# Patient Record
Sex: FEMALE | Race: BLACK OR AFRICAN AMERICAN | NOT HISPANIC OR LATINO | Employment: UNEMPLOYED | URBAN - METROPOLITAN AREA
[De-identification: names, ages, dates, MRNs, and addresses within clinical notes are randomized per-mention and may not be internally consistent; named-entity substitution may affect disease eponyms.]

---

## 2017-03-09 RX ORDER — LABETALOL 100 MG/1
TABLET, FILM COATED ORAL
Qty: 90 TABLET | Refills: 4 | Status: SHIPPED | OUTPATIENT
Start: 2017-03-09 | End: 2018-01-03

## 2017-04-04 ENCOUNTER — TELEPHONE (OUTPATIENT)
Dept: INTERNAL MEDICINE | Facility: CLINIC | Age: 62
End: 2017-04-04

## 2017-04-04 NOTE — TELEPHONE ENCOUNTER
----- Message from Dawson Walls sent at 4/4/2017 12:56 PM CDT -----  Contact: Self [phone] Or [phone]  Type: Orders Request    What orders/ testing are being requested? Mammo Screening     Is there a future appointment scheduled for the patient with PCP? No    Comments:Please call when done, advice    Thanks

## 2017-04-07 ENCOUNTER — TELEPHONE (OUTPATIENT)
Dept: INTERNAL MEDICINE | Facility: CLINIC | Age: 62
End: 2017-04-07

## 2017-04-07 DIAGNOSIS — Z12.31 OTHER SCREENING MAMMOGRAM: Primary | ICD-10-CM

## 2017-04-07 NOTE — TELEPHONE ENCOUNTER
----- Message from Anushka Palafox sent at 4/7/2017 11:56 AM CDT -----  Contact: self  Pt is calling to request an order for a mammogram to be put into the system.  She can be reached at [phone]

## 2017-04-10 NOTE — TELEPHONE ENCOUNTER
Contacted patient and informed her the mammo order was in... Patient stated she would call and schedule the appointment after she checks schedule

## 2017-05-02 ENCOUNTER — HOSPITAL ENCOUNTER (OUTPATIENT)
Dept: RADIOLOGY | Facility: OTHER | Age: 62
Discharge: HOME | End: 2017-05-02
Attending: INTERNAL MEDICINE
Payer: MEDICARE

## 2017-05-02 DIAGNOSIS — Z12.31 OTHER SCREENING MAMMOGRAM: ICD-10-CM

## 2017-05-02 PROCEDURE — 77067 SCR MAMMO BI INCL CAD: CPT | Mod: 26,,, | Performed by: RADIOLOGY

## 2017-05-02 PROCEDURE — 77067 SCR MAMMO BI INCL CAD: CPT | Mod: TC

## 2017-05-02 PROCEDURE — 77063 BREAST TOMOSYNTHESIS BI: CPT | Mod: 26,,, | Performed by: RADIOLOGY

## 2017-06-19 RX ORDER — VALSARTAN 80 MG/1
80 TABLET ORAL DAILY
Qty: 90 TABLET | Refills: 4 | Status: SHIPPED | OUTPATIENT
Start: 2017-06-19 | End: 2017-11-29 | Stop reason: SDUPTHER

## 2017-06-19 NOTE — TELEPHONE ENCOUNTER
----- Message from Ryann Hugo sent at 6/19/2017  9:11 AM CDT -----  Contact: [place] [phone]  Prescription Request:     Name of medication: valsartan (DIOVAN) 80 MG tablet    Reason for request: Refill    Pharmacy: [facility] [address] [place] [address]    Please advise.    Thanks\

## 2017-09-13 ENCOUNTER — TELEPHONE (OUTPATIENT)
Dept: INTERNAL MEDICINE | Facility: CLINIC | Age: 62
End: 2017-09-13

## 2017-09-13 DIAGNOSIS — F20.0 PARANOID SCHIZOPHRENIA: Primary | ICD-10-CM

## 2017-09-13 DIAGNOSIS — I10 ESSENTIAL HYPERTENSION: ICD-10-CM

## 2017-09-13 NOTE — TELEPHONE ENCOUNTER
----- Message from Saúl Pompa MA sent at 9/13/2017  9:53 AM CDT -----  Contact: self - [phone]  Type: Orders Request    What orders/ testing are being requested? Labs     Is there a future appointment scheduled for the patient with PCP? Yes     When? 10/27/17     Comments: Please call. Thanks!

## 2017-09-13 NOTE — TELEPHONE ENCOUNTER
----- Message from Saúl Pompa MA sent at 9/13/2017  9:53 AM CDT -----  Contact: self - [phone]  Type: Orders Request    What orders/ testing are being requested? Labs     Is there a future appointment scheduled for the patient with PCP? Yes     When? 10/27/17     Comments: Please call. Thanks!

## 2017-09-19 ENCOUNTER — OFFICE VISIT (OUTPATIENT)
Dept: OPTOMETRY | Facility: CLINIC | Age: 62
End: 2017-09-19
Payer: MEDICARE

## 2017-09-19 DIAGNOSIS — Z83.511 FAMILY HISTORY OF GLAUCOMA IN MOTHER: ICD-10-CM

## 2017-09-19 DIAGNOSIS — H52.4 BILATERAL PRESBYOPIA: ICD-10-CM

## 2017-09-19 DIAGNOSIS — H25.13 NUCLEAR SCLEROTIC CATARACT OF BOTH EYES: Primary | ICD-10-CM

## 2017-09-19 PROCEDURE — 92014 COMPRE OPH EXAM EST PT 1/>: CPT | Mod: S$GLB,,, | Performed by: OPTOMETRIST

## 2017-09-19 PROCEDURE — 99499 UNLISTED E&M SERVICE: CPT | Mod: S$PBB,,, | Performed by: OPTOMETRIST

## 2017-09-19 PROCEDURE — 92015 DETERMINE REFRACTIVE STATE: CPT | Mod: S$GLB,,, | Performed by: OPTOMETRIST

## 2017-09-19 PROCEDURE — 99999 PR PBB SHADOW E&M-EST. PATIENT-LVL II: CPT | Mod: PBBFAC,,, | Performed by: OPTOMETRIST

## 2017-09-19 NOTE — PATIENT INSTRUCTIONS
I have given you a new glasses prescription. It is different than the glasses you are used to, so please expect it to take up to 1 week to adjust to the new prescription. Please let me know if you experience any problems after this time.     ==============================================    CATARACT    Symptoms and Signs:  A cataract starts out small, and at first has little effect on your vision. You may notice that your vision is blurred a little, like looking through a cloudy piece of glass or viewing an impressionist painting. A cataract may make light from the sun or a lamp seem too bright or glaring. Or you may notice when you drive at night that the oncoming headlights cause more glare than before. Colors may not appear as bright as they once did.  The type of cataract you have will affect exactly which symptoms you experience and how soon they will occur. When a nuclear cataract first develops it can bring about a temporary improvement in your near vision, called second sight. Unfortunately, the improved vision is short-lived and will disappear as the cataract worsens. Meanwhile, a sub-capsular cataract may not produce any symptoms until it's well-developed.    Causes:  No one knows for sure why the eye's lens changes as we age, forming cataracts. Researchers are gradually identifying factors that may cause cataracts - and information that may help to prevent them.  Many studies suggest that exposure to ultraviolet light is associated with cataracts, so eye care practitioners recommend wearing sunglasses and a wide-brimmed hat to lessen your exposure.  Other studies suggest people with diabetes are at risk for developing a cataract.   Some eye care practitioners believe that a diet high in antioxidants, such as beta-carotene (vitamin A), selenium and vitamins C and E, may forestall cataracts.  The most important of these is probably vitamin C; it might be helpful to supplement the diet with an extra Vitamin  C tablet.  Meanwhile, eating a lot of salt may increase your risk.  Other risk factors include cigarette smoke, air pollution and heavy alcohol consumption.  We simply recommend that you be careful to use sunglasses and to take Vitamin C.    Treatment:  When symptoms begin to appear, we can improve your vision for a while using new glasses, strong bifocals, magnification, appropriate lighting or other visual aids.  This is true in your case; your cataract does not impact your vision very much at this time. If you experience any of the symptoms we described you can return at any time. Otherwise it is fine to see you in 1 year.

## 2017-09-19 NOTE — PROGRESS NOTES
HPI     Ms. Janie Zamorano is here for annual comprehensive eye exam     Patient complains of wanting a prescription for glasses. She never   purchased glasses from her last prescription, she just continued to wear   +1.25 OTC readers.  She reports mild distance blur without glasses, and clear near vision with   +1.25 OTC readers.     (-)drops  (-)flashes  (+)floaters: OD>OS, has started seeing floaters in the last 12 months   (-)diplopia    Diabetic no  Hemoglobin A1C       Date                     Value               Ref Range             Status                01/27/2016               5.8                 4.5 - 6.2 %           Final            ----------    OCULAR HISTORY  Last Eye Exam 01/27/16 with Dr. Mohan  (-)eye surgery   Cataracts OU    FAMILY HISTORY  (+)Glaucoma: mother and sister       Last edited by Yara Mohan, OD on 9/19/2017  2:32 PM. (History)            Assessment /Plan     For exam results, see Encounter Report.    Nuclear sclerotic cataract of both eyes   Not visually significant OU. Monitor.      Bilateral presbyopia   New glasses prescription released, adaptation expected.  Discussed lens options.  Eyeglass Final Rx     Eyeglass Final Rx       Sphere Cylinder Axis Dist VA Add    Right -0.25 Sphere  20/20-1 +2.00    Left -0.50 +0.50 150 20/20 +2.00    Expiration Date:  9/20/2018            Family history of glaucoma in mother   No signs of glaucoma today OU. Monitor yearly.      RTC 1 year

## 2017-10-04 RX ORDER — PANTOPRAZOLE SODIUM 40 MG/1
TABLET, DELAYED RELEASE ORAL
Qty: 90 TABLET | Refills: 0 | Status: SHIPPED | OUTPATIENT
Start: 2017-10-04 | End: 2017-11-29 | Stop reason: SDUPTHER

## 2017-10-27 ENCOUNTER — TELEPHONE (OUTPATIENT)
Dept: INTERNAL MEDICINE | Facility: CLINIC | Age: 62
End: 2017-10-27

## 2017-10-27 ENCOUNTER — LAB VISIT (OUTPATIENT)
Dept: LAB | Facility: HOSPITAL | Age: 62
End: 2017-10-27
Attending: INTERNAL MEDICINE
Payer: MEDICARE

## 2017-10-27 ENCOUNTER — OFFICE VISIT (OUTPATIENT)
Dept: INTERNAL MEDICINE | Facility: CLINIC | Age: 62
End: 2017-10-27
Payer: MEDICARE

## 2017-10-27 ENCOUNTER — IMMUNIZATION (OUTPATIENT)
Dept: INTERNAL MEDICINE | Facility: CLINIC | Age: 62
End: 2017-10-27
Payer: MEDICARE

## 2017-10-27 VITALS
DIASTOLIC BLOOD PRESSURE: 88 MMHG | WEIGHT: 166.25 LBS | BODY MASS INDEX: 28.38 KG/M2 | HEART RATE: 73 BPM | SYSTOLIC BLOOD PRESSURE: 160 MMHG | HEIGHT: 64 IN

## 2017-10-27 DIAGNOSIS — R07.9 CHEST PAIN, UNSPECIFIED TYPE: ICD-10-CM

## 2017-10-27 DIAGNOSIS — K29.60 REFLUX GASTRITIS: ICD-10-CM

## 2017-10-27 DIAGNOSIS — I10 ESSENTIAL HYPERTENSION: ICD-10-CM

## 2017-10-27 DIAGNOSIS — J45.20 MILD INTERMITTENT ASTHMA WITHOUT COMPLICATION: ICD-10-CM

## 2017-10-27 DIAGNOSIS — F20.0 PARANOID SCHIZOPHRENIA: Primary | ICD-10-CM

## 2017-10-27 LAB
ALBUMIN SERPL BCP-MCNC: 3.4 G/DL
ALP SERPL-CCNC: 99 U/L
ALT SERPL W/O P-5'-P-CCNC: 16 U/L
ANION GAP SERPL CALC-SCNC: 6 MMOL/L
AST SERPL-CCNC: 18 U/L
BASOPHILS # BLD AUTO: 0.02 K/UL
BASOPHILS NFR BLD: 0.4 %
BILIRUB SERPL-MCNC: 0.4 MG/DL
BUN SERPL-MCNC: 13 MG/DL
CALCIUM SERPL-MCNC: 9.9 MG/DL
CHLORIDE SERPL-SCNC: 104 MMOL/L
CHOLEST SERPL-MCNC: 179 MG/DL
CHOLEST/HDLC SERPL: 4.5 {RATIO}
CO2 SERPL-SCNC: 29 MMOL/L
CREAT SERPL-MCNC: 0.8 MG/DL
DIFFERENTIAL METHOD: ABNORMAL
EOSINOPHIL # BLD AUTO: 0.2 K/UL
EOSINOPHIL NFR BLD: 3.3 %
ERYTHROCYTE [DISTWIDTH] IN BLOOD BY AUTOMATED COUNT: 13.9 %
EST. GFR  (AFRICAN AMERICAN): >60 ML/MIN/1.73 M^2
EST. GFR  (NON AFRICAN AMERICAN): >60 ML/MIN/1.73 M^2
ESTIMATED AVG GLUCOSE: 111 MG/DL
GLUCOSE SERPL-MCNC: 83 MG/DL
HBA1C MFR BLD HPLC: 5.5 %
HCT VFR BLD AUTO: 33.4 %
HDLC SERPL-MCNC: 40 MG/DL
HDLC SERPL: 22.3 %
HGB BLD-MCNC: 10.6 G/DL
LDLC SERPL CALC-MCNC: 108.8 MG/DL
LYMPHOCYTES # BLD AUTO: 1.3 K/UL
LYMPHOCYTES NFR BLD: 28.1 %
MCH RBC QN AUTO: 28.3 PG
MCHC RBC AUTO-ENTMCNC: 31.7 G/DL
MCV RBC AUTO: 89 FL
MONOCYTES # BLD AUTO: 0.5 K/UL
MONOCYTES NFR BLD: 11.9 %
NEUTROPHILS # BLD AUTO: 2.5 K/UL
NEUTROPHILS NFR BLD: 55.4 %
NONHDLC SERPL-MCNC: 139 MG/DL
NRBC BLD-RTO: 0 /100 WBC
PLATELET # BLD AUTO: 271 K/UL
PMV BLD AUTO: 10.1 FL
POTASSIUM SERPL-SCNC: 4.4 MMOL/L
PROT SERPL-MCNC: 6.9 G/DL
RBC # BLD AUTO: 3.74 M/UL
SODIUM SERPL-SCNC: 139 MMOL/L
TRIGL SERPL-MCNC: 151 MG/DL
WBC # BLD AUTO: 4.52 K/UL

## 2017-10-27 PROCEDURE — 99214 OFFICE O/P EST MOD 30 MIN: CPT | Mod: S$GLB,,, | Performed by: INTERNAL MEDICINE

## 2017-10-27 PROCEDURE — 83036 HEMOGLOBIN GLYCOSYLATED A1C: CPT

## 2017-10-27 PROCEDURE — G0008 ADMIN INFLUENZA VIRUS VAC: HCPCS | Mod: S$GLB,,, | Performed by: INTERNAL MEDICINE

## 2017-10-27 PROCEDURE — 99499 UNLISTED E&M SERVICE: CPT | Mod: S$PBB,,, | Performed by: INTERNAL MEDICINE

## 2017-10-27 PROCEDURE — 99999 PR PBB SHADOW E&M-EST. PATIENT-LVL IV: CPT | Mod: PBBFAC,,, | Performed by: INTERNAL MEDICINE

## 2017-10-27 PROCEDURE — 36415 COLL VENOUS BLD VENIPUNCTURE: CPT

## 2017-10-27 PROCEDURE — 90686 IIV4 VACC NO PRSV 0.5 ML IM: CPT | Mod: S$GLB,,, | Performed by: INTERNAL MEDICINE

## 2017-10-27 PROCEDURE — 80061 LIPID PANEL: CPT

## 2017-10-27 PROCEDURE — 85025 COMPLETE CBC W/AUTO DIFF WBC: CPT

## 2017-10-27 PROCEDURE — 86803 HEPATITIS C AB TEST: CPT

## 2017-10-27 PROCEDURE — 80053 COMPREHEN METABOLIC PANEL: CPT

## 2017-10-27 RX ORDER — RISPERIDONE 1 MG/1
TABLET ORAL DAILY
COMMUNITY
Start: 2017-09-15 | End: 2018-05-14

## 2017-10-27 NOTE — TELEPHONE ENCOUNTER
----- Message from Rebeca Castillo sent at 10/27/2017  1:59 PM CDT -----  Pt is in need of a 1 mth f/u with Dr Hoyt

## 2017-10-30 LAB — HCV AB SERPL QL IA: NEGATIVE

## 2017-11-01 NOTE — PROGRESS NOTES
HISTORY OF PRESENT ILLNESS:  She is a 61-year-old female coming in today to   follow up her ongoing medical problems.  She had a stress test at [facility]   about a month ago due to some chest pain she was having.  Chest pain has been   three months of tightness in her chest that eventually gets better, last for   several hours.  Chest pains overall has been doing better.  She had chest pain   both at rest and with activity.  She does have a history of asthma, but she does   not feel like this was due to asthma.  Overall this has gotten better since she   had a stress test.  She does not know the results of her stress test because   she did not go back to [facility].  They have been calling her for followup   either.  She has a history of schizophrenia as she likes to call a little   schizophrenia.  She is on Risperdal for this.  She says she has been doing   relatively well with this.  She is also taking the Cogentin and she says she is   taking her medications.  She denies any suicidal or homicidal thoughts or   ideations.  She has hypertension, which she has had for several years.  Blood   pressure today is 160/88, but she did not take her medication for seeing me   today.  She is supposed to be on labetalol 100 twice a day and valsartan 80 mg a   day.  She has reflux for which she takes Protonix and she says that has been   doing well with the Protonix.  She just have to avoid eating chocolate and spicy   foods and she has asthma.  She uses albuterol p.r.n. and Advair every day.  She   has about once or twice a month where she wakes up because of asthma, but she   says overall that has been doing a lot better.    REVIEW OF SYSTEMS:  She denies any chest pain at the current time.  She just   occasionally has chest pain she was having when she went to [facility] and that   is getting much better.  She has no nausea, vomiting, palpitations, no PND or   orthopnea.  No hallucinations either auditory or  visual.    PHYSICAL EXAMINATION:  GENERAL:  Well-appearing 61-year-old female in no acute distress.  NECK:  Supple.  She has no JVD.  Thyroid is not enlarged.  CARDIOVASCULAR:  S1 and S2, regular rate and rhythm without murmur, gallop or   rub.  ABDOMEN:  Soft, nontender, no hepatosplenomegaly.  She is well dressed, well kempt, not disheveled, alert and oriented x4, does not   seem depressed.    ASSESSMENT:  1.  Chest pain, unspecific.  It is not really typical chest pain.  We will try   to get [facility].  We went over warning signs and   things to watch out for.  If it to get worse, please come in the Emergency Room.  2.  Schizophrenia.  She follows with Psychiatry.  3.  Hypertension.  Blood pressure is not controlled.  We are going to bring her   back in the next two months to recheck the blood pressure when she takes her   medicine.  4.  Reflux.  Continue the Protonix.  5.  Mild intermittent asthma without complication.  Continue current   medications.  We will follow her up again in a couple of months.  We will check   a CBC, comprehensive metabolic panel, hemoglobin A1c and she is also due for a   hepatitis C antibody, so we will get that.  I will follow up her again as   scheduled in the next couple of months.  We will have to refer to   Gastroenterology since her chest pain seems more related to GI, but we will   continue PPI.  I will follow up with her again in two months.      TEE  dd: 10/31/2017 22:26:45 (CDT)  td: 11/01/2017 12:49:42 (CDT)  Doc ID   #[id number]  Job ID #[id number]    CC:

## 2017-11-29 ENCOUNTER — OFFICE VISIT (OUTPATIENT)
Dept: INTERNAL MEDICINE | Facility: CLINIC | Age: 62
End: 2017-11-29
Payer: MEDICARE

## 2017-11-29 VITALS
HEART RATE: 77 BPM | BODY MASS INDEX: 27.67 KG/M2 | DIASTOLIC BLOOD PRESSURE: 80 MMHG | HEIGHT: 64 IN | SYSTOLIC BLOOD PRESSURE: 160 MMHG | OXYGEN SATURATION: 96 % | WEIGHT: 162.06 LBS

## 2017-11-29 DIAGNOSIS — I10 ESSENTIAL HYPERTENSION: ICD-10-CM

## 2017-11-29 DIAGNOSIS — R07.89 OTHER CHEST PAIN: ICD-10-CM

## 2017-11-29 DIAGNOSIS — F20.0 PARANOID SCHIZOPHRENIA: Primary | ICD-10-CM

## 2017-11-29 DIAGNOSIS — J45.20 MILD INTERMITTENT ASTHMA WITHOUT COMPLICATION: ICD-10-CM

## 2017-11-29 PROCEDURE — 99499 UNLISTED E&M SERVICE: CPT | Mod: S$PBB,,, | Performed by: INTERNAL MEDICINE

## 2017-11-29 PROCEDURE — 99214 OFFICE O/P EST MOD 30 MIN: CPT | Mod: S$GLB,,, | Performed by: INTERNAL MEDICINE

## 2017-11-29 PROCEDURE — 99999 PR PBB SHADOW E&M-EST. PATIENT-LVL III: CPT | Mod: PBBFAC,,, | Performed by: INTERNAL MEDICINE

## 2017-11-29 RX ORDER — VALSARTAN 160 MG/1
160 TABLET ORAL DAILY
Qty: 90 TABLET | Refills: 3 | Status: SHIPPED | OUTPATIENT
Start: 2017-11-29 | End: 2018-07-02

## 2017-11-29 RX ORDER — FLUTICASONE PROPIONATE AND SALMETEROL 100; 50 UG/1; UG/1
1 POWDER RESPIRATORY (INHALATION)
COMMUNITY
Start: 2015-07-12 | End: 2017-11-29 | Stop reason: SDUPTHER

## 2017-11-29 RX ORDER — FLUOCINOLONE ACETONIDE 0.11 MG/ML
3 OIL AURICULAR (OTIC)
COMMUNITY
Start: 2014-08-06 | End: 2018-05-25

## 2017-11-29 RX ORDER — PANTOPRAZOLE SODIUM 40 MG/1
TABLET, DELAYED RELEASE ORAL
Qty: 90 TABLET | Refills: 3 | Status: SHIPPED | OUTPATIENT
Start: 2017-11-29 | End: 2017-12-19 | Stop reason: RX

## 2017-11-29 RX ORDER — FLUTICASONE PROPIONATE 50 MCG
1 SPRAY, SUSPENSION (ML) NASAL DAILY
Qty: 1 BOTTLE | Refills: 4 | Status: SHIPPED | OUTPATIENT
Start: 2017-11-29 | End: 2018-10-01

## 2017-11-29 RX ORDER — ALBUTEROL SULFATE 90 UG/1
2 AEROSOL, METERED RESPIRATORY (INHALATION) EVERY 6 HOURS PRN
Qty: 1 EACH | Refills: 11 | Status: SHIPPED | OUTPATIENT
Start: 2017-11-29 | End: 2018-10-01

## 2017-11-29 RX ORDER — FLUTICASONE PROPIONATE AND SALMETEROL 100; 50 UG/1; UG/1
1 POWDER RESPIRATORY (INHALATION) 2 TIMES DAILY
Qty: 1 EACH | Refills: 9 | Status: ON HOLD | OUTPATIENT
Start: 2017-11-29 | End: 2018-08-06

## 2017-12-11 NOTE — PROGRESS NOTES
HISTORY OF PRESENT ILLNESS:  She is a 62-year-old female coming in today.  She   had some chest pain and ended up having a stress test over at [facility] about a   month ago.  She does not really remember the results of that stress test.  She   is not sure if she went back to see them.  I could not find that on Care   Everywhere.  When I saw her in October, we send off request for her medical   records, but we have not gotten that back yet.  She has not had anymore chest   pain.  She said after when she had her chest pain three months ago and the   tightness in chest and she took some [place] and ate and felt a little bit   better.  She has some paranoid schizophrenia or what she calls a little bit of   schizophrenia.  She is following at a local health center.  She is taking the   Cogentin, Risperdal and thinks she is doing well.  She has hypertension.  When I   saw her last time, blood pressure was 160/88.  Today, her blood pressure is   again 160/80.  She is on labetalol 100 and valsartan 80 mg a day.  She denies   any fevers or chills.  No chest pain.  No headaches.  She also has history of   intermittent asthma for which she uses albuterol on a p.r.n. basis about three   times a week.  She does not have to wake up at night because of chest tightness.    PHYSICAL EXAMINATION:  GENERAL:  She is a well-appearing 62-year-old female in no acute distress.  She   is dressed well.  No signs of reacting to any internal stimulus.  NECK:  Supple.  She has no JVD.  Thyroid is not enlarged.  CARDIOVASCULAR:  S1 and S2, regular rate and rhythm without murmur, gallop or   rub.  ABDOMEN:  Soft, nontender.  LUNGS:  Clear without wheeze.  LOWER EXTREMITIES:  No edema.    ASSESSMENT:  1.  Paranoid schizophrenia.  She is going to follow with her psychiatrist.    Continue Cogentin and Risperdal.  2.  Hypertension, not well controlled.  We are going to increase her valsartan   from 80 to 160.  3.  Mild intermittent asthma with  complication.  Continue albuterol and make   sure she got a flu shot.  4.  Chest pain, which resolved.  It might have been asthma, I am a little   unsure.  We are going to send off for her stress echo again.  From what she   tells me it does not sound very typical for angina.  We are going to work on   controlling her blood pressure, send off for medical records again at [facility].  I am going to have my NA about the medical record forms filled for her   and I will follow her up again in one month to recheck the blood pressure.      TOMAS/GLENNA  dd: 12/10/2017 22:42:31 (CST)  td: 12/11/2017 06:58:40 (CST)  Doc ID   #[id number]  Job ID #[id number]    CC:

## 2017-12-19 ENCOUNTER — OFFICE VISIT (OUTPATIENT)
Dept: GASTROENTEROLOGY | Facility: CLINIC | Age: 62
End: 2017-12-19
Payer: MEDICARE

## 2017-12-19 VITALS
WEIGHT: 163.81 LBS | HEIGHT: 64 IN | DIASTOLIC BLOOD PRESSURE: 92 MMHG | HEART RATE: 81 BPM | SYSTOLIC BLOOD PRESSURE: 140 MMHG | BODY MASS INDEX: 27.96 KG/M2

## 2017-12-19 DIAGNOSIS — K21.9 GASTROESOPHAGEAL REFLUX DISEASE, ESOPHAGITIS PRESENCE NOT SPECIFIED: Primary | ICD-10-CM

## 2017-12-19 DIAGNOSIS — Z12.11 ENCOUNTER FOR COLORECTAL CANCER SCREENING: Primary | ICD-10-CM

## 2017-12-19 DIAGNOSIS — Z12.12 ENCOUNTER FOR COLORECTAL CANCER SCREENING: Primary | ICD-10-CM

## 2017-12-19 DIAGNOSIS — Z12.11 COLON CANCER SCREENING: ICD-10-CM

## 2017-12-19 DIAGNOSIS — R13.19 ESOPHAGEAL DYSPHAGIA: ICD-10-CM

## 2017-12-19 PROCEDURE — 99499 UNLISTED E&M SERVICE: CPT | Mod: S$PBB,,, | Performed by: NURSE PRACTITIONER

## 2017-12-19 PROCEDURE — 99999 PR PBB SHADOW E&M-EST. PATIENT-LVL III: CPT | Mod: PBBFAC,,, | Performed by: NURSE PRACTITIONER

## 2017-12-19 PROCEDURE — 99204 OFFICE O/P NEW MOD 45 MIN: CPT | Mod: S$GLB,,, | Performed by: NURSE PRACTITIONER

## 2017-12-19 RX ORDER — PANTOPRAZOLE SODIUM 40 MG/1
40 TABLET, DELAYED RELEASE ORAL DAILY
Qty: 90 TABLET | Refills: 3 | Status: ON HOLD | OUTPATIENT
Start: 2017-12-19 | End: 2018-01-08 | Stop reason: DRUGHIGH

## 2017-12-19 RX ORDER — POLYETHYLENE GLYCOL 3350, SODIUM SULFATE ANHYDROUS, SODIUM BICARBONATE, SODIUM CHLORIDE, POTASSIUM CHLORIDE 236; 22.74; 6.74; 5.86; 2.97 G/4L; G/4L; G/4L; G/4L; G/4L
4 POWDER, FOR SOLUTION ORAL ONCE
Qty: 4000 ML | Refills: 0 | Status: SHIPPED | OUTPATIENT
Start: 2017-12-19 | End: 2017-12-19

## 2017-12-19 NOTE — PROGRESS NOTES
[facility] Note    Reason for Visit:  The primary encounter diagnosis was Gastroesophageal reflux disease, esophagitis presence not specified. Diagnoses of Esophageal dysphagia and Colon cancer screening were also pertinent to this visit.    PCP:   He Hoyt   [address] / [place] [address]    Referring MD:  He Hoyt Jr., Md  [address] [place] [address]    HPI:  This is a 62 y.o. female here for evaluation of Gerd. Ms. Zamorano is new to the clinic.     Pt reports if eats fast will feel food get stuck in cervical esophagus. Recently occurring a couple times per week before was rarely ocucring. Will occasionally induce vomiting due to eating too fast. Has been painful to swallow for a year. Denies difficulty swallowing liquids, CP, and SOB. Hx of Gerd x years. Was taking Protonix 40 mg daily and has not been taking for a couple months. While taking reflux symptoms regurgitaion, epigastric pain, acididc taste were controlled currently not controlled. NSAID usage- Occasional taking Ibuprofen.     Normal formed stool daily. Denies blood in stool and melena     ROS:  Constitutional: No fevers, no chills, No unintentional weight loss, no fatigue   ENT: No allergies  CV: No chest pain, no palpitations, no perif. edema  Pulm: No cough, No shortness of breath, no wheezes, no sputum  Ophtho: No vision changes  GI: see HPI; also no nausea, no vomiting, no change in appetite  Derm: No rash  Heme: No lymphadenopathy, No bruising  MSK: No arthritis, no muscle pain, no muscle weakness  : No dysuria, No hematuria  Endo: No hot or cold intolerance  Neuro: No syncope, No seizure     Medical History:  has a past medical history of Asthma; Depression; GERD (gastroesophageal reflux disease); Hypertension; Neck pain; and Schizophrenia.    Surgical History:  has a past surgical history that includes  section; Lipoma resection; and Hysterectomy (2016).    Family History:  "family history includes Breast cancer in her mother; COPD in her father; Diabetes in her brother; Glaucoma in her mother and sister; Hypertension in her father and mother; Macular degeneration in her mother..     Social History:  reports that she has never smoked. She has never used smokeless tobacco. She reports that she does not drink alcohol.    Review of patient's allergies indicates:   Allergen Reactions    Shellfish containing products Itching     Current Outpatient Prescriptions   Medication Sig    albuterol 90 mcg/actuation inhaler Inhale 2 puffs into the lungs every 6 (six) hours as needed for Wheezing.    benztropine (COGENTIN) 1 MG tablet TK 1 T PO  BID    fluocinolone acetonide oil 0.01 % Drop Place 3 drops in ear(s).    fluticasone (FLONASE) 50 mcg/actuation nasal spray 1 spray by Each Nare route once daily.    fluticasone-salmeterol 100-50 mcg/dose (ADVAIR) 100-50 mcg/dose diskus inhaler Inhale 1 puff into the lungs 2 (two) times daily.    labetalol (NORMODYNE) 100 MG tablet TAKE 1 TABLET BY MOUTH TWICE DAILY    methocarbamol (ROBAXIN) 500 MG Tab TK 1 T PO  TID    methocarbamol (ROBAXIN) 750 MG Tab Take 1 tablet (750 mg total) by mouth 3 (three) times daily as needed (Muscle spasm.).    risperiDONE (RISPERDAL) 1 MG tablet     valsartan (DIOVAN) 160 MG tablet Take 1 tablet (160 mg total) by mouth once daily.    ondansetron (ZOFRAN) 4 MG tablet TK 1 T PO  Q 6 H    pantoprazole (PROTONIX) 40 MG tablet Take 1 tablet (40 mg total) by mouth once daily. Take 30-45 minutes before first protein containing meal.     No current facility-administered medications for this visit.      Objective Findings:    Vital Signs:  BP (!) 140/92   Pulse 81   Ht 5' 4" (1.626 m)   Wt 74.3 kg (163 lb 12.8 oz)   LMP  (LMP Unknown)   BMI 28.12 kg/m²   Body mass index is 28.12 kg/m².    Physical Exam:  General Appearance: Well appearing in no acute distress  Head: Normocephalic, without obvious abnormality  Eyes: " No scleral icterus, EOMI  ENT: Neck supple, Lips, mucosa, and tongue normal; teeth and gums normal  Lungs: CTA bilaterally in anterior and posterior fields, no wheezes, no crackles.  Heart: Regular rate and rhythm, no murmurs heard  Abdomen: Soft, non tender, non distended with positive bowel sounds in all four quadrants.  Extremities: No clubbing, cyanosis or edema  Skin: No rash to exposed areas  Neurologic: AAOx4    Labs:  Lab Results   Component Value Date    WBC 4.52 10/27/2017    HGB 10.6 (L) 10/27/2017    HCT 33.4 (L) 10/27/2017     10/27/2017    CHOL 179 10/27/2017    TRIG 151 (H) 10/27/2017    HDL 40 10/27/2017    ALT 16 10/27/2017    AST 18 10/27/2017     10/27/2017    K 4.4 10/27/2017     10/27/2017    CREATININE 0.8 10/27/2017    BUN 13 10/27/2017    CO2 29 10/27/2017    TSH 2.293 01/27/2016    HGBA1C 5.5 10/27/2017     Endoscopy:    EGD- Pt reports done years ago and normal.     Colonoscopy- 2009 Normal. Repeat in 7 years.     Assessment:  1. Gastroesophageal reflux disease, esophagitis presence not specified    2. Esophageal dysphagia    3. Colon cancer screening      Recommendations:  1. & 2. Schedule EGD to rule out esophagitis and stricture. Begin taking Protonix 40 mg daily as directed, sent rx to pharmacy   3. Schedule colonoscopy to rule out malignancies.     Return in about 6 weeks (around 1/30/2018).    Order summary:  Orders Placed This Encounter    pantoprazole (PROTONIX) 40 MG tablet    Case request GI: ESOPHAGOGASTRODUODENOSCOPY (EGD), COLONOSCOPY     Thank you so much for allowing me to participate in the care of ANDERS Ambriz, FNP-C

## 2018-01-03 RX ORDER — LABETALOL 100 MG/1
TABLET, FILM COATED ORAL
Qty: 180 TABLET | Refills: 4 | Status: ON HOLD | OUTPATIENT
Start: 2018-01-03 | End: 2018-08-20 | Stop reason: HOSPADM

## 2018-01-08 ENCOUNTER — ANESTHESIA (OUTPATIENT)
Dept: ENDOSCOPY | Facility: HOSPITAL | Age: 63
End: 2018-01-08
Payer: MEDICARE

## 2018-01-08 ENCOUNTER — ANESTHESIA EVENT (OUTPATIENT)
Dept: ENDOSCOPY | Facility: HOSPITAL | Age: 63
End: 2018-01-08
Payer: MEDICARE

## 2018-01-08 ENCOUNTER — TELEPHONE (OUTPATIENT)
Dept: GASTROENTEROLOGY | Facility: CLINIC | Age: 63
End: 2018-01-08

## 2018-01-08 ENCOUNTER — TELEPHONE (OUTPATIENT)
Dept: ENDOSCOPY | Facility: HOSPITAL | Age: 63
End: 2018-01-08

## 2018-01-08 ENCOUNTER — SURGERY (OUTPATIENT)
Age: 63
End: 2018-01-08

## 2018-01-08 ENCOUNTER — HOSPITAL ENCOUNTER (OUTPATIENT)
Facility: HOSPITAL | Age: 63
Discharge: HOME | End: 2018-01-08
Attending: INTERNAL MEDICINE | Admitting: INTERNAL MEDICINE
Payer: MEDICARE

## 2018-01-08 VITALS
TEMPERATURE: 98 F | SYSTOLIC BLOOD PRESSURE: 149 MMHG | BODY MASS INDEX: 27.31 KG/M2 | DIASTOLIC BLOOD PRESSURE: 91 MMHG | OXYGEN SATURATION: 96 % | WEIGHT: 160 LBS | HEIGHT: 64 IN | RESPIRATION RATE: 20 BRPM | HEART RATE: 77 BPM

## 2018-01-08 DIAGNOSIS — Z79.899 ENCOUNTER FOR MONITORING LONG-TERM PROTON PUMP INHIBITOR THERAPY: ICD-10-CM

## 2018-01-08 DIAGNOSIS — K21.9 GASTROESOPHAGEAL REFLUX DISEASE, ESOPHAGITIS PRESENCE NOT SPECIFIED: ICD-10-CM

## 2018-01-08 DIAGNOSIS — Z51.81 ENCOUNTER FOR MONITORING LONG-TERM PROTON PUMP INHIBITOR THERAPY: ICD-10-CM

## 2018-01-08 DIAGNOSIS — K44.9 HIATAL HERNIA: ICD-10-CM

## 2018-01-08 DIAGNOSIS — K21.00 GASTROESOPHAGEAL REFLUX DISEASE WITH ESOPHAGITIS: Primary | ICD-10-CM

## 2018-01-08 DIAGNOSIS — K57.32 DIVERTICULITIS OF LARGE INTESTINE WITHOUT PERFORATION OR ABSCESS WITHOUT BLEEDING: Primary | ICD-10-CM

## 2018-01-08 DIAGNOSIS — K57.30 DIVERTICULA OF COLON: ICD-10-CM

## 2018-01-08 DIAGNOSIS — K21.00 GASTROESOPHAGEAL REFLUX DISEASE WITH ESOPHAGITIS: ICD-10-CM

## 2018-01-08 DIAGNOSIS — K22.10 EROSIVE ESOPHAGITIS: Primary | ICD-10-CM

## 2018-01-08 PROCEDURE — 37000009 HC ANESTHESIA EA ADD 15 MINS: Performed by: INTERNAL MEDICINE

## 2018-01-08 PROCEDURE — 88342 IMHCHEM/IMCYTCHM 1ST ANTB: CPT | Mod: 26,,,

## 2018-01-08 PROCEDURE — 37000008 HC ANESTHESIA 1ST 15 MINUTES: Performed by: INTERNAL MEDICINE

## 2018-01-08 PROCEDURE — D9220A PRA ANESTHESIA: Mod: 33,ANES,, | Performed by: ANESTHESIOLOGY

## 2018-01-08 PROCEDURE — 25000003 PHARM REV CODE 250: Performed by: NURSE ANESTHETIST, CERTIFIED REGISTERED

## 2018-01-08 PROCEDURE — 88305 TISSUE EXAM BY PATHOLOGIST: CPT

## 2018-01-08 PROCEDURE — 45380 COLONOSCOPY AND BIOPSY: CPT | Performed by: INTERNAL MEDICINE

## 2018-01-08 PROCEDURE — 27201012 HC FORCEPS, HOT/COLD, DISP: Performed by: INTERNAL MEDICINE

## 2018-01-08 PROCEDURE — 27201089 HC SNARE, DISP (ANY): Performed by: INTERNAL MEDICINE

## 2018-01-08 PROCEDURE — 45385 COLONOSCOPY W/LESION REMOVAL: CPT | Performed by: INTERNAL MEDICINE

## 2018-01-08 PROCEDURE — 45385 COLONOSCOPY W/LESION REMOVAL: CPT | Mod: PT,,, | Performed by: INTERNAL MEDICINE

## 2018-01-08 PROCEDURE — 63600175 PHARM REV CODE 636 W HCPCS: Performed by: NURSE ANESTHETIST, CERTIFIED REGISTERED

## 2018-01-08 PROCEDURE — 43239 EGD BIOPSY SINGLE/MULTIPLE: CPT | Performed by: INTERNAL MEDICINE

## 2018-01-08 PROCEDURE — 88305 TISSUE EXAM BY PATHOLOGIST: CPT | Mod: 26,,,

## 2018-01-08 PROCEDURE — D9220A PRA ANESTHESIA: Mod: 33,CRNA,, | Performed by: NURSE ANESTHETIST, CERTIFIED REGISTERED

## 2018-01-08 PROCEDURE — 43239 EGD BIOPSY SINGLE/MULTIPLE: CPT | Mod: 51,,, | Performed by: INTERNAL MEDICINE

## 2018-01-08 PROCEDURE — 45380 COLONOSCOPY AND BIOPSY: CPT | Mod: 59,,, | Performed by: INTERNAL MEDICINE

## 2018-01-08 PROCEDURE — 25000003 PHARM REV CODE 250: Performed by: INTERNAL MEDICINE

## 2018-01-08 RX ORDER — PANTOPRAZOLE SODIUM 40 MG/1
40 TABLET, DELAYED RELEASE ORAL EVERY 12 HOURS
Qty: 60 TABLET | Refills: 0 | Status: ON HOLD | OUTPATIENT
Start: 2018-01-08 | End: 2018-03-19 | Stop reason: DRUGHIGH

## 2018-01-08 RX ORDER — LIDOCAINE HCL/PF 100 MG/5ML
SYRINGE (ML) INTRAVENOUS
Status: DISCONTINUED | OUTPATIENT
Start: 2018-01-08 | End: 2018-01-08

## 2018-01-08 RX ORDER — GLYCOPYRROLATE 0.2 MG/ML
INJECTION INTRAMUSCULAR; INTRAVENOUS
Status: DISCONTINUED | OUTPATIENT
Start: 2018-01-08 | End: 2018-01-08

## 2018-01-08 RX ORDER — PROPOFOL 10 MG/ML
VIAL (ML) INTRAVENOUS
Status: DISCONTINUED | OUTPATIENT
Start: 2018-01-08 | End: 2018-01-08

## 2018-01-08 RX ORDER — SODIUM CHLORIDE 9 MG/ML
INJECTION, SOLUTION INTRAVENOUS CONTINUOUS
Status: DISCONTINUED | OUTPATIENT
Start: 2018-01-08 | End: 2018-01-08 | Stop reason: HOSPADM

## 2018-01-08 RX ORDER — AMOXICILLIN AND CLAVULANATE POTASSIUM 875; 125 MG/1; MG/1
1 TABLET, FILM COATED ORAL EVERY 12 HOURS
Qty: 14 TABLET | Refills: 0 | Status: SHIPPED | OUTPATIENT
Start: 2018-01-08 | End: 2018-04-02 | Stop reason: ALTCHOICE

## 2018-01-08 RX ORDER — PROPOFOL 10 MG/ML
VIAL (ML) INTRAVENOUS CONTINUOUS PRN
Status: DISCONTINUED | OUTPATIENT
Start: 2018-01-08 | End: 2018-01-08

## 2018-01-08 RX ADMIN — PROPOFOL 200 MCG/KG/MIN: 10 INJECTION, EMULSION INTRAVENOUS at 03:01

## 2018-01-08 RX ADMIN — LIDOCAINE HYDROCHLORIDE 100 MG: 20 INJECTION, SOLUTION INTRAVENOUS at 03:01

## 2018-01-08 RX ADMIN — SODIUM CHLORIDE: 900 INJECTION, SOLUTION INTRAVENOUS at 01:01

## 2018-01-08 RX ADMIN — PROPOFOL 50 MG: 10 INJECTION, EMULSION INTRAVENOUS at 03:01

## 2018-01-08 RX ADMIN — GLYCOPYRROLATE 0.2 MG: 0.2 INJECTION, SOLUTION INTRAMUSCULAR; INTRAVENOUS at 03:01

## 2018-01-08 NOTE — H&P
[facility]  History & Physical    Subjective:      Chief Complaint/Reason for Admission:     EGD and colonoscopy    Janie Zamorano is a 62 y.o. female.    Past Medical History:   Diagnosis Date    Asthma     Depression     GERD (gastroesophageal reflux disease)     Hypertension     Neck pain     Schizophrenia      Past Surgical History:   Procedure Laterality Date     SECTION      X 1    HYSTERECTOMY  2016    KJB---DLH/BSO    LIPOMA RESECTION      back     Family History   Problem Relation Age of Onset    Hypertension Mother     Glaucoma Mother     Macular degeneration Mother     Breast cancer Mother     COPD Father     Hypertension Father     Diabetes Brother     Glaucoma Sister     Colon cancer Neg Hx     Ovarian cancer Neg Hx      Social History   Substance Use Topics    Smoking status: Never Smoker    Smokeless tobacco: Never Used    Alcohol use No       PTA Medications   Medication Sig    albuterol 90 mcg/actuation inhaler Inhale 2 puffs into the lungs every 6 (six) hours as needed for Wheezing.    benztropine (COGENTIN) 1 MG tablet TK 1 T PO  BID    fluticasone (FLONASE) 50 mcg/actuation nasal spray 1 spray by Each Nare route once daily.    fluticasone-salmeterol 100-50 mcg/dose (ADVAIR) 100-50 mcg/dose diskus inhaler Inhale 1 puff into the lungs 2 (two) times daily.    labetalol (NORMODYNE) 100 MG tablet TAKE 1 TABLET BY MOUTH TWICE DAILY    pantoprazole (PROTONIX) 40 MG tablet Take 1 tablet (40 mg total) by mouth once daily. Take 30-45 minutes before first protein containing meal.    risperiDONE (RISPERDAL) 1 MG tablet     valsartan (DIOVAN) 160 MG tablet Take 1 tablet (160 mg total) by mouth once daily.    fluocinolone acetonide oil 0.01 % Drop Place 3 drops in ear(s).    methocarbamol (ROBAXIN) 500 MG Tab TK 1 T PO  TID    methocarbamol (ROBAXIN) 750 MG Tab Take 1 tablet (750 mg total) by mouth 3 (three) times daily as needed (Muscle  spasm.).    ondansetron (ZOFRAN) 4 MG tablet TK 1 T PO  Q 6 H     Review of patient's allergies indicates:   Allergen Reactions    Shellfish containing products Itching        Review of Systems   Constitutional: Negative for chills, fever and weight loss.   Respiratory: Negative for shortness of breath and wheezing.    Cardiovascular: Negative for chest pain.   Gastrointestinal: Positive for heartburn. Negative for abdominal pain, blood in stool, constipation, diarrhea and melena.       Objective:      Vital Signs (Most Recent)  Temp: 98.1 °F (36.7 °C) (01/08/18 1346)  Pulse: 81 (01/08/18 1346)  Resp: 16 (01/08/18 1346)  BP: 134/82 (01/08/18 1346)  SpO2: 97 % (01/08/18 1346)    Vital Signs Range (Last 24H):  Temp:  [98.1 °F (36.7 °C)]   Pulse:  [81]   Resp:  [16]   BP: (134)/(82)   SpO2:  [97 %]     Physical Exam   Constitutional: She appears well-developed and well-nourished.   Cardiovascular: Normal rate.    Pulmonary/Chest: Effort normal and breath sounds normal.   Abdominal: Soft. Bowel sounds are normal.   Skin: Skin is warm and dry.   Psychiatric: She has a normal mood and affect. Her behavior is normal. Judgment and thought content normal.           Assessment:      Active Hospital Problems    Diagnosis  POA    Gastroesophageal reflux disease [K21.9]  Yes      Resolved Hospital Problems    Diagnosis Date Resolved POA   No resolved problems to display.       Plan:    [facility] EGD for GERD and Screening colonoscopy average risk for CRC by history today.

## 2018-01-08 NOTE — ANESTHESIA PREPROCEDURE EVALUATION
01/08/2018  Janie Zamorano is a 62 y.o., female.    Anesthesia Evaluation         Review of Systems  Anesthesia Hx:  No problems with previous Anesthesia   Social:  Non-Smoker, No Alcohol Use    Cardiovascular:   Hypertension    Pulmonary:   Asthma    Hepatic/GI:   GERD Esophageal dysphagia, Colon cancer screening   Psych:   Psychiatric History (Schizophrenia) depression          Physical Exam  General:  Well nourished    Airway/Jaw/Neck:  Airway Findings: Mouth Opening: Normal Tongue: Normal  General Airway Assessment: Adult            Mental Status:  Mental Status Findings:  Alert and Oriented, Cooperative         Anesthesia Plan  Type of Anesthesia, risks & benefits discussed:  Anesthesia Type:  general  Patient's Preference:   Intra-op Monitoring Plan: standard ASA monitors  Intra-op Monitoring Plan Comments:   Post Op Pain Control Plan:   Post Op Pain Control Plan Comments:   Induction:   IV  Beta Blocker:  Patient is on a Beta-Blocker and has received one dose within the past 24 hours (No further documentation required).       Informed Consent: Patient understands risks and agrees with Anesthesia plan.  Questions answered. Anesthesia consent signed with patient.  ASA Score: 2     Day of Surgery Review of History & Physical:    H&P update referred to the provider.         Ready For Surgery From Anesthesia Perspective.

## 2018-01-08 NOTE — PROVATION PATIENT INSTRUCTIONS
Discharge Summary/Instructions after an Endoscopic Procedure  Patient Name: Janie Zamorano  Patient MRN: [id number]  Patient YOB: 1955 Monday, January 08, 2018  Albert Russell MD  RESTRICTIONS:  During your procedure today, you received medications for sedation.  These   medications may affect your judgment, balance and coordination.  Therefore,   for 24 hours, you have the following restrictions:   - DO NOT drive a car, operate machinery, make legal/financial decisions,   sign important papers or drink alcohol.    ACTIVITY:  The following day: return to full activity including work, except no heavy   lifting, straining or running for 3 days if polyps were removed.  DIET:  Eat and drink normally unless instructed otherwise.     TREATMENT FOR COMMON SIDE EFFECTS:  - Mild abdominal pain, belching, bloating or excessive gas: rest, eat   lightly and use a heating pad.  - Sore Throat: treat with throat lozenges and/or gargle with warm salt   water.  SYMPTOMS TO WATCH FOR AND REPORT TO YOUR PHYSICIAN:  1. Abdominal pain or bloating, other than gas cramps.  2. Chest pain.  3. Back pain.  4. Chills or fever occurring within 24 hours after the procedure.  5. Rectal bleeding, which would show as bright red, maroon, or black stools.   (A tablespoon of blood from the rectum is not serious, especially if   hemorrhoids are present.)  6. Vomiting.  7. Weakness or dizziness.  8. Because air was used during the procedure, expelling large amounts of air   from your rectum or belching is normal.  9. If a bowel prep was taken, you may not have a bowel movement for 1-3   days.  This is normal.  GO DIRECTLY TO THE NEAREST EMERGENCY ROOM IF YOU HAVE ANY OF THE FOLLOWING:      Difficulty breathing  Chills and/or fever over 101 F   Persistent vomiting and/or vomiting blood   Severe abdominal pain   Severe chest pain   Black, tarry stools   Bleeding- more than one tablespoon   Any other symptom or condition that you may feel  needs urgent attention  Your doctor recommends these additional instructions:  If any biopsies were taken, your doctor s clinic will contact you in 1 to 2   weeks with any results.  You are being discharged to home.   Follow an antireflux regimen indefinitely.  This includes:       - Do not lie down for at least 3 to 4 hours after meals.        - Raise the head of the bed 4 to 6 inches.        - Decrease excess weight.        - Avoid citrus juices and other acidic foods, alcohol, chocolate, mints,   coffee and other caffeinated beverages, carbonated beverages, fatty and   fried foods.        - Avoid tight-fitting clothing.        - Avoid cigarettes and other tobacco products.   We are waiting for your pathology results.   Telephone your endoscopist for pathology results in two weeks.   Telephone your nurse practitioner for study results in two weeks.   Take Protonix (pantoprazole) 40 mg by mouth twice a day.   Your physician has recommended a repeat upper endoscopy in eight weeks to   check healing.   The findings and recommendations have been discussed with you.   For the next 2 weeks only - Consider avoiding all non-steroidal   anti-inflammatory drugs (aspirin, ibuprofen, naproxen, etc.), unless needed   for cardiovascular protection.  Recommend you discuss with your prescribing   doctor (of your aspirin) to see if cardiovascular benefits of your aspirin   outweigh the risks of GI bleeding.  Return to your nurse practitioner.  For questions, problems or results please call your physician - Albert Russell MD at Work:  [phone].  [facility], EMERGENCY ROOM PHONE NUMBER: [phone]  IF A COMPLICATION OR EMERGENCY SITUATION ARISES AND YOU ARE UNABLE TO REACH   YOUR PHYSICIAN - GO DIRECTLY TO THE EMERGENCY ROOM.  Albert Russell MD  1/8/2018 4:10:31 PM  This report has been verified and signed electronically.

## 2018-01-08 NOTE — TRANSFER OF CARE
"Anesthesia Transfer of Care Note    Patient: Janie Zamorano    Procedure(s) Performed: Procedure(s) (LRB):  ESOPHAGOGASTRODUODENOSCOPY (EGD) (N/A)  COLONOSCOPY (N/A)    Patient location: PACU    Anesthesia Type: general    Transport from OR: Transported from OR on room air with adequate spontaneous ventilation    Post pain: adequate analgesia    Post assessment: no apparent anesthetic complications    Post vital signs: stable    Level of consciousness: responds to stimulation    Nausea/Vomiting: no nausea/vomiting    Complications: none    Transfer of care protocol was followed      Last vitals:   Visit Vitals  /61 (BP Location: Left arm, Patient Position: Lying)   Pulse 91   Temp 36.6 °C (97.9 °F) (Temporal)   Resp 20   Ht 5' 4" (1.626 m)   Wt 72.6 kg (160 lb)   LMP  (LMP Unknown)   SpO2 100%   Breastfeeding? No   BMI 27.46 kg/m²     "

## 2018-01-08 NOTE — PROVATION PATIENT INSTRUCTIONS
Discharge Summary/Instructions after an Endoscopic Procedure  Patient Name: Janie Zamorano  Patient MRN: [id number]  Patient YOB: 1955 Monday, January 08, 2018  Albert Russell MD  RESTRICTIONS:  During your procedure today, you received medications for sedation.  These   medications may affect your judgment, balance and coordination.  Therefore,   for 24 hours, you have the following restrictions:   - DO NOT drive a car, operate machinery, make legal/financial decisions,   sign important papers or drink alcohol.    ACTIVITY:  The following day: return to full activity including work, except no heavy   lifting, straining or running for 3 days if polyps were removed.  DIET:  Eat and drink normally unless instructed otherwise.     TREATMENT FOR COMMON SIDE EFFECTS:  - Mild abdominal pain, belching, bloating or excessive gas: rest, eat   lightly and use a heating pad.  - Sore Throat: treat with throat lozenges and/or gargle with warm salt   water.  SYMPTOMS TO WATCH FOR AND REPORT TO YOUR PHYSICIAN:  1. Abdominal pain or bloating, other than gas cramps.  2. Chest pain.  3. Back pain.  4. Chills or fever occurring within 24 hours after the procedure.  5. Rectal bleeding, which would show as bright red, maroon, or black stools.   (A tablespoon of blood from the rectum is not serious, especially if   hemorrhoids are present.)  6. Vomiting.  7. Weakness or dizziness.  8. Because air was used during the procedure, expelling large amounts of air   from your rectum or belching is normal.  9. If a bowel prep was taken, you may not have a bowel movement for 1-3   days.  This is normal.  GO DIRECTLY TO THE NEAREST EMERGENCY ROOM IF YOU HAVE ANY OF THE FOLLOWING:      Difficulty breathing  Chills and/or fever over 101 F   Persistent vomiting and/or vomiting blood   Severe abdominal pain   Severe chest pain   Black, tarry stools   Bleeding- more than one tablespoon   Any other symptom or condition that you may feel  needs urgent attention  Your doctor recommends these additional instructions:  If any biopsies were taken, your doctor s clinic will contact you in 1 to 2   weeks with any results.  You are being discharged to home.   We are waiting for your pathology results.   Telephone your endoscopist for pathology results in two weeks.   - see Recommendations  Telephone your nurse practitioner for study results tomorrow.   Return to your nurse practitioner in two to four days.   The findings and recommendations have been discussed with you.   Consider avoiding all non-steroidal anti-inflammatory drugs (aspirin,   ibuprofen, naproxen, etc.), unless needed for cardiovascular protection.    Recommend you discuss with your prescribing doctor (of your aspirin) to see   if cardiovascular benefits of your aspirin outweigh the risks of GI   bleeding.  Your physician has recommended a repeat colonoscopy in five years for   surveillance based on pathology results.   Take Augmentin (amoxicillin/clavulanate) 875 mg by mouth twice a day for   seven days.  For questions, problems or results please call your physician - Albert Russell MD at Work:  [phone].  [facility], EMERGENCY ROOM PHONE NUMBER: [phone]  IF A COMPLICATION OR EMERGENCY SITUATION ARISES AND YOU ARE UNABLE TO REACH   YOUR PHYSICIAN - GO DIRECTLY TO THE EMERGENCY ROOM.  Albert Russell MD  1/8/2018 4:54:21 PM  This report has been verified and signed electronically.

## 2018-01-09 NOTE — ANESTHESIA POSTPROCEDURE EVALUATION
"Anesthesia Post Evaluation    Patient: Janie Zamorano    Procedure(s) Performed: Procedure(s) (LRB):  ESOPHAGOGASTRODUODENOSCOPY (EGD) (N/A)  COLONOSCOPY (N/A)    Final Anesthesia Type: general  Patient location during evaluation: GI PACU  Patient participation: Yes- Able to Participate  Level of consciousness: awake and alert, awake and oriented  Post-procedure vital signs: reviewed and stable  Pain management: adequate  Airway patency: patent  PONV status at discharge: No PONV  Anesthetic complications: no      Cardiovascular status: blood pressure returned to baseline, stable and hemodynamically stable  Respiratory status: unassisted, spontaneous ventilation and room air  Hydration status: euvolemic  Follow-up not needed.        Visit Vitals  BP (!) 149/91   Pulse 77   Temp 36.6 °C (97.9 °F) (Temporal)   Resp 20   Ht 5' 4" (1.626 m)   Wt 72.6 kg (160 lb)   LMP  (LMP Unknown)   SpO2 96%   Breastfeeding? No   BMI 27.46 kg/m²       Pain/Debby Score: Pain Assessment Performed: Yes (1/8/2018  5:11 PM)  Presence of Pain: denies (1/8/2018  5:11 PM)  Debyb Score: 10 (1/8/2018  4:56 PM)      "

## 2018-01-10 ENCOUNTER — TELEPHONE (OUTPATIENT)
Dept: ENDOSCOPY | Facility: HOSPITAL | Age: 63
End: 2018-01-10

## 2018-01-10 ENCOUNTER — OFFICE VISIT (OUTPATIENT)
Dept: GASTROENTEROLOGY | Facility: CLINIC | Age: 63
End: 2018-01-10
Payer: MEDICARE

## 2018-01-10 VITALS
HEIGHT: 64 IN | DIASTOLIC BLOOD PRESSURE: 92 MMHG | WEIGHT: 159.63 LBS | BODY MASS INDEX: 27.25 KG/M2 | SYSTOLIC BLOOD PRESSURE: 134 MMHG | HEART RATE: 92 BPM

## 2018-01-10 DIAGNOSIS — R13.19 ESOPHAGEAL DYSPHAGIA: ICD-10-CM

## 2018-01-10 DIAGNOSIS — K20.80 ESOPHAGITIS, LOS ANGELES GRADE C: Primary | ICD-10-CM

## 2018-01-10 DIAGNOSIS — K57.92 DIVERTICULITIS: ICD-10-CM

## 2018-01-10 DIAGNOSIS — K21.00 GASTROESOPHAGEAL REFLUX DISEASE WITH ESOPHAGITIS: ICD-10-CM

## 2018-01-10 PROCEDURE — 99999 PR PBB SHADOW E&M-EST. PATIENT-LVL III: CPT | Mod: PBBFAC,,, | Performed by: NURSE PRACTITIONER

## 2018-01-10 PROCEDURE — 99499 UNLISTED E&M SERVICE: CPT | Mod: S$GLB,,, | Performed by: NURSE PRACTITIONER

## 2018-01-10 PROCEDURE — 99213 OFFICE O/P EST LOW 20 MIN: CPT | Mod: S$GLB,,, | Performed by: NURSE PRACTITIONER

## 2018-01-10 NOTE — PROGRESS NOTES
[facility] Note    Reason for Visit:  The primary encounter diagnosis was Esophagitis, [place] grade C. Diagnoses of Gastroesophageal reflux disease with esophagitis, Esophageal dysphagia, and Diverticulitis were also pertinent to this visit.    PCP:   He Hoyt       Referring MD:  No referring provider defined for this encounter.    HPI:  This is a 62 y.o. female here for f/u evaluation of Gerd and diverticulitis. Ms. Zamorano was last seen in clinic 12/19/17.     Pt reports if eats fast will feel food get stuck in cervical esophagus. Recently occurring a couple times per week before was rarely ocucring. Will occasionally induce vomiting due to eating too fast. Has been painful to swallow for a year. Denies difficulty swallowing liquids, CP, and SOB.     Hx of Gerd x years. Was taking Protonix 40 mg daily and has not been taking for a couple months. While taking reflux symptoms regurgitaion, epigastric pain, acidic taste were controlled currently not controlled. NSAID usage- Occasional taking Ibuprofen. EGD 1/8/18- Erythematous mucosa in the gastric body, antrum and prepyloric region of the stomach bx. Hiatal hernia. LA Grade C reflux esophagitis. Repeat in 8 weeks. Path still in process. Currently, has been taking Protonix daily not BID. Symptoms still same as last visit.     Having normal formed stool daily. Denies blood in stool and melena. Colonoscopy 1/8/18- Ascending colon polyp and sigmoid colon polyp removed. Non-bleeding internal hemorrhoids. Moderate diverticulosis in the proximal sigmoid colon, in the descending colon, in the transverse colon and in the ascending colon. There was no evidence of diverticular bleeding. Mild diverticulosis in the descending colon. Purulent discharge was seen in association with the diverticular opening, suspicious of diverticulitis. Began Augmentin yesterday. Denies abdominal pain.     ROS:  Constitutional: No fevers, no chills, No  unintentional weight loss, no fatigue   ENT: No allergies  CV: No chest pain, no palpitations, no perif. edema  Pulm: No cough, No shortness of breath, no wheezes, no sputum  Ophtho: No vision changes  GI: see HPI  Derm: No rash  Heme: No lymphadenopathy, No bruising  MSK: No arthritis, no muscle pain, no muscle weakness  : No dysuria, No hematuria  Endo: No hot or cold intolerance  Neuro: No syncope, No seizure     Medical History:  has a past medical history of Asthma; Depression; GERD (gastroesophageal reflux disease); Hypertension; Neck pain; and Schizophrenia.    Surgical History:  has a past surgical history that includes  section; Lipoma resection; Hysterectomy (2016); and Colonoscopy (N/A, 2018).    Family History: family history includes Breast cancer in her mother; COPD in her father; Diabetes in her brother; Glaucoma in her mother and sister; Hypertension in her father and mother; Macular degeneration in her mother..     Social History:  reports that she has never smoked. She has never used smokeless tobacco. She reports that she does not drink alcohol.    Review of patient's allergies indicates:   Allergen Reactions    Shellfish containing products Itching     Current Outpatient Prescriptions   Medication Sig    albuterol 90 mcg/actuation inhaler Inhale 2 puffs into the lungs every 6 (six) hours as needed for Wheezing.    amoxicillin-clavulanate 875-125mg (AUGMENTIN) 875-125 mg per tablet Take 1 tablet by mouth every 12 (twelve) hours.    benztropine (COGENTIN) 1 MG tablet TK 1 T PO  BID    fluocinolone acetonide oil 0.01 % Drop Place 3 drops in ear(s).    fluticasone (FLONASE) 50 mcg/actuation nasal spray 1 spray by Each Nare route once daily.    fluticasone-salmeterol 100-50 mcg/dose (ADVAIR) 100-50 mcg/dose diskus inhaler Inhale 1 puff into the lungs 2 (two) times daily.    labetalol (NORMODYNE) 100 MG tablet TAKE 1 TABLET BY MOUTH TWICE DAILY    methocarbamol (ROBAXIN) 500 MG  "Tab TK 1 T PO  TID    methocarbamol (ROBAXIN) 750 MG Tab Take 1 tablet (750 mg total) by mouth 3 (three) times daily as needed (Muscle spasm.).    ondansetron (ZOFRAN) 4 MG tablet TK 1 T PO  Q 6 H    pantoprazole (PROTONIX) 40 MG tablet Take 1 tablet (40 mg total) by mouth every 12 (twelve) hours.    risperiDONE (RISPERDAL) 1 MG tablet     valsartan (DIOVAN) 160 MG tablet Take 1 tablet (160 mg total) by mouth once daily.     No current facility-administered medications for this visit.      Objective Findings:    Vital Signs:  BP (!) 134/92   Pulse 92   Ht 5' 4" (1.626 m)   Wt 72.4 kg (159 lb 9.8 oz)   LMP  (LMP Unknown)   BMI 27.40 kg/m²   Body mass index is 27.4 kg/m².    Physical Exam:  General Appearance: Well appearing in no acute distress  Head: Normocephalic, without obvious abnormality  Eyes: No scleral icterus, EOMI  ENT: Neck supple, Lips, mucosa, and tongue normal; teeth and gums normal  Extremities: No clubbing, cyanosis or edema  Skin: No rash to exposed areas  Neurologic: AAOx4    Labs:  Lab Results   Component Value Date    WBC 4.52 10/27/2017    HGB 10.6 (L) 10/27/2017    HCT 33.4 (L) 10/27/2017     10/27/2017    CHOL 179 10/27/2017    TRIG 151 (H) 10/27/2017    HDL 40 10/27/2017    ALT 21 01/08/2018    AST 24 01/08/2018     01/08/2018    K 3.9 01/08/2018     01/08/2018    CREATININE 0.9 01/08/2018    BUN 8 01/08/2018    CO2 29 01/08/2018    TSH 2.293 01/27/2016    HGBA1C 5.5 10/27/2017     Endoscopy:    EGD- 1/8/18- Erythematous mucosa in the gastric body, antrum and prepyloric region of the stomach bx. Hiatal hernia. LA Grade C reflux esophagitis. Repeat in 8 weeks. Path still in process.     Colonoscopy- 1/8/18- Ascending colon polyp removed. Sigmoid colon polyp removed. Non-bleeding internal hemorrhoids. Moderate diverticulosis in the proximal sigmoid colon, in the descending colon, in the transverse colon and in the ascending colon. There was no evidence of " diverticular bleeding. Mild diverticulosis in the descending colon. Purulent discharge was seen in association with the diverticular opening, suspicious of diverticulitis.    Colonoscopy- 2009 Normal. Repeat in 7 years.     Assessment:  1. Esophagitis, [place] grade C    2. Gastroesophageal reflux disease with esophagitis    3. Esophageal dysphagia    4. Diverticulitis      Recommendations:  1., 2., & 3. Begin taking Protonix 40 mg BID. Pt understood. Reviewed EGD and path still in process. Repeat EGD in 8 weeks to evaluate esophagitis.   3. Complete course of Augmentin for diverticulitis. No abdominal pain at this visit or hx of abdominal pain. If abdominal pain occurs notify clinic, Pt understood.     Follow up after EGD.      Thank you so much for allowing me to participate in the care of ANDERS Ambriz, FNP-C

## 2018-01-11 ENCOUNTER — TELEPHONE (OUTPATIENT)
Dept: GASTROENTEROLOGY | Facility: CLINIC | Age: 63
End: 2018-01-11

## 2018-01-11 NOTE — TELEPHONE ENCOUNTER
----- Message from Edilma France NP sent at 1/11/2018  9:21 AM CST -----  Please schedule follow up in 12 weeks for Ms. Jaun.     Edilma

## 2018-01-15 ENCOUNTER — TELEPHONE (OUTPATIENT)
Dept: ENDOSCOPY | Facility: HOSPITAL | Age: 63
End: 2018-01-15

## 2018-01-22 DIAGNOSIS — E55.9 VITAMIN D INSUFFICIENCY: Primary | ICD-10-CM

## 2018-01-22 DIAGNOSIS — E55.9 VITAMIN D INSUFFICIENCY: ICD-10-CM

## 2018-01-22 RX ORDER — ERGOCALCIFEROL 1.25 MG/1
CAPSULE ORAL
Qty: 12 CAPSULE | Refills: 0 | Status: SHIPPED | OUTPATIENT
Start: 2018-01-22 | End: 2018-03-05 | Stop reason: SDUPTHER

## 2018-01-22 RX ORDER — ERGOCALCIFEROL 1.25 MG/1
50000 CAPSULE ORAL
Qty: 8 CAPSULE | Refills: 0 | Status: SHIPPED | OUTPATIENT
Start: 2018-01-22 | End: 2018-01-22 | Stop reason: SDUPTHER

## 2018-01-22 RX ORDER — ACETAMINOPHEN 500 MG
1 TABLET ORAL DAILY
Qty: 90 CAPSULE | Refills: 3 | COMMUNITY
Start: 2018-01-22 | End: 2018-05-27 | Stop reason: SDUPTHER

## 2018-01-26 ENCOUNTER — TELEPHONE (OUTPATIENT)
Dept: GASTROENTEROLOGY | Facility: CLINIC | Age: 63
End: 2018-01-26

## 2018-01-26 NOTE — TELEPHONE ENCOUNTER
----- Message from Albert Russell MD sent at 1/22/2018  7:07 AM CST -----  Ashley - Please tell patient that their Vitamin D level is low and recommend that they take Vitamin D 50,000 units once a week for 8 weeks and then start Vitamin D3 (2,000 units) over-the-counter once daily.     Ashley - please recheck their vitamin D level in 4 months - Orders placed.

## 2018-02-27 ENCOUNTER — TELEPHONE (OUTPATIENT)
Dept: INTERNAL MEDICINE | Facility: CLINIC | Age: 63
End: 2018-02-27

## 2018-02-27 NOTE — TELEPHONE ENCOUNTER
----- Message from Merari Jalloh sent at 2/27/2018 10:14 AM CST -----  Contact: self/[phone]  Pt called in regards to talking to the nurse about her appointment.      Please advise

## 2018-03-05 ENCOUNTER — OFFICE VISIT (OUTPATIENT)
Dept: INTERNAL MEDICINE | Facility: CLINIC | Age: 63
End: 2018-03-05
Payer: MEDICARE

## 2018-03-05 VITALS
HEART RATE: 78 BPM | DIASTOLIC BLOOD PRESSURE: 94 MMHG | HEIGHT: 64 IN | SYSTOLIC BLOOD PRESSURE: 143 MMHG | WEIGHT: 161.81 LBS | BODY MASS INDEX: 27.63 KG/M2

## 2018-03-05 DIAGNOSIS — K21.00 GASTROESOPHAGEAL REFLUX DISEASE WITH ESOPHAGITIS: ICD-10-CM

## 2018-03-05 DIAGNOSIS — E55.9 VITAMIN D DEFICIENCY: ICD-10-CM

## 2018-03-05 DIAGNOSIS — E55.9 VITAMIN D INSUFFICIENCY: ICD-10-CM

## 2018-03-05 DIAGNOSIS — F20.0 PARANOID SCHIZOPHRENIA: Primary | ICD-10-CM

## 2018-03-05 DIAGNOSIS — I10 ESSENTIAL HYPERTENSION: ICD-10-CM

## 2018-03-05 PROCEDURE — 3080F DIAST BP >= 90 MM HG: CPT | Mod: CPTII,S$GLB,, | Performed by: INTERNAL MEDICINE

## 2018-03-05 PROCEDURE — 99214 OFFICE O/P EST MOD 30 MIN: CPT | Mod: S$GLB,,, | Performed by: INTERNAL MEDICINE

## 2018-03-05 PROCEDURE — 3077F SYST BP >= 140 MM HG: CPT | Mod: CPTII,S$GLB,, | Performed by: INTERNAL MEDICINE

## 2018-03-05 PROCEDURE — 99999 PR PBB SHADOW E&M-EST. PATIENT-LVL III: CPT | Mod: PBBFAC,,, | Performed by: INTERNAL MEDICINE

## 2018-03-05 RX ORDER — ERGOCALCIFEROL 1.25 MG/1
50000 CAPSULE ORAL
Qty: 12 CAPSULE | Refills: 2 | Status: SHIPPED | OUTPATIENT
Start: 2018-03-05 | End: 2018-04-02 | Stop reason: ALTCHOICE

## 2018-03-15 NOTE — PROGRESS NOTES
HISTORY OF PRESENT ILLNESS:  Ms. Zamorano is a 62-year-old lady who comes in today   for a two-month followup of her ongoing medical problems.  The last visit when   I saw her, she was having a lot of trouble with reflux.  She had a stress test   done at [place], but had not gone back to see the results.  She has had some   chest pain, but it is more reflux.  Selene Wassermaner had helped a little bit.  Last   time, we put her on some Protonix and this has been helping her reflux quite a   bit.  She had an EGD in January 2018, a grade C esophageal reflux and   esophagitis.  Since she has been on the Protonix and taken it regularly, she has   felt much better.  She has had a colonoscopy in January, showed diverticulosis.    She has hypertension.  Last visit, we increased her valsartan from 80 to 160 a   day because the hypertension was not controlled.  Blood pressure was 160/80 last   visit.  Blood pressure has come down nicely to 143/94.  She is also on   labetalol 100 mg twice a day.  She does admit that she does not always take this   regularly.  Her other medical problems include she has paranoid schizophrenia.    She is followed by the local unit.  She is on Risperdal and Cogentin for this.    She is not having any trouble with hallucinations or confusion.  She says she   has been doing pretty well.  Otherwise, no new complaints today.    REVIEW OF SYSTEMS:  She has no chest pain, that is resolved.  No nausea,   vomiting, palpitations.  No PND or orthopnea.    PHYSICAL EXAMINATION:  GENERAL:  She is a well-appearing 62-year-old female who looks a little younger   than her stated age.  HEENT:  Her ear canals are open.  TMs clear.  Oropharynx clear.  NECK:  Supple.  She has no JVD.  Thyroid is not enlarged.    ASSESSMENT:  1.  Paranoid schizophrenia.  Continue Cogentin and follow up with the local   health unit.  Continue Risperdal.  If she has trouble getting in over there, to   give me a call.  2.  Reflux, doing  much, much, much better.  Continue Protonix.  She actually is   supposed to have a followup with GI.  So will keep that.  3.  Hypertension.  Blood pressure much better, still on the high side.  We   discussed low-salt diet and follow up again in about four months.  4.  Vitamin D deficiency.  Continue over-the-counter vitamin D.  We will recheck   on next visit as well as recheck comprehensive metabolic profile.          /sheri [id number] blank(s)        TOMAS/GLENNA  dd: 03/14/2018 21:24:55 (CDT)  td: 03/15/2018 12:55:05 (CDT)  Doc ID   #[id number]  Job ID #[id number]    CC:

## 2018-03-19 ENCOUNTER — TELEPHONE (OUTPATIENT)
Dept: ENDOSCOPY | Facility: HOSPITAL | Age: 63
End: 2018-03-19

## 2018-03-19 ENCOUNTER — SURGERY (OUTPATIENT)
Age: 63
End: 2018-03-19

## 2018-03-19 ENCOUNTER — ANESTHESIA EVENT (OUTPATIENT)
Dept: ENDOSCOPY | Facility: HOSPITAL | Age: 63
End: 2018-03-19
Payer: MEDICARE

## 2018-03-19 ENCOUNTER — HOSPITAL ENCOUNTER (OUTPATIENT)
Facility: HOSPITAL | Age: 63
Discharge: HOME | End: 2018-03-19
Attending: INTERNAL MEDICINE | Admitting: INTERNAL MEDICINE
Payer: MEDICARE

## 2018-03-19 ENCOUNTER — ANESTHESIA (OUTPATIENT)
Dept: ENDOSCOPY | Facility: HOSPITAL | Age: 63
End: 2018-03-19
Payer: MEDICARE

## 2018-03-19 VITALS
OXYGEN SATURATION: 98 % | SYSTOLIC BLOOD PRESSURE: 128 MMHG | HEIGHT: 64 IN | TEMPERATURE: 98 F | HEART RATE: 84 BPM | WEIGHT: 160 LBS | BODY MASS INDEX: 27.31 KG/M2 | RESPIRATION RATE: 14 BRPM | DIASTOLIC BLOOD PRESSURE: 76 MMHG

## 2018-03-19 DIAGNOSIS — K22.10 EROSIVE ESOPHAGITIS: ICD-10-CM

## 2018-03-19 DIAGNOSIS — K44.9 HIATAL HERNIA: ICD-10-CM

## 2018-03-19 DIAGNOSIS — K22.10 EROSIVE ESOPHAGITIS: Primary | ICD-10-CM

## 2018-03-19 PROCEDURE — D9220A PRA ANESTHESIA: Mod: CRNA,,, | Performed by: NURSE ANESTHETIST, CERTIFIED REGISTERED

## 2018-03-19 PROCEDURE — 63600175 PHARM REV CODE 636 W HCPCS: Performed by: NURSE ANESTHETIST, CERTIFIED REGISTERED

## 2018-03-19 PROCEDURE — D9220A PRA ANESTHESIA: Mod: ANES,,, | Performed by: ANESTHESIOLOGY

## 2018-03-19 PROCEDURE — 37000008 HC ANESTHESIA 1ST 15 MINUTES: Performed by: INTERNAL MEDICINE

## 2018-03-19 PROCEDURE — 37000009 HC ANESTHESIA EA ADD 15 MINS: Performed by: INTERNAL MEDICINE

## 2018-03-19 PROCEDURE — 25000003 PHARM REV CODE 250: Performed by: INTERNAL MEDICINE

## 2018-03-19 PROCEDURE — 43235 EGD DIAGNOSTIC BRUSH WASH: CPT | Performed by: INTERNAL MEDICINE

## 2018-03-19 PROCEDURE — 43235 EGD DIAGNOSTIC BRUSH WASH: CPT | Mod: ,,, | Performed by: INTERNAL MEDICINE

## 2018-03-19 RX ORDER — LIDOCAINE HCL/PF 100 MG/5ML
SYRINGE (ML) INTRAVENOUS
Status: DISCONTINUED | OUTPATIENT
Start: 2018-03-19 | End: 2018-03-19

## 2018-03-19 RX ORDER — PROPOFOL 10 MG/ML
VIAL (ML) INTRAVENOUS
Status: DISCONTINUED | OUTPATIENT
Start: 2018-03-19 | End: 2018-03-19

## 2018-03-19 RX ORDER — PANTOPRAZOLE SODIUM 40 MG/1
40 TABLET, DELAYED RELEASE ORAL EVERY 12 HOURS
Qty: 60 TABLET | Refills: 3 | Status: SHIPPED | OUTPATIENT
Start: 2018-03-19 | End: 2018-10-01

## 2018-03-19 RX ORDER — SODIUM CHLORIDE 9 MG/ML
INJECTION, SOLUTION INTRAVENOUS CONTINUOUS
Status: DISCONTINUED | OUTPATIENT
Start: 2018-03-19 | End: 2018-03-19 | Stop reason: HOSPADM

## 2018-03-19 RX ADMIN — PROPOFOL 40 MG: 10 INJECTION, EMULSION INTRAVENOUS at 12:03

## 2018-03-19 RX ADMIN — SODIUM CHLORIDE: 0.9 INJECTION, SOLUTION INTRAVENOUS at 12:03

## 2018-03-19 RX ADMIN — PROPOFOL 80 MG: 10 INJECTION, EMULSION INTRAVENOUS at 12:03

## 2018-03-19 RX ADMIN — LIDOCAINE HYDROCHLORIDE 80 MG: 20 INJECTION, SOLUTION INTRAVENOUS at 12:03

## 2018-03-19 NOTE — PROVATION PATIENT INSTRUCTIONS
Discharge Summary/Instructions after an Endoscopic Procedure  Patient Name: Janie Zamorano  Patient MRN: [id number]  Patient YOB: 1955 Monday, March 19, 2018  Albert Russell MD  RESTRICTIONS:  During your procedure today, you received medications for sedation.  These   medications may affect your judgment, balance and coordination.  Therefore,   for 24 hours, you have the following restrictions:   - DO NOT drive a car, operate machinery, make legal/financial decisions,   sign important papers or drink alcohol.    ACTIVITY:  The following day: return to full activity including work, except no heavy   lifting, straining or running for 3 days if polyps were removed.  DIET:  Eat and drink normally unless instructed otherwise.     TREATMENT FOR COMMON SIDE EFFECTS:  - Mild abdominal pain, nausea, belching, bloating or excessive gas:  rest,   eat lightly and use a heating pad.  - Sore Throat: treat with throat lozenges and/or gargle with warm salt   water.  - Because air was used during the procedure, expelling large amounts of air   from your rectum or belching is normal.  - If a bowel prep was taken, you may not have a bowel movement for 1-3 days.    This is normal.  SYMPTOMS TO WATCH FOR AND REPORT TO YOUR PHYSICIAN:  1. Abdominal pain or bloating, other than gas cramps.  2. Chest pain.  3. Back pain.  4. Signs of infection such as: chills or fever occurring within 24 hours   after the procedure.  5. Rectal bleeding, which would show as bright red, maroon, or black stools.   (A tablespoon of blood from the rectum is not serious, especially if   hemorrhoids are present.)  6. Vomiting.  7. Weakness or dizziness.  GO DIRECTLY TO THE NEAREST EMERGENCY ROOM IF YOU HAVE ANY OF THE FOLLOWING:      Difficulty breathing  Chills and/or fever over 101 F   Persistent vomiting and/or vomiting blood   Severe abdominal pain   Severe chest pain   Black, tarry stools   Bleeding- more than one tablespoon   Any other  symptom or condition that you feel may need urgent attention  Your doctor recommends these additional instructions:  If any biopsies were taken, your doctors clinic will contact you in 1 to 2   weeks with any results.  You are being discharged to home.   Follow an antireflux regimen indefinitely.  This includes:       - Do not lie down for at least 3 to 4 hours after meals.        - Raise the head of the bed 4 to 6 inches.        - Decrease excess weight.        - Avoid citrus juices and other acidic foods, alcohol, chocolate, mints,   coffee and other caffeinated beverages, carbonated beverages, fatty and   fried foods.        - Avoid tight-fitting clothing.        - Avoid cigarettes and other tobacco products.   Take Protonix (pantoprazole) 40 mg by mouth twice a day.   Your physician has recommended routine esophageal manometry at the next   available appointment.   Your physician has recommended a gastric emptying study at the next   available appointment.   Your physician has recommended an esophagram at the next available   appointment.   Return to your GI clinic.   An appointment has been made (or make an appointment) to see a surgeon.   Your physician has recommended a repeat upper endoscopy in eight weeks to   check healing.   The findings and recommendations have been discussed with you.  For questions, problems or results please call your physician - Albert Russell MD at Work:  [phone].  [facility], EMERGENCY ROOM PHONE NUMBER: [phone]  IF A COMPLICATION OR EMERGENCY SITUATION ARISES AND YOU ARE UNABLE TO REACH   YOUR PHYSICIAN - GO DIRECTLY TO THE EMERGENCY ROOM.  Albert Russell MD  3/19/2018 12:59:15 PM  This report has been verified and signed electronically.

## 2018-03-19 NOTE — ANESTHESIA PREPROCEDURE EVALUATION
03/19/2018  Janie Zamorano is a 62 y.o., female   Pre-operative evaluation for Procedure(s) (LRB):  ESOPHAGOGASTRODUODENOSCOPY (EGD) (N/A)    Janie Zamorano is a 62 y.o. female       Active problems:  Patient Active Problem List   Diagnosis    Hypertension    Neck pain    Asthma    Paranoid schizophrenia    Gastroesophageal reflux disease    Erosive esophagitis         Prev airway:       Review of patient's allergies indicates:   Allergen Reactions    Shellfish containing products Itching        No current facility-administered medications on file prior to encounter.      Current Outpatient Prescriptions on File Prior to Encounter   Medication Sig Dispense Refill    albuterol 90 mcg/actuation inhaler Inhale 2 puffs into the lungs every 6 (six) hours as needed for Wheezing. 1 each 11    benztropine (COGENTIN) 1 MG tablet TK 1 T PO  BID  0    labetalol (NORMODYNE) 100 MG tablet TAKE 1 TABLET BY MOUTH TWICE DAILY 180 tablet 4    pantoprazole (PROTONIX) 40 MG tablet Take 1 tablet (40 mg total) by mouth every 12 (twelve) hours. 60 tablet 0    risperiDONE (RISPERDAL) 1 MG tablet       valsartan (DIOVAN) 160 MG tablet Take 1 tablet (160 mg total) by mouth once daily. 90 tablet 3    amoxicillin-clavulanate 875-125mg (AUGMENTIN) 875-125 mg per tablet Take 1 tablet by mouth every 12 (twelve) hours. 14 tablet 0    fluocinolone acetonide oil 0.01 % Drop Place 3 drops in ear(s).      fluticasone (FLONASE) 50 mcg/actuation nasal spray 1 spray by Each Nare route once daily. 1 Bottle 4    fluticasone-salmeterol 100-50 mcg/dose (ADVAIR) 100-50 mcg/dose diskus inhaler Inhale 1 puff into the lungs 2 (two) times daily. 1 each 9    methocarbamol (ROBAXIN) 500 MG Tab TK 1 T PO  TID  0    methocarbamol (ROBAXIN) 750 MG Tab Take 1 tablet (750 mg total) by mouth 3 (three) times daily as needed  (Muscle spasm.). 30 tablet 0    ondansetron (ZOFRAN) 4 MG tablet TK 1 T PO  Q 6 H  0       Past Surgical History:   Procedure Laterality Date     SECTION      X 1    COLONOSCOPY N/A 2018    Procedure: COLONOSCOPY;  Surgeon: Albert Russell MD;  Location: [address]);  Service: Endoscopy;  Laterality: N/A;  pm prep/MS    HYSTERECTOMY  2016    KJB---DLH/BSO    LIPOMA RESECTION      back       Social History     Social History    Marital status:      Spouse name: N/A    Number of children: N/A    Years of education: N/A     Occupational History    Not on file.     Social History Main Topics    Smoking status: Never Smoker    Smokeless tobacco: Never Used    Alcohol use No    Drug use: Unknown    Sexual activity: Not Currently     Partners: Male     Birth control/ protection: Post-menopausal     Other Topics Concern    Not on file     Social History Narrative             Vital Signs Range (Last 24H):  Wt Readings from Last 3 Encounters:   18 72.6 kg (160 lb)   18 73.4 kg (161 lb 13.1 oz)   01/10/18 72.4 kg (159 lb 9.8 oz)     Temp Readings from Last 3 Encounters:   18 36.7 °C (98.1 °F) (Temporal)   18 36.6 °C (97.9 °F) (Temporal)   10/25/16 36.4 °C (97.6 °F) (Oral)     BP Readings from Last 3 Encounters:   18 130/77   18 (!) 143/94   01/10/18 (!) 134/92     Pulse Readings from Last 3 Encounters:   18 85   18 78   01/10/18 92         CBC:   Lab Results   Component Value Date    WBC 4.52 10/27/2017    HGB 10.6 (L) 10/27/2017    HCT 33.4 (L) 10/27/2017    MCV 89 10/27/2017     10/27/2017       CMP: CMP  Sodium   Date Value Ref Range Status   2018 141 136 - 145 mmol/L Final     Potassium   Date Value Ref Range Status   2018 3.9 3.5 - 5.1 mmol/L Final     Chloride   Date Value Ref Range Status   2018 103 95 - 110 mmol/L Final     CO2   Date Value Ref Range Status   2018 29 23 - 29 mmol/L Final      Glucose   Date Value Ref Range Status   2018 78 70 - 110 mg/dL Final     BUN, Bld   Date Value Ref Range Status   2018 8 8 - 23 mg/dL Final     Creatinine   Date Value Ref Range Status   2018 0.9 0.5 - 1.4 mg/dL Final     Calcium   Date Value Ref Range Status   2018 9.9 8.7 - 10.5 mg/dL Final     Total Protein   Date Value Ref Range Status   2018 6.9 6.0 - 8.4 g/dL Final     Albumin   Date Value Ref Range Status   2018 3.3 (L) 3.5 - 5.2 g/dL Final     Total Bilirubin   Date Value Ref Range Status   2018 0.5 0.1 - 1.0 mg/dL Final     Comment:     For infants and newborns, interpretation of results should be based  on gestational age, weight and in agreement with clinical  observations.  Premature Infant recommended reference ranges:  Up to 24 hours.............<8.0 mg/dL  Up to 48 hours............<12.0 mg/dL  3-5 days..................<15.0 mg/dL  6-29 days.................<15.0 mg/dL       Alkaline Phosphatase   Date Value Ref Range Status   2018 100 55 - 135 U/L Final     AST   Date Value Ref Range Status   2018 24 10 - 40 U/L Final     ALT   Date Value Ref Range Status   2018 21 10 - 44 U/L Final     Anion Gap   Date Value Ref Range Status   2018 9 8 - 16 mmol/L Final     eGFR if    Date Value Ref Range Status   2018 >60.0 >60 mL/min/1.73 m^2 Final     eGFR if non    Date Value Ref Range Status   2018 >60.0 >60 mL/min/1.73 m^2 Final     Comment:     Calculation used to obtain the estimated glomerular filtration  rate (eGFR) is the CKD-EPI equation.          INR  No results found for: INR, PROTIME        Diagnostic Studies:      EKD Echo:        .    Anesthesia Evaluation         Review of Systems  Anesthesia Hx:  History of prior surgery of interest to airway management or planning:   Cardiovascular:   Hypertension   Pulmonary:   Asthma    Hepatic/GI:   PUD, GERD, well controlled    Psych:    Psychiatric History schizophrenia         Physical Exam  General:  Well nourished    Airway/Jaw/Neck:  Airway Findings: Mouth Opening: Normal Tongue: Normal  General Airway Assessment: Adult  Mallampati: I  Jaw/Neck Findings:  Neck ROM: Normal ROM      Dental:  Dental Findings: In tact   Chest/Lungs:  Chest/Lungs Findings: Clear to auscultation     Heart/Vascular:  Heart Findings: Rate: Normal  Rhythm: Regular Rhythm  Sounds: Normal        Mental Status:  Mental Status Findings:  Cooperative         Anesthesia Plan  Type of Anesthesia, risks & benefits discussed:  Anesthesia Type:  general  Patient's Preference:   Intra-op Monitoring Plan:   Intra-op Monitoring Plan Comments:   Post Op Pain Control Plan:   Post Op Pain Control Plan Comments:   Induction:   IV  Beta Blocker:  Patient is on a Beta-Blocker and has received one dose within the past 24 hours (No further documentation required).       Informed Consent: Patient understands risks and agrees with Anesthesia plan.  Questions answered. Anesthesia consent signed with patient.  ASA Score: 3     Day of Surgery Review of History & Physical:    H&P update referred to the surgeon.         Ready For Surgery From Anesthesia Perspective.

## 2018-03-19 NOTE — H&P
[facility]  History & Physical    Subjective:      Chief Complaint/Reason for Admission:    EGD    Janie Zamorano is a 62 y.o. female.    Past Medical History:   Diagnosis Date    Asthma     Depression     GERD (gastroesophageal reflux disease)     Hypertension     Neck pain     Schizophrenia      Past Surgical History:   Procedure Laterality Date     SECTION      X 1    COLONOSCOPY N/A 2018    Procedure: COLONOSCOPY;  Surgeon: Albert Russell MD;  Location: [facility] ([address] [place] [address]);  Service: Endoscopy;  Laterality: N/A;  pm prep/MS    HYSTERECTOMY      KJB---DLH/BSO    LIPOMA RESECTION      back     Family History   Problem Relation Age of Onset    Hypertension Mother     Glaucoma Mother     Macular degeneration Mother     Breast cancer Mother     COPD Father     Hypertension Father     Diabetes Brother     Glaucoma Sister     Ovarian cancer Neg Hx     Colon cancer Neg Hx     Colon polyps Neg Hx      Social History   Substance Use Topics    Smoking status: Never Smoker    Smokeless tobacco: Never Used    Alcohol use No       PTA Medications   Medication Sig    albuterol 90 mcg/actuation inhaler Inhale 2 puffs into the lungs every 6 (six) hours as needed for Wheezing.    benztropine (COGENTIN) 1 MG tablet TK 1 T PO  BID    cholecalciferol, vitamin D3, 2,000 unit Cap Take 1 capsule (2,000 Units total) by mouth once daily.    labetalol (NORMODYNE) 100 MG tablet TAKE 1 TABLET BY MOUTH TWICE DAILY    pantoprazole (PROTONIX) 40 MG tablet Take 1 tablet (40 mg total) by mouth every 12 (twelve) hours.    risperiDONE (RISPERDAL) 1 MG tablet     valsartan (DIOVAN) 160 MG tablet Take 1 tablet (160 mg total) by mouth once daily.    amoxicillin-clavulanate 875-125mg (AUGMENTIN) 875-125 mg per tablet Take 1 tablet by mouth every 12 (twelve) hours.    ergocalciferol (ERGOCALCIFEROL) 50,000 unit Cap Take 1 capsule (50,000 Units total) by mouth every 7 days.     fluocinolone acetonide oil 0.01 % Drop Place 3 drops in ear(s).    fluticasone (FLONASE) 50 mcg/actuation nasal spray 1 spray by Each Nare route once daily.    fluticasone-salmeterol 100-50 mcg/dose (ADVAIR) 100-50 mcg/dose diskus inhaler Inhale 1 puff into the lungs 2 (two) times daily.    methocarbamol (ROBAXIN) 500 MG Tab TK 1 T PO  TID    methocarbamol (ROBAXIN) 750 MG Tab Take 1 tablet (750 mg total) by mouth 3 (three) times daily as needed (Muscle spasm.).    ondansetron (ZOFRAN) 4 MG tablet TK 1 T PO  Q 6 H     Review of patient's allergies indicates:   Allergen Reactions    Shellfish containing products Itching        Review of Systems   Constitutional: Negative for chills, fever and weight loss.   Respiratory: Negative for shortness of breath and wheezing.    Cardiovascular: Negative for chest pain.   Gastrointestinal: Positive for heartburn. Negative for abdominal pain, blood in stool, constipation, diarrhea, melena and vomiting.       Objective:      Vital Signs (Most Recent)  Temp: 98.1 °F (36.7 °C) (03/19/18 1123)  Pulse: 85 (03/19/18 1123)  Resp: 18 (03/19/18 1123)  BP: 130/77 (03/19/18 1123)  SpO2: 98 % (03/19/18 1123)    Vital Signs Range (Last 24H):  Temp:  [98.1 °F (36.7 °C)]   Pulse:  [85]   Resp:  [18]   BP: (130)/(77)   SpO2:  [98 %]     Physical Exam   Constitutional: She appears well-developed and well-nourished.   Cardiovascular: Normal rate.    Pulmonary/Chest: Effort normal.   Abdominal: Soft.   Skin: Skin is warm and dry.   Psychiatric: She has a normal mood and affect. Her behavior is normal. Thought content normal.           Assessment:      Active Hospital Problems    Diagnosis  POA    Erosive esophagitis [K22.10]  Yes      Resolved Hospital Problems    Diagnosis Date Resolved POA   No resolved problems to display.       Plan:    EGD for follow up erosive esophititis

## 2018-03-19 NOTE — TRANSFER OF CARE
"Anesthesia Transfer of Care Note    Patient: Janie Zamorano    Procedure(s) Performed: Procedure(s) (LRB):  ESOPHAGOGASTRODUODENOSCOPY (EGD) (N/A)    Patient location: PACU    Anesthesia Type: general    Transport from OR: Transported from OR on room air with adequate spontaneous ventilation    Post pain: adequate analgesia    Post assessment: no apparent anesthetic complications and tolerated procedure well    Post vital signs: stable    Level of consciousness: awake, alert and oriented    Nausea/Vomiting: no nausea/vomiting    Complications: none    Transfer of care protocol was followed      Last vitals:   Visit Vitals  /77 (BP Location: Left arm, Patient Position: Lying)   Pulse 85   Temp 36.7 °C (98.1 °F) (Temporal)   Resp 18   Ht 5' 4" (1.626 m)   Wt 72.6 kg (160 lb)   LMP  (LMP Unknown)   SpO2 98%   Breastfeeding? No   BMI 27.46 kg/m²     "

## 2018-03-19 NOTE — DISCHARGE INSTRUCTIONS

## 2018-03-20 NOTE — ANESTHESIA RELEASE NOTE
Anesthesia Release from PACU note    Patient: Janie Zamorano    Procedure(s) Performed: Procedure(s) (LRB):  ESOPHAGOGASTRODUODENOSCOPY (EGD) (N/A)    Anesthesia type: general    Post pain: Adequate analgesia    Post assessment: no apparent anesthetic complications, tolerated procedure well and no evidence of recall    Last Vitals:   Vitals:    03/19/18 1322   BP: 128/76   Pulse: 84   Resp: 14   Temp: 36.7 °C (98.1 °F)   SpO2: 98%       Post vital signs: stable    Level of consciousness: awake, alert  and oriented    Nausea/Vomiting: no nausea/no vomiting    Complications: none    Airway Patency: patent    Respiratory: unassisted    Cardiovascular: stable and blood pressure at baseline    Hydration: euvolemic

## 2018-03-20 NOTE — ANESTHESIA POSTPROCEDURE EVALUATION
"Anesthesia Post Evaluation    Patient: Janie Zamorano    Procedure(s) Performed: Procedure(s) (LRB):  ESOPHAGOGASTRODUODENOSCOPY (EGD) (N/A)    Final Anesthesia Type: general  Patient location during evaluation: PACU  Patient participation: Yes- Able to Participate  Level of consciousness: awake and alert  Post-procedure vital signs: reviewed and stable  Pain management: adequate  Airway patency: patent  PONV status at discharge: No PONV  Anesthetic complications: no      Cardiovascular status: blood pressure returned to baseline  Respiratory status: unassisted  Hydration status: euvolemic  Follow-up not needed.        Visit Vitals  /76   Pulse 84   Temp 36.7 °C (98.1 °F)   Resp 14   Ht 5' 4" (1.626 m)   Wt 72.6 kg (160 lb)   LMP  (LMP Unknown)   SpO2 98%   Breastfeeding? No   BMI 27.46 kg/m²       Pain/Debby Score: Pain Assessment Performed: Yes (3/19/2018 11:27 AM)  Presence of Pain: denies (3/19/2018 11:27 AM)  Debby Score: 10 (3/19/2018  1:22 PM)      "

## 2018-03-26 ENCOUNTER — TELEPHONE (OUTPATIENT)
Dept: RADIOLOGY | Facility: HOSPITAL | Age: 63
End: 2018-03-26

## 2018-03-26 ENCOUNTER — TELEPHONE (OUTPATIENT)
Dept: ENDOSCOPY | Facility: HOSPITAL | Age: 63
End: 2018-03-26

## 2018-03-27 ENCOUNTER — HOSPITAL ENCOUNTER (OUTPATIENT)
Dept: RADIOLOGY | Facility: HOSPITAL | Age: 63
Discharge: HOME | End: 2018-03-27
Attending: INTERNAL MEDICINE
Payer: MEDICARE

## 2018-03-27 DIAGNOSIS — K22.10 EROSIVE ESOPHAGITIS: ICD-10-CM

## 2018-03-27 PROCEDURE — 74220 X-RAY XM ESOPHAGUS 1CNTRST: CPT | Mod: 26,,, | Performed by: RADIOLOGY

## 2018-03-27 PROCEDURE — 74220 X-RAY XM ESOPHAGUS 1CNTRST: CPT | Mod: TC

## 2018-04-02 ENCOUNTER — OFFICE VISIT (OUTPATIENT)
Dept: GASTROENTEROLOGY | Facility: CLINIC | Age: 63
End: 2018-04-02
Payer: MEDICARE

## 2018-04-02 ENCOUNTER — TELEPHONE (OUTPATIENT)
Dept: ENDOSCOPY | Facility: HOSPITAL | Age: 63
End: 2018-04-02

## 2018-04-02 ENCOUNTER — TELEPHONE (OUTPATIENT)
Dept: SURGERY | Facility: CLINIC | Age: 63
End: 2018-04-02

## 2018-04-02 ENCOUNTER — OFFICE VISIT (OUTPATIENT)
Dept: SURGERY | Facility: CLINIC | Age: 63
End: 2018-04-02
Payer: MEDICARE

## 2018-04-02 VITALS
TEMPERATURE: 98 F | SYSTOLIC BLOOD PRESSURE: 140 MMHG | WEIGHT: 161.25 LBS | HEIGHT: 64 IN | HEART RATE: 97 BPM | DIASTOLIC BLOOD PRESSURE: 83 MMHG | BODY MASS INDEX: 27.53 KG/M2

## 2018-04-02 VITALS
SYSTOLIC BLOOD PRESSURE: 143 MMHG | BODY MASS INDEX: 27.18 KG/M2 | RESPIRATION RATE: 16 BRPM | WEIGHT: 159.19 LBS | DIASTOLIC BLOOD PRESSURE: 91 MMHG | HEIGHT: 64 IN | HEART RATE: 79 BPM

## 2018-04-02 DIAGNOSIS — K21.00 GASTROESOPHAGEAL REFLUX DISEASE WITH ESOPHAGITIS: ICD-10-CM

## 2018-04-02 DIAGNOSIS — K44.9 HH (HIATUS HERNIA): Primary | ICD-10-CM

## 2018-04-02 DIAGNOSIS — K20.80 ESOPHAGITIS, LOS ANGELES GRADE C: ICD-10-CM

## 2018-04-02 DIAGNOSIS — R13.19 ESOPHAGEAL DYSPHAGIA: Primary | ICD-10-CM

## 2018-04-02 DIAGNOSIS — K44.9 HIATAL HERNIA: ICD-10-CM

## 2018-04-02 PROCEDURE — 3077F SYST BP >= 140 MM HG: CPT | Mod: CPTII,S$GLB,, | Performed by: SURGERY

## 2018-04-02 PROCEDURE — 3077F SYST BP >= 140 MM HG: CPT | Mod: CPTII,S$GLB,, | Performed by: NURSE PRACTITIONER

## 2018-04-02 PROCEDURE — 99999 PR PBB SHADOW E&M-EST. PATIENT-LVL III: CPT | Mod: PBBFAC,,, | Performed by: NURSE PRACTITIONER

## 2018-04-02 PROCEDURE — 99214 OFFICE O/P EST MOD 30 MIN: CPT | Mod: S$GLB,,, | Performed by: NURSE PRACTITIONER

## 2018-04-02 PROCEDURE — 99204 OFFICE O/P NEW MOD 45 MIN: CPT | Mod: S$GLB,,, | Performed by: SURGERY

## 2018-04-02 PROCEDURE — 99999 PR PBB SHADOW E&M-EST. PATIENT-LVL III: CPT | Mod: PBBFAC,,, | Performed by: SURGERY

## 2018-04-02 PROCEDURE — 3080F DIAST BP >= 90 MM HG: CPT | Mod: CPTII,S$GLB,, | Performed by: NURSE PRACTITIONER

## 2018-04-02 PROCEDURE — 3079F DIAST BP 80-89 MM HG: CPT | Mod: CPTII,S$GLB,, | Performed by: SURGERY

## 2018-04-02 RX ORDER — VALSARTAN 80 MG/1
TABLET ORAL
COMMUNITY
Start: 2018-04-01 | End: 2018-05-25 | Stop reason: SDUPTHER

## 2018-04-02 NOTE — TELEPHONE ENCOUNTER
----- Message from Yara Vogt sent at 4/2/2018  1:08 PM CDT -----  Contact: Pt.Self   Pt.States she would like to speak to nurse in reference to finding out if she can eat or not before her consult please call Pt. Back @ [phone] Thank You :)

## 2018-04-02 NOTE — PROGRESS NOTES
[facility] Note    Reason for Visit:  The primary encounter diagnosis was Esophageal dysphagia. Diagnoses of Esophagitis, [place] grade C and Hiatal hernia were also pertinent to this visit.    PCP:   He Hoyt       Referring MD:  No referring provider defined for this encounter.    HPI:  This is a 62 y.o. female here for f/u evaluation of Gerd and dysphagia. Ms. Zamorano was last seen in clinic 1/10/18.     Hx of dysphagia. If eats fast will feel food get stuck in cervical esophagus. Occurring a couple times per week before was rarely occuring. Will occasionally induce vomiting due to eating too fast. Has been painful to swallow for a year. Denies difficulty swallowing liquids, CP, and SOB. Esophogram done 3/27/18 mild dysmotility with gastroesophageal reflux. Will schedule manometry. GES study scheduled 4/9.     Hx of Gerd x years. EGD 1/8/18 Erythematous mucosa in the gastric body, antrum and prepyloric region of the stomach bx. Hiatal hernia. [place] Grade C reflux esophagitis. Repeat EGD done 3/19/18 [place] Grade C reflux esophagitis still present. Currently, pt reports has been taking Protonix 40 mg daily not BID as was told last visit. NSAID usage- occasionally taking Ibuprofen. Symptoms still same as last visit.     Having normal formed stool daily. Denies abdominal pain, blood in stool, and melena.    ROS:  Constitutional: No fevers, no chills, No unintentional weight loss, no fatigue   ENT: No allergies  CV: No chest pain, no palpitations, no perif. edema  Pulm: No cough, No shortness of breath, no wheezes, no sputum  Ophtho: No vision changes  GI: see HPI  Derm: No rash  Heme: No lymphadenopathy, No bruising  MSK: No arthritis, no muscle pain, no muscle weakness  : No dysuria, No hematuria  Endo: No hot or cold intolerance  Neuro: No syncope, No seizure     Medical History:  has a past medical history of Asthma; Depression; GERD (gastroesophageal reflux disease);  "Hypertension; Neck pain; and Schizophrenia.    Surgical History:  has a past surgical history that includes  section; Lipoma resection; Hysterectomy (2016); and Colonoscopy (N/A, 2018).    Family History: family history includes Breast cancer in her mother; COPD in her father; Diabetes in her brother; Glaucoma in her mother and sister; Hypertension in her father and mother; Macular degeneration in her mother..     Social History:  reports that she has never smoked. She has never used smokeless tobacco. She reports that she does not drink alcohol or use drugs.    Review of patient's allergies indicates:   Allergen Reactions    Shellfish containing products Itching     Current Outpatient Prescriptions   Medication Sig    albuterol 90 mcg/actuation inhaler Inhale 2 puffs into the lungs every 6 (six) hours as needed for Wheezing.    benztropine (COGENTIN) 1 MG tablet TK 1 T PO  BID    cholecalciferol, vitamin D3, 2,000 unit Cap Take 1 capsule (2,000 Units total) by mouth once daily.    fluticasone (FLONASE) 50 mcg/actuation nasal spray 1 spray by Each Nare route once daily.    fluticasone-salmeterol 100-50 mcg/dose (ADVAIR) 100-50 mcg/dose diskus inhaler Inhale 1 puff into the lungs 2 (two) times daily.    labetalol (NORMODYNE) 100 MG tablet TAKE 1 TABLET BY MOUTH TWICE DAILY    methocarbamol (ROBAXIN) 500 MG Tab TK 1 T PO  TID    pantoprazole (PROTONIX) 40 MG tablet Take 1 tablet (40 mg total) by mouth every 12 (twelve) hours.    risperiDONE (RISPERDAL) 1 MG tablet once daily.     valsartan (DIOVAN) 160 MG tablet Take 1 tablet (160 mg total) by mouth once daily.    fluocinolone acetonide oil 0.01 % Drop Place 3 drops in ear(s).    ondansetron (ZOFRAN) 4 MG tablet TK 1 T PO  Q 6 H     No current facility-administered medications for this visit.      Objective Findings:    Vital Signs:  BP (!) 143/91   Pulse 79   Resp 16   Ht 5' 4" (1.626 m)   Wt 72.2 kg (159 lb 2.8 oz)   LMP  (LMP Unknown)  "  BMI 27.32 kg/m²   Body mass index is 27.32 kg/m².    Physical Exam:  General Appearance: Well appearing in no acute distress  Head: Normocephalic, without obvious abnormality  Eyes: No scleral icterus, EOMI  ENT: Neck supple, Lips, mucosa, and tongue normal; teeth and gums normal  Extremities: No clubbing, cyanosis or edema  Skin: No rash to exposed areas  Neurologic: AAOx4    Labs:  Lab Results   Component Value Date    WBC 4.52 10/27/2017    HGB 10.6 (L) 10/27/2017    HCT 33.4 (L) 10/27/2017     10/27/2017    CHOL 179 10/27/2017    TRIG 151 (H) 10/27/2017    HDL 40 10/27/2017    ALT 21 01/08/2018    AST 24 01/08/2018     01/08/2018    K 3.9 01/08/2018     01/08/2018    CREATININE 0.9 01/08/2018    BUN 8 01/08/2018    CO2 29 01/08/2018    TSH 2.293 01/27/2016    HGBA1C 5.5 10/27/2017     Endoscopy:    EGD- 1/8/18- Erythematous mucosa in the gastric body, antrum and prepyloric region of the stomach bx. Hiatal hernia. LA Grade C reflux esophagitis. Repeat in 8 weeks. Path still in process.     EGD- 3/19/18- LA Grade C reflux esophagitis. Hiatal hernia 4cm. Otherwise normal.     Colonoscopy- 1/8/18- Ascending colon polyp removed. Sigmoid colon polyp removed. Non-bleeding internal hemorrhoids. Moderate diverticulosis in the proximal sigmoid colon, in the descending colon, in the transverse colon and in the ascending colon. There was no evidence of diverticular bleeding. Mild diverticulosis in the descending colon. Purulent discharge was seen in association with the diverticular opening, suspicious of diverticulitis.    Colonoscopy- 2009 Normal. Repeat in 7 years.     Assessment:  1. Esophageal dysphagia    2. Esophagitis, [place] grade C    3. Hiatal hernia      Recommendations:  1. Schedule esophageal manometry per request of Dr. Russell.   2. Explained again and handout given to take Protonix 40 mg BID. Pt verbalized understanding. Repeat EGD in 8 weeks per request of Dr. Russell.    3.  Referral with general surgery today to discuss hiatal hernia repair.     Follow up with Dr. Russell.     Orders Placed This Encounter    Case request GI: MANOMETRY-ESOPHAGEAL-WITH IMPEDANCE     Thank you so much for allowing me to participate in the care of Janie France, ANDERS, FNP-C

## 2018-04-02 NOTE — PATIENT INSTRUCTIONS
Esophagitis:     Take Protonix 40 mg twice daily- 30 minutes before breakfast and 30 minutes before dinner

## 2018-04-02 NOTE — LETTER
April 2, 2018      Albert Russell MD  [address]  [place] [address]           [facility]  [address]  [facility] [address]  Phone: [phone]          Patient: Janie Zamorano   MR Number: [id number]   YOB: 1955   Date of Visit: 4/2/2018       Dear Dr. Albert Russell:    Thank you for referring Janie Zamorano to me for evaluation. Attached you will find relevant portions of my assessment and plan of care.    If you have questions, please do not hesitate to call me. I look forward to following Janie Zamorano along with you.    Sincerely,    He Medellin Jr., MD    Enclosure  CC:  No Recipients    If you would like to receive this communication electronically, please contact [email or web address] or [phone] to request more information on [employer] Link access.    For providers and/or their staff who would like to refer a patient to [facility], please contact us through our one-stop-shop provider referral line, [facility], at [phone].    If you feel you have received this communication in error or would no longer like to receive these types of communications, please e-mail [email or web address]

## 2018-04-02 NOTE — PROGRESS NOTES
History & Physical    SUBJECTIVE:     History of Present Illness:  Patient is a 62 y.o. female presents with a 3 year history of Hearburn and reflux.  She also now has dysphagia that has worsened.  She states that initially PPis helped but that she now has break throughh symptoms.  She was instructed to start BID PPIs today and will begin this.  Symptoms are worse after meals.  She has not made any conservative changes to her diet or home habits.  Referred fro possible HH repair and Fundo secondary to 4cm HH on UGI and Grade C esophagitis.        Review of patient's allergies indicates:   Allergen Reactions    Shellfish containing products Itching       Current Outpatient Prescriptions   Medication Sig Dispense Refill    albuterol 90 mcg/actuation inhaler Inhale 2 puffs into the lungs every 6 (six) hours as needed for Wheezing. 1 each 11    benztropine (COGENTIN) 1 MG tablet TK 1 T PO  BID  0    cholecalciferol, vitamin D3, 2,000 unit Cap Take 1 capsule (2,000 Units total) by mouth once daily. 90 capsule 3    fluocinolone acetonide oil 0.01 % Drop Place 3 drops in ear(s).      fluticasone (FLONASE) 50 mcg/actuation nasal spray 1 spray by Each Nare route once daily. 1 Bottle 4    fluticasone-salmeterol 100-50 mcg/dose (ADVAIR) 100-50 mcg/dose diskus inhaler Inhale 1 puff into the lungs 2 (two) times daily. 1 each 9    labetalol (NORMODYNE) 100 MG tablet TAKE 1 TABLET BY MOUTH TWICE DAILY 180 tablet 4    methocarbamol (ROBAXIN) 500 MG Tab TK 1 T PO  TID  0    ondansetron (ZOFRAN) 4 MG tablet TK 1 T PO  Q 6 H  0    pantoprazole (PROTONIX) 40 MG tablet Take 1 tablet (40 mg total) by mouth every 12 (twelve) hours. 60 tablet 3    risperiDONE (RISPERDAL) 1 MG tablet once daily.       valsartan (DIOVAN) 160 MG tablet Take 1 tablet (160 mg total) by mouth once daily. 90 tablet 3    valsartan (DIOVAN) 80 MG tablet        No current facility-administered medications for this visit.        Past Medical History:    Diagnosis Date    Asthma     Depression     GERD (gastroesophageal reflux disease)     Hypertension     Neck pain     Schizophrenia      Past Surgical History:   Procedure Laterality Date     SECTION      X 1    COLONOSCOPY N/A 2018    Procedure: COLONOSCOPY;  Surgeon: Albert Russell MD;  Location: [facility] ([address] [place] [address]);  Service: Endoscopy;  Laterality: N/A;  pm prep/MS    HYSTERECTOMY      KJB---DLH/BSO    LIPOMA RESECTION      back     Family History   Problem Relation Age of Onset    Hypertension Mother     Glaucoma Mother     Macular degeneration Mother     Breast cancer Mother     COPD Father     Hypertension Father     Diabetes Brother     Glaucoma Sister     Ovarian cancer Neg Hx     Colon cancer Neg Hx     Colon polyps Neg Hx      Social History   Substance Use Topics    Smoking status: Never Smoker    Smokeless tobacco: Never Used    Alcohol use No        Review of Systems:  Review of Systems   Constitutional: Negative for activity change, appetite change, chills, diaphoresis, fatigue and fever.   HENT: Negative for congestion, postnasal drip, rhinorrhea, sinus pressure, sneezing, sore throat and tinnitus.    Eyes: Negative for photophobia, pain, discharge, redness, itching and visual disturbance.   Respiratory: Negative for apnea, cough, choking, chest tightness, shortness of breath, wheezing and stridor.    Cardiovascular: Positive for chest pain (Sharp). Negative for palpitations and leg swelling.   Gastrointestinal: Positive for abdominal pain (Dysphagia). Negative for abdominal distention, constipation, diarrhea, nausea and vomiting.   Musculoskeletal: Negative for arthralgias, back pain and joint swelling.   Skin: Negative for color change, pallor and rash.   Neurological: Negative for dizziness, facial asymmetry, light-headedness, numbness and headaches.   Hematological: Negative for adenopathy.   Psychiatric/Behavioral: Negative for agitation, behavioral  "problems, confusion and decreased concentration.       OBJECTIVE:     Vital Signs (Most Recent)  Temp: 98.3 °F (36.8 °C) (04/02/18 1557)  Pulse: 97 (04/02/18 1557)  BP: (!) 140/83 (04/02/18 1557)  5' 4" (1.626 m)  73.1 kg (161 lb 4.3 oz)     Physical Exam:  Physical Exam   Constitutional: She is oriented to person, place, and time. She appears well-developed and well-nourished.   HENT:   Head: Normocephalic and atraumatic.   Right Ear: External ear normal.   Left Ear: External ear normal.   Eyes: Conjunctivae and EOM are normal. Right eye exhibits no discharge. Left eye exhibits no discharge. No scleral icterus.   Neck: Normal range of motion. Neck supple.   Cardiovascular: Normal rate, regular rhythm and normal heart sounds.  Exam reveals no gallop and no friction rub.    No murmur heard.  Pulmonary/Chest: Effort normal and breath sounds normal. No respiratory distress. She has no wheezes. She has no rales.   Abdominal: Soft. Bowel sounds are normal. She exhibits no distension and no mass. There is no tenderness. There is no rebound and no guarding. No hernia.   Musculoskeletal: Normal range of motion. She exhibits no edema or tenderness.   Neurological: She is alert and oriented to person, place, and time.   Skin: Skin is warm and dry. No rash noted. No erythema. No pallor.   Psychiatric: She has a normal mood and affect. Her behavior is normal. Judgment and thought content normal.         ASSESSMENT/PLAN:     HH and reflux    PLAN:Plan     Await results of Manometry and GES  FU after to schedule surgery.       He Medellin MD    "

## 2018-04-09 ENCOUNTER — HOSPITAL ENCOUNTER (OUTPATIENT)
Dept: RADIOLOGY | Facility: HOSPITAL | Age: 63
Discharge: HOME | End: 2018-04-09
Attending: INTERNAL MEDICINE
Payer: MEDICARE

## 2018-04-09 ENCOUNTER — TELEPHONE (OUTPATIENT)
Dept: ENDOSCOPY | Facility: HOSPITAL | Age: 63
End: 2018-04-09

## 2018-04-09 DIAGNOSIS — K22.10 EROSIVE ESOPHAGITIS: ICD-10-CM

## 2018-04-09 PROCEDURE — A9541 TC99M SULFUR COLLOID: HCPCS

## 2018-04-09 PROCEDURE — 78264 GASTRIC EMPTYING IMG STUDY: CPT | Mod: TC

## 2018-04-09 PROCEDURE — 78264 GASTRIC EMPTYING IMG STUDY: CPT | Mod: 26,,, | Performed by: RADIOLOGY

## 2018-04-11 ENCOUNTER — TELEPHONE (OUTPATIENT)
Dept: GASTROENTEROLOGY | Facility: CLINIC | Age: 63
End: 2018-04-11

## 2018-04-11 NOTE — TELEPHONE ENCOUNTER
----- Message from Albert Russell MD sent at 4/9/2018  7:55 PM CDT -----  Ashley - please tell Janie that her gastric emptying study shows delayed emptying but could be due to some of her medications she takes.    Please schedule her for GI clinic apt to discuss.    FINDINGS:  At 4 hours the percentage of retention is 44 % (normal retention at 4 hours is 10% and lower).    Impression      There is delayed gastric emptying suggesting gastroparesis..

## 2018-04-30 ENCOUNTER — TELEPHONE (OUTPATIENT)
Dept: INTERNAL MEDICINE | Facility: CLINIC | Age: 63
End: 2018-04-30

## 2018-04-30 DIAGNOSIS — F20.0 PARANOID SCHIZOPHRENIA: Primary | ICD-10-CM

## 2018-04-30 NOTE — TELEPHONE ENCOUNTER
----- Message from Rajesh Urbina sent at 4/30/2018  9:47 AM CDT -----  Contact: Patient [phone]  Patient requesting an Referral for Psychiatrist, stating she needs one and need the Specialist Dr to Re-Write Rx for her stating they are the only ones to do so, need one that except [employer].    Please call and advise  Thank you

## 2018-04-30 NOTE — TELEPHONE ENCOUNTER
Spoke with pt informed her that her referral was put in and I gave her the number to schedule an appointment.

## 2018-05-14 ENCOUNTER — HOSPITAL ENCOUNTER (EMERGENCY)
Facility: HOSPITAL | Age: 63
Discharge: HOME | End: 2018-05-14
Attending: EMERGENCY MEDICINE
Payer: MEDICARE

## 2018-05-14 VITALS
WEIGHT: 160 LBS | DIASTOLIC BLOOD PRESSURE: 83 MMHG | HEART RATE: 78 BPM | BODY MASS INDEX: 27.31 KG/M2 | SYSTOLIC BLOOD PRESSURE: 164 MMHG | RESPIRATION RATE: 16 BRPM | HEIGHT: 64 IN | OXYGEN SATURATION: 99 % | TEMPERATURE: 98 F

## 2018-05-14 DIAGNOSIS — F20.0 PARANOID SCHIZOPHRENIA: Primary | ICD-10-CM

## 2018-05-14 DIAGNOSIS — K14.6 TONGUE PAIN: Primary | ICD-10-CM

## 2018-05-14 PROCEDURE — 99282 EMERGENCY DEPT VISIT SF MDM: CPT

## 2018-05-14 PROCEDURE — 99283 EMERGENCY DEPT VISIT LOW MDM: CPT

## 2018-05-14 PROCEDURE — 99283 EMERGENCY DEPT VISIT LOW MDM: CPT | Mod: ,,, | Performed by: EMERGENCY MEDICINE

## 2018-05-14 RX ORDER — RISPERIDONE 1 MG/1
1 TABLET ORAL DAILY
Qty: 30 TABLET | Refills: 0 | Status: SHIPPED | OUTPATIENT
Start: 2018-05-14 | End: 2018-05-15

## 2018-05-14 RX ORDER — RISPERIDONE 1 MG/1
2 TABLET ORAL NIGHTLY
Qty: 60 TABLET | Refills: 0 | Status: SHIPPED | OUTPATIENT
Start: 2018-05-14 | End: 2018-05-15

## 2018-05-14 RX ORDER — BENZTROPINE MESYLATE 1 MG/1
1 TABLET ORAL 2 TIMES DAILY
Qty: 60 TABLET | Refills: 0 | Status: SHIPPED | OUTPATIENT
Start: 2018-05-14 | End: 2018-05-15

## 2018-05-14 NOTE — Clinical Note
Janie Zamorano discharge to home/self care.    - Condition at discharge: Stable  - Mode of Discharge: Ambulatory   - The patient left the ED accompanied by a family member.  - The discharge instructions were discussed with the patient  - They sta te an understanding of the discharge instructions.  - Instructed patient to go to the discharge window.

## 2018-05-14 NOTE — TELEPHONE ENCOUNTER
"----- Message from Oksana Brooks sent at 5/14/2018 11:07 AM CDT -----  Contact: self/ [phone]  RX request - refill or new RX.  Is this a refill or new RX:    RX name and strength: risperiDONE (RISPERDAL) 1 MG tablet, benztropine (COGENTIN) 1 MG tablet  Directions:   Is this a 30 day or 90 day RX:    Pharmacy name and phone # (DON'T enter "on file" or "in chart"): [facility] [address] [place] [address] [phone] (Phone)  [phone] (Fax)      Comments:  Please call to advise.      "

## 2018-05-14 NOTE — ED PROVIDER NOTES
Encounter Date: 2018    SCRIBE #1 NOTE: I, Katya Hoyt, am scribing for, and in the presence of,  Dr. Perales. I have scribed the entire note.       History     Chief Complaint   Patient presents with    Mouth Lesions     i'm having allergic reaction with bumps on my tongue     Time patient was seen by the provider: 11:30 AM    The patient is a 62 y.o. female with co-morbidities including: HTN, GERD, and Schizophrenia, who presents to the ED with a complaint of mouth lesions. The patient states that she has had painful bumps on the left and right sides of her tongue which began a few days ago. The patient states that she repeatedly bites on the bumps. She states that she has not taken any new medications, but ran out of her prescribed medications, Risperdal and Cogentin, a few months ago.       The history is provided by the patient and medical records.     Review of patient's allergies indicates:   Allergen Reactions    Shellfish containing products Itching     Past Medical History:   Diagnosis Date    Asthma     Depression     GERD (gastroesophageal reflux disease)     Hypertension     Neck pain     Schizophrenia      Past Surgical History:   Procedure Laterality Date     SECTION      X 1    COLONOSCOPY N/A 2018    Procedure: COLONOSCOPY;  Surgeon: Albert Russell MD;  Location: [facility] ([address] [place] [address]);  Service: Endoscopy;  Laterality: N/A;  pm prep/MS    HYSTERECTOMY      KJB---DLH/BSO    LIPOMA RESECTION      back     Family History   Problem Relation Age of Onset    Hypertension Mother     Glaucoma Mother     Macular degeneration Mother     Breast cancer Mother     COPD Father     Hypertension Father     Diabetes Brother     Glaucoma Sister     Ovarian cancer Neg Hx     Colon cancer Neg Hx     Colon polyps Neg Hx      Social History   Substance Use Topics    Smoking status: Never Smoker    Smokeless tobacco: Never Used    Alcohol use No     Review of Systems    Constitutional: Negative for fever.   HENT: Positive for mouth sores (left and right sides of tongue). Negative for nosebleeds.    Eyes: Negative for visual disturbance.   Respiratory: Negative for wheezing.    Cardiovascular: Negative for chest pain.   Gastrointestinal: Negative for abdominal pain.   Musculoskeletal: Negative for gait problem.   Skin: Negative for rash.   Neurological: Negative for speech difficulty.   Psychiatric/Behavioral: Negative for confusion.       Physical Exam     Initial Vitals [05/14/18 1052]   BP Pulse Resp Temp SpO2   (!) 164/83 78 16 98.4 °F (36.9 °C) 99 %      MAP       110         Physical Exam    Nursing note and vitals reviewed.  Constitutional: She appears well-developed and well-nourished. She is not diaphoretic. No distress.   HENT:   Head: Normocephalic and atraumatic.   Mouth/Throat: Oropharynx is clear and moist.   Eyes: Conjunctivae and EOM are normal. Pupils are equal, round, and reactive to light.   Neck: Normal range of motion. Neck supple. No JVD present.   Cardiovascular: Normal rate, regular rhythm and normal heart sounds.   No murmur heard.  Pulmonary/Chest: Breath sounds normal. No respiratory distress. She has no wheezes. She has no rhonchi. She has no rales.   Abdominal: Soft. Bowel sounds are normal. She exhibits no distension and no mass. There is no tenderness. There is no rebound and no guarding.   Musculoskeletal: Normal range of motion. She exhibits no edema or tenderness.   Neurological: She is alert and oriented to person, place, and time. She has normal strength. No cranial nerve deficit or sensory deficit.   Skin: Skin is warm and dry. No rash noted.   Psychiatric: She has a normal mood and affect. Her behavior is normal. Judgment and thought content normal.         ED Course   Procedures  Labs Reviewed - No data to display          Medical Decision Making:   History:   Old Medical Records: I decided to obtain old medical records.  Initial Assessment:    Patient with sensation of a swollen tongue has no significant swelling. I suspect no angioedema and no airway issue. Patient with history of schizophrenia off of her medications. I will refill her Cogentin and Risperdal.             Scribe Attestation:   Scribe #1: I performed the above scribed service and the documentation accurately describes the services I performed. I attest to the accuracy of the note.               Clinical Impression:   The encounter diagnosis was Tongue pain.    Disposition:   Disposition: Discharged  Condition: Stable                        Kelby Perales MD  05/14/18 9252

## 2018-05-15 RX ORDER — BENZTROPINE MESYLATE 1 MG/1
TABLET ORAL
Qty: 60 TABLET | Refills: 2 | Status: SHIPPED | OUTPATIENT
Start: 2018-05-15 | End: 2018-09-19 | Stop reason: ALTCHOICE

## 2018-05-15 RX ORDER — RISPERIDONE 1 MG/1
1 TABLET ORAL DAILY
Qty: 30 TABLET | Refills: 3 | Status: SHIPPED | OUTPATIENT
Start: 2018-05-15 | End: 2018-09-19 | Stop reason: ALTCHOICE

## 2018-05-25 ENCOUNTER — OFFICE VISIT (OUTPATIENT)
Dept: GASTROENTEROLOGY | Facility: CLINIC | Age: 63
End: 2018-05-25
Payer: MEDICARE

## 2018-05-25 ENCOUNTER — LAB VISIT (OUTPATIENT)
Dept: LAB | Facility: HOSPITAL | Age: 63
End: 2018-05-25
Attending: INTERNAL MEDICINE
Payer: MEDICARE

## 2018-05-25 VITALS
HEIGHT: 64 IN | HEART RATE: 81 BPM | SYSTOLIC BLOOD PRESSURE: 133 MMHG | BODY MASS INDEX: 26.98 KG/M2 | WEIGHT: 158.06 LBS | DIASTOLIC BLOOD PRESSURE: 91 MMHG

## 2018-05-25 DIAGNOSIS — Z79.899 ENCOUNTER FOR MONITORING LONG-TERM PROTON PUMP INHIBITOR THERAPY: ICD-10-CM

## 2018-05-25 DIAGNOSIS — Z13.820 OSTEOPOROSIS SCREENING: ICD-10-CM

## 2018-05-25 DIAGNOSIS — K44.9 HIATAL HERNIA: ICD-10-CM

## 2018-05-25 DIAGNOSIS — E55.9 VITAMIN D INSUFFICIENCY: ICD-10-CM

## 2018-05-25 DIAGNOSIS — K22.10 EROSIVE ESOPHAGITIS: ICD-10-CM

## 2018-05-25 DIAGNOSIS — Z51.81 ENCOUNTER FOR MONITORING LONG-TERM PROTON PUMP INHIBITOR THERAPY: ICD-10-CM

## 2018-05-25 DIAGNOSIS — D50.9 IRON DEFICIENCY ANEMIA, UNSPECIFIED IRON DEFICIENCY ANEMIA TYPE: Primary | ICD-10-CM

## 2018-05-25 LAB — 25(OH)D3+25(OH)D2 SERPL-MCNC: 17 NG/ML

## 2018-05-25 PROCEDURE — 3075F SYST BP GE 130 - 139MM HG: CPT | Mod: CPTII,S$GLB,, | Performed by: INTERNAL MEDICINE

## 2018-05-25 PROCEDURE — 99999 PR PBB SHADOW E&M-EST. PATIENT-LVL III: CPT | Mod: PBBFAC,,, | Performed by: INTERNAL MEDICINE

## 2018-05-25 PROCEDURE — 36415 COLL VENOUS BLD VENIPUNCTURE: CPT

## 2018-05-25 PROCEDURE — 3008F BODY MASS INDEX DOCD: CPT | Mod: CPTII,S$GLB,, | Performed by: INTERNAL MEDICINE

## 2018-05-25 PROCEDURE — 82306 VITAMIN D 25 HYDROXY: CPT

## 2018-05-25 PROCEDURE — 3080F DIAST BP >= 90 MM HG: CPT | Mod: CPTII,S$GLB,, | Performed by: INTERNAL MEDICINE

## 2018-05-25 PROCEDURE — 99499 UNLISTED E&M SERVICE: CPT | Mod: S$GLB,,, | Performed by: INTERNAL MEDICINE

## 2018-05-25 PROCEDURE — 99214 OFFICE O/P EST MOD 30 MIN: CPT | Mod: S$GLB,,, | Performed by: INTERNAL MEDICINE

## 2018-05-25 NOTE — Clinical Note
Ashley - please refer to dietitian for gastroparesis likely from her meds - need gastroparesis diet.

## 2018-05-25 NOTE — PROGRESS NOTES
CHIEF COMPLAINT:  Followup for delayed gastric emptying, esophageal hiatal   hernia, erosive esophagitis.    HISTORY OF PRESENT ILLNESS:  A 62-year-old -American female who underwent   an EGD and colonoscopy in January of this year for heartburn, was found to have   a 4 cm sliding hiatal hernia, LA grade C esophagitis.  Colonoscopy showed a few   small little polyps.  Recommend repeat colonoscopy in 5 years.  Colon polyps   were benign.  No evidence of H. pylori.  The patient had a gastric emptying   study, which shows delayed gastric emptying.  She is on some anticholinergic   medicines for her schizophrenia.  She takes Risperdal and Cogentin.  She denies   taking any NSAIDs.  She is currently taking her PPI, Protonix 40 mg every 12   hours.  Recommend a repeat EGD.  We are referring her to dietary for delayed   gastric emptying and gastroparesis diet.    REVIEW OF SYSTEMS:  CONSTITUTIONAL:  No fever, fatigue or weight loss.  ENT:  No difficulty swallowing or sore throat.  CARDIOVASCULAR:  No chest pain or palpitations.  RESPIRATORY:  No shortness of breath or cough.  GENITOURINARY:  No dysuria, urgency or frequency.  MUSCULOSKELETAL:  No arthritis.  SKIN:  No itching or rash.  NEUROLOGIC:  No headache, syncope or stroke.  PSYCHIATRIC:  No uncontrolled depression or anxiety.  ENDOCRINE:  No cold or heat intolerance.  LYMPHATICS:  No lymphadenopathy.  ALLERGIES:  She is allergic to shellfish.  GASTROINTESTINAL:  No nausea or vomiting.  Heartburn as above.  No abdominal   pain.  No diarrhea, no constipation, no blood in her stool, no early satiety.    Averaging a bowel movement everyday to every other day.    PAST MEDICAL HISTORY:  Asthma, depression, schizophrenia, hypertension, delayed   gastric emptying, hiatal hernia, erosive esophagitis, benign colon polyps.    PAST SURGICAL HISTORY:  She had a , lipoma removed, hysterectomy.    FAMILY HISTORY:  Her dad's brother with EtOH cirrhosis of the liver  and liver   cancer.  Nobody with colon cancer.  Nobody with advanced colon adenomatous   polyps.  No FAP, no attenuated FAP, no MAP and no Martinez syndrome.  Nobody with   celiac sprue or inflammatory bowel disease.    SOCIAL HISTORY:  Nonsmoker, nondrinker.  She has been disabled since her   schizophrenia diagnosis.  She was  once for 21 years,  in 2009.    She has one healthy 29-year-old son.  She lives with her sister, is with her in   clinic today.    PHYSICAL EXAMINATION:  VITAL SIGNS:  With chaperone in the room, Ashley, blood pressure is 133/91,   pulse 81 and regular, 5 feet 4 inches tall, 158 pounds.  GENERAL APPEARANCE:  Well nourished, well developed, not in any acute distress.  ABDOMEN:  Nondistended, soft.  No guarding, no rebound, no tenderness.  No   palpable organomegaly.  No bruits.  No pulsatile masses.  No stigmata of chronic   liver disease.  No appreciative ascites or hernias.  Normal active bowel   sounds.  CARDIOVASCULAR:  S1 and S2 without murmurs, gallops or rubs.  RESPIRATORY:  Clear to auscultation bilaterally without wheezes, rhonchi or   rales.  SKIN:  No petechiae or rash on exposed skin areas.  NEUROLOGIC:  Alert and oriented x4.  PSYCHIATRIC:  Normal speech, mentation and affect.    MEDICAL DECISION MAKING:  As above.  Labs have been reviewed.  EGD, colonoscopy   images and reports were removed.  A gastroparesis diet talk given.    IMPRESSION AND PLAN:  LA grade C esophagitis with gastroparesis.  Refer to   dietary for gastroparesis diet.  I recommend PPI twice daily 45 minutes before   breakfast and dinner, Protonix 40 mg.  Repeat EGD for check for esophagitis is   healing and still anemic.  We will do a small bowel x-ray or CT enterography to   rule out small bowel stricture or lesion, but she will need a steroid Benadryl   prep because of her shellfish allergy. We will have the patient return to clinic   in 3 months for followup.      SEC/IN  dd: 05/25/2018 18:21:19  (CDT)  td: 05/26/2018 04:42:51 (CDT)  Doc ID   #[id number]  Job ID #[id number]    CC:

## 2018-05-27 DIAGNOSIS — D50.9 IRON DEFICIENCY ANEMIA, UNSPECIFIED IRON DEFICIENCY ANEMIA TYPE: Primary | ICD-10-CM

## 2018-05-27 DIAGNOSIS — E55.9 VITAMIN D INSUFFICIENCY: ICD-10-CM

## 2018-05-27 RX ORDER — ERGOCALCIFEROL 1.25 MG/1
50000 CAPSULE ORAL
Qty: 8 CAPSULE | Refills: 0 | Status: SHIPPED | OUTPATIENT
Start: 2018-05-27 | End: 2018-05-29 | Stop reason: SDUPTHER

## 2018-05-27 RX ORDER — FERROUS SULFATE 325(65) MG
325 TABLET ORAL EVERY 12 HOURS
Qty: 60 TABLET | Refills: 4 | Status: ON HOLD | OUTPATIENT
Start: 2018-05-27 | End: 2018-08-06

## 2018-05-27 RX ORDER — ACETAMINOPHEN 500 MG
1 TABLET ORAL DAILY
Qty: 90 CAPSULE | Refills: 3 | COMMUNITY
Start: 2018-05-27 | End: 2018-05-29 | Stop reason: SDUPTHER

## 2018-05-29 ENCOUNTER — TELEPHONE (OUTPATIENT)
Dept: GASTROENTEROLOGY | Facility: CLINIC | Age: 63
End: 2018-05-29

## 2018-05-29 DIAGNOSIS — D50.9 IRON DEFICIENCY ANEMIA, UNSPECIFIED IRON DEFICIENCY ANEMIA TYPE: ICD-10-CM

## 2018-05-29 DIAGNOSIS — E55.9 VITAMIN D INSUFFICIENCY: ICD-10-CM

## 2018-05-29 RX ORDER — ACETAMINOPHEN 500 MG
1 TABLET ORAL DAILY
Qty: 90 CAPSULE | Refills: 3 | COMMUNITY
Start: 2018-05-29 | End: 2018-10-07 | Stop reason: SDUPTHER

## 2018-05-29 RX ORDER — ERGOCALCIFEROL 1.25 MG/1
50000 CAPSULE ORAL
Qty: 8 CAPSULE | Refills: 0 | Status: SHIPPED | OUTPATIENT
Start: 2018-05-29 | End: 2018-07-18

## 2018-05-29 NOTE — TELEPHONE ENCOUNTER
----- Message from Albert Russell MD sent at 5/27/2018  1:00 PM CDT -----  Ashley - please tell patient that they are iron deficient and anaemic and recommend that they take ferrous sulfate one 325mg pill every 12 hours for next 4 months and repeat fasting hemoglobin and Ferritin in 8 weeks - Orders placed.    Ashley - Please tell patient that their Vitamin D level is low and recommend that they take Vitamin D 50,000 units once a week for 8 weeks and then start Vitamin D3 (2,000 units) over-the-counter once daily.     Ashley - please recheck their vitamin D level in 4 months - Orders placed.

## 2018-05-29 NOTE — TELEPHONE ENCOUNTER
----- Message from Albert Russell MD sent at 5/29/2018  3:43 PM CDT -----  Ashley - Please tell patient that their Vitamin D level is low and recommend that they take Vitamin D 50,000 units once a week for 8 weeks and then start Vitamin D3 (2,000 units) over-the-counter once daily.     Ashley - please recheck their vitamin D level in 4 months - Orders placed.

## 2018-07-02 RX ORDER — VALSARTAN 160 MG/1
160 TABLET ORAL DAILY
Qty: 90 TABLET | Refills: 3 | Status: ON HOLD | OUTPATIENT
Start: 2018-07-02 | End: 2018-08-06

## 2018-07-11 ENCOUNTER — TELEPHONE (OUTPATIENT)
Dept: GASTROENTEROLOGY | Facility: CLINIC | Age: 63
End: 2018-07-11

## 2018-07-11 NOTE — TELEPHONE ENCOUNTER
----- Message from Stephanie Guzman MA sent at 7/11/2018  1:31 PM CDT -----  Contact: self  [phone]  or   [phone]  Needs Advice    Reason for call:    I have a procedure on 7-17-18 and need to know if I have a ride and a ?  Communication Preference:  Phone# above  Additional Information:  na

## 2018-07-11 NOTE — TELEPHONE ENCOUNTER
----- Message from Stephanie Guzman MA sent at 7/11/2018  1:31 PM CDT -----  Contact: self  [phone]  or   [phone]  Needs Advice    Reason for call:    I have a procedure on 7-17-18 and need to know if I have a ride and a ?  Communication Preference:  Phone# above  Additional Information:  na

## 2018-07-17 ENCOUNTER — TELEPHONE (OUTPATIENT)
Dept: INTERNAL MEDICINE | Facility: CLINIC | Age: 63
End: 2018-07-17

## 2018-07-17 DIAGNOSIS — K22.10 ESOPHAGITIS, EROSIVE: Primary | ICD-10-CM

## 2018-07-17 DIAGNOSIS — Z12.31 ENCOUNTER FOR SCREENING MAMMOGRAM FOR BREAST CANCER: Primary | ICD-10-CM

## 2018-07-17 NOTE — TELEPHONE ENCOUNTER
----- Message from Rebeca Castillo sent at 7/17/2018 11:06 AM CDT -----  Pt want to have a mammogram and is in need of a order.  Please call when done

## 2018-07-23 ENCOUNTER — TELEPHONE (OUTPATIENT)
Dept: INTERNAL MEDICINE | Facility: CLINIC | Age: 63
End: 2018-07-23

## 2018-07-23 DIAGNOSIS — M54.9 BACK PAIN, UNSPECIFIED BACK LOCATION, UNSPECIFIED BACK PAIN LATERALITY, UNSPECIFIED CHRONICITY: Primary | ICD-10-CM

## 2018-07-23 NOTE — TELEPHONE ENCOUNTER
----- Message from Gilda Jasvir sent at 7/20/2018  2:49 PM CDT -----  Contact: call pt at [phone]  Patient would like to get a referral.  Does the patient already have the specialty clinic appointment scheduled:    If yes, what date is the appointment scheduled:     Referral to what specialty:  chiropractor  Reason (be specific):  Back pain  Did you confirm the insurance currently in Epic is correct (important for a referral):  yes  Does the patient want the referral with a specific physician:  no  Is the specialist an [facility] or [facility]: either  Comments:

## 2018-07-24 ENCOUNTER — TELEPHONE (OUTPATIENT)
Dept: ENDOSCOPY | Facility: HOSPITAL | Age: 63
End: 2018-07-24

## 2018-07-25 ENCOUNTER — HOSPITAL ENCOUNTER (EMERGENCY)
Facility: HOSPITAL | Age: 63
Discharge: HOME | End: 2018-07-25
Attending: EMERGENCY MEDICINE
Payer: MEDICARE

## 2018-07-25 VITALS
HEART RATE: 84 BPM | RESPIRATION RATE: 18 BRPM | DIASTOLIC BLOOD PRESSURE: 92 MMHG | WEIGHT: 160 LBS | SYSTOLIC BLOOD PRESSURE: 186 MMHG | BODY MASS INDEX: 27.31 KG/M2 | OXYGEN SATURATION: 98 % | HEIGHT: 64 IN | TEMPERATURE: 99 F

## 2018-07-25 DIAGNOSIS — M62.830 PARASPINAL MUSCLE SPASM: Primary | ICD-10-CM

## 2018-07-25 DIAGNOSIS — M54.50 LOW BACK ACHE: ICD-10-CM

## 2018-07-25 PROCEDURE — 99283 EMERGENCY DEPT VISIT LOW MDM: CPT | Mod: 25 | Performed by: EMERGENCY MEDICINE

## 2018-07-25 PROCEDURE — 99283 EMERGENCY DEPT VISIT LOW MDM: CPT | Mod: ,,, | Performed by: EMERGENCY MEDICINE

## 2018-07-25 PROCEDURE — 96372 THER/PROPH/DIAG INJ SC/IM: CPT | Performed by: EMERGENCY MEDICINE

## 2018-07-25 PROCEDURE — 63600175 PHARM REV CODE 636 W HCPCS: Performed by: STUDENT IN AN ORGANIZED HEALTH CARE EDUCATION/TRAINING PROGRAM

## 2018-07-25 RX ORDER — KETOROLAC TROMETHAMINE 30 MG/ML
30 INJECTION, SOLUTION INTRAMUSCULAR; INTRAVENOUS
Status: COMPLETED | OUTPATIENT
Start: 2018-07-25 | End: 2018-07-25

## 2018-07-25 RX ORDER — CYCLOBENZAPRINE HCL 10 MG
5 TABLET ORAL 3 TIMES DAILY PRN
Qty: 21 TABLET | Refills: 0 | Status: SHIPPED | OUTPATIENT
Start: 2018-07-25 | End: 2018-07-30

## 2018-07-25 RX ORDER — KETOROLAC TROMETHAMINE 30 MG/ML
15 INJECTION, SOLUTION INTRAMUSCULAR; INTRAVENOUS
Status: DISCONTINUED | OUTPATIENT
Start: 2018-07-25 | End: 2018-07-25

## 2018-07-25 RX ADMIN — KETOROLAC TROMETHAMINE 30 MG: 30 INJECTION, SOLUTION INTRAMUSCULAR at 08:07

## 2018-07-25 NOTE — ED PROVIDER NOTES
Encounter Date: 2018    SCRIBE #1 NOTE: I, Vijaya Vazquez, am scribing for, and in the presence of,  Dr. Humphrey. I have scribed the following portions of the note - the Resident attestation.       History     Chief Complaint   Patient presents with    Back Pain     Pt c/o of back pain since yesterday that is getting worse. Pt states pain is mainly in lower back but is now starting to move up.     62 yr old with PMH of Asthma, depression,schizphrenia, HTN presented with low back ache since 3 days, L>R. Pain is 10/10, present at rest and aggravated on lying down or movement. No relieving factors. Denies radiation of pain, numbness/weakness in legs. No bowel/bladder problems.   H/o schizophrenia for which she takes risperidone.  On albuterol and fluticasone inhalers for asthma. Denies SOB/chest pain/palpitations.             Review of patient's allergies indicates:   Allergen Reactions    Shellfish containing products Itching     Past Medical History:   Diagnosis Date    Asthma     Depression     GERD (gastroesophageal reflux disease)     Hypertension     Neck pain     Schizophrenia      Past Surgical History:   Procedure Laterality Date     SECTION      X 1    COLONOSCOPY N/A 2018    Procedure: COLONOSCOPY;  Surgeon: Albert Russell MD;  Location: [facility] (4TH FLR);  Service: Endoscopy;  Laterality: N/A;  pm prep/MS    ESOPHAGOGASTRODUODENOSCOPY N/A 2018    Procedure: ESOPHAGOGASTRODUODENOSCOPY (EGD);  Surgeon: Albert Russell MD;  Location: [facility] (2ND FLR);  Service: Endoscopy;  Laterality: N/A;  EGD in 8 weeks on Pantoprazole 40mg every 12 hours patient needs to take her PPI morning of EGD with sip of water.  Follow up esophagitis.       hx of gastroparesis. per Dr Russell-24 hours clear liquids/ Per Dr. Russell on 18 Pt to be r/s with     HYSTERECTOMY      KJB---DLH/BSO    LIPOMA RESECTION      back     Family History   Problem Relation Age of Onset     Hypertension Mother     Glaucoma Mother     Macular degeneration Mother     Breast cancer Mother     COPD Father     Hypertension Father     Diabetes Brother     Glaucoma Sister     Liver cancer Paternal Uncle 75        dad brother ETOH    Ovarian cancer Neg Hx     Colon cancer Neg Hx     Colon polyps Neg Hx     Cirrhosis Neg Hx     Celiac disease Neg Hx     Ulcerative colitis Neg Hx     Hemochromatosis Neg Hx     Esophageal cancer Neg Hx      Social History   Substance Use Topics    Smoking status: Never Smoker    Smokeless tobacco: Never Used    Alcohol use No     Review of Systems   Constitutional: Negative for chills and fever.   HENT: Negative for trouble swallowing.    Eyes: Negative for visual disturbance.   Respiratory: Negative for chest tightness and shortness of breath.    Cardiovascular: Negative for chest pain, palpitations and leg swelling.   Gastrointestinal: Negative for abdominal pain, constipation, diarrhea, nausea and vomiting.   Genitourinary: Negative for difficulty urinating.   Musculoskeletal: Positive for back pain. Negative for neck pain.   Neurological: Negative for dizziness and light-headedness.   Psychiatric/Behavioral: Negative for agitation and confusion. The patient is nervous/anxious.        Physical Exam     Initial Vitals [07/25/18 0622]   BP Pulse Resp Temp SpO2   (!) 186/92 84 18 98.9 °F (37.2 °C) 98 %      MAP       --         Physical Exam    Constitutional: No distress.   HENT:   Head: Normocephalic.   Eyes: EOM are normal. Pupils are equal, round, and reactive to light.   Neck: Normal range of motion.   Cardiovascular: Normal rate and regular rhythm.   Pulmonary/Chest: Breath sounds normal.   Abdominal: Soft. Bowel sounds are normal. There is no tenderness.   Genitourinary:   Genitourinary Comments: No CVA tenderness.   Musculoskeletal:   Restricted ROM at pelvis. Unable to bend forward/backward/sideways .   Tenderness over lower paraspinal regions. No  vertebral tenderness.   Neurological: She is alert and oriented to person, place, and time.         ED Course   Procedures  Labs Reviewed - No data to display       Imaging Results          X-Ray Lumbar Spine Ap And Lateral (Final result)  Result time 07/25/18 08:45:07    Final result by Xavier Rousseau MD (07/25/18 08:45:07)                 Impression:      See above      Electronically signed by: Xavier Rousseau MD  Date:    07/25/2018  Time:    08:45             Narrative:    EXAMINATION:  XR LUMBAR SPINE AP AND LATERAL    CLINICAL HISTORY:  Low back pain, <6wks, no red flags, no prior management;Low back pain    TECHNIQUE:  AP, lateral and spot images were performed of the lumbar spine.    COMPARISON:  None    FINDINGS:  Mild DJD.  No significant disc space narrowing.  Mild lumbar scoliosis.  No fracture, spondylolisthesis or bone destruction identified                                 Medical Decision Making:   History:   Old Medical Records: I decided to obtain old medical records.  Initial Assessment:   62 yr old with low back ache since 3 days.   Differential Diagnosis:   1) Paraspinal muscle sprain  2) Vertebral compression fracture.    Clinical Tests:   Radiological Study: Ordered and Reviewed       APC / Resident Notes:   9:48 AM  X-ray L-spine shows DJD, no evidence of compression fracture.  Discharged with Flexeril 5mg TID x 7days  Advised to follow up with PCP.       Scribe Attestation:   Scribe #1: I performed the above scribed service and the documentation accurately describes the services I performed. I attest to the accuracy of the note.    Attending Attestation:   Physician Attestation Statement for Resident:  As the supervising MD   Physician Attestation Statement: I have personally seen and examined this patient.   I agree with the above history. -:   As the supervising MD I agree with the above PE.    As the supervising MD I agree with the above treatment, course, plan, and disposition.                        Clinical Impression:   The encounter diagnosis was Low back ache.                             Dillon Cabral MD  Resident  07/25/18 5716

## 2018-08-01 ENCOUNTER — HOSPITAL ENCOUNTER (OUTPATIENT)
Dept: RADIOLOGY | Facility: HOSPITAL | Age: 63
Discharge: HOME | DRG: 821 | End: 2018-08-01
Attending: INTERNAL MEDICINE
Payer: MEDICARE

## 2018-08-01 ENCOUNTER — OFFICE VISIT (OUTPATIENT)
Dept: INTERNAL MEDICINE | Facility: CLINIC | Age: 63
DRG: 821 | End: 2018-08-01
Payer: MEDICARE

## 2018-08-01 VITALS
DIASTOLIC BLOOD PRESSURE: 88 MMHG | SYSTOLIC BLOOD PRESSURE: 148 MMHG | HEART RATE: 84 BPM | HEIGHT: 64 IN | WEIGHT: 152 LBS | BODY MASS INDEX: 25.95 KG/M2

## 2018-08-01 DIAGNOSIS — Z12.31 ENCOUNTER FOR SCREENING MAMMOGRAM FOR BREAST CANCER: ICD-10-CM

## 2018-08-01 DIAGNOSIS — M62.830 BACK MUSCLE SPASM: Primary | ICD-10-CM

## 2018-08-01 DIAGNOSIS — I10 HYPERTENSION, ESSENTIAL: ICD-10-CM

## 2018-08-01 DIAGNOSIS — K21.00 GASTROESOPHAGEAL REFLUX DISEASE WITH ESOPHAGITIS: ICD-10-CM

## 2018-08-01 PROCEDURE — 77067 SCR MAMMO BI INCL CAD: CPT | Mod: TC

## 2018-08-01 PROCEDURE — 99214 OFFICE O/P EST MOD 30 MIN: CPT | Mod: S$GLB,,, | Performed by: PHYSICIAN ASSISTANT

## 2018-08-01 PROCEDURE — 99999 PR PBB SHADOW E&M-EST. PATIENT-LVL IV: CPT | Mod: PBBFAC,,, | Performed by: PHYSICIAN ASSISTANT

## 2018-08-01 PROCEDURE — 3077F SYST BP >= 140 MM HG: CPT | Mod: CPTII,S$GLB,, | Performed by: PHYSICIAN ASSISTANT

## 2018-08-01 PROCEDURE — 3079F DIAST BP 80-89 MM HG: CPT | Mod: CPTII,S$GLB,, | Performed by: PHYSICIAN ASSISTANT

## 2018-08-01 PROCEDURE — 77063 BREAST TOMOSYNTHESIS BI: CPT | Mod: 26,,, | Performed by: RADIOLOGY

## 2018-08-01 PROCEDURE — 3008F BODY MASS INDEX DOCD: CPT | Mod: CPTII,S$GLB,, | Performed by: PHYSICIAN ASSISTANT

## 2018-08-01 PROCEDURE — 77067 SCR MAMMO BI INCL CAD: CPT | Mod: 26,,, | Performed by: RADIOLOGY

## 2018-08-01 RX ORDER — METHOCARBAMOL 500 MG/1
500 TABLET, FILM COATED ORAL 4 TIMES DAILY
Qty: 40 TABLET | Refills: 0 | Status: ON HOLD | OUTPATIENT
Start: 2018-08-01 | End: 2018-08-06

## 2018-08-01 RX ORDER — DICLOFENAC SODIUM 10 MG/G
2 GEL TOPICAL 4 TIMES DAILY
Qty: 100 G | Refills: 0 | Status: SHIPPED | OUTPATIENT
Start: 2018-08-01 | End: 2018-08-02 | Stop reason: SDUPTHER

## 2018-08-01 NOTE — PATIENT INSTRUCTIONS
Back Spasm, No Trauma (Child)  Anything that puts stress on the muscles, ligaments, and bones can cause back pain and spasm. A muscle spasm is an involuntary muscle contraction. The muscles cramp up and become very tight. They may feel hard. Muscle spasms can be very painful. They may occur in the upper or lower back, on one or both sides of the spine. This condition is called a back spasm.  Back spasms in children can be caused by too much exercise or muscle fatigue. A too-soft mattress or heavy school backpack can also contribute to back spasms.   Illnesses can also cause back pain. It is often difficult or impossible for a young child to explain what he or she feels and what the problem is. Because children have trouble describing things, it may be difficult for you to determine if an illness is causing your child's pain, rather than an injury. Other causes may include dehydration, bladder infections, appendicitis, or abdominal infections. If your child has any of the following symptoms, follow up with your doctor:  · Fever, chills, or weight loss  · Weakness or numbness  · Trouble walking  · Pain that radiates down one or both legs  · Bowel or bladder problems  · Pain that keeps the child from sleeping  The earlier the cause of a problem is identified, the better. Make sure to see a doctor if your child's back pain lasts for more than a few days or gets progressively worse. Depending on what your doctor finds after talking with you and examining your child, tests may be done.  Muscle spasms may go away with slow stretching or massaging of the muscles. Your healthcare provider may prescribe pain and anti-inflammatory medicines. Treatment depends on the cause of the spasm. Discomfort caused by minor problems usually goes away in a few weeks.   Home care  Your doctor may prescribe medicines to treat the pain and inflammation. Follow the doctors instructions when using these medicines. The medicines are usually  given until your child is pain-free.  General care  · During the first 24 to 72 hours after an injury, apply an ice pack to the painful area for 20 minutes, then remove it for 20 minutes. Do this over a period of 60 to 90 minutes, or as often as your child will tolerate it. This will reduce swelling and pain. Always use a protective barrier such as a towel, cloth, or plastic wrap when applying ice or a cold pack to your child's skin.  · You can start with ice then switch to heat. Heat (a warm bath or heating pad) reduces pain. Heat can be applied to the painful area for 20 minutes, then removed for 20 minutes. Do this over a period of 60 to 90 minutes or as often as your child will tolerate it. As a safety precaution, do not let your child sleep on a heating pad. Sleeping with a heating pad can lead to skin burns and tissue damage.  · You can alternate ice and heat therapy.  · Massage can help relax the back muscles. Try rubbing the area that hurts. Be gentle. The idea is to relax the muscles, not manipulate them.  · Allow your child to continue most normal activities as tolerated. Bed rest is not necessary. Your child should avoid lifting and jumping until the muscles have completely healed.  · Help your child do any exercises that were suggested by your doctor. Exercises can help prevent the back pain from coming back. When your child is no longer in pain, he or she should get at least 30 minutes of active play (walking, running, bike riding) each day.   Follow-up care  Follow up with your healthcare provider, or as advised.   Special note to parents  Talk to your doctor about guidelines for safe backpack use.   Call 911  Seek emergency care if any of the following occur:  · Trouble breathing  · Confusion  · Unusual drowsiness or trouble awakening  · Fainting or loss of consciousness  · Rapid or very slow heart rate  · Loss of bowel or bladder control  When to seek medical advice  Call your child's healthcare  provider right away if any of these occur:  · Fever greater than 100.4°F (38°C), or as advised  · Chills  · Severe cramping  · Cramping that lasts a long time, does not go away with stretching, or keeps coming back  · Pain, tingling, or weakness in legs  · Trouble walking  · Pain that wakes the child up at night  · Weight loss  Date Last Reviewed: 6/1/2016  © 1494-8496 [employer]. [address] [place] [address], [place] [address]. All rights reserved. This information is not intended as a substitute for professional medical care. Always follow your healthcare professional's instructions.

## 2018-08-01 NOTE — PROGRESS NOTES
Subjective:       Patient ID: Janie Zamorano is a 62 y.o. female.        Chief Complaint: Back Pain    Janie Zamorano is an established patient of He Hoyt MD here today for urgent care visit.    Low back pain: seen in ED 7/25 for back pain and spasms, given rx for flexeril and has been taking this without relief, she also had home rx for norco and ibuprofen 800 mg that she has been taking intermittently, she is now out of both medications, no loss of bowel or bladder, no numbness/tingling/weakness, no saddle anesthesia, no abdominal pain, no fever or weight loss, pain worsened by twisting and standing, improved with sitting.    HTN: taking labetalol and valsartan, took both meds today, BP elevated to 190/110 in ED, improved today but still not at goal, not consistent with medication, no chest pain/shortness of breath/headache/blurred vision.           Review of Systems   Constitutional: Negative for chills, diaphoresis, fatigue and fever.   HENT: Negative for congestion and sore throat.    Eyes: Negative for visual disturbance.   Respiratory: Negative for cough, chest tightness and shortness of breath.    Cardiovascular: Negative for chest pain, palpitations and leg swelling.   Gastrointestinal: Negative for abdominal pain, blood in stool, constipation, diarrhea, nausea and vomiting.   Genitourinary: Negative for dysuria, frequency, hematuria and urgency.   Musculoskeletal: Positive for back pain. Negative for arthralgias.   Skin: Negative for rash.   Neurological: Negative for dizziness, syncope, weakness and headaches.   Psychiatric/Behavioral: Negative for dysphoric mood and sleep disturbance. The patient is not nervous/anxious.        Objective:      Physical Exam   Constitutional: She appears well-developed and well-nourished. No distress.   HENT:   Head: Normocephalic and atraumatic.   Right Ear: Tympanic membrane and external ear normal.   Left Ear: Tympanic membrane and external ear  "normal.   Nose: Nose normal.   Mouth/Throat: Oropharynx is clear and moist.   Eyes: Conjunctivae are normal. Pupils are equal, round, and reactive to light.   Cardiovascular: Normal rate, regular rhythm and normal heart sounds.  Exam reveals no gallop.    No murmur heard.  Pulmonary/Chest: Effort normal and breath sounds normal. No respiratory distress.   Abdominal: Soft. Normal appearance. There is no tenderness. There is no rebound, no guarding and no CVA tenderness.   Musculoskeletal: She exhibits no edema.        Lumbar back: She exhibits spasm (bilateral paravertebral). She exhibits normal range of motion (pain with rotation to left) and no tenderness.   Neurological: She is alert.   Skin: Skin is warm and dry. She is not diaphoretic.   Psychiatric: She has a normal mood and affect.   Nursing note and vitals reviewed.      Assessment:       1. Back muscle spasm    2. Hypertension, essential    3. Gastroesophageal reflux disease with esophagitis        Plan:       Janie was seen today for back pain.    Diagnoses and all orders for this visit:    Back muscle spasm  -     methocarbamol (ROBAXIN) 500 MG Tab; Take 1 tablet (500 mg total) by mouth 4 (four) times daily. for 10 days  -     diclofenac sodium 1 % Gel; Apply 2 g topically 4 (four) times daily. for 10 days    Hypertension, essential: improved compared to ED BP readings but still not at goal, not taking labetalol consistently, importance discussed, schedule f/u with PCP in 2-4 weeks    Gastroesophageal reflux disease with esophagitis: continue protonix    Rest, ice, muscle cream, heat, gentle stretching, f/u poor results.    Pt has been given instructions populated from [employer] database and has verbalized understanding of the after visit summary and information contained wherein.    Follow up with a primary care provider. May go to ER for acute shortness of breath, lightheadedness, fever, or any other emergent complaints or changes in condition.    "This " "note will be shared with the patient"    Future Appointments  Date Time Provider Department Center   8/1/2018 2:00 PM [facility]   9/12/2018 3:00 PM Gomez Paulson Jr., MD [facility] [place] [facility]   9/28/2018 9:00 AM LAB, APPOINTMENT [facility]               "

## 2018-08-02 ENCOUNTER — TELEPHONE (OUTPATIENT)
Dept: INTERNAL MEDICINE | Facility: CLINIC | Age: 63
End: 2018-08-02

## 2018-08-02 DIAGNOSIS — M62.830 BACK MUSCLE SPASM: ICD-10-CM

## 2018-08-02 RX ORDER — DICLOFENAC SODIUM 10 MG/G
2 GEL TOPICAL 4 TIMES DAILY
Qty: 100 G | Refills: 0 | Status: SHIPPED | OUTPATIENT
Start: 2018-08-02 | End: 2018-08-02 | Stop reason: SDUPTHER

## 2018-08-02 RX ORDER — DICLOFENAC SODIUM 10 MG/G
2 GEL TOPICAL 4 TIMES DAILY
Qty: 100 G | Refills: 0 | Status: ON HOLD | OUTPATIENT
Start: 2018-08-02 | End: 2018-08-06

## 2018-08-02 NOTE — TELEPHONE ENCOUNTER
Spoke with Florence states the form was faxed over this morning around 8:30 and needs to be completed and sent back before 1 tomorrow.       Did the fax come through yet?

## 2018-08-02 NOTE — TELEPHONE ENCOUNTER
----- Message from Tarah Andre sent at 8/2/2018  1:58 PM CDT -----  Contact: Florence from [employer]  Requesting a prior authorization    diclofenac sodium 1 % Gel    Florence can be reached at [phone]    Thanks

## 2018-08-02 NOTE — TELEPHONE ENCOUNTER
"----- Message from Ryann Hugo sent at 8/2/2018  1:59 PM CDT -----  Contact: [facility][id number] [phone]  Prior Authorization Needed    Rx: diclofenac sodium 1 % Gel    To submit the PA:    1: Go to " [email or web address] " and click "Enter a Key"    2. Enter the patient's last name and date of birth and the key.      KEY: [id number]    3. Complete the forms and click "send to Plan"    Note chart when prior authorization has been submitted.    Please notify pharmacy when prior authorization has been approved.    Thank You    "

## 2018-08-03 ENCOUNTER — NURSE TRIAGE (OUTPATIENT)
Dept: ADMINISTRATIVE | Facility: CLINIC | Age: 63
End: 2018-08-03

## 2018-08-03 ENCOUNTER — HOSPITAL ENCOUNTER (INPATIENT)
Facility: HOSPITAL | Age: 63
LOS: 18 days | Discharge: HOME HEALTH | DRG: 821 | End: 2018-08-21
Attending: EMERGENCY MEDICINE | Admitting: HOSPITALIST
Payer: MEDICARE

## 2018-08-03 DIAGNOSIS — K59.00 CONSTIPATION: ICD-10-CM

## 2018-08-03 DIAGNOSIS — S22.089A CLOSED FRACTURE OF TWELFTH THORACIC VERTEBRA, UNSPECIFIED FRACTURE MORPHOLOGY, INITIAL ENCOUNTER: ICD-10-CM

## 2018-08-03 DIAGNOSIS — E83.52 HYPERCALCEMIA: ICD-10-CM

## 2018-08-03 DIAGNOSIS — R41.0 DELIRIUM: ICD-10-CM

## 2018-08-03 DIAGNOSIS — R10.9 BILATERAL FLANK PAIN: ICD-10-CM

## 2018-08-03 DIAGNOSIS — M43.20 FUSION OF SPINE: ICD-10-CM

## 2018-08-03 DIAGNOSIS — C90.00 MULTIPLE MYELOMA, REMISSION STATUS UNSPECIFIED: ICD-10-CM

## 2018-08-03 DIAGNOSIS — D64.9 ANEMIA, UNSPECIFIED TYPE: ICD-10-CM

## 2018-08-03 DIAGNOSIS — Z98.1 S/P SPINAL FUSION: Primary | ICD-10-CM

## 2018-08-03 DIAGNOSIS — F20.0 PARANOID SCHIZOPHRENIA: ICD-10-CM

## 2018-08-03 DIAGNOSIS — S22.000A CLOSED COMPRESSION FRACTURE OF THORACIC VERTEBRA, INITIAL ENCOUNTER: ICD-10-CM

## 2018-08-03 DIAGNOSIS — M84.58XA PATHOLOGICAL FRACTURE OF VERTEBRA DUE TO NEOPLASTIC DISEASE, INITIAL ENCOUNTER: ICD-10-CM

## 2018-08-03 DIAGNOSIS — C79.9 MULTIPLE LESIONS OF METASTATIC MALIGNANCY: ICD-10-CM

## 2018-08-03 DIAGNOSIS — N17.9 ACUTE KIDNEY INJURY: ICD-10-CM

## 2018-08-03 DIAGNOSIS — C90.00 MULTIPLE MYELOMA NOT HAVING ACHIEVED REMISSION: ICD-10-CM

## 2018-08-03 PROBLEM — M89.9 LYTIC BONE LESIONS ON XRAY: Status: ACTIVE | Noted: 2018-08-03

## 2018-08-03 PROBLEM — R80.8 OTHER PROTEINURIA: Status: ACTIVE | Noted: 2018-08-03

## 2018-08-03 LAB
25(OH)D3+25(OH)D2 SERPL-MCNC: 27 NG/ML
ALBUMIN SERPL BCP-MCNC: 3.9 G/DL
ALP SERPL-CCNC: 98 U/L
ALT SERPL W/O P-5'-P-CCNC: 14 U/L
ANION GAP SERPL CALC-SCNC: 9 MMOL/L
AST SERPL-CCNC: 24 U/L
BACTERIA #/AREA URNS AUTO: ABNORMAL /HPF
BASOPHILS # BLD AUTO: 0.02 K/UL
BASOPHILS NFR BLD: 0.5 %
BILIRUB SERPL-MCNC: 0.6 MG/DL
BILIRUB UR QL STRIP: NEGATIVE
BUN SERPL-MCNC: 24 MG/DL
CALCIUM SERPL-MCNC: 11.8 MG/DL
CHLORIDE SERPL-SCNC: 105 MMOL/L
CLARITY UR REFRACT.AUTO: CLEAR
CO2 SERPL-SCNC: 24 MMOL/L
COLOR UR AUTO: YELLOW
CREAT SERPL-MCNC: 1.7 MG/DL
CREAT UR-MCNC: 119 MG/DL
DIFFERENTIAL METHOD: ABNORMAL
EOSINOPHIL # BLD AUTO: 0.1 K/UL
EOSINOPHIL NFR BLD: 1.6 %
ERYTHROCYTE [DISTWIDTH] IN BLOOD BY AUTOMATED COUNT: 13.5 %
EST. GFR  (AFRICAN AMERICAN): 37 ML/MIN/1.73 M^2
EST. GFR  (NON AFRICAN AMERICAN): 32.1 ML/MIN/1.73 M^2
ESTIMATED AVG GLUCOSE: 108 MG/DL
GLUCOSE SERPL-MCNC: 89 MG/DL
GLUCOSE UR QL STRIP: NEGATIVE
HBA1C MFR BLD HPLC: 5.4 %
HCT VFR BLD AUTO: 34.6 %
HGB BLD-MCNC: 11.1 G/DL
HGB UR QL STRIP: ABNORMAL
HYALINE CASTS UR QL AUTO: 7 /LPF
IMM GRANULOCYTES # BLD AUTO: 0.03 K/UL
IMM GRANULOCYTES NFR BLD AUTO: 0.7 %
KETONES UR QL STRIP: NEGATIVE
LEUKOCYTE ESTERASE UR QL STRIP: NEGATIVE
LYMPHOCYTES # BLD AUTO: 1 K/UL
LYMPHOCYTES NFR BLD: 23 %
MCH RBC QN AUTO: 28.2 PG
MCHC RBC AUTO-ENTMCNC: 32.1 G/DL
MCV RBC AUTO: 88 FL
MICROSCOPIC COMMENT: ABNORMAL
MONOCYTES # BLD AUTO: 0.4 K/UL
MONOCYTES NFR BLD: 10.2 %
NEUTROPHILS # BLD AUTO: 2.8 K/UL
NEUTROPHILS NFR BLD: 64 %
NITRITE UR QL STRIP: NEGATIVE
NRBC BLD-RTO: 0 /100 WBC
PH UR STRIP: 5 [PH] (ref 5–8)
PLATELET # BLD AUTO: 238 K/UL
PMV BLD AUTO: 10 FL
POTASSIUM SERPL-SCNC: 4.8 MMOL/L
PROT SERPL-MCNC: 8.1 G/DL
PROT UR QL STRIP: ABNORMAL
PTH-INTACT SERPL-MCNC: 104 PG/ML
RBC # BLD AUTO: 3.93 M/UL
RBC #/AREA URNS AUTO: 2 /HPF (ref 0–4)
SODIUM SERPL-SCNC: 138 MMOL/L
SODIUM UR-SCNC: 58 MMOL/L
SP GR UR STRIP: 1.02 (ref 1–1.03)
SQUAMOUS #/AREA URNS AUTO: 2 /HPF
URN SPEC COLLECT METH UR: ABNORMAL
UROBILINOGEN UR STRIP-ACNC: NEGATIVE EU/DL
WBC # BLD AUTO: 4.31 K/UL
WBC #/AREA URNS AUTO: 2 /HPF (ref 0–5)

## 2018-08-03 PROCEDURE — 63600175 PHARM REV CODE 636 W HCPCS: Performed by: STUDENT IN AN ORGANIZED HEALTH CARE EDUCATION/TRAINING PROGRAM

## 2018-08-03 PROCEDURE — 25000003 PHARM REV CODE 250: Performed by: STUDENT IN AN ORGANIZED HEALTH CARE EDUCATION/TRAINING PROGRAM

## 2018-08-03 PROCEDURE — 84165 PROTEIN E-PHORESIS SERUM: CPT

## 2018-08-03 PROCEDURE — 83036 HEMOGLOBIN GLYCOSYLATED A1C: CPT

## 2018-08-03 PROCEDURE — 96374 THER/PROPH/DIAG INJ IV PUSH: CPT

## 2018-08-03 PROCEDURE — 80053 COMPREHEN METABOLIC PANEL: CPT

## 2018-08-03 PROCEDURE — 99285 EMERGENCY DEPT VISIT HI MDM: CPT | Mod: 25

## 2018-08-03 PROCEDURE — 86334 IMMUNOFIX E-PHORESIS SERUM: CPT | Mod: 26,,, | Performed by: PATHOLOGY

## 2018-08-03 PROCEDURE — 83970 ASSAY OF PARATHORMONE: CPT

## 2018-08-03 PROCEDURE — 82306 VITAMIN D 25 HYDROXY: CPT

## 2018-08-03 PROCEDURE — 99285 EMERGENCY DEPT VISIT HI MDM: CPT | Mod: ,,, | Performed by: PHYSICIAN ASSISTANT

## 2018-08-03 PROCEDURE — 25000003 PHARM REV CODE 250: Performed by: PHYSICIAN ASSISTANT

## 2018-08-03 PROCEDURE — 96361 HYDRATE IV INFUSION ADD-ON: CPT

## 2018-08-03 PROCEDURE — 81001 URINALYSIS AUTO W/SCOPE: CPT

## 2018-08-03 PROCEDURE — 85025 COMPLETE CBC W/AUTO DIFF WBC: CPT

## 2018-08-03 PROCEDURE — 25000003 PHARM REV CODE 250: Performed by: HOSPITALIST

## 2018-08-03 PROCEDURE — 11000001 HC ACUTE MED/SURG PRIVATE ROOM

## 2018-08-03 PROCEDURE — 84165 PROTEIN E-PHORESIS SERUM: CPT | Mod: 26,,, | Performed by: PATHOLOGY

## 2018-08-03 PROCEDURE — 99284 EMERGENCY DEPT VISIT MOD MDM: CPT | Mod: ,,, | Performed by: PHYSICIAN ASSISTANT

## 2018-08-03 PROCEDURE — 84300 ASSAY OF URINE SODIUM: CPT

## 2018-08-03 PROCEDURE — 82570 ASSAY OF URINE CREATININE: CPT

## 2018-08-03 PROCEDURE — 99223 1ST HOSP IP/OBS HIGH 75: CPT | Mod: AI,GC,, | Performed by: HOSPITALIST

## 2018-08-03 PROCEDURE — 96372 THER/PROPH/DIAG INJ SC/IM: CPT

## 2018-08-03 PROCEDURE — 86334 IMMUNOFIX E-PHORESIS SERUM: CPT

## 2018-08-03 PROCEDURE — 82397 CHEMILUMINESCENT ASSAY: CPT

## 2018-08-03 PROCEDURE — 63600175 PHARM REV CODE 636 W HCPCS: Performed by: PHYSICIAN ASSISTANT

## 2018-08-03 RX ORDER — AMOXICILLIN 250 MG
1 CAPSULE ORAL 2 TIMES DAILY PRN
Status: DISCONTINUED | OUTPATIENT
Start: 2018-08-03 | End: 2018-08-04

## 2018-08-03 RX ORDER — GLUCAGON 1 MG
1 KIT INJECTION
Status: DISCONTINUED | OUTPATIENT
Start: 2018-08-03 | End: 2018-08-21 | Stop reason: HOSPADM

## 2018-08-03 RX ORDER — ORPHENADRINE CITRATE 30 MG/ML
60 INJECTION INTRAMUSCULAR; INTRAVENOUS
Status: COMPLETED | OUTPATIENT
Start: 2018-08-03 | End: 2018-08-03

## 2018-08-03 RX ORDER — KETOROLAC TROMETHAMINE 30 MG/ML
15 INJECTION, SOLUTION INTRAMUSCULAR; INTRAVENOUS
Status: COMPLETED | OUTPATIENT
Start: 2018-08-03 | End: 2018-08-03

## 2018-08-03 RX ORDER — POLYETHYLENE GLYCOL 3350 17 G/17G
17 POWDER, FOR SOLUTION ORAL DAILY
Status: DISCONTINUED | OUTPATIENT
Start: 2018-08-04 | End: 2018-08-21 | Stop reason: HOSPADM

## 2018-08-03 RX ORDER — IPRATROPIUM BROMIDE AND ALBUTEROL SULFATE 2.5; .5 MG/3ML; MG/3ML
3 SOLUTION RESPIRATORY (INHALATION) EVERY 4 HOURS PRN
Status: DISCONTINUED | OUTPATIENT
Start: 2018-08-03 | End: 2018-08-21 | Stop reason: HOSPADM

## 2018-08-03 RX ORDER — BENZTROPINE MESYLATE 1 MG/1
1 TABLET ORAL 2 TIMES DAILY
Status: DISCONTINUED | OUTPATIENT
Start: 2018-08-03 | End: 2018-08-15

## 2018-08-03 RX ORDER — HYDROMORPHONE HYDROCHLORIDE 1 MG/ML
0.5 INJECTION, SOLUTION INTRAMUSCULAR; INTRAVENOUS; SUBCUTANEOUS EVERY 6 HOURS PRN
Status: DISCONTINUED | OUTPATIENT
Start: 2018-08-03 | End: 2018-08-04

## 2018-08-03 RX ORDER — SODIUM CHLORIDE 0.9 % (FLUSH) 0.9 %
5 SYRINGE (ML) INJECTION
Status: DISCONTINUED | OUTPATIENT
Start: 2018-08-03 | End: 2018-08-21 | Stop reason: HOSPADM

## 2018-08-03 RX ORDER — MORPHINE SULFATE 4 MG/ML
INJECTION, SOLUTION INTRAMUSCULAR; INTRAVENOUS
Status: DISPENSED
Start: 2018-08-03 | End: 2018-08-04

## 2018-08-03 RX ORDER — IBUPROFEN 200 MG
16 TABLET ORAL
Status: DISCONTINUED | OUTPATIENT
Start: 2018-08-03 | End: 2018-08-21 | Stop reason: HOSPADM

## 2018-08-03 RX ORDER — HYDRALAZINE HYDROCHLORIDE 25 MG/1
25 TABLET, FILM COATED ORAL ONCE
Status: COMPLETED | OUTPATIENT
Start: 2018-08-03 | End: 2018-08-03

## 2018-08-03 RX ORDER — LABETALOL 100 MG/1
100 TABLET, FILM COATED ORAL 2 TIMES DAILY
Status: DISCONTINUED | OUTPATIENT
Start: 2018-08-03 | End: 2018-08-04

## 2018-08-03 RX ORDER — SODIUM CHLORIDE 9 MG/ML
INJECTION, SOLUTION INTRAVENOUS CONTINUOUS
Status: DISCONTINUED | OUTPATIENT
Start: 2018-08-03 | End: 2018-08-06

## 2018-08-03 RX ORDER — ONDANSETRON 2 MG/ML
4 INJECTION INTRAMUSCULAR; INTRAVENOUS EVERY 6 HOURS PRN
Status: DISCONTINUED | OUTPATIENT
Start: 2018-08-04 | End: 2018-08-21 | Stop reason: HOSPADM

## 2018-08-03 RX ORDER — FLUTICASONE PROPIONATE 44 UG/1
1 AEROSOL, METERED RESPIRATORY (INHALATION) 2 TIMES DAILY
Status: DISCONTINUED | OUTPATIENT
Start: 2018-08-03 | End: 2018-08-21 | Stop reason: HOSPADM

## 2018-08-03 RX ORDER — RISPERIDONE 0.5 MG/1
2 TABLET ORAL NIGHTLY
Status: DISCONTINUED | OUTPATIENT
Start: 2018-08-03 | End: 2018-08-12

## 2018-08-03 RX ORDER — IBUPROFEN 200 MG
24 TABLET ORAL
Status: DISCONTINUED | OUTPATIENT
Start: 2018-08-03 | End: 2018-08-21 | Stop reason: HOSPADM

## 2018-08-03 RX ORDER — MORPHINE SULFATE 4 MG/ML
4 INJECTION, SOLUTION INTRAMUSCULAR; INTRAVENOUS
Status: COMPLETED | OUTPATIENT
Start: 2018-08-03 | End: 2018-08-03

## 2018-08-03 RX ORDER — RISPERIDONE 1 MG/1
1 TABLET ORAL DAILY
Status: DISCONTINUED | OUTPATIENT
Start: 2018-08-04 | End: 2018-08-12

## 2018-08-03 RX ADMIN — HYDRALAZINE HYDROCHLORIDE 25 MG: 25 TABLET, FILM COATED ORAL at 07:08

## 2018-08-03 RX ADMIN — LABETALOL HYDROCHLORIDE 100 MG: 100 TABLET, FILM COATED ORAL at 04:08

## 2018-08-03 RX ADMIN — KETOROLAC TROMETHAMINE 15 MG: 30 INJECTION, SOLUTION INTRAMUSCULAR at 12:08

## 2018-08-03 RX ADMIN — SENNOSIDES AND DOCUSATE SODIUM 1 TABLET: 8.6; 5 TABLET ORAL at 11:08

## 2018-08-03 RX ADMIN — BENZTROPINE MESYLATE 1 MG: 1 TABLET ORAL at 10:08

## 2018-08-03 RX ADMIN — MORPHINE SULFATE 4 MG: 4 INJECTION INTRAVENOUS at 02:08

## 2018-08-03 RX ADMIN — SODIUM CHLORIDE 1000 ML: 0.9 INJECTION, SOLUTION INTRAVENOUS at 01:08

## 2018-08-03 RX ADMIN — SODIUM CHLORIDE: 0.9 INJECTION, SOLUTION INTRAVENOUS at 11:08

## 2018-08-03 RX ADMIN — RISPERIDONE 2 MG: 1 TABLET ORAL at 10:08

## 2018-08-03 RX ADMIN — ORPHENADRINE CITRATE 60 MG: 30 INJECTION INTRAMUSCULAR; INTRAVENOUS at 12:08

## 2018-08-03 RX ADMIN — HYDROMORPHONE HYDROCHLORIDE 0.5 MG: 1 INJECTION, SOLUTION INTRAMUSCULAR; INTRAVENOUS; SUBCUTANEOUS at 10:08

## 2018-08-03 NOTE — ED NOTES
Patient identifiers for Janie Zamorano 62 y.o. female checked and correct.  Chief Complaint   Patient presents with    Back Pain     x several days     Past Medical History:   Diagnosis Date    Asthma     Depression     GERD (gastroesophageal reflux disease)     Hypertension     Neck pain     Schizophrenia      Allergies reported:   Review of patient's allergies indicates:   Allergen Reactions    Shellfish containing products Itching       LOC: Patient is awake, alert, and aware of environment with an appropriate affect. Patient is oriented x 3 and speaking appropriately.  APPEARANCE: Patient resting comfortably and in no acute distress. Patient is clean and well groomed, patient's clothing is properly fastened.  HEENT: No abnormalities noted  SKIN: The skin is warm and dry. Patient has normal skin turgor and moist mucus membranes. Skin is intact; no bruising or breakdown noted.  MUSKULOSKELETAL: Patient is timidly moving her extremities, seems to be in pain, no obvious deformities noted. Pulses intact.   RESPIRATORY: Airway is open and patent. Respirations are spontaneous and non-labored with normal effort and rate, BBS=clear  CARDIAC: Patient has a normal rate and rhythm.   ABDOMEN: No distention noted. Bowel sounds active in all 4 quadrants. Soft and non-tender upon palpation.

## 2018-08-03 NOTE — MEDICAL/APP STUDENT
HISTORY & PHYSICAL  Hospital Medicine    Team: [facility]OSP MED 5    PRESENTING HISTORY     Chief Complaint/Reason for Admission: Back pain and Constipation    History of Present Illness:  Ms. Janie Zamorano is a 62 y.o. female who presented with back pain and constipation. Onset of symptoms for back pain occurred a week ago. Patient reports that leaning over to  a storage box initiated her back pain. Patient states the pain starts in her back but denies any radiation of pain. The pain worsens when she reaches for objects and upon deep inspiration. She also loses her breath if she aggravates her back pain. Patient denies any trauma to the region. Patient has been able to ambulate the past week with reluctance. The patient has tried hot and cold packs to relieve her pain. The patient did not report using pain medications to relieve the pain. Patient denies fever, chills, night sweats, dysuria, hematuria, and easy bruising or bleeding.    Patient also complains of constipation for the past two weeks. She reports no bowel movements during that span. Patient was started on iron pills two weeks ago. She has not had constipation prior to this event. She has tried enemas to no avail. She states she is belching and passing gas. She complains of nausea but reports no vomiting. She denies chest pain, palpitations, and cough.    FHx: Patient reports her mother and cousin both had breast cancer in their 50s and 60s. She had an uncle diagnosed with leukemia.     PAST HISTORY:     Past Medical History:   Diagnosis Date    Asthma     Depression     GERD (gastroesophageal reflux disease)     Hypertension     Neck pain     Schizophrenia        Past Surgical History:   Procedure Laterality Date     SECTION      X 1    COLONOSCOPY N/A 2018    Procedure: COLONOSCOPY;  Surgeon: Albert Russell MD;  Location: [address]);  Service: Endoscopy;  Laterality: N/A;  pm prep/MS     ESOPHAGOGASTRODUODENOSCOPY N/A 7/17/2018    Procedure: ESOPHAGOGASTRODUODENOSCOPY (EGD);  Surgeon: Albert Russell MD;  Location: [address];  Service: Endoscopy;  Laterality: N/A;  EGD in 8 weeks on Pantoprazole 40mg every 12 hours patient needs to take her PPI morning of EGD with sip of water.  Follow up esophagitis.       hx of gastroparesis. per Dr Russell-24 hours clear liquids/ Per Dr. Russell on 6/18/18 Pt to be r/s with     HYSTERECTOMY  2016    [facility]---DLH/BSO    LIPOMA RESECTION      back       Family History   Problem Relation Age of Onset    Hypertension Mother     Glaucoma Mother     Macular degeneration Mother     Breast cancer Mother     COPD Father     Hypertension Father     Diabetes Brother     Glaucoma Sister     Liver cancer Paternal Uncle 75        dad brother ETOH    Ovarian cancer Neg Hx     Colon cancer Neg Hx     Colon polyps Neg Hx     Cirrhosis Neg Hx     Celiac disease Neg Hx     Ulcerative colitis Neg Hx     Hemochromatosis Neg Hx     Esophageal cancer Neg Hx        Social History     Social History    Marital status:      Spouse name: N/A    Number of children: N/A    Years of education: N/A     Social History Main Topics    Smoking status: Never Smoker    Smokeless tobacco: Never Used    Alcohol use No    Drug use: No    Sexual activity: Not Currently     Partners: Male     Birth control/ protection: Post-menopausal     Other Topics Concern    None     Social History Narrative           MEDICATIONS & ALLERGIES:     No current facility-administered medications on file prior to encounter.      Current Outpatient Prescriptions on File Prior to Encounter   Medication Sig Dispense Refill    albuterol 90 mcg/actuation inhaler Inhale 2 puffs into the lungs every 6 (six) hours as needed for Wheezing. 1 each 11    benztropine (COGENTIN) 1 MG tablet TK 1 T PO  BID 60 tablet 2    cholecalciferol, vitamin D3, 2,000 unit Cap Take 1 capsule  (2,000 Units total) by mouth once daily. 90 capsule 3    diclofenac sodium 1 % Gel Apply 2 g topically 4 (four) times daily. for 10 days 100 g 0    ferrous sulfate 325 mg (65 mg iron) Tab tablet Take 1 tablet (325 mg total) by mouth every 12 (twelve) hours. 60 tablet 4    fluticasone (FLONASE) 50 mcg/actuation nasal spray 1 spray by Each Nare route once daily. 1 Bottle 4    fluticasone-salmeterol 100-50 mcg/dose (ADVAIR) 100-50 mcg/dose diskus inhaler Inhale 1 puff into the lungs 2 (two) times daily. 1 each 9    labetalol (NORMODYNE) 100 MG tablet TAKE 1 TABLET BY MOUTH TWICE DAILY 180 tablet 4    methocarbamol (ROBAXIN) 500 MG Tab Take 1 tablet (500 mg total) by mouth 4 (four) times daily. for 10 days 40 tablet 0    pantoprazole (PROTONIX) 40 MG tablet Take 1 tablet (40 mg total) by mouth every 12 (twelve) hours. 60 tablet 3    risperiDONE (RISPERDAL) 1 MG tablet Take 1 tablet (1 mg total) by mouth once daily. (Patient taking differently: Take 1 mg by mouth once daily. 1 in the morning and 2 at bedtime) 30 tablet 3    valsartan (DIOVAN) 160 MG tablet Take 1 tablet (160 mg total) by mouth once daily. 90 tablet 3        Review of patient's allergies indicates:   Allergen Reactions    Shellfish containing products Itching       OBJECTIVE:     Vital Signs:  Temp:  [98.5 °F (36.9 °C)] 98.5 °F (36.9 °C)  Pulse:  [82-93] 82  Resp:  [18-20] 20  SpO2:  [96 %-100 %] 96 %  BP: (160-191)/() 191/98  There is no height or weight on file to calculate BMI.     Physical Exam:  Constitutional: She appears well-developed and well-nourished. She is not diaphoretic. No distress.   HENT:   Head: Normocephalic and atraumatic.   Eyes: Conjunctivae and EOM are normal. Pupils are equal, round, and reactive to light.   Neck: Neck supple.   Cardiovascular: Normal rate, regular rhythm, normal heart sounds and intact distal pulses.   Pulmonary/Chest: Breath sounds normal. No respiratory distress.   Abdominal: Soft. Normal  appearance. She exhibits no distension. There is tenderness (Mild diffuse tenderness on palpation). There is no rigidity, no rebound, no guarding and no CVA tenderness.   Musculoskeletal:        Right shoulder: She exhibits tenderness. She exhibits no swelling.        Lumbar back: She exhibits tenderness.   Diffuse tenderness on palpation over the lumbar and thoracic paraspinal muscles bilaterally.   No  pain on straight leg raise.  No deformity, erythema noted. Range of motion of the lower extremities fully intact.    Neurological: She is alert and oriented to person, place, and time. She has normal strength. No sensory deficit.   Skin: Skin is warm and dry.     Laboratory  Lab Results   Component Value Date    WBC 4.31 08/03/2018    HGB 11.1 (L) 08/03/2018    HCT 34.6 (L) 08/03/2018    MCV 88 08/03/2018     08/03/2018       Recent Labs  Lab 08/03/18  1305   GLU 89      K 4.8      CO2 24   BUN 24*   CREATININE 1.7*   CALCIUM 11.8*     No results found for: INR, PROTIME  Lab Results   Component Value Date    HGBA1C 5.5 10/27/2017     No results for input(s): POCTGLUCOSE in the last 72 hours.    Diagnostic Results:  X-Ray Abdomen: Cecal/proximal colonic large stool volume which may represent constipation.  No associated bowel obstruction at this time.  There is a 7 mm calcific density projected over the left mid to upper abdomen which could represent a renal calculus    CT Renal Stone: Innumerable lytic lesions involving the thoracic and lumbar spine, as well as the iliac wings bilaterally.  These findings are suggestive of metastatic disease.    Pathologic fracture of the T12 vertebral body, resulting in mild height loss.  No retropulsion into the spinal canal, however there is possible soft tissue extension into the canal.    Hypodense foci within the liver, the largest measuring 3.9 cm, favored to represent a hepatic cyst.  The remaining hypodensities are too small to fully characterize.  Findings suggestive of constipation. Trace bilateral lateral pleural effusions, as well as a small pericardial effusion.    ASSESSMENT & PLAN:     Current Problems List:  Active Hospital Problems    Diagnosis  POA    Acute kidney injury [N17.9]  Yes      Resolved Hospital Problems    Diagnosis Date Resolved POA   No resolved problems to display.     Pathologic fracture   Consult Neurosurgery    Hypercalcemia:  Cease Vit D pills  IVF NS  PTH level  Ph level    Liver Mass & Lytic bone lesions  Consult Heme/Onc          Signing Physician:  Jay Garner

## 2018-08-03 NOTE — ED TRIAGE NOTES
Severe back pain throughout back, especially in the lower spine.  SOB and pain with movement. Duration 1 week. Acute incident, no bowel movements in 2 weeks, tried an enema with no relief.

## 2018-08-03 NOTE — ED PROVIDER NOTES
Encounter Date: 8/3/2018       History     Chief Complaint   Patient presents with    Back Pain     x several days     Patient is a 62-year-old female with a history of hypertension, schizophrenia, asthma, GERD is presenting to the ER for multiple complaints.    Patient states that she has had back pain for the last week and a half.  It is located in the lower lumbar spine.  She denies any fall, trauma or injury. No radiation of this pain to her extremity.  No paresthesia or focal weakness to her extremities. Denies any fever chills. Denies any dysuria, or hematuria.  She has been using muscle relaxers with no alleviation of her symptoms.    Patient also complains of abdominal pain and constipation.  She states she has not been able to have a bowel movement in the last 2 weeks.  This is abnormal for her.  She has tried enemas without any alleviation of her symptoms. She states that she is belching and is passing gas.  She does complain of nausea, no vomiting.  She states that she has increased pain upon taking deep inspiration in due to that she feels short of breath. She denies any cough congestion URI symptoms. No chest pain or palpitations otherwise.  She has had a history of a hysterectomy.      The history is provided by the patient.     Review of patient's allergies indicates:   Allergen Reactions    Shellfish containing products Itching     Past Medical History:   Diagnosis Date    Asthma     Depression     GERD (gastroesophageal reflux disease)     Hypertension     Neck pain     Schizophrenia      Past Surgical History:   Procedure Laterality Date     SECTION      X 1    COLONOSCOPY N/A 2018    Procedure: COLONOSCOPY;  Surgeon: Albert Russell MD;  Location: [facility] (4TH FLR);  Service: Endoscopy;  Laterality: N/A;  pm prep/MS    ESOPHAGOGASTRODUODENOSCOPY N/A 2018    Procedure: ESOPHAGOGASTRODUODENOSCOPY (EGD);  Surgeon: Albert Russell MD;  Location: [facility] (2ND FLR);   Service: Endoscopy;  Laterality: N/A;  EGD in 8 weeks on Pantoprazole 40mg every 12 hours patient needs to take her PPI morning of EGD with sip of water.  Follow up esophagitis.       hx of gastroparesis. per Dr Russell-24 hours clear liquids/ Per Dr. Russell on 6/18/18 Pt to be r/s with     HYSTERECTOMY  2016    [facility]---DLH/BSO    LIPOMA RESECTION      back     Family History   Problem Relation Age of Onset    Hypertension Mother     Glaucoma Mother     Macular degeneration Mother     Breast cancer Mother     COPD Father     Hypertension Father     Diabetes Brother     Glaucoma Sister     Liver cancer Paternal Uncle 75        dad brother ETOH    Ovarian cancer Neg Hx     Colon cancer Neg Hx     Colon polyps Neg Hx     Cirrhosis Neg Hx     Celiac disease Neg Hx     Ulcerative colitis Neg Hx     Hemochromatosis Neg Hx     Esophageal cancer Neg Hx      Social History   Substance Use Topics    Smoking status: Never Smoker    Smokeless tobacco: Never Used    Alcohol use No     Review of Systems   Constitutional: Negative for chills and fever.   HENT: Negative for congestion.    Respiratory: Negative for cough and shortness of breath.    Cardiovascular: Negative for chest pain and palpitations.   Gastrointestinal: Positive for abdominal pain, constipation and nausea. Negative for abdominal distention, anal bleeding, blood in stool, diarrhea and vomiting.   Genitourinary: Negative for dysuria and flank pain.   Musculoskeletal: Positive for back pain and myalgias. Negative for arthralgias, gait problem and joint swelling.   Skin: Negative for rash and wound.   Allergic/Immunologic: Negative for immunocompromised state.   Neurological: Negative for weakness and numbness.   Hematological: Does not bruise/bleed easily.   Psychiatric/Behavioral: Negative for confusion.       Physical Exam     Initial Vitals [08/03/18 1116]   BP Pulse Resp Temp SpO2   (!) 160/100 93 18 98.5 °F (36.9 °C) 100 %      MAP        --         Physical Exam    Constitutional: She appears well-developed and well-nourished. She is not diaphoretic. No distress.   HENT:   Head: Normocephalic and atraumatic.   Eyes: Conjunctivae and EOM are normal. Pupils are equal, round, and reactive to light.   Neck: Neck supple.   Cardiovascular: Normal rate, regular rhythm, normal heart sounds and intact distal pulses.   Pulmonary/Chest: Breath sounds normal. No respiratory distress.   Abdominal: Soft. Normal appearance. She exhibits no distension. There is tenderness (Mild diffuse tenderness on palpation). There is no rigidity, no rebound, no guarding and no CVA tenderness.   Musculoskeletal:        Right shoulder: She exhibits tenderness. She exhibits no swelling.        Lumbar back: She exhibits tenderness.   Diffuse tenderness on palpation over the lumbar and thoracic paraspinal muscles bilaterally.   No  pain on straight leg raise.  No deformity, erythema noted. Range of motion of the lower extremities fully intact.    Neurological: She is alert and oriented to person, place, and time. She has normal strength. No sensory deficit.   Skin: Skin is warm and dry.         ED Course   Procedures  Labs Reviewed   URINALYSIS, REFLEX TO URINE CULTURE - Abnormal; Notable for the following:        Result Value    Protein, UA 2+ (*)     Occult Blood UA 1+ (*)     All other components within normal limits    Narrative:     Preferred Collection Type->Urine, Clean Catch   CBC W/ AUTO DIFFERENTIAL - Abnormal; Notable for the following:     RBC 3.93 (*)     Hemoglobin 11.1 (*)     Hematocrit 34.6 (*)     Immature Granulocytes 0.7 (*)     All other components within normal limits   COMPREHENSIVE METABOLIC PANEL - Abnormal; Notable for the following:     BUN, Bld 24 (*)     Creatinine 1.7 (*)     Calcium 11.8 (*)     eGFR if  37.0 (*)     eGFR if non  32.1 (*)     All other components within normal limits   URINALYSIS MICROSCOPIC - Abnormal;  Notable for the following:     Hyaline Casts, UA 7 (*)     All other components within normal limits    Narrative:     Preferred Collection Type->Urine, Clean Catch          Imaging Results          CT Renal Stone Study ABD Pelvis WO (Final result)  Result time 08/03/18 15:36:29    Final result by Kalin Sanders Jr., MD (08/03/18 15:36:29)                 Impression:      Innumerable lytic lesions involving the thoracic and lumbar spine, as well as the iliac wings bilaterally.  These findings are suggestive of metastatic disease.    Pathologic fracture of the T12 vertebral body, resulting in mild height loss.  No retropulsion into the spinal canal, however there is possible soft tissue extension into the canal.    Hypodense foci within the liver, the largest measuring 3.9 cm, favored to represent a hepatic cyst.  The remaining hypodensities are too small to fully characterize.    Findings suggestive of constipation.    Trace bilateral lateral pleural effusions, as well as a small pericardial effusion.    Simple left renal cyst.    COMMUNICATION  This critical result was discovered/received at 14:20.  The critical information above was relayed directly by me by telephone to Bozena Montesinos at 8/3/18.    Electronically signed by resident: Jose Cobb  Date:    08/03/2018  Time:    14:11    Electronically signed by: Kalin Sanders MD  Date:    08/03/2018  Time:    15:36             Narrative:    EXAMINATION:  CT RENAL STONE STUDY ABD PELVIS WO    CLINICAL HISTORY:  Abdominal pain, unspecified    TECHNIQUE:  Low dose axial images, sagittal and coronal reformations were obtained from the lung bases to the pubic symphysis.  Contrast was not administered.    COMPARISON:  None    FINDINGS:  Heart: Normal in size.  There is a small volume of pericardial fluid.    Lung Bases: Trace bilateral pleural effusions, left greater than right.  The lungs appear otherwise well aerated.    Liver: Liver is normal in size and  attenuation, with a 3.9 cm simple cyst within hepatic segment 4.  There are also scattered subcentimeter hypodensities within segments 5 and 8, adjacent to the gallbladder fossa.  These findings are too small to fully characterize, however favored to represent simple hepatic cysts.    Gallbladder: No calcified gallstones.    Bile Ducts: No evidence of dilated ducts.    Pancreas: No mass or peripancreatic fat stranding.    Spleen: Unremarkable.    Adrenals: Unremarkable.    Kidneys/ Ureters: 0.8 cm nonobstructing left renal stone.  No evidence of hydronephrosis.  There is a 1.3 cm hypodense focus on the left kidney, favored to represent a simple renal cysts.    Bladder: No evidence of wall thickening.    Reproductive organs: Status post hysterectomy.    GI Tract/Mesentery: Moderate amount of retained stool in the ascending and transverse:, suggestive of constipation.  No evidence of obstruction.    Peritoneal Space: No ascites. No free air.    Retroperitoneum: No significant adenopathy.    Abdominal wall: Small fat containing umbilical hernia.    Vasculature: No significant atherosclerosis or aneurysm.    Bones: There are multiple lytic lesions involving the lumbar and thoracic vertebral bodies, with a pathologic fracture of the T12 vertebral body, resulting in minimal height loss.  There is no retropulsion into the spinal canal, however there appears to be soft tissue extension into the canal at this level.  Several lesions extend into the vertebral pedicles, most notably at T9 and T10.  There also lytic lesions of the iliac wings bilaterally these findings are suggestive of metastatic disease.                               X-Ray Abdomen Flat And Erect (Final result)  Result time 08/03/18 12:46:12    Final result by Reyes Eden MD (08/03/18 12:46:12)                 Impression:      Cecal/proximal colonic large stool volume which may represent constipation.  No associated bowel obstruction at this  time.      Electronically signed by: Reyes Eden MD  Date:    08/03/2018  Time:    12:46             Narrative:    EXAMINATION:  XR ABDOMEN FLAT AND ERECT    CLINICAL HISTORY:  Constipation, unspecified    TECHNIQUE:  Flat and erect AP views of the abdomen were performed.    COMPARISON:  Lumbar spine series 07/25/2018 and chest radiograph 09/09/2004    FINDINGS:  Mild distension with large amount of stool noted within the cecum/proximal colon.  Remainder of the more mid and distal colonic loops are mildly distended with gas.  No dilated loops of bowel or small bowel air-fluid levels to suggest obstruction.  No organomegaly or significant mass effect.  There is a 7 mm calcific density projected over the left mid to upper abdomen which could represent a renal calculus.  No large amount of free air.  No large consolidation at the included lung bases.  Included osseous structures show age-related degenerative change without acute process seen.                                 Medical Decision Making:   Clinical Tests:   Lab Tests: Ordered and Reviewed  Radiological Study: Ordered and Reviewed       APC / Resident Notes:   The patient was seen in the ER promptly upon arrival.  She is afebrile, no acute distress. Physical examination does reveal mild diffuse tenderness on deep palpation.  The abdomen is soft, nondistended.  She does have some tenderness on palpation over the paraspinal muscles of the lower back bilaterally. No pain on straight leg raise.  Strength equal bilaterally of the lower extremity.  Sensation intact. Patient was initially given Norflex and Toradol for pain.     Initial workup included an abdominal series x-ray to rule out evidence of obstruction.  There is a large stool volume which may represent constipation.  There was also a 7 mm calcific density projected over the left mid to upper abdomen could represent a renal calculus.      Given that patient did have associated back pain I did feel that  she required further workup.  Laboratory studies were therefore obtained. She has a normal white count of 4.3.  Hemoglobin is stable. Chemistries do show an elevated kidney function of creatinine 1.7, BUN 24.  This is above patient's baseline of 0.9 creatinine.  Urinalysis does not show evidence of infection or blood.    CT of the abdomen does reveal innumerable lytic lesions throughout the thoracic, lumbar and iliac wings bilaterally.  This is suggestive of metastatic disease.  There is also a pathological fracture of the T12 vertebral body.  There is also multiple lesions in the liver.  I had an extensive conversation with patient regarding the CT results.  She was not aware of any of these findings.  Patient did have a recent mammogram on the 17th of July.  She states that she was supposed to get another mammogram scheduled for Monday.  She was not informed of the findings of these mammogram.  It did reveal abnormality noted on the left breast.  I do suspect breast cancer with mets.  Patient did receive additional morphine for pain.  Spoke to Neurosurgery regarding the T12 fracture.  I did place a consultation for Oncology as well. Will admit to Hospital Medicine for further management of her pain, GLENIS and further workup of this metastatic disease.    The care of this patient was overseen by attending physician who agrees with treatment, plan, and disposition.           Attending Attestation:     Physician Attestation Statement for NP/PA:   I discussed this assessment and plan of this patient with the NP/PA, but I did not personally examine the patient. The face to face encounter was performed by the NP/PA.                     Clinical Impression:   The primary encounter diagnosis was Acute kidney injury. Diagnoses of Constipation, Multiple lesions of metastatic malignancy, Closed fracture of twelfth thoracic vertebra, unspecified fracture morphology, initial encounter, and Pathological fracture of vertebra due  to neoplastic disease, initial encounter were also pertinent to this visit.      Disposition:   Disposition: Admitted  Condition: Serious                        Bozena Montesinos PA-C  08/03/18 1645       Marvin Grant MD  08/06/18 0206

## 2018-08-03 NOTE — TELEPHONE ENCOUNTER
Nope-     Let her know I will send the rx to the primary care pharmacy and see if they can help her get her med

## 2018-08-03 NOTE — ED NOTES
Attempted to call report to 9th floor, RN not assigned to bed ready 25B. Will attempt to call back.

## 2018-08-03 NOTE — TELEPHONE ENCOUNTER
Reason for Disposition   SEVERE difficulty breathing (e.g., struggling for each breath, speaks in single words, pulse > 120)    Protocols used: ST BREATHING DIFFICULTY-A-OH

## 2018-08-04 PROBLEM — Z75.8 DISCHARGE PLANNING ISSUES: Status: ACTIVE | Noted: 2018-08-04

## 2018-08-04 LAB
ANION GAP SERPL CALC-SCNC: 8 MMOL/L
ANION GAP SERPL CALC-SCNC: 8 MMOL/L
ANION GAP SERPL CALC-SCNC: 9 MMOL/L
BUN SERPL-MCNC: 18 MG/DL
BUN SERPL-MCNC: 19 MG/DL
BUN SERPL-MCNC: 21 MG/DL
CALCIUM SERPL-MCNC: 10.7 MG/DL
CALCIUM SERPL-MCNC: 11.2 MG/DL
CALCIUM SERPL-MCNC: 11.8 MG/DL
CHLORIDE SERPL-SCNC: 105 MMOL/L
CHLORIDE SERPL-SCNC: 106 MMOL/L
CHLORIDE SERPL-SCNC: 107 MMOL/L
CO2 SERPL-SCNC: 19 MMOL/L
CO2 SERPL-SCNC: 20 MMOL/L
CO2 SERPL-SCNC: 25 MMOL/L
CREAT SERPL-MCNC: 1.1 MG/DL
CREAT SERPL-MCNC: 1.2 MG/DL
CREAT SERPL-MCNC: 1.5 MG/DL
CREAT UR-MCNC: 39 MG/DL
EST. GFR  (AFRICAN AMERICAN): 43 ML/MIN/1.73 M^2
EST. GFR  (AFRICAN AMERICAN): 56.4 ML/MIN/1.73 M^2
EST. GFR  (AFRICAN AMERICAN): >60 ML/MIN/1.73 M^2
EST. GFR  (NON AFRICAN AMERICAN): 37.3 ML/MIN/1.73 M^2
EST. GFR  (NON AFRICAN AMERICAN): 48.9 ML/MIN/1.73 M^2
EST. GFR  (NON AFRICAN AMERICAN): 54.3 ML/MIN/1.73 M^2
GLUCOSE SERPL-MCNC: 101 MG/DL
GLUCOSE SERPL-MCNC: 101 MG/DL
GLUCOSE SERPL-MCNC: 102 MG/DL
POTASSIUM SERPL-SCNC: 4.1 MMOL/L
POTASSIUM SERPL-SCNC: 4.2 MMOL/L
POTASSIUM SERPL-SCNC: 4.2 MMOL/L
SODIUM SERPL-SCNC: 134 MMOL/L
SODIUM SERPL-SCNC: 135 MMOL/L
SODIUM SERPL-SCNC: 138 MMOL/L
SODIUM UR-SCNC: 125 MMOL/L

## 2018-08-04 PROCEDURE — 63600175 PHARM REV CODE 636 W HCPCS: Performed by: STUDENT IN AN ORGANIZED HEALTH CARE EDUCATION/TRAINING PROGRAM

## 2018-08-04 PROCEDURE — 99233 SBSQ HOSP IP/OBS HIGH 50: CPT | Mod: GC,,, | Performed by: HOSPITALIST

## 2018-08-04 PROCEDURE — 80048 BASIC METABOLIC PNL TOTAL CA: CPT

## 2018-08-04 PROCEDURE — 25000003 PHARM REV CODE 250: Performed by: STUDENT IN AN ORGANIZED HEALTH CARE EDUCATION/TRAINING PROGRAM

## 2018-08-04 PROCEDURE — 84300 ASSAY OF URINE SODIUM: CPT

## 2018-08-04 PROCEDURE — 86334 IMMUNOFIX E-PHORESIS SERUM: CPT

## 2018-08-04 PROCEDURE — 11000001 HC ACUTE MED/SURG PRIVATE ROOM

## 2018-08-04 PROCEDURE — 80048 BASIC METABOLIC PNL TOTAL CA: CPT | Mod: 91

## 2018-08-04 PROCEDURE — 99222 1ST HOSP IP/OBS MODERATE 55: CPT | Mod: GC,,, | Performed by: INTERNAL MEDICINE

## 2018-08-04 PROCEDURE — 36415 COLL VENOUS BLD VENIPUNCTURE: CPT

## 2018-08-04 PROCEDURE — 86334 IMMUNOFIX E-PHORESIS SERUM: CPT | Mod: 26,,, | Performed by: PATHOLOGY

## 2018-08-04 PROCEDURE — 82570 ASSAY OF URINE CREATININE: CPT

## 2018-08-04 PROCEDURE — 25000003 PHARM REV CODE 250: Performed by: HOSPITALIST

## 2018-08-04 RX ORDER — HYDROMORPHONE HYDROCHLORIDE 1 MG/ML
0.5 INJECTION, SOLUTION INTRAMUSCULAR; INTRAVENOUS; SUBCUTANEOUS EVERY 4 HOURS PRN
Status: DISCONTINUED | OUTPATIENT
Start: 2018-08-04 | End: 2018-08-05

## 2018-08-04 RX ORDER — HYDRALAZINE HYDROCHLORIDE 25 MG/1
25 TABLET, FILM COATED ORAL ONCE
Status: COMPLETED | OUTPATIENT
Start: 2018-08-04 | End: 2018-08-04

## 2018-08-04 RX ORDER — HYDROMORPHONE HYDROCHLORIDE 1 MG/ML
0.5 INJECTION, SOLUTION INTRAMUSCULAR; INTRAVENOUS; SUBCUTANEOUS EVERY 6 HOURS PRN
Status: DISCONTINUED | OUTPATIENT
Start: 2018-08-04 | End: 2018-08-04

## 2018-08-04 RX ORDER — DICLOFENAC SODIUM 10 MG/G
2 GEL TOPICAL 4 TIMES DAILY
Status: DISCONTINUED | OUTPATIENT
Start: 2018-08-04 | End: 2018-08-21 | Stop reason: HOSPADM

## 2018-08-04 RX ORDER — AMOXICILLIN 250 MG
1 CAPSULE ORAL 2 TIMES DAILY
Status: DISCONTINUED | OUTPATIENT
Start: 2018-08-04 | End: 2018-08-21 | Stop reason: HOSPADM

## 2018-08-04 RX ORDER — LABETALOL 200 MG/1
200 TABLET, FILM COATED ORAL 2 TIMES DAILY
Status: DISCONTINUED | OUTPATIENT
Start: 2018-08-04 | End: 2018-08-05

## 2018-08-04 RX ADMIN — BENZTROPINE MESYLATE 1 MG: 1 TABLET ORAL at 08:08

## 2018-08-04 RX ADMIN — HYDROMORPHONE HYDROCHLORIDE 0.5 MG: 1 INJECTION, SOLUTION INTRAMUSCULAR; INTRAVENOUS; SUBCUTANEOUS at 09:08

## 2018-08-04 RX ADMIN — DICLOFENAC 2 G: 10 GEL TOPICAL at 06:08

## 2018-08-04 RX ADMIN — RISPERIDONE 2 MG: 1 TABLET ORAL at 10:08

## 2018-08-04 RX ADMIN — DICLOFENAC 2 G: 10 GEL TOPICAL at 10:08

## 2018-08-04 RX ADMIN — BENZTROPINE MESYLATE 1 MG: 1 TABLET ORAL at 10:08

## 2018-08-04 RX ADMIN — HYDRALAZINE HYDROCHLORIDE 25 MG: 25 TABLET ORAL at 05:08

## 2018-08-04 RX ADMIN — HYDROMORPHONE HYDROCHLORIDE 0.5 MG: 1 INJECTION, SOLUTION INTRAMUSCULAR; INTRAVENOUS; SUBCUTANEOUS at 05:08

## 2018-08-04 RX ADMIN — LABETALOL HYDROCHLORIDE 200 MG: 200 TABLET, FILM COATED ORAL at 10:08

## 2018-08-04 RX ADMIN — LABETALOL HYDROCHLORIDE 100 MG: 100 TABLET, FILM COATED ORAL at 08:08

## 2018-08-04 RX ADMIN — SODIUM CHLORIDE: 0.9 INJECTION, SOLUTION INTRAVENOUS at 08:08

## 2018-08-04 RX ADMIN — HYDROMORPHONE HYDROCHLORIDE 0.5 MG: 1 INJECTION, SOLUTION INTRAMUSCULAR; INTRAVENOUS; SUBCUTANEOUS at 10:08

## 2018-08-04 RX ADMIN — HYDROMORPHONE HYDROCHLORIDE 0.5 MG: 1 INJECTION, SOLUTION INTRAMUSCULAR; INTRAVENOUS; SUBCUTANEOUS at 06:08

## 2018-08-04 RX ADMIN — SENNOSIDES AND DOCUSATE SODIUM 1 TABLET: 8.6; 5 TABLET ORAL at 06:08

## 2018-08-04 RX ADMIN — HYDROMORPHONE HYDROCHLORIDE 0.5 MG: 1 INJECTION, SOLUTION INTRAMUSCULAR; INTRAVENOUS; SUBCUTANEOUS at 01:08

## 2018-08-04 RX ADMIN — POLYETHYLENE GLYCOL 3350 17 G: 17 POWDER, FOR SOLUTION ORAL at 08:08

## 2018-08-04 RX ADMIN — DICLOFENAC 2 G: 10 GEL TOPICAL at 01:08

## 2018-08-04 RX ADMIN — RISPERIDONE 1 MG: 1 TABLET ORAL at 08:08

## 2018-08-04 NOTE — SUBJECTIVE & OBJECTIVE
Medications:  Continuous Infusions:   sodium chloride 0.9%       Scheduled Meds:   hydrALAZINE  25 mg Oral Once    labetalol  100 mg Oral BID    [START ON 8/4/2018] polyethylene glycol  17 g Oral Daily    sodium phosphates  1 enema Rectal Once     PRN Meds:dextrose 50%, dextrose 50%, glucagon (human recombinant), glucose, glucose, HYDROmorphone, senna-docusate 8.6-50 mg, sodium chloride 0.9%     Review of Systems   Constitutional: no fever, chills or night sweats. No changes in weight   Eyes: no visual changes   ENT: no nasal congestion or sore throat   Respiratory: no cough or shortness of breath   Cardiovascular: no chest pain or palpitations   Gastrointestinal: no nausea or vomiting   Genitourinary: no hematuria or dysuria   Integument/Breast: no rash or pruritis   Hematologic/Lymphatic: no easy bruising or lymphadenopathy   Musculoskeletal: positive for bilateral flank pain  Neurological: no seizures or tremors   Behavioral/Psych: no auditory or visual hallucinations   Endocrine: no heat or cold intolerance       Objective:        There is no height or weight on file to calculate BMI.  Vital Signs (Most Recent):  Temp: 98.5 °F (36.9 °C) (08/03/18 1116)  Pulse: 82 (08/03/18 1434)  Resp: 20 (08/03/18 1434)  BP: (!) 191/111 (med team 5 made aware. ) (08/03/18 1900)  SpO2: 96 % (08/03/18 1434) Vital Signs (24h Range):  Temp:  [98.5 °F (36.9 °C)] 98.5 °F (36.9 °C)  Pulse:  [82-93] 82  Resp:  [18-20] 20  SpO2:  [96 %-100 %] 96 %  BP: (160-191)/() 191/111        Neurosurgery Physical Exam   General: well developed, well nourished, no distress.   Head: normocephalic, atraumatic  Neurologic: Alert and oriented. Thought content appropriate.  GCS: Motor: 6/Verbal: 5/Eyes: 4 GCS Total: 15  Mental Status: Awake, Alert, Oriented x 4  Language: No aphasia  Speech: No dysarthria  Cranial nerves: face symmetric, tongue midline, CN II-XII grossly intact.   Eyes: pupils equal, round, reactive to light with  accomodation, EOMI.   Pulmonary: normal respirations, no signs of respiratory distress  Abdomen: soft, non-distended, not tender to palpation  Sensory: intact to light touch throughout  Motor Strength:Moves all extremities spontaneously with good tone.  Full strength upper and lower extremities. No abnormal movements seen.   Pronator Drift: no drift noted  Finger-to-nose: Intact bilaterally  Washburn: absent  Clonus: absent  Skin: Skin is warm, dry and intact.  Straight leg raise: negative  No thoracic or lumbar TTP, midline or along paraspinal muscles        Significant Labs:    Recent Labs  Lab 08/03/18  1305   GLU 89      K 4.8      CO2 24   BUN 24*   CREATININE 1.7*   CALCIUM 11.8*       Recent Labs  Lab 08/03/18  1305   WBC 4.31   HGB 11.1*   HCT 34.6*            Significant Diagnostics:  CT renal stone:  I independently reviewed the imaging.     Innumerable lytic lesions involving the thoracic and lumbar spine, as well as the iliac wings bilaterally.  These findings are suggestive of metastatic disease.    Pathologic fracture of the T12 vertebral body, resulting in mild height loss.  No retropulsion into the spinal canal, however there is possible soft tissue extension into the canal.    Hypodense foci within the liver, the largest measuring 3.9 cm, favored to represent a hepatic cyst.  The remaining hypodensities are too small to fully characterize.    Findings suggestive of constipation.    Trace bilateral lateral pleural effusions, as well as a small pericardial effusion.    Simple left renal cyst.

## 2018-08-04 NOTE — SUBJECTIVE & OBJECTIVE
Past Medical History:   Diagnosis Date    Asthma     Depression     GERD (gastroesophageal reflux disease)     Hypertension     Neck pain     Schizophrenia        Past Surgical History:   Procedure Laterality Date     SECTION      X 1    COLONOSCOPY N/A 2018    Procedure: COLONOSCOPY;  Surgeon: Albert Russell MD;  Location: [facility] (4TH FLR);  Service: Endoscopy;  Laterality: N/A;  pm prep/MS    ESOPHAGOGASTRODUODENOSCOPY N/A 2018    Procedure: ESOPHAGOGASTRODUODENOSCOPY (EGD);  Surgeon: Albert Russell MD;  Location: [facility] (2ND FLR);  Service: Endoscopy;  Laterality: N/A;  EGD in 8 weeks on Pantoprazole 40mg every 12 hours patient needs to take her PPI morning of EGD with sip of water.  Follow up esophagitis.       hx of gastroparesis. per Dr Russell-24 hours clear liquids/ Per Dr. Russell on 18 Pt to be r/s with     HYSTERECTOMY      KJB---DLH/BSO    LIPOMA RESECTION      back       Review of patient's allergies indicates:   Allergen Reactions    Shellfish containing products Itching       No current facility-administered medications on file prior to encounter.      Current Outpatient Prescriptions on File Prior to Encounter   Medication Sig    albuterol 90 mcg/actuation inhaler Inhale 2 puffs into the lungs every 6 (six) hours as needed for Wheezing.    benztropine (COGENTIN) 1 MG tablet TK 1 T PO  BID    cholecalciferol, vitamin D3, 2,000 unit Cap Take 1 capsule (2,000 Units total) by mouth once daily.    diclofenac sodium 1 % Gel Apply 2 g topically 4 (four) times daily. for 10 days    ferrous sulfate 325 mg (65 mg iron) Tab tablet Take 1 tablet (325 mg total) by mouth every 12 (twelve) hours.    fluticasone (FLONASE) 50 mcg/actuation nasal spray 1 spray by Each Nare route once daily.    fluticasone-salmeterol 100-50 mcg/dose (ADVAIR) 100-50 mcg/dose diskus inhaler Inhale 1 puff into the lungs 2 (two) times daily.    labetalol (NORMODYNE) 100 MG tablet  TAKE 1 TABLET BY MOUTH TWICE DAILY    methocarbamol (ROBAXIN) 500 MG Tab Take 1 tablet (500 mg total) by mouth 4 (four) times daily. for 10 days    pantoprazole (PROTONIX) 40 MG tablet Take 1 tablet (40 mg total) by mouth every 12 (twelve) hours.    risperiDONE (RISPERDAL) 1 MG tablet Take 1 tablet (1 mg total) by mouth once daily. (Patient taking differently: Take 1 mg by mouth once daily. 1 in the morning and 2 at bedtime)    valsartan (DIOVAN) 160 MG tablet Take 1 tablet (160 mg total) by mouth once daily.     Family History     Problem Relation (Age of Onset)    Breast cancer Mother    COPD Father    Diabetes Brother    Glaucoma Mother, Sister    Hypertension Mother, Father    Liver cancer Paternal Uncle (75)    Macular degeneration Mother        Social History Main Topics    Smoking status: Never Smoker    Smokeless tobacco: Never Used    Alcohol use No    Drug use: No    Sexual activity: Not Currently     Partners: Male     Birth control/ protection: Post-menopausal     Review of Systems   Constitutional: Positive for activity change, appetite change and unexpected weight change. Negative for chills, diaphoresis, fatigue and fever.   Respiratory: Negative for cough, chest tightness and shortness of breath.    Cardiovascular: Negative for chest pain and leg swelling.   Gastrointestinal: Positive for constipation and nausea. Negative for abdominal pain and vomiting.   Genitourinary: Negative for decreased urine volume, flank pain and frequency.   Musculoskeletal: Positive for back pain and gait problem.   Skin: Negative for rash.   Neurological: Positive for numbness (left hand).     Objective:     Vital Signs (Most Recent):  Temp: 98.5 °F (36.9 °C) (08/03/18 1116)  Pulse: 82 (08/03/18 1434)  Resp: 20 (08/03/18 1434)  BP: (!) 191/111 (med team 5 made aware. ) (08/03/18 1900)  SpO2: 96 % (08/03/18 1434) Vital Signs (24h Range):  Temp:  [98.5 °F (36.9 °C)] 98.5 °F (36.9 °C)  Pulse:  [82-93] 82  Resp:   [18-20] 20  SpO2:  [96 %-100 %] 96 %  BP: (160-191)/() 191/111        There is no height or weight on file to calculate BMI.    Physical Exam   Constitutional: She is oriented to person, place, and time. She appears well-developed and well-nourished. No distress.   HENT:   Head: Normocephalic and atraumatic.   Neck: Normal range of motion. Neck supple. No JVD present.   Cardiovascular: Normal rate, regular rhythm, normal heart sounds and intact distal pulses.  Exam reveals no friction rub.    No murmur heard.  Pulmonary/Chest: Effort normal and breath sounds normal. No respiratory distress.   Abdominal: Soft. Bowel sounds are normal. She exhibits distension. There is no tenderness. There is no rebound and no guarding.   Musculoskeletal: She exhibits no edema.   Neurological: She is alert and oriented to person, place, and time. She displays normal reflexes. No sensory deficit. She exhibits normal muscle tone.   Skin: She is not diaphoretic.           Significant Labs:   CBC:   Recent Labs  Lab 08/03/18  1305   WBC 4.31   HGB 11.1*   HCT 34.6*        CMP:   Recent Labs  Lab 08/03/18  1305      K 4.8      CO2 24   GLU 89   BUN 24*   CREATININE 1.7*   CALCIUM 11.8*   PROT 8.1   ALBUMIN 3.9   BILITOT 0.6   ALKPHOS 98   AST 24   ALT 14   ANIONGAP 9   EGFRNONAA 32.1*     Urine Studies:   Recent Labs  Lab 08/03/18  1249   COLORU Yellow   APPEARANCEUA Clear   PHUR 5.0   SPECGRAV 1.020   PROTEINUA 2+*   GLUCUA Negative   KETONESU Negative   BILIRUBINUA Negative   OCCULTUA 1+*   NITRITE Negative   UROBILINOGEN Negative   LEUKOCYTESUR Negative   RBCUA 2   WBCUA 2   BACTERIA Occasional   SQUAMEPITHEL 2   HYALINECASTS 7*       Significant Imaging: CT: I have reviewed all pertinent results/findings within the past 24 hours and my personal findings are:  Innumerable lytic lesions involving the thoracic and lumbar spine, as well as the iliac wings bilaterally.  These findings are suggestive of metastatic  disease.

## 2018-08-04 NOTE — HOSPITAL COURSE
Patient admitted to [facility] to w/o pathologic fx in T12 along with lytic lesions in spine. Neurosurgery and oncology were consulted. Found to have high PTH in labs. Endocrine was consulted for r/o Hyperparathyroidism. Abdominal CT showed diffuse lytic lesion in spine and pelvis. Chest Ct done this AM per BMT recs. Labs diagnostic for MM and transfer of care to BMT service today.

## 2018-08-04 NOTE — ASSESSMENT & PLAN NOTE
-Orthopedics service was consulted  -Patient neurologically stable on exam  -No acute neurosurgical intervention indicated  -TLSO brace ordered from [facility]. Patient to wear when OOB or working with therapy. OK to remove when in bed or lying in recliner.   -Recommend MRI with/without contrast to rule out pathologic fracture and to better evaluate lytic lesions  -Further recs to follow imaging

## 2018-08-04 NOTE — PROGRESS NOTES
[facility] Hospital Medicine  Progress Note    Patient Name: Janie Zamorano  MRN: [id number]  Patient Class: IP- Inpatient   Admission Date: 8/3/2018  Length of Stay: 1 days  Attending Physician: Linda Moss MD  Primary Care Provider: He Hoyt MD    [facility] Medicine Team: [facility] MED 5 Ladi Bradley MD    Subjective:     Principal Problem:<principal problem not specified>    HPI:  Ms. Janie Zamorano is a 62 y.o. female who presented with back pain and constipation. Onset of symptoms for back pain occurred a week ago. Patient reports that leaning over to  a storage box initiated her back pain. Patient states the pain starts in her back but denies any radiation of pain. The pain worsens when she reaches for objects and upon deep inspiration. She also loses her breath if she aggravates her back pain. Patient denies any trauma to the region. Patient has been able to ambulate the past week with reluctance. The patient has tried hot and cold packs to relieve her pain. The patient did not report using pain medications to relieve the pain. Patient denies fever, chills, night sweats, dysuria, hematuria, and easy bruising or bleeding. She lost around 8 LB over the course of past two weeks. She denies any shooting pain to LE, no loss of sense in LE or abdominal area, she mentions numbness in her left hand. Denies bowel/urinary incontinence. No saddle anesthesia. Patient has been anemic chronically and was started on iron pills two weeks ago by her primary doctor.  Patient also complains of constipation for the past two weeks. She reports no bowel movements during that span. She has not had constipation prior to this event. She has tried enemas to no avail. She states she is belching and passing gas. She complains of nausea but reports no vomiting. She denies chest pain, palpitations, and cough.     FHx: Patient reports her mother and cousin both had breast cancer in their  50s and 60s. She had an uncle diagnosed with leukemia.        Hospital Course:  Patient admitted to [facility] to w/o pathologic fx in T12 along with lytic lesions in spine. Neurosurgery and oncology were consulted. Found to have high PTH in labs. Endocrin was consulted.    Interval History: patient is in a lot of pain, pain medication scheduled. UOP ok.     Review of Systems  Objective:     Vital Signs (Most Recent):  Temp: 98.1 °F (36.7 °C) (08/04/18 1205)  Pulse: 89 (08/04/18 1205)  Resp: 20 (08/04/18 1205)  BP: (!) 184/107 (08/04/18 1205)  SpO2: 97 % (08/04/18 1205) Vital Signs (24h Range):  Temp:  [97.8 °F (36.6 °C)-98.9 °F (37.2 °C)] 98.1 °F (36.7 °C)  Pulse:  [81-99] 89  Resp:  [16-20] 20  SpO2:  [93 %-99 %] 97 %  BP: (158-206)/() 184/107     Weight: 69.9 kg (154 lb)  Body mass index is 26.43 kg/m².    Intake/Output Summary (Last 24 hours) at 08/04/18 1708  Last data filed at 08/04/18 1600   Gross per 24 hour   Intake          2136.67 ml   Output              800 ml   Net          1336.67 ml      Physical Exam    Significant Labs:   CBC:   Recent Labs  Lab 08/03/18  1305   WBC 4.31   HGB 11.1*   HCT 34.6*        CMP:   Recent Labs  Lab 08/03/18  1305 08/04/18  0047 08/04/18  0916 08/04/18  1523    138 135* 134*   K 4.8 4.1 4.2 4.2    105 107 106   CO2 24 25 20* 19*   GLU 89 101 101 102   BUN 24* 21 19 18   CREATININE 1.7* 1.5* 1.2 1.1   CALCIUM 11.8* 11.8* 11.2* 10.7*   PROT 8.1  --   --   --    ALBUMIN 3.9  --   --   --    BILITOT 0.6  --   --   --    ALKPHOS 98  --   --   --    AST 24  --   --   --    ALT 14  --   --   --    ANIONGAP 9 8 8 9   EGFRNONAA 32.1* 37.3* 48.9* 54.3*       Significant Imaging: I have reviewed and interpreted all pertinent imaging results/findings within the past 24 hours.    Assessment/Plan:      Discharge planning issues      Going back to her sister's place        Lytic bone lesions on xray      - Ortho consulted, MRI w/ & w/o was suggested  - endocrinology  consult suggested possible dual etiologies given hypercalcemia w high PTH - either primary hyperparathyroidism and concurrent metastatic malignancy, or (rare) PTH secreting malignancy (lung, ovarian, neuroectodermal thyroid etc). May consider CT chest as well.            Suspected multiple myeloma not having achieved remission      Patient has hypercalcemia, along with anemia, bone fx and lytic lesions. Very likely due to multiple myeloma.  -started on IVF ns 200 cc/hr, despite many hours of hydration calcium level did not change much.  -upep, spep, urin pro,PTH.rPTH, Vit D ordered  - hem-onc consulted for BM biopsy and possible calcitonin, recommendations is appreciated  NSGY consulted, TLSO brace ordered, MRI w/ and w/o   -continue IVF           Hypercalcemia    Patient has hypercalcemia, along with anemia, bone fx and lytic lesions. Very likely due to multiple myeloma.  - started on IVF ns 200 cc/hr, despite many hours of hydration calcium level did not change much.  - upep, spep, urin pro,PTH.rPTH, Vit D ordered  - hem-onc consulted for BM biopsy and possible calcitonin, recommendations is appreciated  - continue IVF   Endocrinology consulted:  May have dual etiologies given hypercalcemia w high PTH - either primary hyperparathyroidism and concurrent metastatic malignancy, or (rare) PTH secreting malignancy (lung, ovarian, neuroectodermal thyroid etc). May consider CT chest as well.      Followup /send for SPEP, UPEP, calcitriol, PTHrP     IV hydration, calcitonin if needed; can consider IV bisphosphonates if needed given stable/improving kidney function        Multiple lesions of metastatic malignancy      Chest/abdominal/pelvic CT  MRI w/ & w/o          Closed compression fracture of thoracic vertebra    -Orthopedics service was consulted  -Patient neurologically stable on exam  -No acute neurosurgical intervention indicated  -TLSO brace ordered from [facility]. Patient to wear when OOB or working with  therapy. OK to remove when in bed or lying in recliner.   -Recommend MRI with/without contrast to rule out pathologic fracture and to better evaluate lytic lesions  -Further recs to follow imaging        Acute kidney injury      Patient has not been eating and drinking much during past few days, she also has hypercalcemia which could affect tubules, also patient's BP is not well controlled. Imaging did not show any hydronephrosis or obstruction in the urinary system.  - IV fluids  - monitor Cr and UOP  - Cr improved 1.1        Other secondary hypertension    - Home meds re-started except the valsartan given patient having GLENIS  - Increased Labetalol dose 200 mg BID, continue hydrlazine  - Monitor BP            VTE Risk Mitigation         Ordered     IP VTE HIGH RISK PATIENT  Once      08/03/18 2320     Place MOUNIKA hose  Until discontinued      08/03/18 2320     Place sequential compression device  Until discontinued      08/03/18 1735              Ladi Bradley MD  Department of Hospital Medicine   [facility]

## 2018-08-04 NOTE — ASSESSMENT & PLAN NOTE
- Ortho consulted, MRI w/ & w/o was suggested  - endocrinology consult suggested possible dual etiologies given hypercalcemia w high PTH - either primary hyperparathyroidism and concurrent metastatic malignancy, or (rare) PTH secreting malignancy (lung, ovarian, neuroectodermal thyroid etc). May consider CT chest as well.

## 2018-08-04 NOTE — SUBJECTIVE & OBJECTIVE
Interval History: patient is in a lot of pain, pain medication scheduled. UOP ok.     Review of Systems  Objective:     Vital Signs (Most Recent):  Temp: 98.1 °F (36.7 °C) (08/04/18 1205)  Pulse: 89 (08/04/18 1205)  Resp: 20 (08/04/18 1205)  BP: (!) 184/107 (08/04/18 1205)  SpO2: 97 % (08/04/18 1205) Vital Signs (24h Range):  Temp:  [97.8 °F (36.6 °C)-98.9 °F (37.2 °C)] 98.1 °F (36.7 °C)  Pulse:  [81-99] 89  Resp:  [16-20] 20  SpO2:  [93 %-99 %] 97 %  BP: (158-206)/() 184/107     Weight: 69.9 kg (154 lb)  Body mass index is 26.43 kg/m².    Intake/Output Summary (Last 24 hours) at 08/04/18 1708  Last data filed at 08/04/18 1600   Gross per 24 hour   Intake          2136.67 ml   Output              800 ml   Net          1336.67 ml      Physical Exam    Significant Labs:   CBC:   Recent Labs  Lab 08/03/18  1305   WBC 4.31   HGB 11.1*   HCT 34.6*        CMP:   Recent Labs  Lab 08/03/18  1305 08/04/18  0047 08/04/18  0916 08/04/18  1523    138 135* 134*   K 4.8 4.1 4.2 4.2    105 107 106   CO2 24 25 20* 19*   GLU 89 101 101 102   BUN 24* 21 19 18   CREATININE 1.7* 1.5* 1.2 1.1   CALCIUM 11.8* 11.8* 11.2* 10.7*   PROT 8.1  --   --   --    ALBUMIN 3.9  --   --   --    BILITOT 0.6  --   --   --    ALKPHOS 98  --   --   --    AST 24  --   --   --    ALT 14  --   --   --    ANIONGAP 9 8 8 9   EGFRNONAA 32.1* 37.3* 48.9* 54.3*       Significant Imaging: I have reviewed and interpreted all pertinent imaging results/findings within the past 24 hours.

## 2018-08-04 NOTE — ED NOTES
Med team 5 paged to place PRN HTN meds for inpatient. Taken upstairs with transport in wheelchair. Family at bs. No distress noted.

## 2018-08-04 NOTE — HPI
A 63 yo woman with a diagnosis of anemia, asthma, schizophrenia, HTN, has been admitted for T12 pathologic fracture, hypercalcemia, and GLENIS. Endocrinology has been consulted for evaluation of hypercalcemia.  Pt reports of having back pain and constipation since one week. She lost about 8lbs over the past two weeks.  Her anti-hypertensive medications include Valsartan and Labetalol.     At the time of admission pt had serum calcium of 11.8 mg/dl. She received 1L NS bolus and was started on NS continuous infusion.   Labs at the time of admission:  PTH: 104  Vitamin D 25-OH: 27    CT Abdomen/ Pelvis showed: Innumerable lytic lesions involving the thoracic and lumbar spine, as well as the iliac wings bilaterally.  These findings are suggestive of metastatic disease.

## 2018-08-04 NOTE — ASSESSMENT & PLAN NOTE
Patient has hypercalcemia, along with anemia, bone fx and lytic lesions. Very likely due to multiple myeloma.  -started on IVF ns 200 cc/hr, despite many hours of hydration calcium level did not change much.  -upep, spep, urin pro,PTH.rPTH, Vit D ordered  - hem-onc consulted for BM biopsy and possible calcitonin, recommendations is appreciated  NSGY consulted, TLSO brace ordered, MRI w/ and w/o   -continue IVF

## 2018-08-04 NOTE — SUBJECTIVE & OBJECTIVE
Interval HPI:   Overnight events:  Hypertensive    Eatin%  Nausea: No  Hypoglycemia and intervention: No  Fever: No  TPN and/or TF: No    PMH, PSH, FH, SH updated and reviewed     ROS:    Review of Systems   Constitutional: Positive for appetite change and unexpected weight change.   Eyes: Negative for visual disturbance.   Respiratory: Negative for shortness of breath.    Cardiovascular: Negative for chest pain.   Gastrointestinal: Negative for abdominal pain.   Endocrine: Negative for polyuria.   Genitourinary: Negative for urgency.   Musculoskeletal: Positive for arthralgias and back pain.   Skin: Negative for wound.   Neurological: Negative for headaches.   Hematological: Does not bruise/bleed easily.   Psychiatric/Behavioral: Negative for sleep disturbance.            PHYSICAL EXAMINATION:  Vitals:    18 1205   BP: (!) 184/107   Pulse: 89   Resp: 20   Temp: 98.1 °F (36.7 °C)     Body mass index is 26.43 kg/m².    Physical Exam   Constitutional: She is oriented to person, place, and time. She appears well-developed and well-nourished.   HENT:   Head: Normocephalic and atraumatic.   Neck: Neck supple. No thyromegaly present.   Cardiovascular: Normal rate, regular rhythm and normal heart sounds.    Pulmonary/Chest: Effort normal. No respiratory distress.   Abdominal: Soft. There is no tenderness.   Neurological: She is alert and oriented to person, place, and time.   Skin: Skin is warm. No rash noted.   Psychiatric: She has a normal mood and affect. Her behavior is normal.

## 2018-08-04 NOTE — PLAN OF CARE
Problem: Fall Risk (Adult)  Goal: Identify Related Risk Factors and Signs and Symptoms  Related risk factors and signs and symptoms are identified upon initiation of Human Response Clinical Practice Guideline (CPG)   Outcome: Ongoing (interventions implemented as appropriate)   08/04/18 1104   Fall Risk   Related Risk Factors (Fall Risk) age-related changes;bladder function altered;culprit medication(s);depression/anxiety;fatigue/slow reaction;fear of falling;inadequate lighting;objects hard to reach;environment unfamiliar   Signs and Symptoms (Fall Risk) presence of risk factors       Problem: Patient Care Overview  Goal: Plan of Care Review  Outcome: Ongoing (interventions implemented as appropriate)   08/04/18 1104   Coping/Psychosocial   Plan Of Care Reviewed With patient

## 2018-08-04 NOTE — ASSESSMENT & PLAN NOTE
Most likely due to combination of PTH mediated hypercalcemia and hypercalcemia of malignancy.  Elevated PTH is suggestive of primary hyperparathyroidism. The osteolytic lesions are suggestive hypercalcemia of malignancy due to metastasis.    F/U SPEP and UPEP results to check for multiple myeloma  Would send PTHrP to evaluate for hypercalcemia of malignancy  Would send vitamin D 1,25-OH level to r/o granulomatous causes of hypercalcemia.  Would send TSH to r/o hyperthyroidism as a cause of hypercalcemia, although less likely given her clinical presentation.    Agree with current management of hypercalcemia.  -Continue NS infusion  -Monitor serum calcium levels daily  -If serum calcium doesn't improve with IVF, would consider calcitonin and bisphosphonates when renal function improves.

## 2018-08-04 NOTE — CONSULTS
[facility]  Endocrinology  Consult Note    Consult Requested by: Linda Moss MD   Reason for admit: <principal problem not specified>    HISTORY OF PRESENT ILLNESS:  A 61 yo woman with a diagnosis of anemia, asthma, schizophrenia, HTN , has been admitted for T12 pathologic fracture, hypercalcemia, and GLENIS. Endocrinology has been consulted for evaluation of hypercalcemia.  Pt reports of having back pain and constipation since one week. She lost about 8lbs over the past two weeks.  Her anti-hypertensive medications include Valsartan and Labetalol.     At the time of admission pt had serum calcium of 11.8 mg/dl. She received 1L NS bolus and was started on NS continuous infusion.   Labs at the time of admission:  PTH: 104  Vitamin D 25-OH: 27    CT Abdomen/ Pelvis showed: Innumerable lytic lesions involving the thoracic and lumbar spine, as well as the iliac wings bilaterally.  These findings are suggestive of metastatic disease.    Medications and/or Treatments Impacting Glycemic Control:  Immunotherapy:    Immunosuppressants     None        Steroids:   Hormones     None        Pressors:    Autonomic Drugs     None          Prescriptions Prior to Admission   Medication Sig Dispense Refill Last Dose    albuterol 90 mcg/actuation inhaler Inhale 2 puffs into the lungs every 6 (six) hours as needed for Wheezing. 1 each 11 Taking    benztropine (COGENTIN) 1 MG tablet TK 1 T PO  BID 60 tablet 2 Taking    cholecalciferol, vitamin D3, 2,000 unit Cap Take 1 capsule (2,000 Units total) by mouth once daily. 90 capsule 3 Taking    diclofenac sodium 1 % Gel Apply 2 g topically 4 (four) times daily. for 10 days 100 g 0     ferrous sulfate 325 mg (65 mg iron) Tab tablet Take 1 tablet (325 mg total) by mouth every 12 (twelve) hours. 60 tablet 4 Taking    fluticasone (FLONASE) 50 mcg/actuation nasal spray 1 spray by Each Nare route once daily. 1 Bottle 4 Taking    fluticasone-salmeterol 100-50 mcg/dose  (ADVAIR) 100-50 mcg/dose diskus inhaler Inhale 1 puff into the lungs 2 (two) times daily. 1 each 9 Taking    labetalol (NORMODYNE) 100 MG tablet TAKE 1 TABLET BY MOUTH TWICE DAILY 180 tablet 4 Taking    methocarbamol (ROBAXIN) 500 MG Tab Take 1 tablet (500 mg total) by mouth 4 (four) times daily. for 10 days 40 tablet 0     pantoprazole (PROTONIX) 40 MG tablet Take 1 tablet (40 mg total) by mouth every 12 (twelve) hours. 60 tablet 3 Taking    risperiDONE (RISPERDAL) 1 MG tablet Take 1 tablet (1 mg total) by mouth once daily. (Patient taking differently: Take 1 mg by mouth once daily. 1 in the morning and 2 at bedtime) 30 tablet 3 Taking    valsartan (DIOVAN) 160 MG tablet Take 1 tablet (160 mg total) by mouth once daily. 90 tablet 3 Taking       Current Facility-Administered Medications   Medication Dose Route Frequency Provider Last Rate Last Dose    0.9%  NaCl infusion   Intravenous Continuous Linda Moss  mL/hr at 08/04/18 0812      albuterol-ipratropium 2.5 mg-0.5 mg/3 mL nebulizer solution 3 mL  3 mL Nebulization Q4H PRN Ladi Bradley MD        benztropine tablet 1 mg  1 mg Oral BID Ladi Bradley MD   1 mg at 08/04/18 0808    dextrose 50% injection 12.5 g  12.5 g Intravenous PRN Ldai Bradley MD        dextrose 50% injection 25 g  25 g Intravenous PRN Ladi Bradley MD        diclofenac sodium 1 % gel 2 g  2 g Topical (Top) QID Antionette Reyes MD   2 g at 08/04/18 1356    fluticasone 44 mcg/actuation inhaler 1 puff  1 puff Inhalation BID Ladi Bradley MD        glucagon (human recombinant) injection 1 mg  1 mg Intramuscular PRN Ladi Bradley MD        glucose chewable tablet 16 g  16 g Oral PRN Ladi Bradley MD        glucose chewable tablet 24 g  24 g Oral PRN Ladi Bradley MD        HYDROmorphone injection 0.5 mg  0.5 mg Intravenous Q4H PRN Antionette Reyes MD   0.5 mg at 08/04/18 1349    labetalol tablet 200 mg  200 mg Oral BID Antionette Reyes MD        ondansetron injection 4 mg   4 mg Intravenous Q6H PRN Orlando Bagley MD        polyethylene glycol packet 17 g  17 g Oral Daily Ladi Bradley MD   17 g at 18 0808    risperiDONE tablet 1 mg  1 mg Oral Daily Ladi Bradley MD   1 mg at 18 0809    risperiDONE tablet 2 mg  2 mg Oral Nightly Ladi Bradley MD   2 mg at 18 2227    senna-docusate 8.6-50 mg per tablet 1 tablet  1 tablet Oral BID PRN Ladi Bradley MD   1 tablet at 18 2327    sodium chloride 0.9% flush 5 mL  5 mL Intravenous PRN Ladi Bradley MD        sodium phosphates 19-7 gram/118 mL enema 1 enema  1 enema Rectal Once Antionette Reyes MD           Interval HPI:   Overnight events:  Hypertensive    Eatin%  Nausea: No  Hypoglycemia and intervention: No  Fever: No  TPN and/or TF: No    PMH, PSH, FH, SH updated and reviewed     ROS:    Review of Systems   Constitutional: Positive for appetite change and unexpected weight change.   Eyes: Negative for visual disturbance.   Respiratory: Negative for shortness of breath.    Cardiovascular: Negative for chest pain.   Gastrointestinal: Negative for abdominal pain.   Endocrine: Negative for polyuria.   Genitourinary: Negative for urgency.   Musculoskeletal: Positive for arthralgias and back pain.   Skin: Negative for wound.   Neurological: Negative for headaches.   Hematological: Does not bruise/bleed easily.   Psychiatric/Behavioral: Negative for sleep disturbance.            PHYSICAL EXAMINATION:  Vitals:    18 1205   BP: (!) 184/107   Pulse: 89   Resp: 20   Temp: 98.1 °F (36.7 °C)     Body mass index is 26.43 kg/m².    Physical Exam   Constitutional: She is oriented to person, place, and time. She appears well-developed and well-nourished.   HENT:   Head: Normocephalic and atraumatic.   Neck: Neck supple. No thyromegaly present.   Cardiovascular: Normal rate, regular rhythm and normal heart sounds.    Pulmonary/Chest: Effort normal. No respiratory distress.   Abdominal: Soft. There is no tenderness.    Neurological: She is alert and oriented to person, place, and time.   Skin: Skin is warm. No rash noted.   Psychiatric: She has a normal mood and affect. Her behavior is normal.     .     ASSESSMENT and PLAN:    Lytic bone lesions on xray      Most likely a cause of hypercalcemia        Hypercalcemia    Most likely due to combination of PTH mediated hypercalcemia and hypercalcemia of malignancy.  Elevated PTH is suggestive of primary hyperparathyroidism. The osteolytic lesions are suggestive hypercalcemia of malignancy due to metastasis.    F/U SPEP and UPEP results to check for multiple myeloma  Would send PTHrP to evaluate for hypercalcemia of malignancy  Would send vitamin D 1,25-OH level to r/o granulomatous causes of hypercalcemia.  Would send TSH to r/o hyperthyroidism as a cause of hypercalcemia, although less likely given her clinical presentation.    Agree with current management of hypercalcemia.  -Continue NS infusion  -Monitor serum calcium levels daily  -If serum calcium doesn't improve with IVF, would consider calcitonin and bisphosphonates when renal function improves.              DISCHARGE NEEDS: will assess daily    Tiffany Bourne MD  Endocrinology  [facility]

## 2018-08-04 NOTE — ASSESSMENT & PLAN NOTE
62 year old female with acute onset on bilateral flank pain that began after lifting a heavy object. Imaging shows a T12 compression fracture along with multiple lytic lesions throughout the spine.     -Patient neurologically stable on exam  -No acute neurosurgical intervention indicated  -TLSO brace ordered from [facility]. Patient to wear when OOB or working with therapy. OK to remove when in bed or lying in recliner.   -Recommend MRI with/without contrast to rule out pathologic fracture and to better evaluate lytic lesions  -Further recs to follow imaging

## 2018-08-04 NOTE — CONSULTS
[facility]  Neurosurgery  Progress Note    Subjective:     History of Present Illness: Ms. Zamorano is a 62 year old female with no PMHx of cancer, chronic back pain, or osteoporosis, who presents to the ED with onset of bilateral flank pain that began 1 week ago while lifting a heavy object from the ground. Patient denies any popping or pulling sensation. Denies any thoracic or lumbar back pain. Denies any UE or LE pain, paresthesias, or weakness. Denies any bladder or bowel incontinence. CT renal was performed and showed a T12 compression fracture. Neurosurgery was consulted for treatment recommendations.     Post-Op Info:  * No surgery found *           Medications:  Continuous Infusions:   sodium chloride 0.9%       Scheduled Meds:   hydrALAZINE  25 mg Oral Once    labetalol  100 mg Oral BID    [START ON 8/4/2018] polyethylene glycol  17 g Oral Daily    sodium phosphates  1 enema Rectal Once     PRN Meds:dextrose 50%, dextrose 50%, glucagon (human recombinant), glucose, glucose, HYDROmorphone, senna-docusate 8.6-50 mg, sodium chloride 0.9%     Review of Systems   Constitutional: no fever, chills or night sweats. No changes in weight   Eyes: no visual changes   ENT: no nasal congestion or sore throat   Respiratory: no cough or shortness of breath   Cardiovascular: no chest pain or palpitations   Gastrointestinal: no nausea or vomiting   Genitourinary: no hematuria or dysuria   Integument/Breast: no rash or pruritis   Hematologic/Lymphatic: no easy bruising or lymphadenopathy   Musculoskeletal: positive for bilateral flank pain  Neurological: no seizures or tremors   Behavioral/Psych: no auditory or visual hallucinations   Endocrine: no heat or cold intolerance       Objective:        There is no height or weight on file to calculate BMI.  Vital Signs (Most Recent):  Temp: 98.5 °F (36.9 °C) (08/03/18 1116)  Pulse: 82 (08/03/18 1434)  Resp: 20 (08/03/18 1434)  BP: (!) 191/111 (med team 5 made  aware. ) (08/03/18 1900)  SpO2: 96 % (08/03/18 1434) Vital Signs (24h Range):  Temp:  [98.5 °F (36.9 °C)] 98.5 °F (36.9 °C)  Pulse:  [82-93] 82  Resp:  [18-20] 20  SpO2:  [96 %-100 %] 96 %  BP: (160-191)/() 191/111        Neurosurgery Physical Exam   General: well developed, well nourished, no distress.   Head: normocephalic, atraumatic  Neurologic: Alert and oriented. Thought content appropriate.  GCS: Motor: 6/Verbal: 5/Eyes: 4 GCS Total: 15  Mental Status: Awake, Alert, Oriented x 4  Language: No aphasia  Speech: No dysarthria  Cranial nerves: face symmetric, tongue midline, CN II-XII grossly intact.   Eyes: pupils equal, round, reactive to light with accomodation, EOMI.   Pulmonary: normal respirations, no signs of respiratory distress  Abdomen: soft, non-distended, not tender to palpation  Sensory: intact to light touch throughout  Motor Strength:Moves all extremities spontaneously with good tone.  Full strength upper and lower extremities. No abnormal movements seen.   Pronator Drift: no drift noted  Finger-to-nose: Intact bilaterally  [place]: absent  Clonus: absent  Skin: Skin is warm, dry and intact.  Straight leg raise: negative  No thoracic or lumbar TTP, midline or along paraspinal muscles        Significant Labs:    Recent Labs  Lab 08/03/18  1305   GLU 89      K 4.8      CO2 24   BUN 24*   CREATININE 1.7*   CALCIUM 11.8*       Recent Labs  Lab 08/03/18  1305   WBC 4.31   HGB 11.1*   HCT 34.6*            Significant Diagnostics:  CT renal stone:  I independently reviewed the imaging.     Innumerable lytic lesions involving the thoracic and lumbar spine, as well as the iliac wings bilaterally.  These findings are suggestive of metastatic disease.    Pathologic fracture of the T12 vertebral body, resulting in mild height loss.  No retropulsion into the spinal canal, however there is possible soft tissue extension into the canal.    Hypodense foci within the liver, the largest  measuring 3.9 cm, favored to represent a hepatic cyst.  The remaining hypodensities are too small to fully characterize.    Findings suggestive of constipation.    Trace bilateral lateral pleural effusions, as well as a small pericardial effusion.    Simple left renal cyst.        Assessment/Plan:     Closed compression fracture of thoracic vertebra    62 year old female with acute onset on bilateral flank pain that began after lifting a heavy object. Imaging shows a T12 compression fracture along with multiple lytic lesions throughout the spine.     -Patient neurologically stable on exam  -No acute neurosurgical intervention indicated  -TLSO brace ordered from [facility]. Patient to wear when OOB or working with therapy. OK to remove when in bed or lying in recliner.   -Recommend MRI with/without contrast to rule out pathologic fracture and to better evaluate lytic lesions  -Further recs to follow imaging          Please call with any questions      Margaret Rust PA-C   Neurosurgery   Pager: [phone]

## 2018-08-04 NOTE — ASSESSMENT & PLAN NOTE
Patient has not been eating and drinking much during past few days, she also has hypercalcemia which could affect tubules, also patient's BP is not well controlled. Imaging did not show any hydronephrosis or obstruction in the urinary system.    - IV fluids  - UA  - FENa  - monitor Cr and UOP

## 2018-08-04 NOTE — HPI
Ms. Zamorano is a 62 year old female with no PMHx of cancer, chronic back pain, or osteoporosis, who presents to the ED with onset of bilateral flank pain that began 1 week ago while lifting a heavy object from the ground. Patient denies any popping or pulling sensation. Denies any thoracic or lumbar back pain. Denies any UE or LE pain, paresthesias, or weakness. Denies any bladder or bowel incontinence. CT renal was performed and showed a T12 compression fracture. Neurosurgery was consulted for treatment recommendations.

## 2018-08-04 NOTE — HPI
Ms. Janie Zamorano is a 62 y.o. female who presented with back pain and constipation. Onset of symptoms for back pain occurred a week ago. Patient reports that leaning over to  a storage box initiated her back pain. Patient states the pain starts in her back but denies any radiation of pain. The pain worsens when she reaches for objects and upon deep inspiration. She also loses her breath if she aggravates her back pain. Patient denies any trauma to the region. Patient has been able to ambulate the past week with reluctance. The patient has tried hot and cold packs to relieve her pain. The patient did not report using pain medications to relieve the pain. Patient denies fever, chills, night sweats, dysuria, hematuria, and easy bruising or bleeding. She lost around 8 LB over the course of past two weeks. She denies any shooting pain to LE, no loss of sense in LE or abdominal area, she mentions numbness in her left hand. Denies bowel/urinary incontinence. No saddle anesthesia. Patient has been anemic chronically and was started on iron pills two weeks ago by her primary doctor.  Patient also complains of constipation for the past two weeks. She reports no bowel movements during that span. She has not had constipation prior to this event. She has tried enemas to no avail. She states she is belching and passing gas. She complains of nausea but reports no vomiting. She denies chest pain, palpitations, and cough.     FHx: Patient reports her mother and cousin both had breast cancer in their 50s and 60s. She had an uncle diagnosed with leukemia.

## 2018-08-04 NOTE — ASSESSMENT & PLAN NOTE
Patient has hypercalcemia, along with anemia, bone fx and lytic lesions. Very likely due to multiple myeloma.  - started on IVF ns 200 cc/hr, despite many hours of hydration calcium level did not change much.  - upep, spep, urin pro,PTH.rPTH, Vit D ordered  - hem-onc consulted for BM biopsy and possible calcitonin, recommendations is appreciated  - continue IVF   Endocrinology consulted:  May have dual etiologies given hypercalcemia w high PTH - either primary hyperparathyroidism and concurrent metastatic malignancy, or (rare) PTH secreting malignancy (lung, ovarian, neuroectodermal thyroid etc). May consider CT chest as well.      Followup /send for SPEP, UPEP, calcitriol, PTHrP     IV hydration, calcitonin if needed; can consider IV bisphosphonates if needed given stable/improving kidney function

## 2018-08-05 PROBLEM — K59.00 CONSTIPATION: Status: ACTIVE | Noted: 2018-08-05

## 2018-08-05 LAB
ALBUMIN SERPL BCP-MCNC: 3.3 G/DL
ANION GAP SERPL CALC-SCNC: 6 MMOL/L
ANION GAP SERPL CALC-SCNC: 7 MMOL/L
ANION GAP SERPL CALC-SCNC: 7 MMOL/L
BUN SERPL-MCNC: 12 MG/DL
BUN SERPL-MCNC: 14 MG/DL
BUN SERPL-MCNC: 15 MG/DL
CALCIUM 24H UR-MRATE: 8 MG/HR
CALCIUM SERPL-MCNC: 10.8 MG/DL
CALCIUM SERPL-MCNC: 10.9 MG/DL
CALCIUM SERPL-MCNC: 11 MG/DL
CALCIUM UR-MCNC: 5.6 MG/DL
CALCIUM URINE (MG/SPEC): 199 MG/SPEC
CHLORIDE SERPL-SCNC: 106 MMOL/L
CHLORIDE SERPL-SCNC: 108 MMOL/L
CHLORIDE SERPL-SCNC: 110 MMOL/L
CO2 SERPL-SCNC: 22 MMOL/L
CO2 SERPL-SCNC: 22 MMOL/L
CO2 SERPL-SCNC: 23 MMOL/L
CREAT SERPL-MCNC: 0.9 MG/DL
CREAT SERPL-MCNC: 0.9 MG/DL
CREAT SERPL-MCNC: 1 MG/DL
EST. GFR  (AFRICAN AMERICAN): >60 ML/MIN/1.73 M^2
EST. GFR  (NON AFRICAN AMERICAN): >60 ML/MIN/1.73 M^2
GLUCOSE SERPL-MCNC: 103 MG/DL
GLUCOSE SERPL-MCNC: 91 MG/DL
GLUCOSE SERPL-MCNC: 95 MG/DL
IGA SERPL-MCNC: 58 MG/DL
IGG SERPL-MCNC: 846 MG/DL
IGM SERPL-MCNC: 20 MG/DL
PHOSPHATE SERPL-MCNC: 2.8 MG/DL
POTASSIUM SERPL-SCNC: 4.2 MMOL/L
POTASSIUM SERPL-SCNC: 4.5 MMOL/L
POTASSIUM SERPL-SCNC: 4.6 MMOL/L
SODIUM SERPL-SCNC: 136 MMOL/L
SODIUM SERPL-SCNC: 137 MMOL/L
SODIUM SERPL-SCNC: 138 MMOL/L
URINE COLLECTION DURATION: 24 HR
URINE VOLUME: 3550 ML

## 2018-08-05 PROCEDURE — 80069 RENAL FUNCTION PANEL: CPT

## 2018-08-05 PROCEDURE — 83520 IMMUNOASSAY QUANT NOS NONAB: CPT | Mod: 59

## 2018-08-05 PROCEDURE — 25000003 PHARM REV CODE 250: Performed by: HOSPITALIST

## 2018-08-05 PROCEDURE — 80048 BASIC METABOLIC PNL TOTAL CA: CPT | Mod: 91

## 2018-08-05 PROCEDURE — 25000242 PHARM REV CODE 250 ALT 637 W/ HCPCS: Performed by: STUDENT IN AN ORGANIZED HEALTH CARE EDUCATION/TRAINING PROGRAM

## 2018-08-05 PROCEDURE — 99232 SBSQ HOSP IP/OBS MODERATE 35: CPT | Mod: GC,,, | Performed by: INTERNAL MEDICINE

## 2018-08-05 PROCEDURE — 80048 BASIC METABOLIC PNL TOTAL CA: CPT

## 2018-08-05 PROCEDURE — 25000003 PHARM REV CODE 250: Performed by: STUDENT IN AN ORGANIZED HEALTH CARE EDUCATION/TRAINING PROGRAM

## 2018-08-05 PROCEDURE — 63600175 PHARM REV CODE 636 W HCPCS: Performed by: STUDENT IN AN ORGANIZED HEALTH CARE EDUCATION/TRAINING PROGRAM

## 2018-08-05 PROCEDURE — 36415 COLL VENOUS BLD VENIPUNCTURE: CPT

## 2018-08-05 PROCEDURE — 99233 SBSQ HOSP IP/OBS HIGH 50: CPT | Mod: GC,,, | Performed by: HOSPITALIST

## 2018-08-05 PROCEDURE — 11000001 HC ACUTE MED/SURG PRIVATE ROOM

## 2018-08-05 PROCEDURE — 82784 ASSAY IGA/IGD/IGG/IGM EACH: CPT | Mod: 59

## 2018-08-05 PROCEDURE — 82340 ASSAY OF CALCIUM IN URINE: CPT

## 2018-08-05 PROCEDURE — 82652 VIT D 1 25-DIHYDROXY: CPT

## 2018-08-05 RX ORDER — OXYCODONE HYDROCHLORIDE 5 MG/1
10 TABLET ORAL EVERY 6 HOURS PRN
Status: DISCONTINUED | OUTPATIENT
Start: 2018-08-05 | End: 2018-08-08

## 2018-08-05 RX ORDER — LABETALOL 200 MG/1
200 TABLET, FILM COATED ORAL 3 TIMES DAILY
Status: DISCONTINUED | OUTPATIENT
Start: 2018-08-05 | End: 2018-08-07

## 2018-08-05 RX ORDER — OXYCODONE HYDROCHLORIDE 5 MG/1
5 TABLET ORAL EVERY 6 HOURS PRN
Status: DISCONTINUED | OUTPATIENT
Start: 2018-08-05 | End: 2018-08-08

## 2018-08-05 RX ADMIN — OXYCODONE HYDROCHLORIDE 10 MG: 5 TABLET ORAL at 11:08

## 2018-08-05 RX ADMIN — BENZTROPINE MESYLATE 1 MG: 1 TABLET ORAL at 09:08

## 2018-08-05 RX ADMIN — RISPERIDONE 1 MG: 1 TABLET ORAL at 09:08

## 2018-08-05 RX ADMIN — SENNOSIDES AND DOCUSATE SODIUM 1 TABLET: 8.6; 5 TABLET ORAL at 08:08

## 2018-08-05 RX ADMIN — FLUTICASONE PROPIONATE 1 PUFF: 44 AEROSOL, METERED RESPIRATORY (INHALATION) at 09:08

## 2018-08-05 RX ADMIN — LABETALOL HYDROCHLORIDE 200 MG: 200 TABLET, FILM COATED ORAL at 08:08

## 2018-08-05 RX ADMIN — SODIUM CHLORIDE: 0.9 INJECTION, SOLUTION INTRAVENOUS at 09:08

## 2018-08-05 RX ADMIN — LABETALOL HYDROCHLORIDE 200 MG: 200 TABLET, FILM COATED ORAL at 10:08

## 2018-08-05 RX ADMIN — SODIUM PHOSPHATE, DIBASIC AND SODIUM PHOSPHATE, MONOBASIC 1 ENEMA: 7; 19 ENEMA RECTAL at 12:08

## 2018-08-05 RX ADMIN — OXYCODONE HYDROCHLORIDE 10 MG: 5 TABLET ORAL at 05:08

## 2018-08-05 RX ADMIN — LABETALOL HYDROCHLORIDE 200 MG: 200 TABLET, FILM COATED ORAL at 02:08

## 2018-08-05 RX ADMIN — DICLOFENAC 2 G: 10 GEL TOPICAL at 02:08

## 2018-08-05 RX ADMIN — HYDROMORPHONE HYDROCHLORIDE 0.5 MG: 1 INJECTION, SOLUTION INTRAMUSCULAR; INTRAVENOUS; SUBCUTANEOUS at 04:08

## 2018-08-05 RX ADMIN — OXYCODONE HYDROCHLORIDE 5 MG: 5 TABLET ORAL at 02:08

## 2018-08-05 RX ADMIN — SODIUM CHLORIDE: 0.9 INJECTION, SOLUTION INTRAVENOUS at 02:08

## 2018-08-05 RX ADMIN — DICLOFENAC 2 G: 10 GEL TOPICAL at 09:08

## 2018-08-05 RX ADMIN — HYDROMORPHONE HYDROCHLORIDE 0.5 MG: 1 INJECTION, SOLUTION INTRAMUSCULAR; INTRAVENOUS; SUBCUTANEOUS at 09:08

## 2018-08-05 NOTE — ASSESSMENT & PLAN NOTE
- Ortho consulted, MRI w/ & w/o was suggested   - CT chest for possible malignancy is ordered, waiting for the result

## 2018-08-05 NOTE — ASSESSMENT & PLAN NOTE
- continue fluids @ 200 cc/h  - PTH elevated as well  - will consider one dose of Zometa 4 mg if this is from hypercalcemia of malignancy, perhaps when renal function better as well

## 2018-08-05 NOTE — HPI
"Pt is a 61 yo AAF with PMHx of schizophrenia, astham, and Gerd who presented to the hospital due to back pain. The back pain started about a week ago when the pt was trying to  some boxes.  She denies any fevers,chills. The pt does endorse a weight loss of eight pounds over the last one to two weeks. She denies any problems with ambulation or sensation. The pt states that she has some intermittent "sweats" given her menopausal status. Also, pt does endorse new onset of constipation that has occurred with jt episode of back pain.  She denies any recent history of worsening dyspnea or prior history of smoking. She also notes that she has had mammograms previously and that they have been negative, no personal history of previous cancer as far as she is aware. The pt was also unaware of any family members with multuple myeloma.     The pt additionally states that she saw a dentist within the last few months and did not have any dental disease at that time.   "

## 2018-08-05 NOTE — SUBJECTIVE & OBJECTIVE
"Interval HPI:   Overnight events:  VS stable    Eatin%  Nausea: No  Hypoglycemia and intervention: No  Fever: No  TPN and/or TF: No    BP (!) 176/110 (BP Location: Left arm, Patient Position: Lying)   Pulse 80   Temp 97.7 °F (36.5 °C) (Oral)   Resp 19   Ht 5' 4" (1.626 m)   Wt 69.9 kg (154 lb)   LMP  (LMP Unknown)   SpO2 98%   Breastfeeding? No   BMI 26.43 kg/m²     Labs Reviewed and Include      Recent Labs  Lab 18  0815   GLU 91   CALCIUM 11.0*      K 4.6   CO2 22*      BUN 12   CREATININE 0.9     Lab Results   Component Value Date    WBC 4.31 2018    HGB 11.1 (L) 2018    HCT 34.6 (L) 2018    MCV 88 2018     2018     No results for input(s): TSH, FREET4 in the last 168 hours.  Lab Results   Component Value Date    HGBA1C 5.4 2018       Nutritional status:   Body mass index is 26.43 kg/m².  Lab Results   Component Value Date    ALBUMIN 3.9 2018    ALBUMIN 3.3 (L) 2018    ALBUMIN 3.3 (L) 2018     No results found for: PREALBUMIN    Estimated Creatinine Clearance: 63 mL/min (based on SCr of 0.9 mg/dL).    Accu-Checks  No results for input(s): POCTGLUCOSE in the last 72 hours.    Current Medications and/or Treatments Impacting Glycemic Control  Immunotherapy:  Immunosuppressants     None        Steroids:   Hormones     None        Pressors:    Autonomic Drugs     None        Hyperglycemia/Diabetes Medications: Antihyperglycemics     None        "

## 2018-08-05 NOTE — ASSESSMENT & PLAN NOTE
Last BM was 7/28, patient tried fleet enema at home. Patient is hypercalcemic. Patient is on Miralax and senna docusat.   - Continue hydration  - Can try Fleet enema if patient is not able to have a BM

## 2018-08-05 NOTE — ASSESSMENT & PLAN NOTE
last Hgb/ hct of 11.1/ 34.6   - normocytic anemia; will evaluate if this due to underlying malignancy  - transfuse for Hgb < 7

## 2018-08-05 NOTE — SUBJECTIVE & OBJECTIVE
"  HPI:  Pt is a 61 yo AAF with PMHx of schizophrenia, astham, and Gerd who presented to the hospital due to back pain. The back pain started about a week ago when the pt was trying to  some boxes.  She denies any fevers,chills. The pt does endorse a weight loss of eight pounds over the last one to two weeks. She denies any problems with ambulation or sensation. The pt states that she has some intermittent "sweats" given her menopausal status. Also, pt does endorse new onset of constipation that has occurred with jt episode of back pain.  She denies any recent history of worsening dyspnea or prior history of smoking. She also notes that she has had mammograms previously and that they have been negative, no personal history of previous cancer as far as she is aware. The pt was also unaware of any family members with multuple myeloma.     The pt additionally states that she saw a dentist within the last few months and did not have nay dental disease at that time.     Objective:     Vital Signs (Most Recent):  Temp: 98.2 °F (36.8 °C) (08/04/18 1731)  Pulse: 90 (08/04/18 1731)  Resp: 19 (08/04/18 1731)  BP: (!) 178/88 (08/04/18 1731)  SpO2: 97 % (08/04/18 1731) Vital Signs (24h Range):  Temp:  [97.8 °F (36.6 °C)-98.9 °F (37.2 °C)] 98.2 °F (36.8 °C)  Pulse:  [81-99] 90  Resp:  [16-20] 19  SpO2:  [93 %-99 %] 97 %  BP: (158-206)/() 178/88     Weight: 69.9 kg (154 lb)  Body mass index is 26.43 kg/m².  Body surface area is 1.78 meters squared.    ECOG SCORE         [unfilled]    Intake/Output - Last 3 Shifts       08/02 0700 - 08/03 0659 08/03 0700 - 08/04 0659 08/04 0700 - 08/05 0659    P.O.   500    I.V. (mL/kg)   1636.7 (23.4)    Total Intake(mL/kg)   2136.7 (30.6)    Urine (mL/kg/hr)   800 (0.9)    Total Output     800    Net     +1336.7           Stool Occurrence   0 x          Physical Exam   Constitutional: She is oriented to person, place, and time. She appears well-developed and well-nourished.   HENT: "   Head: Normocephalic and atraumatic.   Eyes: Conjunctivae and EOM are normal. Pupils are equal, round, and reactive to light.   Neck: Normal range of motion. Neck supple.   Cardiovascular: Normal rate, regular rhythm and normal heart sounds.  Exam reveals no gallop and no friction rub.    No murmur heard.  Pulmonary/Chest: Effort normal. She has no wheezes. She has no rales. She exhibits no tenderness.   Abdominal: Soft. Bowel sounds are normal. She exhibits distension.   Musculoskeletal: Normal range of motion.   Has belt around lower back   Neurological: She is alert and oriented to person, place, and time.   Skin: Skin is warm and dry.   Psychiatric: She has a normal mood and affect.       Significant Labs:   BMP:   Recent Labs  Lab 08/04/18  0047 08/04/18  0916 08/04/18  1523    101 102    135* 134*   K 4.1 4.2 4.2    107 106   CO2 25 20* 19*   BUN 21 19 18   CREATININE 1.5* 1.2 1.1   CALCIUM 11.8* 11.2* 10.7*   , CBC:   Recent Labs  Lab 08/03/18  1305   WBC 4.31   HGB 11.1*   HCT 34.6*      , CMP:   Recent Labs  Lab 08/03/18  1305 08/04/18  0047 08/04/18  0916 08/04/18  1523    138 135* 134*   K 4.8 4.1 4.2 4.2    105 107 106   CO2 24 25 20* 19*   GLU 89 101 101 102   BUN 24* 21 19 18   CREATININE 1.7* 1.5* 1.2 1.1   CALCIUM 11.8* 11.8* 11.2* 10.7*   PROT 8.1  --   --   --    ALBUMIN 3.9  --   --   --    BILITOT 0.6  --   --   --    ALKPHOS 98  --   --   --    AST 24  --   --   --    ALT 14  --   --   --    ANIONGAP 9 8 8 9   EGFRNONAA 32.1* 37.3* 48.9* 54.3*   , Coagulation: No results for input(s): PT, INR, APTT in the last 48 hours., Haptoglobin: No results for input(s): HAPTOGLOBIN in the last 48 hours., Immunology: No results for input(s): SPEP, ADAM, KWESI, FREELAMBDALI in the last 48 hours., LDH: No results for input(s): LDHCSF, BFSOURCE in the last 48 hours., LFTs:   Recent Labs  Lab 08/03/18  1305   ALT 14   AST 24   ALKPHOS 98   BILITOT 0.6   PROT 8.1   ALBUMIN 3.9    , Reticulocytes: No results for input(s): RETIC in the last 48 hours., Tumor Markers: No results for input(s): PSA, CEA, , AFPTM, [id number],  in the last 48 hours.    Invalid input(s): ALGTM, Uric Acid No results for input(s): URICACID in the last 48 hours. and Urine Studies:   Recent Labs  Lab 08/03/18  1249   COLORU Yellow   APPEARANCEUA Clear   PHUR 5.0   SPECGRAV 1.020   PROTEINUA 2+*   GLUCUA Negative   KETONESU Negative   BILIRUBINUA Negative   OCCULTUA 1+*   NITRITE Negative   UROBILINOGEN Negative   LEUKOCYTESUR Negative   RBCUA 2   WBCUA 2   BACTERIA Occasional   SQUAMEPITHEL 2   HYALINECASTS 7*       Diagnostic Results:  EXAMINATION:  CT RENAL STONE STUDY ABD PELVIS WO    CLINICAL HISTORY:  Abdominal pain, unspecified    TECHNIQUE:  Low dose axial images, sagittal and coronal reformations were obtained from the lung bases to the pubic symphysis.  Contrast was not administered.    COMPARISON:  None    FINDINGS:  Heart: Normal in size.  There is a small volume of pericardial fluid.    Lung Bases: Trace bilateral pleural effusions, left greater than right.  The lungs appear otherwise well aerated.    Liver: Liver is normal in size and attenuation, with a 3.9 cm simple cyst within hepatic segment 4.  There are also scattered subcentimeter hypodensities within segments 5 and 8, adjacent to the gallbladder fossa.  These findings are too small to fully characterize, however favored to represent simple hepatic cysts.    Gallbladder: No calcified gallstones.    Bile Ducts: No evidence of dilated ducts.    Pancreas: No mass or peripancreatic fat stranding.    Spleen: Unremarkable.    Adrenals: Unremarkable.    Kidneys/ Ureters: 0.8 cm nonobstructing left renal stone.  No evidence of hydronephrosis.  There is a 1.3 cm hypodense focus on the left kidney, favored to represent a simple renal cysts.    Bladder: No evidence of wall thickening.    Reproductive organs: Status post hysterectomy.    GI  Tract/Mesentery: Moderate amount of retained stool in the ascending and transverse:, suggestive of constipation.  No evidence of obstruction.    Peritoneal Space: No ascites. No free air.    Retroperitoneum: No significant adenopathy.    Abdominal wall: Small fat containing umbilical hernia.    Vasculature: No significant atherosclerosis or aneurysm.    Bones: There are multiple lytic lesions involving the lumbar and thoracic vertebral bodies, with a pathologic fracture of the T12 vertebral body, resulting in minimal height loss.  There is no retropulsion into the spinal canal, however there appears to be soft tissue extension into the canal at this level.  Several lesions extend into the vertebral pedicles, most notably at T9 and T10.  There also lytic lesions of the iliac wings bilaterally these findings are suggestive of metastatic disease.      Impression       Innumerable lytic lesions involving the thoracic and lumbar spine, as well as the iliac wings bilaterally.  These findings are suggestive of metastatic disease.    Pathologic fracture of the T12 vertebral body, resulting in mild height loss.  No retropulsion into the spinal canal, however there is possible soft tissue extension into the canal.    Hypodense foci within the liver, the largest measuring 3.9 cm, favored to represent a hepatic cyst.  The remaining hypodensities are too small to fully characterize.    Findings suggestive of constipation.    Trace bilateral lateral pleural effusions, as well as a small pericardial effusion.    Simple left renal cyst.

## 2018-08-05 NOTE — PROGRESS NOTES
"[facility]  Hematology  Bone Marrow Transplant  Progress Note    Patient Name: Janie Zamorano  Admission Date: 8/3/2018  Hospital Length of Stay: 1 days  Code Status: Full Code    HPI:  Pt is a 61 yo AAF with PMHx of schizophrenia, HTN, asthma, and Gerd who presented to the hospital due to back pain. The back pain started about a week ago when the pt was trying to  some boxes.  She denies any fevers,chills. The pt does endorse a weight loss of eight pounds over the last one to two weeks. She denies any problems with ambulation or sensation. The pt states that she has some intermittent "sweats" given her menopausal status. Also, pt does endorse new onset of constipation that has occurred with jt episode of back pain.  She denies any recent history of worsening dyspnea or prior history of smoking. She also notes that she has had mammograms previously and that they have been negative, no personal history of previous cancer as far as she is aware. The pt was also unaware of any family members with multuple myeloma.     The pt additionally states that she saw a dentist within the last few months and did not have nay dental disease at that time.     Objective:     Vital Signs (Most Recent):  Temp: 98.2 °F (36.8 °C) (08/04/18 1731)  Pulse: 90 (08/04/18 1731)  Resp: 19 (08/04/18 1731)  BP: (!) 178/88 (08/04/18 1731)  SpO2: 97 % (08/04/18 1731) Vital Signs (24h Range):  Temp:  [97.8 °F (36.6 °C)-98.9 °F (37.2 °C)] 98.2 °F (36.8 °C)  Pulse:  [81-99] 90  Resp:  [16-20] 19  SpO2:  [93 %-99 %] 97 %  BP: (158-206)/() 178/88     Weight: 69.9 kg (154 lb)  Body mass index is 26.43 kg/m².  Body surface area is 1.78 meters squared.    ECOG SCORE         [unfilled]    Intake/Output - Last 3 Shifts       08/02 0700 - 08/03 0659 08/03 0700 - 08/04 0659 08/04 0700 - 08/05 0659    P.O.   500    I.V. (mL/kg)   1636.7 (23.4)    Total Intake(mL/kg)   2136.7 (30.6)    Urine (mL/kg/hr)   800 (0.9)    " Total Output     800    Net     +1336.7           Stool Occurrence   0 x          Physical Exam   Constitutional: She is oriented to person, place, and time. She appears well-developed and well-nourished.   HENT:   Head: Normocephalic and atraumatic.   Eyes: Conjunctivae and EOM are normal. Pupils are equal, round, and reactive to light.   Neck: Normal range of motion. Neck supple.   Cardiovascular: Normal rate, regular rhythm and normal heart sounds.  Exam reveals no gallop and no friction rub.    No murmur heard.  Pulmonary/Chest: Effort normal. She has no wheezes. She has no rales. She exhibits no tenderness.   Abdominal: Soft. Bowel sounds are normal. She exhibits distension.   Musculoskeletal: Normal range of motion.   Has belt around lower back   Neurological: She is alert and oriented to person, place, and time.   Skin: Skin is warm and dry.   Psychiatric: She has a normal mood and affect.       Significant Labs:   BMP:   Recent Labs  Lab 08/04/18  0047 08/04/18  0916 08/04/18  1523    101 102    135* 134*   K 4.1 4.2 4.2    107 106   CO2 25 20* 19*   BUN 21 19 18   CREATININE 1.5* 1.2 1.1   CALCIUM 11.8* 11.2* 10.7*   , CBC:   Recent Labs  Lab 08/03/18  1305   WBC 4.31   HGB 11.1*   HCT 34.6*      , CMP:   Recent Labs  Lab 08/03/18  1305 08/04/18  0047 08/04/18  0916 08/04/18  1523    138 135* 134*   K 4.8 4.1 4.2 4.2    105 107 106   CO2 24 25 20* 19*   GLU 89 101 101 102   BUN 24* 21 19 18   CREATININE 1.7* 1.5* 1.2 1.1   CALCIUM 11.8* 11.8* 11.2* 10.7*   PROT 8.1  --   --   --    ALBUMIN 3.9  --   --   --    BILITOT 0.6  --   --   --    ALKPHOS 98  --   --   --    AST 24  --   --   --    ALT 14  --   --   --    ANIONGAP 9 8 8 9   EGFRNONAA 32.1* 37.3* 48.9* 54.3*   , Coagulation: No results for input(s): PT, INR, APTT in the last 48 hours., Haptoglobin: No results for input(s): HAPTOGLOBIN in the last 48 hours., Immunology: No results for input(s): SPEP, ADAM, KWESI,  FREELAMBDALI in the last 48 hours., LDH: No results for input(s): LDHCSF, BFSOURCE in the last 48 hours., LFTs:   Recent Labs  Lab 08/03/18  1305   ALT 14   AST 24   ALKPHOS 98   BILITOT 0.6   PROT 8.1   ALBUMIN 3.9   , Reticulocytes: No results for input(s): RETIC in the last 48 hours., Tumor Markers: No results for input(s): PSA, CEA, , AFPTM, [id number],  in the last 48 hours.    Invalid input(s): ALGTM, Uric Acid No results for input(s): URICACID in the last 48 hours. and Urine Studies:   Recent Labs  Lab 08/03/18  1249   COLORU Yellow   APPEARANCEUA Clear   PHUR 5.0   SPECGRAV 1.020   PROTEINUA 2+*   GLUCUA Negative   KETONESU Negative   BILIRUBINUA Negative   OCCULTUA 1+*   NITRITE Negative   UROBILINOGEN Negative   LEUKOCYTESUR Negative   RBCUA 2   WBCUA 2   BACTERIA Occasional   SQUAMEPITHEL 2   HYALINECASTS 7*       Diagnostic Results:  EXAMINATION:  CT RENAL STONE STUDY ABD PELVIS WO    CLINICAL HISTORY:  Abdominal pain, unspecified    TECHNIQUE:  Low dose axial images, sagittal and coronal reformations were obtained from the lung bases to the pubic symphysis.  Contrast was not administered.    COMPARISON:  None    FINDINGS:  Heart: Normal in size.  There is a small volume of pericardial fluid.    Lung Bases: Trace bilateral pleural effusions, left greater than right.  The lungs appear otherwise well aerated.    Liver: Liver is normal in size and attenuation, with a 3.9 cm simple cyst within hepatic segment 4.  There are also scattered subcentimeter hypodensities within segments 5 and 8, adjacent to the gallbladder fossa.  These findings are too small to fully characterize, however favored to represent simple hepatic cysts.    Gallbladder: No calcified gallstones.    Bile Ducts: No evidence of dilated ducts.    Pancreas: No mass or peripancreatic fat stranding.    Spleen: Unremarkable.    Adrenals: Unremarkable.    Kidneys/ Ureters: 0.8 cm nonobstructing left renal stone.  No evidence of  hydronephrosis.  There is a 1.3 cm hypodense focus on the left kidney, favored to represent a simple renal cysts.    Bladder: No evidence of wall thickening.    Reproductive organs: Status post hysterectomy.    GI Tract/Mesentery: Moderate amount of retained stool in the ascending and transverse:, suggestive of constipation.  No evidence of obstruction.    Peritoneal Space: No ascites. No free air.    Retroperitoneum: No significant adenopathy.    Abdominal wall: Small fat containing umbilical hernia.    Vasculature: No significant atherosclerosis or aneurysm.    Bones: There are multiple lytic lesions involving the lumbar and thoracic vertebral bodies, with a pathologic fracture of the T12 vertebral body, resulting in minimal height loss.  There is no retropulsion into the spinal canal, however there appears to be soft tissue extension into the canal at this level.  Several lesions extend into the vertebral pedicles, most notably at T9 and T10.  There also lytic lesions of the iliac wings bilaterally these findings are suggestive of metastatic disease.      Impression       Innumerable lytic lesions involving the thoracic and lumbar spine, as well as the iliac wings bilaterally.  These findings are suggestive of metastatic disease.    Pathologic fracture of the T12 vertebral body, resulting in mild height loss.  No retropulsion into the spinal canal, however there is possible soft tissue extension into the canal.    Hypodense foci within the liver, the largest measuring 3.9 cm, favored to represent a hepatic cyst.  The remaining hypodensities are too small to fully characterize.    Findings suggestive of constipation.    Trace bilateral lateral pleural effusions, as well as a small pericardial effusion.    Simple left renal cyst.         Assessment/Plan:     * Hypercalcemia    - continue fluids @ 200 cc/h  - PTH elevated as well  - will consider one dose of Zometa 4 mg if this is from hypercalcemia of malignancy,  perhaps when renal function better as well        Anemia    - last Hgb/hct of 11.1/34.6 yesterday  - normocytic anemia; will evaluate if this due to underlying malignancy  - transfuse for Hgb < 7        Lytic bone lesions on xray    - unclear primary cancer diagnosis at this time; please workup lytic lesions additionally with CT chest w contrast when possible. Discussed this with team, who plans to proceed with CT in a few days when renal function is better.  - also will await studies of SPEP and will an immunofixation order for now; also check serum free light chains and quantitative immunoglobulins  - if pt seems to have multiple myeloma for additional testing above, would order all of the followin hr protein, urine ADAM, skeletal survey, beta 2 microglobulin, LDH, uric acid  - also would then schedule for a bone marrow biopsy if this seems to be MM            VTE Risk Mitigation         Ordered     IP VTE HIGH RISK PATIENT  Once      18 2320     Place MOUNIKA hose  Until discontinued      18 2320     Place sequential compression device  Until discontinued      18 173          Discussed with Dr. Jameson    Disposition:     Herrera Sandra MD   Hematology/Oncology PGY IV  Bone Marrow Transplant  [facility]

## 2018-08-05 NOTE — PROGRESS NOTES
"[facility]  Endocrinology  Progress Note    Admit Date: 8/3/2018     A 61 yo woman with a diagnosis of anemia, asthma, schizophrenia, HTN , has been admitted for T12 pathologic fracture, hypercalcemia, and GLENIS. Endocrinology has been consulted for evaluation of hypercalcemia.  Pt reports of having back pain and constipation since one week. She lost about 8lbs over the past two weeks.  Her anti-hypertensive medications include Valsartan and Labetalol.     At the time of admission pt had serum calcium of 11.8 mg/dl. She received 1L NS bolus and was started on NS continuous infusion.   Labs at the time of admission:  PTH: 104  Vitamin D 25-OH: 27    CT Abdomen/ Pelvis showed: Innumerable lytic lesions involving the thoracic and lumbar spine, as well as the iliac wings bilaterally.  These findings are suggestive of metastatic disease.    Interval HPI:   Overnight events:  VS stable    Eatin%  Nausea: No  Hypoglycemia and intervention: No  Fever: No  TPN and/or TF: No    BP (!) 176/110 (BP Location: Left arm, Patient Position: Lying)   Pulse 80   Temp 97.7 °F (36.5 °C) (Oral)   Resp 19   Ht 5' 4" (1.626 m)   Wt 69.9 kg (154 lb)   LMP  (LMP Unknown)   SpO2 98%   Breastfeeding? No   BMI 26.43 kg/m²       Labs Reviewed and Include      Recent Labs  Lab 18  0815   GLU 91   CALCIUM 11.0*      K 4.6   CO2 22*      BUN 12   CREATININE 0.9     Lab Results   Component Value Date    WBC 4.31 2018    HGB 11.1 (L) 2018    HCT 34.6 (L) 2018    MCV 88 2018     2018     No results for input(s): TSH, FREET4 in the last 168 hours.  Lab Results   Component Value Date    HGBA1C 5.4 2018       Nutritional status:   Body mass index is 26.43 kg/m².  Lab Results   Component Value Date    ALBUMIN 3.9 2018    ALBUMIN 3.3 (L) 2018    ALBUMIN 3.3 (L) 2018     No results found for: PREALBUMIN    Estimated Creatinine Clearance: 63 " mL/min (based on SCr of 0.9 mg/dL).    Accu-Checks  No results for input(s): POCTGLUCOSE in the last 72 hours.    Current Medications and/or Treatments Impacting Glycemic Control  Immunotherapy:  Immunosuppressants     None        Steroids:   Hormones     None        Pressors:    Autonomic Drugs     None        Hyperglycemia/Diabetes Medications: Antihyperglycemics     None          ASSESSMENT and PLAN    * Hypercalcemia    Pt has mild hypercalcemia with serum calcium of 11 mg/dl today.  Could be due to combination of PTH mediated hypercalcemia and hypercalcemia of malignancy vs rare carcinoma secreting PTH.    F/U SPEP and UPEP results to check for multiple myeloma  F/U PTHrP to evaluate for hypercalcemia of malignancy  F/U vitamin D 1,25-OH level to r/o granulomatous causes of hypercalcemia.    Agree with current management of hypercalcemia.  -Continue NS infusion  -Monitor serum calcium levels daily  -If serum calcium doesn't improve with IVF, would consider calcitonin and bisphosphonate therapy.        Lytic bone lesions on xray      Most likely a cause of hypercalcemia        Closed compression fracture of thoracic vertebra      Could be secondary to possible malignancy    Management as per primary            Tiffany Bourne MD  Endocrinology  [facility]

## 2018-08-05 NOTE — ASSESSMENT & PLAN NOTE
- Home meds re-started except the valsartan given patient having GLENIS  - labetalol dose increased to 200 mg TID  - continue to monitor BP

## 2018-08-05 NOTE — SUBJECTIVE & OBJECTIVE
Interval History: patient is in a lot of pain, pain medication scheduled. UOP ok.     Review of Systems   Constitutional: Negative for activity change, appetite change, chills, fatigue and fever.   Respiratory: Positive for shortness of breath. Negative for cough and wheezing.    Cardiovascular: Negative for chest pain and palpitations.   Gastrointestinal: Positive for constipation. Negative for abdominal pain and nausea.   Genitourinary: Negative for decreased urine volume, difficulty urinating and dysuria.   Musculoskeletal: Positive for back pain.   Neurological: Negative for dizziness, light-headedness and headaches.   Hematological: Does not bruise/bleed easily.     Objective:     Vital Signs (Most Recent):  Temp: 97.7 °F (36.5 °C) (08/05/18 0723)  Pulse: 80 (08/05/18 0902)  Resp: 19 (08/05/18 0902)  BP: (!) 176/110 (08/05/18 0723)  SpO2: 98 % (08/05/18 0723) Vital Signs (24h Range):  Temp:  [96.3 °F (35.7 °C)-98.2 °F (36.8 °C)] 97.7 °F (36.5 °C)  Pulse:  [75-90] 80  Resp:  [18-20] 19  SpO2:  [97 %-98 %] 98 %  BP: (176-194)/() 176/110     Weight: 69.9 kg (154 lb)  Body mass index is 26.43 kg/m².    Intake/Output Summary (Last 24 hours) at 08/05/18 1033  Last data filed at 08/04/18 1600   Gross per 24 hour   Intake          1936.67 ml   Output              425 ml   Net          1511.67 ml      Physical Exam   Constitutional: She is oriented to person, place, and time. She appears well-developed and well-nourished. No distress.   Neck: Normal range of motion. Neck supple. No JVD present.   Cardiovascular: Normal rate, regular rhythm and normal heart sounds.    Pulmonary/Chest: Breath sounds normal. No respiratory distress. She has no rales.   Abdominal: Soft. She exhibits no distension. There is no tenderness.   Neurological: She is alert and oriented to person, place, and time.       Significant Labs:   CBC:     Recent Labs  Lab 08/03/18  1305   WBC 4.31   HGB 11.1*   HCT 34.6*        CMP:   Recent  Labs  Lab 08/03/18  1305  08/04/18  1523 08/05/18  0040 08/05/18  0815     < > 134* 137 138   K 4.8  < > 4.2 4.2 4.6     < > 106 108 110   CO2 24  < > 19* 22* 22*   GLU 89  < > 102 103 91   BUN 24*  < > 18 15 12   CREATININE 1.7*  < > 1.1 1.0 0.9   CALCIUM 11.8*  < > 10.7* 10.9* 11.0*   PROT 8.1  --   --   --   --    ALBUMIN 3.9  --   --   --   --    BILITOT 0.6  --   --   --   --    ALKPHOS 98  --   --   --   --    AST 24  --   --   --   --    ALT 14  --   --   --   --    ANIONGAP 9  < > 9 7* 6*   EGFRNONAA 32.1*  < > 54.3* >60.0 >60.0   < > = values in this interval not displayed.    Significant Imaging: I have reviewed and interpreted all pertinent imaging results/findings within the past 24 hours.

## 2018-08-05 NOTE — ASSESSMENT & PLAN NOTE
-Orthopedics service was consulted  -Patient neurologically stable on exam  -No acute neurosurgical intervention indicated  -TLSO brace to wear when OOB or working with therapy. OK to remove when in bed or lying in recliner.

## 2018-08-05 NOTE — PLAN OF CARE
Problem: Patient Care Overview  Goal: Plan of Care Review  Outcome: Ongoing (interventions implemented as appropriate)  Patient AAOx4. Moderate complaints of pain. VS stable, afrebrile. Neurovascular intact. Skin intact. Free from falls. Frequent rounds made for safety, pain and comfort. Bed at lowest position, call light within reach, side rails up x2.

## 2018-08-05 NOTE — PROGRESS NOTES
[facility]  Neurosurgery  Progress Note    Subjective:     History of Present Illness: Ms. Zamorano is a 62 year old female with no PMHx of cancer, chronic back pain, or osteoporosis, who presents to the ED with onset of bilateral flank pain that began 1 week ago while lifting a heavy object from the ground. Patient denies any popping or pulling sensation. Denies any thoracic or lumbar back pain. Denies any UE or LE pain, paresthesias, or weakness. Denies any bladder or bowel incontinence. CT renal was performed and showed a T12 compression fracture. Neurosurgery was consulted for treatment recommendations.     Post-Op Info:  * No surgery found *         Interval History: Neuro exam stable, primary team oncological workup, concern for MM, labs pending today, contrast scans when kidney function improved    Medications:  Continuous Infusions:   sodium chloride 0.9% 200 mL/hr at 08/04/18 0812     Scheduled Meds:   benztropine  1 mg Oral BID    diclofenac sodium  2 g Topical (Top) QID    fluticasone  1 puff Inhalation BID    labetalol  200 mg Oral BID    polyethylene glycol  17 g Oral Daily    risperiDONE  1 mg Oral Daily    risperiDONE  2 mg Oral Nightly    senna-docusate 8.6-50 mg  1 tablet Oral BID    sodium phosphates  1 enema Rectal Once     PRN Meds:albuterol-ipratropium, dextrose 50%, dextrose 50%, glucagon (human recombinant), glucose, glucose, HYDROmorphone, ondansetron, sodium chloride 0.9%     Review of Systems  Objective:     Weight: 69.9 kg (154 lb)  Body mass index is 26.43 kg/m².  Vital Signs (Most Recent):  Temp: 97.4 °F (36.3 °C) (08/04/18 1949)  Pulse: 85 (08/04/18 1949)  Resp: 18 (08/04/18 1949)  BP: (!) 194/107 (08/04/18 1949)  SpO2: 97 % (08/04/18 1949) Vital Signs (24h Range):  Temp:  [97.4 °F (36.3 °C)-98.9 °F (37.2 °C)] 97.4 °F (36.3 °C)  Pulse:  [85-99] 85  Resp:  [16-20] 18  SpO2:  [93 %-97 %] 97 %  BP: (158-206)/() 194/107       Date 08/04/18 0700 - 08/05/18  0659   Shift 8622-0600 4762-7802 9804-7181 24 Hour Total   I  N  T  A  K  E   P.O. 500   500    I.V.  (mL/kg) 1200  (17.2) 436.7  (6.3)  1636.7  (23.4)    Shift Total  (mL/kg) 1700  (24.3) 436.7  (6.3)  2136.7  (30.6)   O  U  T  P  U  T   Urine  (mL/kg/hr) 650  (1.2) 150  800    Shift Total  (mL/kg) 650  (9.3) 150  (2.1)  800  (11.5)   Weight (kg) 69.9 69.9 69.9 69.9                        Neurosurgery Physical Exam  -Alert and oriented x4  -PERRL, EOMI, face symmetrical, tongue midline, facial sensation symmetric, shoulder shrug full strength and symmetric  -Motor: 5/5 throughout the upper and lower extremites bilaterally  -sensation intact to light touch throughout  -reflexes 2+ in patella and brachioradialis bilaterally  -no clonus, no Washburn's  -coordination intact to finger to nose bilaterally        Significant Labs:    Recent Labs  Lab 08/04/18  0047 08/04/18  0916 08/04/18  1523    101 102    135* 134*   K 4.1 4.2 4.2    107 106   CO2 25 20* 19*   BUN 21 19 18   CREATININE 1.5* 1.2 1.1   CALCIUM 11.8* 11.2* 10.7*       Recent Labs  Lab 08/03/18  1305   WBC 4.31   HGB 11.1*   HCT 34.6*        No results for input(s): LABPT, INR, APTT in the last 48 hours.  Microbiology Results (last 7 days)     ** No results found for the last 168 hours. **        All pertinent labs from the last 24 hours have been reviewed.    Significant Diagnostics:  I have reviewed all pertinent imaging results/findings within the past 24 hours.    Assessment/Plan:     Closed compression fracture of thoracic vertebra    62 year old female with acute onset on bilateral flank pain that began after lifting a heavy object. Imaging shows a T12 compression fracture along with multiple lytic lesions throughout the spine.     -Patient neurologically stable on exam  -No acute neurosurgical intervention indicated  -TLSO brace ordered from [facility]. Patient to wear when OOB or working with therapy. OK to remove when in  bed or lying in recliner.   -Recommend MRI with/without contrast to rule out pathologic fracture and to better evaluate lytic lesions, awaiting better kidney function for contrasted scans  -Further recs to follow imaging            Jaun Lewis MD  Neurosurgery  [facility]

## 2018-08-05 NOTE — ASSESSMENT & PLAN NOTE
Patient has hypercalcemia, along with anemia, bone fx and lytic lesions. likely due to multiple myeloma.  -started on IVF ns 200 cc/hr  - upep, spep, urin pro,rPTH pending  - calcitoinine if calcium trends up-May need core biopsy vs bone marrow biopsy in coming days

## 2018-08-05 NOTE — ASSESSMENT & PLAN NOTE
Patient has not been eating and drinking much during past few days, she also has hypercalcemia which could affect tubules, also patient's BP is not well controlled. Imaging did not show any hydronephrosis or obstruction in the urinary system.  RESOLVED  - IV fluids  - UA normal  - FENa pre-renal  - Cr back to base-line 0.9, GFR>60

## 2018-08-05 NOTE — ASSESSMENT & PLAN NOTE
62 year old female with acute onset on bilateral flank pain that began after lifting a heavy object. Imaging shows a T12 compression fracture along with multiple lytic lesions throughout the spine.     -Patient neurologically stable on exam  -No acute neurosurgical intervention indicated  -TLSO brace ordered from [facility]. Patient to wear when OOB or working with therapy. OK to remove when in bed or lying in recliner.   -Recommend MRI with/without contrast to rule out pathologic fracture and to better evaluate lytic lesions, awaiting better kidney function for contrasted scans  -Further recs to follow imaging

## 2018-08-05 NOTE — SUBJECTIVE & OBJECTIVE
Interval History: Neuro exam stable, primary team oncological workup, concern for MM, labs pending today, contrast scans when kidney function improved    Medications:  Continuous Infusions:   sodium chloride 0.9% 200 mL/hr at 08/04/18 0812     Scheduled Meds:   benztropine  1 mg Oral BID    diclofenac sodium  2 g Topical (Top) QID    fluticasone  1 puff Inhalation BID    labetalol  200 mg Oral BID    polyethylene glycol  17 g Oral Daily    risperiDONE  1 mg Oral Daily    risperiDONE  2 mg Oral Nightly    senna-docusate 8.6-50 mg  1 tablet Oral BID    sodium phosphates  1 enema Rectal Once     PRN Meds:albuterol-ipratropium, dextrose 50%, dextrose 50%, glucagon (human recombinant), glucose, glucose, HYDROmorphone, ondansetron, sodium chloride 0.9%     Review of Systems  Objective:     Weight: 69.9 kg (154 lb)  Body mass index is 26.43 kg/m².  Vital Signs (Most Recent):  Temp: 97.4 °F (36.3 °C) (08/04/18 1949)  Pulse: 85 (08/04/18 1949)  Resp: 18 (08/04/18 1949)  BP: (!) 194/107 (08/04/18 1949)  SpO2: 97 % (08/04/18 1949) Vital Signs (24h Range):  Temp:  [97.4 °F (36.3 °C)-98.9 °F (37.2 °C)] 97.4 °F (36.3 °C)  Pulse:  [85-99] 85  Resp:  [16-20] 18  SpO2:  [93 %-97 %] 97 %  BP: (158-206)/() 194/107       Date 08/04/18 0700 - 08/05/18 0659   Shift 8504-5300 1973-4099 0444-8186 24 Hour Total   I  N  T  A  K  E   P.O. 500   500    I.V.  (mL/kg) 1200  (17.2) 436.7  (6.3)  1636.7  (23.4)    Shift Total  (mL/kg) 1700  (24.3) 436.7  (6.3)  2136.7  (30.6)   O  U  T  P  U  T   Urine  (mL/kg/hr) 650  (1.2) 150  800    Shift Total  (mL/kg) 650  (9.3) 150  (2.1)  800  (11.5)   Weight (kg) 69.9 69.9 69.9 69.9                        Neurosurgery Physical Exam  -Alert and oriented x4  -PERRL, EOMI, face symmetrical, tongue midline, facial sensation symmetric, shoulder shrug full strength and symmetric  -Motor: 5/5 throughout the upper and lower extremites bilaterally  -sensation intact to light touch  throughout  -reflexes 2+ in patella and brachioradialis bilaterally  -no clonus, no Washburn's  -coordination intact to finger to nose bilaterally        Significant Labs:    Recent Labs  Lab 08/04/18  0047 08/04/18  0916 08/04/18  1523    101 102    135* 134*   K 4.1 4.2 4.2    107 106   CO2 25 20* 19*   BUN 21 19 18   CREATININE 1.5* 1.2 1.1   CALCIUM 11.8* 11.2* 10.7*       Recent Labs  Lab 08/03/18  1305   WBC 4.31   HGB 11.1*   HCT 34.6*        No results for input(s): LABPT, INR, APTT in the last 48 hours.  Microbiology Results (last 7 days)     ** No results found for the last 168 hours. **        All pertinent labs from the last 24 hours have been reviewed.    Significant Diagnostics:  I have reviewed all pertinent imaging results/findings within the past 24 hours.

## 2018-08-05 NOTE — PROGRESS NOTES
[facility] Hospital Medicine  Progress Note    Patient Name: Janie Zamorano  MRN: [id number]  Patient Class: IP- Inpatient   Admission Date: 8/3/2018  Length of Stay: 2 days  Attending Physician: Linda Msos MD  Primary Care Provider: He Hoyt MD    [facility] Hospital Medicine Team: [facility] HOSP MED 5 Ladi Bradley MD    Subjective:     Principal Problem:Hypercalcemia    HPI:  Ms. Janie Zamorano is a 62 y.o. female who presented with back pain and constipation. Onset of symptoms for back pain occurred a week ago. Patient reports that leaning over to  a storage box initiated her back pain. Patient states the pain starts in her back but denies any radiation of pain. The pain worsens when she reaches for objects and upon deep inspiration. She also loses her breath if she aggravates her back pain. Patient denies any trauma to the region. Patient has been able to ambulate the past week with reluctance. The patient has tried hot and cold packs to relieve her pain. The patient did not report using pain medications to relieve the pain. Patient denies fever, chills, night sweats, dysuria, hematuria, and easy bruising or bleeding. She lost around 8 LB over the course of past two weeks. She denies any shooting pain to LE, no loss of sense in LE or abdominal area, she mentions numbness in her left hand. Denies bowel/urinary incontinence. No saddle anesthesia. Patient has been anemic chronically and was started on iron pills two weeks ago by her primary doctor.  Patient also complains of constipation for the past two weeks. She reports no bowel movements during that span. She has not had constipation prior to this event. She has tried enemas to no avail. She states she is belching and passing gas. She complains of nausea but reports no vomiting. She denies chest pain, palpitations, and cough.     FHx: Patient reports her mother and cousin both had breast cancer in their 50s and 60s. She had  an uncle diagnosed with leukemia.        Hospital Course:  Patient admitted to [facility] to w/o pathologic fx in T12 along with lytic lesions in spine. Neurosurgery and oncology were consulted. Found to have high PTH in labs. Endocrin was consulted for r/o Hyperparathyroidism. Abdominal CT showed diffuse lytic lesion in spine and pelvis. Chest Ct     Interval History: patient is in a lot of pain, pain medication scheduled. UOP ok.     Review of Systems   Constitutional: Negative for activity change, appetite change, chills, fatigue and fever.   Respiratory: Positive for shortness of breath. Negative for cough and wheezing.    Cardiovascular: Negative for chest pain and palpitations.   Gastrointestinal: Positive for constipation. Negative for abdominal pain and nausea.   Genitourinary: Negative for decreased urine volume, difficulty urinating and dysuria.   Musculoskeletal: Positive for back pain.   Neurological: Negative for dizziness, light-headedness and headaches.   Hematological: Does not bruise/bleed easily.     Objective:     Vital Signs (Most Recent):  Temp: 97.7 °F (36.5 °C) (08/05/18 0723)  Pulse: 80 (08/05/18 0902)  Resp: 19 (08/05/18 0902)  BP: (!) 176/110 (08/05/18 0723)  SpO2: 98 % (08/05/18 0723) Vital Signs (24h Range):  Temp:  [96.3 °F (35.7 °C)-98.2 °F (36.8 °C)] 97.7 °F (36.5 °C)  Pulse:  [75-90] 80  Resp:  [18-20] 19  SpO2:  [97 %-98 %] 98 %  BP: (176-194)/() 176/110     Weight: 69.9 kg (154 lb)  Body mass index is 26.43 kg/m².    Intake/Output Summary (Last 24 hours) at 08/05/18 1033  Last data filed at 08/04/18 1600   Gross per 24 hour   Intake          1936.67 ml   Output              425 ml   Net          1511.67 ml      Physical Exam   Constitutional: She is oriented to person, place, and time. She appears well-developed and well-nourished. No distress.   Neck: Normal range of motion. Neck supple. No JVD present.   Cardiovascular: Normal rate, regular rhythm and normal heart sounds.     Pulmonary/Chest: Breath sounds normal. No respiratory distress. She has no rales.   Abdominal: Soft. She exhibits no distension. There is no tenderness.   Neurological: She is alert and oriented to person, place, and time.       Significant Labs:   CBC:     Recent Labs  Lab 08/03/18  1305   WBC 4.31   HGB 11.1*   HCT 34.6*        CMP:   Recent Labs  Lab 08/03/18  1305  08/04/18  1523 08/05/18  0040 08/05/18  0815     < > 134* 137 138   K 4.8  < > 4.2 4.2 4.6     < > 106 108 110   CO2 24  < > 19* 22* 22*   GLU 89  < > 102 103 91   BUN 24*  < > 18 15 12   CREATININE 1.7*  < > 1.1 1.0 0.9   CALCIUM 11.8*  < > 10.7* 10.9* 11.0*   PROT 8.1  --   --   --   --    ALBUMIN 3.9  --   --   --   --    BILITOT 0.6  --   --   --   --    ALKPHOS 98  --   --   --   --    AST 24  --   --   --   --    ALT 14  --   --   --   --    ANIONGAP 9  < > 9 7* 6*   EGFRNONAA 32.1*  < > 54.3* >60.0 >60.0   < > = values in this interval not displayed.    Significant Imaging: I have reviewed and interpreted all pertinent imaging results/findings within the past 24 hours.    Assessment/Plan:      * Hypercalcemia    Patient has hypercalcemia, along with anemia, bone fx and lytic lesions. Multiple myeloma is possible. Gven elevated PTH, primary hyperparathyroidism is considered. Given diffuse lytic lesions, w/o for possible malignancy causing metastatic lesions is on going.  -  Aggressive hydration with IVF,  cc/hr.  - upep, spep, urine pro, rPTH, immunofixation electrophoresis, Immunoglobulin free LT chains, urine Cr 24hr, and calcitriol pending  - PTH elevated, Vit D lower limit of normal range, will check 1,25- OH Vit D for granulomatosis dis source of hypercalcemia  - hem-onc consulted for BM biopsy and possible calcitonin: continue IVF   - Endocrinology consulted: CT chest ,followup /send for SPEP, UPEP, calcitriol, PTHrP, IV hydration, calcitonin if needed; can consider IV bisphosphonates if needed given  stable/improving kidney function          Constipation      Last BM was 7/28, patient tried fleet enema at home. Patient is hypercalcemic. Patient is on Miralax and senna docusat.   - Continue hydration  - Can try Fleet enema if patient is not able to have a BM         Discharge planning issues      Going back to her sister's place        Anemia    last Hgb/ hct of 11.1/ 34.6   - normocytic anemia; will evaluate if this due to underlying malignancy  - transfuse for Hgb < 7          Lytic bone lesions on xray      - Ortho consulted, MRI w/ & w/o was suggested   - CT chest for possible malignancy is ordered, waiting for the result        Suspected multiple myeloma not having achieved remission      Patient has hypercalcemia, along with anemia, bone fx and lytic lesions. likely due to multiple myeloma.  -started on IVF ns 200 cc/hr  - upep, spep, urin pro,rPTH pending  - calcitoinine if calcium trends up-May need core biopsy vs bone marrow biopsy in coming days            Multiple lesions of metastatic malignancy      Chest/abdominal/pelvic CT  MRI w/ & w/o          Closed compression fracture of thoracic vertebra    -Orthopedics service was consulted  -Patient neurologically stable on exam  -No acute neurosurgical intervention indicated  -TLSO brace to wear when OOB or working with therapy. OK to remove when in bed or lying in recliner.           Acute kidney injury      Patient has not been eating and drinking much during past few days, she also has hypercalcemia which could affect tubules, also patient's BP is not well controlled. Imaging did not show any hydronephrosis or obstruction in the urinary system.  RESOLVED  - IV fluids  - UA normal  - FENa pre-renal  - Cr back to base-line 0.9, GFR>60        Paranoid schizophrenia      Risperidone 1mg daily  Risperidone 2mg qhs        Asthma    Fluticasone 44mcg/Actuation 1 puffs BID  No active sob  Pulse oxy q shift          Other secondary hypertension    - Home meds  re-started except the valsartan given patient having GLENIS  - labetalol dose increased to 200 mg TID  - continue to monitor BP            VTE Risk Mitigation         Ordered     IP VTE HIGH RISK PATIENT  Once      08/03/18 2320     Place MOUNIKA hose  Until discontinued      08/03/18 2320     Place sequential compression device  Until discontinued      08/03/18 2288              Ladi Bradley MD  Department of Hospital Medicine   [facility]

## 2018-08-05 NOTE — HOSPITAL COURSE
8/3: Admitted to hospital medication for work up of pathologic T12 fracture, multiple lytic lesions, and elevated Ca/PTH/Cr/BUN. Seen by NSGY who ordered TLSO brace and recommended an MRI with contrast. Started on aggressive fluids for elevated Ca and PTH. Ordered 24 hr protein, urine ADAM, skeletal survey, beta 2 microglobulin, SPEP, LDH, serum immunofixation, light chain studies, quant immunoglobulins, and uric acid to evaluate for multiple myeloma.   8/4: Exam stable. Multiple labs pending. CT chest with contrast ordered. Continue IVF's. MRI T spine on hold until renal function improves.   8/5: Heme Onc consulted for bone marrow biopsy. Labs pending. Pending multiple imaging studies with contrast. Exam stable.   8/6: Bone marrow biopsy today. Given zometa x 1. Transfer to BMT service for metastatic work up. GLENIS resolved. Cr back to baseline. CT chest does shows multiple lytic lesions in the spine, but no clear etiology of metastatic disease seen.   8/7: Correct Ca today is 11.3. Zometa 4 mg IV refused by patient yesterday. Will try again to give Zometa. Labs pending. MRI spine shows progression of thoracic compression fracture with some retropulsion of the posterior fragment. Xrays ordered to eval for instability.  8/8: Zometa 4 mg IV administered 8/7. Corrected Ca today 11.1; improving. Upright and supine x-rays concerning for unstable fracture. NSGY recommends remaining in TLSO brace at all times. CT thoracic spine with 3D reconstruction ordered.   8/9: Corrected Ca 9.6 today. DC IVF's. Bone marrow bx consistent with plasma cell myeloma. Plan for systemic therapy after discharge. NSGY recommending T9-L3 posterior instrumented fusion tomorrow. Patient placed on bed rest.   8/10: Patient refused to sign anesthesia consents last night and stated that she did not want surgical intervention. Continue TLSO brace at all times and bed rest. After a long discussion regarding surgery, patient and son now agreeable to  surgical intervention. Awaiting OR availability. Corrected Ca 9.3 today.  8/11 - 8/16: Ca continues to improve. Exam and back pain stable. Continue bed rest and TLSO brace at all times. Awaiting surgical intervention.  8/17: OR today for T9-L3  instrumentation. No intra op complications. Patient tolerated procedure well. Recovered in neuro ICU  8/18: POD#2. No significant events overnight. Labetalol decreased. Hemodynamically stable will step down to NSGY today.  8/19: NAEON, exam stable. BMT recommending a pulse of steroids (dexamethasone 40 mg x 4 days) to help control myeloma until she can begin outpatient chemotherapy - son refusing. PT/OT recommending home health.   8/20: RENAE. Drain output 0 & 0 cc. Both drains removed without complication. Patient tolerated removal well. Post op xrays completed. Shows good positioning of the hardware. Continue incisional wound vac until discharge tomorrow.   8/21: Exam stable. Pain controlled. H&H improving on iron for replacement - continue for 2 weeks. Attempted to order pulse dose of steroids x 4 days but son refusing. States they will discuss at the outpatient appt. [place] home with home health and equipment. Follow up in neurosurgery clinic in 2 weeks for wound check. Discharge instructions given verbally to patient and family. All of their questions were answered. They were encouraged to call the clinic with any questions or concerns prior to follow up appt.

## 2018-08-05 NOTE — ASSESSMENT & PLAN NOTE
Pt has mild hypercalcemia with serum calcium of 11 mg/dl today.  Could be due to combination of PTH mediated hypercalcemia and hypercalcemia of malignancy vs rare carcinoma secreting PTH.    F/U SPEP and UPEP results to check for multiple myeloma  F/U PTHrP to evaluate for hypercalcemia of malignancy  F/U vitamin D 1,25-OH level to r/o granulomatous causes of hypercalcemia.    Agree with current management of hypercalcemia.  -Continue NS infusion  -Monitor serum calcium levels daily  -If serum calcium doesn't improve with IVF, would consider calcitonin and bisphosphonate therapy.

## 2018-08-05 NOTE — ASSESSMENT & PLAN NOTE
Patient has hypercalcemia, along with anemia, bone fx and lytic lesions. Multiple myeloma is possible. Gven elevated PTH, primary hyperparathyroidism is considered. Given diffuse lytic lesions, w/o for possible malignancy causing metastatic lesions is on going.  -  Aggressive hydration with IVF,  cc/hr.  - upep, spep, urine pro, rPTH, immunofixation electrophoresis, Immunoglobulin free LT chains, urine Cr 24hr, and calcitriol pending  - PTH elevated, Vit D lower limit of normal range, will check 1,25- OH Vit D for granulomatosis dis source of hypercalcemia  - hem-onc consulted for BM biopsy and possible calcitonin: continue IVF   - Endocrinology consulted: CT chest ,followup /send for SPEP, UPEP, calcitriol, PTHrP, IV hydration, calcitonin if needed; can consider IV bisphosphonates if needed given stable/improving kidney function

## 2018-08-05 NOTE — ASSESSMENT & PLAN NOTE
- last Hgb/hct of 11.1/34.6 yesterday  - normocytic anemia; will evaluate if this due to underlying malignancy  - transfuse for Hgb < 7

## 2018-08-05 NOTE — ASSESSMENT & PLAN NOTE
- unclear primary cancer diagnosis at this time; please workup lytic lesions additionally with CT chest w contrast when possible. Discussed this with team, who plans to proceed with CT in a few days when renal function is better.  - also will await studies of SPEP and will an immunofixation order for now; also check serum free light chains and quantitative immunoglobulins  - if pt seems to have multiple myeloma for additional testing above, would order all of the followin hr protein, urine ADAM, skeletal survey, beta 2 microglobulin, LDH, uric acid  - also would then schedule for a bone marrow biopsy if this seems to be MM

## 2018-08-05 NOTE — NURSING
Pt c/o SOB. O2 sats 97-99%, bilateral breath sounds clear. Pt c/o of sharp sudden pain that triggers SOB. MD notified, states he will see pt. Lab also tried to draw blood for ADAM lab, pt refused. MD also notified. Will continue to monitor.

## 2018-08-06 PROBLEM — C90.00 MULTIPLE MYELOMA: Status: ACTIVE | Noted: 2018-08-06

## 2018-08-06 LAB
ALBUMIN SERPL BCP-MCNC: 3.1 G/DL
ALBUMIN SERPL BCP-MCNC: 3.2 G/DL
ALBUMIN SERPL BCP-MCNC: 3.3 G/DL
ALBUMIN SERPL ELPH-MCNC: 3.74 G/DL
ALPHA1 GLOB SERPL ELPH-MCNC: 0.4 G/DL
ALPHA2 GLOB SERPL ELPH-MCNC: 0.92 G/DL
ANION GAP SERPL CALC-SCNC: 10 MMOL/L
ANION GAP SERPL CALC-SCNC: 7 MMOL/L
ANION GAP SERPL CALC-SCNC: 8 MMOL/L
B-GLOBULIN SERPL ELPH-MCNC: 0.94 G/DL
B2 MICROGLOB SERPL-MCNC: 3.3 UG/ML
BONE MARROW WRIGHT STAIN COMMENT: NORMAL
BUN SERPL-MCNC: 10 MG/DL
BUN SERPL-MCNC: 11 MG/DL
BUN SERPL-MCNC: 11 MG/DL
CALCIUM SERPL-MCNC: 10.6 MG/DL
CALCIUM SERPL-MCNC: 10.8 MG/DL
CALCIUM SERPL-MCNC: 11 MG/DL
CHLORIDE SERPL-SCNC: 107 MMOL/L
CHLORIDE SERPL-SCNC: 110 MMOL/L
CHLORIDE SERPL-SCNC: 110 MMOL/L
CO2 SERPL-SCNC: 17 MMOL/L
CO2 SERPL-SCNC: 20 MMOL/L
CO2 SERPL-SCNC: 22 MMOL/L
CREAT SERPL-MCNC: 0.9 MG/DL
CREAT SERPL-MCNC: 1 MG/DL
CREAT SERPL-MCNC: 1 MG/DL
EST. GFR  (AFRICAN AMERICAN): >60 ML/MIN/1.73 M^2
EST. GFR  (NON AFRICAN AMERICAN): >60 ML/MIN/1.73 M^2
GAMMA GLOB SERPL ELPH-MCNC: 0.9 G/DL
GLUCOSE SERPL-MCNC: 87 MG/DL
GLUCOSE SERPL-MCNC: 90 MG/DL
GLUCOSE SERPL-MCNC: 93 MG/DL
INTERPRETATION SERPL IFE-IMP: NORMAL
KAPPA LC SER QL IA: 527.1 MG/DL
KAPPA LC/LAMBDA SER IA: 620.1
LAMBDA LC SER QL IA: 0.85 MG/DL
PATHOLOGIST INTERPRETATION IFE: NORMAL
PATHOLOGIST INTERPRETATION SPE: NORMAL
PHOSPHATE SERPL-MCNC: 2.6 MG/DL
PHOSPHATE SERPL-MCNC: 2.7 MG/DL
PHOSPHATE SERPL-MCNC: 2.8 MG/DL
POTASSIUM SERPL-SCNC: 3.9 MMOL/L
POTASSIUM SERPL-SCNC: 4.1 MMOL/L
POTASSIUM SERPL-SCNC: 4.2 MMOL/L
PROT SERPL-MCNC: 6.9 G/DL
SODIUM SERPL-SCNC: 137 MMOL/L

## 2018-08-06 PROCEDURE — 88274 CYTOGENETICS 25-99: CPT

## 2018-08-06 PROCEDURE — 88305 TISSUE EXAM BY PATHOLOGIST: CPT | Mod: 26,,, | Performed by: PATHOLOGY

## 2018-08-06 PROCEDURE — 88237 TISSUE CULTURE BONE MARROW: CPT

## 2018-08-06 PROCEDURE — 25000003 PHARM REV CODE 250: Performed by: NURSE PRACTITIONER

## 2018-08-06 PROCEDURE — 25000242 PHARM REV CODE 250 ALT 637 W/ HCPCS: Performed by: STUDENT IN AN ORGANIZED HEALTH CARE EDUCATION/TRAINING PROGRAM

## 2018-08-06 PROCEDURE — 88342 IMHCHEM/IMCYTCHM 1ST ANTB: CPT | Performed by: PATHOLOGY

## 2018-08-06 PROCEDURE — 85097 BONE MARROW INTERPRETATION: CPT | Mod: ,,, | Performed by: PATHOLOGY

## 2018-08-06 PROCEDURE — 25000003 PHARM REV CODE 250: Performed by: STUDENT IN AN ORGANIZED HEALTH CARE EDUCATION/TRAINING PROGRAM

## 2018-08-06 PROCEDURE — 82232 ASSAY OF BETA-2 PROTEIN: CPT

## 2018-08-06 PROCEDURE — 07DR3ZX EXTRACTION OF ILIAC BONE MARROW, PERCUTANEOUS APPROACH, DIAGNOSTIC: ICD-10-PCS | Performed by: INTERNAL MEDICINE

## 2018-08-06 PROCEDURE — 88264 CHROMOSOME ANALYSIS 20-25: CPT

## 2018-08-06 PROCEDURE — 11000001 HC ACUTE MED/SURG PRIVATE ROOM

## 2018-08-06 PROCEDURE — 25500020 PHARM REV CODE 255: Performed by: HOSPITALIST

## 2018-08-06 PROCEDURE — 88189 FLOWCYTOMETRY/READ 16 & >: CPT | Mod: ,,, | Performed by: PATHOLOGY

## 2018-08-06 PROCEDURE — 88271 CYTOGENETICS DNA PROBE: CPT

## 2018-08-06 PROCEDURE — 36415 COLL VENOUS BLD VENIPUNCTURE: CPT

## 2018-08-06 PROCEDURE — 38222 DX BONE MARROW BX & ASPIR: CPT | Mod: LT,,, | Performed by: NURSE PRACTITIONER

## 2018-08-06 PROCEDURE — 88274 CYTOGENETICS 25-99: CPT | Mod: 59

## 2018-08-06 PROCEDURE — 88271 CYTOGENETICS DNA PROBE: CPT | Mod: 59

## 2018-08-06 PROCEDURE — 88341 IMHCHEM/IMCYTCHM EA ADD ANTB: CPT | Mod: 26,59,, | Performed by: PATHOLOGY

## 2018-08-06 PROCEDURE — 80069 RENAL FUNCTION PANEL: CPT

## 2018-08-06 PROCEDURE — 99232 SBSQ HOSP IP/OBS MODERATE 35: CPT | Mod: GC,,, | Performed by: HOSPITALIST

## 2018-08-06 PROCEDURE — 99232 SBSQ HOSP IP/OBS MODERATE 35: CPT | Mod: ,,, | Performed by: PHYSICIAN ASSISTANT

## 2018-08-06 PROCEDURE — 88311 DECALCIFY TISSUE: CPT | Mod: 26,,, | Performed by: PATHOLOGY

## 2018-08-06 PROCEDURE — 88360 TUMOR IMMUNOHISTOCHEM/MANUAL: CPT | Performed by: PATHOLOGY

## 2018-08-06 PROCEDURE — 88185 FLOWCYTOMETRY/TC ADD-ON: CPT | Performed by: PATHOLOGY

## 2018-08-06 PROCEDURE — 88184 FLOWCYTOMETRY/ TC 1 MARKER: CPT | Performed by: PATHOLOGY

## 2018-08-06 PROCEDURE — 63600175 PHARM REV CODE 636 W HCPCS: Performed by: NURSE PRACTITIONER

## 2018-08-06 PROCEDURE — 88342 IMHCHEM/IMCYTCHM 1ST ANTB: CPT | Mod: 26,59,, | Performed by: PATHOLOGY

## 2018-08-06 PROCEDURE — 80069 RENAL FUNCTION PANEL: CPT | Mod: 91

## 2018-08-06 PROCEDURE — 88360 TUMOR IMMUNOHISTOCHEM/MANUAL: CPT | Mod: 26,,, | Performed by: PATHOLOGY

## 2018-08-06 PROCEDURE — 88313 SPECIAL STAINS GROUP 2: CPT

## 2018-08-06 PROCEDURE — 88313 SPECIAL STAINS GROUP 2: CPT | Mod: 26,,, | Performed by: PATHOLOGY

## 2018-08-06 RX ORDER — SODIUM CHLORIDE 9 MG/ML
INJECTION, SOLUTION INTRAVENOUS CONTINUOUS
Status: DISCONTINUED | OUTPATIENT
Start: 2018-08-06 | End: 2018-08-09

## 2018-08-06 RX ORDER — ENOXAPARIN SODIUM 100 MG/ML
40 INJECTION SUBCUTANEOUS EVERY 24 HOURS
Status: DISCONTINUED | OUTPATIENT
Start: 2018-08-06 | End: 2018-08-06

## 2018-08-06 RX ORDER — BISACODYL 5 MG
5 TABLET, DELAYED RELEASE (ENTERIC COATED) ORAL DAILY PRN
COMMUNITY
End: 2018-11-12

## 2018-08-06 RX ORDER — HYDROMORPHONE HYDROCHLORIDE 1 MG/ML
1 INJECTION, SOLUTION INTRAMUSCULAR; INTRAVENOUS; SUBCUTANEOUS ONCE
Status: COMPLETED | OUTPATIENT
Start: 2018-08-06 | End: 2018-08-06

## 2018-08-06 RX ORDER — LIDOCAINE HYDROCHLORIDE 20 MG/ML
10 INJECTION, SOLUTION INFILTRATION; PERINEURAL ONCE
Status: COMPLETED | OUTPATIENT
Start: 2018-08-06 | End: 2018-08-06

## 2018-08-06 RX ADMIN — RISPERIDONE 1 MG: 1 TABLET ORAL at 08:08

## 2018-08-06 RX ADMIN — POLYETHYLENE GLYCOL 3350 17 G: 17 POWDER, FOR SOLUTION ORAL at 08:08

## 2018-08-06 RX ADMIN — OXYCODONE HYDROCHLORIDE 5 MG: 5 TABLET ORAL at 08:08

## 2018-08-06 RX ADMIN — IOHEXOL 75 ML: 350 INJECTION, SOLUTION INTRAVENOUS at 08:08

## 2018-08-06 RX ADMIN — SODIUM CHLORIDE: 0.9 INJECTION, SOLUTION INTRAVENOUS at 03:08

## 2018-08-06 RX ADMIN — SENNOSIDES AND DOCUSATE SODIUM 1 TABLET: 8.6; 5 TABLET ORAL at 09:08

## 2018-08-06 RX ADMIN — HYDROMORPHONE HYDROCHLORIDE 1 MG: 1 INJECTION, SOLUTION INTRAMUSCULAR; INTRAVENOUS; SUBCUTANEOUS at 03:08

## 2018-08-06 RX ADMIN — SODIUM CHLORIDE: 0.9 INJECTION, SOLUTION INTRAVENOUS at 07:08

## 2018-08-06 RX ADMIN — FLUTICASONE PROPIONATE 1 PUFF: 44 AEROSOL, METERED RESPIRATORY (INHALATION) at 09:08

## 2018-08-06 RX ADMIN — LABETALOL HYDROCHLORIDE 200 MG: 200 TABLET, FILM COATED ORAL at 03:08

## 2018-08-06 RX ADMIN — LABETALOL HYDROCHLORIDE 200 MG: 200 TABLET, FILM COATED ORAL at 08:08

## 2018-08-06 RX ADMIN — BENZTROPINE MESYLATE 1 MG: 1 TABLET ORAL at 08:08

## 2018-08-06 RX ADMIN — LIDOCAINE HYDROCHLORIDE 10 ML: 20 INJECTION, SOLUTION INFILTRATION; PERINEURAL at 02:08

## 2018-08-06 RX ADMIN — OXYCODONE HYDROCHLORIDE 10 MG: 5 TABLET ORAL at 02:08

## 2018-08-06 NOTE — ASSESSMENT & PLAN NOTE
Patient has hypercalcemia, along with anemia, bone fx and lytic lesions. Multiple myeloma is possible. Gven elevated PTH, primary hyperparathyroidism is considered. Given diffuse lytic lesions, w/o for possible malignancy causing metastatic lesions is on going.  -  Aggressive hydration with IVF,  cc/hr.  - upep, spep, urine pro, rPTH, immunofixation electrophoresis, Immunoglobulin free LT chains, urine Cr 24hr, and calcitriol pending  - PTH elevated, Vit D lower limit of normal range, will check 1,25- OH Vit D for granulomatosis dis source of hypercalcemia  - hem-onc consulted for BM biopsy and possible Zometa, labs diagnostic for MM. Transfer to BMT service now  - Endocrinology consulted: CT chest, SPEP, UPEP, calcitriol, PTHrP, IV hydration, Zometa if needed

## 2018-08-06 NOTE — PROGRESS NOTES
[facility]  Hematology  Bone Marrow Transplant  Progress Note    Patient Name: Janie Zamorano  Admission Date: 8/3/2018  Hospital Length of Stay: 3 days  Code Status: Full Code    HPI:  Patient admitted to internal medicine service from ED on 8/3/18 to w/o pathologic fx in T12 along with lytic lesions in spine. Neurosurgery and oncology were consulted. Found to have high PTH in labs. Endocrine was consulted for r/o Hyperparathyroidism. Abdominal CT showed diffuse lytic lesion in spine and pelvis.    Today:  - [place] free light chain 527.1 and kappa/lambda ratio 620.1. Transferred to BMT service for suspected multiple myeloma. Will perform BM bx 8/6/18 and order routine studies plus PCPD FISH. Will complete serologic and urine work up for MM.   - Continue aggressive hydration at  ml/hr. Continued hypercalcemia with corrected Ca of 11.4, give zometa 4 mg IV x1 today.    - Pt with uncontrolled pain, will work on pain regimen and continue back brace.     Objective:     Vital Signs (Most Recent):  Temp: 97.8 °F (36.6 °C) (08/06/18 1206)  Pulse: 91 (08/06/18 1206)  Resp: 20 (08/06/18 0900)  BP: (!) 158/79 (08/06/18 1206)  SpO2: 97 % (08/06/18 1206) Vital Signs (24h Range):  Temp:  [96.9 °F (36.1 °C)-98.6 °F (37 °C)] 97.8 °F (36.6 °C)  Pulse:  [] 91  Resp:  [16-20] 20  SpO2:  [95 %-98 %] 97 %  BP: (158-207)/() 158/79     Weight: 69.9 kg (154 lb)  Body mass index is 26.43 kg/m².  Body surface area is 1.78 meters squared.    ECOG SCORE         Intake/Output - Last 3 Shifts       08/04 0700 - 08/05 0659 08/05 0700 - 08/06 0659 08/06 0700 - 08/07 0659    P.O. 500      I.V. (mL/kg) 1636.7 (23.4) 4200 (60.1)     Total Intake(mL/kg) 2136.7 (30.6) 4200 (60.1)     Urine (mL/kg/hr) 800 (0.5) 1100 (0.7) 300 (0.6)    Emesis/NG output   0 (0)    Other   0 (0)    Stool  100 (0.1) 0 (0)    Blood   0 (0)    Total Output 800 1200 300    Net +1336.7 +3000 -300           Stool Occurrence 0 x 7 x            Physical Exam   Constitutional: She is oriented to person, place, and time. She appears well-developed and well-nourished.   HENT:   Head: Normocephalic and atraumatic.   Eyes: Conjunctivae and EOM are normal.   Neck: Normal range of motion. Neck supple.   Cardiovascular: Normal rate, regular rhythm and normal heart sounds.  Exam reveals no gallop and no friction rub.    No murmur heard.  Pulmonary/Chest: Effort normal. She has no wheezes. She has no rales. She exhibits no tenderness.   Abdominal: Soft. Bowel sounds are normal. She exhibits distension.   Musculoskeletal: Normal range of motion.   With back brace   Neurological: She is alert and oriented to person, place, and time.   Skin: Skin is warm and dry.   Psychiatric: She has a normal mood and affect.       Significant Labs:   BMP:     Recent Labs  Lab 08/05/18  1638 08/06/18  0111 08/06/18  0849   GLU 95 90 87    137 137   K 4.5 4.1 4.2    110 110   CO2 23 20* 17*   BUN 14 11 10   CREATININE 0.9 0.9 1.0   CALCIUM 10.8* 10.6* 10.8*   , CBC: No results for input(s): WBC, HGB, HCT, PLT in the last 48 hours., CMP:       Recent Labs  Lab 08/05/18  0407   FREELAMBDALI 0.85   , LDH: No results for input(s): LDHCSF, BFSOURCE in the last 48 hours., LFTs:     Recent Labs  Lab 08/05/18  1638 08/06/18  0111 08/06/18  0849   ALBUMIN 3.3* 3.1* 3.2*     Diagnostic Results:  CT renal stone study  8/3/18  Multiple lytic lesions    CT chest  8/6/18  Multiple lytic lesions    Xray metastatic survey  8/6/18  Pending results    Bone marrow bx   8/6/18  Pending results    Assessment/Plan:     * Hypercalcemia    -continue fluids NS @ 200 cc/h  -PTH elevated as well  -Zometa 4 mg IV x1 8/6/18  -Correct Ca today is 11.4        Suspected multiple myeloma not having achieved remission    -hypercalcemia, anemia, bone fx and lytic lesions. Likely 2/2 to multiple myeloma.  -on IVF  cc/hr  -zometa 4 mg IV x1 8/6/18 for hypercalcemia not controlled with  aggressive IVFs  -serologic and urine studies ordered: kappa free light chain 527.1 and kappa/lambda ratio 620.1, immunoglobulins unremarkable, other studies pending  -metastatic skeletal survey pending from 8/6/18  -bone marrow bx and aspiration 8/6/18 with routine studies plus PCPD FISH, pending        Anemia    - last Hgb/hct of 11.1/34.6 8/3/18  - normocytic anemia; likely 2/2 to underlying malignancy  - transfuse for Hgb < 7 and plt < 10K   - cbc with diff daily while inpatient        Constipation    -last BM was 7/28, patient tried fleet enema at home. Patient is hypercalcemic. Patient is on Miralax and senna docusat  -continue hydration  -will consider repeat fleet enema if patient is not able to have a BM                    Closed compression fracture of thoracic vertebra    -neurosug following, appreciate recs  -no acute neurosurgical intervention indicated  -TLSO brace when OOB or working with therapy. OK to remove when in bed or lying in recliner.   -neurosurg recommended MRI with/without contrast to rule out pathologic fracture and to better evaluate lytic lesions, when kidney function stable per primary team. Will discuss with staff  -pain uncontrolled, continue diclofenac topical and oxy 5-10 mg q6hr prn. Will consider adding additional agents after discussing with staff        Acute kidney injury    -2/2 low PO intake, hypercalcemia, uncontrolled HTN  -imaging did not show any hydronephrosis or obstruction in the urinary system  -UA normal  -FENa pre-renal  -Cr improved to 1.0 today after aggressive IV fluids x3days  -will continue IVFs at this time, okay to give zometa 8/6/18        Paranoid schizophrenia    -continue Risperidone 1mg daily and 2mg qhs        Asthma    -Fluticasone 44mcg/Actuation 1 puffs BID  -No active SOB        Other secondary hypertension    - Home meds re-started except the valsartan given patient having GLENIS  - labetalol dose increased to 200 mg TID  - continue to monitor BP              VTE Risk Mitigation         Ordered     IP VTE HIGH RISK PATIENT  Once      08/03/18 2320     Place MOUNIKA hose  Until discontinued      08/03/18 2320     Place sequential compression device  Until discontinued      08/03/18 1732          Disposition: remains inpatient, now on BMT service. Okay to discharge once clinically stable and pain controlled. Okay to f/u outpatient for bm bx results.    Miesha Ni NP  Bone Marrow Transplant  [facility]

## 2018-08-06 NOTE — PROGRESS NOTES
[facility]  Neurosurgery  Progress Note    Subjective:     History of Present Illness: Ms. Zamorano is a 62 year old female with no PMHx of cancer, chronic back pain, or osteoporosis, who presents to the ED with onset of bilateral flank pain that began 1 week ago while lifting a heavy object from the ground. Patient denies any popping or pulling sensation. Denies any thoracic or lumbar back pain. Denies any UE or LE pain, paresthesias, or weakness. Denies any bladder or bowel incontinence. CT renal was performed and showed a T12 compression fracture. Neurosurgery was consulted for treatment recommendations.     Post-Op Info:  * No surgery found *         Interval History: Neuro exam stable, primary team oncological workup, concern for MM, labs pending today, contrast scans when kidney function improved    Medications:  Continuous Infusions:   sodium chloride 0.9% 200 mL/hr at 08/05/18 1433     Scheduled Meds:   benztropine  1 mg Oral BID    diclofenac sodium  2 g Topical (Top) QID    fluticasone  1 puff Inhalation BID    labetalol  200 mg Oral TID    polyethylene glycol  17 g Oral Daily    risperiDONE  1 mg Oral Daily    risperiDONE  2 mg Oral Nightly    senna-docusate 8.6-50 mg  1 tablet Oral BID    sodium phosphates  1 enema Rectal Once     PRN Meds:albuterol-ipratropium, dextrose 50%, dextrose 50%, glucagon (human recombinant), glucose, glucose, ondansetron, oxyCODONE, oxyCODONE, sodium chloride 0.9%     Review of Systems    Objective:     Weight: 69.9 kg (154 lb)  Body mass index is 26.43 kg/m².  Vital Signs (Most Recent):  Temp: 98.4 °F (36.9 °C) (08/06/18 0437)  Pulse: 88 (08/06/18 0437)  Resp: 16 (08/06/18 0437)  BP: (!) 175/88 (08/06/18 0437)  SpO2: 98 % (08/06/18 0437) Vital Signs (24h Range):  Temp:  [96.9 °F (36.1 °C)-98.9 °F (37.2 °C)] 98.4 °F (36.9 °C)  Pulse:  [79-96] 88  Resp:  [16-20] 16  SpO2:  [94 %-98 %] 98 %  BP: (175-207)/() 175/88                            Neurosurgery Physical Exam    -Alert and oriented x4  -PERRL, EOMI, face symmetrical, tongue midline, facial sensation symmetric, shoulder shrug full strength and symmetric  -Motor: 5/5 throughout the upper and lower extremites bilaterally  -sensation intact to light touch throughout  -reflexes 2+ in patella and brachioradialis bilaterally  -no clonus, no Washburn's  -coordination intact to finger to nose bilaterally        Significant Labs:    Recent Labs  Lab 08/05/18  0815 08/05/18  1638 08/06/18  0111   GLU 91 95 90    136 137   K 4.6 4.5 4.1    106 110   CO2 22* 23 20*   BUN 12 14 11   CREATININE 0.9 0.9 0.9   CALCIUM 11.0* 10.8* 10.6*     No results for input(s): WBC, HGB, HCT, PLT in the last 48 hours.  No results for input(s): LABPT, INR, APTT in the last 48 hours.  Microbiology Results (last 7 days)     ** No results found for the last 168 hours. **        All pertinent labs from the last 24 hours have been reviewed.    Significant Diagnostics:  I have reviewed all pertinent imaging results/findings within the past 24 hours.    Assessment/Plan:     Closed compression fracture of thoracic vertebra    62 year old female with acute onset on bilateral flank pain that began after lifting a heavy object. Imaging shows a T12 compression fracture along with multiple lytic lesions throughout the spine.     -Patient neurologically stable on exam  -No acute neurosurgical intervention indicated  -TLSO brace ordered from [facility]. Patient to wear when OOB or working with therapy. OK to remove when in bed or lying in recliner.   -Recommend MRI with/without contrast to rule out pathologic fracture and to better evaluate lytic lesions, awaiting better kidney function for contrasted scans  -Further recs to follow imaging            Jaun Lewis MD  Neurosurgery  [facility]

## 2018-08-06 NOTE — ASSESSMENT & PLAN NOTE
62 year old female with acute onset on bilateral flank pain that began after lifting a heavy object. Imaging shows a T12 compression fracture along with multiple lytic lesions throughout the spine.     -Patient neurologically stable on exam  -No acute neurosurgical intervention indicated  -TLSO brace at bedside. Showed patient and son how to apply brace and remove brace.   -Patient to wear brace when OOB or working with therapy. OK to remove when in bed or lying in recliner.   -Recommend MRI with/without contrast to rule out pathologic fracture and to better evaluate lytic lesions, when kidney function stable per primary team.   -Further recs to follow imaging

## 2018-08-06 NOTE — PLAN OF CARE
"Problem: Patient Care Overview  Goal: Plan of Care Review  Pt BP beginning to trend down with adjustment to blood pressure medication.  All other VSS, pt. Reluctant to move due to pain and reports most pain with activity.  Pt. States "Back Brace is too heavy".  Pt would not get OOB to use bedside commode, pt. Stated she preferred bedpan and ultimately went to briefs due to frequency.  Will continue to monitor.       "

## 2018-08-06 NOTE — ASSESSMENT & PLAN NOTE
-2/2 low PO intake, hypercalcemia, uncontrolled HTN  -imaging did not show any hydronephrosis or obstruction in the urinary system  -UA normal  -FENa pre-renal  -Cr improved to 1.0 today after aggressive IV fluids x3days  -will continue IVFs at this time, okay to give zometa 8/6/18

## 2018-08-06 NOTE — SUBJECTIVE & OBJECTIVE
Interval History: CT chest this AM. Labs diagnostic for MM. Transfer of care to BMT service now.    Review of Systems  Objective:     Vital Signs (Most Recent):  Temp: 98.8 °F (37.1 °C) (08/06/18 1503)  Pulse: 96 (08/06/18 1503)  Resp: 20 (08/06/18 1503)  BP: (!) 171/98 (08/06/18 1503)  SpO2: 96 % (08/06/18 1503) Vital Signs (24h Range):  Temp:  [97.8 °F (36.6 °C)-98.8 °F (37.1 °C)] 98.8 °F (37.1 °C)  Pulse:  [] 96  Resp:  [16-20] 20  SpO2:  [95 %-98 %] 96 %  BP: (158-207)/() 171/98     Weight: 69.9 kg (154 lb)  Body mass index is 26.43 kg/m².    Intake/Output Summary (Last 24 hours) at 08/06/18 1537  Last data filed at 08/06/18 1200   Gross per 24 hour   Intake             3000 ml   Output             1125 ml   Net             1875 ml      Physical Exam   Constitutional: She is oriented to person, place, and time. She appears well-developed and well-nourished. No distress.   Neck: Normal range of motion. Neck supple. No JVD present.   Cardiovascular: Normal rate, regular rhythm and normal heart sounds.    Pulmonary/Chest: Breath sounds normal. No respiratory distress. She has no rales.   Abdominal: Soft. She exhibits no distension. There is no tenderness.   Neurological: She is alert and oriented to person, place, and time.       Significant Labs:   CBC:   No results for input(s): WBC, HGB, HCT, PLT in the last 48 hours.  CMP:     Recent Labs  Lab 08/05/18  1638 08/06/18  0111 08/06/18  0849    137 137   K 4.5 4.1 4.2    110 110   CO2 23 20* 17*   GLU 95 90 87   BUN 14 11 10   CREATININE 0.9 0.9 1.0   CALCIUM 10.8* 10.6* 10.8*   ALBUMIN 3.3* 3.1* 3.2*   ANIONGAP 7* 7* 10   EGFRNONAA >60.0 >60.0 >60.0       Significant Imaging: I have reviewed and interpreted all pertinent imaging results/findings within the past 24 hours.

## 2018-08-06 NOTE — PROCEDURES
PROCEDURE NOTE:  Date of Procedure: 08/06/2018  Bone Marrow Biopsy and Aspiration  Indication: suspected multiple myeloma  Consent: Informed consent was obtained from patient.  Timeout: Done and documented.  Position: Right lateral  Site: Left posterior illiac crest.  Prep: Betadine.  Needle used: 11 gauge Jamshidi needle.  Anesthetic: 2% lidocaine 9 cc.  Biopsy: The biopsy needle was introduced into the marrow cavity and an aspirate was obtained without complications and sent for flow cytometry, PCPD FISH, and cytogenetics. Core biopsy obtained without difficulty and sent for routine histologic examination.  Complications: None.  Disposition: The patient was left in room with her RN and instructed to lay on her back for 20 minutes. Bandaid may be removed in 24 hours.  Blood loss: Minimal.     Miesha Ni DNP, NP  Hematology/Oncology

## 2018-08-06 NOTE — ASSESSMENT & PLAN NOTE
Patient has hypercalcemia, along with anemia, bone fx and lytic lesions. likely due to multiple myeloma.  -started on IVF ns 200 cc/hr  - upep, spep, urin pro,rPTH   - calcitonin vs bisphosphonate-Bone marrow bx/aspiration today by BMT

## 2018-08-06 NOTE — SUBJECTIVE & OBJECTIVE
HPI:  Patient admitted to internal medicine service from ED on 8/3/18 to w/o pathologic fx in T12 along with lytic lesions in spine. Neurosurgery and oncology were consulted. Found to have high PTH in labs. Endocrine was consulted for r/o Hyperparathyroidism. Abdominal CT showed diffuse lytic lesion in spine and pelvis.    Today:  - [place] free light chain 527.1 and kappa/lambda ratio 620.1. Transferred to BMT service for suspected multiple myeloma. Will perform BM bx 8/6/18 and order routine studies plus PCPD FISH. Will complete serologic and urine work up for MM.   - Continue aggressive hydration at  ml/hr. Continued hypercalcemia with corrected Ca of 11.4, give zometa 4 mg IV x1 today.    - Pt with uncontrolled pain, will work on pain regimen and continue back brace.     Objective:     Vital Signs (Most Recent):  Temp: 97.8 °F (36.6 °C) (08/06/18 1206)  Pulse: 91 (08/06/18 1206)  Resp: 20 (08/06/18 0900)  BP: (!) 158/79 (08/06/18 1206)  SpO2: 97 % (08/06/18 1206) Vital Signs (24h Range):  Temp:  [96.9 °F (36.1 °C)-98.6 °F (37 °C)] 97.8 °F (36.6 °C)  Pulse:  [] 91  Resp:  [16-20] 20  SpO2:  [95 %-98 %] 97 %  BP: (158-207)/() 158/79     Weight: 69.9 kg (154 lb)  Body mass index is 26.43 kg/m².  Body surface area is 1.78 meters squared.    ECOG SCORE         Intake/Output - Last 3 Shifts       08/04 0700 - 08/05 0659 08/05 0700 - 08/06 0659 08/06 0700 - 08/07 0659    P.O. 500      I.V. (mL/kg) 1636.7 (23.4) 4200 (60.1)     Total Intake(mL/kg) 2136.7 (30.6) 4200 (60.1)     Urine (mL/kg/hr) 800 (0.5) 1100 (0.7) 300 (0.6)    Emesis/NG output   0 (0)    Other   0 (0)    Stool  100 (0.1) 0 (0)    Blood   0 (0)    Total Output 800 1200 300    Net +1336.7 +3000 -300           Stool Occurrence 0 x 7 x           Physical Exam   Constitutional: She is oriented to person, place, and time. She appears well-developed and well-nourished.   HENT:   Head: Normocephalic and atraumatic.   Eyes: Conjunctivae and EOM  are normal.   Neck: Normal range of motion. Neck supple.   Cardiovascular: Normal rate, regular rhythm and normal heart sounds.  Exam reveals no gallop and no friction rub.    No murmur heard.  Pulmonary/Chest: Effort normal. She has no wheezes. She has no rales. She exhibits no tenderness.   Abdominal: Soft. Bowel sounds are normal. She exhibits distension.   Musculoskeletal: Normal range of motion.   With back brace   Neurological: She is alert and oriented to person, place, and time.   Skin: Skin is warm and dry.   Psychiatric: She has a normal mood and affect.       Significant Labs:   BMP:     Recent Labs  Lab 08/05/18  1638 08/06/18  0111 08/06/18  0849   GLU 95 90 87    137 137   K 4.5 4.1 4.2    110 110   CO2 23 20* 17*   BUN 14 11 10   CREATININE 0.9 0.9 1.0   CALCIUM 10.8* 10.6* 10.8*   , CBC: No results for input(s): WBC, HGB, HCT, PLT in the last 48 hours., CMP:       Recent Labs  Lab 08/05/18  0407   FREELAMBDALI 0.85   , LDH: No results for input(s): LDHCSF, BFSOURCE in the last 48 hours., LFTs:     Recent Labs  Lab 08/05/18  1638 08/06/18  0111 08/06/18  0849   ALBUMIN 3.3* 3.1* 3.2*     Diagnostic Results:  CT renal stone study  8/3/18  Multiple lytic lesions    CT chest  8/6/18  Multiple lytic lesions    Xray metastatic survey  8/6/18  Pending results    Bone marrow bx   8/6/18  Pending results

## 2018-08-06 NOTE — ASSESSMENT & PLAN NOTE
-last BM was 7/28, patient tried fleet enema at home. Patient is hypercalcemic. Patient is on Miralax and senna docusat  -continue hydration  -will consider repeat fleet enema if patient is not able to have a BM

## 2018-08-06 NOTE — HOSPITAL COURSE
Patient admitted to internal medicine service from ED on 8/3/18 to w/o pathologic fx in T12 along with lytic lesions in spine. Neurosurgery and oncology were consulted. Found to have high PTH in labs. Endocrine was consulted for r/o Hyperparathyroidism. Abdominal CT showed diffuse lytic lesion in spine and pelvis.    08/06/2018: [place] free light chain 527.1 and kappa/lambda ratio 620.1. Transferred to BMT service for suspected multiple myeloma. Will perform BM bx 8/6/18 and order routine studies plus PCPD FISH. Will complete serologic and urine work up for MM. Continue aggressive hydration at  ml/hr. Continued hypercalcemia with corrected Ca of 11.4, give zometa 4 mg IV x1 today. Pt with uncontrolled pain, will work on pain regimen and continue back brace.     08/07/2018 Transferred from hospital medicine to BMT service. Metastatic survey completed yesterday demonstrating multiple long bones w/ lytic lesions/ lucent areas. CT chest showed Multiple lytic lesions in the thoracic spine on with compression fracture of 1 of the lower with vertebral bodies. Bone marrow biopsy performed yesterday. Corrected calcium 11.3 today. Patient refused zolendronate yesterday. MRI w/w/o contrast thoracic and lumbar spine ordered.    08/08/2018 Received zometa yesterday. Corrected Ca today 11.1. On IVFs.   08/09/2018 Imaging shows unstable T 12 fracture, neurosurgery recommends surgical intervention.  08/10/2018 Patient declined surgery and it is rescheduled for 8/15  8/11-8/12: Patient becoming delirious.  She has refused her Risperidone and Psych has been consulted regarding med rec given upcoming surgery.     08/17/2018: Thoracic fusion today. VSS. Afebrile.

## 2018-08-06 NOTE — ASSESSMENT & PLAN NOTE
-neurosug following, appreciate recs  -no acute neurosurgical intervention indicated  -TLSO brace when OOB or working with therapy. OK to remove when in bed or lying in recliner.   -neurosurg recommended MRI with/without contrast to rule out pathologic fracture and to better evaluate lytic lesions, when kidney function stable per primary team. Will discuss with staff  -pain uncontrolled, continue diclofenac topical and oxy 5-10 mg q6hr prn. Will consider adding additional agents after discussing with staff

## 2018-08-06 NOTE — PROGRESS NOTES
[facility]  Neurosurgery  Progress Note    Subjective:     History of Present Illness: Ms. Zamorano is a 62 year old female with no PMHx of cancer, chronic back pain, or osteoporosis, who presents to the ED with onset of bilateral flank pain that began 1 week ago while lifting a heavy object from the ground. Patient denies any popping or pulling sensation. Denies any thoracic or lumbar back pain. Denies any UE or LE pain, paresthesias, or weakness. Denies any bladder or bowel incontinence. CT renal was performed and showed a T12 compression fracture. Neurosurgery was consulted for treatment recommendations.     Post-Op Info:  * No surgery found *         Interval History:   NAEON. Patient sitting upright in chair without brace on. Reports muscle spasms and left flank pain. Denies any midline thoracic or lumbar pain. Denies any UE or LE radicular pain or weakness. Has some numbness in her left finger tips at baseline. Denies any new paresthesias.     Medications:  Continuous Infusions:   sodium chloride 0.9%       Scheduled Meds:   benztropine  1 mg Oral BID    diclofenac sodium  2 g Topical (Top) QID    fluticasone  1 puff Inhalation BID    HYDROmorphone  1 mg Intravenous Once    labetalol  200 mg Oral TID    lidocaine HCL 20 mg/ml (2%)  10 mL Other Once    polyethylene glycol  17 g Oral Daily    risperiDONE  1 mg Oral Daily    risperiDONE  2 mg Oral Nightly    senna-docusate 8.6-50 mg  1 tablet Oral BID    sodium phosphates  1 enema Rectal Once    zoledronic acid (ZOMETA) IVPB  4 mg Intravenous 1 time in Clinic/HOD     PRN Meds:albuterol-ipratropium, dextrose 50%, dextrose 50%, glucagon (human recombinant), glucose, glucose, methyl salicylate-menthol 15-10%, ondansetron, oxyCODONE, oxyCODONE, sodium chloride 0.9%     Review of Systems  Objective:     Weight: 69.9 kg (154 lb)  Body mass index is 26.43 kg/m².  Vital Signs (Most Recent):  Temp: 97.8 °F (36.6 °C) (08/06/18 1206)  Pulse:  91 (08/06/18 1206)  Resp: 20 (08/06/18 0900)  BP: (!) 158/79 (08/06/18 1206)  SpO2: 97 % (08/06/18 1206) Vital Signs (24h Range):  Temp:  [96.9 °F (36.1 °C)-98.6 °F (37 °C)] 97.8 °F (36.6 °C)  Pulse:  [] 91  Resp:  [16-20] 20  SpO2:  [95 %-98 %] 97 %  BP: (158-207)/() 158/79       Date 08/06/18 0700 - 08/07/18 0659   Shift 3980-4039 3803-7310 2958-3817 24 Hour Total   I  N  T  A  K  E   Shift Total  (mL/kg)       O  U  T  P  U  T   Urine  (mL/kg/hr) 300   300    Emesis/NG output 0   0    Other 0   0    Stool 0   0    Blood 0   0    Shift Total  (mL/kg) 300  (4.3)   300  (4.3)   Weight (kg) 69.9 69.9 69.9 69.9       Neurosurgery Physical Exam  General: well developed, well nourished, no distress.   Head: normocephalic, atraumatic  Neurologic: Alert and oriented. Thought content appropriate.  GCS: Motor: 6/Verbal: 5/Eyes: 4 GCS Total: 15  Mental Status: Awake, Alert, Oriented x 4  Language: No aphasia  Speech: No dysarthria  Cranial nerves: face symmetric, tongue midline, CN II-XII grossly intact.   Eyes: pupils equal, round, reactive to light with accomodation, EOMI.   Pulmonary: normal respirations, no signs of respiratory distress  Abdomen: soft, non-distended, not tender to palpation  Sensory: intact to light touch throughout  Motor Strength:Moves all extremities spontaneously with good tone.  Full strength upper and lower extremities. No abnormal movements seen.   Washburn: absent  Clonus: absent  Skin: Skin is warm, dry and intact.  No thoracic or lumbar TTP, midline or along paraspinal muscles      Significant Labs:    Recent Labs  Lab 08/05/18  1638 08/06/18  0111 08/06/18  0849   GLU 95 90 87    137 137   K 4.5 4.1 4.2    110 110   CO2 23 20* 17*   BUN 14 11 10   CREATININE 0.9 0.9 1.0   CALCIUM 10.8* 10.6* 10.8*         Significant Diagnostics:  CT chest:  I independently reviewed the imaging.     1.  Subcentimeter pulmonary in the left upper lobe (axial series 2, image 23). Similar  findings can be seen in normal individuals and this is too small to account for patient's osseous metastatic disease.    2.  Small bilateral pleural fluid and small pericardial effusion.    3.  Multiple hypodense hepatic lesions largest measuring 3.9 cm and located in the left hepatic lobe demonstrating fluid attenuation likely representing cyst. The other hepatic hypodense lesions are too small to characterize.     4.  Multiple lytic lesions in the thoracic spine on with compression fracture of 1 of the lower with vertebral bodies. Please refer to the report of the recent renal stone study from 08/03/2018 for further details.    Assessment/Plan:     Closed compression fracture of thoracic vertebra    62 year old female with acute onset on bilateral flank pain that began after lifting a heavy object. Imaging shows a T12 compression fracture along with multiple lytic lesions throughout the spine.     -Patient neurologically stable on exam  -No acute neurosurgical intervention indicated  -TLSO brace at bedside. Showed patient and son how to apply brace and remove brace.   -Patient to wear brace when OOB or working with therapy. OK to remove when in bed or lying in recliner.   -Recommend MRI with/without contrast to rule out pathologic fracture and to better evaluate lytic lesions, when kidney function stable per primary team.   -Further recs to follow imaging  -Hypercalcemia: continue management per primary team and Endo. PTHrP pending to evaluate for hypercalcemia of malignancy. Vitamin D 1,25-OH level pending to r/o granulomatous causes of hypercalcemia. SPEP and UPEP pending to evaluate for multiple myeloma.              Please call with any questions      Margaret Rust PA-C   Neurosurgery   Pager: [phone]

## 2018-08-06 NOTE — SUBJECTIVE & OBJECTIVE
Interval History: Neuro exam stable, primary team oncological workup, concern for MM, labs pending today, contrast scans when kidney function improved    Medications:  Continuous Infusions:   sodium chloride 0.9% 200 mL/hr at 08/05/18 1433     Scheduled Meds:   benztropine  1 mg Oral BID    diclofenac sodium  2 g Topical (Top) QID    fluticasone  1 puff Inhalation BID    labetalol  200 mg Oral TID    polyethylene glycol  17 g Oral Daily    risperiDONE  1 mg Oral Daily    risperiDONE  2 mg Oral Nightly    senna-docusate 8.6-50 mg  1 tablet Oral BID    sodium phosphates  1 enema Rectal Once     PRN Meds:albuterol-ipratropium, dextrose 50%, dextrose 50%, glucagon (human recombinant), glucose, glucose, ondansetron, oxyCODONE, oxyCODONE, sodium chloride 0.9%     Review of Systems    Objective:     Weight: 69.9 kg (154 lb)  Body mass index is 26.43 kg/m².  Vital Signs (Most Recent):  Temp: 98.4 °F (36.9 °C) (08/06/18 0437)  Pulse: 88 (08/06/18 0437)  Resp: 16 (08/06/18 0437)  BP: (!) 175/88 (08/06/18 0437)  SpO2: 98 % (08/06/18 0437) Vital Signs (24h Range):  Temp:  [96.9 °F (36.1 °C)-98.9 °F (37.2 °C)] 98.4 °F (36.9 °C)  Pulse:  [79-96] 88  Resp:  [16-20] 16  SpO2:  [94 %-98 %] 98 %  BP: (175-207)/() 175/88                           Neurosurgery Physical Exam    -Alert and oriented x4  -PERRL, EOMI, face symmetrical, tongue midline, facial sensation symmetric, shoulder shrug full strength and symmetric  -Motor: 5/5 throughout the upper and lower extremites bilaterally  -sensation intact to light touch throughout  -reflexes 2+ in patella and brachioradialis bilaterally  -no clonus, no Washburn's  -coordination intact to finger to nose bilaterally        Significant Labs:    Recent Labs  Lab 08/05/18  0815 08/05/18  1638 08/06/18  0111   GLU 91 95 90    136 137   K 4.6 4.5 4.1    106 110   CO2 22* 23 20*   BUN 12 14 11   CREATININE 0.9 0.9 0.9   CALCIUM 11.0* 10.8* 10.6*     No results for  input(s): WBC, HGB, HCT, PLT in the last 48 hours.  No results for input(s): LABPT, INR, APTT in the last 48 hours.  Microbiology Results (last 7 days)     ** No results found for the last 168 hours. **        All pertinent labs from the last 24 hours have been reviewed.    Significant Diagnostics:  I have reviewed all pertinent imaging results/findings within the past 24 hours.

## 2018-08-06 NOTE — PROGRESS NOTES
[facility] Hospital Medicine  Progress Note    Patient Name: Janie Zamorano  MRN: [id number]  Patient Class: IP- Inpatient   Admission Date: 8/3/2018  Length of Stay: 3 days  Attending Physician: Linda Moss MD  Primary Care Provider: He Hoyt MD    [facility] Medicine Team: [facility] HOSP MED 5 Antionette Reyes MD    Subjective:     Principal Problem:Hypercalcemia    HPI:  Ms. Janie Zamorano is a 62 y.o. female who presented with back pain and constipation. Onset of symptoms for back pain occurred a week ago. Patient reports that leaning over to  a storage box initiated her back pain. Patient states the pain starts in her back but denies any radiation of pain. The pain worsens when she reaches for objects and upon deep inspiration. She also loses her breath if she aggravates her back pain. Patient denies any trauma to the region. Patient has been able to ambulate the past week with reluctance. The patient has tried hot and cold packs to relieve her pain. The patient did not report using pain medications to relieve the pain. Patient denies fever, chills, night sweats, dysuria, hematuria, and easy bruising or bleeding. She lost around 8 LB over the course of past two weeks. She denies any shooting pain to LE, no loss of sense in LE or abdominal area, she mentions numbness in her left hand. Denies bowel/urinary incontinence. No saddle anesthesia. Patient has been anemic chronically and was started on iron pills two weeks ago by her primary doctor.  Patient also complains of constipation for the past two weeks. She reports no bowel movements during that span. She has not had constipation prior to this event. She has tried enemas to no avail. She states she is belching and passing gas. She complains of nausea but reports no vomiting. She denies chest pain, palpitations, and cough.     FHx: Patient reports her mother and cousin both had breast cancer in their 50s and 60s.  She had an uncle diagnosed with leukemia.        Hospital Course:  Patient admitted to [facility] to w/o pathologic fx in T12 along with lytic lesions in spine. Neurosurgery and oncology were consulted. Found to have high PTH in labs. Endocrine was consulted for r/o Hyperparathyroidism. Abdominal CT showed diffuse lytic lesion in spine and pelvis. Chest Ct done this AM per BMT recs. Labs diagnostic for MM and transfer of care to BMT service today.    Interval History: CT chest this AM. Labs diagnostic for MM. Transfer of care to BMT service now.    Review of Systems  Objective:     Vital Signs (Most Recent):  Temp: 98.8 °F (37.1 °C) (08/06/18 1503)  Pulse: 96 (08/06/18 1503)  Resp: 20 (08/06/18 1503)  BP: (!) 171/98 (08/06/18 1503)  SpO2: 96 % (08/06/18 1503) Vital Signs (24h Range):  Temp:  [97.8 °F (36.6 °C)-98.8 °F (37.1 °C)] 98.8 °F (37.1 °C)  Pulse:  [] 96  Resp:  [16-20] 20  SpO2:  [95 %-98 %] 96 %  BP: (158-207)/() 171/98     Weight: 69.9 kg (154 lb)  Body mass index is 26.43 kg/m².    Intake/Output Summary (Last 24 hours) at 08/06/18 1537  Last data filed at 08/06/18 1200   Gross per 24 hour   Intake             3000 ml   Output             1125 ml   Net             1875 ml      Physical Exam   Constitutional: She is oriented to person, place, and time. She appears well-developed and well-nourished. No distress.   Neck: Normal range of motion. Neck supple. No JVD present.   Cardiovascular: Normal rate, regular rhythm and normal heart sounds.    Pulmonary/Chest: Breath sounds normal. No respiratory distress. She has no rales.   Abdominal: Soft. She exhibits no distension. There is no tenderness.   Neurological: She is alert and oriented to person, place, and time.       Significant Labs:   CBC:   No results for input(s): WBC, HGB, HCT, PLT in the last 48 hours.  CMP:     Recent Labs  Lab 08/05/18  1638 08/06/18  0111 08/06/18  0849    137 137   K 4.5 4.1 4.2    110 110   CO2 23 20* 17*   GLU  95 90 87   BUN 14 11 10   CREATININE 0.9 0.9 1.0   CALCIUM 10.8* 10.6* 10.8*   ALBUMIN 3.3* 3.1* 3.2*   ANIONGAP 7* 7* 10   EGFRNONAA >60.0 >60.0 >60.0       Significant Imaging: I have reviewed and interpreted all pertinent imaging results/findings within the past 24 hours.    Assessment/Plan:      * Hypercalcemia    Patient has hypercalcemia, along with anemia, bone fx and lytic lesions. Multiple myeloma is possible. Gven elevated PTH, primary hyperparathyroidism is considered. Given diffuse lytic lesions, w/o for possible malignancy causing metastatic lesions is on going.  -  Aggressive hydration with IVF,  cc/hr.  - upep, spep, urine pro, rPTH, immunofixation electrophoresis, Immunoglobulin free LT chains, urine Cr 24hr, and calcitriol pending  - PTH elevated, Vit D lower limit of normal range, will check 1,25- OH Vit D for granulomatosis dis source of hypercalcemia  - hem-onc consulted for BM biopsy and possible Zometa, labs diagnostic for MM. Transfer to BMT service now  - Endocrinology consulted: CT chest, SPEP, UPEP, calcitriol, PTHrP, IV hydration, Zometa if needed          Constipation      Last BM was 7/28, patient tried fleet enema at home. Patient is hypercalcemic. Patient is on Miralax and senna docusat.   - Continue hydration  - Can try Fleet enema if patient is not able to have a BM         Discharge planning issues      Going back to her sister's place        Anemia    last Hgb/ hct of 11.1/ 34.6   - normocytic anemia; will evaluate if this due to underlying malignancy  - transfuse for Hgb < 7          Lytic bone lesions on xray      - Ortho consulted, MRI w/ & w/o was suggested   - CT chest for possible malignancy is ordered, waiting for the result        Suspected multiple myeloma not having achieved remission    Patient has hypercalcemia, along with anemia, bone fx and lytic lesions. likely due to multiple myeloma.  -started on IVF ns 200 cc/hr  - upep, spep, urin pro,rPTH   - calcitonin vs  bisphosphonate-Bone marrow bx/aspiration today by BMT            Multiple lesions of metastatic malignancy    F/u CT chest          Closed compression fracture of thoracic vertebra    -Orthopedics service was consulted  -Patient neurologically stable on exam  -No acute neurosurgical intervention indicated  -TLSO brace to wear when OOB or working with therapy. OK to remove when in bed or lying in recliner.           Acute kidney injury      Patient has not been eating and drinking much during past few days, she also has hypercalcemia which could affect tubules, also patient's BP is not well controlled. Imaging did not show any hydronephrosis or obstruction in the urinary system.  RESOLVED  - IV fluids  - UA normal  - FENa pre-renal  - Cr back to base-line 0.9, GFR>60        Paranoid schizophrenia      Risperidone 1mg daily  Risperidone 2mg qhs        Asthma    Fluticasone 44mcg/Actuation 1 puffs BID  No active sob  Pulse oxy q shift          Other secondary hypertension    - Home meds re-started except the valsartan given patient having GLENIS  - labetalol dose increased to 200 mg TID  - continue to monitor BP            VTE Risk Mitigation         Ordered     IP VTE HIGH RISK PATIENT  Once      08/03/18 2320     Place MOUNIKA hose  Until discontinued      08/03/18 2320     Place sequential compression device  Until discontinued      08/03/18 6182              Antionette Reyes MD  Department of Hospital Medicine   [facility]

## 2018-08-06 NOTE — ASSESSMENT & PLAN NOTE
-hypercalcemia, anemia, bone fx and lytic lesions. Likely 2/2 to multiple myeloma.  -on IVF  cc/hr  -zometa 4 mg IV x1 8/6/18 for hypercalcemia not controlled with aggressive IVFs  -serologic and urine studies ordered: kappa free light chain 527.1 and kappa/lambda ratio 620.1, immunoglobulins unremarkable, other studies pending  -metastatic skeletal survey pending from 8/6/18  -bone marrow bx and aspiration 8/6/18 with routine studies plus PCPD FISH, pending

## 2018-08-06 NOTE — ASSESSMENT & PLAN NOTE
-continue fluids NS @ 200 cc/h  -PTH elevated as well  -Zometa 4 mg IV x1 8/6/18  -Correct Ca today is 11.4

## 2018-08-06 NOTE — ASSESSMENT & PLAN NOTE
- last Hgb/hct of 11.1/34.6 8/3/18  - normocytic anemia; likely 2/2 to underlying malignancy  - transfuse for Hgb < 7 and plt < 10K   - cbc with diff daily while inpatient

## 2018-08-06 NOTE — PLAN OF CARE
Problem: Patient Care Overview  Goal: Plan of Care Review  Outcome: Ongoing (interventions implemented as appropriate)  POC reviewed with pt & son. AAO x4. NS infusing @ 200ml/hr. Bone marrow biopsy performed at .  Pt remained free from falls. Questions and concerns addressed. Pt progressing towards goals. Will continue to monitor. See flow sheets for full assessment and VS

## 2018-08-06 NOTE — SUBJECTIVE & OBJECTIVE
Interval History:   NAEON. Patient sitting upright in chair without brace on. Reports muscle spasms and left flank pain. Denies any midline thoracic or lumbar pain. Denies any UE or LE radicular pain or weakness. Has some numbness in her left finger tips at baseline. Denies any new paresthesias.     Medications:  Continuous Infusions:   sodium chloride 0.9%       Scheduled Meds:   benztropine  1 mg Oral BID    diclofenac sodium  2 g Topical (Top) QID    fluticasone  1 puff Inhalation BID    HYDROmorphone  1 mg Intravenous Once    labetalol  200 mg Oral TID    lidocaine HCL 20 mg/ml (2%)  10 mL Other Once    polyethylene glycol  17 g Oral Daily    risperiDONE  1 mg Oral Daily    risperiDONE  2 mg Oral Nightly    senna-docusate 8.6-50 mg  1 tablet Oral BID    sodium phosphates  1 enema Rectal Once    zoledronic acid (ZOMETA) IVPB  4 mg Intravenous 1 time in Clinic/HOD     PRN Meds:albuterol-ipratropium, dextrose 50%, dextrose 50%, glucagon (human recombinant), glucose, glucose, methyl salicylate-menthol 15-10%, ondansetron, oxyCODONE, oxyCODONE, sodium chloride 0.9%     Review of Systems  Objective:     Weight: 69.9 kg (154 lb)  Body mass index is 26.43 kg/m².  Vital Signs (Most Recent):  Temp: 97.8 °F (36.6 °C) (08/06/18 1206)  Pulse: 91 (08/06/18 1206)  Resp: 20 (08/06/18 0900)  BP: (!) 158/79 (08/06/18 1206)  SpO2: 97 % (08/06/18 1206) Vital Signs (24h Range):  Temp:  [96.9 °F (36.1 °C)-98.6 °F (37 °C)] 97.8 °F (36.6 °C)  Pulse:  [] 91  Resp:  [16-20] 20  SpO2:  [95 %-98 %] 97 %  BP: (158-207)/() 158/79       Date 08/06/18 0700 - 08/07/18 0659   Shift 6938-0048 3952-1161 4608-1627 24 Hour Total   I  N  T  A  K  E   Shift Total  (mL/kg)       O  U  T  P  U  T   Urine  (mL/kg/hr) 300   300    Emesis/NG output 0   0    Other 0   0    Stool 0   0    Blood 0   0    Shift Total  (mL/kg) 300  (4.3)   300  (4.3)   Weight (kg) 69.9 69.9 69.9 69.9       Neurosurgery Physical Exam  General: well  developed, well nourished, no distress.   Head: normocephalic, atraumatic  Neurologic: Alert and oriented. Thought content appropriate.  GCS: Motor: 6/Verbal: 5/Eyes: 4 GCS Total: 15  Mental Status: Awake, Alert, Oriented x 4  Language: No aphasia  Speech: No dysarthria  Cranial nerves: face symmetric, tongue midline, CN II-XII grossly intact.   Eyes: pupils equal, round, reactive to light with accomodation, EOMI.   Pulmonary: normal respirations, no signs of respiratory distress  Abdomen: soft, non-distended, not tender to palpation  Sensory: intact to light touch throughout  Motor Strength:Moves all extremities spontaneously with good tone.  Full strength upper and lower extremities. No abnormal movements seen.   Washburn: absent  Clonus: absent  Skin: Skin is warm, dry and intact.  No thoracic or lumbar TTP, midline or along paraspinal muscles      Significant Labs:    Recent Labs  Lab 08/05/18  1638 08/06/18  0111 08/06/18  0849   GLU 95 90 87    137 137   K 4.5 4.1 4.2    110 110   CO2 23 20* 17*   BUN 14 11 10   CREATININE 0.9 0.9 1.0   CALCIUM 10.8* 10.6* 10.8*         Significant Diagnostics:  CT chest:  I independently reviewed the imaging.     1.  Subcentimeter pulmonary in the left upper lobe (axial series 2, image 23). Similar findings can be seen in normal individuals and this is too small to account for patient's osseous metastatic disease.    2.  Small bilateral pleural fluid and small pericardial effusion.    3.  Multiple hypodense hepatic lesions largest measuring 3.9 cm and located in the left hepatic lobe demonstrating fluid attenuation likely representing cyst. The other hepatic hypodense lesions are too small to characterize.     4.  Multiple lytic lesions in the thoracic spine on with compression fracture of 1 of the lower with vertebral bodies. Please refer to the report of the recent renal stone study from 08/03/2018 for further details.

## 2018-08-07 LAB
1,25(OH)2D3 SERPL-MCNC: 28 PG/ML
ALBUMIN SERPL BCP-MCNC: 2.9 G/DL
ALBUMIN SERPL BCP-MCNC: 3 G/DL
ALBUMIN SERPL BCP-MCNC: 3 G/DL
ANION GAP SERPL CALC-SCNC: 4 MMOL/L
ANION GAP SERPL CALC-SCNC: 6 MMOL/L
ANION GAP SERPL CALC-SCNC: 8 MMOL/L
BASOPHILS # BLD AUTO: 0.01 K/UL
BASOPHILS NFR BLD: 0.2 %
BODY SITE - BONE MARROW: NORMAL
BONE MARROW IRON STAIN COMMENT: NORMAL
BUN SERPL-MCNC: 10 MG/DL
BUN SERPL-MCNC: 10 MG/DL
BUN SERPL-MCNC: 13 MG/DL
CALCIUM SERPL-MCNC: 10.4 MG/DL
CALCIUM SERPL-MCNC: 10.4 MG/DL
CALCIUM SERPL-MCNC: 10.6 MG/DL
CHLORIDE SERPL-SCNC: 106 MMOL/L
CHLORIDE SERPL-SCNC: 106 MMOL/L
CHLORIDE SERPL-SCNC: 107 MMOL/L
CLINICAL DIAGNOSIS - BONE MARROW: NORMAL
CO2 SERPL-SCNC: 22 MMOL/L
CO2 SERPL-SCNC: 24 MMOL/L
CO2 SERPL-SCNC: 25 MMOL/L
CREAT SERPL-MCNC: 0.8 MG/DL
CREAT SERPL-MCNC: 0.8 MG/DL
CREAT SERPL-MCNC: 0.9 MG/DL
DIFFERENTIAL METHOD: ABNORMAL
EOSINOPHIL # BLD AUTO: 0 K/UL
EOSINOPHIL NFR BLD: 0.7 %
ERYTHROCYTE [DISTWIDTH] IN BLOOD BY AUTOMATED COUNT: 13.5 %
EST. GFR  (AFRICAN AMERICAN): >60 ML/MIN/1.73 M^2
EST. GFR  (NON AFRICAN AMERICAN): >60 ML/MIN/1.73 M^2
FLOW CYTOMETRY ANTIBODIES ANALYZED - BONE MARROW: NORMAL
FLOW CYTOMETRY COMMENT - BONE MARROW: NORMAL
FLOW CYTOMETRY INTERPRETATION - BONE MARROW: NORMAL
GLUCOSE SERPL-MCNC: 102 MG/DL
GLUCOSE SERPL-MCNC: 110 MG/DL
GLUCOSE SERPL-MCNC: 89 MG/DL
HCT VFR BLD AUTO: 28.2 %
HGB BLD-MCNC: 9.2 G/DL
IMM GRANULOCYTES # BLD AUTO: 0.04 K/UL
IMM GRANULOCYTES NFR BLD AUTO: 0.7 %
INTERPRETATION SERPL IFE-IMP: NORMAL
LYMPHOCYTES # BLD AUTO: 1.1 K/UL
LYMPHOCYTES NFR BLD: 19.5 %
MCH RBC QN AUTO: 28.6 PG
MCHC RBC AUTO-ENTMCNC: 32.6 G/DL
MCV RBC AUTO: 88 FL
MONOCYTES # BLD AUTO: 0.7 K/UL
MONOCYTES NFR BLD: 12.3 %
NEUTROPHILS # BLD AUTO: 3.7 K/UL
NEUTROPHILS NFR BLD: 66.6 %
NRBC BLD-RTO: 0 /100 WBC
PATHOLOGIST INTERPRETATION IFE: NORMAL
PHOSPHATE SERPL-MCNC: 2.3 MG/DL
PHOSPHATE SERPL-MCNC: 2.3 MG/DL
PHOSPHATE SERPL-MCNC: 2.4 MG/DL
PLATELET # BLD AUTO: 185 K/UL
PMV BLD AUTO: 10.2 FL
POTASSIUM SERPL-SCNC: 3.7 MMOL/L
PROT 24H UR-MRATE: 5896 MG/SPEC
PROT 24H UR-MRATE: 5940 MG/SPEC
PROT UR-MCNC: 134 MG/DL
PROT UR-MCNC: 135 MG/DL
PTH RELATED PROT SERPL-SCNC: 0.8 PMOL/L
RBC # BLD AUTO: 3.22 M/UL
SODIUM SERPL-SCNC: 135 MMOL/L
SODIUM SERPL-SCNC: 136 MMOL/L
SODIUM SERPL-SCNC: 137 MMOL/L
URINE COLLECTION DURATION: 24 HR
URINE COLLECTION DURATION: 24 HR
URINE VOLUME: 4400 ML
URINE VOLUME: 4400 ML
WBC # BLD AUTO: 5.54 K/UL

## 2018-08-07 PROCEDURE — 85025 COMPLETE CBC W/AUTO DIFF WBC: CPT

## 2018-08-07 PROCEDURE — 36415 COLL VENOUS BLD VENIPUNCTURE: CPT

## 2018-08-07 PROCEDURE — 25000003 PHARM REV CODE 250: Performed by: STUDENT IN AN ORGANIZED HEALTH CARE EDUCATION/TRAINING PROGRAM

## 2018-08-07 PROCEDURE — 63600175 PHARM REV CODE 636 W HCPCS: Performed by: STUDENT IN AN ORGANIZED HEALTH CARE EDUCATION/TRAINING PROGRAM

## 2018-08-07 PROCEDURE — 25500020 PHARM REV CODE 255: Performed by: INTERNAL MEDICINE

## 2018-08-07 PROCEDURE — 80069 RENAL FUNCTION PANEL: CPT

## 2018-08-07 PROCEDURE — 84166 PROTEIN E-PHORESIS/URINE/CSF: CPT | Mod: 26,,, | Performed by: PATHOLOGY

## 2018-08-07 PROCEDURE — 86335 IMMUNFIX E-PHORSIS/URINE/CSF: CPT | Mod: 26,,, | Performed by: PATHOLOGY

## 2018-08-07 PROCEDURE — A9585 GADOBUTROL INJECTION: HCPCS | Performed by: INTERNAL MEDICINE

## 2018-08-07 PROCEDURE — 11000001 HC ACUTE MED/SURG PRIVATE ROOM

## 2018-08-07 PROCEDURE — 25000003 PHARM REV CODE 250: Performed by: NURSE PRACTITIONER

## 2018-08-07 PROCEDURE — 25000242 PHARM REV CODE 250 ALT 637 W/ HCPCS: Performed by: STUDENT IN AN ORGANIZED HEALTH CARE EDUCATION/TRAINING PROGRAM

## 2018-08-07 PROCEDURE — 84156 ASSAY OF PROTEIN URINE: CPT

## 2018-08-07 PROCEDURE — 99233 SBSQ HOSP IP/OBS HIGH 50: CPT | Mod: ,,, | Performed by: INTERNAL MEDICINE

## 2018-08-07 PROCEDURE — 84166 PROTEIN E-PHORESIS/URINE/CSF: CPT

## 2018-08-07 PROCEDURE — 86335 IMMUNFIX E-PHORSIS/URINE/CSF: CPT

## 2018-08-07 RX ORDER — HEPARIN 100 UNIT/ML
500 SYRINGE INTRAVENOUS
Status: CANCELLED | OUTPATIENT
Start: 2018-09-03

## 2018-08-07 RX ORDER — HEPARIN 100 UNIT/ML
500 SYRINGE INTRAVENOUS
Status: CANCELLED | OUTPATIENT
Start: 2018-08-27

## 2018-08-07 RX ORDER — SODIUM CHLORIDE 0.9 % (FLUSH) 0.9 %
10 SYRINGE (ML) INJECTION
Status: CANCELLED | OUTPATIENT
Start: 2018-08-20

## 2018-08-07 RX ORDER — BORTEZOMIB 3.5 MG/1
1.3 INJECTION, POWDER, LYOPHILIZED, FOR SOLUTION INTRAVENOUS; SUBCUTANEOUS
Status: CANCELLED | OUTPATIENT
Start: 2018-08-27

## 2018-08-07 RX ORDER — LABETALOL 100 MG/1
300 TABLET, FILM COATED ORAL 3 TIMES DAILY
Status: DISCONTINUED | OUTPATIENT
Start: 2018-08-07 | End: 2018-08-16 | Stop reason: ALTCHOICE

## 2018-08-07 RX ORDER — SODIUM CHLORIDE 0.9 % (FLUSH) 0.9 %
10 SYRINGE (ML) INJECTION
Status: CANCELLED | OUTPATIENT
Start: 2018-09-03

## 2018-08-07 RX ORDER — HEPARIN 100 UNIT/ML
500 SYRINGE INTRAVENOUS
Status: CANCELLED | OUTPATIENT
Start: 2018-08-13

## 2018-08-07 RX ORDER — HYDRALAZINE HYDROCHLORIDE 25 MG/1
25 TABLET, FILM COATED ORAL EVERY 8 HOURS PRN
Status: DISCONTINUED | OUTPATIENT
Start: 2018-08-07 | End: 2018-08-21 | Stop reason: HOSPADM

## 2018-08-07 RX ORDER — GADOBUTROL 604.72 MG/ML
7 INJECTION INTRAVENOUS
Status: COMPLETED | OUTPATIENT
Start: 2018-08-07 | End: 2018-08-07

## 2018-08-07 RX ORDER — HEPARIN 100 UNIT/ML
500 SYRINGE INTRAVENOUS
Status: CANCELLED | OUTPATIENT
Start: 2018-08-20

## 2018-08-07 RX ORDER — BORTEZOMIB 3.5 MG/1
1.3 INJECTION, POWDER, LYOPHILIZED, FOR SOLUTION INTRAVENOUS; SUBCUTANEOUS
Status: CANCELLED | OUTPATIENT
Start: 2018-08-13

## 2018-08-07 RX ORDER — SODIUM CHLORIDE 0.9 % (FLUSH) 0.9 %
10 SYRINGE (ML) INJECTION
Status: CANCELLED | OUTPATIENT
Start: 2018-08-27

## 2018-08-07 RX ORDER — SODIUM CHLORIDE 0.9 % (FLUSH) 0.9 %
10 SYRINGE (ML) INJECTION
Status: CANCELLED | OUTPATIENT
Start: 2018-08-13

## 2018-08-07 RX ORDER — BORTEZOMIB 3.5 MG/1
1.3 INJECTION, POWDER, LYOPHILIZED, FOR SOLUTION INTRAVENOUS; SUBCUTANEOUS
Status: CANCELLED | OUTPATIENT
Start: 2018-09-03

## 2018-08-07 RX ORDER — BORTEZOMIB 3.5 MG/1
1.3 INJECTION, POWDER, LYOPHILIZED, FOR SOLUTION INTRAVENOUS; SUBCUTANEOUS
Status: CANCELLED | OUTPATIENT
Start: 2018-08-20

## 2018-08-07 RX ADMIN — SODIUM CHLORIDE: 0.9 INJECTION, SOLUTION INTRAVENOUS at 10:08

## 2018-08-07 RX ADMIN — OXYCODONE HYDROCHLORIDE 10 MG: 5 TABLET ORAL at 08:08

## 2018-08-07 RX ADMIN — SENNOSIDES AND DOCUSATE SODIUM 1 TABLET: 8.6; 5 TABLET ORAL at 09:08

## 2018-08-07 RX ADMIN — LABETALOL HCL 300 MG: 100 TABLET, FILM COATED ORAL at 09:08

## 2018-08-07 RX ADMIN — ZOLEDRONIC ACID 4 MG: 0.04 INJECTION, SOLUTION INTRAVENOUS at 06:08

## 2018-08-07 RX ADMIN — DICLOFENAC 2 G: 10 GEL TOPICAL at 09:08

## 2018-08-07 RX ADMIN — SODIUM CHLORIDE: 0.9 INJECTION, SOLUTION INTRAVENOUS at 12:08

## 2018-08-07 RX ADMIN — SODIUM CHLORIDE: 0.9 INJECTION, SOLUTION INTRAVENOUS at 11:08

## 2018-08-07 RX ADMIN — SODIUM CHLORIDE: 0.9 INJECTION, SOLUTION INTRAVENOUS at 05:08

## 2018-08-07 RX ADMIN — HYDRALAZINE HYDROCHLORIDE 25 MG: 25 TABLET ORAL at 05:08

## 2018-08-07 RX ADMIN — LABETALOL HCL 300 MG: 100 TABLET, FILM COATED ORAL at 02:08

## 2018-08-07 RX ADMIN — GADOBUTROL 7 ML: 604.72 INJECTION INTRAVENOUS at 03:08

## 2018-08-07 RX ADMIN — DICLOFENAC 2 G: 10 GEL TOPICAL at 10:08

## 2018-08-07 RX ADMIN — FLUTICASONE PROPIONATE 1 PUFF: 44 AEROSOL, METERED RESPIRATORY (INHALATION) at 09:08

## 2018-08-07 RX ADMIN — DICLOFENAC 2 G: 10 GEL TOPICAL at 04:08

## 2018-08-07 RX ADMIN — OXYCODONE HYDROCHLORIDE 10 MG: 5 TABLET ORAL at 09:08

## 2018-08-07 RX ADMIN — DICLOFENAC 2 G: 10 GEL TOPICAL at 01:08

## 2018-08-07 RX ADMIN — OXYCODONE HYDROCHLORIDE 10 MG: 5 TABLET ORAL at 12:08

## 2018-08-07 RX ADMIN — OXYCODONE HYDROCHLORIDE 10 MG: 5 TABLET ORAL at 02:08

## 2018-08-07 NOTE — ASSESSMENT & PLAN NOTE
-2/2 low PO intake, hypercalcemia, uncontrolled HTN  -imaging did not show any hydronephrosis or obstruction in the urinary system  -UA normal  -FENa pre-renal  -GLENIS resolved after aggressive IV fluids x3days

## 2018-08-07 NOTE — PLAN OF CARE
08/06/18 1420   Discharge Assessment   Assessment Type Discharge Planning Assessment   Assessment information obtained from? Medical Record     MD at  Performing a Bone Marrow Biopsy

## 2018-08-07 NOTE — ASSESSMENT & PLAN NOTE
BM w/ multiple BM recorded on 8/5 although CT showed moderate amount of retained stool in the ascending and transverse:, suggestive of constipation  Will place on regimen as patient receiving opiates

## 2018-08-07 NOTE — PLAN OF CARE
Reviewed chart. Bone marrow performed for myeloma workup per oncology. Zometa ordered for treatment of hypercalcemia of malignancy, but patient/family refused.     Corrected calcium from last night is improved; no result from this AM.    Suspect patient has baseline primary hyperparathyroidism with hypercalcemia related to multiple myeloma superimposed. Agree with IV bisphosphonate and periodic monitoring of calcium at discharge. Encourage patient to maintain hydration. If persistent hypercalcemia is an issue in the future, consideration for cinacalcet can be made at that time.    Endocrine will sign off, but please don't hesitate to call/reconsult with questions.

## 2018-08-07 NOTE — SUBJECTIVE & OBJECTIVE
Subjective:     Interval History: No acute overnight events. Transferred from hospital medicine to BMT service. Metastatic survey completed yesterday demonstrating multiple long bones w/ lytic lesions/ lucent areas. CT chest showed Multiple lytic lesions in the thoracic spine on with compression fracture of 1 of the lower with vertebral bodies. Bone marrow biopsy performed yesterday. Corrected calcium 11.3 today. Patient refused zolendronate yesterday. MRI w/w/o contrast thoracic and lumbar spine ordered.    Objective:     Vital Signs (Most Recent):  Temp: 98.5 °F (36.9 °C) (08/07/18 0812)  Pulse: 99 (08/07/18 0812)  Resp: 16 (08/07/18 0812)  BP: (!) 168/93 (08/07/18 0812)  SpO2: 96 % (08/07/18 0812) Vital Signs (24h Range):  Temp:  [97.8 °F (36.6 °C)-99.3 °F (37.4 °C)] 98.5 °F (36.9 °C)  Pulse:  [91-99] 99  Resp:  [16-20] 16  SpO2:  [96 %-99 %] 96 %  BP: (157-182)/(79-98) 168/93     Weight: 69.9 kg (154 lb)  Body mass index is 26.43 kg/m².  Body surface area is 1.78 meters squared.    ECOG SCORE         [unfilled]    Intake/Output - Last 3 Shifts       08/05 0700 - 08/06 0659 08/06 0700 - 08/07 0659 08/07 0700 - 08/08 0659    I.V. (mL/kg) 4200 (60.1)      Total Intake(mL/kg) 4200 (60.1)      Urine (mL/kg/hr) 1100 (0.7) 2400 (1.4)     Emesis/NG output  0 (0)     Other  0 (0)     Stool 100 (0.1) 0 (0)     Blood  0 (0)     Total Output 1200 2400      Net +3000 -2400             Stool Occurrence 7 x            Physical Exam   Constitutional: She is oriented to person, place, and time. No distress.   HENT:   Head: Normocephalic and atraumatic.   Eyes: Conjunctivae and EOM are normal. No scleral icterus.   Neck: Neck supple.   Cardiovascular: Normal rate, regular rhythm and intact distal pulses.    Pulmonary/Chest: No respiratory distress. She has no wheezes. She has no rales.   Abdominal: Soft. There is no tenderness. There is no guarding.   Musculoskeletal: She exhibits no edema.   Neurological: She is alert and  oriented to person, place, and time.   Skin: Skin is warm. No rash noted. She is not diaphoretic.       Significant Labs:   CBC:   Recent Labs  Lab 08/07/18  0333   WBC 5.54   HGB 9.2*   HCT 28.2*       and CMP:   Recent Labs  Lab 08/06/18  0849 08/06/18  1625 08/06/18  2356    137 135*   K 4.2 3.9 3.7    107 107   CO2 17* 22* 24   GLU 87 93 110   BUN 10 11 13   CREATININE 1.0 1.0 0.9   CALCIUM 10.8* 11.0* 10.4   ALBUMIN 3.2* 3.3* 2.9*   ANIONGAP 10 8 4*   EGFRNONAA >60.0 >60.0 >60.0       Diagnostic Results:  I have reviewed all pertinent imaging results/findings within the past 24 hours.   Metastatic survey: lucent areas in the humeri bilaterally, DJD of the spine and T12 compression deformity, small lytic areas suspected in the ulna and radius bilaterally, b/l femurs demonstrate lytic areas.   CT chest showed Multiple lytic lesions in the thoracic spine on with compression fracture of 1 of the lower with vertebral bodies.

## 2018-08-07 NOTE — PHYSICIAN QUERY
PT Name: Janie Zamorano  MR #: [id number]  Physician Query Form - Renal Condition Clarification     Silvia Rico RN CDS    Contact Information: [phone]    This form is a permanent document in the medical record.     QueryDate: August 7, 2018    By submitting this query, we are merely seeking further clarification of documentation. Please utilize your independent clinical judgment when addressing the question(s) below.    The Medical record contains the following:   Indicator Supporting Clinical Findings Location in Medical Record    Kidney (Renal) Insufficiency     X Kidney (Renal) Failure / Injury Acute kidney injury  8/3 H&P    Nephrotoxic Agents     X BUN/Creatinine GFR BUN 24,21, 19, 18, 15, 12,14, 11, 10, 11,13 11    Creatinine 1.7, 1.5, 1.2, 1.0, 0.9, 0.9,1.0, 1.0   eGFR 37.0, 43.0,56.4, >60.0 >60.0 >60.0 >60.0 >60.0   8/3 -8/6 Lab   X Urine: Casts         Eosinophils Hyaline Casts 7   8/3 Lab    Dehydration     X Nausea/Vomiting Positive for abdominal pain, constipation and nausea.   8/3 ED Prov note    Dialysis/CRRT     X Treatment: - IV fluids   - UA   - FENa   - monitor Cr and UOP     Patient has not been eating and drinking much during past few days, she also has hypercalcemia which could affect tubules,     also patient's BP is not well controlled. Imaging did  not show any hydronephrosis or obstruction in the urinary system.  8/3 H&P    Other:      Acute Kidney Injury / Acute Renal Failure has different defining criteria. A generally accepted guideline  is:   A greater than 100% (2X) rise in serum creatinine from baseline* occurring during the course of a single hospital stay.   *Baseline as determined by the providers judgment and consideration of previous lab values and other documentation, if available.    A diagnosis of Acute Kidney Injury/ Acute Renal Failure should incorporate abnormal labs and clinical findings that are clinically significant      References: 1. Nitish et al. Acute  renal failure-definition, outcome measures, animal models, fluid therapy and information technology needs: the Second International Consensus Conference of the Acute Dialysis Quality Initiative (ADQI) Group. Crit Care 2004; 8:B204; 2. Susanna et al. Acute Kidney Injury Network: report of an initiative to improve outcomes in acute kidney injury. Crit Care 2007; 11:R31; 3. Kidney Disease: Improving Global Outcomes (KDIGO). Acute Kidney Injury Work Group. KDIGO clinical practice guidelines for acute kidney injury. Kidney Int Suppl 2012; 2:1.    The clinical guidelines noted below is only a system guideline, it does not replace the providers clinical judgment.    Provider, please specify the diagnosis or diagnoses associated with above clinical findings.      [    ]  Acute Kidney Failure/Injury with Tubular Necrosis  Damage to the tubule cells of the kidney. Common triggers: shock, hypotension, IV contrast, rhabdomyolysis, medications  [   x ]  Acute Renal Insufficiency  Consider if SCr rise is transient and normalizes quickly with no efforts at real resuscitation of vital signs and perfusion  [    ]  Other (please specify): _______________________________  [    ]  Clinically Undetermined    Please document in your progress notes daily for the duration of treatment until resolved and include in your discharge summary.

## 2018-08-07 NOTE — ASSESSMENT & PLAN NOTE
- Hb stable  - normocytic anemia; likely 2/2 to underlying malignancy  - transfuse for Hgb < 7 and plt < 10K   - cbc with diff daily while inpatient

## 2018-08-07 NOTE — ASSESSMENT & PLAN NOTE
-neurosug following, appreciate recs  -no acute neurosurgical intervention indicated  -TLSO brace when OOB or working with therapy. OK to remove when in bed or lying in recliner.   -MRI ordered with/without contrast to rule out pathologic fracture and to better evaluate lytic lesions per neurosurgery  -pain somewhat controlled, continue diclofenac topical and oxy 5-10 mg q6hr prn

## 2018-08-07 NOTE — ASSESSMENT & PLAN NOTE
-hypercalcemia, anemia, bone fx and lytic lesions. Likely 2/2 to multiple myeloma.  -on IVF  cc/hr  -zometa 4 mg IV x1 8/6/18 for hypercalcemia refused by patient/ family  -serologic and urine studies ordered: kappa free light chain 527.1 and kappa/lambda ratio 620.1, immunoglobulins unremarkable  -metastatic skeletal survey w/ multiple lytic lesions  -bone marrow bx and aspiration 8/6/18 with routine studies plus PCPD FISH, pending  -f/u 24 hr urine protein, immunofixation

## 2018-08-07 NOTE — ASSESSMENT & PLAN NOTE
- Home meds re-started except the valsartan given patient having GLENIS  - labetalol dose increased to 300 mg TID  - continue to monitor BP

## 2018-08-07 NOTE — ASSESSMENT & PLAN NOTE
-continue fluids NS @ 200 cc/h  -PTH elevated as well  -Zometa 4 mg IV refused by patient, will discuss w/ patient and family  -Correct Ca today is 11.3

## 2018-08-07 NOTE — PROGRESS NOTES
[facility]  Hematology  Bone Marrow Transplant  Progress Note    Patient Name: Janie Zamorano  Admission Date: 8/3/2018  Hospital Length of Stay: 4 days  Code Status: Full Code    Subjective:     Interval History: No acute overnight events. Transferred from hospital medicine to BMT service. Metastatic survey completed yesterday demonstrating multiple long bones w/ lytic lesions/ lucent areas. CT chest showed Multiple lytic lesions in the thoracic spine on with compression fracture of 1 of the lower with vertebral bodies. Bone marrow biopsy performed yesterday. Corrected calcium 11.3 today. Patient refused zolendronate yesterday. MRI w/w/o contrast thoracic and lumbar spine ordered.    Objective:     Vital Signs (Most Recent):  Temp: 98.5 °F (36.9 °C) (08/07/18 0812)  Pulse: 99 (08/07/18 0812)  Resp: 16 (08/07/18 0812)  BP: (!) 168/93 (08/07/18 0812)  SpO2: 96 % (08/07/18 0812) Vital Signs (24h Range):  Temp:  [97.8 °F (36.6 °C)-99.3 °F (37.4 °C)] 98.5 °F (36.9 °C)  Pulse:  [91-99] 99  Resp:  [16-20] 16  SpO2:  [96 %-99 %] 96 %  BP: (157-182)/(79-98) 168/93     Weight: 69.9 kg (154 lb)  Body mass index is 26.43 kg/m².  Body surface area is 1.78 meters squared.    ECOG SCORE         [unfilled]    Intake/Output - Last 3 Shifts       08/05 0700 - 08/06 0659 08/06 0700 - 08/07 0659 08/07 0700 - 08/08 0659    I.V. (mL/kg) 4200 (60.1)      Total Intake(mL/kg) 4200 (60.1)      Urine (mL/kg/hr) 1100 (0.7) 2400 (1.4)     Emesis/NG output  0 (0)     Other  0 (0)     Stool 100 (0.1) 0 (0)     Blood  0 (0)     Total Output 1200 2400      Net +3000 -2400             Stool Occurrence 7 x            Physical Exam   Constitutional: She is oriented to person, place, and time. No distress.   HENT:   Head: Normocephalic and atraumatic.   Eyes: Conjunctivae and EOM are normal. No scleral icterus.   Neck: Neck supple.   Cardiovascular: Normal rate, regular rhythm and intact distal pulses.    Pulmonary/Chest: No  respiratory distress. She has no wheezes. She has no rales.   Abdominal: Soft. There is no tenderness. There is no guarding.   Musculoskeletal: She exhibits no edema.   Neurological: She is alert and oriented to person, place, and time.   Skin: Skin is warm. No rash noted. She is not diaphoretic.       Significant Labs:   CBC:   Recent Labs  Lab 08/07/18  0333   WBC 5.54   HGB 9.2*   HCT 28.2*       and CMP:   Recent Labs  Lab 08/06/18  0849 08/06/18  1625 08/06/18  2356    137 135*   K 4.2 3.9 3.7    107 107   CO2 17* 22* 24   GLU 87 93 110   BUN 10 11 13   CREATININE 1.0 1.0 0.9   CALCIUM 10.8* 11.0* 10.4   ALBUMIN 3.2* 3.3* 2.9*   ANIONGAP 10 8 4*   EGFRNONAA >60.0 >60.0 >60.0       Diagnostic Results:  I have reviewed all pertinent imaging results/findings within the past 24 hours.   Metastatic survey: lucent areas in the humeri bilaterally, DJD of the spine and T12 compression deformity, small lytic areas suspected in the ulna and radius bilaterally, b/l femurs demonstrate lytic areas.   CT chest showed Multiple lytic lesions in the thoracic spine on with compression fracture of 1 of the lower with vertebral bodies.     Assessment/Plan:     * Hypercalcemia    -continue fluids NS @ 200 cc/h  -PTH elevated as well  -Zometa 4 mg IV refused by patient, will discuss w/ patient and family  -Correct Ca today is 11.3        Constipation    BM w/ multiple BM recorded on 8/5 although CT showed moderate amount of retained stool in the ascending and transverse:, suggestive of constipation  Will place on regimen as patient receiving opiates        Anemia    - Hb stable  - normocytic anemia; likely 2/2 to underlying malignancy  - transfuse for Hgb < 7 and plt < 10K   - cbc with diff daily while inpatient        Lytic bone lesions on xray    - unclear primary cancer diagnosis at this time; please workup lytic lesions additionally with CT chest w contrast when possible. Discussed this with team, who plans to  proceed with CT in a few days when renal function is better.  - also will await studies of SPEP and will an immunofixation order for now; also check serum free light chains and quantitative immunoglobulins  - if pt seems to have multiple myeloma for additional testing above, would order all of the followin hr protein, urine ADAM, skeletal survey, beta 2 microglobulin, LDH, uric acid  - also would then schedule for a bone marrow biopsy if this seems to be MM        Suspected multiple myeloma not having achieved remission    -hypercalcemia, anemia, bone fx and lytic lesions. Likely 2/2 to multiple myeloma.  -on IVF  cc/hr  -zometa 4 mg IV x1 18 for hypercalcemia refused by patient/ family  -serologic and urine studies ordered: kappa free light chain 527.1 and kappa/lambda ratio 620.1, immunoglobulins unremarkable  -metastatic skeletal survey w/ multiple lytic lesions  -bone marrow bx and aspiration 18 with routine studies plus PCPD FISH, pending  -f/u 24 hr urine protein, immunofixation          Closed compression fracture of thoracic vertebra    -neurosug following, appreciate recs  -no acute neurosurgical intervention indicated  -TLSO brace when OOB or working with therapy. OK to remove when in bed or lying in recliner.   -MRI ordered with/without contrast to rule out pathologic fracture and to better evaluate lytic lesions per neurosurgery  -pain somewhat controlled, continue diclofenac topical and oxy 5-10 mg q6hr prn        Acute kidney injury    -2/2 low PO intake, hypercalcemia, uncontrolled HTN  -imaging did not show any hydronephrosis or obstruction in the urinary system  -UA normal  -FENa pre-renal  -GLENIS resolved after aggressive IV fluids x3days        Paranoid schizophrenia    -continue Risperidone 1mg daily and 2mg qhs        Asthma    -Fluticasone 44mcg/Actuation 1 puffs BID  -No signs of symptoms of exacerbation        Other secondary hypertension    - Home meds re-started except the  valsartan given patient having GLENIS  - labetalol dose increased to 300 mg TID  - continue to monitor BP             VTE Risk Mitigation         Ordered     IP VTE HIGH RISK PATIENT  Once      08/03/18 2320     Place MOUNIKA hose  Until discontinued      08/03/18 2320     Place sequential compression device  Until discontinued      08/03/18 8807          Disposition:     Steven Zhang MD  Bone Marrow Transplant  [facility]

## 2018-08-07 NOTE — PLAN OF CARE
Problem: Patient Care Overview  Goal: Plan of Care Review  Alert. Complain of lower back pain which oxycodone was given, Informed pt that she should not refuse the pain oint volteran . Patient has allow son to refuse all medicine that according to her son she does not take at home on a regular bases . 24 hour urine in process until 1800 this afternoon. Bp has been elevated this shift. Md called and PRN hydralazine was added.'45 minutes after med was given BP was 157 89

## 2018-08-08 DIAGNOSIS — C90.00 MULTIPLE MYELOMA, REMISSION STATUS UNSPECIFIED: Primary | ICD-10-CM

## 2018-08-08 LAB
ALBUMIN SERPL BCP-MCNC: 2.6 G/DL
ALBUMIN SERPL BCP-MCNC: 2.8 G/DL
ALBUMIN SERPL BCP-MCNC: 2.9 G/DL
ANION GAP SERPL CALC-SCNC: 3 MMOL/L
ANION GAP SERPL CALC-SCNC: 6 MMOL/L
ANION GAP SERPL CALC-SCNC: 9 MMOL/L
BACTERIA #/AREA URNS AUTO: ABNORMAL /HPF
BASOPHILS # BLD AUTO: 0.01 K/UL
BASOPHILS NFR BLD: 0.2 %
BILIRUB UR QL STRIP: NEGATIVE
BUN SERPL-MCNC: 11 MG/DL
BUN SERPL-MCNC: 9 MG/DL
BUN SERPL-MCNC: 9 MG/DL
CALCIUM SERPL-MCNC: 10.2 MG/DL
CALCIUM SERPL-MCNC: 9.2 MG/DL
CALCIUM SERPL-MCNC: 9.9 MG/DL
CHLORIDE SERPL-SCNC: 105 MMOL/L
CHLORIDE SERPL-SCNC: 107 MMOL/L
CHLORIDE SERPL-SCNC: 107 MMOL/L
CLARITY UR REFRACT.AUTO: ABNORMAL
CO2 SERPL-SCNC: 19 MMOL/L
CO2 SERPL-SCNC: 22 MMOL/L
CO2 SERPL-SCNC: 25 MMOL/L
COLOR UR AUTO: YELLOW
CREAT SERPL-MCNC: 0.9 MG/DL
CREAT SERPL-MCNC: 0.9 MG/DL
CREAT SERPL-MCNC: 1 MG/DL
DIFFERENTIAL METHOD: ABNORMAL
EOSINOPHIL # BLD AUTO: 0.1 K/UL
EOSINOPHIL NFR BLD: 1 %
ERYTHROCYTE [DISTWIDTH] IN BLOOD BY AUTOMATED COUNT: 13.6 %
EST. GFR  (AFRICAN AMERICAN): >60 ML/MIN/1.73 M^2
EST. GFR  (NON AFRICAN AMERICAN): >60 ML/MIN/1.73 M^2
GLUCOSE SERPL-MCNC: 100 MG/DL
GLUCOSE SERPL-MCNC: 106 MG/DL
GLUCOSE SERPL-MCNC: 97 MG/DL
GLUCOSE UR QL STRIP: NEGATIVE
HCT VFR BLD AUTO: 26.9 %
HGB BLD-MCNC: 8.7 G/DL
HGB UR QL STRIP: ABNORMAL
HYALINE CASTS UR QL AUTO: 0 /LPF
IMM GRANULOCYTES # BLD AUTO: 0.03 K/UL
IMM GRANULOCYTES NFR BLD AUTO: 0.5 %
KETONES UR QL STRIP: NEGATIVE
LEUKOCYTE ESTERASE UR QL STRIP: NEGATIVE
LYMPHOCYTES # BLD AUTO: 0.5 K/UL
LYMPHOCYTES NFR BLD: 7.4 %
MCH RBC QN AUTO: 28.4 PG
MCHC RBC AUTO-ENTMCNC: 32.3 G/DL
MCV RBC AUTO: 88 FL
MICROSCOPIC COMMENT: ABNORMAL
MONOCYTES # BLD AUTO: 0.3 K/UL
MONOCYTES NFR BLD: 5.1 %
NEUTROPHILS # BLD AUTO: 5.2 K/UL
NEUTROPHILS NFR BLD: 85.8 %
NITRITE UR QL STRIP: NEGATIVE
NRBC BLD-RTO: 0 /100 WBC
PH UR STRIP: 5 [PH] (ref 5–8)
PHOSPHATE SERPL-MCNC: 2 MG/DL
PHOSPHATE SERPL-MCNC: 2.1 MG/DL
PHOSPHATE SERPL-MCNC: 2.4 MG/DL
PLATELET # BLD AUTO: 162 K/UL
PMV BLD AUTO: 9.7 FL
POTASSIUM SERPL-SCNC: 3.5 MMOL/L
POTASSIUM SERPL-SCNC: 3.6 MMOL/L
POTASSIUM SERPL-SCNC: 3.6 MMOL/L
PROT UR QL STRIP: ABNORMAL
RBC # BLD AUTO: 3.06 M/UL
RBC #/AREA URNS AUTO: 12 /HPF (ref 0–4)
SODIUM SERPL-SCNC: 133 MMOL/L
SODIUM SERPL-SCNC: 135 MMOL/L
SODIUM SERPL-SCNC: 135 MMOL/L
SP GR UR STRIP: 1.01 (ref 1–1.03)
SQUAMOUS #/AREA URNS AUTO: 2 /HPF
URN SPEC COLLECT METH UR: ABNORMAL
UROBILINOGEN UR STRIP-ACNC: NEGATIVE EU/DL
WBC # BLD AUTO: 6.06 K/UL
WBC #/AREA URNS AUTO: 3 /HPF (ref 0–5)

## 2018-08-08 PROCEDURE — 36415 COLL VENOUS BLD VENIPUNCTURE: CPT

## 2018-08-08 PROCEDURE — 81001 URINALYSIS AUTO W/SCOPE: CPT

## 2018-08-08 PROCEDURE — 25000003 PHARM REV CODE 250: Performed by: STUDENT IN AN ORGANIZED HEALTH CARE EDUCATION/TRAINING PROGRAM

## 2018-08-08 PROCEDURE — 87205 SMEAR GRAM STAIN: CPT

## 2018-08-08 PROCEDURE — 99233 SBSQ HOSP IP/OBS HIGH 50: CPT | Mod: ,,, | Performed by: INTERNAL MEDICINE

## 2018-08-08 PROCEDURE — 25000003 PHARM REV CODE 250: Performed by: INTERNAL MEDICINE

## 2018-08-08 PROCEDURE — 99232 SBSQ HOSP IP/OBS MODERATE 35: CPT | Mod: ,,, | Performed by: PHYSICIAN ASSISTANT

## 2018-08-08 PROCEDURE — 25000003 PHARM REV CODE 250: Performed by: NURSE PRACTITIONER

## 2018-08-08 PROCEDURE — 80069 RENAL FUNCTION PANEL: CPT | Mod: 91

## 2018-08-08 PROCEDURE — 20600001 HC STEP DOWN PRIVATE ROOM

## 2018-08-08 PROCEDURE — 80069 RENAL FUNCTION PANEL: CPT

## 2018-08-08 PROCEDURE — 87040 BLOOD CULTURE FOR BACTERIA: CPT

## 2018-08-08 PROCEDURE — 99024 POSTOP FOLLOW-UP VISIT: CPT | Mod: ,,, | Performed by: PHYSICIAN ASSISTANT

## 2018-08-08 PROCEDURE — 97530 THERAPEUTIC ACTIVITIES: CPT

## 2018-08-08 PROCEDURE — 97165 OT EVAL LOW COMPLEX 30 MIN: CPT

## 2018-08-08 PROCEDURE — 85025 COMPLETE CBC W/AUTO DIFF WBC: CPT

## 2018-08-08 RX ORDER — DICLOFENAC SODIUM 10 MG/G
2 GEL TOPICAL 4 TIMES DAILY
Qty: 100 G | Refills: 0 | Status: CANCELLED | OUTPATIENT
Start: 2018-08-08

## 2018-08-08 RX ORDER — ACETAMINOPHEN 325 MG/1
650 TABLET ORAL ONCE
Status: COMPLETED | OUTPATIENT
Start: 2018-08-08 | End: 2018-08-08

## 2018-08-08 RX ORDER — AMOXICILLIN 250 MG
1 CAPSULE ORAL 2 TIMES DAILY
Status: CANCELLED | COMMUNITY
Start: 2018-08-08

## 2018-08-08 RX ORDER — NAPROXEN SODIUM 220 MG/1
81 TABLET, FILM COATED ORAL DAILY
Status: CANCELLED | COMMUNITY
Start: 2018-08-08

## 2018-08-08 RX ORDER — MOXIFLOXACIN HYDROCHLORIDE 400 MG/1
400 TABLET ORAL DAILY
Status: DISCONTINUED | OUTPATIENT
Start: 2018-08-08 | End: 2018-08-14

## 2018-08-08 RX ORDER — BENZTROPINE MESYLATE 1 MG/1
1 TABLET ORAL 2 TIMES DAILY
Status: CANCELLED
Start: 2018-08-08

## 2018-08-08 RX ORDER — LOSARTAN POTASSIUM 25 MG/1
50 TABLET ORAL DAILY
Status: DISCONTINUED | OUTPATIENT
Start: 2018-08-08 | End: 2018-08-08

## 2018-08-08 RX ORDER — SODIUM,POTASSIUM PHOSPHATES 280-250MG
1 POWDER IN PACKET (EA) ORAL
Status: DISPENSED | OUTPATIENT
Start: 2018-08-08 | End: 2018-08-09

## 2018-08-08 RX ORDER — TRAMADOL HYDROCHLORIDE 50 MG/1
50 TABLET ORAL EVERY 6 HOURS PRN
Status: DISCONTINUED | OUTPATIENT
Start: 2018-08-08 | End: 2018-08-21 | Stop reason: HOSPADM

## 2018-08-08 RX ORDER — DEXAMETHASONE 4 MG/1
40 TABLET ORAL WEEKLY
Qty: 40 TABLET | Refills: 5 | Status: CANCELLED | OUTPATIENT
Start: 2018-08-08

## 2018-08-08 RX ORDER — LENALIDOMIDE 25 MG/1
25 CAPSULE ORAL DAILY
Qty: 21 CAPSULE | Refills: 0 | Status: SHIPPED | OUTPATIENT
Start: 2018-08-08 | End: 2018-08-15 | Stop reason: SDUPTHER

## 2018-08-08 RX ORDER — RISPERIDONE 1 MG/1
TABLET ORAL
Status: CANCELLED
Start: 2018-08-08

## 2018-08-08 RX ORDER — ACYCLOVIR 400 MG/1
400 TABLET ORAL 2 TIMES DAILY
Qty: 60 TABLET | Refills: 11 | Status: CANCELLED | OUTPATIENT
Start: 2018-08-08

## 2018-08-08 RX ADMIN — DICLOFENAC 2 G: 10 GEL TOPICAL at 01:08

## 2018-08-08 RX ADMIN — OXYCODONE HYDROCHLORIDE 10 MG: 5 TABLET ORAL at 03:08

## 2018-08-08 RX ADMIN — SENNOSIDES AND DOCUSATE SODIUM 1 TABLET: 8.6; 5 TABLET ORAL at 09:08

## 2018-08-08 RX ADMIN — DICLOFENAC 2 G: 10 GEL TOPICAL at 09:08

## 2018-08-08 RX ADMIN — BENZTROPINE MESYLATE 1 MG: 1 TABLET ORAL at 09:08

## 2018-08-08 RX ADMIN — POTASSIUM & SODIUM PHOSPHATES POWDER PACK 280-160-250 MG 1 PACKET: 280-160-250 PACK at 05:08

## 2018-08-08 RX ADMIN — SENNOSIDES AND DOCUSATE SODIUM 1 TABLET: 8.6; 5 TABLET ORAL at 08:08

## 2018-08-08 RX ADMIN — OXYCODONE HYDROCHLORIDE 10 MG: 5 TABLET ORAL at 04:08

## 2018-08-08 RX ADMIN — SODIUM CHLORIDE: 0.9 INJECTION, SOLUTION INTRAVENOUS at 10:08

## 2018-08-08 RX ADMIN — ACETAMINOPHEN 650 MG: 325 TABLET, FILM COATED ORAL at 11:08

## 2018-08-08 RX ADMIN — SODIUM CHLORIDE: 0.9 INJECTION, SOLUTION INTRAVENOUS at 05:08

## 2018-08-08 RX ADMIN — ALUMINUM HYDROXIDE, MAGNESIUM HYDROXIDE, AND SIMETHICONE: 200; 200; 20 SUSPENSION ORAL at 01:08

## 2018-08-08 RX ADMIN — LABETALOL HCL 300 MG: 100 TABLET, FILM COATED ORAL at 09:08

## 2018-08-08 RX ADMIN — DICLOFENAC 2 G: 10 GEL TOPICAL at 05:08

## 2018-08-08 RX ADMIN — LABETALOL HCL 300 MG: 100 TABLET, FILM COATED ORAL at 08:08

## 2018-08-08 RX ADMIN — POTASSIUM & SODIUM PHOSPHATES POWDER PACK 280-160-250 MG 1 PACKET: 280-160-250 PACK at 11:08

## 2018-08-08 RX ADMIN — SODIUM CHLORIDE: 0.9 INJECTION, SOLUTION INTRAVENOUS at 07:08

## 2018-08-08 NOTE — ASSESSMENT & PLAN NOTE
-due to PHPT and MM  -continue fluids NS @ 200 cc/h  -PTH elevated as well  -Zometa 4 mg IV administered 8/7  -corrected Ca today 11.1

## 2018-08-08 NOTE — PLAN OF CARE
Problem: Patient Care Overview  Goal: Plan of Care Review  Outcome: Ongoing (interventions implemented as appropriate)  Plan of care reviewed with patient and son . Patient febrile times one this shift. Blood culture, chest xray urinalysis graim stain, CBC and bmp drawn.  Tylenol given for fever and pain early in shift Patient also sent to radiology to have spine xrays performed . Pain medicine given on return for pain level of 10. Patient and son have refused some medications his shift . Physician notified . Hourly rounding continues . Bedside potty in room, patient up with assistance. Call light within reach. Son at bedside this shift, closely dictating medications of patient . Hourly monitoring continues

## 2018-08-08 NOTE — PLAN OF CARE
Problem: Occupational Therapy Goal  Goal: Occupational Therapy Goal  Goals to be met by: 8/15/18     Patient will increase functional independence with ADLs by performing:    UE Dressing with Set-up Assistance and Supervision, mod assist with brace  LE Dressing with Set-up Assistance and Supervision, with AE use/rec's prn  Grooming while standing with Supervision.  Toileting from toilet with Supervision for hygiene and clothing management.   Toilet transfer to toilet with Supervision.    Outcome: Ongoing (interventions implemented as appropriate)  Goals established  Genevieve Jacinto OTR

## 2018-08-08 NOTE — PT/OT/SLP EVAL
Occupational Therapy   Evaluation    Name: Janie Zamorano  MRN: [id number]  Admitting Diagnosis:  Hypercalcemia      Recommendations:     Discharge Recommendations: home health PT, home health OT, would benefit from increased mobility and education prior to d/c  Discharge Equipment Recommendations:  bedside commode, walker, rolling  Barriers to discharge:  Inaccessible home environment, Decreased caregiver support    History:     Occupational Profile:  Living Environment: lives in  home with 4-6 LOAN  Previous level of function: mod I/I after initial incident with back pain  Roles and Routines: none stated  Equipment Owned:  none (has shower chair available)  Assistance upon Discharge: yes, sister and son mostly works from home and can assist    Past Medical History:   Diagnosis Date    Asthma     Depression     GERD (gastroesophageal reflux disease)     Hypertension     Neck pain     Schizophrenia        Past Surgical History:   Procedure Laterality Date     SECTION      X 1    COLONOSCOPY N/A 2018    Procedure: COLONOSCOPY;  Surgeon: Albert Russell MD;  Location: [facility] (4TH FLR);  Service: Endoscopy;  Laterality: N/A;  pm prep/MS    ESOPHAGOGASTRODUODENOSCOPY N/A 2018    Procedure: ESOPHAGOGASTRODUODENOSCOPY (EGD);  Surgeon: Albert Russell MD;  Location: [facility] (2ND FLR);  Service: Endoscopy;  Laterality: N/A;  EGD in 8 weeks on Pantoprazole 40mg every 12 hours patient needs to take her PPI morning of EGD with sip of water.  Follow up esophagitis.       hx of gastroparesis. per Dr Russell-24 hours clear liquids/ Per Dr. Russell on 18 Pt to be r/s with     HYSTERECTOMY      KJB---DLH/BSO    LIPOMA RESECTION      back       Subjective     Chief Complaint: back pain  Patient/Family stated goals: none stated  Communicated with: nsg prior to session.  Pain/Comfort:  · Pain Rating 1: 7/10 (back, position dependent)  · Pain Addressed 1: Reposition, Distraction,  Cessation of Activity    Patients cultural, spiritual, Zoroastrianism conflicts given the current situation: none    Objective:     Patient found with: peripheral IV    General Precautions: Standard, fall   Orthopedic Precautions:spinal precautions   Braces: TLSO     Occupational Performance:    Bed Mobility:  Son with good understanding of log roll technique after educated  · Sup to sit using log roll technique with min assist  · Sit to sup with log roll technique with min assist and mod cues    Functional Mobility/Transfers:  · Sit to stand with CGA  · Functional Mobility: ambulated x 10 ft to BSC and back to bed with CGA, min to mod cues with RW use     Activities of Daily Living:  · TLSO donned/doffed with total assist, son able to perform with min cues for proper placement and fit  · Performed BSC transfer with CGA, toileting hygiene with setup seated    Cognitive/Visual Perceptual:  Pt follows commands, slowed processing, somewhat sleepy during evaluation.     Physical Exam:  wfl BUE strength and ROM    Patient left supine with all lines intact and son present, camera system present    [facility] 6 Click:  [facility] Total Score: 17    Treatment & Education:  Performed above activity, educated pt and son on proper TLSO donning and doffing and use with all upright/OOB activity, log rolling technique, body mechanics with activity and no twisting/bending, CGA presently with mobility and activity. Educated on plan of care and current rec's for DME and f/u.   Education:    Assessment:     Janie Zamorano is a 62 y.o. female with a medical diagnosis of Hypercalcemia.  She presents somewhat sleepy at [facility] but able to participate and son involved in care with good understanding of instructions and education, reports he is available to assist as is pt's sister.  Performance deficits affecting function are weakness, impaired endurance, impaired self care skills, impaired functional mobilty, pain, decreased safety awareness.   "    Rehab Prognosis:  good; patient would benefit from acute skilled OT services to address these deficits and reach maximum level of function.         Clinical Decision Makin.  OT Low:  "Pt evaluation falls under low complexity for evaluation coding due to performance deficits noted in 1-3 areas as stated above and 0 co-morbities affecting current functional status. Data obtained from problem focused assessments. No modifications or assistance was required for completion of evaluation. Only brief occupational profile and history review completed."     Plan:     Patient to be seen 3 x/week to address the above listed problems via self-care/home management, therapeutic activities, therapeutic exercises  · Plan of Care Expires: 18  · Plan of Care Reviewed with: patient, son    This Plan of care has been discussed with the patient who was involved in its development and understands and is in agreement with the identified goals and treatment plan    GOALS:    Occupational Therapy Goals        Problem: Occupational Therapy Goal    Goal Priority Disciplines Outcome Interventions   Occupational Therapy Goal     OT, PT/OT Ongoing (interventions implemented as appropriate)    Description:  Goals to be met by: 8/15/18     Patient will increase functional independence with ADLs by performing:    UE Dressing with Set-up Assistance and Supervision, mod assist with brace  LE Dressing with Set-up Assistance and Supervision, with AE use/rec's prn  Grooming while standing with Supervision.  Toileting from toilet with Supervision for hygiene and clothing management.   Toilet transfer to toilet with Supervision.                      Time Tracking:     OT Date of Treatment: 18  OT Start Time: 1446  OT Stop Time: 1523  OT Total Time (min): 37 min    Billable Minutes:Evaluation 25 min  Therapeutic Activity 12 min    Genevieve Jacinto OT  2018    "

## 2018-08-08 NOTE — ASSESSMENT & PLAN NOTE
- Home meds re-started except the valsartan given patient was having GLENIS  - labetalol dose increased to 300 mg TID  - continue restarting valsartan now than GLENIS has resolved.  - continue to monitor BP

## 2018-08-08 NOTE — NURSING
Transfer to room 811 by wheelchair after  Detail report was call to that floor to unit. All personal belonging was sent with pt. I attempt to call patient son .Ms Lima also attempted but asked that I leave a voiced message on her phone which I did.

## 2018-08-08 NOTE — PLAN OF CARE
Problem: Patient Care Overview  Goal: Plan of Care Review  Outcome: Ongoing (interventions implemented as appropriate)  Pt A&O x 4.  BP controlled during shift, last /85.  All other VSS on room air.  Pt c/o pain to bilateral hips and lower back, 8/10 - 10/10.  Given PRN oxycodone x 2.  Full relief obtained. Pt c/o diffuse chest medial pain attributed to indigestion, requesting PRN med.  Hematology BMT service contacted x 5.  Did not receive call back.      24 hour urine completed and sent to lab.  Pt travelled for MRI spine.  Given IV zometa and receiving continuous IV fluids.  Calcium trending down.

## 2018-08-08 NOTE — SUBJECTIVE & OBJECTIVE
Subjective:     Interval History: NAEON. Complaining of back pain this morning. Hypercalcemia slowly improving.     Objective:     Vital Signs (Most Recent):  Temp: 99.2 °F (37.3 °C) (08/08/18 0720)  Pulse: (!) 111 (08/08/18 0720)  Resp: 18 (08/08/18 0720)  BP: (!) 159/93 (08/08/18 0720)  SpO2: 95 % (08/08/18 0720) Vital Signs (24h Range):  Temp:  [98.1 °F (36.7 °C)-99.2 °F (37.3 °C)] 99.2 °F (37.3 °C)  Pulse:  [] 111  Resp:  [16-20] 18  SpO2:  [95 %-99 %] 95 %  BP: (142-179)/(70-98) 159/93     Weight: 69.9 kg (154 lb)  Body mass index is 26.43 kg/m².  Body surface area is 1.78 meters squared.    ECOG SCORE         [unfilled]    Intake/Output - Last 3 Shifts       08/06 0700 - 08/07 0659 08/07 0700 - 08/08 0659 08/08 0700 - 08/09 0659    P.O.  100     I.V. (mL/kg)  2000 (28.6)     Other  0     IV Piggyback  100     Total Intake(mL/kg)  2200 (31.5)     Urine (mL/kg/hr) 2400 (1.4) 2850 (1.7)     Emesis/NG output 0 (0) 0 (0)     Other 0 (0) 0 (0)     Stool 0 (0) 0 (0)     Blood 0 (0) 0 (0)     Total Output 2400 2850      Net -2400 -650             Stool Occurrence  0 x           Physical Exam   Constitutional: She is oriented to person, place, and time. She appears well-developed and well-nourished. No distress.   HENT:   Head: Normocephalic.   Eyes: Conjunctivae and EOM are normal. Pupils are equal, round, and reactive to light.   Neck: Normal range of motion. Neck supple.   Cardiovascular: Regular rhythm and normal heart sounds.    tachycardic   Pulmonary/Chest: Effort normal and breath sounds normal. No respiratory distress.   Abdominal: Soft. Bowel sounds are normal. She exhibits no distension.   Musculoskeletal: Normal range of motion. She exhibits no edema.   Neurological: She is alert and oriented to person, place, and time. No cranial nerve deficit.   Skin: Skin is warm and dry.   Psychiatric: She has a normal mood and affect. Her behavior is normal.       Significant Labs:   All pertinent labs from the  last 24 hours have been reviewed.    Diagnostic Results:  I have reviewed all pertinent imaging results/findings within the past 24 hours.

## 2018-08-08 NOTE — PROGRESS NOTES
[facility]  Hematology  Bone Marrow Transplant  Progress Note    Patient Name: Janie Zamorano  Admission Date: 8/3/2018  Hospital Length of Stay: 5 days  Code Status: Full Code    Subjective:     Interval History: NAEON. Complaining of back pain this morning. Hypercalcemia slowly improving.     Objective:     Vital Signs (Most Recent):  Temp: 99.2 °F (37.3 °C) (08/08/18 0720)  Pulse: (!) 111 (08/08/18 0720)  Resp: 18 (08/08/18 0720)  BP: (!) 159/93 (08/08/18 0720)  SpO2: 95 % (08/08/18 0720) Vital Signs (24h Range):  Temp:  [98.1 °F (36.7 °C)-99.2 °F (37.3 °C)] 99.2 °F (37.3 °C)  Pulse:  [] 111  Resp:  [16-20] 18  SpO2:  [95 %-99 %] 95 %  BP: (142-179)/(70-98) 159/93     Weight: 69.9 kg (154 lb)  Body mass index is 26.43 kg/m².  Body surface area is 1.78 meters squared.    ECOG SCORE         [unfilled]    Intake/Output - Last 3 Shifts       08/06 0700 - 08/07 0659 08/07 0700 - 08/08 0659 08/08 0700 - 08/09 0659    P.O.  100     I.V. (mL/kg)  2000 (28.6)     Other  0     IV Piggyback  100     Total Intake(mL/kg)  2200 (31.5)     Urine (mL/kg/hr) 2400 (1.4) 2850 (1.7)     Emesis/NG output 0 (0) 0 (0)     Other 0 (0) 0 (0)     Stool 0 (0) 0 (0)     Blood 0 (0) 0 (0)     Total Output 2400 2850      Net -2400 -650             Stool Occurrence  0 x           Physical Exam   Constitutional: She is oriented to person, place, and time. She appears well-developed and well-nourished. No distress.   HENT:   Head: Normocephalic.   Eyes: Conjunctivae and EOM are normal. Pupils are equal, round, and reactive to light.   Neck: Normal range of motion. Neck supple.   Cardiovascular: Regular rhythm and normal heart sounds.    tachycardic   Pulmonary/Chest: Effort normal and breath sounds normal. No respiratory distress.   Abdominal: Soft. Bowel sounds are normal. She exhibits no distension.   Musculoskeletal: Normal range of motion. She exhibits no edema.   Neurological: She is alert and oriented to  person, place, and time. No cranial nerve deficit.   Skin: Skin is warm and dry.   Psychiatric: She has a normal mood and affect. Her behavior is normal.       Significant Labs:   All pertinent labs from the last 24 hours have been reviewed.    Diagnostic Results:  I have reviewed all pertinent imaging results/findings within the past 24 hours.    Assessment/Plan:     * Hypercalcemia    -due to PHPT and MM  -continue fluids NS @ 200 cc/h  -PTH elevated as well  -Zometa 4 mg IV administered 8/7  -corrected Ca today 11.1; improving        Constipation    BM w/ multiple BM recorded on 8/5 although CT showed moderate amount of retained stool in the ascending and transverse:, suggestive of constipation  Will place on regimen as patient receiving opiates        Anemia    - Hb stable  - normocytic anemia; likely 2/2 to underlying malignancy  - transfuse for Hgb < 7 and plt < 10K   - cbc with diff daily while inpatient                      Suspected multiple myeloma not having achieved remission    -hypercalcemia, anemia, bone fx and lytic lesions. Likely 2/2 to multiple myeloma.  -on IVF  cc/hr  -zometa 4 mg IV x1 8/7/18  -serologic and urine studies ordered: kappa free light chain 527.1 and kappa/lambda ratio 620.1, immunoglobulins unremarkable  -metastatic skeletal survey w/ multiple lytic lesions  -bone marrow bx and aspiration 8/6/18 with routine studies plus PCPD FISH, pending  -24 hr urine protein=5940  -immunofixation-A free kappa light chain is present in beta.          Closed compression fracture of thoracic vertebra    -neurosug following, appreciate recs  -no acute neurosurgical intervention indicated  -TLSO brace when OOB or working with therapy. OK to remove when in bed or lying in recliner.   -MRI lumbar: Associated pathologic compression fracture of T12 vertebral body with retropulsion of the fracture and mild associated effacement of the thecal sac. No acute intervention necessary, per neurosurgery.  Rec continue wearing brace when OOB.  -pain somewhat controlled, continue diclofenac topical and oxy 5-10 mg q6hr prn        Acute kidney injury    -2/2 low PO intake, hypercalcemia, uncontrolled HTN  -imaging did not show any hydronephrosis or obstruction in the urinary system  -UA normal  -FENa pre-renal  -GLENIS resolved after aggressive IV fluids x3days        Paranoid schizophrenia    -continue Risperidone 1mg daily and 2mg qhs        Asthma    -Fluticasone 44mcg/Actuation 1 puffs BID  -No signs of symptoms of exacerbation        Other secondary hypertension    - Home meds re-started except the valsartan given patient was having GLENIS  - labetalol dose increased to 300 mg TID  - continue restarting valsartan now that GLENIS has resolved. (valsartan shortage, so unable to restart. Will do losartan)  - continue to monitor BP             VTE Risk Mitigation         Ordered     IP VTE HIGH RISK PATIENT  Once      08/03/18 2320     Place MOUNIKA hose  Until discontinued      08/03/18 2320     Place sequential compression device  Until discontinued      08/03/18 1735          Disposition: discharge home pending PT/OT and neuro sx recs    Daniela Ayon MD  Bone Marrow Transplant  [facility]

## 2018-08-08 NOTE — ASSESSMENT & PLAN NOTE
-hypercalcemia, anemia, bone fx and lytic lesions. Likely 2/2 to multiple myeloma.  -on IVF  cc/hr  -zometa 4 mg IV x1 8/7/18  -serologic and urine studies ordered: kappa free light chain 527.1 and kappa/lambda ratio 620.1, immunoglobulins unremarkable  -metastatic skeletal survey w/ multiple lytic lesions  -bone marrow bx and aspiration 8/6/18 with routine studies plus PCPD FISH, pending  -24 hr urine protein=5940  -immunofixation-A free kappa light chain is present in beta.

## 2018-08-08 NOTE — PROGRESS NOTES
Pt  Has been told to call for assistance by using call light prior to getting out of bed. Twice this shift she has attempted to get up setting off bed alarm to go to BSC. FIrst time IV came apart causing bleeding to occur from site but was able to save IV and stop bleeding. . Currently complain of pain to hip, back, and lt side. Oxycodone was given.Pain Med lasting about 4.5 hours-5 hours long not 6 hours.SBp this shift has been 140-160s. Complain of indigestion but choose nt to take anything for it for now.

## 2018-08-09 LAB
ALBUMIN SERPL BCP-MCNC: 2.3 G/DL
ALBUMIN SERPL BCP-MCNC: 2.4 G/DL
ALBUMIN SERPL BCP-MCNC: 2.5 G/DL
ANION GAP SERPL CALC-SCNC: 10 MMOL/L
ANION GAP SERPL CALC-SCNC: 8 MMOL/L
ANION GAP SERPL CALC-SCNC: 9 MMOL/L
BASOPHILS # BLD AUTO: 0.01 K/UL
BASOPHILS NFR BLD: 0.2 %
BUN SERPL-MCNC: 10 MG/DL
BUN SERPL-MCNC: 8 MG/DL
BUN SERPL-MCNC: 9 MG/DL
CALCIUM SERPL-MCNC: 8 MG/DL
CALCIUM SERPL-MCNC: 8.3 MG/DL
CALCIUM SERPL-MCNC: 8.8 MG/DL
CHLORIDE SERPL-SCNC: 108 MMOL/L
CHLORIDE SERPL-SCNC: 111 MMOL/L
CHLORIDE SERPL-SCNC: 111 MMOL/L
CO2 SERPL-SCNC: 19 MMOL/L
CO2 SERPL-SCNC: 19 MMOL/L
CO2 SERPL-SCNC: 20 MMOL/L
CREAT SERPL-MCNC: 0.9 MG/DL
CREAT SERPL-MCNC: 0.9 MG/DL
CREAT SERPL-MCNC: 1 MG/DL
DIFFERENTIAL METHOD: ABNORMAL
EOSINOPHIL # BLD AUTO: 0.1 K/UL
EOSINOPHIL NFR BLD: 1.3 %
ERYTHROCYTE [DISTWIDTH] IN BLOOD BY AUTOMATED COUNT: 14.1 %
EST. GFR  (AFRICAN AMERICAN): >60 ML/MIN/1.73 M^2
EST. GFR  (NON AFRICAN AMERICAN): >60 ML/MIN/1.73 M^2
GLUCOSE SERPL-MCNC: 78 MG/DL
GLUCOSE SERPL-MCNC: 78 MG/DL
GLUCOSE SERPL-MCNC: 84 MG/DL
GRAM STN SPEC: NORMAL
HCT VFR BLD AUTO: 23.1 %
HGB BLD-MCNC: 7.3 G/DL
IMM GRANULOCYTES # BLD AUTO: 0.04 K/UL
IMM GRANULOCYTES NFR BLD AUTO: 0.9 %
LYMPHOCYTES # BLD AUTO: 0.4 K/UL
LYMPHOCYTES NFR BLD: 8.3 %
MCH RBC QN AUTO: 28.1 PG
MCHC RBC AUTO-ENTMCNC: 31.6 G/DL
MCV RBC AUTO: 89 FL
MONOCYTES # BLD AUTO: 0.3 K/UL
MONOCYTES NFR BLD: 6.6 %
NEUTROPHILS # BLD AUTO: 3.9 K/UL
NEUTROPHILS NFR BLD: 82.7 %
NRBC BLD-RTO: 0 /100 WBC
PATHOLOGIST INTERPRETATION UPE: NORMAL
PHOSPHATE SERPL-MCNC: 1.6 MG/DL
PHOSPHATE SERPL-MCNC: 1.7 MG/DL
PHOSPHATE SERPL-MCNC: 2.1 MG/DL
PLATELET # BLD AUTO: 146 K/UL
PMV BLD AUTO: 9.3 FL
POTASSIUM SERPL-SCNC: 3.3 MMOL/L
POTASSIUM SERPL-SCNC: 3.3 MMOL/L
POTASSIUM SERPL-SCNC: 3.4 MMOL/L
PROT PATTERN UR ELPH-IMP: NORMAL
RBC # BLD AUTO: 2.6 M/UL
SODIUM SERPL-SCNC: 137 MMOL/L
SODIUM SERPL-SCNC: 139 MMOL/L
SODIUM SERPL-SCNC: 139 MMOL/L
WBC # BLD AUTO: 4.69 K/UL

## 2018-08-09 PROCEDURE — 25000003 PHARM REV CODE 250: Performed by: INTERNAL MEDICINE

## 2018-08-09 PROCEDURE — 25000003 PHARM REV CODE 250: Performed by: STUDENT IN AN ORGANIZED HEALTH CARE EDUCATION/TRAINING PROGRAM

## 2018-08-09 PROCEDURE — 99233 SBSQ HOSP IP/OBS HIGH 50: CPT | Mod: ,,, | Performed by: INTERNAL MEDICINE

## 2018-08-09 PROCEDURE — 20600001 HC STEP DOWN PRIVATE ROOM

## 2018-08-09 PROCEDURE — 94761 N-INVAS EAR/PLS OXIMETRY MLT: CPT

## 2018-08-09 PROCEDURE — 36415 COLL VENOUS BLD VENIPUNCTURE: CPT

## 2018-08-09 PROCEDURE — 25000003 PHARM REV CODE 250: Performed by: NURSE PRACTITIONER

## 2018-08-09 PROCEDURE — 99232 SBSQ HOSP IP/OBS MODERATE 35: CPT | Mod: ,,, | Performed by: PHYSICIAN ASSISTANT

## 2018-08-09 PROCEDURE — 80069 RENAL FUNCTION PANEL: CPT | Mod: 91

## 2018-08-09 PROCEDURE — 80069 RENAL FUNCTION PANEL: CPT

## 2018-08-09 PROCEDURE — 85025 COMPLETE CBC W/AUTO DIFF WBC: CPT

## 2018-08-09 RX ORDER — FAMOTIDINE 20 MG/1
20 TABLET, FILM COATED ORAL 2 TIMES DAILY
Status: DISCONTINUED | OUTPATIENT
Start: 2018-08-09 | End: 2018-08-21 | Stop reason: HOSPADM

## 2018-08-09 RX ORDER — POTASSIUM CHLORIDE 20 MEQ/15ML
40 SOLUTION ORAL ONCE
Status: COMPLETED | OUTPATIENT
Start: 2018-08-09 | End: 2018-08-09

## 2018-08-09 RX ORDER — POTASSIUM CHLORIDE 20 MEQ/1
40 TABLET, EXTENDED RELEASE ORAL ONCE
Status: COMPLETED | OUTPATIENT
Start: 2018-08-09 | End: 2018-08-09

## 2018-08-09 RX ADMIN — MOXIFLOXACIN HYDROCHLORIDE 400 MG: 400 TABLET, FILM COATED ORAL at 08:08

## 2018-08-09 RX ADMIN — SODIUM CHLORIDE 200 ML/HR: 0.9 INJECTION, SOLUTION INTRAVENOUS at 03:08

## 2018-08-09 RX ADMIN — SENNOSIDES AND DOCUSATE SODIUM 1 TABLET: 8.6; 5 TABLET ORAL at 08:08

## 2018-08-09 RX ADMIN — SENNOSIDES AND DOCUSATE SODIUM 1 TABLET: 8.6; 5 TABLET ORAL at 09:08

## 2018-08-09 RX ADMIN — FAMOTIDINE 20 MG: 20 TABLET ORAL at 09:08

## 2018-08-09 RX ADMIN — POTASSIUM BICARBONATE 50 MEQ: 25 TABLET, EFFERVESCENT ORAL at 02:08

## 2018-08-09 RX ADMIN — TRAMADOL HYDROCHLORIDE 50 MG: 50 TABLET, COATED ORAL at 11:08

## 2018-08-09 RX ADMIN — DICLOFENAC 2 G: 10 GEL TOPICAL at 08:08

## 2018-08-09 RX ADMIN — DICLOFENAC 2 G: 10 GEL TOPICAL at 01:08

## 2018-08-09 RX ADMIN — LABETALOL HCL 300 MG: 100 TABLET, FILM COATED ORAL at 04:08

## 2018-08-09 RX ADMIN — POTASSIUM CHLORIDE 40 MEQ: 1500 TABLET, EXTENDED RELEASE ORAL at 06:08

## 2018-08-09 RX ADMIN — LABETALOL HCL 300 MG: 100 TABLET, FILM COATED ORAL at 10:08

## 2018-08-09 RX ADMIN — LABETALOL HCL 300 MG: 100 TABLET, FILM COATED ORAL at 09:08

## 2018-08-09 RX ADMIN — TRAMADOL HYDROCHLORIDE 50 MG: 50 TABLET, COATED ORAL at 08:08

## 2018-08-09 RX ADMIN — DICLOFENAC 2 G: 10 GEL TOPICAL at 09:08

## 2018-08-09 RX ADMIN — POTASSIUM CHLORIDE 40 MEQ: 20 SOLUTION ORAL at 10:08

## 2018-08-09 NOTE — ASSESSMENT & PLAN NOTE
62 year old female with acute onset on bilateral flank pain that began after lifting a heavy object. Imaging shows a T12 compression fracture along with multiple lytic lesions throughout the spine.   -Patient neurologically stable without s/s or myelopathy   -CT Thoracic/Upright&supine x-rays reviewed, consistent with unstable fracture  -Discussed recommendations for surgery in detail with patient and Son, including risk of progression of fracture and cord injury if fracture is not surgically stabilized.  They would like think about surgery, and decide this evening.    -Pt is tentatively booked for T9-L3 posterior instrumented fusion with Dr. Andres in AM    -Keep in TLSO brace at all times.    -Bed Rest  -Will continue to follow.  Please call with questions or change in neurologic status  -Discussed with Dr. Andres

## 2018-08-09 NOTE — ASSESSMENT & PLAN NOTE
Concern for fracture stability.  Plan for inpatient surgery 8/10, will make NPO   -neurosug following, appreciate recs  -TLSO brace   -pain somewhat controlled, continue diclofenac topical and oxy 5-10 mg q6hr prn

## 2018-08-09 NOTE — PROGRESS NOTES
[facility]  Hematology  Bone Marrow Transplant  Progress Note    Patient Name: Janie Zamorano  Admission Date: 8/3/2018  Hospital Length of Stay: 6 days  Code Status: Full Code    Subjective:     Interval History: Low grade fever 100.8 yesterday.  Complaining of back pain this morning. Hypercalcemia continues to improve.  Neurosurgery plans for inpatient surgery tomorrow to stabilize T12 compression fracture.  Otherwise no new complaints.    Patient's son requesting chart release for his records.          Objective:     Vital Signs (Most Recent):  Temp: 98.3 °F (36.8 °C) (08/09/18 1100)  Pulse: 99 (08/09/18 1100)  Resp: 19 (08/09/18 1100)  BP: (!) 169/83 (08/09/18 1100)  SpO2: 96 % (08/09/18 1400) Vital Signs (24h Range):  Temp:  [98.3 °F (36.8 °C)-100 °F (37.8 °C)] 98.3 °F (36.8 °C)  Pulse:  [] 99  Resp:  [18-20] 19  SpO2:  [93 %-97 %] 96 %  BP: (104-169)/(62-86) 169/83     Weight: 69.9 kg (154 lb)  Body mass index is 26.43 kg/m².  Body surface area is 1.78 meters squared.    ECOG SCORE         [unfilled]    Intake/Output - Last 3 Shifts       08/07 0700 - 08/08 0659 08/08 0700 - 08/09 0659 08/09 0700 - 08/10 0659    P.O. 100      I.V. (mL/kg) 3625 (51.9)      Other 0      IV Piggyback 100      Total Intake(mL/kg) 3825 (54.7)      Urine (mL/kg/hr) 2850 (1.7) 500 (0.3)     Emesis/NG output 0 (0)      Other 0 (0)      Stool 0 (0)      Blood 0 (0)      Total Output 2850 500      Net +975 -500             Urine Occurrence  4 x     Stool Occurrence 0 x 1 x     Emesis Occurrence 0 x            Physical Exam   Constitutional: She is oriented to person, place, and time. She appears well-developed and well-nourished. No distress.   HENT:   Head: Normocephalic.   Eyes: Conjunctivae and EOM are normal. Pupils are equal, round, and reactive to light.   Neck: Normal range of motion. Neck supple.   Cardiovascular: Regular rhythm and normal heart sounds.    tachycardic   Pulmonary/Chest: Effort  normal and breath sounds normal. No respiratory distress.   Abdominal: Soft. Bowel sounds are normal. She exhibits no distension.   Musculoskeletal: Normal range of motion. She exhibits no edema.   Neurological: She is alert and oriented to person, place, and time. No cranial nerve deficit.   Skin: Skin is warm and dry.   Psychiatric: She has a normal mood and affect. Her behavior is normal.       Significant Labs:   All pertinent labs from the last 24 hours have been reviewed.    Diagnostic Results:  I have reviewed all pertinent imaging results/findings within the past 24 hours.    Assessment/Plan:     * Hypercalcemia    Improving.  Corrected 9.6 today, Dcing continuous fluids.    -due to PHPT and MM  -continue fluids NS @ 200 cc/h  -PTH elevated as well  -Zometa 4 mg IV administered 8/7          Multiple myeloma not having achieved remission    -hypercalcemia, anemia, bone fx and lytic lesions. Likely 2/2 to multiple myeloma.  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   -24 hr urine protein=5940  -immunofixation-A free kappa light chain is present in beta.          Closed compression fracture of thoracic vertebra    Concern for fracture stability.  Plan for inpatient surgery 8/10, will make NPO   -neurosug following, appreciate recs  -TLSO brace   -pain somewhat controlled, continue diclofenac topical and oxy 5-10 mg q6hr prn        Constipation    Resolved, continue bowel regimen.   BM w/ multiple BM recorded on 8/5 although CT showed moderate amount of retained stool in the ascending and transverse:, suggestive of constipation          Anemia    Hgb dropping, likely dilutional.    - normocytic anemia; likely 2/2 to underlying malignancy  - transfuse for Hgb < 7 and plt < 10K   - cbc with diff  daily while inpatient        Lytic bone lesions on xray             Acute kidney injury    Resolved.   -2/2 low PO intake, hypercalcemia, uncontrolled HTN  -imaging did not show any hydronephrosis or obstruction in the urinary system  -UA normal  -FENa pre-renal          Paranoid schizophrenia    -continue Risperidone 1mg daily and 2mg qhs        Asthma    -Fluticasone 44mcg/Actuation 1 puffs BID  -No signs of symptoms of exacerbation        Other secondary hypertension    - Home meds re-started except the valsartan given patient was having GLENIS  - labetalol dose increased to 300 mg TID  - continue restarting valsartan now than GLENIS has resolved.  - continue to monitor BP             VTE Risk Mitigation         Ordered     IP VTE HIGH RISK PATIENT  Once      08/03/18 2320     Place MOUNIKA hose  Until discontinued      08/03/18 2320     Place sequential compression device  Until discontinued      08/03/18 1735          Disposition: NPO tonight for plan neurosurgery tomorrow at 7 AM.       Ruslan Small MD  Bone Marrow Transplant  [facility]

## 2018-08-09 NOTE — PLAN OF CARE
"Problem: Patient Care Overview  Goal: Plan of Care Review  Outcome: Ongoing (interventions implemented as appropriate)  Plan of care reviewed with patient and son . Patient and son consulted with neuro surgery today to discuss surgery tomorrow for thoracic fusion. Case pending patient and son making a decision. Patient NPO after midnight for pending case. Patient afebrile this shift. VSS stable with hypetensive BP , labetelol given .  Patient now on bedrest with brace on. ,however not "strapped on patient " per family choice . Tramadol given once for pain with fair relief.  Iv fluids dcd this shift. PIV in hand dcd due to not flushing and due date to be removed. PIV in forearm flushing well. Patient bed remains in low locked position, call light within reach, son at bedside all shift. Encouraged son to rest . Hourly rounding continues       "

## 2018-08-09 NOTE — PROGRESS NOTES
[facility]  Neurosurgery  Progress Note    Subjective:     History of Present Illness: Ms. Zamorano is a 62 year old female with no PMHx of cancer, chronic back pain, or osteoporosis, who presents to the ED with onset of bilateral flank pain that began 1 week ago while lifting a heavy object from the ground. Patient denies any popping or pulling sensation. Denies any thoracic or lumbar back pain. Denies any UE or LE pain, paresthesias, or weakness. Denies any bladder or bowel incontinence. CT renal was performed and showed a T12 compression fracture. Neurosurgery was consulted for treatment recommendations.     Post-Op Info:  * No surgery found *       No new subjective & objective note has been filed under this hospital service since the last note was generated.  General: well developed, well nourished, no distress.   Head: normocephalic, atraumatic  Neurologic: Alert and oriented. Thought content appropriate.  GCS: Motor: 6/Verbal: 5/Eyes: 4 GCS Total: 15  Mental Status: Awake, Alert, Oriented x 4  Language: No aphasia  Speech: No dysarthria  Cranial nerves: face symmetric, tongue midline, CN II-XII grossly intact.   Eyes: pupils equal, round, reactive to light with accomodation, EOMI.   Pulmonary: normal respirations, no signs of respiratory distress  Abdomen: soft, non-distended, not tender to palpation  Sensory: intact to light touch throughout  Motor Strength:Moves all extremities spontaneously with good tone.  Full strength upper and lower extremities. No abnormal movements seen.   Washburn: absent  Clonus: absent  Skin: Skin is warm, dry and intact.  No thoracic or lumbar TTP, midline or along paraspinal muscles         Assessment/Plan:     Closed compression fracture of thoracic vertebra    62 year old female with acute onset on bilateral flank pain that began after lifting a heavy object. Imaging shows a T12 compression fracture along with multiple lytic lesions throughout the spine.    -Patient neurologically stable   -Upright and supine x-rays reviewed today, concerning for unstable fracture.  -Keep in TLSO brace at all times.    -CT Thoracic spine with 3D reconstruction ordered.  Pt may require surgical fixation pending results.  -Continue current work up per primary, will follow results   -Will continue to follow.  Please call with questions or change in neurologic status  -Discussed with KARRIE David  Neurosurgery  [facility]

## 2018-08-09 NOTE — ASSESSMENT & PLAN NOTE
Improving.  Corrected 9.6 today, Dcing continuous fluids.    -due to PHPT and MM  -continue fluids NS @ 200 cc/h  -PTH elevated as well  -Zometa 4 mg IV administered 8/7

## 2018-08-09 NOTE — SUBJECTIVE & OBJECTIVE
Interval History:  NAEON.  Pt reports continued back pain.  Denies pain, paraesthesias, weakness in extremities.  Denies bowel/bladder dysfunction.        Medications:  Continuous Infusions:  Scheduled Meds:   benztropine  1 mg Oral BID    diclofenac sodium  2 g Topical (Top) QID    famotidine  20 mg Oral BID    fluticasone  1 puff Inhalation BID    labetalol  300 mg Oral TID    moxifloxacin  400 mg Oral Daily    polyethylene glycol  17 g Oral Daily    potassium chloride  40 mEq Oral Once    risperiDONE  1 mg Oral Daily    risperiDONE  2 mg Oral Nightly    senna-docusate 8.6-50 mg  1 tablet Oral BID     PRN Meds:albuterol-ipratropium, dextrose 50%, dextrose 50%, glucagon (human recombinant), glucose, glucose, hydrALAZINE, methyl salicylate-menthol 15-10%, ondansetron, sodium chloride 0.9%, traMADol     Review of Systems  Objective:     Weight: 69.9 kg (154 lb)  Body mass index is 26.43 kg/m².  Vital Signs (Most Recent):  Temp: 99.3 °F (37.4 °C) (08/09/18 1715)  Pulse: 100 (08/09/18 1715)  Resp: 19 (08/09/18 1715)  BP: (!) 162/88 (08/09/18 1715)  SpO2: 98 % (08/09/18 1715) Vital Signs (24h Range):  Temp:  [98.3 °F (36.8 °C)-99.9 °F (37.7 °C)] 99.3 °F (37.4 °C)  Pulse:  [] 100  Resp:  [18-20] 19  SpO2:  [93 %-98 %] 98 %  BP: (134-169)/(73-88) 162/88       Date 08/09/18 0700 - 08/10/18 0659   Shift 2560-0943 1082-6903 2687-3004 24 Hour Total   I  N  T  A  K  E   Shift Total  (mL/kg)       O  U  T  P  U  T   Urine  (mL/kg/hr) 600  (1.1)   600    Shift Total  (mL/kg) 600  (8.6)   600  (8.6)   Weight (kg) 69.9 69.9 69.9 69.9                   Female External Urinary Catheter 08/09/18 0000 (Active)   Skin no redness;no breakdown 8/9/2018  8:28 AM   Tolerance no signs/symptoms of discomfort 8/9/2018  8:28 AM   Suction Continuous suction at 40 mmHg 8/9/2018  8:28 AM   Date of last wick change 08/09/18 8/9/2018  8:28 AM   Time of last wick change 1613 8/9/2018  8:28 AM   Output (mL) 600 mL 8/9/2018  8:28 AM        Neurosurgery Physical Exam   General: well developed, well nourished, no distress.   Head: normocephalic, atraumatic  Neurologic: Alert and oriented. Thought content appropriate.  GCS: Motor: 6/Verbal: 5/Eyes: 4 GCS Total: 15  Mental Status: Awake, Alert, Oriented x 4  Language: No aphasia  Speech: No dysarthria  Cranial nerves: face symmetric, tongue midline, CN II-XII grossly intact.   Eyes: pupils equal, round, reactive to light with accomodation, EOMI.   Pulmonary: normal respirations, no signs of respiratory distress  Abdomen: soft, non-distended, not tender to palpation  Sensory: intact to light touch throughout  Motor Strength:Moves all extremities spontaneously with good tone.  Full strength upper and lower extremities. No abnormal movements seen.   Washburn: absent  Clonus: absent  Skin: Skin is warm, dry and intact.  No thoracic or lumbar TTP, midline or along paraspinal muscles    Significant Labs:    Recent Labs  Lab 08/09/18  0013 08/09/18  0753 08/09/18  1612   GLU 84 78 78    139 139   K 3.4* 3.3* 3.3*    111* 111*   CO2 19* 19* 20*   BUN 10 9 8   CREATININE 1.0 0.9 0.9   CALCIUM 8.8 8.3* 8.0*       Recent Labs  Lab 08/08/18  0511 08/09/18  0449   WBC 6.06 4.69   HGB 8.7* 7.3*   HCT 26.9* 23.1*    146*     No results for input(s): LABPT, INR, APTT in the last 48 hours.  Microbiology Results (last 7 days)     Procedure Component Value Units Date/Time    Blood culture [[id number]] Collected:  08/08/18 1801    Order Status:  Completed Specimen:  Blood Updated:  08/09/18 0515     Blood Culture, Routine No Growth to date    Narrative:       Collection has been rescheduled by FB at 8/8/2018 14:42 Reason: nurse   Leonora clines all labs at 1600  Collection has been rescheduled by JOYCE at 8/8/2018 16:24 Reason:   Patient unavailable  Collection has been rescheduled by JMVero at 8/8/2018 16:25 Reason:   Patient unavailable  Collection has been rescheduled by JM2 at 8/8/2018 16:37 Reason:    Patient unavailable  Collection has been rescheduled by FB at 8/8/2018 14:42 Reason: nurse   Leonora wants all labs at 1600  Collection has been rescheduled by JM2 at 8/8/2018 16:24 Reason:   Patient unavailable  Collection has been rescheduled by JM2 at 8/8/2018 16:25 Reason:   Patient unavailable  Collection has been rescheduled by JM2 at 8/8/2018 16:37 Reason:   Patient unavailable    Blood culture [[id number]] Collected:  08/08/18 1800    Order Status:  Completed Specimen:  Blood Updated:  08/09/18 0515     Blood Culture, Routine No Growth to date    Narrative:       Collection has been rescheduled by FB at 8/8/2018 14:42 Reason: nurse   Leonora clines all labs at 1600  Collection has been rescheduled by JM2 at 8/8/2018 16:24 Reason:   Patient unavailable  Collection has been rescheduled by JM2 at 8/8/2018 16:25 Reason:   Patient unavailable  Collection has been rescheduled by JM2 at 8/8/2018 16:37 Reason:   Patient unavailable  Collection has been rescheduled by FB at 8/8/2018 14:42 Reason: nurse   Leonora jain all labs at 1600  Collection has been rescheduled by JM2 at 8/8/2018 16:24 Reason:   Patient unavailable  Collection has been rescheduled by JM2 at 8/8/2018 16:25 Reason:   Patient unavailable  Collection has been rescheduled by JM2 at 8/8/2018 16:37 Reason:   Patient unavailable    Gram stain [[id number]] Collected:  08/08/18 1558    Order Status:  Completed Specimen:  Urine from Urine Updated:  08/09/18 0229     Gram Stain Result Rare WBC's      Few Gram positive rods       Rare budding yeast

## 2018-08-09 NOTE — ASSESSMENT & PLAN NOTE
Hgb dropping, likely dilutional.    - normocytic anemia; likely 2/2 to underlying malignancy  - transfuse for Hgb < 7 and plt < 10K   - cbc with diff daily while inpatient

## 2018-08-09 NOTE — ASSESSMENT & PLAN NOTE
-hypercalcemia, anemia, bone fx and lytic lesions. Likely 2/2 to multiple myeloma.  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   -24 hr urine protein=5940  -immunofixation-A free kappa light chain is present in beta.

## 2018-08-09 NOTE — PROGRESS NOTES
[facility]  Neurosurgery  Progress Note    Subjective:     History of Present Illness: Ms. Zamorano is a 62 year old female with no PMHx of cancer, chronic back pain, or osteoporosis, who presents to the ED with onset of bilateral flank pain that began 1 week ago while lifting a heavy object from the ground. Patient denies any popping or pulling sensation. Denies any thoracic or lumbar back pain. Denies any UE or LE pain, paresthesias, or weakness. Denies any bladder or bowel incontinence. CT renal was performed and showed a T12 compression fracture. Neurosurgery was consulted for treatment recommendations.     Post-Op Info:  Procedure(s) (LRB):  THORACIC FUSION - PEDICLE SCREWS - T9 - L3 AIRO (N/A)         Interval History:  NAEON.  Pt reports continued back pain.  Denies pain, paraesthesias, weakness in extremities.  Denies bowel/bladder dysfunction.        Medications:  Continuous Infusions:  Scheduled Meds:   benztropine  1 mg Oral BID    diclofenac sodium  2 g Topical (Top) QID    famotidine  20 mg Oral BID    fluticasone  1 puff Inhalation BID    labetalol  300 mg Oral TID    moxifloxacin  400 mg Oral Daily    polyethylene glycol  17 g Oral Daily    potassium chloride  40 mEq Oral Once    risperiDONE  1 mg Oral Daily    risperiDONE  2 mg Oral Nightly    senna-docusate 8.6-50 mg  1 tablet Oral BID     PRN Meds:albuterol-ipratropium, dextrose 50%, dextrose 50%, glucagon (human recombinant), glucose, glucose, hydrALAZINE, methyl salicylate-menthol 15-10%, ondansetron, sodium chloride 0.9%, traMADol     Review of Systems  Objective:     Weight: 69.9 kg (154 lb)  Body mass index is 26.43 kg/m².  Vital Signs (Most Recent):  Temp: 99.3 °F (37.4 °C) (08/09/18 1715)  Pulse: 100 (08/09/18 1715)  Resp: 19 (08/09/18 1715)  BP: (!) 162/88 (08/09/18 1715)  SpO2: 98 % (08/09/18 1715) Vital Signs (24h Range):  Temp:  [98.3 °F (36.8 °C)-99.9 °F (37.7 °C)] 99.3 °F (37.4 °C)  Pulse:  [99111]  100  Resp:  [18-20] 19  SpO2:  [93 %-98 %] 98 %  BP: (134-169)/(73-88) 162/88       Date 08/09/18 0700 - 08/10/18 0659   Shift 9104-4468 3727-1069 6973-9312 24 Hour Total   I  N  T  A  K  E   Shift Total  (mL/kg)       O  U  T  P  U  T   Urine  (mL/kg/hr) 600  (1.1)   600    Shift Total  (mL/kg) 600  (8.6)   600  (8.6)   Weight (kg) 69.9 69.9 69.9 69.9                   Female External Urinary Catheter 08/09/18 0000 (Active)   Skin no redness;no breakdown 8/9/2018  8:28 AM   Tolerance no signs/symptoms of discomfort 8/9/2018  8:28 AM   Suction Continuous suction at 40 mmHg 8/9/2018  8:28 AM   Date of last wick change 08/09/18 8/9/2018  8:28 AM   Time of last wick change 1613 8/9/2018  8:28 AM   Output (mL) 600 mL 8/9/2018  8:28 AM       Neurosurgery Physical Exam   General: well developed, well nourished, no distress.   Head: normocephalic, atraumatic  Neurologic: Alert and oriented. Thought content appropriate.  GCS: Motor: 6/Verbal: 5/Eyes: 4 GCS Total: 15  Mental Status: Awake, Alert, Oriented x 4  Language: No aphasia  Speech: No dysarthria  Cranial nerves: face symmetric, tongue midline, CN II-XII grossly intact.   Eyes: pupils equal, round, reactive to light with accomodation, EOMI.   Pulmonary: normal respirations, no signs of respiratory distress  Abdomen: soft, non-distended, not tender to palpation  Sensory: intact to light touch throughout  Motor Strength:Moves all extremities spontaneously with good tone.  Full strength upper and lower extremities. No abnormal movements seen.   Washburn: absent  Clonus: absent  Skin: Skin is warm, dry and intact.  No thoracic or lumbar TTP, midline or along paraspinal muscles    Significant Labs:    Recent Labs  Lab 08/09/18  0013 08/09/18  0753 08/09/18  1612   GLU 84 78 78    139 139   K 3.4* 3.3* 3.3*    111* 111*   CO2 19* 19* 20*   BUN 10 9 8   CREATININE 1.0 0.9 0.9   CALCIUM 8.8 8.3* 8.0*       Recent Labs  Lab 08/08/18  0511 08/09/18  0449   WBC 6.06  4.69   HGB 8.7* 7.3*   HCT 26.9* 23.1*    146*     No results for input(s): LABPT, INR, APTT in the last 48 hours.  Microbiology Results (last 7 days)     Procedure Component Value Units Date/Time    Blood culture [[id number]] Collected:  08/08/18 1801    Order Status:  Completed Specimen:  Blood Updated:  08/09/18 0515     Blood Culture, Routine No Growth to date    Narrative:       Collection has been rescheduled by FB at 8/8/2018 14:42 Reason: nurse   Leonora sons all labs at 1600  Collection has been rescheduled by JM2 at 8/8/2018 16:24 Reason:   Patient unavailable  Collection has been rescheduled by JM2 at 8/8/2018 16:25 Reason:   Patient unavailable  Collection has been rescheduled by JM2 at 8/8/2018 16:37 Reason:   Patient unavailable  Collection has been rescheduled by FB at 8/8/2018 14:42 Reason: nurse   Leonora jain all labs at 1600  Collection has been rescheduled by JM2 at 8/8/2018 16:24 Reason:   Patient unavailable  Collection has been rescheduled by JM2 at 8/8/2018 16:25 Reason:   Patient unavailable  Collection has been rescheduled by JM2 at 8/8/2018 16:37 Reason:   Patient unavailable    Blood culture [[id number]] Collected:  08/08/18 1800    Order Status:  Completed Specimen:  Blood Updated:  08/09/18 0515     Blood Culture, Routine No Growth to date    Narrative:       Collection has been rescheduled by FB at 8/8/2018 14:42 Reason: nurse   Leonora sons all labs at 1600  Collection has been rescheduled by JM2 at 8/8/2018 16:24 Reason:   Patient unavailable  Collection has been rescheduled by JM2 at 8/8/2018 16:25 Reason:   Patient unavailable  Collection has been rescheduled by JM2 at 8/8/2018 16:37 Reason:   Patient unavailable  Collection has been rescheduled by FB at 8/8/2018 14:42 Reason: nurse   Leonora sons all labs at 1600  Collection has been rescheduled by JM2 at 8/8/2018 16:24 Reason:   Patient unavailable  Collection has been rescheduled by JM2 at 8/8/2018 16:25 Reason:   Patient  unavailable  Collection has been rescheduled by JOYCE at 8/8/2018 16:37 Reason:   Patient unavailable    Gram stain [[id number]] Collected:  08/08/18 1558    Order Status:  Completed Specimen:  Urine from Urine Updated:  08/09/18 0229     Gram Stain Result Rare WBC's      Few Gram positive rods       Rare budding yeast        Assessment/Plan:     Closed compression fracture of thoracic vertebra    62 year old female with acute onset on bilateral flank pain that began after lifting a heavy object. Imaging shows a T12 compression fracture along with multiple lytic lesions throughout the spine.   -Patient neurologically stable without s/s or myelopathy   -CT Thoracic/Upright&supine x-rays reviewed, consistent with unstable fracture  -Discussed recommendations for surgery in detail with patient and Son, including risk of progression of fracture and cord injury if fracture is not surgically stabilized.  They would like think about surgery, and decide this evening.    -Pt is tentatively booked for T9-L3 posterior instrumented fusion with Dr. Andres in AM    -Keep in TLSO brace at all times.    -Bed Rest  -Will continue to follow.  Please call with questions or change in neurologic status  -Discussed with KARRIE Valencia  Neurosurgery  [facility]

## 2018-08-09 NOTE — ASSESSMENT & PLAN NOTE
Resolved, continue bowel regimen.   BM w/ multiple BM recorded on 8/5 although CT showed moderate amount of retained stool in the ascending and transverse:, suggestive of constipation

## 2018-08-09 NOTE — ASSESSMENT & PLAN NOTE
Resolved.   -2/2 low PO intake, hypercalcemia, uncontrolled HTN  -imaging did not show any hydronephrosis or obstruction in the urinary system  -UA normal  -FENa pre-renal

## 2018-08-09 NOTE — SUBJECTIVE & OBJECTIVE
Subjective:     Interval History: Low grade fever 100.8 yesterday.  Complaining of back pain this morning. Hypercalcemia continues to improve.  Neurosurgery plans for inpatient surgery tomorrow to stabilize T12 compression fracture.  Otherwise no new complaints.    Patient's son requesting chart release for his records.          Objective:     Vital Signs (Most Recent):  Temp: 98.3 °F (36.8 °C) (08/09/18 1100)  Pulse: 99 (08/09/18 1100)  Resp: 19 (08/09/18 1100)  BP: (!) 169/83 (08/09/18 1100)  SpO2: 96 % (08/09/18 1400) Vital Signs (24h Range):  Temp:  [98.3 °F (36.8 °C)-100 °F (37.8 °C)] 98.3 °F (36.8 °C)  Pulse:  [] 99  Resp:  [18-20] 19  SpO2:  [93 %-97 %] 96 %  BP: (104-169)/(62-86) 169/83     Weight: 69.9 kg (154 lb)  Body mass index is 26.43 kg/m².  Body surface area is 1.78 meters squared.    ECOG SCORE         [unfilled]    Intake/Output - Last 3 Shifts       08/07 0700 - 08/08 0659 08/08 0700 - 08/09 0659 08/09 0700 - 08/10 0659    P.O. 100      I.V. (mL/kg) 3625 (51.9)      Other 0      IV Piggyback 100      Total Intake(mL/kg) 3825 (54.7)      Urine (mL/kg/hr) 2850 (1.7) 500 (0.3)     Emesis/NG output 0 (0)      Other 0 (0)      Stool 0 (0)      Blood 0 (0)      Total Output 2850 500      Net +975 -500             Urine Occurrence  4 x     Stool Occurrence 0 x 1 x     Emesis Occurrence 0 x            Physical Exam   Constitutional: She is oriented to person, place, and time. She appears well-developed and well-nourished. No distress.   HENT:   Head: Normocephalic.   Eyes: Conjunctivae and EOM are normal. Pupils are equal, round, and reactive to light.   Neck: Normal range of motion. Neck supple.   Cardiovascular: Regular rhythm and normal heart sounds.    tachycardic   Pulmonary/Chest: Effort normal and breath sounds normal. No respiratory distress.   Abdominal: Soft. Bowel sounds are normal. She exhibits no distension.   Musculoskeletal: Normal range of motion. She exhibits no edema.    Neurological: She is alert and oriented to person, place, and time. No cranial nerve deficit.   Skin: Skin is warm and dry.   Psychiatric: She has a normal mood and affect. Her behavior is normal.       Significant Labs:   All pertinent labs from the last 24 hours have been reviewed.    Diagnostic Results:  I have reviewed all pertinent imaging results/findings within the past 24 hours.

## 2018-08-09 NOTE — ASSESSMENT & PLAN NOTE
62 year old female with acute onset on bilateral flank pain that began after lifting a heavy object. Imaging shows a T12 compression fracture along with multiple lytic lesions throughout the spine.   -Patient neurologically stable   -Upright and supine x-rays reviewed today, concerning for unstable fracture.  -Keep in TLSO brace at all times.    -CT Thoracic spine with 3D reconstruction ordered.  Pt may require surgical fixation pending results.  -Will continue to follow.  Please call with questions or change in neurologic status  -Discussed with Dr. Nascimento

## 2018-08-09 NOTE — PT/OT/SLP PROGRESS
Physical Therapy      Patient Name:  Janie Zamorano   MRN:  [id number]    Patient not seen today secondary to  (PT to pt's room for initial eval. Prior to PT entering room, Neurosurgery KARRIE Beltran alerted PT that pt not appropriate for therapy at this time 2* unstable vertebral fracture.). PA gave verbal orders to d/c PT/OT orders at this time. Team will re-consult when pt medically appropriate. PT not initiated and PT orders discontinued at this time. Later in AM, PT received page from BMT MD. PT alerted this MD of Neurosurgery PA's orders. Will follow-up as pt appropriate.    Jeanette Barrera, PT, DPT   8/9/2018  [phone]

## 2018-08-10 PROBLEM — N17.9 ACUTE KIDNEY INJURY: Status: RESOLVED | Noted: 2018-08-03 | Resolved: 2018-08-10

## 2018-08-10 PROBLEM — K21.9 GERD (GASTROESOPHAGEAL REFLUX DISEASE): Status: ACTIVE | Noted: 2018-08-10

## 2018-08-10 LAB
ALBUMIN SERPL BCP-MCNC: 2.4 G/DL
ALBUMIN SERPL BCP-MCNC: 2.5 G/DL
ANION GAP SERPL CALC-SCNC: 10 MMOL/L
ANION GAP SERPL CALC-SCNC: 8 MMOL/L
APTT BLDCRRT: 26.7 SEC
BASOPHILS # BLD AUTO: 0.01 K/UL
BASOPHILS NFR BLD: 0.3 %
BUN SERPL-MCNC: 8 MG/DL
BUN SERPL-MCNC: 8 MG/DL
CALCIUM SERPL-MCNC: 8 MG/DL
CALCIUM SERPL-MCNC: 8.2 MG/DL
CHLORIDE SERPL-SCNC: 110 MMOL/L
CHLORIDE SERPL-SCNC: 111 MMOL/L
CO2 SERPL-SCNC: 19 MMOL/L
CO2 SERPL-SCNC: 20 MMOL/L
CREAT SERPL-MCNC: 0.8 MG/DL
CREAT SERPL-MCNC: 0.8 MG/DL
DIFFERENTIAL METHOD: ABNORMAL
EOSINOPHIL # BLD AUTO: 0.1 K/UL
EOSINOPHIL NFR BLD: 2.6 %
ERYTHROCYTE [DISTWIDTH] IN BLOOD BY AUTOMATED COUNT: 14.4 %
EST. GFR  (AFRICAN AMERICAN): >60 ML/MIN/1.73 M^2
EST. GFR  (AFRICAN AMERICAN): >60 ML/MIN/1.73 M^2
EST. GFR  (NON AFRICAN AMERICAN): >60 ML/MIN/1.73 M^2
EST. GFR  (NON AFRICAN AMERICAN): >60 ML/MIN/1.73 M^2
GLUCOSE SERPL-MCNC: 70 MG/DL
GLUCOSE SERPL-MCNC: 73 MG/DL
HCT VFR BLD AUTO: 24.3 %
HGB BLD-MCNC: 7.8 G/DL
IMM GRANULOCYTES # BLD AUTO: 0.07 K/UL
IMM GRANULOCYTES NFR BLD AUTO: 1.8 %
INR PPP: 1
INTERPRETATION UR IFE-IMP: NORMAL
LYMPHOCYTES # BLD AUTO: 0.6 K/UL
LYMPHOCYTES NFR BLD: 14.9 %
MCH RBC QN AUTO: 28 PG
MCHC RBC AUTO-ENTMCNC: 32.1 G/DL
MCV RBC AUTO: 87 FL
MONOCYTES # BLD AUTO: 0.4 K/UL
MONOCYTES NFR BLD: 11.1 %
NEUTROPHILS # BLD AUTO: 2.7 K/UL
NEUTROPHILS NFR BLD: 69.3 %
NRBC BLD-RTO: 1 /100 WBC
PATHOLOGIST INTERPRETATION UIFE: NORMAL
PHOSPHATE SERPL-MCNC: 1.5 MG/DL
PHOSPHATE SERPL-MCNC: 1.6 MG/DL
PLATELET # BLD AUTO: 191 K/UL
PMV BLD AUTO: 10 FL
POTASSIUM SERPL-SCNC: 3.2 MMOL/L
POTASSIUM SERPL-SCNC: 3.4 MMOL/L
PROTHROMBIN TIME: 10.6 SEC
RBC # BLD AUTO: 2.79 M/UL
SODIUM SERPL-SCNC: 138 MMOL/L
SODIUM SERPL-SCNC: 140 MMOL/L
WBC # BLD AUTO: 3.89 K/UL

## 2018-08-10 PROCEDURE — 27201037 HC PRESSURE MONITORING SET UP

## 2018-08-10 PROCEDURE — 85730 THROMBOPLASTIN TIME PARTIAL: CPT

## 2018-08-10 PROCEDURE — 25000003 PHARM REV CODE 250: Performed by: STUDENT IN AN ORGANIZED HEALTH CARE EDUCATION/TRAINING PROGRAM

## 2018-08-10 PROCEDURE — 36415 COLL VENOUS BLD VENIPUNCTURE: CPT

## 2018-08-10 PROCEDURE — 25000242 PHARM REV CODE 250 ALT 637 W/ HCPCS: Performed by: STUDENT IN AN ORGANIZED HEALTH CARE EDUCATION/TRAINING PROGRAM

## 2018-08-10 PROCEDURE — 25000003 PHARM REV CODE 250: Performed by: INTERNAL MEDICINE

## 2018-08-10 PROCEDURE — 80069 RENAL FUNCTION PANEL: CPT

## 2018-08-10 PROCEDURE — 85610 PROTHROMBIN TIME: CPT

## 2018-08-10 PROCEDURE — 99233 SBSQ HOSP IP/OBS HIGH 50: CPT | Mod: ,,, | Performed by: NEUROLOGICAL SURGERY

## 2018-08-10 PROCEDURE — 20600001 HC STEP DOWN PRIVATE ROOM

## 2018-08-10 PROCEDURE — 99233 SBSQ HOSP IP/OBS HIGH 50: CPT | Mod: ,,, | Performed by: PHYSICIAN ASSISTANT

## 2018-08-10 PROCEDURE — 85025 COMPLETE CBC W/AUTO DIFF WBC: CPT

## 2018-08-10 PROCEDURE — 99233 SBSQ HOSP IP/OBS HIGH 50: CPT | Mod: ,,, | Performed by: INTERNAL MEDICINE

## 2018-08-10 RX ORDER — POTASSIUM CHLORIDE 20 MEQ/1
40 TABLET, EXTENDED RELEASE ORAL EVERY 4 HOURS
Status: COMPLETED | OUTPATIENT
Start: 2018-08-10 | End: 2018-08-10

## 2018-08-10 RX ORDER — SODIUM,POTASSIUM PHOSPHATES 280-250MG
2 POWDER IN PACKET (EA) ORAL DAILY
Status: DISCONTINUED | OUTPATIENT
Start: 2018-08-10 | End: 2018-08-12

## 2018-08-10 RX ADMIN — TRAMADOL HYDROCHLORIDE 50 MG: 50 TABLET, COATED ORAL at 09:08

## 2018-08-10 RX ADMIN — LABETALOL HCL 300 MG: 100 TABLET, FILM COATED ORAL at 09:08

## 2018-08-10 RX ADMIN — LABETALOL HCL 300 MG: 100 TABLET, FILM COATED ORAL at 08:08

## 2018-08-10 RX ADMIN — FAMOTIDINE 20 MG: 20 TABLET ORAL at 09:08

## 2018-08-10 RX ADMIN — LABETALOL HCL 300 MG: 100 TABLET, FILM COATED ORAL at 03:08

## 2018-08-10 RX ADMIN — FLUTICASONE PROPIONATE 1 PUFF: 44 AEROSOL, METERED RESPIRATORY (INHALATION) at 08:08

## 2018-08-10 RX ADMIN — POTASSIUM CHLORIDE 40 MEQ: 1500 TABLET, EXTENDED RELEASE ORAL at 07:08

## 2018-08-10 RX ADMIN — DICLOFENAC 2 G: 10 GEL TOPICAL at 06:08

## 2018-08-10 RX ADMIN — MOXIFLOXACIN HYDROCHLORIDE 400 MG: 400 TABLET, FILM COATED ORAL at 08:08

## 2018-08-10 RX ADMIN — TRAMADOL HYDROCHLORIDE 50 MG: 50 TABLET, COATED ORAL at 08:08

## 2018-08-10 RX ADMIN — DICLOFENAC 2 G: 10 GEL TOPICAL at 08:08

## 2018-08-10 RX ADMIN — POTASSIUM CHLORIDE 40 MEQ: 1500 TABLET, EXTENDED RELEASE ORAL at 10:08

## 2018-08-10 RX ADMIN — FAMOTIDINE 20 MG: 20 TABLET ORAL at 08:08

## 2018-08-10 RX ADMIN — POTASSIUM & SODIUM PHOSPHATES POWDER PACK 280-160-250 MG 2 PACKET: 280-160-250 PACK at 10:08

## 2018-08-10 RX ADMIN — TRAMADOL HYDROCHLORIDE 50 MG: 50 TABLET, COATED ORAL at 03:08

## 2018-08-10 NOTE — ASSESSMENT & PLAN NOTE
Improving/resolved.  Corrected 9.3 today.   -due to PHPT and MM  -PTH elevated as well  -Zometa 4 mg IV administered 8/7

## 2018-08-10 NOTE — PT/OT/SLP DISCHARGE
Occupational Therapy Discharge Summary    Janie Zamorano  MRN: [id number]   Principal Problem: Hypercalcemia      Patient Discharged from acute Occupational Therapy on 8/10/18.  Please refer to prior OT note dated 8/8/18  for functional status.    Assessment:      Neurosurgery KARRIE Beltran alerted PT that pt not appropriate for therapy at this time 2* unstable vertebral fracture.). PA gave verbal orders to d/c PT/OT orders at this time. Team will re-consult when pt medically appropriate    Objective:     GOALS:    Occupational Therapy Goals        Problem: Occupational Therapy Goal    Goal Priority Disciplines Outcome Interventions   Occupational Therapy Goal     OT, PT/OT Ongoing (interventions implemented as appropriate)    Description:  Goals to be met by: 8/15/18     Patient will increase functional independence with ADLs by performing:    UE Dressing with Set-up Assistance and Supervision, mod assist with brace  LE Dressing with Set-up Assistance and Supervision, with AE use/rec's prn  Grooming while standing with Supervision.  Toileting from toilet with Supervision for hygiene and clothing management.   Toilet transfer to toilet with Supervision.                      Reasons for Discontinuation of Therapy Services  Patient is unable to continue work toward goals because of medical or psychosocial complications.      Plan:     Please re-consult therapy services when medically appropriate.     Nilam coyle OT  8/10/2018

## 2018-08-10 NOTE — SUBJECTIVE & OBJECTIVE
Interval History: Neurosurgery recommended surgery yesterday. Pt was unsure and ultimately declined surgery last night. Spoke with pt this AM and advised pt of risks of not proceeding with surgery. She and her son felt there was miscommunication regarding cancer treatment vs fracture tx. They would now like to proceed with surgery. Pt drank 2 sips of a smoothie this morning at 9am, otherwise has been NPO since midnight. Denies changes in strength or sensation.     Medications:  Continuous Infusions:  Scheduled Meds:   benztropine  1 mg Oral BID    diclofenac sodium  2 g Topical (Top) QID    famotidine  20 mg Oral BID    fluticasone  1 puff Inhalation BID    labetalol  300 mg Oral TID    moxifloxacin  400 mg Oral Daily    polyethylene glycol  17 g Oral Daily    potassium, sodium phosphates  2 packet Oral Daily    risperiDONE  1 mg Oral Daily    risperiDONE  2 mg Oral Nightly    senna-docusate 8.6-50 mg  1 tablet Oral BID     PRN Meds:albuterol-ipratropium, dextrose 50%, dextrose 50%, glucagon (human recombinant), glucose, glucose, hydrALAZINE, methyl salicylate-menthol 15-10%, ondansetron, sodium chloride 0.9%, traMADol     Review of Systems  Objective:     Weight: 69.9 kg (154 lb)  Body mass index is 26.43 kg/m².  Vital Signs (Most Recent):  Temp: 99 °F (37.2 °C) (08/10/18 0733)  Pulse: 92 (08/10/18 0847)  Resp: 17 (08/10/18 0847)  BP: (!) 178/92 (08/10/18 0733)  SpO2: 99 % (08/10/18 0733) Vital Signs (24h Range):  Temp:  [98.2 °F (36.8 °C)-99.3 °F (37.4 °C)] 99 °F (37.2 °C)  Pulse:  [] 92  Resp:  [17-19] 17  SpO2:  [94 %-99 %] 99 %  BP: (139-178)/(79-92) 178/92       Date 08/10/18 0700 - 08/11/18 0659   Shift 0379-7603 5161-6996 1199-4440 24 Hour Total   I  N  T  A  K  E   Shift Total  (mL/kg)       O  U  T  P  U  T   Urine  (mL/kg/hr) 450   450    Shift Total  (mL/kg) 450  (6.4)   450  (6.4)   Weight (kg) 69.9 69.9 69.9 69.9         Female External Urinary Catheter 08/09/18 0000 (Active)   Skin no  redness;no breakdown 8/10/2018  7:33 AM   Tolerance no signs/symptoms of discomfort 8/10/2018  7:33 AM   Suction Continuous suction at 70 mmHg 8/10/2018  7:33 AM   Date of last wick change 08/09/18 8/9/2018  9:23 PM   Time of last wick change 2225 8/10/2018  6:00 AM   Output (mL) 250 mL 8/10/2018  9:00 AM       Neurosurgery Physical Exam   General: no distress, resting in bed  Neurologic: Alert and oriented. Thought content appropriate.  Head: normocephalic  GCS: Motor: 6/Verbal: 5/Eyes: 4 GCS Total: 15  Cranial nerves: face symmetric, tongue midline, pupils equal, round, reactive to light with accomodation, extraocular muscles intact  Sensory: response to light touch throughout  Motor Strength:full strength upper and lower extremities  No focal numbness or weakness  Lungs:  normal respiratory effort  Abdomen: soft, non-tender   Extremities: no cyanosis or edema, or clubbing  TLSO brace not on upon entering the room. Placed back on pt prior to leaving.       Significant Labs:    Recent Labs  Lab 08/09/18  1612 08/09/18  2351 08/10/18  0527   GLU 78 73 70    138 140   K 3.3* 3.2* 3.4*   * 110 111*   CO2 20* 20* 19*   BUN 8 8 8   CREATININE 0.9 0.8 0.8   CALCIUM 8.0* 8.0* 8.2*       Recent Labs  Lab 08/09/18  0449 08/10/18  0528   WBC 4.69 3.89*   HGB 7.3* 7.8*   HCT 23.1* 24.3*   * 191       Recent Labs  Lab 08/10/18  0807   INR 1.0   APTT 26.7     Microbiology Results (last 7 days)     Procedure Component Value Units Date/Time    Blood culture [[id number]] Collected:  08/08/18 1801    Order Status:  Completed Specimen:  Blood Updated:  08/09/18 2212     Blood Culture, Routine No Growth to date     Blood Culture, Routine No Growth to date    Narrative:       Collection has been rescheduled by FB at 8/8/2018 14:42 Reason: nurse   Leonora clines all labs at 1600  Collection has been rescheduled by JOYCE at 8/8/2018 16:24 Reason:   Patient unavailable  Collection has been rescheduled by JOYCE at 8/8/2018  16:25 Reason:   Patient unavailable  Collection has been rescheduled by JM2 at 8/8/2018 16:37 Reason:   Patient unavailable  Collection has been rescheduled by FB at 8/8/2018 14:42 Reason: nurse   Leonora jain all labs at 1600  Collection has been rescheduled by JM2 at 8/8/2018 16:24 Reason:   Patient unavailable  Collection has been rescheduled by JM2 at 8/8/2018 16:25 Reason:   Patient unavailable  Collection has been rescheduled by JM2 at 8/8/2018 16:37 Reason:   Patient unavailable    Blood culture [[id number]] Collected:  08/08/18 1800    Order Status:  Completed Specimen:  Blood Updated:  08/09/18 2212     Blood Culture, Routine No Growth to date     Blood Culture, Routine No Growth to date    Narrative:       Collection has been rescheduled by FB at 8/8/2018 14:42 Reason: nurse   Leonora clines all labs at 1600  Collection has been rescheduled by JM2 at 8/8/2018 16:24 Reason:   Patient unavailable  Collection has been rescheduled by JM2 at 8/8/2018 16:25 Reason:   Patient unavailable  Collection has been rescheduled by JM2 at 8/8/2018 16:37 Reason:   Patient unavailable  Collection has been rescheduled by FB at 8/8/2018 14:42 Reason: nurse   Leonora jain all labs at 1600  Collection has been rescheduled by JM2 at 8/8/2018 16:24 Reason:   Patient unavailable  Collection has been rescheduled by JM2 at 8/8/2018 16:25 Reason:   Patient unavailable  Collection has been rescheduled by JM2 at 8/8/2018 16:37 Reason:   Patient unavailable    Gram stain [[id number]] Collected:  08/08/18 1558    Order Status:  Completed Specimen:  Urine from Urine Updated:  08/09/18 0229     Gram Stain Result Rare WBC's      Few Gram positive rods       Rare budding yeast            Significant Diagnostics:  No new imaging to review within 24hr

## 2018-08-10 NOTE — ASSESSMENT & PLAN NOTE
62 year old female with acute onset on bilateral flank pain that began after lifting a heavy object. Imaging shows a T12 compression fracture along with multiple lytic lesions throughout the spine.   -Patient neurologically stable without s/s or myelopathy   -CT Thoracic/Upright&supine x-rays reviewed, consistent with unstable fracture  -Discussed recommendations for surgery in detail with patient and Son, including risk of progression of fracture and cord injury if fracture is not surgically stabilized. Initially declined surgery last night, now wishes to proceed.   --has been NPO since midnight but admits to taking a few sips of a smoothie at 9am.    -Was scheduled for T9-L3 posterior instrumented fusion with Dr. Andres in AM. Will now discuss with Dmitry and OR as to when to proceed  -Keep in TLSO brace at all times.    -Bed Rest  -Will continue to follow.  Please call with questions or change in neurologic status  -Discussed with Dr. Andres

## 2018-08-10 NOTE — PLAN OF CARE
Problem: Patient Care Overview  Goal: Plan of Care Review  Outcome: Ongoing (interventions implemented as appropriate)  Patient AAOx4, VSS afebrile, and without injury. Fall precautions maintained. Patient instructed on how to contact the nurse. Family at bedside. [employer] camera in use. Patient without distress on room air; patient requested pureed diet. No complaints of nausea. Complaints of pain addressed with PRN medication. Surgery cancelled this morning due to family questions regarding procedure; at this time, to be scheduled for next week. Neuro checks continued. Pure wick in place draining clear yellow urine. Brace in place. Questions and concerns have been addressed; will continue to monitor.

## 2018-08-10 NOTE — PROGRESS NOTES
[facility]  Neurosurgery  Progress Note    Subjective:     History of Present Illness: Ms. Zamorano is a 62 year old female with no PMHx of cancer, chronic back pain, or osteoporosis, who presents to the ED with onset of bilateral flank pain that began 1 week ago while lifting a heavy object from the ground. Patient denies any popping or pulling sensation. Denies any thoracic or lumbar back pain. Denies any UE or LE pain, paresthesias, or weakness. Denies any bladder or bowel incontinence. CT renal was performed and showed a T12 compression fracture. Neurosurgery was consulted for treatment recommendations.     Post-Op Info:  Procedure(s) (LRB):  THORACIC FUSION - PEDICLE SCREWS - T9 - L3 AIRO (N/A)         Interval History: Neurosurgery recommended surgery yesterday. Pt was unsure and ultimately declined surgery last night. Spoke with pt this AM and advised pt of risks of not proceeding with surgery. She and her son felt there was miscommunication regarding cancer treatment vs fracture tx. They would now like to proceed with surgery. Pt drank 2 sips of a smoothie this morning at 9am, otherwise has been NPO since midnight. Denies changes in strength or sensation.     Medications:  Continuous Infusions:  Scheduled Meds:   benztropine  1 mg Oral BID    diclofenac sodium  2 g Topical (Top) QID    famotidine  20 mg Oral BID    fluticasone  1 puff Inhalation BID    labetalol  300 mg Oral TID    moxifloxacin  400 mg Oral Daily    polyethylene glycol  17 g Oral Daily    potassium, sodium phosphates  2 packet Oral Daily    risperiDONE  1 mg Oral Daily    risperiDONE  2 mg Oral Nightly    senna-docusate 8.6-50 mg  1 tablet Oral BID     PRN Meds:albuterol-ipratropium, dextrose 50%, dextrose 50%, glucagon (human recombinant), glucose, glucose, hydrALAZINE, methyl salicylate-menthol 15-10%, ondansetron, sodium chloride 0.9%, traMADol     Review of Systems  Objective:     Weight: 69.9 kg (154  lb)  Body mass index is 26.43 kg/m².  Vital Signs (Most Recent):  Temp: 99 °F (37.2 °C) (08/10/18 0733)  Pulse: 92 (08/10/18 0847)  Resp: 17 (08/10/18 0847)  BP: (!) 178/92 (08/10/18 0733)  SpO2: 99 % (08/10/18 0733) Vital Signs (24h Range):  Temp:  [98.2 °F (36.8 °C)-99.3 °F (37.4 °C)] 99 °F (37.2 °C)  Pulse:  [] 92  Resp:  [17-19] 17  SpO2:  [94 %-99 %] 99 %  BP: (139-178)/(79-92) 178/92       Date 08/10/18 0700 - 08/11/18 0659   Shift 0050-9531 9457-7979 5038-7703 24 Hour Total   I  N  T  A  K  E   Shift Total  (mL/kg)       O  U  T  P  U  T   Urine  (mL/kg/hr) 450   450    Shift Total  (mL/kg) 450  (6.4)   450  (6.4)   Weight (kg) 69.9 69.9 69.9 69.9         Female External Urinary Catheter 08/09/18 0000 (Active)   Skin no redness;no breakdown 8/10/2018  7:33 AM   Tolerance no signs/symptoms of discomfort 8/10/2018  7:33 AM   Suction Continuous suction at 70 mmHg 8/10/2018  7:33 AM   Date of last wick change 08/09/18 8/9/2018  9:23 PM   Time of last wick change 2225 8/10/2018  6:00 AM   Output (mL) 250 mL 8/10/2018  9:00 AM       Neurosurgery Physical Exam   General: no distress, resting in bed  Neurologic: Alert and oriented. Thought content appropriate.  Head: normocephalic  GCS: Motor: 6/Verbal: 5/Eyes: 4 GCS Total: 15  Cranial nerves: face symmetric, tongue midline, pupils equal, round, reactive to light with accomodation, extraocular muscles intact  Sensory: response to light touch throughout  Motor Strength:full strength upper and lower extremities  No focal numbness or weakness  Lungs:  normal respiratory effort  Abdomen: soft, non-tender   Extremities: no cyanosis or edema, or clubbing  TLSO brace not on upon entering the room. Placed back on pt prior to leaving.       Significant Labs:    Recent Labs  Lab 08/09/18  1612 08/09/18  2351 08/10/18  0527   GLU 78 73 70    138 140   K 3.3* 3.2* 3.4*   * 110 111*   CO2 20* 20* 19*   BUN 8 8 8   CREATININE 0.9 0.8 0.8   CALCIUM 8.0* 8.0* 8.2*        Recent Labs  Lab 08/09/18  0449 08/10/18  0528   WBC 4.69 3.89*   HGB 7.3* 7.8*   HCT 23.1* 24.3*   * 191       Recent Labs  Lab 08/10/18  0807   INR 1.0   APTT 26.7     Microbiology Results (last 7 days)     Procedure Component Value Units Date/Time    Blood culture [[id number]] Collected:  08/08/18 1801    Order Status:  Completed Specimen:  Blood Updated:  08/09/18 2212     Blood Culture, Routine No Growth to date     Blood Culture, Routine No Growth to date    Narrative:       Collection has been rescheduled by FB at 8/8/2018 14:42 Reason: nurse   Leonora sons all labs at 1600  Collection has been rescheduled by 2 at 8/8/2018 16:24 Reason:   Patient unavailable  Collection has been rescheduled by 2 at 8/8/2018 16:25 Reason:   Patient unavailable  Collection has been rescheduled by 2 at 8/8/2018 16:37 Reason:   Patient unavailable  Collection has been rescheduled by FB at 8/8/2018 14:42 Reason: nurse   Leonora jain all labs at 1600  Collection has been rescheduled by 2 at 8/8/2018 16:24 Reason:   Patient unavailable  Collection has been rescheduled by 2 at 8/8/2018 16:25 Reason:   Patient unavailable  Collection has been rescheduled by 2 at 8/8/2018 16:37 Reason:   Patient unavailable    Blood culture [[id number]] Collected:  08/08/18 1800    Order Status:  Completed Specimen:  Blood Updated:  08/09/18 2212     Blood Culture, Routine No Growth to date     Blood Culture, Routine No Growth to date    Narrative:       Collection has been rescheduled by FB at 8/8/2018 14:42 Reason: nurse   Leonora clines all labs at 1600  Collection has been rescheduled by 2 at 8/8/2018 16:24 Reason:   Patient unavailable  Collection has been rescheduled by 2 at 8/8/2018 16:25 Reason:   Patient unavailable  Collection has been rescheduled by 2 at 8/8/2018 16:37 Reason:   Patient unavailable  Collection has been rescheduled by FB at 8/8/2018 14:42 Reason: nurse   Leonora sons all labs at 1600  Collection has  been rescheduled by JOYCE at 8/8/2018 16:24 Reason:   Patient unavailable  Collection has been rescheduled by JM2 at 8/8/2018 16:25 Reason:   Patient unavailable  Collection has been rescheduled by JM2 at 8/8/2018 16:37 Reason:   Patient unavailable    Gram stain [[id number]] Collected:  08/08/18 1558    Order Status:  Completed Specimen:  Urine from Urine Updated:  08/09/18 0229     Gram Stain Result Rare WBC's      Few Gram positive rods       Rare budding yeast            Significant Diagnostics:  No new imaging to review within 24hr    Assessment/Plan:     Closed compression fracture of thoracic vertebra    62 year old female with acute onset on bilateral flank pain that began after lifting a heavy object. Imaging shows a T12 compression fracture along with multiple lytic lesions throughout the spine.   -Patient neurologically stable without s/s or myelopathy   -CT Thoracic/Upright&supine x-rays reviewed, consistent with unstable fracture  -Discussed recommendations for surgery in detail with patient and Son, including risk of progression of fracture and cord injury if fracture is not surgically stabilized. Initially declined surgery last night, now wishes to proceed.   --has been NPO since midnight but admits to taking a few sips of a smoothie at 9am.    -Was scheduled for T9-L3 posterior instrumented fusion with Dr. Andres in AM. Will now discuss with Dmitry and OR as to when to proceed  -Keep in TLSO brace at all times.    -Bed Rest  -Will continue to follow.  Please call with questions or change in neurologic status  -Discussed with Dr. Dmitry Hauser PA-C  Neurosurgery  [facility]

## 2018-08-10 NOTE — ASSESSMENT & PLAN NOTE
Plan to discuss therapy once outpatient.    -hypercalcemia, anemia, bone fx and lytic lesions. Likely 2/2 to multiple myeloma.  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   -24 hr urine protein=5940  -immunofixation-A free kappa light chain is present in beta.

## 2018-08-10 NOTE — ASSESSMENT & PLAN NOTE
Concern for fracture stability.  Plan for inpatient surgery 8/10m refused, Likely surgery next week.   -neurosug following, appreciate recs  -TLSO brace   -pain somewhat controlled, continue diclofenac topical and oxy 5-10 mg q6hr prn

## 2018-08-10 NOTE — PROGRESS NOTES
Admit Assessment    Patient Identification  Janie Zamorano   :  1955  Admit Date:  8/3/2018  Attending Provider:  Ruslan Jameson MD              Referral:   Pt was admitted to  with a diagnosis of Hypercalcemia, and was admitted this hospital stay due to Bilateral flank pain [R10.9]  Constipation [K59.00]  Acute kidney injury [N17.9]  Multiple lesions of metastatic malignancy [C79.9]  Closed fracture of twelfth thoracic vertebra, unspecified fracture morphology, initial encounter [S22.089A]  Pathological fracture of vertebra due to neoplastic disease, initial encounter [M84.58XA]  Multiple myeloma [C90.00]  Closed fracture of twelfth thoracic vertebra, unspecified fracture morphology, initial encounter [S22.089A].   is involved was referred to the Social Work Department via (Referral).  Patient presents as a 62 y.o. year old female.    Discharge planning:  SW visit bedside, pt alert oriented resting in flat position in hospital bed.  Pt's 2 sisters bedside-Alyssa Weaver [phone] and Adele Angel [phone].  Pt resides at mentioned address with 2 sisters and her niece.  Pt reports that she has decided to move forward with surgery recommended by neuro and she is currently NPO in hopes that she will go to the OR today.  Pt/sisters report that she has very good support from her siblings, son and family.  Pt lists primary contact numbers as her home number [phone] and her son Saud Zamorano--[phone].  Discharge planning and role of SWer discussed.  Business cards for SWer including contact information given to pt and family members.    Living Situation:      Resides at [address] [place] [address] [id number] [place] [id number], phone: [phone] (home).      Functional Status Prior  Ambulation: 3-->assistive equipment and person (TLSO brace)  Transferrin-->assistive person  Toiletin-->assistive person  Bathin-->assistive  person  Dressin-->assistive person  Eatin-->independent  Communication: understands/communicates without difficulty  Swallowing: swallows foods/liquids without difficulty    Current or Past Agencies and Description of Services/Supplies    DME  None  Equipment Currently Used at Home: none (has shower chair available)    Home Health  None    IV Infusion n/a    Nutrition: Oral    Outpatient Pharmacy:     [facility] [address] [place], [place] [address] [facility] & [address] [address]  Phone: [phone] Fax: [phone]    [facility] [address] [place], [place] - [address] AT [facility]  [address]  [facility] [address]  Phone: [phone] Fax: [phone]      Patient Preference of agencies include To be determined as appropriate.    Patient/Caregiver informed of right to choose providers or agencies.  Patient provides permission to release any necessary information to [facility] and to [facility] agencies as needed to facilitate patient care, treatment planning, and patient discharge planning.  Written and verbal resources provided.      Coping  Appropriate and pt has good family support.    Adjustment to Diagnosis and Treatment  Ongoing process    Emotional/Behavioral/Cognitive Issues    History/Current Symptoms of Anxiety/Depression: Pt has hx of mental illness with dx of  Paranoid schizophrenia   Currently taking reperidone 1mg troy and 2 mg at night.     History/Current Substance Use:   Social History     Social History Main Topics    Smoking status: Never Smoker    Smokeless tobacco: Never Used    Alcohol use No    Drug use: No    Sexual activity: Not Currently     Partners: Male     Birth control/ protection: Post-menopausal       Indications of Abuse/Neglect: No  Abuse Screen  Do You Feel Unsafe at Home, Work or School?: no    Financial:  Payor/Plan Subscr  Sex Relation Sub. Ins. ID Effective Group Num   1. [employer]  CHITO* MARINA SINGH * 1955 Female  [id number] 1/1/16 [address]      Other identified concerns/needs: Pt currently waiting for surgery with discharge planning needs post surgical procedure.      Plan:    Interventions/Referrals: Pt currently resides with her sisters and niece.  D/c plan to be determined based on needs following surgery.    Patient/caregiver engaged in treatment planning process.     providing psychosocial and supportive counseling, resources, education, assistance and discharge planning as appropriate.  Patient/caregiver state understanding of  available resources,  following, remains available.

## 2018-08-10 NOTE — SUBJECTIVE & OBJECTIVE
Subjective:     Interval History: Afebrile.  Patient refused surgery this morning.  Continues to complain of back pain this morning. Hypercalcemia continues to improve.  Neurosurgery plans for inpatient surgery tomorrow to stabilize T12 compression fracture.  Otherwise no new complaints.        Objective:     Vital Signs (Most Recent):  Temp: 99 °F (37.2 °C) (08/10/18 1544)  Pulse: 90 (08/10/18 1544)  Resp: 20 (08/10/18 1544)  BP: (!) 174/91 (08/10/18 1544)  SpO2: 97 % (08/10/18 1544) Vital Signs (24h Range):  Temp:  [98.2 °F (36.8 °C)-99.3 °F (37.4 °C)] 99 °F (37.2 °C)  Pulse:  [] 90  Resp:  [17-20] 20  SpO2:  [94 %-99 %] 97 %  BP: (139-178)/(79-95) 174/91     Weight: 69.9 kg (154 lb)  Body mass index is 26.43 kg/m².  Body surface area is 1.78 meters squared.    ECOG SCORE         [unfilled]    Intake/Output - Last 3 Shifts       08/08 0700 - 08/09 0659 08/09 0700 - 08/10 0659 08/10 0700 - 08/11 0659    Urine (mL/kg/hr) 500 (0.3) 1350 (0.8) 1050 (1.5)    Total Output 500 1350 1050    Net -500 -1350 -1050           Urine Occurrence 4 x      Stool Occurrence 1 x 3 x           Physical Exam   Constitutional: She is oriented to person, place, and time. She appears well-developed and well-nourished. No distress.   HENT:   Head: Normocephalic.   Eyes: Conjunctivae and EOM are normal. Pupils are equal, round, and reactive to light.   Neck: Normal range of motion. Neck supple.   Cardiovascular: Regular rhythm and normal heart sounds.    tachycardic   Pulmonary/Chest: Effort normal and breath sounds normal. No respiratory distress.   Abdominal: Soft. Bowel sounds are normal. She exhibits no distension.   Musculoskeletal: Normal range of motion. She exhibits no edema.   Neurological: She is alert and oriented to person, place, and time. No cranial nerve deficit.   Skin: Skin is warm and dry.   Psychiatric: She has a normal mood and affect. Her behavior is normal.       Significant Labs:   All pertinent labs from the  last 24 hours have been reviewed.    Diagnostic Results:  I have reviewed all pertinent imaging results/findings within the past 24 hours.

## 2018-08-10 NOTE — PROGRESS NOTES
[facility]  Hematology  Bone Marrow Transplant  Progress Note    Patient Name: Janie Zamorano  Admission Date: 8/3/2018  Hospital Length of Stay: 7 days  Code Status: Full Code    Subjective:     Interval History: Afebrile.  Patient refused surgery this morning.  Continues to complain of back pain this morning. Hypercalcemia continues to improve.  Neurosurgery plans for inpatient surgery tomorrow to stabilize T12 compression fracture.  Otherwise no new complaints.        Objective:     Vital Signs (Most Recent):  Temp: 99 °F (37.2 °C) (08/10/18 1544)  Pulse: 90 (08/10/18 1544)  Resp: 20 (08/10/18 1544)  BP: (!) 174/91 (08/10/18 1544)  SpO2: 97 % (08/10/18 1544) Vital Signs (24h Range):  Temp:  [98.2 °F (36.8 °C)-99.3 °F (37.4 °C)] 99 °F (37.2 °C)  Pulse:  [] 90  Resp:  [17-20] 20  SpO2:  [94 %-99 %] 97 %  BP: (139-178)/(79-95) 174/91     Weight: 69.9 kg (154 lb)  Body mass index is 26.43 kg/m².  Body surface area is 1.78 meters squared.    ECOG SCORE         [unfilled]    Intake/Output - Last 3 Shifts       08/08 0700 - 08/09 0659 08/09 0700 - 08/10 0659 08/10 0700 - 08/11 0659    Urine (mL/kg/hr) 500 (0.3) 1350 (0.8) 1050 (1.5)    Total Output 500 1350 1050    Net -500 -1350 -1050           Urine Occurrence 4 x      Stool Occurrence 1 x 3 x           Physical Exam   Constitutional: She is oriented to person, place, and time. She appears well-developed and well-nourished. No distress.   HENT:   Head: Normocephalic.   Eyes: Conjunctivae and EOM are normal. Pupils are equal, round, and reactive to light.   Neck: Normal range of motion. Neck supple.   Cardiovascular: Regular rhythm and normal heart sounds.    tachycardic   Pulmonary/Chest: Effort normal and breath sounds normal. No respiratory distress.   Abdominal: Soft. Bowel sounds are normal. She exhibits no distension.   Musculoskeletal: Normal range of motion. She exhibits no edema.   Neurological: She is alert and oriented to  person, place, and time. No cranial nerve deficit.   Skin: Skin is warm and dry.   Psychiatric: She has a normal mood and affect. Her behavior is normal.       Significant Labs:   All pertinent labs from the last 24 hours have been reviewed.    Diagnostic Results:  I have reviewed all pertinent imaging results/findings within the past 24 hours.    Assessment/Plan:     * Hypercalcemia    Improving/resolved.  Corrected 9.3 today.   -due to PHPT and MM  -PTH elevated as well  -Zometa 4 mg IV administered 8/7          Multiple myeloma not having achieved remission    Plan to discuss therapy once outpatient.    -hypercalcemia, anemia, bone fx and lytic lesions. Likely 2/2 to multiple myeloma.  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   -24 hr urine protein=5940  -immunofixation-A free kappa light chain is present in beta.          Closed compression fracture of thoracic vertebra    Concern for fracture stability.  Plan for inpatient surgery 8/10m refused, Likely surgery next week.   -neurosug following, appreciate recs  -TLSO brace   -pain somewhat controlled, continue diclofenac topical and oxy 5-10 mg q6hr prn        GERD (gastroesophageal reflux disease)    Pepcid BID          Constipation    Resolved, continue bowel regimen.   BM w/ multiple BM recorded on 8/5 although CT showed moderate amount of retained stool in the ascending and transverse:, suggestive of constipation          Anemia    Hgb dropping, likely dilutional.    - normocytic anemia; likely 2/2 to underlying malignancy  - transfuse for Hgb < 7 and plt < 10K   - cbc with diff daily while inpatient        Lytic bone lesions on xray             Paranoid schizophrenia    -continue Risperidone 1mg daily and 2mg qhs        Asthma     -Fluticasone 44mcg/Actuation 1 puffs BID  -No signs of symptoms of exacerbation        Other secondary hypertension    - Home meds re-started except the valsartan given patient was having GLENIS  - labetalol dose increased to 300 mg TID  - continue restarting valsartan now than GLENIS has resolved.  - continue to monitor BP             VTE Risk Mitigation         Ordered     IP VTE HIGH RISK PATIENT  Once      08/03/18 2320     Place MOUNIKA hose  Until discontinued      08/03/18 2320     Place sequential compression device  Until discontinued      08/03/18 1735          Disposition: Possible surgical intervention.  Consider SNF,  Will clarify with neurosurgery timeline for intervention.      Case discussed with Staff.      Ruslan Small MD  Bone Marrow Transplant  [facility]

## 2018-08-10 NOTE — PLAN OF CARE
Problem: Patient Care Overview  Goal: Plan of Care Review  Outcome: Ongoing (interventions implemented as appropriate)  Patient remains free from falls and injury this shift. Bed in low, locked position with call light in reach. Family at bedside and attentive to pt, requested to speak with the MD multiple times concerning questions about pts care and possible am surgery. MD went to bedside, pt family also instructed to speak with am MDs for further clarification. Pt NPO past midnight for am procedure.  Bed alarm active. Camera sitter at bedside. Patient encouraged to call for assistance when needed.  Patient verbalized understanding. Pt had complaints of bilat lower back pain, pt given tramadol with mild relief. Pt refused few pm medications. Pt educated on importance of taking medications, however pt refused. Pure wick in place draining to wall suction. All belongings within reach will continue to monitor.'

## 2018-08-11 LAB
ALBUMIN SERPL BCP-MCNC: 2.7 G/DL
ANION GAP SERPL CALC-SCNC: 9 MMOL/L
BASOPHILS # BLD AUTO: 0.02 K/UL
BASOPHILS NFR BLD: 0.5 %
BUN SERPL-MCNC: 7 MG/DL
CALCIUM SERPL-MCNC: 8.4 MG/DL
CHLORIDE SERPL-SCNC: 108 MMOL/L
CO2 SERPL-SCNC: 23 MMOL/L
CREAT SERPL-MCNC: 0.8 MG/DL
DIFFERENTIAL METHOD: ABNORMAL
EOSINOPHIL # BLD AUTO: 0.1 K/UL
EOSINOPHIL NFR BLD: 2.7 %
ERYTHROCYTE [DISTWIDTH] IN BLOOD BY AUTOMATED COUNT: 14.3 %
EST. GFR  (AFRICAN AMERICAN): >60 ML/MIN/1.73 M^2
EST. GFR  (NON AFRICAN AMERICAN): >60 ML/MIN/1.73 M^2
GLUCOSE SERPL-MCNC: 84 MG/DL
HCT VFR BLD AUTO: 26.8 %
HGB BLD-MCNC: 8.7 G/DL
IMM GRANULOCYTES # BLD AUTO: 0.1 K/UL
IMM GRANULOCYTES NFR BLD AUTO: 2.4 %
LYMPHOCYTES # BLD AUTO: 0.9 K/UL
LYMPHOCYTES NFR BLD: 20.6 %
MCH RBC QN AUTO: 28.3 PG
MCHC RBC AUTO-ENTMCNC: 32.5 G/DL
MCV RBC AUTO: 87 FL
MONOCYTES # BLD AUTO: 0.5 K/UL
MONOCYTES NFR BLD: 12.3 %
NEUTROPHILS # BLD AUTO: 2.5 K/UL
NEUTROPHILS NFR BLD: 61.5 %
NRBC BLD-RTO: 1 /100 WBC
PHOSPHATE SERPL-MCNC: 1.4 MG/DL
PLATELET # BLD AUTO: 224 K/UL
PMV BLD AUTO: 9.5 FL
POTASSIUM SERPL-SCNC: 3.3 MMOL/L
RBC # BLD AUTO: 3.07 M/UL
SODIUM SERPL-SCNC: 140 MMOL/L
WBC # BLD AUTO: 4.13 K/UL

## 2018-08-11 PROCEDURE — 99233 SBSQ HOSP IP/OBS HIGH 50: CPT | Mod: ,,, | Performed by: PHYSICIAN ASSISTANT

## 2018-08-11 PROCEDURE — 25000003 PHARM REV CODE 250: Performed by: STUDENT IN AN ORGANIZED HEALTH CARE EDUCATION/TRAINING PROGRAM

## 2018-08-11 PROCEDURE — 25000003 PHARM REV CODE 250: Performed by: INTERNAL MEDICINE

## 2018-08-11 PROCEDURE — 85025 COMPLETE CBC W/AUTO DIFF WBC: CPT

## 2018-08-11 PROCEDURE — 20600001 HC STEP DOWN PRIVATE ROOM

## 2018-08-11 PROCEDURE — 99233 SBSQ HOSP IP/OBS HIGH 50: CPT | Mod: ,,, | Performed by: INTERNAL MEDICINE

## 2018-08-11 PROCEDURE — 63600175 PHARM REV CODE 636 W HCPCS: Performed by: STUDENT IN AN ORGANIZED HEALTH CARE EDUCATION/TRAINING PROGRAM

## 2018-08-11 PROCEDURE — 80069 RENAL FUNCTION PANEL: CPT

## 2018-08-11 PROCEDURE — 36415 COLL VENOUS BLD VENIPUNCTURE: CPT

## 2018-08-11 RX ORDER — POTASSIUM CHLORIDE 20 MEQ/15ML
40 SOLUTION ORAL EVERY 4 HOURS
Status: COMPLETED | OUTPATIENT
Start: 2018-08-11 | End: 2018-08-11

## 2018-08-11 RX ORDER — ENOXAPARIN SODIUM 100 MG/ML
40 INJECTION SUBCUTANEOUS EVERY 24 HOURS
Status: DISCONTINUED | OUTPATIENT
Start: 2018-08-11 | End: 2018-08-11

## 2018-08-11 RX ORDER — RAMELTEON 8 MG/1
8 TABLET ORAL NIGHTLY PRN
Status: DISCONTINUED | OUTPATIENT
Start: 2018-08-11 | End: 2018-08-21 | Stop reason: HOSPADM

## 2018-08-11 RX ORDER — POTASSIUM CHLORIDE 20 MEQ/1
40 TABLET, EXTENDED RELEASE ORAL EVERY 4 HOURS
Status: DISCONTINUED | OUTPATIENT
Start: 2018-08-11 | End: 2018-08-11

## 2018-08-11 RX ORDER — ENOXAPARIN SODIUM 100 MG/ML
40 INJECTION SUBCUTANEOUS EVERY 24 HOURS
Status: DISCONTINUED | OUTPATIENT
Start: 2018-08-11 | End: 2018-08-14

## 2018-08-11 RX ADMIN — MOXIFLOXACIN HYDROCHLORIDE 400 MG: 400 TABLET, FILM COATED ORAL at 09:08

## 2018-08-11 RX ADMIN — FAMOTIDINE 20 MG: 20 TABLET ORAL at 09:08

## 2018-08-11 RX ADMIN — DICLOFENAC 2 G: 10 GEL TOPICAL at 02:08

## 2018-08-11 RX ADMIN — DICLOFENAC 2 G: 10 GEL TOPICAL at 05:08

## 2018-08-11 RX ADMIN — DICLOFENAC 2 G: 10 GEL TOPICAL at 09:08

## 2018-08-11 RX ADMIN — FLUTICASONE PROPIONATE 1 PUFF: 44 AEROSOL, METERED RESPIRATORY (INHALATION) at 09:08

## 2018-08-11 RX ADMIN — POTASSIUM CHLORIDE 40 MEQ: 20 SOLUTION ORAL at 02:08

## 2018-08-11 RX ADMIN — TRAMADOL HYDROCHLORIDE 50 MG: 50 TABLET, COATED ORAL at 04:08

## 2018-08-11 RX ADMIN — POTASSIUM & SODIUM PHOSPHATES POWDER PACK 280-160-250 MG 2 PACKET: 280-160-250 PACK at 09:08

## 2018-08-11 RX ADMIN — ENOXAPARIN SODIUM 40 MG: 100 INJECTION SUBCUTANEOUS at 02:08

## 2018-08-11 RX ADMIN — LABETALOL HCL 300 MG: 100 TABLET, FILM COATED ORAL at 03:08

## 2018-08-11 RX ADMIN — LABETALOL HCL 300 MG: 100 TABLET, FILM COATED ORAL at 09:08

## 2018-08-11 RX ADMIN — SENNOSIDES AND DOCUSATE SODIUM 1 TABLET: 8.6; 5 TABLET ORAL at 09:08

## 2018-08-11 RX ADMIN — RAMELTEON 8 MG: 8 TABLET, FILM COATED ORAL at 10:08

## 2018-08-11 RX ADMIN — POTASSIUM CHLORIDE 40 MEQ: 20 SOLUTION ORAL at 10:08

## 2018-08-11 NOTE — PLAN OF CARE
Problem: Patient Care Overview  Goal: Plan of Care Review  Outcome: Ongoing (interventions implemented as appropriate)  Patient AAOx4, VSS afebrile, and without injury. Fall precautions maintained. Patient instructed on how to contact the nurse. Family at bedside. Patient without distress on room air; patient requested vegetarian diet. No complaints of nausea. Pain seems more controlled today. Patient is more talkative as well. Neuro checks continued. Pure wick in place draining clear yellow urine with some urine occurences. Brace in place. Patient educated about the need to turn to avoid skin breakdown, but patient is resistant to frequent position changes. Will reinforce education. Electrolytes replaced PO. Patient's son refused the administration of 50% of the potassium. Nurse educated on the importance of potassium. Patient (and patient's son re-iterating on her behalf) have refused potassium pills as well. Questions and concerns have been addressed; will continue to monitor.

## 2018-08-11 NOTE — ASSESSMENT & PLAN NOTE
Concern for fracture stability.  Plan for inpatient surgery 8/15   - Lovenox given bed rest  -neurosug following, appreciate recs  -TLSO brace   -pain somewhat controlled, continue diclofenac topical and oxy 5-10 mg q6hr prn

## 2018-08-11 NOTE — ASSESSMENT & PLAN NOTE
62 year old female with acute onset on bilateral flank pain that began after lifting a heavy object. Imaging shows a T12 compression fracture along with multiple lytic lesions throughout the spine.     -Patient neurologically stable without s/s or myelopathy   -CT Thoracic/Upright&supine x-rays reviewed, consistent with unstable fracture  -Discussed recommendations for surgery in detail with patient and Son, including risk of progression of fracture and cord injury if fracture is not surgically stabilized. Initially declined surgery but now wishes to proceed.   -Plan for  T9-L3 posterior instrumented fusion with Dr. Andres Wednesday AM.   -Keep in TLSO brace at all times.    -Bed Rest  -Recommend DVTP with SQH, teds, and SCDs given bedrest.   -Will continue to follow.  Please call with questions or change in neurologic status    Discussed with Dr. Andres

## 2018-08-11 NOTE — SUBJECTIVE & OBJECTIVE
Interval History: NAEON. continued low back pain with intermittent radicular leg pain to both thighs. TLSO brace on. No other complaints. Surgery scheduled for Wednesday AM.     Medications:  Continuous Infusions:  Scheduled Meds:   benztropine  1 mg Oral BID    diclofenac sodium  2 g Topical (Top) QID    enoxaparin  40 mg Subcutaneous Daily    famotidine  20 mg Oral BID    fluticasone  1 puff Inhalation BID    labetalol  300 mg Oral TID    moxifloxacin  400 mg Oral Daily    polyethylene glycol  17 g Oral Daily    potassium chloride 10%  40 mEq Oral Q4H    potassium, sodium phosphates  2 packet Oral Daily    risperiDONE  1 mg Oral Daily    risperiDONE  2 mg Oral Nightly    senna-docusate 8.6-50 mg  1 tablet Oral BID     PRN Meds:albuterol-ipratropium, dextrose 50%, dextrose 50%, glucagon (human recombinant), glucose, glucose, hydrALAZINE, methyl salicylate-menthol 15-10%, ondansetron, ramelteon, sodium chloride 0.9%, traMADol     Review of Systems  Objective:     Weight: 70.4 kg (155 lb 3.3 oz)  Body mass index is 26.64 kg/m².  Vital Signs (Most Recent):  Temp: 98.2 °F (36.8 °C) (08/11/18 1200)  Pulse: 87 (08/11/18 1200)  Resp: 16 (08/11/18 1200)  BP: (!) 152/90 (08/11/18 1200)  SpO2: 99 % (08/11/18 1200) Vital Signs (24h Range):  Temp:  [98.2 °F (36.8 °C)-99 °F (37.2 °C)] 98.2 °F (36.8 °C)  Pulse:  [87-92] 87  Resp:  [16-20] 16  SpO2:  [94 %-99 %] 99 %  BP: (142-174)/(82-91) 152/90       Date 08/11/18 0700 - 08/12/18 0659   Shift 1768-7338 7670-1337 9121-9985 24 Hour Total   I  N  T  A  K  E   Shift Total  (mL/kg)       O  U  T  P  U  T   Urine  (mL/kg/hr) 900   900    Shift Total  (mL/kg) 900  (12.8)   900  (12.8)   Weight (kg) 70.4 70.4 70.4 70.4                   Female External Urinary Catheter 08/09/18 0000 (Active)   Skin no redness;no breakdown 8/11/2018  7:43 AM   Tolerance no signs/symptoms of discomfort 8/11/2018  7:43 AM   Suction Continuous suction at 50 mmHg 8/10/2018  8:15 PM   Date of  last wick change 08/10/18 8/10/2018  8:15 PM   Time of last wick change 2217 8/10/2018  8:15 PM   Output (mL) 250 mL 8/10/2018  9:00 AM       Neurosurgery Physical Exam  Vital signs: reviewed above.   Constitutional: well-developed, no acute distress  Cardiovascular: regular rate and rhythm  Resp: normal respirations, not labored, no accessory muscles used  Abdomen: soft, non-distended, not tender to palpation  Pulses: palpable distal pulses   Skin: warm, dry and intact, no rashes  Neurological  GCS: Motor: 6/Verbal: 5/Eyes: 4 GCS Total: 15  Mental Status: Awake, Alert, Oriented x 4  Follows commands   Head: normocephalic, atraumatic  PERRL, EOMI,   Facial expression symmetric, tongue midline, shoulder shrug symmetric  Moves all extremities with good strength 5/5  SLR positive on R  SILT  No clonus or babinski   TLSO brace on           Significant Labs:    Recent Labs  Lab 08/09/18  2351 08/10/18  0527 08/11/18  0449   GLU 73 70 84    140 140   K 3.2* 3.4* 3.3*    111* 108   CO2 20* 19* 23   BUN 8 8 7*   CREATININE 0.8 0.8 0.8   CALCIUM 8.0* 8.2* 8.4*       Recent Labs  Lab 08/10/18  0528 08/11/18  0449   WBC 3.89* 4.13   HGB 7.8* 8.7*   HCT 24.3* 26.8*    224       Recent Labs  Lab 08/10/18  0807   INR 1.0   APTT 26.7     Microbiology Results (last 7 days)     Procedure Component Value Units Date/Time    Blood culture [[id number]] Collected:  08/08/18 1801    Order Status:  Completed Specimen:  Blood Updated:  08/10/18 2212     Blood Culture, Routine No Growth to date     Blood Culture, Routine No Growth to date     Blood Culture, Routine No Growth to date    Narrative:       Collection has been rescheduled by FB at 8/8/2018 14:42 Reason: nurse   Leonora wants all labs at 1600  Collection has been rescheduled by JOYCE at 8/8/2018 16:24 Reason:   Patient unavailable  Collection has been rescheduled by JMVero at 8/8/2018 16:25 Reason:   Patient unavailable  Collection has been rescheduled by JOYCE at  8/8/2018 16:37 Reason:   Patient unavailable  Collection has been rescheduled by FB at 8/8/2018 14:42 Reason: nurse   Leonora sons all labs at 1600  Collection has been rescheduled by JM2 at 8/8/2018 16:24 Reason:   Patient unavailable  Collection has been rescheduled by JM2 at 8/8/2018 16:25 Reason:   Patient unavailable  Collection has been rescheduled by JM2 at 8/8/2018 16:37 Reason:   Patient unavailable    Blood culture [[id number]] Collected:  08/08/18 1800    Order Status:  Completed Specimen:  Blood Updated:  08/10/18 2212     Blood Culture, Routine No Growth to date     Blood Culture, Routine No Growth to date     Blood Culture, Routine No Growth to date    Narrative:       Collection has been rescheduled by FB at 8/8/2018 14:42 Reason: nurse   Leonora sons all labs at 1600  Collection has been rescheduled by JM2 at 8/8/2018 16:24 Reason:   Patient unavailable  Collection has been rescheduled by JM2 at 8/8/2018 16:25 Reason:   Patient unavailable  Collection has been rescheduled by JM2 at 8/8/2018 16:37 Reason:   Patient unavailable  Collection has been rescheduled by FB at 8/8/2018 14:42 Reason: nurse   Leonora sons all labs at 1600  Collection has been rescheduled by JM2 at 8/8/2018 16:24 Reason:   Patient unavailable  Collection has been rescheduled by JM2 at 8/8/2018 16:25 Reason:   Patient unavailable  Collection has been rescheduled by JM2 at 8/8/2018 16:37 Reason:   Patient unavailable    Gram stain [[id number]] Collected:  08/08/18 1558    Order Status:  Completed Specimen:  Urine from Urine Updated:  08/09/18 0229     Gram Stain Result Rare WBC's      Few Gram positive rods       Rare budding yeast          Significant Diagnostics:  None new

## 2018-08-11 NOTE — SUBJECTIVE & OBJECTIVE
Subjective:     Interval History: Afebrile.   Neurosurgery plans for inpatient surgery Wednesday to stabilize T12 compression fracture.  Otherwise no new complaints.  Continues to be on bed rest.  Otherwise no new complaints.          Objective:     Vital Signs (Most Recent):  Temp: 98.2 °F (36.8 °C) (08/11/18 1200)  Pulse: 87 (08/11/18 1200)  Resp: 16 (08/11/18 1200)  BP: (!) 152/90 (08/11/18 1200)  SpO2: 99 % (08/11/18 1200) Vital Signs (24h Range):  Temp:  [98.2 °F (36.8 °C)-99 °F (37.2 °C)] 98.2 °F (36.8 °C)  Pulse:  [87-92] 87  Resp:  [16-20] 16  SpO2:  [94 %-99 %] 99 %  BP: (142-174)/(82-91) 152/90     Weight: 70.4 kg (155 lb 3.3 oz)  Body mass index is 26.64 kg/m².  Body surface area is 1.78 meters squared.    ECOG SCORE         [unfilled]    Intake/Output - Last 3 Shifts       08/09 0700 - 08/10 0659 08/10 0700 - 08/11 0659 08/11 0700 - 08/12 0659    P.O.  320     Total Intake(mL/kg)  320 (4.5)     Urine (mL/kg/hr) 1350 (0.8) 1650 (1) 900 (1.7)    Total Output 1350 1650 900    Net -1350 -1330 -900           Urine Occurrence  2 x     Stool Occurrence 3 x 1 x           Physical Exam   Constitutional: She is oriented to person, place, and time. She appears well-developed and well-nourished. No distress.   HENT:   Head: Normocephalic.   Eyes: Conjunctivae and EOM are normal. Pupils are equal, round, and reactive to light.   Neck: Normal range of motion. Neck supple.   Cardiovascular: Regular rhythm and normal heart sounds.    tachycardic   Pulmonary/Chest: Effort normal and breath sounds normal. No respiratory distress.   Abdominal: Soft. Bowel sounds are normal. She exhibits no distension.   Musculoskeletal: Normal range of motion. She exhibits no edema.   Neurological: She is alert and oriented to person, place, and time. No cranial nerve deficit.   Skin: Skin is warm and dry.   Psychiatric: She has a normal mood and affect. Her behavior is normal.       Significant Labs:   All pertinent labs from the last 24  hours have been reviewed.    Diagnostic Results:  I have reviewed all pertinent imaging results/findings within the past 24 hours.

## 2018-08-11 NOTE — PT/OT/SLP PROGRESS
Physical Therapy      Patient Name:  Janie Zamorano   MRN:  [id number]    Contacted medical team prior to evaluation - Pt originally scheduled for back surgery 8/10 but refused; now pt is agreeable c back surgery and PT eval is to be held until pt appropriate following surgery.    Kalin Levy, PT   8/11/2018

## 2018-08-11 NOTE — PLAN OF CARE
Problem: Patient Care Overview  Goal: Plan of Care Review  Outcome: Ongoing (interventions implemented as appropriate)  Patient remained free from falls throughout shift, call bell within reach. Patient disoriented to situation. Brace remains on at all times, patient refuses to be repositioned to ensure skin breakdown doesn't occur - despite extensive education being given to both patient and son. Vitals stable, will continue to monitor.

## 2018-08-11 NOTE — PROGRESS NOTES
[facility]  Hematology  Bone Marrow Transplant  Progress Note    Patient Name: Janie Zamorano  Admission Date: 8/3/2018  Hospital Length of Stay: 8 days  Code Status: Full Code    Subjective:     Interval History: Afebrile.   Neurosurgery plans for inpatient surgery Wednesday to stabilize T12 compression fracture.  Otherwise no new complaints.  Continues to be on bed rest.  Otherwise no new complaints.          Objective:     Vital Signs (Most Recent):  Temp: 98.2 °F (36.8 °C) (08/11/18 1200)  Pulse: 87 (08/11/18 1200)  Resp: 16 (08/11/18 1200)  BP: (!) 152/90 (08/11/18 1200)  SpO2: 99 % (08/11/18 1200) Vital Signs (24h Range):  Temp:  [98.2 °F (36.8 °C)-99 °F (37.2 °C)] 98.2 °F (36.8 °C)  Pulse:  [87-92] 87  Resp:  [16-20] 16  SpO2:  [94 %-99 %] 99 %  BP: (142-174)/(82-91) 152/90     Weight: 70.4 kg (155 lb 3.3 oz)  Body mass index is 26.64 kg/m².  Body surface area is 1.78 meters squared.    ECOG SCORE         [unfilled]    Intake/Output - Last 3 Shifts       08/09 0700 - 08/10 0659 08/10 0700 - 08/11 0659 08/11 0700 - 08/12 0659    P.O.  320     Total Intake(mL/kg)  320 (4.5)     Urine (mL/kg/hr) 1350 (0.8) 1650 (1) 900 (1.7)    Total Output 1350 1650 900    Net -1350 -1330 -900           Urine Occurrence  2 x     Stool Occurrence 3 x 1 x           Physical Exam   Constitutional: She is oriented to person, place, and time. She appears well-developed and well-nourished. No distress.   HENT:   Head: Normocephalic.   Eyes: Conjunctivae and EOM are normal. Pupils are equal, round, and reactive to light.   Neck: Normal range of motion. Neck supple.   Cardiovascular: Regular rhythm and normal heart sounds.    tachycardic   Pulmonary/Chest: Effort normal and breath sounds normal. No respiratory distress.   Abdominal: Soft. Bowel sounds are normal. She exhibits no distension.   Musculoskeletal: Normal range of motion. She exhibits no edema.   Neurological: She is alert and oriented to person,  place, and time. No cranial nerve deficit.   Skin: Skin is warm and dry.   Psychiatric: She has a normal mood and affect. Her behavior is normal.       Significant Labs:   All pertinent labs from the last 24 hours have been reviewed.    Diagnostic Results:  I have reviewed all pertinent imaging results/findings within the past 24 hours.    Assessment/Plan:     * Hypercalcemia    Improving/resolved.  Stable    -due to PHPT and MM  -PTH elevated as well  -Zometa 4 mg IV administered 8/7          Multiple myeloma not having achieved remission    Plan to discuss therapy once outpatient.    -hypercalcemia, anemia, bone fx and lytic lesions. Likely 2/2 to multiple myeloma.  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   -24 hr urine protein=5940  -immunofixation-A free kappa light chain is present in beta.          Closed compression fracture of thoracic vertebra    Concern for fracture stability.  Plan for inpatient surgery 8/15   - Lovenox given bed rest  -neurosug following, appreciate recs  -TLSO brace   -pain somewhat controlled, continue diclofenac topical and oxy 5-10 mg q6hr prn        GERD (gastroesophageal reflux disease)    Pepcid BID          Constipation    Resolved, continue bowel regimen.   BM w/ multiple BM recorded on 8/5 although CT showed moderate amount of retained stool in the ascending and transverse:, suggestive of constipation          Anemia    Hgb dropping, likely dilutional.    - normocytic anemia; likely 2/2 to underlying malignancy  - transfuse for Hgb < 7 and plt < 10K   - cbc with diff daily while inpatient        Lytic bone lesions on xray             Paranoid schizophrenia    -continue Risperidone 1mg daily and 2mg qhs  - refusing meds, consider psych for new med rec given  future surgery.         Asthma    -Fluticasone 44mcg/Actuation 1 puffs BID  -No signs of symptoms of exacerbation        Other secondary hypertension    - Home meds re-started except the valsartan given patient was having GLENIS  - labetalol dose increased to 300 mg TID  - continue restarting valsartan now than GLENIS has resolved.  - continue to monitor BP             VTE Risk Mitigation         Ordered     enoxaparin injection 40 mg  Daily      08/11/18 1058     IP VTE HIGH RISK PATIENT  Once      08/03/18 2320     Place MOUNIKA hose  Until discontinued      08/03/18 2320     Place sequential compression device  Until discontinued      08/03/18 2821          Disposition: Continue inpatient care with plan for surgery on 8/15    Ruslan Small MD  Bone Marrow Transplant  [facility]

## 2018-08-11 NOTE — PROGRESS NOTES
[facility]  Neurosurgery  Progress Note    Subjective:     History of Present Illness: Ms. Zamorano is a 62 year old female with no PMHx of cancer, chronic back pain, or osteoporosis, who presents to the ED with onset of bilateral flank pain that began 1 week ago while lifting a heavy object from the ground. Patient denies any popping or pulling sensation. Denies any thoracic or lumbar back pain. Denies any UE or LE pain, paresthesias, or weakness. Denies any bladder or bowel incontinence. CT renal was performed and showed a T12 compression fracture. Neurosurgery was consulted for treatment recommendations.     Post-Op Info:  Procedure(s) (LRB):  THORACIC FUSION - PEDICLE SCREWS - T9 - L3 AIRO (N/A)         Interval History: NAEON. continued low back pain with intermittent radicular leg pain to both thighs. TLSO brace on. No other complaints. Surgery scheduled for Wednesday AM.     Medications:  Continuous Infusions:  Scheduled Meds:   benztropine  1 mg Oral BID    diclofenac sodium  2 g Topical (Top) QID    enoxaparin  40 mg Subcutaneous Daily    famotidine  20 mg Oral BID    fluticasone  1 puff Inhalation BID    labetalol  300 mg Oral TID    moxifloxacin  400 mg Oral Daily    polyethylene glycol  17 g Oral Daily    potassium chloride 10%  40 mEq Oral Q4H    potassium, sodium phosphates  2 packet Oral Daily    risperiDONE  1 mg Oral Daily    risperiDONE  2 mg Oral Nightly    senna-docusate 8.6-50 mg  1 tablet Oral BID     PRN Meds:albuterol-ipratropium, dextrose 50%, dextrose 50%, glucagon (human recombinant), glucose, glucose, hydrALAZINE, methyl salicylate-menthol 15-10%, ondansetron, ramelteon, sodium chloride 0.9%, traMADol     Review of Systems  Objective:     Weight: 70.4 kg (155 lb 3.3 oz)  Body mass index is 26.64 kg/m².  Vital Signs (Most Recent):  Temp: 98.2 °F (36.8 °C) (08/11/18 1200)  Pulse: 87 (08/11/18 1200)  Resp: 16 (08/11/18 1200)  BP: (!) 152/90 (08/11/18  1200)  SpO2: 99 % (08/11/18 1200) Vital Signs (24h Range):  Temp:  [98.2 °F (36.8 °C)-99 °F (37.2 °C)] 98.2 °F (36.8 °C)  Pulse:  [87-92] 87  Resp:  [16-20] 16  SpO2:  [94 %-99 %] 99 %  BP: (142-174)/(82-91) 152/90       Date 08/11/18 0700 - 08/12/18 0659   Shift 2450-5348 6020-5080 1258-3505 24 Hour Total   I  N  T  A  K  E   Shift Total  (mL/kg)       O  U  T  P  U  T   Urine  (mL/kg/hr) 900   900    Shift Total  (mL/kg) 900  (12.8)   900  (12.8)   Weight (kg) 70.4 70.4 70.4 70.4                   Female External Urinary Catheter 08/09/18 0000 (Active)   Skin no redness;no breakdown 8/11/2018  7:43 AM   Tolerance no signs/symptoms of discomfort 8/11/2018  7:43 AM   Suction Continuous suction at 50 mmHg 8/10/2018  8:15 PM   Date of last wick change 08/10/18 8/10/2018  8:15 PM   Time of last wick change 2217 8/10/2018  8:15 PM   Output (mL) 250 mL 8/10/2018  9:00 AM       Neurosurgery Physical Exam  Vital signs: reviewed above.   Constitutional: well-developed, no acute distress  Cardiovascular: regular rate and rhythm  Resp: normal respirations, not labored, no accessory muscles used  Abdomen: soft, non-distended, not tender to palpation  Pulses: palpable distal pulses   Skin: warm, dry and intact, no rashes  Neurological  GCS: Motor: 6/Verbal: 5/Eyes: 4 GCS Total: 15  Mental Status: Awake, Alert, Oriented x 4  Follows commands   Head: normocephalic, atraumatic  PERRL, EOMI,   Facial expression symmetric, tongue midline, shoulder shrug symmetric  Moves all extremities with good strength 5/5  SLR positive on R  SILT  No clonus or babinski   TLSO brace on           Significant Labs:    Recent Labs  Lab 08/09/18  2351 08/10/18  0527 08/11/18  0449   GLU 73 70 84    140 140   K 3.2* 3.4* 3.3*    111* 108   CO2 20* 19* 23   BUN 8 8 7*   CREATININE 0.8 0.8 0.8   CALCIUM 8.0* 8.2* 8.4*       Recent Labs  Lab 08/10/18  0528 08/11/18  0449   WBC 3.89* 4.13   HGB 7.8* 8.7*   HCT 24.3* 26.8*    224        Recent Labs  Lab 08/10/18  0807   INR 1.0   APTT 26.7     Microbiology Results (last 7 days)     Procedure Component Value Units Date/Time    Blood culture [[id number]] Collected:  08/08/18 1801    Order Status:  Completed Specimen:  Blood Updated:  08/10/18 2212     Blood Culture, Routine No Growth to date     Blood Culture, Routine No Growth to date     Blood Culture, Routine No Growth to date    Narrative:       Collection has been rescheduled by FB at 8/8/2018 14:42 Reason: nurse   Leonora sons all labs at 1600  Collection has been rescheduled by JM2 at 8/8/2018 16:24 Reason:   Patient unavailable  Collection has been rescheduled by JM2 at 8/8/2018 16:25 Reason:   Patient unavailable  Collection has been rescheduled by 2 at 8/8/2018 16:37 Reason:   Patient unavailable  Collection has been rescheduled by FB at 8/8/2018 14:42 Reason: nurse   Leonora jain all labs at 1600  Collection has been rescheduled by 2 at 8/8/2018 16:24 Reason:   Patient unavailable  Collection has been rescheduled by 2 at 8/8/2018 16:25 Reason:   Patient unavailable  Collection has been rescheduled by 2 at 8/8/2018 16:37 Reason:   Patient unavailable    Blood culture [[id number]] Collected:  08/08/18 1800    Order Status:  Completed Specimen:  Blood Updated:  08/10/18 2212     Blood Culture, Routine No Growth to date     Blood Culture, Routine No Growth to date     Blood Culture, Routine No Growth to date    Narrative:       Collection has been rescheduled by FB at 8/8/2018 14:42 Reason: nurse   Leonora sons all labs at 1600  Collection has been rescheduled by JM2 at 8/8/2018 16:24 Reason:   Patient unavailable  Collection has been rescheduled by JM2 at 8/8/2018 16:25 Reason:   Patient unavailable  Collection has been rescheduled by JM2 at 8/8/2018 16:37 Reason:   Patient unavailable  Collection has been rescheduled by FB at 8/8/2018 14:42 Reason: nurse Leonora jain all labs at 1600  Collection has been rescheduled by JM2 at  8/8/2018 16:24 Reason:   Patient unavailable  Collection has been rescheduled by JMVero at 8/8/2018 16:25 Reason:   Patient unavailable  Collection has been rescheduled by JM2 at 8/8/2018 16:37 Reason:   Patient unavailable    Gram stain [[id number]] Collected:  08/08/18 1558    Order Status:  Completed Specimen:  Urine from Urine Updated:  08/09/18 0229     Gram Stain Result Rare WBC's      Few Gram positive rods       Rare budding yeast          Significant Diagnostics:  None new    Assessment/Plan:     Closed compression fracture of thoracic vertebra    62 year old female with acute onset on bilateral flank pain that began after lifting a heavy object. Imaging shows a T12 compression fracture along with multiple lytic lesions throughout the spine.     -Patient neurologically stable without s/s or myelopathy   -CT Thoracic/Upright&supine x-rays reviewed, consistent with unstable fracture  -Discussed recommendations for surgery in detail with patient and Son, including risk of progression of fracture and cord injury if fracture is not surgically stabilized. Initially declined surgery but now wishes to proceed.   -Plan for  T9-L3 posterior instrumented fusion with Dr. Andres Wednesday AM.   -Keep in TLSO brace at all times.    -Bed Rest  -Recommend DVTP with SQH, teds, and SCDs given bedrest.   -Will continue to follow.  Please call with questions or change in neurologic status    Discussed with Dr. Dmitry Hernandez PA-C  Neurosurgery  [facility]

## 2018-08-11 NOTE — ASSESSMENT & PLAN NOTE
Improving/resolved.  Stable    -due to PHPT and MM  -PTH elevated as well  -Zometa 4 mg IV administered 8/7

## 2018-08-11 NOTE — ASSESSMENT & PLAN NOTE
-continue Risperidone 1mg daily and 2mg qhs  - refusing meds, consider psych for new med rec given future surgery.

## 2018-08-12 PROBLEM — R41.0 DELIRIUM: Status: ACTIVE | Noted: 2018-08-12

## 2018-08-12 LAB
ALBUMIN SERPL BCP-MCNC: 2.8 G/DL
ANION GAP SERPL CALC-SCNC: 11 MMOL/L
BACTERIA #/AREA URNS AUTO: ABNORMAL /HPF
BASOPHILS # BLD AUTO: 0.01 K/UL
BASOPHILS NFR BLD: 0.2 %
BILIRUB UR QL STRIP: NEGATIVE
BUN SERPL-MCNC: 7 MG/DL
CALCIUM SERPL-MCNC: 8.3 MG/DL
CHLORIDE SERPL-SCNC: 109 MMOL/L
CLARITY UR REFRACT.AUTO: CLEAR
CO2 SERPL-SCNC: 20 MMOL/L
COLOR UR AUTO: YELLOW
CREAT SERPL-MCNC: 0.9 MG/DL
DIFFERENTIAL METHOD: ABNORMAL
EOSINOPHIL # BLD AUTO: 0.1 K/UL
EOSINOPHIL NFR BLD: 2.3 %
ERYTHROCYTE [DISTWIDTH] IN BLOOD BY AUTOMATED COUNT: 14 %
EST. GFR  (AFRICAN AMERICAN): >60 ML/MIN/1.73 M^2
EST. GFR  (NON AFRICAN AMERICAN): >60 ML/MIN/1.73 M^2
GLUCOSE SERPL-MCNC: 97 MG/DL
GLUCOSE UR QL STRIP: NEGATIVE
HCT VFR BLD AUTO: 27.5 %
HGB BLD-MCNC: 9 G/DL
HGB UR QL STRIP: NEGATIVE
HYALINE CASTS UR QL AUTO: 0 /LPF
IMM GRANULOCYTES # BLD AUTO: 0.11 K/UL
IMM GRANULOCYTES NFR BLD AUTO: 2.3 %
KETONES UR QL STRIP: ABNORMAL
LEUKOCYTE ESTERASE UR QL STRIP: NEGATIVE
LYMPHOCYTES # BLD AUTO: 0.8 K/UL
LYMPHOCYTES NFR BLD: 17.2 %
MCH RBC QN AUTO: 28.5 PG
MCHC RBC AUTO-ENTMCNC: 32.7 G/DL
MCV RBC AUTO: 87 FL
MICROSCOPIC COMMENT: ABNORMAL
MONOCYTES # BLD AUTO: 0.6 K/UL
MONOCYTES NFR BLD: 13.2 %
NEUTROPHILS # BLD AUTO: 3.1 K/UL
NEUTROPHILS NFR BLD: 64.8 %
NITRITE UR QL STRIP: NEGATIVE
NRBC BLD-RTO: 1 /100 WBC
PH UR STRIP: 7 [PH] (ref 5–8)
PHOSPHATE SERPL-MCNC: 1.6 MG/DL
PLATELET # BLD AUTO: 240 K/UL
PMV BLD AUTO: 9.3 FL
POTASSIUM SERPL-SCNC: 3.8 MMOL/L
PROT UR QL STRIP: ABNORMAL
RBC # BLD AUTO: 3.16 M/UL
RBC #/AREA URNS AUTO: 0 /HPF (ref 0–4)
SODIUM SERPL-SCNC: 140 MMOL/L
SP GR UR STRIP: 1.01 (ref 1–1.03)
SQUAMOUS #/AREA URNS AUTO: 6 /HPF
URN SPEC COLLECT METH UR: ABNORMAL
UROBILINOGEN UR STRIP-ACNC: NEGATIVE EU/DL
WBC # BLD AUTO: 4.78 K/UL
WBC #/AREA URNS AUTO: 1 /HPF (ref 0–5)

## 2018-08-12 PROCEDURE — 80069 RENAL FUNCTION PANEL: CPT

## 2018-08-12 PROCEDURE — 99233 SBSQ HOSP IP/OBS HIGH 50: CPT | Mod: ,,, | Performed by: INTERNAL MEDICINE

## 2018-08-12 PROCEDURE — 20600001 HC STEP DOWN PRIVATE ROOM

## 2018-08-12 PROCEDURE — 25000003 PHARM REV CODE 250: Performed by: INTERNAL MEDICINE

## 2018-08-12 PROCEDURE — 85025 COMPLETE CBC W/AUTO DIFF WBC: CPT

## 2018-08-12 PROCEDURE — 87086 URINE CULTURE/COLONY COUNT: CPT

## 2018-08-12 PROCEDURE — 36415 COLL VENOUS BLD VENIPUNCTURE: CPT

## 2018-08-12 PROCEDURE — 25000003 PHARM REV CODE 250: Performed by: STUDENT IN AN ORGANIZED HEALTH CARE EDUCATION/TRAINING PROGRAM

## 2018-08-12 PROCEDURE — 90792 PSYCH DIAG EVAL W/MED SRVCS: CPT | Mod: ,,, | Performed by: PSYCHIATRY & NEUROLOGY

## 2018-08-12 PROCEDURE — 81001 URINALYSIS AUTO W/SCOPE: CPT

## 2018-08-12 PROCEDURE — 63600175 PHARM REV CODE 636 W HCPCS: Performed by: STUDENT IN AN ORGANIZED HEALTH CARE EDUCATION/TRAINING PROGRAM

## 2018-08-12 RX ORDER — SODIUM,POTASSIUM PHOSPHATES 280-250MG
1 POWDER IN PACKET (EA) ORAL
Status: DISCONTINUED | OUTPATIENT
Start: 2018-08-12 | End: 2018-08-17

## 2018-08-12 RX ORDER — QUETIAPINE FUMARATE 25 MG/1
50 TABLET, FILM COATED ORAL NIGHTLY
Status: DISCONTINUED | OUTPATIENT
Start: 2018-08-12 | End: 2018-08-12

## 2018-08-12 RX ORDER — QUETIAPINE FUMARATE 100 MG/1
100 TABLET, FILM COATED ORAL NIGHTLY
Status: DISCONTINUED | OUTPATIENT
Start: 2018-08-12 | End: 2018-08-14

## 2018-08-12 RX ORDER — QUETIAPINE FUMARATE 25 MG/1
50 TABLET, FILM COATED ORAL ONCE
Status: COMPLETED | OUTPATIENT
Start: 2018-08-12 | End: 2018-08-12

## 2018-08-12 RX ADMIN — DICLOFENAC 2 G: 10 GEL TOPICAL at 02:08

## 2018-08-12 RX ADMIN — DICLOFENAC 2 G: 10 GEL TOPICAL at 04:08

## 2018-08-12 RX ADMIN — ENOXAPARIN SODIUM 40 MG: 100 INJECTION SUBCUTANEOUS at 04:08

## 2018-08-12 RX ADMIN — QUETIAPINE FUMARATE 50 MG: 25 TABLET ORAL at 05:08

## 2018-08-12 RX ADMIN — DICLOFENAC 2 G: 10 GEL TOPICAL at 08:08

## 2018-08-12 RX ADMIN — DICLOFENAC 2 G: 10 GEL TOPICAL at 09:08

## 2018-08-12 RX ADMIN — SODIUM PHOSPHATE, MONOBASIC, MONOHYDRATE 39.99 MMOL: 276; 142 INJECTION, SOLUTION INTRAVENOUS at 06:08

## 2018-08-12 RX ADMIN — LABETALOL HCL 300 MG: 100 TABLET, FILM COATED ORAL at 03:08

## 2018-08-12 RX ADMIN — FLUTICASONE PROPIONATE 1 PUFF: 44 AEROSOL, METERED RESPIRATORY (INHALATION) at 08:08

## 2018-08-12 RX ADMIN — LABETALOL HCL 300 MG: 100 TABLET, FILM COATED ORAL at 10:08

## 2018-08-12 NOTE — CONSULTS
[facility]  Psychiatry  Consult Note    Patient Name: Janie Zamorano  MRN: [id number]   Code Status: Full Code  Admission Date: 8/3/2018  Hospital Length of Stay: 9 days  Attending Physician: Ruslan Jameson MD  Primary Care Provider: He Hoyt MD    Current Legal Status: N/A    Patient information was obtained from patient, relative(s) and ER records.   Inpatient consult to Psychiatry  Consult performed by: Albert Alejo MD  Consult ordered by: Ruslan Small MD        Subjective:     Principal Problem:Hypercalcemia    Chief Complaint:  Back pain     HPI:   Consultation-Liaison Psychiatry Consult Note      Chief Complaint / Reason for Consult:     Delirium, paranoia, agitation, refusal of meds     Subjective:     Per primary:  Ms. Janie Zamorano is a 62 y.o. female who presented with back pain and constipation. Onset of symptoms for back pain occurred a week ago. Patient reports that leaning over to  a storage box initiated her back pain. Patient states the pain starts in her back but denies any radiation of pain. The pain worsens when she reaches for objects and upon deep inspiration. She also loses her breath if she aggravates her back pain. Patient denies any trauma to the region. Patient has been able to ambulate the past week with reluctance. The patient has tried hot and cold packs to relieve her pain. The patient did not report using pain medications to relieve the pain. Patient denies fever, chills, night sweats, dysuria, hematuria, and easy bruising or bleeding. She lost around 8 LB over the course of past two weeks. She denies any shooting pain to LE, no loss of sense in LE or abdominal area, she mentions numbness in her left hand. Denies bowel/urinary incontinence. No saddle anesthesia. Patient has been anemic chronically and was started on iron pills two weeks ago by her primary doctor.  Patient also complains of constipation for the past two  weeks. She reports no bowel movements during that span. She has not had constipation prior to this event. She has tried enemas to no avail. She states she is belching and passing gas. She complains of nausea but reports no vomiting. She denies chest pain, palpitations, and cough.     FHx: Patient reports her mother and cousin both had breast cancer in their 50s and 60s. She had an uncle diagnosed with leukemia.       History of Present Illness:   Janie Zamorano is a 62 y.o. female with a history of schizophrenia who presented to [facility] due to back pain. Psychiatry was consulted to address the patient's symptoms of psychosis.      Pt was recently diagnosed with Multiple myeloma, has multiple lytic lesions of the spine, needs surgery on her T12 vertebrae.  Psychiatry was consulted because pt has become paranoid, agitated, at times refusing meds.  Pt has been admitted to [facility] in the past, has undergone ECT.    On interview pt was reclined in bed, neck brace immobilizing her head.  Pt politely refused to answer any questions about her mood, psychosis, past psych history.  When questioned on why she refused to answer basic questions, pt stared blankly at the interviewer.      Collateral:   Pt's son was in the room with the patient, we spoke outside the room.  He states the the pt does have a history of depression, not schizophrenia.  She was taking risperdal and cogentin, but for depression.  She stopped taking risperdal due to sedation, 4 months ago, has not displayed any symptoms of psychosis in the interval.  Pt's behavior over the last day was the result of an inability to sleep, the seroquel 50 mg helped somewhat.  Pt's son minimized psychiatric past and current symptoms.      Medical Review Of Systems:  Pertinent items are noted in HPI.    Psychiatric Review Of Systems - Is patient experiencing or having changes in:  - could not be completed, pt would not cooperate.    Allergies:  Shellfish containing  products    Past Medical/Surgical History  Past Medical History:   Diagnosis Date    Asthma     Depression     GERD (gastroesophageal reflux disease)     Hypertension     Neck pain     Schizophrenia       has a past medical history of Asthma, Depression, GERD (gastroesophageal reflux disease), Hypertension, Neck pain, and Schizophrenia.  Past Surgical History:   Procedure Laterality Date     SECTION      X 1    HYSTERECTOMY      KJB---DLH/BSO    LIPOMA RESECTION      back     Pt would not cooperate with interview:  Past Psychiatric History:  Previous Medication Trials: unknown   Previous Psychiatric Hospitalizations: unknown   Previous Suicide Attempts: unknown   History of Violence: unknown  Outpatient Psychiatrist: unknown    Social History:  Marital Status: unknown  Children: son was with pt   Employment Status/Info: unknown  Education: unknown  Special Ed: unknown  Housing Status:  Lives w family  History of phys/sexual abuse: unknown  Access to gun: unknown    Substance Abuse History:  Recreational Drugs: unknown  Use of Alcohol: unknown   Rehab History:unknown   Tobacco Use:unknown  Use of Caffeine: unknown  Use of OTC:    Legal consequences of chemical use: unknown    Legal History:  Past Charges/Incarcerations:unknown,     Pending charges:unknown     Family Psychiatric History:   Unknown,    Psychosocial Factors:  Could not ascertain    Objective:     Current Medications:  Infusions:    Scheduled:   benztropine  1 mg Oral BID    diclofenac sodium  2 g Topical (Top) QID    enoxaparin  40 mg Subcutaneous Daily    famotidine  20 mg Oral BID    fluticasone  1 puff Inhalation BID    labetalol  300 mg Oral TID    moxifloxacin  400 mg Oral Daily    polyethylene glycol  17 g Oral Daily    potassium, sodium phosphates  1 packet Oral QID (WM & HS)    QUEtiapine  100 mg Oral QHS    senna-docusate 8.6-50 mg  1 tablet Oral BID     PRN:  albuterol-ipratropium, dextrose 50%, dextrose 50%,  glucagon (human recombinant), glucose, glucose, hydrALAZINE, methyl salicylate-menthol 15-10%, ondansetron, ramelteon, sodium chloride 0.9%, traMADol    Home Medications:  Prior to Admission medications    Medication Sig Start Date End Date Taking? Authorizing Provider   albuterol 90 mcg/actuation inhaler Inhale 2 puffs into the lungs every 6 (six) hours as needed for Wheezing. 11/29/17  Yes He Hoyt Jr., MD   benztropine (COGENTIN) 1 MG tablet TK 1 T PO  BID  Patient taking differently: Take 1 mg by mouth 2 (two) times daily.  5/15/18  Yes He Hoyt Jr., MD   bisacodyl (DULCOLAX) 5 mg EC tablet Take 5 mg by mouth daily as needed for Constipation.   Yes Historical Provider, MD   cholecalciferol, vitamin D3, 2,000 unit Cap Take 1 capsule (2,000 Units total) by mouth once daily. 5/29/18 5/29/19 Yes Albert Russell MD   cyanocobalamin, vitamin B-12, (VITAMIN B-12 ORAL) Take 1 tablet by mouth daily as needed.   Yes Historical Provider, MD   fluticasone (FLONASE) 50 mcg/actuation nasal spray 1 spray by Each Nare route once daily.  Patient taking differently: 1 spray by Each Nare route daily as needed for Allergies.  11/29/17  Yes He Hoyt Jr., MD   labetalol (NORMODYNE) 100 MG tablet TAKE 1 TABLET BY MOUTH TWICE DAILY 1/3/18  Yes He Hoyt Jr., MD   pantoprazole (PROTONIX) 40 MG tablet Take 1 tablet (40 mg total) by mouth every 12 (twelve) hours. 3/19/18 8/6/18 Yes Albert Russell MD   risperiDONE (RISPERDAL) 1 MG tablet Take 1 tablet (1 mg total) by mouth once daily.  Patient taking differently: Take 1 tablet by mouth  in the morning and 2 at bedtime 5/15/18  Yes He Hoyt Jr., MD   sodium phosphates (DISPOSABLE ENEMA) 19-7 gram/118 mL Enem Place 1 enema rectally as needed (constipation).   Yes Historical Provider, MD   lenalidomide (REVLIMID) 25 mg Cap Take 1 capsule (25 mg total) by mouth once daily. Take on days 1-21 of a 28 day cycle 8/8/18 9/7/18  Ruslan Jameson MD     Vital  "Signs:  Temp:  [97.3 °F (36.3 °C)-99.4 °F (37.4 °C)]   Pulse:  [80-97]   Resp:  [18-20]   BP: (138-165)/(82-99)   SpO2:  [93 %-96 %]     Physical Exam:  Gen: Alert, calm, cooperative, NAD   Head: NCAT, PERRL, EOMI, MMM   Lungs: CTAB, respirations unlabored   Chest wall: No tenderness or deformity   Heart: RRR, S1/S2 normal, no M/R/G   Abdomen: S/NT/ND, +BS, no HSM, no masses   Extremities: Extremities normal, atraumatic, no cyanosis or edema   Pulses: 2+ and symmetric all extremities   Skin: Skin color, texture, turgor normal; no rashes or lesions   Neurologic: CN II-XII grossly intact, normal strength, sensations and reflexes throughout     Mental Status Exam:  Appearance: obese   Behavior: reluctant to participate   Speech/Language: soft, monotone   Mood: "I will not answer that question"   Affect: mood congruent   Thought Process:  unknown   Thought Content: normal, no suicidality, no homicidality, delusions, or paranoia, would not answer   Orientation: grossly intact   Cognition: grossly intact   Insight: poor   Judgment: poor     Laboratory Data:  Recent Results (from the past 48 hour(s))   CBC auto differential    Collection Time: 08/11/18  4:49 AM   Result Value Ref Range    WBC 4.13 3.90 - 12.70 K/uL    RBC 3.07 (L) 4.00 - 5.40 M/uL    Hemoglobin 8.7 (L) 12.0 - 16.0 g/dL    Hematocrit 26.8 (L) 37.0 - 48.5 %    MCV 87 82 - 98 fL    MCH 28.3 27.0 - 31.0 pg    MCHC 32.5 32.0 - 36.0 g/dL    RDW 14.3 11.5 - 14.5 %    Platelets 224 150 - 350 K/uL    MPV 9.5 9.2 - 12.9 fL    Immature Granulocytes 2.4 (H) 0.0 - 0.5 %    Gran # (ANC) 2.5 1.8 - 7.7 K/uL    Immature Grans (Abs) 0.10 (H) 0.00 - 0.04 K/uL    Lymph # 0.9 (L) 1.0 - 4.8 K/uL    Mono # 0.5 0.3 - 1.0 K/uL    Eos # 0.1 0.0 - 0.5 K/uL    Baso # 0.02 0.00 - 0.20 K/uL    nRBC 1 (A) 0 /100 WBC    Gran% 61.5 38.0 - 73.0 %    Lymph% 20.6 18.0 - 48.0 %    Mono% 12.3 4.0 - 15.0 %    Eosinophil% 2.7 0.0 - 8.0 %    Basophil% 0.5 0.0 - 1.9 %    Differential Method " Automated    Renal function panel    Collection Time: 08/11/18  4:49 AM   Result Value Ref Range    Glucose 84 70 - 110 mg/dL    Sodium 140 136 - 145 mmol/L    Potassium 3.3 (L) 3.5 - 5.1 mmol/L    Chloride 108 95 - 110 mmol/L    CO2 23 23 - 29 mmol/L    BUN, Bld 7 (L) 8 - 23 mg/dL    Calcium 8.4 (L) 8.7 - 10.5 mg/dL    Creatinine 0.8 0.5 - 1.4 mg/dL    Albumin 2.7 (L) 3.5 - 5.2 g/dL    Phosphorus 1.4 (L) 2.7 - 4.5 mg/dL    eGFR if African American >60.0 >60 mL/min/1.73 m^2    eGFR if non African American >60.0 >60 mL/min/1.73 m^2    Anion Gap 9 8 - 16 mmol/L   CBC auto differential    Collection Time: 08/12/18  3:51 AM   Result Value Ref Range    WBC 4.78 3.90 - 12.70 K/uL    RBC 3.16 (L) 4.00 - 5.40 M/uL    Hemoglobin 9.0 (L) 12.0 - 16.0 g/dL    Hematocrit 27.5 (L) 37.0 - 48.5 %    MCV 87 82 - 98 fL    MCH 28.5 27.0 - 31.0 pg    MCHC 32.7 32.0 - 36.0 g/dL    RDW 14.0 11.5 - 14.5 %    Platelets 240 150 - 350 K/uL    MPV 9.3 9.2 - 12.9 fL    Immature Granulocytes 2.3 (H) 0.0 - 0.5 %    Gran # (ANC) 3.1 1.8 - 7.7 K/uL    Immature Grans (Abs) 0.11 (H) 0.00 - 0.04 K/uL    Lymph # 0.8 (L) 1.0 - 4.8 K/uL    Mono # 0.6 0.3 - 1.0 K/uL    Eos # 0.1 0.0 - 0.5 K/uL    Baso # 0.01 0.00 - 0.20 K/uL    nRBC 1 (A) 0 /100 WBC    Gran% 64.8 38.0 - 73.0 %    Lymph% 17.2 (L) 18.0 - 48.0 %    Mono% 13.2 4.0 - 15.0 %    Eosinophil% 2.3 0.0 - 8.0 %    Basophil% 0.2 0.0 - 1.9 %    Differential Method Automated    Renal function panel    Collection Time: 08/12/18  3:51 AM   Result Value Ref Range    Glucose 97 70 - 110 mg/dL    Sodium 140 136 - 145 mmol/L    Potassium 3.8 3.5 - 5.1 mmol/L    Chloride 109 95 - 110 mmol/L    CO2 20 (L) 23 - 29 mmol/L    BUN, Bld 7 (L) 8 - 23 mg/dL    Calcium 8.3 (L) 8.7 - 10.5 mg/dL    Creatinine 0.9 0.5 - 1.4 mg/dL    Albumin 2.8 (L) 3.5 - 5.2 g/dL    Phosphorus 1.6 (L) 2.7 - 4.5 mg/dL    eGFR if African American >60.0 >60 mL/min/1.73 m^2    eGFR if non African American >60.0 >60 mL/min/1.73 m^2    Anion  Gap 11 8 - 16 mmol/L   Urinalysis    Collection Time: 08/12/18 12:06 PM   Result Value Ref Range    Specimen UA Urine, Clean Catch     Color, UA Yellow Yellow, Straw, Malka    Appearance, UA Clear Clear    pH, UA 7.0 5.0 - 8.0    Specific Gravity, UA 1.010 1.005 - 1.030    Protein, UA 2+ (A) Negative    Glucose, UA Negative Negative    Ketones, UA Trace (A) Negative    Bilirubin (UA) Negative Negative    Occult Blood UA Negative Negative    Nitrite, UA Negative Negative    Urobilinogen, UA Negative <2.0 EU/dL    Leukocytes, UA Negative Negative   Urinalysis Microscopic    Collection Time: 08/12/18 12:06 PM   Result Value Ref Range    RBC, UA 0 0 - 4 /hpf    WBC, UA 1 0 - 5 /hpf    Bacteria, UA Few (A) None-Occ /hpf    Squam Epithel, UA 6 /hpf    Hyaline Casts, UA 0 0-1/lpf /lpf    Microscopic Comment SEE COMMENT       No results found for: PHENYTOIN, PHENOBARB, VALPROATE, CBMZ  Imaging:  Imaging Results          CT Renal Stone Study ABD Pelvis WO (Final result)  Result time 08/03/18 15:36:29    Final result by Kalin Sanders Jr., MD (08/03/18 15:36:29)                 Impression:      Innumerable lytic lesions involving the thoracic and lumbar spine, as well as the iliac wings bilaterally.  These findings are suggestive of metastatic disease.    Pathologic fracture of the T12 vertebral body, resulting in mild height loss.  No retropulsion into the spinal canal, however there is possible soft tissue extension into the canal.    Hypodense foci within the liver, the largest measuring 3.9 cm, favored to represent a hepatic cyst.  The remaining hypodensities are too small to fully characterize.    Findings suggestive of constipation.    Trace bilateral lateral pleural effusions, as well as a small pericardial effusion.    Simple left renal cyst.    COMMUNICATION  This critical result was discovered/received at 14:20.  The critical information above was relayed directly by me by telephone to Bozena Montesinos at  8/3/18.    Electronically signed by resident: Jose Cobb  Date:    08/03/2018  Time:    14:11    Electronically signed by: Kalin Sanders MD  Date:    08/03/2018  Time:    15:36             Narrative:    EXAMINATION:  CT RENAL STONE STUDY ABD PELVIS WO    CLINICAL HISTORY:  Abdominal pain, unspecified    TECHNIQUE:  Low dose axial images, sagittal and coronal reformations were obtained from the lung bases to the pubic symphysis.  Contrast was not administered.    COMPARISON:  None    FINDINGS:  Heart: Normal in size.  There is a small volume of pericardial fluid.    Lung Bases: Trace bilateral pleural effusions, left greater than right.  The lungs appear otherwise well aerated.    Liver: Liver is normal in size and attenuation, with a 3.9 cm simple cyst within hepatic segment 4.  There are also scattered subcentimeter hypodensities within segments 5 and 8, adjacent to the gallbladder fossa.  These findings are too small to fully characterize, however favored to represent simple hepatic cysts.    Gallbladder: No calcified gallstones.    Bile Ducts: No evidence of dilated ducts.    Pancreas: No mass or peripancreatic fat stranding.    Spleen: Unremarkable.    Adrenals: Unremarkable.    Kidneys/ Ureters: 0.8 cm nonobstructing left renal stone.  No evidence of hydronephrosis.  There is a 1.3 cm hypodense focus on the left kidney, favored to represent a simple renal cysts.    Bladder: No evidence of wall thickening.    Reproductive organs: Status post hysterectomy.    GI Tract/Mesentery: Moderate amount of retained stool in the ascending and transverse:, suggestive of constipation.  No evidence of obstruction.    Peritoneal Space: No ascites. No free air.    Retroperitoneum: No significant adenopathy.    Abdominal wall: Small fat containing umbilical hernia.    Vasculature: No significant atherosclerosis or aneurysm.    Bones: There are multiple lytic lesions involving the lumbar and thoracic vertebral bodies, with a  pathologic fracture of the T12 vertebral body, resulting in minimal height loss.  There is no retropulsion into the spinal canal, however there appears to be soft tissue extension into the canal at this level.  Several lesions extend into the vertebral pedicles, most notably at T9 and T10.  There also lytic lesions of the iliac wings bilaterally these findings are suggestive of metastatic disease.                               X-Ray Abdomen Flat And Erect (Final result)  Result time 08/03/18 12:46:12    Final result by Reyes Eden MD (08/03/18 12:46:12)                 Impression:      Cecal/proximal colonic large stool volume which may represent constipation.  No associated bowel obstruction at this time.      Electronically signed by: Reyes Eden MD  Date:    08/03/2018  Time:    12:46             Narrative:    EXAMINATION:  XR ABDOMEN FLAT AND ERECT    CLINICAL HISTORY:  Constipation, unspecified    TECHNIQUE:  Flat and erect AP views of the abdomen were performed.    COMPARISON:  Lumbar spine series 07/25/2018 and chest radiograph 09/09/2004    FINDINGS:  Mild distension with large amount of stool noted within the cecum/proximal colon.  Remainder of the more mid and distal colonic loops are mildly distended with gas.  No dilated loops of bowel or small bowel air-fluid levels to suggest obstruction.  No organomegaly or significant mass effect.  There is a 7 mm calcific density projected over the left mid to upper abdomen which could represent a renal calculus.  No large amount of free air.  No large consolidation at the included lung bases.  Included osseous structures show age-related degenerative change without acute process seen.                                     Hospital Course: No notes on file         Patient History           Medical as of 8/12/2018     Past Medical History     Diagnosis Date Comments Source    Asthma -- -- Provider    Depression -- -- Provider    GERD (gastroesophageal reflux  disease) -- -- Provider    Hypertension -- -- Provider    Neck pain -- -- Provider    Schizophrenia -- -- Provider          Pertinent Negatives     Diagnosis Date Noted Comments Source    Diabetes mellitus 2016 -- Provider    Glaucoma 2016 -- Provider    Macular degeneration 2016 -- Provider    Retinal detachment 2016 -- Provider    Sickle cell anemia 2016 -- Provider    Sickle cell trait 2016 -- Provider                  Surgical as of 2018     Past Surgical History     Procedure Laterality Date Comments Source     SECTION -- -- X 1 Provider    LIPOMA RESECTION -- -- back Provider    HYSTERECTOMY --  KJB---DLH/BSO Provider    COLONOSCOPY N/A 2018 Procedure: COLONOSCOPY;  Surgeon: Albert Russell MD;  Location: [facility] (4TH FLR);  Service: Endoscopy;  Laterality: N/A;  pm prep/MS Provider    ESOPHAGOGASTRODUODENOSCOPY N/A 2018 Procedure: ESOPHAGOGASTRODUODENOSCOPY (EGD);  Surgeon: Albert Russell MD;  Location: [facility] (2ND FLR);  Service: Endoscopy;  Laterality: N/A;  EGD in 8 weeks on Pantoprazole 40mg every 12 hours patient needs to take her PPI morning of EGD with sip of water.  Follow up esophagitis.       hx of gastroparesis. per Dr Russell-24 hours clear liquids/ Per Dr. Russell on 18 Pt to be r/s with  Provider                  Family as of 2018     Problem Relation Name Age of Onset Comments Source    Hypertension Mother -- -- -- Provider    Glaucoma Mother -- -- -- Provider    Macular degeneration Mother -- -- -- Provider    Breast cancer Mother -- -- -- Provider    COPD Father -- -- -- Provider    Hypertension Father -- -- -- Provider    Diabetes Brother -- -- -- Provider    Glaucoma Sister -- -- -- Provider    Liver cancer Paternal Uncle -- 75 dad brother ETOH Provider    Ovarian cancer Neg Hx -- -- -- Provider    Colon cancer Neg Hx -- -- -- Provider    Colon polyps Neg Hx -- -- -- Provider    Cirrhosis Neg Hx -- -- --  Provider    Celiac disease Neg Hx -- -- -- Provider    Ulcerative colitis Neg Hx -- -- -- Provider    Hemochromatosis Neg Hx -- -- -- Provider    Esophageal cancer Neg Hx -- -- -- Provider            Tobacco Use as of 8/12/2018     Smoking Status Smoking Start Date Smoking Quit Date Packs/Day Years Used    Never Smoker -- -- -- --    Types Comments Smokeless Tobacco Status Smokeless Tobacco Quit Date Source    -- -- Never Used -- Provider            Alcohol Use as of 8/12/2018     Alcohol Use Drinks/Week Alcohol/Week Comments Source    No 0 Standard drinks or equivalent 0.0 oz -- Provider    Frequency Standard Drinks Binge Drinking Source      -- -- -- Provider             Drug Use as of 8/12/2018     Drug Use Types Frequency Comments Source    No -- -- -- Provider            Sexual Activity as of 8/12/2018     Sexually Active Birth Control Partners Comments Source    Not Currently Post-menopausal Male -- Provider            Activities of Daily Living as of 8/12/2018    None           Social Documentation as of 8/12/2018      Source: Provider           Occupational as of 8/12/2018    None           Socioeconomic as of 8/12/2018     Marital Status Spouse Name Number of Children Years Education Education Level Preferred Language Ethnicity Race Source     -- -- -- -- English /Black Black or  Provider    Financial Resource Strain Food Insecurity: Worry Food Insecurity: Inability Transportation Needs: Medical Transportation Needs: Non-medical       -- -- -- -- --             Pertinent History     Question Response Comments    Lives with -- --    Place in Birth Order -- --    Lives in -- --    Number of Siblings -- --    Raised by -- --    Legal Involvement -- --    Childhood Trauma -- --    Criminal History of -- --    Financial Status -- --    Highest Level of Education -- --    Does patient have access to a firearm? -- --     Service -- --    Primary Leisure  Activity -- --    Spirituality -- --        Past Medical History:   Diagnosis Date    Asthma     Depression     GERD (gastroesophageal reflux disease)     Hypertension     Neck pain     Schizophrenia      Past Surgical History:   Procedure Laterality Date     SECTION      X 1    HYSTERECTOMY  2016    KJB---DLH/BSO    LIPOMA RESECTION      back     Family History     Problem Relation (Age of Onset)    Breast cancer Mother    COPD Father    Diabetes Brother    Glaucoma Mother, Sister    Hypertension Mother, Father    Liver cancer Paternal Uncle (75)    Macular degeneration Mother        Tobacco Use    Smoking status: Never Smoker    Smokeless tobacco: Never Used   Substance and Sexual Activity    Alcohol use: No     Alcohol/week: 0.0 oz    Drug use: No    Sexual activity: Not Currently     Partners: Male     Birth control/protection: Post-menopausal     Review of patient's allergies indicates:   Allergen Reactions    Shellfish containing products Itching       No current facility-administered medications on file prior to encounter.      Current Outpatient Medications on File Prior to Encounter   Medication Sig    albuterol 90 mcg/actuation inhaler Inhale 2 puffs into the lungs every 6 (six) hours as needed for Wheezing.    benztropine (COGENTIN) 1 MG tablet TK 1 T PO  BID (Patient taking differently: Take 1 mg by mouth 2 (two) times daily. )    bisacodyl (DULCOLAX) 5 mg EC tablet Take 5 mg by mouth daily as needed for Constipation.    cholecalciferol, vitamin D3, 2,000 unit Cap Take 1 capsule (2,000 Units total) by mouth once daily.    cyanocobalamin, vitamin B-12, (VITAMIN B-12 ORAL) Take 1 tablet by mouth daily as needed.    fluticasone (FLONASE) 50 mcg/actuation nasal spray 1 spray by Each Nare route once daily. (Patient taking differently: 1 spray by Each Nare route daily as needed for Allergies. )    labetalol (NORMODYNE) 100 MG tablet TAKE 1 TABLET BY MOUTH TWICE DAILY     "pantoprazole (PROTONIX) 40 MG tablet Take 1 tablet (40 mg total) by mouth every 12 (twelve) hours.    risperiDONE (RISPERDAL) 1 MG tablet Take 1 tablet (1 mg total) by mouth once daily. (Patient taking differently: Take 1 tablet by mouth  in the morning and 2 at bedtime)    sodium phosphates (DISPOSABLE ENEMA) 19-7 gram/118 mL Enem Place 1 enema rectally as needed (constipation).     Psychotherapeutics (From admission, onward)    Start     Stop Route Frequency Ordered    08/12/18 2100  QUEtiapine tablet 100 mg      -- Oral Nightly 08/12/18 1553    08/11/18 0112  ramelteon tablet 8 mg      -- Oral Nightly PRN 08/11/18 0112        Review of Systems  Strengths and Liabilities: Liability: Patient is defensive., Liability: Patient is hostile., Liability: Patient has poor health.    Objective:     Vital Signs (Most Recent):  Temp: 97.3 °F (36.3 °C) (08/12/18 1508)  Pulse: 97 (08/12/18 1508)  Resp: 18 (08/12/18 1508)  BP: 138/89 (08/12/18 1508)  SpO2: 95 % (08/12/18 1508) Vital Signs (24h Range):  Temp:  [97.3 °F (36.3 °C)-99.4 °F (37.4 °C)] 97.3 °F (36.3 °C)  Pulse:  [80-97] 97  Resp:  [18-20] 18  SpO2:  [93 %-96 %] 95 %  BP: (138-165)/(82-99) 138/89     Height: 5' 4" (162.6 cm)  Weight: 70.4 kg (155 lb 3.3 oz)  Body mass index is 26.64 kg/m².      Intake/Output Summary (Last 24 hours) at 8/12/2018 1617  Last data filed at 8/12/2018 1509  Gross per 24 hour   Intake 60 ml   Output 950 ml   Net -890 ml       Physical Exam     Significant Labs:   Last 24 Hours:   Recent Lab Results       08/12/18  1206 08/12/18  0351      Immature Granulocytes  2.3     Immature Grans (Abs)  0.11  Comment:  Mild elevation in immature granulocytes is non specific and   can be seen in a variety of conditions including stress response,   acute inflammation, trauma and pregnancy. Correlation with other   laboratory and clinical findings is essential.       Albumin  2.8     Anion Gap  11     Appearance, UA Clear      Bacteria, UA Few      Baso # "  0.01     Basophil%  0.2     Bilirubin (UA) Negative      BUN, Bld  7     Calcium  8.3     Chloride  109     CO2  20     Color, UA Yellow      Creatinine  0.9     Differential Method  Automated     eGFR if African American  >60.0     eGFR if non   >60.0  Comment:  Calculation used to obtain the estimated glomerular filtration  rate (eGFR) is the CKD-EPI equation.        Eos #  0.1     Eosinophil%  2.3     Glucose  97     Glucose, UA Negative      Gran # (ANC)  3.1     Gran%  64.8     Hematocrit  27.5     Hemoglobin  9.0     Hyaline Casts, UA 0      Ketones, UA Trace      Leukocytes, UA Negative      Lymph #  0.8     Lymph%  17.2     MCH  28.5     MCHC  32.7     MCV  87     Microscopic Comment SEE COMMENT  Comment:  Other formed elements not mentioned in the report are not   present in the microscopic examination.         Mono #  0.6     Mono%  13.2     MPV  9.3     Nitrite, UA Negative      nRBC  1     Occult Blood UA Negative      pH, UA 7.0      Phosphorus  1.6     Platelets  240     Potassium  3.8     Protein, UA 2+  Comment:  Recommend a 24 hour urine protein or a urine   protein/creatinine ratio if globulin induced proteinuria is  clinically suspected.        RBC  3.16     RBC, UA 0      RDW  14.0     Sodium  140     Specific Gravity, UA 1.010      Specimen UA Urine, Clean Catch      Squam Epithel, UA 6      Urobilinogen, UA Negative      WBC, UA 1      WBC  4.78           Significant Imaging: None    Assessment/Plan:     Delirium    - pt currently not delirious  - pt negative for CAM-ICU   - cont to maintain a low threshold for delirium    CAM ICU- continue to administer at signs of AMS  DELIRIUM BEHAVIOR MANAGEMENT  PLEASE utilize CHEMICAL restraints with PRN meds first for agitation. Minimize use of PHYSICAL restraints  Keep window shades open and room lit during day and room dim at night in order to promote normal sleep-wake cycles  Encourage family at bedside. [place] patient often to  "situation, location, date.  Continue to Limit or Discontinue use of Narcotics, Benzos and Anti-cholinergic medications as they may worsen delirium.  Continue medical workup for causative etiology of Delirium.               Paranoid schizophrenia    ASSESSMENT     Impression:  Janie Zamorano is a 62 y.o. female with a history of schizophrenia, has undergone ECT at [facility], has not released records to [facility].    Pt was difficult to assess, would not answer basic questions, most likely due to paranoia.  Son minimizes past and present symptoms, states all problems are caused by insomnia.    RECOMMENDATIONS      1. Scheduled Medication Recommendations:  none    2. PRN Recommendations:  - Increase seroquel to 100 mg nightly, 50 mg "somewhat worked" last night  - CL will follow this week, will adjust meds as needed.    3.  Monitor:  - for changes in mental status    4. Legal Status/Precautions:  n/a    5. Capacity:  - pt currently has capacity to make medical decisions.               Total Time:  60 minutes      Albert Alejo MD   Psychiatry  [facility]  "

## 2018-08-12 NOTE — PROGRESS NOTES
[facility]  Neurosurgery  Progress Note    Subjective:     History of Present Illness: Ms. Zamorano is a 62 year old female with no PMHx of cancer, chronic back pain, or osteoporosis, who presents to the ED with onset of bilateral flank pain that began 1 week ago while lifting a heavy object from the ground. Patient denies any popping or pulling sensation. Denies any thoracic or lumbar back pain. Denies any UE or LE pain, paresthesias, or weakness. Denies any bladder or bowel incontinence. CT renal was performed and showed a T12 compression fracture. Neurosurgery was consulted for treatment recommendations.     Post-Op Info:  Procedure(s) (LRB):  THORACIC FUSION - PEDICLE SCREWS - T9 - L3 AIRO (N/A)         Interval History: NAEON. TLSO in place.     Medications:  Continuous Infusions:  Scheduled Meds:   benztropine  1 mg Oral BID    diclofenac sodium  2 g Topical (Top) QID    enoxaparin  40 mg Subcutaneous Daily    famotidine  20 mg Oral BID    fluticasone  1 puff Inhalation BID    labetalol  300 mg Oral TID    moxifloxacin  400 mg Oral Daily    polyethylene glycol  17 g Oral Daily    potassium, sodium phosphates  1 packet Oral QID (WM & HS)    QUEtiapine  50 mg Oral QHS    senna-docusate 8.6-50 mg  1 tablet Oral BID     PRN Meds:albuterol-ipratropium, dextrose 50%, dextrose 50%, glucagon (human recombinant), glucose, glucose, hydrALAZINE, methyl salicylate-menthol 15-10%, ondansetron, ramelteon, sodium chloride 0.9%, traMADol     Review of Systems  Objective:     Weight: 70.4 kg (155 lb 3.3 oz)  Body mass index is 26.64 kg/m².  Vital Signs (Most Recent):  Temp: 98.5 °F (36.9 °C) (08/12/18 1121)  Pulse: 95 (08/12/18 1121)  Resp: 18 (08/12/18 1121)  BP: (!) 143/89 (08/12/18 1121)  SpO2: 96 % (08/12/18 1121) Vital Signs (24h Range):  Temp:  [98.2 °F (36.8 °C)-99.4 °F (37.4 °C)] 98.5 °F (36.9 °C)  Pulse:  [80-95] 95  Resp:  [18-20] 18  SpO2:  [93 %-96 %] 96 %  BP: (143-165)/(82-99) 143/89      Date 08/12/18 0700 - 08/13/18 0659   Shift 2390-5679 8202-6660 1522-2350 24 Hour Total   INTAKE   P.O. 60   60   Shift Total(mL/kg) 60(0.9)   60(0.9)   OUTPUT   Urine(mL/kg/hr) 250   250   Shift Total(mL/kg) 250(3.6)   250(3.6)   Weight (kg) 70.4 70.4 70.4 70.4                   Female External Urinary Catheter 08/09/18 0000 (Active)   Skin no redness;no breakdown 8/12/2018  7:24 AM   Tolerance no signs/symptoms of discomfort 8/12/2018  7:24 AM   Suction Continuous suction at 70 mmHg 8/12/2018  7:24 AM   Date of last wick change 08/12/18 8/12/2018  7:24 AM   Time of last wick change 2318 8/11/2018 10:48 PM   Output (mL) 500 mL 8/12/2018  6:02 AM       Neurosurgery Physical Exam  Awake, alert, no acute distress  GCS: Motor: 6/Verbal: 5/Eyes: 4 GCS Total: 15  Mental Status: Awake, Alert, Oriented x 4  Follows commands   Head: normocephalic, atraumatic  PERRL, EOMI,   Facial expression symmetric, tongue midline, shoulder shrug symmetric  Moves all extremities with good strength 5/5  SLR positive on R  SILT  No clonus or babinski   TLSO brace on         Significant Labs:  Recent Labs   Lab  08/11/18   0449  08/12/18   0351   GLU  84  97   NA  140  140   K  3.3*  3.8   CL  108  109   CO2  23  20*   BUN  7*  7*   CREATININE  0.8  0.9   CALCIUM  8.4*  8.3*     Recent Labs   Lab  08/11/18   0449  08/12/18   0351   WBC  4.13  4.78   HGB  8.7*  9.0*   HCT  26.8*  27.5*   PLT  224  240     No results for input(s): LABPT, INR, APTT in the last 48 hours.  Microbiology Results (last 7 days)     Procedure Component Value Units Date/Time    Blood culture [[id number]] Collected:  08/08/18 1801    Order Status:  Completed Specimen:  Blood Updated:  08/11/18 2213     Blood Culture, Routine No Growth to date     Blood Culture, Routine No Growth to date     Blood Culture, Routine No Growth to date     Blood Culture, Routine No Growth to date    Narrative:       Collection has been rescheduled by FB at 8/8/2018 14:42 Reason: nurse    Leonora jain all labs at 1600  Collection has been rescheduled by JM2 at 8/8/2018 16:24 Reason:   Patient unavailable  Collection has been rescheduled by JM2 at 8/8/2018 16:25 Reason:   Patient unavailable  Collection has been rescheduled by JM2 at 8/8/2018 16:37 Reason:   Patient unavailable  Collection has been rescheduled by FB at 8/8/2018 14:42 Reason: nurse   Leonora jain all labs at 1600  Collection has been rescheduled by JM2 at 8/8/2018 16:24 Reason:   Patient unavailable  Collection has been rescheduled by JM2 at 8/8/2018 16:25 Reason:   Patient unavailable  Collection has been rescheduled by JM2 at 8/8/2018 16:37 Reason:   Patient unavailable    Blood culture [[id number]] Collected:  08/08/18 1800    Order Status:  Completed Specimen:  Blood Updated:  08/11/18 2213     Blood Culture, Routine No Growth to date     Blood Culture, Routine No Growth to date     Blood Culture, Routine No Growth to date     Blood Culture, Routine No Growth to date    Narrative:       Collection has been rescheduled by FB at 8/8/2018 14:42 Reason: nurse   Leonora jain all labs at 1600  Collection has been rescheduled by JM2 at 8/8/2018 16:24 Reason:   Patient unavailable  Collection has been rescheduled by JM2 at 8/8/2018 16:25 Reason:   Patient unavailable  Collection has been rescheduled by JM2 at 8/8/2018 16:37 Reason:   Patient unavailable  Collection has been rescheduled by FB at 8/8/2018 14:42 Reason: nurse   Leonora clines all labs at 1600  Collection has been rescheduled by JM2 at 8/8/2018 16:24 Reason:   Patient unavailable  Collection has been rescheduled by JM2 at 8/8/2018 16:25 Reason:   Patient unavailable  Collection has been rescheduled by JM2 at 8/8/2018 16:37 Reason:   Patient unavailable    Gram stain [[id number]] Collected:  08/08/18 1558    Order Status:  Completed Specimen:  Urine Updated:  08/09/18 0229     Gram Stain Result Rare WBC's      Few Gram positive rods       Rare budding yeast        Recent Lab  Results       08/12/18  1206 08/12/18  0351      Immature Granulocytes  2.3     Immature Grans (Abs)  0.11  Comment:  Mild elevation in immature granulocytes is non specific and   can be seen in a variety of conditions including stress response,   acute inflammation, trauma and pregnancy. Correlation with other   laboratory and clinical findings is essential.       Albumin  2.8     Anion Gap  11     Appearance, UA Clear      Bacteria, UA Few      Baso #  0.01     Basophil%  0.2     Bilirubin (UA) Negative      BUN, Bld  7     Calcium  8.3     Chloride  109     CO2  20     Color, UA Yellow      Creatinine  0.9     Differential Method  Automated     eGFR if African American  >60.0     eGFR if non   >60.0  Comment:  Calculation used to obtain the estimated glomerular filtration  rate (eGFR) is the CKD-EPI equation.        Eos #  0.1     Eosinophil%  2.3     Glucose  97     Glucose, UA Negative      Gran # (ANC)  3.1     Gran%  64.8     Hematocrit  27.5     Hemoglobin  9.0     Hyaline Casts, UA 0      Ketones, UA Trace      Leukocytes, UA Negative      Lymph #  0.8     Lymph%  17.2     MCH  28.5     MCHC  32.7     MCV  87     Microscopic Comment SEE COMMENT  Comment:  Other formed elements not mentioned in the report are not   present in the microscopic examination.         Mono #  0.6     Mono%  13.2     MPV  9.3     Nitrite, UA Negative      nRBC  1     Occult Blood UA Negative      pH, UA 7.0      Phosphorus  1.6     Platelets  240     Potassium  3.8     Protein, UA 2+  Comment:  Recommend a 24 hour urine protein or a urine   protein/creatinine ratio if globulin induced proteinuria is  clinically suspected.        RBC  3.16     RBC, UA 0      RDW  14.0     Sodium  140     Specific Gravity, UA 1.010      Specimen UA Urine, Clean Catch      Squam Epithel, UA 6      Urobilinogen, UA Negative      WBC, UA 1      WBC  4.78           Significant Diagnostics:  I have reviewed all pertinent imaging  results/findings within the past 24 hours.    Assessment/Plan:     Closed compression fracture of thoracic vertebra    62 year old female with acute onset on bilateral flank pain that began after lifting a heavy object. Imaging shows a T12 compression fracture along with multiple lytic lesions throughout the spine.     -Patient neurologically stable without s/s or myelopathy   -CT Thoracic/Upright&supine x-rays reviewed, consistent with unstable fracture  -Discussed recommendations for surgery in detail with patient and Son, including risk of progression of fracture and cord injury if fracture is not surgically stabilized. Initially declined surgery but now wishes to proceed. Confirmed wishes to proceed this AM.   -Plan for  T9-L3 posterior instrumented fusion with Dr. Andres Wednesday AM.   -Keep in TLSO brace at all times.    -Bed Rest  -Recommend DVTP with SQH, teds, and SCDs given bedrest. On Lovenox 40 daily.   -Will continue to follow.  Please call with questions or change in neurologic status              Shelby Zhang MD  Neurosurgery  [facility]

## 2018-08-12 NOTE — PROGRESS NOTES
[facility]  Hematology  Bone Marrow Transplant  Progress Note    Patient Name: Janie Zamorano  Admission Date: 8/3/2018  Hospital Length of Stay: 9 days  Code Status: Full Code    Subjective:     Interval History: Confusion overnight and now paranoid this morning.  Son at bedside reports patient hasn't slept very well int [facility].  She currently reports no complain and request to go home.  Patient re directed regarding her unstable fracture and need for surgical intervention.  Psych consulted and patient informed.     Objective:     Vital Signs (Most Recent):  Temp: 98.5 °F (36.9 °C) (08/12/18 1121)  Pulse: 95 (08/12/18 1121)  Resp: 18 (08/12/18 1121)  BP: (!) 143/89 (08/12/18 1121)  SpO2: 96 % (08/12/18 1121) Vital Signs (24h Range):  Temp:  [98.2 °F (36.8 °C)-99.4 °F (37.4 °C)] 98.5 °F (36.9 °C)  Pulse:  [80-95] 95  Resp:  [18-20] 18  SpO2:  [93 %-96 %] 96 %  BP: (143-165)/(82-99) 143/89     Weight: 70.4 kg (155 lb 3.3 oz)  Body mass index is 26.64 kg/m².  Body surface area is 1.78 meters squared.    ECOG SCORE         [unfilled]    Intake/Output - Last 3 Shifts     ** Patient Encounter Information Not Found **          Physical Exam   Constitutional: She is oriented to person, place, and time. She appears well-developed and well-nourished. No distress.   HENT:   Head: Normocephalic.   Eyes: Conjunctivae and EOM are normal. Pupils are equal, round, and reactive to light.   Neck: Normal range of motion. Neck supple.   Cardiovascular: Regular rhythm and normal heart sounds.   tachycardic   Pulmonary/Chest: Effort normal and breath sounds normal. No respiratory distress.   Abdominal: Soft. Bowel sounds are normal. She exhibits no distension.   Musculoskeletal: Normal range of motion. She exhibits no edema.   Neurological: She is alert and oriented to person, place, and time. No cranial nerve deficit.   Skin: Skin is warm and dry. She is not diaphoretic.   Psychiatric: Her speech is  delayed. She is aggressive. Thought content is paranoid. Cognition and memory are impaired.       Significant Labs:   All pertinent labs from the last 24 hours have been reviewed.    Diagnostic Results:  I have reviewed all pertinent imaging results/findings within the past 24 hours.    Assessment/Plan:     * Hypercalcemia    Improving/resolved.  Stable    -due to PHPT and MM  -PTH elevated as well  -Zometa 4 mg IV administered 8/7          Multiple myeloma not having achieved remission    Plan to discuss therapy once outpatient.    -hypercalcemia, anemia, bone fx and lytic lesions. Likely 2/2 to multiple myeloma.  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   -24 hr urine protein=5940  -immunofixation-A free kappa light chain is present in beta.          Closed compression fracture of thoracic vertebra    Concern for fracture stability.  Plan for inpatient surgery 8/15   - Lovenox given bed rest  -neurosug following, appreciate recs  -TLSO brace   -pain somewhat controlled, continue diclofenac topical and oxy 5-10 mg q6hr prn        Delirium    Patient displaying signs of acute delirium likely attributed to hx of schizophrenia aggravated by lack of sleep and hospitalization,  Do not suspect infectious sources at this time  - Will obtain UA and continue moxi  - Psych consulted regarding mediation recs given poor adherence to previous regimen.          GERD (gastroesophageal reflux disease)    Pepcid BID          Constipation    Resolved, continue bowel regimen.   BM w/ multiple BM recorded on 8/5 although CT showed moderate amount of retained stool in the ascending and transverse:, suggestive of constipation          Anemia    Hgb dropping, likely dilutional.    - normocytic anemia; likely 2/2 to  underlying malignancy  - transfuse for Hgb < 7 and plt < 10K   - cbc with diff daily while inpatient        Lytic bone lesions on xray             Paranoid schizophrenia    - Will trial Seroquel overnight and follow up with psych recs.    - refusing meds.         Asthma    -Fluticasone 44mcg/Actuation 1 puffs BID  -No signs of symptoms of exacerbation        Other secondary hypertension    - Home meds re-started except the valsartan given patient was having GLENIS  - labetalol dose increased to 300 mg TID  - continue restarting valsartan now than GLENIS has resolved.  - continue to monitor BP             VTE Risk Mitigation (From admission, onward)        Ordered     enoxaparin injection 40 mg  Daily      08/11/18 1058     IP VTE HIGH RISK PATIENT  Once      08/03/18 2320     Place MOUNIKA hose  Until discontinued      08/03/18 2320     Place sequential compression device  Until discontinued      08/03/18 1735          Disposition: Continue inpatient and surgery on Wednesday     Ruslan Small MD  Bone Marrow Transplant  [facility]

## 2018-08-12 NOTE — PLAN OF CARE
Problem: Patient Care Overview  Goal: Plan of Care Review  Outcome: Ongoing (interventions implemented as appropriate)  Patient remains free from falls and injury this shift. Bed in low, locked position with call light in reach. Family at bedside.  Patient encouraged to call for assistance when needed. Patient verbalized understanding. Pt had no complaints of pain or discomfort this shift. Pure wick in place draining clear yellow urine to wall suction. Neuro checks q4, pt is AAOx4. Pt refused some medications this shift, educated on importance of taking medications. All belongings within reach will continue to monitor.'

## 2018-08-12 NOTE — ASSESSMENT & PLAN NOTE
Patient displaying signs of acute delirium likely attributed to hx of schizophrenia aggravated by lack of sleep and hospitalization,  Do not suspect infectious sources at this time  - Will obtain UA and continue moxi  - Psych consulted regarding mediation recs given poor adherence to previous regimen.

## 2018-08-12 NOTE — ASSESSMENT & PLAN NOTE
"ASSESSMENT     Impression:  Janie Zamorano is a 62 y.o. female with a history of schizophrenia, has undergone ECT at [facility], has not released records to [facility].    Pt was difficult to assess, would not answer basic questions, most likely due to paranoia.  Son minimizes past and present symptoms, states all problems are caused by insomnia.    RECOMMENDATIONS      1. Scheduled Medication Recommendations:  none    2. PRN Recommendations:  - Increase seroquel to 100 mg nightly, 50 mg "somewhat worked" last night  - CL will follow this week, will adjust meds as needed.    3.  Monitor:  - for changes in mental status    4. Legal Status/Precautions:  n/a    5. Capacity:  - pt currently has capacity to make medical decisions.    "

## 2018-08-12 NOTE — PLAN OF CARE
Problem: Patient Care Overview  Goal: Plan of Care Review  Outcome: Ongoing (interventions implemented as appropriate)  Patient AAOx4, VSS afebrile, and without injury. Fall precautions maintained. Patient instructed on how to contact the nurse. Family at bedside. Patient without distress on room air; patient with moderate to poor appetite on vegetarian diet. No complaints of nausea. Pain controlled today. Patient's agitation from this morning has improved. Neuro checks continued. Pure wick in place draining clear yellow urine with some urine occurences. Brace in place. Patient educated about the need to turn to avoid skin breakdown, but patient is resistant to frequent position changes. Will reinforce education. Potassium phos replaced IV. Questions and concerns have been addressed; will continue to monitor.

## 2018-08-12 NOTE — SUBJECTIVE & OBJECTIVE
Patient History           Medical as of 2018     Past Medical History     Diagnosis Date Comments Source    Asthma -- -- Provider    Depression -- -- Provider    GERD (gastroesophageal reflux disease) -- -- Provider    Hypertension -- -- Provider    Neck pain -- -- Provider    Schizophrenia -- -- Provider          Pertinent Negatives     Diagnosis Date Noted Comments Source    Diabetes mellitus 2016 -- Provider    Glaucoma 2016 -- Provider    Macular degeneration 2016 -- Provider    Retinal detachment 2016 -- Provider    Sickle cell anemia 2016 -- Provider    Sickle cell trait 2016 -- Provider                  Surgical as of 2018     Past Surgical History     Procedure Laterality Date Comments Source     SECTION -- -- X 1 Provider    LIPOMA RESECTION -- -- back Provider    HYSTERECTOMY --  KJB---DLH/BSO Provider    COLONOSCOPY N/A 2018 Procedure: COLONOSCOPY;  Surgeon: Albert Russell MD;  Location: [facility] (4TH FLR);  Service: Endoscopy;  Laterality: N/A;  pm prep/MS Provider    ESOPHAGOGASTRODUODENOSCOPY N/A 2018 Procedure: ESOPHAGOGASTRODUODENOSCOPY (EGD);  Surgeon: Albert Russell MD;  Location: [facility] (2ND FLR);  Service: Endoscopy;  Laterality: N/A;  EGD in 8 weeks on Pantoprazole 40mg every 12 hours patient needs to take her PPI morning of EGD with sip of water.  Follow up esophagitis.       hx of gastroparesis. per Dr Russell-24 hours clear liquids/ Per Dr. Russell on 18 Pt to be r/s with  Provider                  Family as of 2018     Problem Relation Name Age of Onset Comments Source    Hypertension Mother -- -- -- Provider    Glaucoma Mother -- -- -- Provider    Macular degeneration Mother -- -- -- Provider    Breast cancer Mother -- -- -- Provider    COPD Father -- -- -- Provider    Hypertension Father -- -- -- Provider    Diabetes Brother -- -- -- Provider    Glaucoma Sister -- -- -- Provider    Liver cancer  Paternal Uncle -- 75 dad brother ETOH Provider    Ovarian cancer Neg Hx -- -- -- Provider    Colon cancer Neg Hx -- -- -- Provider    Colon polyps Neg Hx -- -- -- Provider    Cirrhosis Neg Hx -- -- -- Provider    Celiac disease Neg Hx -- -- -- Provider    Ulcerative colitis Neg Hx -- -- -- Provider    Hemochromatosis Neg Hx -- -- -- Provider    Esophageal cancer Neg Hx -- -- -- Provider            Tobacco Use as of 8/12/2018     Smoking Status Smoking Start Date Smoking Quit Date Packs/Day Years Used    Never Smoker -- -- -- --    Types Comments Smokeless Tobacco Status Smokeless Tobacco Quit Date Source    -- -- Never Used -- Provider            Alcohol Use as of 8/12/2018     Alcohol Use Drinks/Week Alcohol/Week Comments Source    No 0 Standard drinks or equivalent 0.0 oz -- Provider    Frequency Standard Drinks Binge Drinking Source      -- -- -- Provider             Drug Use as of 8/12/2018     Drug Use Types Frequency Comments Source    No -- -- -- Provider            Sexual Activity as of 8/12/2018     Sexually Active Birth Control Partners Comments Source    Not Currently Post-menopausal Male -- Provider            Activities of Daily Living as of 8/12/2018    None           Social Documentation as of 8/12/2018      Source: Provider           Occupational as of 8/12/2018    None           Socioeconomic as of 8/12/2018     Marital Status Spouse Name Number of Children Years Education Education Level Preferred Language Ethnicity Race Source     -- -- -- -- English /Black Black or  Provider    Financial Resource Strain Food Insecurity: Worry Food Insecurity: Inability Transportation Needs: Medical Transportation Needs: Non-medical       -- -- -- -- --             Pertinent History     Question Response Comments    Lives with -- --    Place in Birth Order -- --    Lives in -- --    Number of Siblings -- --    Raised by -- --    Legal Involvement -- --    Childhood  Trauma -- --    Criminal History of -- --    Financial Status -- --    Highest Level of Education -- --    Does patient have access to a firearm? -- --     Service -- --    Primary Leisure Activity -- --    Spirituality -- --        Past Medical History:   Diagnosis Date    Asthma     Depression     GERD (gastroesophageal reflux disease)     Hypertension     Neck pain     Schizophrenia      Past Surgical History:   Procedure Laterality Date     SECTION      X 1    HYSTERECTOMY  2016    KJB---DLH/BSO    LIPOMA RESECTION      back     Family History     Problem Relation (Age of Onset)    Breast cancer Mother    COPD Father    Diabetes Brother    Glaucoma Mother, Sister    Hypertension Mother, Father    Liver cancer Paternal Uncle (75)    Macular degeneration Mother        Tobacco Use    Smoking status: Never Smoker    Smokeless tobacco: Never Used   Substance and Sexual Activity    Alcohol use: No     Alcohol/week: 0.0 oz    Drug use: No    Sexual activity: Not Currently     Partners: Male     Birth control/protection: Post-menopausal     Review of patient's allergies indicates:   Allergen Reactions    Shellfish containing products Itching       No current facility-administered medications on file prior to encounter.      Current Outpatient Medications on File Prior to Encounter   Medication Sig    albuterol 90 mcg/actuation inhaler Inhale 2 puffs into the lungs every 6 (six) hours as needed for Wheezing.    benztropine (COGENTIN) 1 MG tablet TK 1 T PO  BID (Patient taking differently: Take 1 mg by mouth 2 (two) times daily. )    bisacodyl (DULCOLAX) 5 mg EC tablet Take 5 mg by mouth daily as needed for Constipation.    cholecalciferol, vitamin D3, 2,000 unit Cap Take 1 capsule (2,000 Units total) by mouth once daily.    cyanocobalamin, vitamin B-12, (VITAMIN B-12 ORAL) Take 1 tablet by mouth daily as needed.    fluticasone (FLONASE) 50 mcg/actuation nasal spray 1 spray by Each  "Nare route once daily. (Patient taking differently: 1 spray by Each Nare route daily as needed for Allergies. )    labetalol (NORMODYNE) 100 MG tablet TAKE 1 TABLET BY MOUTH TWICE DAILY    pantoprazole (PROTONIX) 40 MG tablet Take 1 tablet (40 mg total) by mouth every 12 (twelve) hours.    risperiDONE (RISPERDAL) 1 MG tablet Take 1 tablet (1 mg total) by mouth once daily. (Patient taking differently: Take 1 tablet by mouth  in the morning and 2 at bedtime)    sodium phosphates (DISPOSABLE ENEMA) 19-7 gram/118 mL Enem Place 1 enema rectally as needed (constipation).     Psychotherapeutics (From admission, onward)    Start     Stop Route Frequency Ordered    08/12/18 2100  QUEtiapine tablet 100 mg      -- Oral Nightly 08/12/18 1553    08/11/18 0112  ramelteon tablet 8 mg      -- Oral Nightly PRN 08/11/18 0112        Review of Systems  Strengths and Liabilities: Liability: Patient is defensive., Liability: Patient is hostile., Liability: Patient has poor health.    Objective:     Vital Signs (Most Recent):  Temp: 97.3 °F (36.3 °C) (08/12/18 1508)  Pulse: 97 (08/12/18 1508)  Resp: 18 (08/12/18 1508)  BP: 138/89 (08/12/18 1508)  SpO2: 95 % (08/12/18 1508) Vital Signs (24h Range):  Temp:  [97.3 °F (36.3 °C)-99.4 °F (37.4 °C)] 97.3 °F (36.3 °C)  Pulse:  [80-97] 97  Resp:  [18-20] 18  SpO2:  [93 %-96 %] 95 %  BP: (138-165)/(82-99) 138/89     Height: 5' 4" (162.6 cm)  Weight: 70.4 kg (155 lb 3.3 oz)  Body mass index is 26.64 kg/m².      Intake/Output Summary (Last 24 hours) at 8/12/2018 1617  Last data filed at 8/12/2018 1509  Gross per 24 hour   Intake 60 ml   Output 950 ml   Net -890 ml       Physical Exam     Significant Labs:   Last 24 Hours:   Recent Lab Results       08/12/18  1206 08/12/18  0351      Immature Granulocytes  2.3     Immature Grans (Abs)  0.11  Comment:  Mild elevation in immature granulocytes is non specific and   can be seen in a variety of conditions including stress response,   acute " inflammation, trauma and pregnancy. Correlation with other   laboratory and clinical findings is essential.       Albumin  2.8     Anion Gap  11     Appearance, UA Clear      Bacteria, UA Few      Baso #  0.01     Basophil%  0.2     Bilirubin (UA) Negative      BUN, Bld  7     Calcium  8.3     Chloride  109     CO2  20     Color, UA Yellow      Creatinine  0.9     Differential Method  Automated     eGFR if African American  >60.0     eGFR if non   >60.0  Comment:  Calculation used to obtain the estimated glomerular filtration  rate (eGFR) is the CKD-EPI equation.        Eos #  0.1     Eosinophil%  2.3     Glucose  97     Glucose, UA Negative      Gran # (ANC)  3.1     Gran%  64.8     Hematocrit  27.5     Hemoglobin  9.0     Hyaline Casts, UA 0      Ketones, UA Trace      Leukocytes, UA Negative      Lymph #  0.8     Lymph%  17.2     MCH  28.5     MCHC  32.7     MCV  87     Microscopic Comment SEE COMMENT  Comment:  Other formed elements not mentioned in the report are not   present in the microscopic examination.         Mono #  0.6     Mono%  13.2     MPV  9.3     Nitrite, UA Negative      nRBC  1     Occult Blood UA Negative      pH, UA 7.0      Phosphorus  1.6     Platelets  240     Potassium  3.8     Protein, UA 2+  Comment:  Recommend a 24 hour urine protein or a urine   protein/creatinine ratio if globulin induced proteinuria is  clinically suspected.        RBC  3.16     RBC, UA 0      RDW  14.0     Sodium  140     Specific Gravity, UA 1.010      Specimen UA Urine, Clean Catch      Squam Epithel, UA 6      Urobilinogen, UA Negative      WBC, UA 1      WBC  4.78           Significant Imaging: None

## 2018-08-12 NOTE — SUBJECTIVE & OBJECTIVE
Subjective:     Interval History: Confusion overnight and now paranoid this morning.  Son at bedside reports patient hasn't slept very well int [facility].  She currently reports no complain and request to go home.  Patient re directed regarding her unstable fracture and need for surgical intervention.  Psych consulted and patient informed.     Objective:     Vital Signs (Most Recent):  Temp: 98.5 °F (36.9 °C) (08/12/18 1121)  Pulse: 95 (08/12/18 1121)  Resp: 18 (08/12/18 1121)  BP: (!) 143/89 (08/12/18 1121)  SpO2: 96 % (08/12/18 1121) Vital Signs (24h Range):  Temp:  [98.2 °F (36.8 °C)-99.4 °F (37.4 °C)] 98.5 °F (36.9 °C)  Pulse:  [80-95] 95  Resp:  [18-20] 18  SpO2:  [93 %-96 %] 96 %  BP: (143-165)/(82-99) 143/89     Weight: 70.4 kg (155 lb 3.3 oz)  Body mass index is 26.64 kg/m².  Body surface area is 1.78 meters squared.    ECOG SCORE         [unfilled]    Intake/Output - Last 3 Shifts     ** Patient Encounter Information Not Found **          Physical Exam   Constitutional: She is oriented to person, place, and time. She appears well-developed and well-nourished. No distress.   HENT:   Head: Normocephalic.   Eyes: Conjunctivae and EOM are normal. Pupils are equal, round, and reactive to light.   Neck: Normal range of motion. Neck supple.   Cardiovascular: Regular rhythm and normal heart sounds.   tachycardic   Pulmonary/Chest: Effort normal and breath sounds normal. No respiratory distress.   Abdominal: Soft. Bowel sounds are normal. She exhibits no distension.   Musculoskeletal: Normal range of motion. She exhibits no edema.   Neurological: She is alert and oriented to person, place, and time. No cranial nerve deficit.   Skin: Skin is warm and dry. She is not diaphoretic.   Psychiatric: Her speech is delayed. She is aggressive. Thought content is paranoid. Cognition and memory are impaired.       Significant Labs:   All pertinent labs from the last 24 hours have been reviewed.    Diagnostic Results:  I have  reviewed all pertinent imaging results/findings within the past 24 hours.

## 2018-08-12 NOTE — PROGRESS NOTES
Dr. Rmaos (neurosurgery) notified of change in patient's behavior today. Patient remains oriented, but refusing PO medications and straight cath, more agitated. Will continue to monitor.

## 2018-08-12 NOTE — ASSESSMENT & PLAN NOTE
- pt currently not delirious  - pt negative for CAM-ICU   - cont to maintain a low threshold for delirium    CAM ICU- continue to administer at signs of AMS  DELIRIUM BEHAVIOR MANAGEMENT  PLEASE utilize CHEMICAL restraints with PRN meds first for agitation. Minimize use of PHYSICAL restraints  Keep window shades open and room lit during day and room dim at night in order to promote normal sleep-wake cycles  Encourage family at bedside. [place] patient often to situation, location, date.  Continue to Limit or Discontinue use of Narcotics, Benzos and Anti-cholinergic medications as they may worsen delirium.  Continue medical workup for causative etiology of Delirium.

## 2018-08-12 NOTE — PROGRESS NOTES
Patient refusing PO medications this morning despite re-orientation and education by the nurse. Dr. Small notified.

## 2018-08-12 NOTE — HPI
Consultation-Liaison Psychiatry Consult Note      Chief Complaint / Reason for Consult:     Delirium, paranoia, agitation, refusal of meds     Subjective:     Per primary:  Ms. Janie Zamorano is a 62 y.o. female who presented with back pain and constipation. Onset of symptoms for back pain occurred a week ago. Patient reports that leaning over to  a storage box initiated her back pain. Patient states the pain starts in her back but denies any radiation of pain. The pain worsens when she reaches for objects and upon deep inspiration. She also loses her breath if she aggravates her back pain. Patient denies any trauma to the region. Patient has been able to ambulate the past week with reluctance. The patient has tried hot and cold packs to relieve her pain. The patient did not report using pain medications to relieve the pain. Patient denies fever, chills, night sweats, dysuria, hematuria, and easy bruising or bleeding. She lost around 8 LB over the course of past two weeks. She denies any shooting pain to LE, no loss of sense in LE or abdominal area, she mentions numbness in her left hand. Denies bowel/urinary incontinence. No saddle anesthesia. Patient has been anemic chronically and was started on iron pills two weeks ago by her primary doctor.  Patient also complains of constipation for the past two weeks. She reports no bowel movements during that span. She has not had constipation prior to this event. She has tried enemas to no avail. She states she is belching and passing gas. She complains of nausea but reports no vomiting. She denies chest pain, palpitations, and cough.     FHx: Patient reports her mother and cousin both had breast cancer in their 50s and 60s. She had an uncle diagnosed with leukemia.       History of Present Illness:   Janie Zamorano is a 62 y.o. female with a history of schizophrenia who presented to [facility] due to back pain. Psychiatry was consulted to address the  patient's symptoms of psychosis.      Pt was recently diagnosed with Multiple myeloma, has multiple lytic lesions of the spine, needs surgery on her T12 vertebrae.  Psychiatry was consulted because pt has become paranoid, agitated, at times refusing meds.  Pt has been admitted to [facility] in the past, has undergone ECT.    On interview pt was reclined in bed, neck brace immobilizing her head.  Pt politely refused to answer any questions about her mood, psychosis, past psych history.  When questioned on why she refused to answer basic questions, pt stared blankly at the interviewer.      Collateral:   Pt's son was in the room with the patient, we spoke outside the room.  He states the the pt does have a history of depression, not schizophrenia.  She was taking risperdal and cogentin, but for depression.  She stopped taking risperdal due to sedation, 4 months ago, has not displayed any symptoms of psychosis in the interval.  Pt's behavior over the last day was the result of an inability to sleep, the seroquel 50 mg helped somewhat.  Pt's son minimized psychiatric past and current symptoms.      Medical Review Of Systems:  Pertinent items are noted in HPI.    Psychiatric Review Of Systems - Is patient experiencing or having changes in:  - could not be completed, pt would not cooperate.    Allergies:  Shellfish containing products    Past Medical/Surgical History  Past Medical History:   Diagnosis Date    Asthma     Depression     GERD (gastroesophageal reflux disease)     Hypertension     Neck pain     Schizophrenia       has a past medical history of Asthma, Depression, GERD (gastroesophageal reflux disease), Hypertension, Neck pain, and Schizophrenia.  Past Surgical History:   Procedure Laterality Date     SECTION      X 1    HYSTERECTOMY  2016    KJB---DLH/BSO    LIPOMA RESECTION      back     Pt would not cooperate with interview:  Past Psychiatric History:  Previous Medication Trials: unknown    Previous Psychiatric Hospitalizations: unknown   Previous Suicide Attempts: unknown   History of Violence: unknown  Outpatient Psychiatrist: unknown    Social History:  Marital Status: unknown  Children: son was with pt   Employment Status/Info: unknown  Education: unknown  Special Ed: unknown  Housing Status:  Lives w family  History of phys/sexual abuse: unknown  Access to gun: unknown    Substance Abuse History:  Recreational Drugs: unknown  Use of Alcohol: unknown   Rehab History:unknown   Tobacco Use:unknown  Use of Caffeine: unknown  Use of OTC:    Legal consequences of chemical use: unknown    Legal History:  Past Charges/Incarcerations:unknown,     Pending charges:unknown     Family Psychiatric History:   Unknown,    Psychosocial Factors:  Could not ascertain    Objective:     Current Medications:  Infusions:    Scheduled:   benztropine  1 mg Oral BID    diclofenac sodium  2 g Topical (Top) QID    enoxaparin  40 mg Subcutaneous Daily    famotidine  20 mg Oral BID    fluticasone  1 puff Inhalation BID    labetalol  300 mg Oral TID    moxifloxacin  400 mg Oral Daily    polyethylene glycol  17 g Oral Daily    potassium, sodium phosphates  1 packet Oral QID (WM & HS)    QUEtiapine  100 mg Oral QHS    senna-docusate 8.6-50 mg  1 tablet Oral BID     PRN:  albuterol-ipratropium, dextrose 50%, dextrose 50%, glucagon (human recombinant), glucose, glucose, hydrALAZINE, methyl salicylate-menthol 15-10%, ondansetron, ramelteon, sodium chloride 0.9%, traMADol    Home Medications:  Prior to Admission medications    Medication Sig Start Date End Date Taking? Authorizing Provider   albuterol 90 mcg/actuation inhaler Inhale 2 puffs into the lungs every 6 (six) hours as needed for Wheezing. 11/29/17  Yes He Hoyt Jr., MD   benztropine (COGENTIN) 1 MG tablet TK 1 T PO  BID  Patient taking differently: Take 1 mg by mouth 2 (two) times daily.  5/15/18  Yes He Hoyt Jr., MD   bisacodyl (DULCOLAX) 5 mg EC  tablet Take 5 mg by mouth daily as needed for Constipation.   Yes Historical Provider, MD   cholecalciferol, vitamin D3, 2,000 unit Cap Take 1 capsule (2,000 Units total) by mouth once daily. 5/29/18 5/29/19 Yes Albert Russell MD   cyanocobalamin, vitamin B-12, (VITAMIN B-12 ORAL) Take 1 tablet by mouth daily as needed.   Yes Historical Provider, MD   fluticasone (FLONASE) 50 mcg/actuation nasal spray 1 spray by Each Nare route once daily.  Patient taking differently: 1 spray by Each Nare route daily as needed for Allergies.  11/29/17  Yes He Hoyt Jr., MD   labetalol (NORMODYNE) 100 MG tablet TAKE 1 TABLET BY MOUTH TWICE DAILY 1/3/18  Yes He Hoyt Jr., MD   pantoprazole (PROTONIX) 40 MG tablet Take 1 tablet (40 mg total) by mouth every 12 (twelve) hours. 3/19/18 8/6/18 Yes Albert Russell MD   risperiDONE (RISPERDAL) 1 MG tablet Take 1 tablet (1 mg total) by mouth once daily.  Patient taking differently: Take 1 tablet by mouth  in the morning and 2 at bedtime 5/15/18  Yes He Hoyt Jr., MD   sodium phosphates (DISPOSABLE ENEMA) 19-7 gram/118 mL Enem Place 1 enema rectally as needed (constipation).   Yes Historical Provider, MD   lenalidomide (REVLIMID) 25 mg Cap Take 1 capsule (25 mg total) by mouth once daily. Take on days 1-21 of a 28 day cycle 8/8/18 9/7/18  Ruslan Jameson MD     Vital Signs:  Temp:  [97.3 °F (36.3 °C)-99.4 °F (37.4 °C)]   Pulse:  [80-97]   Resp:  [18-20]   BP: (138-165)/(82-99)   SpO2:  [93 %-96 %]     Physical Exam:  Gen: Alert, calm, cooperative, NAD   Head: NCAT, PERRL, EOMI, MMM   Lungs: CTAB, respirations unlabored   Chest wall: No tenderness or deformity   Heart: RRR, S1/S2 normal, no M/R/G   Abdomen: S/NT/ND, +BS, no HSM, no masses   Extremities: Extremities normal, atraumatic, no cyanosis or edema   Pulses: 2+ and symmetric all extremities   Skin: Skin color, texture, turgor normal; no rashes or lesions   Neurologic: CN II-XII grossly intact, normal strength,  "sensations and reflexes throughout     Mental Status Exam:  Appearance: obese   Behavior: reluctant to participate   Speech/Language: soft, monotone   Mood: "I will not answer that question"   Affect: mood congruent   Thought Process:  unknown   Thought Content: normal, no suicidality, no homicidality, delusions, or paranoia, would not answer   Orientation: grossly intact   Cognition: grossly intact   Insight: poor   Judgment: poor     Laboratory Data:  Recent Results (from the past 48 hour(s))   CBC auto differential    Collection Time: 08/11/18  4:49 AM   Result Value Ref Range    WBC 4.13 3.90 - 12.70 K/uL    RBC 3.07 (L) 4.00 - 5.40 M/uL    Hemoglobin 8.7 (L) 12.0 - 16.0 g/dL    Hematocrit 26.8 (L) 37.0 - 48.5 %    MCV 87 82 - 98 fL    MCH 28.3 27.0 - 31.0 pg    MCHC 32.5 32.0 - 36.0 g/dL    RDW 14.3 11.5 - 14.5 %    Platelets 224 150 - 350 K/uL    MPV 9.5 9.2 - 12.9 fL    Immature Granulocytes 2.4 (H) 0.0 - 0.5 %    Gran # (ANC) 2.5 1.8 - 7.7 K/uL    Immature Grans (Abs) 0.10 (H) 0.00 - 0.04 K/uL    Lymph # 0.9 (L) 1.0 - 4.8 K/uL    Mono # 0.5 0.3 - 1.0 K/uL    Eos # 0.1 0.0 - 0.5 K/uL    Baso # 0.02 0.00 - 0.20 K/uL    nRBC 1 (A) 0 /100 WBC    Gran% 61.5 38.0 - 73.0 %    Lymph% 20.6 18.0 - 48.0 %    Mono% 12.3 4.0 - 15.0 %    Eosinophil% 2.7 0.0 - 8.0 %    Basophil% 0.5 0.0 - 1.9 %    Differential Method Automated    Renal function panel    Collection Time: 08/11/18  4:49 AM   Result Value Ref Range    Glucose 84 70 - 110 mg/dL    Sodium 140 136 - 145 mmol/L    Potassium 3.3 (L) 3.5 - 5.1 mmol/L    Chloride 108 95 - 110 mmol/L    CO2 23 23 - 29 mmol/L    BUN, Bld 7 (L) 8 - 23 mg/dL    Calcium 8.4 (L) 8.7 - 10.5 mg/dL    Creatinine 0.8 0.5 - 1.4 mg/dL    Albumin 2.7 (L) 3.5 - 5.2 g/dL    Phosphorus 1.4 (L) 2.7 - 4.5 mg/dL    eGFR if African American >60.0 >60 mL/min/1.73 m^2    eGFR if non African American >60.0 >60 mL/min/1.73 m^2    Anion Gap 9 8 - 16 mmol/L   CBC auto differential    Collection Time: " 08/12/18  3:51 AM   Result Value Ref Range    WBC 4.78 3.90 - 12.70 K/uL    RBC 3.16 (L) 4.00 - 5.40 M/uL    Hemoglobin 9.0 (L) 12.0 - 16.0 g/dL    Hematocrit 27.5 (L) 37.0 - 48.5 %    MCV 87 82 - 98 fL    MCH 28.5 27.0 - 31.0 pg    MCHC 32.7 32.0 - 36.0 g/dL    RDW 14.0 11.5 - 14.5 %    Platelets 240 150 - 350 K/uL    MPV 9.3 9.2 - 12.9 fL    Immature Granulocytes 2.3 (H) 0.0 - 0.5 %    Gran # (ANC) 3.1 1.8 - 7.7 K/uL    Immature Grans (Abs) 0.11 (H) 0.00 - 0.04 K/uL    Lymph # 0.8 (L) 1.0 - 4.8 K/uL    Mono # 0.6 0.3 - 1.0 K/uL    Eos # 0.1 0.0 - 0.5 K/uL    Baso # 0.01 0.00 - 0.20 K/uL    nRBC 1 (A) 0 /100 WBC    Gran% 64.8 38.0 - 73.0 %    Lymph% 17.2 (L) 18.0 - 48.0 %    Mono% 13.2 4.0 - 15.0 %    Eosinophil% 2.3 0.0 - 8.0 %    Basophil% 0.2 0.0 - 1.9 %    Differential Method Automated    Renal function panel    Collection Time: 08/12/18  3:51 AM   Result Value Ref Range    Glucose 97 70 - 110 mg/dL    Sodium 140 136 - 145 mmol/L    Potassium 3.8 3.5 - 5.1 mmol/L    Chloride 109 95 - 110 mmol/L    CO2 20 (L) 23 - 29 mmol/L    BUN, Bld 7 (L) 8 - 23 mg/dL    Calcium 8.3 (L) 8.7 - 10.5 mg/dL    Creatinine 0.9 0.5 - 1.4 mg/dL    Albumin 2.8 (L) 3.5 - 5.2 g/dL    Phosphorus 1.6 (L) 2.7 - 4.5 mg/dL    eGFR if African American >60.0 >60 mL/min/1.73 m^2    eGFR if non African American >60.0 >60 mL/min/1.73 m^2    Anion Gap 11 8 - 16 mmol/L   Urinalysis    Collection Time: 08/12/18 12:06 PM   Result Value Ref Range    Specimen UA Urine, Clean Catch     Color, UA Yellow Yellow, Straw, Malka    Appearance, UA Clear Clear    pH, UA 7.0 5.0 - 8.0    Specific Gravity, UA 1.010 1.005 - 1.030    Protein, UA 2+ (A) Negative    Glucose, UA Negative Negative    Ketones, UA Trace (A) Negative    Bilirubin (UA) Negative Negative    Occult Blood UA Negative Negative    Nitrite, UA Negative Negative    Urobilinogen, UA Negative <2.0 EU/dL    Leukocytes, UA Negative Negative   Urinalysis Microscopic    Collection Time: 08/12/18 12:06  PM   Result Value Ref Range    RBC, UA 0 0 - 4 /hpf    WBC, UA 1 0 - 5 /hpf    Bacteria, UA Few (A) None-Occ /hpf    Squam Epithel, UA 6 /hpf    Hyaline Casts, UA 0 0-1/lpf /lpf    Microscopic Comment SEE COMMENT       No results found for: PHENYTOIN, PHENOBARB, VALPROATE, CBMZ  Imaging:  Imaging Results          CT Renal Stone Study ABD Pelvis WO (Final result)  Result time 08/03/18 15:36:29    Final result by Kalin Sanders Jr., MD (08/03/18 15:36:29)                 Impression:      Innumerable lytic lesions involving the thoracic and lumbar spine, as well as the iliac wings bilaterally.  These findings are suggestive of metastatic disease.    Pathologic fracture of the T12 vertebral body, resulting in mild height loss.  No retropulsion into the spinal canal, however there is possible soft tissue extension into the canal.    Hypodense foci within the liver, the largest measuring 3.9 cm, favored to represent a hepatic cyst.  The remaining hypodensities are too small to fully characterize.    Findings suggestive of constipation.    Trace bilateral lateral pleural effusions, as well as a small pericardial effusion.    Simple left renal cyst.    COMMUNICATION  This critical result was discovered/received at 14:20.  The critical information above was relayed directly by me by telephone to Bozena Montesinos at 8/3/18.    Electronically signed by resident: Jose Cobb  Date:    08/03/2018  Time:    14:11    Electronically signed by: Kalin Sanders MD  Date:    08/03/2018  Time:    15:36             Narrative:    EXAMINATION:  CT RENAL STONE STUDY ABD PELVIS WO    CLINICAL HISTORY:  Abdominal pain, unspecified    TECHNIQUE:  Low dose axial images, sagittal and coronal reformations were obtained from the lung bases to the pubic symphysis.  Contrast was not administered.    COMPARISON:  None    FINDINGS:  Heart: Normal in size.  There is a small volume of pericardial fluid.    Lung Bases: Trace bilateral pleural  effusions, left greater than right.  The lungs appear otherwise well aerated.    Liver: Liver is normal in size and attenuation, with a 3.9 cm simple cyst within hepatic segment 4.  There are also scattered subcentimeter hypodensities within segments 5 and 8, adjacent to the gallbladder fossa.  These findings are too small to fully characterize, however favored to represent simple hepatic cysts.    Gallbladder: No calcified gallstones.    Bile Ducts: No evidence of dilated ducts.    Pancreas: No mass or peripancreatic fat stranding.    Spleen: Unremarkable.    Adrenals: Unremarkable.    Kidneys/ Ureters: 0.8 cm nonobstructing left renal stone.  No evidence of hydronephrosis.  There is a 1.3 cm hypodense focus on the left kidney, favored to represent a simple renal cysts.    Bladder: No evidence of wall thickening.    Reproductive organs: Status post hysterectomy.    GI Tract/Mesentery: Moderate amount of retained stool in the ascending and transverse:, suggestive of constipation.  No evidence of obstruction.    Peritoneal Space: No ascites. No free air.    Retroperitoneum: No significant adenopathy.    Abdominal wall: Small fat containing umbilical hernia.    Vasculature: No significant atherosclerosis or aneurysm.    Bones: There are multiple lytic lesions involving the lumbar and thoracic vertebral bodies, with a pathologic fracture of the T12 vertebral body, resulting in minimal height loss.  There is no retropulsion into the spinal canal, however there appears to be soft tissue extension into the canal at this level.  Several lesions extend into the vertebral pedicles, most notably at T9 and T10.  There also lytic lesions of the iliac wings bilaterally these findings are suggestive of metastatic disease.                               X-Ray Abdomen Flat And Erect (Final result)  Result time 08/03/18 12:46:12    Final result by Reyes Eden MD (08/03/18 12:46:12)                 Impression:      Cecal/proximal  colonic large stool volume which may represent constipation.  No associated bowel obstruction at this time.      Electronically signed by: Reyes Eden MD  Date:    08/03/2018  Time:    12:46             Narrative:    EXAMINATION:  XR ABDOMEN FLAT AND ERECT    CLINICAL HISTORY:  Constipation, unspecified    TECHNIQUE:  Flat and erect AP views of the abdomen were performed.    COMPARISON:  Lumbar spine series 07/25/2018 and chest radiograph 09/09/2004    FINDINGS:  Mild distension with large amount of stool noted within the cecum/proximal colon.  Remainder of the more mid and distal colonic loops are mildly distended with gas.  No dilated loops of bowel or small bowel air-fluid levels to suggest obstruction.  No organomegaly or significant mass effect.  There is a 7 mm calcific density projected over the left mid to upper abdomen which could represent a renal calculus.  No large amount of free air.  No large consolidation at the included lung bases.  Included osseous structures show age-related degenerative change without acute process seen.

## 2018-08-12 NOTE — PROGRESS NOTES
Patient refusing straight cath ordered for urinalysis collection. Patient given opportunity to void per pure wick. Bladder scan showing 368 ml urine in bladder. Patient and family educated on the use of the in/out catheter and the importance of draining the bladder as well as the need for the urinalysis. Dr. Small notified.  Pure wick in place to catch urine. Will continue to monitor.

## 2018-08-12 NOTE — ASSESSMENT & PLAN NOTE
62 year old female with acute onset on bilateral flank pain that began after lifting a heavy object. Imaging shows a T12 compression fracture along with multiple lytic lesions throughout the spine.     -Patient neurologically stable without s/s or myelopathy   -CT Thoracic/Upright&supine x-rays reviewed, consistent with unstable fracture  -Discussed recommendations for surgery in detail with patient and Son, including risk of progression of fracture and cord injury if fracture is not surgically stabilized. Initially declined surgery but now wishes to proceed. Confirmed wishes to proceed this AM.   -Plan for  T9-L3 posterior instrumented fusion with Dr. Andres Wednesday AM.   -Keep in TLSO brace at all times.    -Bed Rest  -Recommend DVTP with SQH, teds, and SCDs given bedrest. On Lovenox 40 daily.   -Will continue to follow.  Please call with questions or change in neurologic status

## 2018-08-13 LAB
ALBUMIN SERPL BCP-MCNC: 2.9 G/DL
ANION GAP SERPL CALC-SCNC: 11 MMOL/L
BACTERIA BLD CULT: NORMAL
BACTERIA BLD CULT: NORMAL
BASOPHILS # BLD AUTO: 0.02 K/UL
BASOPHILS NFR BLD: 0.4 %
BUN SERPL-MCNC: 7 MG/DL
CALCIUM SERPL-MCNC: 7.9 MG/DL
CHLORIDE SERPL-SCNC: 108 MMOL/L
CO2 SERPL-SCNC: 20 MMOL/L
CREAT SERPL-MCNC: 0.7 MG/DL
DIFFERENTIAL METHOD: ABNORMAL
EOSINOPHIL # BLD AUTO: 0.1 K/UL
EOSINOPHIL NFR BLD: 1.6 %
ERYTHROCYTE [DISTWIDTH] IN BLOOD BY AUTOMATED COUNT: 14.1 %
EST. GFR  (AFRICAN AMERICAN): >60 ML/MIN/1.73 M^2
EST. GFR  (NON AFRICAN AMERICAN): >60 ML/MIN/1.73 M^2
GLUCOSE SERPL-MCNC: 90 MG/DL
HCT VFR BLD AUTO: 28.3 %
HGB BLD-MCNC: 9.2 G/DL
IMM GRANULOCYTES # BLD AUTO: 0.12 K/UL
IMM GRANULOCYTES NFR BLD AUTO: 2.1 %
LYMPHOCYTES # BLD AUTO: 0.9 K/UL
LYMPHOCYTES NFR BLD: 16 %
MCH RBC QN AUTO: 28.2 PG
MCHC RBC AUTO-ENTMCNC: 32.5 G/DL
MCV RBC AUTO: 87 FL
MONOCYTES # BLD AUTO: 0.7 K/UL
MONOCYTES NFR BLD: 11.6 %
NEUTROPHILS # BLD AUTO: 3.8 K/UL
NEUTROPHILS NFR BLD: 68.3 %
NRBC BLD-RTO: 0 /100 WBC
PHOSPHATE SERPL-MCNC: 2 MG/DL
PLATELET # BLD AUTO: 251 K/UL
PMV BLD AUTO: 8.9 FL
POTASSIUM SERPL-SCNC: 3.7 MMOL/L
RBC # BLD AUTO: 3.26 M/UL
SODIUM SERPL-SCNC: 139 MMOL/L
WBC # BLD AUTO: 5.61 K/UL

## 2018-08-13 PROCEDURE — 97803 MED NUTRITION INDIV SUBSEQ: CPT

## 2018-08-13 PROCEDURE — 25000003 PHARM REV CODE 250: Performed by: STUDENT IN AN ORGANIZED HEALTH CARE EDUCATION/TRAINING PROGRAM

## 2018-08-13 PROCEDURE — 20600001 HC STEP DOWN PRIVATE ROOM

## 2018-08-13 PROCEDURE — 99232 SBSQ HOSP IP/OBS MODERATE 35: CPT | Mod: ,,, | Performed by: PSYCHIATRY & NEUROLOGY

## 2018-08-13 PROCEDURE — 80069 RENAL FUNCTION PANEL: CPT

## 2018-08-13 PROCEDURE — 85025 COMPLETE CBC W/AUTO DIFF WBC: CPT

## 2018-08-13 PROCEDURE — 94761 N-INVAS EAR/PLS OXIMETRY MLT: CPT

## 2018-08-13 PROCEDURE — 63600175 PHARM REV CODE 636 W HCPCS: Performed by: STUDENT IN AN ORGANIZED HEALTH CARE EDUCATION/TRAINING PROGRAM

## 2018-08-13 PROCEDURE — 36415 COLL VENOUS BLD VENIPUNCTURE: CPT

## 2018-08-13 PROCEDURE — 99232 SBSQ HOSP IP/OBS MODERATE 35: CPT | Mod: ,,, | Performed by: PHYSICIAN ASSISTANT

## 2018-08-13 PROCEDURE — 25000003 PHARM REV CODE 250: Performed by: INTERNAL MEDICINE

## 2018-08-13 PROCEDURE — 99233 SBSQ HOSP IP/OBS HIGH 50: CPT | Mod: ,,, | Performed by: INTERNAL MEDICINE

## 2018-08-13 RX ADMIN — POTASSIUM & SODIUM PHOSPHATES POWDER PACK 280-160-250 MG 1 PACKET: 280-160-250 PACK at 09:08

## 2018-08-13 RX ADMIN — POTASSIUM & SODIUM PHOSPHATES POWDER PACK 280-160-250 MG 1 PACKET: 280-160-250 PACK at 05:08

## 2018-08-13 RX ADMIN — MOXIFLOXACIN HYDROCHLORIDE 400 MG: 400 TABLET, FILM COATED ORAL at 09:08

## 2018-08-13 RX ADMIN — POTASSIUM & SODIUM PHOSPHATES POWDER PACK 280-160-250 MG 1 PACKET: 280-160-250 PACK at 11:08

## 2018-08-13 RX ADMIN — LABETALOL HCL 300 MG: 100 TABLET, FILM COATED ORAL at 09:08

## 2018-08-13 RX ADMIN — LABETALOL HCL 300 MG: 100 TABLET, FILM COATED ORAL at 03:08

## 2018-08-13 RX ADMIN — ENOXAPARIN SODIUM 40 MG: 100 INJECTION SUBCUTANEOUS at 05:08

## 2018-08-13 NOTE — PROGRESS NOTES
[facility]  Hematology  Bone Marrow Transplant  Progress Note    Patient Name: Janie Zamorano  Admission Date: 8/3/2018  Hospital Length of Stay: 10 days  Code Status: Full Code    Subjective:     Interval History: Slept overnight.  Son and patient refused meds overnight medication.  Patient doing better this morning.  Appears mildly confused regarding situation/ reason for hospitalization.  Otherwise patient reports no new complaints.  She denies chest pain, abdominal pain, change in urination or change in BM.         Objective:     Vital Signs (Most Recent):  Temp: 98.2 °F (36.8 °C) (08/13/18 0351)  Pulse: 90 (08/13/18 0351)  Resp: 16 (08/13/18 0351)  BP: (!) 150/92 (08/13/18 0351)  SpO2: 98 % (08/13/18 0351) Vital Signs (24h Range):  Temp:  [97.3 °F (36.3 °C)-98.8 °F (37.1 °C)] 98.2 °F (36.8 °C)  Pulse:  [90-97] 90  Resp:  [16-18] 16  SpO2:  [94 %-98 %] 98 %  BP: (134-150)/(76-92) 150/92     Weight: 70.4 kg (155 lb 3.3 oz)  Body mass index is 26.64 kg/m².  Body surface area is 1.78 meters squared.    ECOG SCORE         [unfilled]    Intake/Output - Last 3 Shifts     ** Patient Encounter Information Not Found **          Physical Exam   Constitutional: She is oriented to person, place, and time. She appears well-developed and well-nourished. No distress.   HENT:   Head: Normocephalic.   Eyes: Conjunctivae and EOM are normal. Pupils are equal, round, and reactive to light.   Neck: Normal range of motion. Neck supple.   Cardiovascular: Regular rhythm and normal heart sounds.   tachycardic   Pulmonary/Chest: Effort normal and breath sounds normal. No respiratory distress.   Abdominal: Soft. Bowel sounds are normal. She exhibits no distension.   Musculoskeletal: Normal range of motion. She exhibits no edema.   Neurological: She is alert and oriented to person, place, and time. No cranial nerve deficit.   Skin: Skin is warm and dry. She is not diaphoretic.   Psychiatric: Her speech is delayed.  She is aggressive. Thought content is paranoid. Cognition and memory are impaired.       Significant Labs:   All pertinent labs from the last 24 hours have been reviewed.    Diagnostic Results:  I have reviewed all pertinent imaging results/findings within the past 24 hours.    Assessment/Plan:     * Hypercalcemia    Improving/resolved.  Stable, 8.8 on 8/13    -due to PHPT and MM  -PTH elevated as well  -Zometa 4 mg IV administered 8/7            Multiple myeloma not having achieved remission    Plan to discuss therapy once outpatient.    -hypercalcemia, anemia, bone fx and lytic lesions. Likely 2/2 to multiple myeloma.  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   -24 hr urine protein=5940  -immunofixation-A free kappa light chain is present in beta.          Closed compression fracture of thoracic vertebra    Concern for fracture stability.  Plan for inpatient surgery 8/15   - Lovenox given bed rest  - Neurosug following, appreciate recs  - TLSO brace   - Pain somewhat controlled, continue diclofenac topical and oxy 5-10 mg q6hr prn        Paranoid schizophrenia    - Will trial Seroquel overnight and follow up with psych recs.    - refusing meds.         Delirium    Patient displaying signs of acute delirium likely attributed to hx of schizophrenia aggravated by lack of sleep and hospitalization,  Do not suspect infectious sources at this time  - Will obtain UA and continue moxi  - Psych consulted regarding mediation recs given poor adherence to previous regimen.    - Seroquel 100 QHS         GERD (gastroesophageal reflux disease)    Pepcid BID          Constipation    Resolved, continue bowel regimen.   BM w/ multiple BM recorded on 8/5 although CT showed moderate amount of retained stool in the  ascending and transverse:, suggestive of constipation          Anemia    Hgb dropping, likely dilutional.    - normocytic anemia; likely 2/2 to underlying malignancy  - transfuse for Hgb < 7 and plt < 10K   - cbc with diff daily while inpatient        Lytic bone lesions on xray             Asthma    -Fluticasone 44mcg/Actuation 1 puffs BID  -No signs of symptoms of exacerbation        Other secondary hypertension    - Home meds re-started except the valsartan given patient was having GLENIS  - labetalol dose increased to 300 mg TID  - continue restarting valsartan now than GLENIS has resolved.  - continue to monitor BP             VTE Risk Mitigation (From admission, onward)        Ordered     enoxaparin injection 40 mg  Daily      08/11/18 1058     IP VTE HIGH RISK PATIENT  Once      08/03/18 2320     Place MOUNIKA hose  Until discontinued      08/03/18 2320     Place sequential compression device  Until discontinued      08/03/18 1735          Disposition: Surgery tentatively planned for 8/15.    Ruslan Small MD  Bone Marrow Transplant  [facility]

## 2018-08-13 NOTE — SUBJECTIVE & OBJECTIVE
Subjective:     Interval History: Slept overnight.  Son and patient refused meds overnight medication.  Patient doing better this morning.  Appears mildly confused regarding situation/ reason for hospitalization.  Otherwise patient reports no new complaints.  She denies chest pain, abdominal pain, change in urination or change in BM.         Objective:     Vital Signs (Most Recent):  Temp: 98.2 °F (36.8 °C) (08/13/18 0351)  Pulse: 90 (08/13/18 0351)  Resp: 16 (08/13/18 0351)  BP: (!) 150/92 (08/13/18 0351)  SpO2: 98 % (08/13/18 0351) Vital Signs (24h Range):  Temp:  [97.3 °F (36.3 °C)-98.8 °F (37.1 °C)] 98.2 °F (36.8 °C)  Pulse:  [90-97] 90  Resp:  [16-18] 16  SpO2:  [94 %-98 %] 98 %  BP: (134-150)/(76-92) 150/92     Weight: 70.4 kg (155 lb 3.3 oz)  Body mass index is 26.64 kg/m².  Body surface area is 1.78 meters squared.    ECOG SCORE         [unfilled]    Intake/Output - Last 3 Shifts     ** Patient Encounter Information Not Found **          Physical Exam   Constitutional: She is oriented to person, place, and time. She appears well-developed and well-nourished. No distress.   HENT:   Head: Normocephalic.   Eyes: Conjunctivae and EOM are normal. Pupils are equal, round, and reactive to light.   Neck: Normal range of motion. Neck supple.   Cardiovascular: Regular rhythm and normal heart sounds.   tachycardic   Pulmonary/Chest: Effort normal and breath sounds normal. No respiratory distress.   Abdominal: Soft. Bowel sounds are normal. She exhibits no distension.   Musculoskeletal: Normal range of motion. She exhibits no edema.   Neurological: She is alert and oriented to person, place, and time. No cranial nerve deficit.   Skin: Skin is warm and dry. She is not diaphoretic.   Psychiatric: Her speech is delayed. She is aggressive. Thought content is paranoid. Cognition and memory are impaired.       Significant Labs:   All pertinent labs from the last 24 hours have been reviewed.    Diagnostic Results:  I have  reviewed all pertinent imaging results/findings within the past 24 hours.

## 2018-08-13 NOTE — PROGRESS NOTES
[facility]  Psychiatry  Progress Note    Patient Name: Janie Zamorano  MRN: [id number]   Code Status: Full Code  Admission Date: 8/3/2018  Hospital Length of Stay: 10 days  Expected Discharge Date: 8/15/2018  Attending Physician: Ruslan Jameson MD  Primary Care Provider: He Hoyt MD    Current Legal Status: N/A    Patient information was obtained from patient, relative(s), past medical records and ER records.     Subjective:     Principal Problem:Hypercalcemia    Chief Complaint: Hx of paranoid schizophrenia    HPI:   Consultation-Liaison Psychiatry Consult Note      Chief Complaint / Reason for Consult:     Delirium, paranoia, agitation, refusal of meds     Subjective:     Per primary:  Ms. Janie Zamorano is a 62 y.o. female who presented with back pain and constipation. Onset of symptoms for back pain occurred a week ago. Patient reports that leaning over to  a storage box initiated her back pain. Patient states the pain starts in her back but denies any radiation of pain. The pain worsens when she reaches for objects and upon deep inspiration. She also loses her breath if she aggravates her back pain. Patient denies any trauma to the region. Patient has been able to ambulate the past week with reluctance. The patient has tried hot and cold packs to relieve her pain. The patient did not report using pain medications to relieve the pain. Patient denies fever, chills, night sweats, dysuria, hematuria, and easy bruising or bleeding. She lost around 8 LB over the course of past two weeks. She denies any shooting pain to LE, no loss of sense in LE or abdominal area, she mentions numbness in her left hand. Denies bowel/urinary incontinence. No saddle anesthesia. Patient has been anemic chronically and was started on iron pills two weeks ago by her primary doctor.  Patient also complains of constipation for the past two weeks. She reports no bowel movements during that  span. She has not had constipation prior to this event. She has tried enemas to no avail. She states she is belching and passing gas. She complains of nausea but reports no vomiting. She denies chest pain, palpitations, and cough.     FHx: Patient reports her mother and cousin both had breast cancer in their 50s and 60s. She had an uncle diagnosed with leukemia.       History of Present Illness:   Janie Zamorano is a 62 y.o. female with a history of schizophrenia who presented to [facility] due to back pain. Psychiatry was consulted to address the patient's symptoms of psychosis.      Pt was recently diagnosed with Multiple myeloma, has multiple lytic lesions of the spine, needs surgery on her T12 vertebrae.  Psychiatry was consulted because pt has become paranoid, agitated, at times refusing meds.  Pt has been admitted to [facility] in the past, has undergone ECT.    On interview pt was reclined in bed, neck brace immobilizing her head.  Pt politely refused to answer any questions about her mood, psychosis, past psych history.  When questioned on why she refused to answer basic questions, pt stared blankly at the interviewer.      Collateral:   Pt's son was in the room with the patient, we spoke outside the room.  He states the the pt does have a history of depression, not schizophrenia.  She was taking risperdal and cogentin, but for depression.  She stopped taking risperdal due to sedation, 4 months ago, has not displayed any symptoms of psychosis in the interval.  Pt's behavior over the last day was the result of an inability to sleep, the seroquel 50 mg helped somewhat.  Pt's son minimized psychiatric past and current symptoms.      Medical Review Of Systems:  Pertinent items are noted in HPI.    Psychiatric Review Of Systems - Is patient experiencing or having changes in:  - could not be completed, pt would not cooperate.    Allergies:  Shellfish containing products    Past Medical/Surgical History  Past Medical  History:   Diagnosis Date    Asthma     Depression     GERD (gastroesophageal reflux disease)     Hypertension     Neck pain     Schizophrenia       has a past medical history of Asthma, Depression, GERD (gastroesophageal reflux disease), Hypertension, Neck pain, and Schizophrenia.  Past Surgical History:   Procedure Laterality Date     SECTION      X 1    HYSTERECTOMY  2016    KJB---DLH/BSO    LIPOMA RESECTION      back     Pt would not cooperate with interview:  Past Psychiatric History:  Previous Medication Trials: unknown   Previous Psychiatric Hospitalizations: unknown   Previous Suicide Attempts: unknown   History of Violence: unknown  Outpatient Psychiatrist: unknown    Social History:  Marital Status: unknown  Children: son was with pt   Employment Status/Info: unknown  Education: unknown  Special Ed: unknown  Housing Status:  Lives w family  History of phys/sexual abuse: unknown  Access to gun: unknown    Substance Abuse History:  Recreational Drugs: unknown  Use of Alcohol: unknown   Rehab History:unknown   Tobacco Use:unknown  Use of Caffeine: unknown  Use of OTC:    Legal consequences of chemical use: unknown    Legal History:  Past Charges/Incarcerations:unknown,     Pending charges:unknown     Family Psychiatric History:   Unknown,    Psychosocial Factors:  Could not ascertain    Objective:     Current Medications:  Infusions:    Scheduled:   benztropine  1 mg Oral BID    diclofenac sodium  2 g Topical (Top) QID    enoxaparin  40 mg Subcutaneous Daily    famotidine  20 mg Oral BID    fluticasone  1 puff Inhalation BID    labetalol  300 mg Oral TID    moxifloxacin  400 mg Oral Daily    polyethylene glycol  17 g Oral Daily    potassium, sodium phosphates  1 packet Oral QID (WM & HS)    QUEtiapine  100 mg Oral QHS    senna-docusate 8.6-50 mg  1 tablet Oral BID     PRN:  albuterol-ipratropium, dextrose 50%, dextrose 50%, glucagon (human recombinant), glucose, glucose,  hydrALAZINE, methyl salicylate-menthol 15-10%, ondansetron, ramelteon, sodium chloride 0.9%, traMADol    Home Medications:  Prior to Admission medications    Medication Sig Start Date End Date Taking? Authorizing Provider   albuterol 90 mcg/actuation inhaler Inhale 2 puffs into the lungs every 6 (six) hours as needed for Wheezing. 11/29/17  Yes He Hoyt Jr., MD   benztropine (COGENTIN) 1 MG tablet TK 1 T PO  BID  Patient taking differently: Take 1 mg by mouth 2 (two) times daily.  5/15/18  Yes He Hoyt Jr., MD   bisacodyl (DULCOLAX) 5 mg EC tablet Take 5 mg by mouth daily as needed for Constipation.   Yes Historical Provider, MD   cholecalciferol, vitamin D3, 2,000 unit Cap Take 1 capsule (2,000 Units total) by mouth once daily. 5/29/18 5/29/19 Yes Albert Russell MD   cyanocobalamin, vitamin B-12, (VITAMIN B-12 ORAL) Take 1 tablet by mouth daily as needed.   Yes Historical Provider, MD   fluticasone (FLONASE) 50 mcg/actuation nasal spray 1 spray by Each Nare route once daily.  Patient taking differently: 1 spray by Each Nare route daily as needed for Allergies.  11/29/17  Yes He Hoyt Jr., MD   labetalol (NORMODYNE) 100 MG tablet TAKE 1 TABLET BY MOUTH TWICE DAILY 1/3/18  Yes He Hoyt Jr., MD   pantoprazole (PROTONIX) 40 MG tablet Take 1 tablet (40 mg total) by mouth every 12 (twelve) hours. 3/19/18 8/6/18 Yes Albert Russell MD   risperiDONE (RISPERDAL) 1 MG tablet Take 1 tablet (1 mg total) by mouth once daily.  Patient taking differently: Take 1 tablet by mouth  in the morning and 2 at bedtime 5/15/18  Yes He Hoyt Jr., MD   sodium phosphates (DISPOSABLE ENEMA) 19-7 gram/118 mL Enem Place 1 enema rectally as needed (constipation).   Yes Historical Provider, MD   lenalidomide (REVLIMID) 25 mg Cap Take 1 capsule (25 mg total) by mouth once daily. Take on days 1-21 of a 28 day cycle 8/8/18 9/7/18  Ruslan Jameson MD     Vital Signs:  Temp:  [97.3 °F (36.3 °C)-99.4 °F (37.4  "°C)]   Pulse:  [80-97]   Resp:  [18-20]   BP: (138-165)/(82-99)   SpO2:  [93 %-96 %]     Physical Exam:  Gen: Alert, calm, cooperative, NAD   Head: NCAT, PERRL, EOMI, MMM   Lungs: CTAB, respirations unlabored   Chest wall: No tenderness or deformity   Heart: RRR, S1/S2 normal, no M/R/G   Abdomen: S/NT/ND, +BS, no HSM, no masses   Extremities: Extremities normal, atraumatic, no cyanosis or edema   Pulses: 2+ and symmetric all extremities   Skin: Skin color, texture, turgor normal; no rashes or lesions   Neurologic: CN II-XII grossly intact, normal strength, sensations and reflexes throughout     Mental Status Exam:  Appearance: obese   Behavior: reluctant to participate   Speech/Language: soft, monotone   Mood: "I will not answer that question"   Affect: mood congruent   Thought Process:  unknown   Thought Content: normal, no suicidality, no homicidality, delusions, or paranoia, would not answer   Orientation: grossly intact   Cognition: grossly intact   Insight: poor   Judgment: poor     Laboratory Data:  Recent Results (from the past 48 hour(s))   CBC auto differential    Collection Time: 08/11/18  4:49 AM   Result Value Ref Range    WBC 4.13 3.90 - 12.70 K/uL    RBC 3.07 (L) 4.00 - 5.40 M/uL    Hemoglobin 8.7 (L) 12.0 - 16.0 g/dL    Hematocrit 26.8 (L) 37.0 - 48.5 %    MCV 87 82 - 98 fL    MCH 28.3 27.0 - 31.0 pg    MCHC 32.5 32.0 - 36.0 g/dL    RDW 14.3 11.5 - 14.5 %    Platelets 224 150 - 350 K/uL    MPV 9.5 9.2 - 12.9 fL    Immature Granulocytes 2.4 (H) 0.0 - 0.5 %    Gran # (ANC) 2.5 1.8 - 7.7 K/uL    Immature Grans (Abs) 0.10 (H) 0.00 - 0.04 K/uL    Lymph # 0.9 (L) 1.0 - 4.8 K/uL    Mono # 0.5 0.3 - 1.0 K/uL    Eos # 0.1 0.0 - 0.5 K/uL    Baso # 0.02 0.00 - 0.20 K/uL    nRBC 1 (A) 0 /100 WBC    Gran% 61.5 38.0 - 73.0 %    Lymph% 20.6 18.0 - 48.0 %    Mono% 12.3 4.0 - 15.0 %    Eosinophil% 2.7 0.0 - 8.0 %    Basophil% 0.5 0.0 - 1.9 %    Differential Method Automated    Renal function panel    Collection Time: " 08/11/18  4:49 AM   Result Value Ref Range    Glucose 84 70 - 110 mg/dL    Sodium 140 136 - 145 mmol/L    Potassium 3.3 (L) 3.5 - 5.1 mmol/L    Chloride 108 95 - 110 mmol/L    CO2 23 23 - 29 mmol/L    BUN, Bld 7 (L) 8 - 23 mg/dL    Calcium 8.4 (L) 8.7 - 10.5 mg/dL    Creatinine 0.8 0.5 - 1.4 mg/dL    Albumin 2.7 (L) 3.5 - 5.2 g/dL    Phosphorus 1.4 (L) 2.7 - 4.5 mg/dL    eGFR if African American >60.0 >60 mL/min/1.73 m^2    eGFR if non African American >60.0 >60 mL/min/1.73 m^2    Anion Gap 9 8 - 16 mmol/L   CBC auto differential    Collection Time: 08/12/18  3:51 AM   Result Value Ref Range    WBC 4.78 3.90 - 12.70 K/uL    RBC 3.16 (L) 4.00 - 5.40 M/uL    Hemoglobin 9.0 (L) 12.0 - 16.0 g/dL    Hematocrit 27.5 (L) 37.0 - 48.5 %    MCV 87 82 - 98 fL    MCH 28.5 27.0 - 31.0 pg    MCHC 32.7 32.0 - 36.0 g/dL    RDW 14.0 11.5 - 14.5 %    Platelets 240 150 - 350 K/uL    MPV 9.3 9.2 - 12.9 fL    Immature Granulocytes 2.3 (H) 0.0 - 0.5 %    Gran # (ANC) 3.1 1.8 - 7.7 K/uL    Immature Grans (Abs) 0.11 (H) 0.00 - 0.04 K/uL    Lymph # 0.8 (L) 1.0 - 4.8 K/uL    Mono # 0.6 0.3 - 1.0 K/uL    Eos # 0.1 0.0 - 0.5 K/uL    Baso # 0.01 0.00 - 0.20 K/uL    nRBC 1 (A) 0 /100 WBC    Gran% 64.8 38.0 - 73.0 %    Lymph% 17.2 (L) 18.0 - 48.0 %    Mono% 13.2 4.0 - 15.0 %    Eosinophil% 2.3 0.0 - 8.0 %    Basophil% 0.2 0.0 - 1.9 %    Differential Method Automated    Renal function panel    Collection Time: 08/12/18  3:51 AM   Result Value Ref Range    Glucose 97 70 - 110 mg/dL    Sodium 140 136 - 145 mmol/L    Potassium 3.8 3.5 - 5.1 mmol/L    Chloride 109 95 - 110 mmol/L    CO2 20 (L) 23 - 29 mmol/L    BUN, Bld 7 (L) 8 - 23 mg/dL    Calcium 8.3 (L) 8.7 - 10.5 mg/dL    Creatinine 0.9 0.5 - 1.4 mg/dL    Albumin 2.8 (L) 3.5 - 5.2 g/dL    Phosphorus 1.6 (L) 2.7 - 4.5 mg/dL    eGFR if African American >60.0 >60 mL/min/1.73 m^2    eGFR if non African American >60.0 >60 mL/min/1.73 m^2    Anion Gap 11 8 - 16 mmol/L   Urinalysis    Collection Time:  08/12/18 12:06 PM   Result Value Ref Range    Specimen UA Urine, Clean Catch     Color, UA Yellow Yellow, Straw, Malka    Appearance, UA Clear Clear    pH, UA 7.0 5.0 - 8.0    Specific Gravity, UA 1.010 1.005 - 1.030    Protein, UA 2+ (A) Negative    Glucose, UA Negative Negative    Ketones, UA Trace (A) Negative    Bilirubin (UA) Negative Negative    Occult Blood UA Negative Negative    Nitrite, UA Negative Negative    Urobilinogen, UA Negative <2.0 EU/dL    Leukocytes, UA Negative Negative   Urinalysis Microscopic    Collection Time: 08/12/18 12:06 PM   Result Value Ref Range    RBC, UA 0 0 - 4 /hpf    WBC, UA 1 0 - 5 /hpf    Bacteria, UA Few (A) None-Occ /hpf    Squam Epithel, UA 6 /hpf    Hyaline Casts, UA 0 0-1/lpf /lpf    Microscopic Comment SEE COMMENT       No results found for: PHENYTOIN, PHENOBARB, VALPROATE, CBMZ  Imaging:  Imaging Results          CT Renal Stone Study ABD Pelvis WO (Final result)  Result time 08/03/18 15:36:29    Final result by Kalin Sanders Jr., MD (08/03/18 15:36:29)                 Impression:      Innumerable lytic lesions involving the thoracic and lumbar spine, as well as the iliac wings bilaterally.  These findings are suggestive of metastatic disease.    Pathologic fracture of the T12 vertebral body, resulting in mild height loss.  No retropulsion into the spinal canal, however there is possible soft tissue extension into the canal.    Hypodense foci within the liver, the largest measuring 3.9 cm, favored to represent a hepatic cyst.  The remaining hypodensities are too small to fully characterize.    Findings suggestive of constipation.    Trace bilateral lateral pleural effusions, as well as a small pericardial effusion.    Simple left renal cyst.    COMMUNICATION  This critical result was discovered/received at 14:20.  The critical information above was relayed directly by me by telephone to Bozena Montesinos at 8/3/18.    Electronically signed by resident: Jose  Jordyn  Date:    08/03/2018  Time:    14:11    Electronically signed by: Kalin Sanders MD  Date:    08/03/2018  Time:    15:36             Narrative:    EXAMINATION:  CT RENAL STONE STUDY ABD PELVIS WO    CLINICAL HISTORY:  Abdominal pain, unspecified    TECHNIQUE:  Low dose axial images, sagittal and coronal reformations were obtained from the lung bases to the pubic symphysis.  Contrast was not administered.    COMPARISON:  None    FINDINGS:  Heart: Normal in size.  There is a small volume of pericardial fluid.    Lung Bases: Trace bilateral pleural effusions, left greater than right.  The lungs appear otherwise well aerated.    Liver: Liver is normal in size and attenuation, with a 3.9 cm simple cyst within hepatic segment 4.  There are also scattered subcentimeter hypodensities within segments 5 and 8, adjacent to the gallbladder fossa.  These findings are too small to fully characterize, however favored to represent simple hepatic cysts.    Gallbladder: No calcified gallstones.    Bile Ducts: No evidence of dilated ducts.    Pancreas: No mass or peripancreatic fat stranding.    Spleen: Unremarkable.    Adrenals: Unremarkable.    Kidneys/ Ureters: 0.8 cm nonobstructing left renal stone.  No evidence of hydronephrosis.  There is a 1.3 cm hypodense focus on the left kidney, favored to represent a simple renal cysts.    Bladder: No evidence of wall thickening.    Reproductive organs: Status post hysterectomy.    GI Tract/Mesentery: Moderate amount of retained stool in the ascending and transverse:, suggestive of constipation.  No evidence of obstruction.    Peritoneal Space: No ascites. No free air.    Retroperitoneum: No significant adenopathy.    Abdominal wall: Small fat containing umbilical hernia.    Vasculature: No significant atherosclerosis or aneurysm.    Bones: There are multiple lytic lesions involving the lumbar and thoracic vertebral bodies, with a pathologic fracture of the T12 vertebral body,  "resulting in minimal height loss.  There is no retropulsion into the spinal canal, however there appears to be soft tissue extension into the canal at this level.  Several lesions extend into the vertebral pedicles, most notably at T9 and T10.  There also lytic lesions of the iliac wings bilaterally these findings are suggestive of metastatic disease.                               X-Ray Abdomen Flat And Erect (Final result)  Result time 08/03/18 12:46:12    Final result by Reyes Eden MD (08/03/18 12:46:12)                 Impression:      Cecal/proximal colonic large stool volume which may represent constipation.  No associated bowel obstruction at this time.      Electronically signed by: Reyes Eden MD  Date:    08/03/2018  Time:    12:46             Narrative:    EXAMINATION:  XR ABDOMEN FLAT AND ERECT    CLINICAL HISTORY:  Constipation, unspecified    TECHNIQUE:  Flat and erect AP views of the abdomen were performed.    COMPARISON:  Lumbar spine series 07/25/2018 and chest radiograph 09/09/2004    FINDINGS:  Mild distension with large amount of stool noted within the cecum/proximal colon.  Remainder of the more mid and distal colonic loops are mildly distended with gas.  No dilated loops of bowel or small bowel air-fluid levels to suggest obstruction.  No organomegaly or significant mass effect.  There is a 7 mm calcific density projected over the left mid to upper abdomen which could represent a renal calculus.  No large amount of free air.  No large consolidation at the included lung bases.  Included osseous structures show age-related degenerative change without acute process seen.                                     Hospital Course: No notes on file    Interval History: Patient feels well this am. Slept well last night despite not using quetiapine.  Patient's son is at bedside and notes that the initial dose made patient "weak" and this was why they refused the dose last night.  He feels that she just " did not sleep well and became delirious from lack of sleep associated with the pain.  He notes that she has been catching up lately and denies pain which has been helping her.  She is somnolent this am but fully oriented on questions.  They both note that she has not been on risperidone or benztropine for months and do not see the need for it currently.  Declined to discuss previous MDD with psychotic feature diagnosis.  Patient to have fusion procedure Wednesday am.      Family History     Problem Relation (Age of Onset)    Breast cancer Mother    COPD Father    Diabetes Brother    Glaucoma Mother, Sister    Hypertension Mother, Father    Liver cancer Paternal Uncle (75)    Macular degeneration Mother        Tobacco Use    Smoking status: Never Smoker    Smokeless tobacco: Never Used   Substance and Sexual Activity    Alcohol use: No     Alcohol/week: 0.0 oz    Drug use: No    Sexual activity: Not Currently     Partners: Male     Birth control/protection: Post-menopausal     Psychotherapeutics (From admission, onward)    Start     Stop Route Frequency Ordered    08/12/18 2100  QUEtiapine tablet 100 mg      -- Oral Nightly 08/12/18 1553    08/11/18 0112  ramelteon tablet 8 mg      -- Oral Nightly PRN 08/11/18 0112        Review of Systems   Constitutional: Positive for fatigue. Negative for chills and fever.   Eyes: Negative for redness and visual disturbance.   Respiratory: Negative for cough and shortness of breath.    Cardiovascular: Negative for chest pain and palpitations.   Gastrointestinal: Negative for abdominal distention and abdominal pain.   Musculoskeletal: Negative for arthralgias and back pain (Recent back/neck pain--pt denies currently).   Skin: Negative for rash and wound.   Neurological: Negative for weakness and numbness.   Hematological: Negative for adenopathy. Does not bruise/bleed easily.   Psychiatric/Behavioral: Positive for decreased concentration. Negative for agitation, confusion,  "dysphoric mood, hallucinations, self-injury and suicidal ideas. The patient is not nervous/anxious.      Objective:     Vital Signs (Most Recent):  Temp: 98.3 °F (36.8 °C) (08/13/18 1137)  Pulse: 86 (08/13/18 1137)  Resp: 19 (08/13/18 1137)  BP: 125/85 (08/13/18 1137)  SpO2: 98 % (08/13/18 1137) Vital Signs (24h Range):  Temp:  [97.3 °F (36.3 °C)-98.8 °F (37.1 °C)] 98.3 °F (36.8 °C)  Pulse:  [86-97] 86  Resp:  [16-19] 19  SpO2:  [94 %-98 %] 98 %  BP: (125-150)/(76-92) 125/85     Height: 5' 4" (162.6 cm)  Weight: 70.4 kg (155 lb 3.3 oz)  Body mass index is 26.64 kg/m².      Intake/Output Summary (Last 24 hours) at 8/13/2018 1147  Last data filed at 8/13/2018 1100  Gross per 24 hour   Intake 240 ml   Output 850 ml   Net -610 ml     Physical Exam   Constitutional: She is oriented to person, place, and time. She appears well-developed and well-nourished. No distress.   HENT:   Head: Normocephalic and atraumatic.   Mouth/Throat: No oropharyngeal exudate.   Eyes: Conjunctivae and EOM are normal. Pupils are equal, round, and reactive to light.   Neck: Normal range of motion. Neck supple. No thyromegaly present.   Pulmonary/Chest: Effort normal. No respiratory distress.   Abdominal: Soft. She exhibits no distension.   Musculoskeletal: Normal range of motion. She exhibits no edema or deformity.   Neurological: She is oriented to person, place, and time. No cranial nerve deficit or sensory deficit. She exhibits normal muscle tone.   Somnolent but wakes/opens eyes to voice consistently and fully oriented, answering questions/following commands appropriately   Skin: Skin is warm and dry. No rash noted. No erythema.   Psychiatric: She has a normal mood and affect. Her speech is normal and behavior is normal. Judgment and thought content normal. She is not agitated, not aggressive, not hyperactive, not slowed, not withdrawn and not actively hallucinating. Thought content is not paranoid and not delusional. Cognition and memory " are normal. She expresses no homicidal and no suicidal ideation.   Pt somnolent but fully oriented.  Pleasant and cooperative with exam.  Speech appropriate.  Son in room assists with some collateral hx, does not want to discuss previous psych issues currently given pt's calm/stable state this am.  Both reasonably requesting to have PCP informed of hospitalization.   She is attentive.     NEUROLOGICAL EXAMINATION:     MENTAL STATUS   Oriented to person, place, and time.   Speech: speech is normal     CRANIAL NERVES     CN III, IV, VI   Pupils are equal, round, and reactive to light.  Extraocular motions are normal.     Significant Labs:   CBC:   Recent Labs   Lab  186   WBC  4.78  5.61   HGB  9.0*  9.2*   HCT  27.5*  28.3*   PLT  240  251     CMP:   Recent Labs   Lab  18   NA  140  139   K  3.8  3.7   CL  109  108   CO2  20*  20*   GLU  97  90   BUN  7*  7*   CREATININE  0.9  0.7   CALCIUM  8.3*  7.9*   ALBUMIN  2.8*  2.9*   ANIONGAP  11  11   EGFRNONAA  >60.0  >60.0     Significant Imagin18 CT T spine w/o contrast:  1. Redemonstration of pathologic T12 compression fracture with retropulsion of posterior fracture fragments resulting in mild spinal canal stenosis.  No additional pathologic fracture identified.  2. Innumerable lytic lesions throughout the visualized thoracolumbar spine.  Findings appear similar to those seen on most recent MRI examination and are concerning for underlying neoplastic process such as possible multiple myeloma.  3. Small bilateral pleural effusions with adjacent bibasilar atelectatic change.  4. Moderate-sized  hiatal hernia, nonobstructing left renal calculus.    Assessment/Plan:     Delirium    -Negative CAM-ICU assessment.  Patient will be high risk for delirium with upcoming spinal surgery.  -Can use quetiapine 50 mg qHS as prn if ramelteon is ineffective or as prn for non-redirectable agitation  -As per initial  consult note:    DELIRIUM BEHAVIOR MANAGEMENT  PLEASE utilize CHEMICAL restraints with PRN meds first for agitation. Minimize use of PHYSICAL restraints  Keep window shades open, room lit during day and room dim at night in order to promote normal sleep-wake cycles  Encourage family at bedside. Allred patient often to situation, location, date.  Continue to limit/discontinue use of opioids, benzos, and anticholinergic medications as they may worsen delirium.  Continue medical workup for causative etiology of Delirium.         Paranoid schizophrenia    -Follow up with Dr. Paulson in 1 month as an outpatient.  Pt ok to remain off of risperidone, benztropine.  -Continue quetiapine 50 mg qHS as a prn medication, can be second line agent if ramelteon is ineffective  -Consider checking EKG for QTc.  Can use quetiapine 50 mg prn non-redirectable agitation.  Delirium precautions per above.  Call for any additional concerns.        Need for Continued Hospitalization:   No need for inpatient psychiatric hospitalization. Continue medical care as per the primary team.    Anticipated Disposition: Per NSGY and primary team after surgical procedure and assessments by PT/OT     Total time:  35 with greater than 50% of this time spent in counseling and/or coordination of care.     Olya Ramírez MD   Psychiatry  [facility]

## 2018-08-13 NOTE — ASSESSMENT & PLAN NOTE
-Negative CAM-ICU assessment  -As per initial consult note:    DELIRIUM BEHAVIOR MANAGEMENT  PLEASE utilize CHEMICAL restraints with PRN meds first for agitation. Minimize use of PHYSICAL restraints  Keep window shades open and room lit during day and room dim at night in order to promote normal sleep-wake cycles  Encourage family at bedside. [place] patient often to situation, location, date.  Continue to Limit or Discontinue use of Narcotics, Benzos and Anti-cholinergic medications as they may worsen delirium.  Continue medical workup for causative etiology of Delirium.

## 2018-08-13 NOTE — PROGRESS NOTES
[facility]  Neurosurgery  Progress Note    Subjective:     History of Present Illness: Ms. Zamorano is a 62 year old female with no PMHx of cancer, chronic back pain, or osteoporosis, who presents to the ED with onset of bilateral flank pain that began 1 week ago while lifting a heavy object from the ground. Patient denies any popping or pulling sensation. Denies any thoracic or lumbar back pain. Denies any UE or LE pain, paresthesias, or weakness. Denies any bladder or bowel incontinence. CT renal was performed and showed a T12 compression fracture. Neurosurgery was consulted for treatment recommendations.     Post-Op Info:  Procedure(s) (LRB):  THORACIC FUSION - PEDICLE SCREWS - T9 - L3 AIRO (N/A)         Interval History:   NAEON. Patient resting comfortably in bed with TLSO brace in place. Son at bedside. Reports continued improvement in back pain. Denies any UE or LE pain, paresthesias, or weakness. Denies any incontinence or retention.     Medications:  Continuous Infusions:  Scheduled Meds:   benztropine  1 mg Oral BID    diclofenac sodium  2 g Topical (Top) QID    enoxaparin  40 mg Subcutaneous Daily    famotidine  20 mg Oral BID    fluticasone  1 puff Inhalation BID    labetalol  300 mg Oral TID    moxifloxacin  400 mg Oral Daily    polyethylene glycol  17 g Oral Daily    potassium, sodium phosphates  1 packet Oral QID (WM & HS)    QUEtiapine  100 mg Oral QHS    senna-docusate 8.6-50 mg  1 tablet Oral BID     PRN Meds:albuterol-ipratropium, dextrose 50%, dextrose 50%, glucagon (human recombinant), glucose, glucose, hydrALAZINE, methyl salicylate-menthol 15-10%, ondansetron, ramelteon, sodium chloride 0.9%, traMADol     Review of Systems  Objective:     Weight: 70.4 kg (155 lb 3.3 oz)  Body mass index is 26.64 kg/m².  Vital Signs (Most Recent):  Temp: 99.2 °F (37.3 °C) (08/13/18 1504)  Pulse: 91 (08/13/18 1504)  Resp: 19 (08/13/18 1504)  BP: 136/85 (08/13/18 1504)  SpO2: 97 %  (08/13/18 1504) Vital Signs (24h Range):  Temp:  [98.1 °F (36.7 °C)-99.2 °F (37.3 °C)] 99.2 °F (37.3 °C)  Pulse:  [86-93] 91  Resp:  [16-19] 19  SpO2:  [94 %-98 %] 97 %  BP: (125-150)/(76-92) 136/85     Date 08/13/18 0700 - 08/14/18 0659   Shift 0387-2422 7005-7699 4641-8141 24 Hour Total   INTAKE   P.O. 180   180   Shift Total(mL/kg) 180(2.6)   180(2.6)   OUTPUT   Shift Total(mL/kg)       Weight (kg) 70.4 70.4 70.4 70.4                   Female External Urinary Catheter 08/09/18 0000 (Active)   Skin no redness;no breakdown 8/12/2018  7:48 PM   Tolerance no signs/symptoms of discomfort 8/12/2018  7:48 PM   Suction Continuous suction at 70 mmHg 8/12/2018  7:48 PM   Date of last wick change 08/13/18 8/13/2018  4:00 AM   Time of last wick change 0400 8/13/2018  4:00 AM   Output (mL) 300 mL 8/13/2018  4:00 AM       Neurosurgery Physical Exam   General: well developed, well nourished, no distress.   Head: normocephalic, atraumatic  Neurologic: Alert and oriented. Thought content appropriate.  GCS: Motor: 6/Verbal: 5/Eyes: 4 GCS Total: 15  Mental Status: Awake, Alert, Oriented x 4  Language: No aphasia  Speech: No dysarthria  Cranial nerves: face symmetric, tongue midline, CN II-XII grossly intact.   Eyes: pupils equal, round, reactive to light with accomodation, EOMI.   Pulmonary: normal respirations, no signs of respiratory distress  Abdomen: soft, non-distended, not tender to palpation  Sensory: intact to light touch throughout  Motor Strength:Moves all extremities spontaneously with good tone.  Full strength upper and lower extremities. No abnormal movements seen.   Washburn: absent  Clonus: absent  Skin: Skin is warm, dry and intact.          Significant Labs:  Recent Labs   Lab  08/12/18   0351  08/13/18   0456   GLU  97  90   NA  140  139   K  3.8  3.7   CL  109  108   CO2  20*  20*   BUN  7*  7*   CREATININE  0.9  0.7   CALCIUM  8.3*  7.9*     Recent Labs   Lab  08/12/18   0351  08/13/18   0456   WBC  4.78  5.61    HGB  9.0*  9.2*   HCT  27.5*  28.3*   PLT  240  251     No results for input(s): LABPT, INR, APTT in the last 48 hours.  Microbiology Results (last 7 days)     Procedure Component Value Units Date/Time    Urine culture [[id number]] Collected:  08/12/18 1206    Order Status:  No result Specimen:  Urine Updated:  08/13/18 1231    Urine culture [[id number]]     Order Status:  Completed Specimen:  Urine     Blood culture [[id number]] Collected:  08/08/18 1801    Order Status:  Completed Specimen:  Blood Updated:  08/12/18 2212     Blood Culture, Routine No Growth to date     Blood Culture, Routine No Growth to date     Blood Culture, Routine No Growth to date     Blood Culture, Routine No Growth to date     Blood Culture, Routine No Growth to date    Narrative:       Collection has been rescheduled by FB at 8/8/2018 14:42 Reason: nurse   Leonora jain all labs at 1600  Collection has been rescheduled by [facility] at 8/8/2018 16:24 Reason:   Patient unavailable  Collection has been rescheduled by 2 at 8/8/2018 16:25 Reason:   Patient unavailable  Collection has been rescheduled by 2 at 8/8/2018 16:37 Reason:   Patient unavailable  Collection has been rescheduled by FB at 8/8/2018 14:42 Reason: nurse   Leonora jain all labs at 1600  Collection has been rescheduled by 2 at 8/8/2018 16:24 Reason:   Patient unavailable  Collection has been rescheduled by 2 at 8/8/2018 16:25 Reason:   Patient unavailable  Collection has been rescheduled by 2 at 8/8/2018 16:37 Reason:   Patient unavailable    Blood culture [[id number]] Collected:  08/08/18 1800    Order Status:  Completed Specimen:  Blood Updated:  08/12/18 2212     Blood Culture, Routine No Growth to date     Blood Culture, Routine No Growth to date     Blood Culture, Routine No Growth to date     Blood Culture, Routine No Growth to date     Blood Culture, Routine No Growth to date    Narrative:       Collection has been rescheduled by FB at 8/8/2018 14:42 Reason: nurse    Leonora jain all labs at 1600  Collection has been rescheduled by JM2 at 8/8/2018 16:24 Reason:   Patient unavailable  Collection has been rescheduled by JM2 at 8/8/2018 16:25 Reason:   Patient unavailable  Collection has been rescheduled by JM2 at 8/8/2018 16:37 Reason:   Patient unavailable  Collection has been rescheduled by FB at 8/8/2018 14:42 Reason: nurse   Leonora jain all labs at 1600  Collection has been rescheduled by JM2 at 8/8/2018 16:24 Reason:   Patient unavailable  Collection has been rescheduled by JM2 at 8/8/2018 16:25 Reason:   Patient unavailable  Collection has been rescheduled by JM2 at 8/8/2018 16:37 Reason:   Patient unavailable    Gram stain [[id number]] Collected:  08/08/18 1558    Order Status:  Completed Specimen:  Urine Updated:  08/09/18 0229     Gram Stain Result Rare WBC's      Few Gram positive rods       Rare budding yeast            Assessment/Plan:     Closed compression fracture of thoracic vertebra    62 year old female with acute onset on bilateral flank pain that began after lifting a heavy object. Imaging shows a T12 compression fracture along with multiple lytic lesions throughout the spine.     -Patient neurologically stable without s/s of myelopathy   -CT Thoracic/Upright and supine x-rays consistent with an unstable fracture  -Plan for OR Wednesday for T9-L3 posterior instrumented fusion with Dr. Andres. Patient and son still agreeable with surgical plan.   -Keep in TLSO brace at all times. Continue bed rest.  -Anemia: H&H 9.2/28.3, was 9/27.5. Please continue to optimize H&H prior to surgery. Continue management per primary team.   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 Plans to discuss therapy once outpatient. Continue management per primary team.   -Hypercalcemia: Ca 7.9 today, was 8.3. Due to PHPT and MM. Zometa 4 mg IV administered 8/7.  Continue management per primary team.   -Schizophrenia: Continue quetiapine 50 mg qHS PRN. Continue management per  Psych.   -Continue DVTP per primary team. Will need to hold Lovenox after tomorrow morning's dose in preparation for surgery.   -Please call with questions or change in neurologic status            Please call with any questions      Margaret Rust PA-C   Neurosurgery   Pager: [phone]

## 2018-08-13 NOTE — PROGRESS NOTES
Pt and Pt's son refused scheduled night time medications. Education provided. Pt complied with taking scheduled labetalol 300 mg.

## 2018-08-13 NOTE — ASSESSMENT & PLAN NOTE
"ASSESSMENT     Per Dr. Alejo:  Impression:  Janie Zamorano is a 62 y.o. female with a history of schizophrenia, has undergone ECT at [facility], has not released records to [facility].    Pt was difficult to assess, would not answer basic questions, most likely due to paranoia.  Son minimizes past and present symptoms, states all problems are caused by insomnia.    RECOMMENDATIONS      1. Scheduled Medication Recommendations:  -None.  Patient stable.  -Please continue delirium precautions.    2. PRN Recommendations:  -Pt and her son refused quetiapine last night, son states it "made her weak"  -Can continue quetiapine 50 mg qHS as prn for insomnia.  Would follow with EKG, last EKG in chart 2008.    3.  Monitor:  -Changes in mental status    4. Legal Status/Precautions:  -N/a    5. Capacity:  -Patient currently has capacity to make medical decisions    "

## 2018-08-13 NOTE — PLAN OF CARE
Problem: Patient Care Overview  Goal: Plan of Care Review  Outcome: Ongoing (interventions implemented as appropriate)  Pt with nonskid footwear on with bed in lowest position and locked with bed rails up x2. Pt and son instructed to call for assistance when needed with call light within reach, understanding was verbalized. Neuro checks done q4, Pt is AAOx4, verbal responses delayed. Pt and son refusing most medications, education provided on the purpose and importance of medications. Pt with no c/o pain or discomfort this shift. Remained afebrile and free from any falls or injuries. TLSO brace remains in place. Will continue to monitor pt.

## 2018-08-13 NOTE — PLAN OF CARE
Problem: Patient Care Overview  Goal: Plan of Care Review  Outcome: Ongoing (interventions implemented as appropriate)  Pt remained free of falls during shift. AAOx4. VS stable. Afebrile. Tolerating regular diet. Reluctant to take medications. Pt did agree to taking labetalol, electrolyte replacements, Lovenox, and antibx. TLSO brace left in place. Pt resting comfortably. Pt participating in plan of care. All questions answered. Bed locked and in lowest position. Call light within reach. Non skid socks worn. Family at bedside. Will continue to monitor.

## 2018-08-13 NOTE — ASSESSMENT & PLAN NOTE
Improving/resolved.  Stable, 8.8 on 8/13    -due to PHPT and MM  -PTH elevated as well  -Zometa 4 mg IV administered 8/7

## 2018-08-13 NOTE — ASSESSMENT & PLAN NOTE
62 year old female with acute onset on bilateral flank pain that began after lifting a heavy object. Imaging shows a T12 compression fracture along with multiple lytic lesions throughout the spine.     -Patient neurologically stable without s/s of myelopathy   -CT Thoracic/Upright and supine x-rays consistent with an unstable fracture  -OR tomorrow for T9-L3 posterior instrumented fusion with Dr. Andres. Patient still agreeable with surgical plan. All of her questions were answered.   -Signed consents in chart. Site marked. NPO at midnight.   -Keep in TLSO brace at all times. Continue bed rest.  -Anemia: H&H 9.1/29, was 9.2/28.3. Please continue to optimize H&H prior to surgery. Continue management per primary team.   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 Plans to discuss therapy once outpatient. Continue management per primary team.   -Hypercalcemia: Ca 7.8 today, was 7.9 Due to PHPT and MM. Zometa 4 mg IV administered 8/7.  Continue management per primary team.   -Schizophrenia: Continue quetiapine 50 mg qHS PRN. Continue management per Psych.   -Continue DVTP per primary team. Hold Lovenox in preparation for surgery.   -Please call with questions or change in neurologic status

## 2018-08-13 NOTE — ASSESSMENT & PLAN NOTE
Concern for fracture stability.  Plan for inpatient surgery 8/15   - Lovenox given bed rest  - Neurosug following, appreciate recs  - TLSO brace   - Pain somewhat controlled, continue diclofenac topical and oxy 5-10 mg q6hr prn

## 2018-08-13 NOTE — PROGRESS NOTES
" [facility]  Adult Nutrition  Progress Note    SUMMARY       Recommendations    Recommendation/Intervention:   1.Cont w/ Vegetarian diet.   2.If PO intake < 50% cont to provide Boost Plus.    3.Encourage healthy food choices.   4.RD to follow  Goals: nutrient intake >/= 85% EEN/EPN   Nutrition Goal Status: progressing towards goal  Communication of RD Recs: discussed on rounds    Reason for Assessment    Reason for Assessment: length of stay  Diagnosis: (Hypercalcemia )  Relevant Medical History: GLENIS, Hypercalcemia, GERD, HTN  Interdisciplinary Rounds: attended  General Information Comments: Pt w/ AMS over the weekend currently refusing most medications, PO intake poor per son. back sx scheduled for 8/15 w/ Possible d/c at the end of the week.   Nutrition Discharge Planning: Cont. vegetarian diet w/ ONS as needed s/p sx for healing    Nutrition Risk Screen    Nutrition Risk Screen: no indicators present    Nutrition/Diet History    Patient Reported Diet/Restrictions/Preferences: vegetarian  Food Preferences: vegetarian  Do you have any cultural, spiritual, Episcopal conflicts, given your current situation?: none  Food Allergies: shellfish  Factors Affecting Nutritional Intake: impaired cognitive status/motor control    Anthropometrics    Temp: 98.5 °F (36.9 °C)  Height Method: Stated  Height: 5' 4" (162.6 cm)  Height (inches): 64 in  Weight Method: (unable to stand)  Weight: 70.4 kg (155 lb 3.3 oz)  Weight (lb): 155.21 lb  Ideal Body Weight (IBW), Female: 120 lb  % Ideal Body Weight, Female (lb): 129.34 lb  BMI (Calculated): 26.7  BMI Grade: 25 - 29.9 - overweight  Weight Loss: unintentional(x 1 yr)  Usual Body Weight (UBW), k.45 kg  % Usual Body Weight: 93.5  % Weight Change From Usual Weight: -6.69 %       Lab/Procedures/Meds    Pertinent Labs Reviewed: reviewed  BUN-7, Ca-7.9, Alb-2.9, Phos-2.0, WBC-5.61, H/H-9.2/28.3, plts-251    Pertinent Medications Reviewed: reviewed  polyethylene " glycol       Physical Findings/Assessment    Overall Physical Appearance: overweight  Skin: intact    Estimated/Assessed Needs    Weight Used For Calorie Calculations: 70.4 kg (155 lb 3.3 oz)  Energy Calorie Requirements (kcal): 1760-2112kcal/d  Energy Need Method: Kcal/kg(25-30g/kg)  Protein Requirements: 85-99g/d(1.2-1.4g/kg)  Weight Used For Protein Calculations: 70.4 kg (155 lb 3.3 oz)     Fluid Need Method: RDA Method(1ml/kcal or Per MD)  RDA Method (mL): 1760  CHO Requirement: NA      Nutrition Prescription Ordered    Current Diet Order: Veg. Ovo Lacto  Oral Nutrition Supplement: Boost Plus     Evaluation of Received Nutrient/Fluid Intake    Oral Calories (kcal): 1080  Oral Protein (gm): 42  Oral Fluid (mL): 711  % Kcal Needs: 61  % Protein Needs: 49  I/O: -2.6 L since admit  Energy Calories Required: not meeting needs  Protein Required: not meeting needs  Fluid Required: not meeting needs  Comments: LBM: 8/11/18  % Intake of Estimated Energy Needs: 50 - 75 %  % Meal Intake: 50 - 75 %    Nutrition Risk    Level of Risk/Frequency of Follow-up: low     Assessment and Plan    Nutrition Problem  Increased nutrient needs    Related to (etiology):    multiple myeloma     Signs and Symptoms (as evidenced by):   6% wt loss x 1 yr    Interventions/Recommendations (treatment strategy):  1.Cont w/ Vegetarian diet.   2.If PO intake < 50% cont to provide Boost Plus.    3.Encourage healthy food choices.   4.RD to follow    Nutrition Diagnosis Status:   new         Monitor and Evaluation    Food and Nutrient Intake: energy intake, food and beverage intake  Food and Nutrient Adminstration: diet order  Physical Activity and Function: nutrition-related ADLs and IADLs  Anthropometric Measurements: weight, weight change  Biochemical Data, Medical Tests and Procedures: (All labs)  Nutrition-Focused Physical Findings: overall appearance     Nutrition Follow-Up    RD Follow-up?: Yes

## 2018-08-13 NOTE — SUBJECTIVE & OBJECTIVE
Interval History:   NAEON. Patient resting comfortably in bed with TLSO brace in place. Son at bedside. Reports continued improvement in back pain. Denies any UE or LE pain, paresthesias, or weakness. Denies any incontinence or retention.     Medications:  Continuous Infusions:  Scheduled Meds:   benztropine  1 mg Oral BID    diclofenac sodium  2 g Topical (Top) QID    enoxaparin  40 mg Subcutaneous Daily    famotidine  20 mg Oral BID    fluticasone  1 puff Inhalation BID    labetalol  300 mg Oral TID    moxifloxacin  400 mg Oral Daily    polyethylene glycol  17 g Oral Daily    potassium, sodium phosphates  1 packet Oral QID (WM & HS)    QUEtiapine  100 mg Oral QHS    senna-docusate 8.6-50 mg  1 tablet Oral BID     PRN Meds:albuterol-ipratropium, dextrose 50%, dextrose 50%, glucagon (human recombinant), glucose, glucose, hydrALAZINE, methyl salicylate-menthol 15-10%, ondansetron, ramelteon, sodium chloride 0.9%, traMADol     Review of Systems  Objective:     Weight: 70.4 kg (155 lb 3.3 oz)  Body mass index is 26.64 kg/m².  Vital Signs (Most Recent):  Temp: 99.2 °F (37.3 °C) (08/13/18 1504)  Pulse: 91 (08/13/18 1504)  Resp: 19 (08/13/18 1504)  BP: 136/85 (08/13/18 1504)  SpO2: 97 % (08/13/18 1504) Vital Signs (24h Range):  Temp:  [98.1 °F (36.7 °C)-99.2 °F (37.3 °C)] 99.2 °F (37.3 °C)  Pulse:  [86-93] 91  Resp:  [16-19] 19  SpO2:  [94 %-98 %] 97 %  BP: (125-150)/(76-92) 136/85     Date 08/13/18 0700 - 08/14/18 0659   Shift 5631-0130 9642-0030 5035-2591 24 Hour Total   INTAKE   P.O. 180   180   Shift Total(mL/kg) 180(2.6)   180(2.6)   OUTPUT   Shift Total(mL/kg)       Weight (kg) 70.4 70.4 70.4 70.4                   Female External Urinary Catheter 08/09/18 0000 (Active)   Skin no redness;no breakdown 8/12/2018  7:48 PM   Tolerance no signs/symptoms of discomfort 8/12/2018  7:48 PM   Suction Continuous suction at 70 mmHg 8/12/2018  7:48 PM   Date of last wick change 08/13/18 8/13/2018  4:00 AM   Time of  last wick change 0400 8/13/2018  4:00 AM   Output (mL) 300 mL 8/13/2018  4:00 AM       Neurosurgery Physical Exam   General: well developed, well nourished, no distress.   Head: normocephalic, atraumatic  Neurologic: Alert and oriented. Thought content appropriate.  GCS: Motor: 6/Verbal: 5/Eyes: 4 GCS Total: 15  Mental Status: Awake, Alert, Oriented x 4  Language: No aphasia  Speech: No dysarthria  Cranial nerves: face symmetric, tongue midline, CN II-XII grossly intact.   Eyes: pupils equal, round, reactive to light with accomodation, EOMI.   Pulmonary: normal respirations, no signs of respiratory distress  Abdomen: soft, non-distended, not tender to palpation  Sensory: intact to light touch throughout  Motor Strength:Moves all extremities spontaneously with good tone.  Full strength upper and lower extremities. No abnormal movements seen.   Washburn: absent  Clonus: absent  Skin: Skin is warm, dry and intact.          Significant Labs:  Recent Labs   Lab  08/12/18   0351  08/13/18   0456   GLU  97  90   NA  140  139   K  3.8  3.7   CL  109  108   CO2  20*  20*   BUN  7*  7*   CREATININE  0.9  0.7   CALCIUM  8.3*  7.9*     Recent Labs   Lab  08/12/18   0351  08/13/18   0456   WBC  4.78  5.61   HGB  9.0*  9.2*   HCT  27.5*  28.3*   PLT  240  251     No results for input(s): LABPT, INR, APTT in the last 48 hours.  Microbiology Results (last 7 days)     Procedure Component Value Units Date/Time    Urine culture [[id number]] Collected:  08/12/18 1206    Order Status:  No result Specimen:  Urine Updated:  08/13/18 1231    Urine culture [[id number]]     Order Status:  Completed Specimen:  Urine     Blood culture [[id number]] Collected:  08/08/18 1801    Order Status:  Completed Specimen:  Blood Updated:  08/12/18 2212     Blood Culture, Routine No Growth to date     Blood Culture, Routine No Growth to date     Blood Culture, Routine No Growth to date     Blood Culture, Routine No Growth to date     Blood Culture, Routine No  Growth to date    Narrative:       Collection has been rescheduled by FB at 8/8/2018 14:42 Reason: nurse   Leonora wants all labs at 1600  Collection has been rescheduled by JM2 at 8/8/2018 16:24 Reason:   Patient unavailable  Collection has been rescheduled by JM2 at 8/8/2018 16:25 Reason:   Patient unavailable  Collection has been rescheduled by JM2 at 8/8/2018 16:37 Reason:   Patient unavailable  Collection has been rescheduled by FB at 8/8/2018 14:42 Reason: nurse   Leonora clines all labs at 1600  Collection has been rescheduled by JM2 at 8/8/2018 16:24 Reason:   Patient unavailable  Collection has been rescheduled by JM2 at 8/8/2018 16:25 Reason:   Patient unavailable  Collection has been rescheduled by JM2 at 8/8/2018 16:37 Reason:   Patient unavailable    Blood culture [[id number]] Collected:  08/08/18 1800    Order Status:  Completed Specimen:  Blood Updated:  08/12/18 2212     Blood Culture, Routine No Growth to date     Blood Culture, Routine No Growth to date     Blood Culture, Routine No Growth to date     Blood Culture, Routine No Growth to date     Blood Culture, Routine No Growth to date    Narrative:       Collection has been rescheduled by FB at 8/8/2018 14:42 Reason: nurse   Leonora clines all labs at 1600  Collection has been rescheduled by JM2 at 8/8/2018 16:24 Reason:   Patient unavailable  Collection has been rescheduled by 2 at 8/8/2018 16:25 Reason:   Patient unavailable  Collection has been rescheduled by JM2 at 8/8/2018 16:37 Reason:   Patient unavailable  Collection has been rescheduled by FB at 8/8/2018 14:42 Reason: nurse   Leonora clines all labs at 1600  Collection has been rescheduled by JM2 at 8/8/2018 16:24 Reason:   Patient unavailable  Collection has been rescheduled by JM2 at 8/8/2018 16:25 Reason:   Patient unavailable  Collection has been rescheduled by JM2 at 8/8/2018 16:37 Reason:   Patient unavailable    Gram stain [[id number]] Collected:  08/08/18 1558    Order Status:  Completed  Specimen:  Urine Updated:  08/09/18 0229     Gram Stain Result Rare WBC's      Few Gram positive rods       Rare budding yeast

## 2018-08-13 NOTE — ASSESSMENT & PLAN NOTE
62 year old female with acute onset on bilateral flank pain that began after lifting a heavy object. Imaging shows a T12 compression fracture along with multiple lytic lesions throughout the spine.     -Patient neurologically stable without s/s of myelopathy   -CT Thoracic/Upright and supine x-rays consistent with an unstable fracture  -Plan for OR Wednesday for T9-L3 posterior instrumented fusion with Dr. Andres. Patient and son still agreeable with surgical plan.   -Keep in TLSO brace at all times.    -Continue bed rest   -Continue DVTP per primary team. Will need to hold Lovenox after tomorrow morning's dose in preparation for surgery.   -Please call with questions or change in neurologic status

## 2018-08-13 NOTE — SUBJECTIVE & OBJECTIVE
"Interval History: Patient feels well this am. Slept well last night despite not using quetiapine.  Patient's son is at bedside and notes that the initial dose made patient "weak" and this was why they refused the dose last night.  He feels that she just did not sleep well and became delirious from lack of sleep associated with the pain.  He notes that she has been catching up lately and denies pain which has been helping her.  She is somnolent this am but fully oriented on questions.  They both note that she has not been on risperidone or benztropine for months and do not see the need for it currently.  Declined to discuss previous MDD with psychotic feature diagnosis.  Patient to have fusion procedure Wednesday am.      Family History     Problem Relation (Age of Onset)    Breast cancer Mother    COPD Father    Diabetes Brother    Glaucoma Mother, Sister    Hypertension Mother, Father    Liver cancer Paternal Uncle (75)    Macular degeneration Mother        Tobacco Use    Smoking status: Never Smoker    Smokeless tobacco: Never Used   Substance and Sexual Activity    Alcohol use: No     Alcohol/week: 0.0 oz    Drug use: No    Sexual activity: Not Currently     Partners: Male     Birth control/protection: Post-menopausal     Psychotherapeutics (From admission, onward)    Start     Stop Route Frequency Ordered    08/12/18 2100  QUEtiapine tablet 100 mg      -- Oral Nightly 08/12/18 1553    08/11/18 0112  ramelteon tablet 8 mg      -- Oral Nightly PRN 08/11/18 0112        Review of Systems   Constitutional: Positive for fatigue. Negative for chills and fever.   Eyes: Negative for redness and visual disturbance.   Respiratory: Negative for cough and shortness of breath.    Cardiovascular: Negative for chest pain and palpitations.   Gastrointestinal: Negative for abdominal distention and abdominal pain.   Musculoskeletal: Negative for arthralgias and back pain (Recent back/neck pain--pt denies currently).   Skin: " "Negative for rash and wound.   Neurological: Negative for weakness and numbness.   Hematological: Negative for adenopathy. Does not bruise/bleed easily.   Psychiatric/Behavioral: Positive for decreased concentration. Negative for agitation, confusion, dysphoric mood, hallucinations, self-injury and suicidal ideas. The patient is not nervous/anxious.      Objective:     Vital Signs (Most Recent):  Temp: 98.3 °F (36.8 °C) (08/13/18 1137)  Pulse: 86 (08/13/18 1137)  Resp: 19 (08/13/18 1137)  BP: 125/85 (08/13/18 1137)  SpO2: 98 % (08/13/18 1137) Vital Signs (24h Range):  Temp:  [97.3 °F (36.3 °C)-98.8 °F (37.1 °C)] 98.3 °F (36.8 °C)  Pulse:  [86-97] 86  Resp:  [16-19] 19  SpO2:  [94 %-98 %] 98 %  BP: (125-150)/(76-92) 125/85     Height: 5' 4" (162.6 cm)  Weight: 70.4 kg (155 lb 3.3 oz)  Body mass index is 26.64 kg/m².      Intake/Output Summary (Last 24 hours) at 8/13/2018 1147  Last data filed at 8/13/2018 1100  Gross per 24 hour   Intake 240 ml   Output 850 ml   Net -610 ml     Physical Exam   Constitutional: She is oriented to person, place, and time. She appears well-developed and well-nourished. No distress.   HENT:   Head: Normocephalic and atraumatic.   Mouth/Throat: No oropharyngeal exudate.   Eyes: Conjunctivae and EOM are normal. Pupils are equal, round, and reactive to light.   Neck: Normal range of motion. Neck supple. No thyromegaly present.   Pulmonary/Chest: Effort normal. No respiratory distress.   Abdominal: Soft. She exhibits no distension.   Musculoskeletal: Normal range of motion. She exhibits no edema or deformity.   Neurological: She is oriented to person, place, and time. No cranial nerve deficit or sensory deficit. She exhibits normal muscle tone.   Somnolent but wakes/opens eyes to voice consistently and fully oriented, answering questions/following commands appropriately   Skin: Skin is warm and dry. No rash noted. No erythema.   Psychiatric: She has a normal mood and affect. Her speech is " normal and behavior is normal. Judgment and thought content normal. She is not agitated, not aggressive, not hyperactive, not slowed, not withdrawn and not actively hallucinating. Thought content is not paranoid and not delusional. Cognition and memory are normal. She expresses no homicidal and no suicidal ideation.   Pt somnolent but fully oriented.  Pleasant and cooperative with exam.  Speech appropriate.  Son in room assists with some collateral hx, does not want to discuss previous psych issues currently given pt's calm/stable state this am.  Both reasonably requesting to have PCP informed of hospitalization.   She is attentive.     NEUROLOGICAL EXAMINATION:     MENTAL STATUS   Oriented to person, place, and time.   Speech: speech is normal     CRANIAL NERVES     CN III, IV, VI   Pupils are equal, round, and reactive to light.  Extraocular motions are normal.     Significant Labs:   CBC:   Recent Labs   Lab  18   03518   0456   WBC  4.78  5.61   HGB  9.0*  9.2*   HCT  27.5*  28.3*   PLT  240  251     CMP:   Recent Labs   Lab  18   0456   NA  140  139   K  3.8  3.7   CL  109  108   CO2  20*  20*   GLU  97  90   BUN  7*  7*   CREATININE  0.9  0.7   CALCIUM  8.3*  7.9*   ALBUMIN  2.8*  2.9*   ANIONGAP  11  11   EGFRNONAA  >60.0  >60.0     Significant Imagin18 CT T spine w/o contrast:  1. Redemonstration of pathologic T12 compression fracture with retropulsion of posterior fracture fragments resulting in mild spinal canal stenosis.  No additional pathologic fracture identified.  2. Innumerable lytic lesions throughout the visualized thoracolumbar spine.  Findings appear similar to those seen on most recent MRI examination and are concerning for underlying neoplastic process such as possible multiple myeloma.  3. Small bilateral pleural effusions with adjacent bibasilar atelectatic change.  4. Moderate-sized  hiatal hernia, nonobstructing left renal calculus.

## 2018-08-13 NOTE — ASSESSMENT & PLAN NOTE
Patient displaying signs of acute delirium likely attributed to hx of schizophrenia aggravated by lack of sleep and hospitalization,  Do not suspect infectious sources at this time  - Will obtain UA and continue moxi  - Psych consulted regarding mediation recs given poor adherence to previous regimen.    - Seroquel 100 QHS

## 2018-08-14 ENCOUNTER — TELEPHONE (OUTPATIENT)
Dept: PHARMACY | Facility: CLINIC | Age: 63
End: 2018-08-14

## 2018-08-14 LAB
ABO + RH BLD: NORMAL
ALBUMIN SERPL BCP-MCNC: 2.7 G/DL
ANION GAP SERPL CALC-SCNC: 9 MMOL/L
BACTERIA UR CULT: NO GROWTH
BASOPHILS # BLD AUTO: 0.02 K/UL
BASOPHILS NFR BLD: 0.4 %
BLD GP AB SCN CELLS X3 SERPL QL: NORMAL
BUN SERPL-MCNC: 8 MG/DL
CALCIUM SERPL-MCNC: 7.8 MG/DL
CHLORIDE SERPL-SCNC: 108 MMOL/L
CO2 SERPL-SCNC: 21 MMOL/L
CREAT SERPL-MCNC: 0.7 MG/DL
DIFFERENTIAL METHOD: ABNORMAL
EOSINOPHIL # BLD AUTO: 0.1 K/UL
EOSINOPHIL NFR BLD: 1.8 %
ERYTHROCYTE [DISTWIDTH] IN BLOOD BY AUTOMATED COUNT: 14.4 %
EST. GFR  (AFRICAN AMERICAN): >60 ML/MIN/1.73 M^2
EST. GFR  (NON AFRICAN AMERICAN): >60 ML/MIN/1.73 M^2
GLUCOSE SERPL-MCNC: 107 MG/DL
HCT VFR BLD AUTO: 29 %
HGB BLD-MCNC: 9.1 G/DL
IMM GRANULOCYTES # BLD AUTO: 0.2 K/UL
IMM GRANULOCYTES NFR BLD AUTO: 3.5 %
LYMPHOCYTES # BLD AUTO: 0.8 K/UL
LYMPHOCYTES NFR BLD: 14.6 %
MCH RBC QN AUTO: 27.6 PG
MCHC RBC AUTO-ENTMCNC: 31.4 G/DL
MCV RBC AUTO: 88 FL
MONOCYTES # BLD AUTO: 0.6 K/UL
MONOCYTES NFR BLD: 10.5 %
NEUTROPHILS # BLD AUTO: 3.9 K/UL
NEUTROPHILS NFR BLD: 69.2 %
NRBC BLD-RTO: 0 /100 WBC
PHOSPHATE SERPL-MCNC: 2 MG/DL
PLATELET # BLD AUTO: 279 K/UL
PMV BLD AUTO: 9 FL
POTASSIUM SERPL-SCNC: 4 MMOL/L
RBC # BLD AUTO: 3.3 M/UL
SODIUM SERPL-SCNC: 138 MMOL/L
WBC # BLD AUTO: 5.69 K/UL

## 2018-08-14 PROCEDURE — 80069 RENAL FUNCTION PANEL: CPT

## 2018-08-14 PROCEDURE — 36415 COLL VENOUS BLD VENIPUNCTURE: CPT

## 2018-08-14 PROCEDURE — 86850 RBC ANTIBODY SCREEN: CPT

## 2018-08-14 PROCEDURE — 20600001 HC STEP DOWN PRIVATE ROOM

## 2018-08-14 PROCEDURE — 25000003 PHARM REV CODE 250: Performed by: STUDENT IN AN ORGANIZED HEALTH CARE EDUCATION/TRAINING PROGRAM

## 2018-08-14 PROCEDURE — 63600175 PHARM REV CODE 636 W HCPCS: Performed by: STUDENT IN AN ORGANIZED HEALTH CARE EDUCATION/TRAINING PROGRAM

## 2018-08-14 PROCEDURE — 86920 COMPATIBILITY TEST SPIN: CPT

## 2018-08-14 PROCEDURE — 99232 SBSQ HOSP IP/OBS MODERATE 35: CPT | Mod: ,,, | Performed by: PHYSICIAN ASSISTANT

## 2018-08-14 PROCEDURE — 99233 SBSQ HOSP IP/OBS HIGH 50: CPT | Mod: ,,, | Performed by: INTERNAL MEDICINE

## 2018-08-14 PROCEDURE — 85025 COMPLETE CBC W/AUTO DIFF WBC: CPT

## 2018-08-14 RX ORDER — ENOXAPARIN SODIUM 100 MG/ML
40 INJECTION SUBCUTANEOUS EVERY 24 HOURS
Status: COMPLETED | OUTPATIENT
Start: 2018-08-14 | End: 2018-08-14

## 2018-08-14 RX ORDER — MOXIFLOXACIN HYDROCHLORIDE 400 MG/1
400 TABLET ORAL DAILY
Status: DISCONTINUED | OUTPATIENT
Start: 2018-08-14 | End: 2018-08-15

## 2018-08-14 RX ORDER — CEFAZOLIN SODIUM 1 G/3ML
2 INJECTION, POWDER, FOR SOLUTION INTRAMUSCULAR; INTRAVENOUS
Status: DISCONTINUED | OUTPATIENT
Start: 2018-08-15 | End: 2018-08-15 | Stop reason: HOSPADM

## 2018-08-14 RX ORDER — SODIUM CHLORIDE 9 MG/ML
INJECTION, SOLUTION INTRAVENOUS CONTINUOUS
Status: DISCONTINUED | OUTPATIENT
Start: 2018-08-15 | End: 2018-08-16

## 2018-08-14 RX ADMIN — LABETALOL HCL 300 MG: 100 TABLET, FILM COATED ORAL at 04:08

## 2018-08-14 RX ADMIN — POLYETHYLENE GLYCOL 3350 17 G: 17 POWDER, FOR SOLUTION ORAL at 09:08

## 2018-08-14 RX ADMIN — FAMOTIDINE 20 MG: 20 TABLET ORAL at 09:08

## 2018-08-14 RX ADMIN — FLUTICASONE PROPIONATE 1 PUFF: 44 AEROSOL, METERED RESPIRATORY (INHALATION) at 08:08

## 2018-08-14 RX ADMIN — POTASSIUM & SODIUM PHOSPHATES POWDER PACK 280-160-250 MG 1 PACKET: 280-160-250 PACK at 12:08

## 2018-08-14 RX ADMIN — SODIUM CHLORIDE: 0.9 INJECTION, SOLUTION INTRAVENOUS at 11:08

## 2018-08-14 RX ADMIN — DICLOFENAC 2 G: 10 GEL TOPICAL at 04:08

## 2018-08-14 RX ADMIN — SENNOSIDES AND DOCUSATE SODIUM 1 TABLET: 8.6; 5 TABLET ORAL at 09:08

## 2018-08-14 RX ADMIN — MOXIFLOXACIN HYDROCHLORIDE 400 MG: 400 TABLET, FILM COATED ORAL at 09:08

## 2018-08-14 RX ADMIN — FLUTICASONE PROPIONATE 1 PUFF: 44 AEROSOL, METERED RESPIRATORY (INHALATION) at 09:08

## 2018-08-14 RX ADMIN — RAMELTEON 8 MG: 8 TABLET, FILM COATED ORAL at 02:08

## 2018-08-14 RX ADMIN — POTASSIUM & SODIUM PHOSPHATES POWDER PACK 280-160-250 MG 1 PACKET: 280-160-250 PACK at 08:08

## 2018-08-14 RX ADMIN — ENOXAPARIN SODIUM 40 MG: 100 INJECTION SUBCUTANEOUS at 09:08

## 2018-08-14 RX ADMIN — DICLOFENAC 2 G: 10 GEL TOPICAL at 08:08

## 2018-08-14 RX ADMIN — LABETALOL HCL 300 MG: 100 TABLET, FILM COATED ORAL at 08:08

## 2018-08-14 RX ADMIN — LABETALOL HCL 300 MG: 100 TABLET, FILM COATED ORAL at 09:08

## 2018-08-14 NOTE — ASSESSMENT & PLAN NOTE
Improving/resolved.  Stable, 8.8 on 8/14  -due to PHPT and MM  -PTH elevated as well  -Zometa 4 mg IV administered 8/7

## 2018-08-14 NOTE — PHYSICIAN QUERY
PT Name: Janie Zamorano  MR #: [id number]     Physician Query Form - Etiology of Condition Clarification      Silvia Rico RN CDS    Contact Information: [phone]    This form is a permanent document in the medical record.     Query Date: August 14, 2018    By submitting this query, we are merely seeking further clarification of documentation.  Please utilize your independent clinical judgment when addressing the question(s) below.     The medical record contains the following:    Findings Supporting Clinical Information Location in Medical Record   Secondary Hypertension Patient is a 62-year-old female with a history of hypertension, schizophrenia, asthma, GERD is presenting to the ER for multiple complaints.     62 y.o. woman admitted with pathologic T-12 fracture, anemia, hypercalcemia, and acute kidney injury. Multiple lytic bone lesions on imaging. High suspicion for multiple myeloma  Other proteinuria    Other Secondary Hypertension    Hypertension - Home meds re-started except the valsartan given patient having GLENIS   - Monitor BP     Other secondary hypertension - Home meds re-started except the valsartan given patient having GLENIS   - labetalol dose increased to 200 mg TID   - continue to monitor BP   Hydralazine tablet 25 mg every 8 hours as needed  Labetalol tablet 300  mg oral 3 times daily      Home medication:  valsartan (DIOVAN) 160 MG tablet Take 1 tablet (160 mg total) by mouth once daily.   Labetalol 100 mg oral 1 tablet Daily 8/3 ED Prov note          8/3 H&P                              8/5 Hospital Medicine   PN          8/7 MAR    8/13-8/14 MAR      8/3 H&P     Please document your best medical opinion regarding the etiology of __Secondary _Hypertension_______ for which the primary focus of treatment is/was directed.    Please specify the underlying cause of the secondary hypertension         [  ] Secondary Hypertension due to (Specify)_______________________________    [  x] Primary  (Essential ) Hypertension only    [  ] No Secondary Hypertension            [    ]  Clinically Undetermined    Please document in your progress notes daily for the duration of treatment, until resolved, and include in your discharge summary.

## 2018-08-14 NOTE — PLAN OF CARE
MDR's with Dr Jameson.  Patient's hypercalcemia has resolved.  Patient continues to wear her TLSO brace and on bedrest for her compression fx.  T9-L3 fusion planned for tomorrow with neuro sx.  Dispo pending surgery and PT/OT post op rec's.  Tx for MM still planned for after d/c.  Son at bedside for support.  Will continue to follow for d/c needs.

## 2018-08-14 NOTE — ASSESSMENT & PLAN NOTE
Resolved.   - Psych consulted regarding mediation recs given poor adherence to previous regimen.    - Seroquel 50 PRN

## 2018-08-14 NOTE — PROGRESS NOTES
[facility]  Hematology  Bone Marrow Transplant  Progress Note    Patient Name: Janie Zamorano  Admission Date: 8/3/2018  Hospital Length of Stay: 11 days  Code Status: Full Code    Subjective:     Interval History: Slept well overnight.  Psych changed meds to PRN and feels patient is stable.  Otherwise patient reporrts she is doing well.  Son at bedside and happy with care.  She denies chest pain, abdominal pain, change in urination or change in BM.         Objective:     Vital Signs (Most Recent):  Temp: 98.8 °F (37.1 °C) (08/14/18 0453)  Pulse: 90 (08/14/18 0453)  Resp: 18 (08/14/18 0453)  BP: 132/81 (08/14/18 0453)  SpO2: 97 % (08/14/18 0453) Vital Signs (24h Range):  Temp:  [98.3 °F (36.8 °C)-99.2 °F (37.3 °C)] 98.8 °F (37.1 °C)  Pulse:  [86-91] 90  Resp:  [16-19] 18  SpO2:  [94 %-98 %] 97 %  BP: (108-136)/(53-85) 132/81     Weight: 70.4 kg (155 lb 3.3 oz)  Body mass index is 26.64 kg/m².  Body surface area is 1.78 meters squared.    ECOG SCORE         [unfilled]    Intake/Output - Last 3 Shifts     ** Patient Encounter Information Not Found **          Physical Exam   Constitutional: She is oriented to person, place, and time. She appears well-developed and well-nourished. No distress.   HENT:   Head: Normocephalic.   Eyes: Conjunctivae and EOM are normal. Pupils are equal, round, and reactive to light.   Neck: Normal range of motion. Neck supple.   Cardiovascular: Regular rhythm and normal heart sounds.   tachycardic   Pulmonary/Chest: Effort normal and breath sounds normal. No respiratory distress.   Abdominal: Soft. Bowel sounds are normal. She exhibits no distension.   Musculoskeletal: Normal range of motion. She exhibits no edema.   Neurological: She is alert and oriented to person, place, and time. No cranial nerve deficit.   Skin: Skin is warm and dry. She is not diaphoretic.   Psychiatric: She is not aggressive. Thought content is not paranoid. She exhibits abnormal recent  memory.       Significant Labs:   All pertinent labs from the last 24 hours have been reviewed.    Diagnostic Results:  I have reviewed all pertinent imaging results/findings within the past 24 hours.    Assessment/Plan:     * Hypercalcemia    Improving/resolved.  Stable, 8.8 on 8/14  -due to PHPT and MM  -PTH elevated as well  -Zometa 4 mg IV administered 8/7            Closed compression fracture of thoracic vertebra    Concern for fracture stability.  Plan for inpatient surgery 8/15   - Lovenox given bed rest  - Neurosug following, appreciate recs  - TLSO brace   - Pain somewhat controlled, continue diclofenac topical and oxy 5-10 mg q6hr prn        Multiple myeloma not having achieved remission    Plan to discuss therapy once outpatient.    -hypercalcemia, anemia, bone fx and lytic lesions. Likely 2/2 to multiple myeloma.  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   -24 hr urine protein=5940  -immunofixation-A free kappa light chain is present in beta.          Paranoid schizophrenia    - Will trial Seroquel PRN and follow up with psych recs.    - Outpatient follow up         Delirium    Resolved.   - Psych consulted regarding mediation recs given poor adherence to previous regimen.    - Seroquel 50 PRN         GERD (gastroesophageal reflux disease)    Pepcid BID          Constipation    Resolved, continue bowel regimen.   BM w/ multiple BM recorded on 8/5 although CT showed moderate amount of retained stool in the ascending and transverse:, suggestive of constipation          Anemia    Hgb dropping, likely dilutional.    - normocytic anemia; likely 2/2 to underlying malignancy  - transfuse for Hgb < 7 and plt < 10K   - cbc with diff daily while inpatient        Lytic bone lesions on xray              Asthma    -Fluticasone 44mcg/Actuation 1 puffs BID  -No signs of symptoms of exacerbation        Other secondary hypertension    - Home meds re-started except the valsartan given patient was having GLENIS  - labetalol dose increased to 300 mg TID  - continue restarting valsartan now than GLENIS has resolved.  - continue to monitor BP             VTE Risk Mitigation (From admission, onward)        Ordered     enoxaparin injection 40 mg  Daily      08/14/18 0817     IP VTE HIGH RISK PATIENT  Once      08/03/18 2320     Place MOUNIKA hose  Until discontinued      08/03/18 2320     Place sequential compression device  Until discontinued      08/03/18 1735          Disposition: Surgery tomorrow.     Ruslan Small MD  Bone Marrow Transplant  [facility]

## 2018-08-14 NOTE — PLAN OF CARE
Problem: Fall Risk (Adult)  Goal: Identify Related Risk Factors and Signs and Symptoms  Related risk factors and signs and symptoms are identified upon initiation of Human Response Clinical Practice Guideline (CPG)   Outcome: Ongoing (interventions implemented as appropriate)  Plan of  Care reviewed with patient and family.  Fall precautions maintained,side rails up x 2 , call light in reach, bed in low position and locked.  Son at the bedside.  Npo after midnight for surgery tomorrow.  Encouraged to eat and drink.   Wearing her tlso brace.  Will continue to monitor.

## 2018-08-14 NOTE — TELEPHONE ENCOUNTER
DOCUMENTATION ONLY:  Prior authorization for Revlimid approved from 08/13/2018 to 12/31/2018.    Case ID# [id number]    Co-pay: $7.79      Limited Distribution [facility]:   [facility]   T: [phone]   F: [phone]

## 2018-08-14 NOTE — PROGRESS NOTES
Pt and Pt's son refusing all scheduled night time medications at this time. Education provided on purpose of medications and side effects.

## 2018-08-14 NOTE — SUBJECTIVE & OBJECTIVE
Interval History:   NAEON. Patient resting comfortably in bed. TLSO brace in place. Denies any new symptoms. Son not at bedside currently.     Medications:  Continuous Infusions:  Scheduled Meds:   benztropine  1 mg Oral BID    diclofenac sodium  2 g Topical (Top) QID    famotidine  20 mg Oral BID    fluticasone  1 puff Inhalation BID    labetalol  300 mg Oral TID    moxifloxacin  400 mg Oral Daily    polyethylene glycol  17 g Oral Daily    potassium, sodium phosphates  1 packet Oral QID (WM & HS)    senna-docusate 8.6-50 mg  1 tablet Oral BID     PRN Meds:albuterol-ipratropium, dextrose 50%, dextrose 50%, glucagon (human recombinant), glucose, glucose, hydrALAZINE, methyl salicylate-menthol 15-10%, ondansetron, QUEtiapine, ramelteon, sodium chloride 0.9%, traMADol     Review of Systems  Objective:     Weight: 70.4 kg (155 lb 3.3 oz)  Body mass index is 26.64 kg/m².  Vital Signs (Most Recent):  Temp: 98.4 °F (36.9 °C) (08/14/18 1652)  Pulse: 85 (08/14/18 1652)  Resp: 20 (08/14/18 1652)  BP: 137/83 (08/14/18 1652)  SpO2: (!) 93 % (08/14/18 1652) Vital Signs (24h Range):  Temp:  [98.1 °F (36.7 °C)-98.8 °F (37.1 °C)] 98.4 °F (36.9 °C)  Pulse:  [83-91] 85  Resp:  [16-20] 20  SpO2:  [92 %-97 %] 93 %  BP: (108-140)/(53-88) 137/83     Date 08/14/18 0700 - 08/15/18 0659   Shift 5045-9063 0330-8227 1958-6542 24 Hour Total   INTAKE   P.O. 460 100  560   Shift Total(mL/kg) 460(6.5) 100(1.4)  560(8)   OUTPUT   Urine(mL/kg/hr) 250(0.4) 100  350   Shift Total(mL/kg) 250(3.6) 100(1.4)  350(5)   Weight (kg) 70.4 70.4 70.4 70.4                   Female External Urinary Catheter 08/09/18 0000 (Active)   Skin no redness;no breakdown 8/12/2018  7:48 PM   Tolerance no signs/symptoms of discomfort 8/12/2018  7:48 PM   Suction Continuous suction at 70 mmHg 8/12/2018  7:48 PM   Date of last wick change 08/13/18 8/13/2018  4:00 AM   Time of last wick change 0400 8/13/2018  4:00 AM   Output (mL) 100 mL 8/14/2018  4:00 PM        Neurosurgery Physical Exam   General: well developed, well nourished, no distress.   Head: normocephalic, atraumatic  Neurologic: Alert and oriented. Thought content appropriate.  GCS: Motor: 6/Verbal: 5/Eyes: 4 GCS Total: 15  Mental Status: Awake, Alert, Oriented x 4  Language: No aphasia  Speech: No dysarthria  Cranial nerves: face symmetric, tongue midline, CN II-XII grossly intact.   Eyes: pupils equal, round, reactive to light with accomodation, EOMI.   Pulmonary: normal respirations, no signs of respiratory distress  Abdomen: soft, non-distended, not tender to palpation  Sensory: intact to light touch throughout  Motor Strength:Moves all extremities spontaneously with good tone.  Full strength upper and lower extremities. No abnormal movements seen.   Washburn: absent  Clonus: absent  Skin: Skin is warm, dry and intact.          Significant Labs:  Recent Labs   Lab  08/13/18 0456 08/14/18   0459   GLU  90  107   NA  139  138   K  3.7  4.0   CL  108  108   CO2  20*  21*   BUN  7*  8   CREATININE  0.7  0.7   CALCIUM  7.9*  7.8*     Recent Labs   Lab  08/13/18   0456  08/14/18   0459   WBC  5.61  5.69   HGB  9.2*  9.1*   HCT  28.3*  29.0*   PLT  251  279     No results for input(s): LABPT, INR, APTT in the last 48 hours.  Microbiology Results (last 7 days)     Procedure Component Value Units Date/Time    Urine culture [[id number]] Collected:  08/12/18 1206    Order Status:  Completed Specimen:  Urine Updated:  08/14/18 1324     Urine Culture, Routine No growth    Narrative:       add on Urine culture order #[id number] per Dr. Ruslan Small @ 12:30    08/13/2018     Blood culture [[id number]] Collected:  08/08/18 1800    Order Status:  Completed Specimen:  Blood Updated:  08/13/18 2212     Blood Culture, Routine No growth after 5 days.    Narrative:       Collection has been rescheduled by FB at 8/8/2018 14:42 Reason: nurse   Leonora wants all labs at 1600  Collection has been rescheduled by JM2 at 8/8/2018  16:24 Reason:   Patient unavailable  Collection has been rescheduled by JM2 at 8/8/2018 16:25 Reason:   Patient unavailable  Collection has been rescheduled by JM2 at 8/8/2018 16:37 Reason:   Patient unavailable  Collection has been rescheduled by FB at 8/8/2018 14:42 Reason: nurse   Leonora wants all labs at 1600  Collection has been rescheduled by JM2 at 8/8/2018 16:24 Reason:   Patient unavailable  Collection has been rescheduled by JM2 at 8/8/2018 16:25 Reason:   Patient unavailable  Collection has been rescheduled by JM2 at 8/8/2018 16:37 Reason:   Patient unavailable    Blood culture [[id number]] Collected:  08/08/18 1801    Order Status:  Completed Specimen:  Blood Updated:  08/13/18 2212     Blood Culture, Routine No growth after 5 days.    Narrative:       Collection has been rescheduled by FB at 8/8/2018 14:42 Reason: nurse   Leonora wants all labs at 1600  Collection has been rescheduled by JM2 at 8/8/2018 16:24 Reason:   Patient unavailable  Collection has been rescheduled by JM2 at 8/8/2018 16:25 Reason:   Patient unavailable  Collection has been rescheduled by JM2 at 8/8/2018 16:37 Reason:   Patient unavailable  Collection has been rescheduled by FB at 8/8/2018 14:42 Reason: nurse   Leonora wants all labs at 1600  Collection has been rescheduled by JM2 at 8/8/2018 16:24 Reason:   Patient unavailable  Collection has been rescheduled by JM2 at 8/8/2018 16:25 Reason:   Patient unavailable  Collection has been rescheduled by JM2 at 8/8/2018 16:37 Reason:   Patient unavailable    Urine culture [[id number]]     Order Status:  Completed Specimen:  Urine     Gram stain [[id number]] Collected:  08/08/18 1558    Order Status:  Completed Specimen:  Urine Updated:  08/09/18 0229     Gram Stain Result Rare WBC's      Few Gram positive rods       Rare budding yeast

## 2018-08-14 NOTE — PROGRESS NOTES
[facility]  Neurosurgery  Progress Note    Subjective:     History of Present Illness: Ms. Zamorano is a 62 year old female with no PMHx of cancer, chronic back pain, or osteoporosis, who presents to the ED with onset of bilateral flank pain that began 1 week ago while lifting a heavy object from the ground. Patient denies any popping or pulling sensation. Denies any thoracic or lumbar back pain. Denies any UE or LE pain, paresthesias, or weakness. Denies any bladder or bowel incontinence. CT renal was performed and showed a T12 compression fracture. Neurosurgery was consulted for treatment recommendations.     Post-Op Info:  Procedure(s) (LRB):  THORACIC FUSION - PEDICLE SCREWS - T9 - L3 AIRO (N/A)         Interval History:   NAEON. Patient resting comfortably in bed. TLSO brace in place. Denies any new symptoms. Son not at bedside currently.     Medications:  Continuous Infusions:  Scheduled Meds:   benztropine  1 mg Oral BID    diclofenac sodium  2 g Topical (Top) QID    famotidine  20 mg Oral BID    fluticasone  1 puff Inhalation BID    labetalol  300 mg Oral TID    moxifloxacin  400 mg Oral Daily    polyethylene glycol  17 g Oral Daily    potassium, sodium phosphates  1 packet Oral QID (WM & HS)    senna-docusate 8.6-50 mg  1 tablet Oral BID     PRN Meds:albuterol-ipratropium, dextrose 50%, dextrose 50%, glucagon (human recombinant), glucose, glucose, hydrALAZINE, methyl salicylate-menthol 15-10%, ondansetron, QUEtiapine, ramelteon, sodium chloride 0.9%, traMADol     Review of Systems  Objective:     Weight: 70.4 kg (155 lb 3.3 oz)  Body mass index is 26.64 kg/m².  Vital Signs (Most Recent):  Temp: 98.4 °F (36.9 °C) (08/14/18 1652)  Pulse: 85 (08/14/18 1652)  Resp: 20 (08/14/18 1652)  BP: 137/83 (08/14/18 1652)  SpO2: (!) 93 % (08/14/18 1652) Vital Signs (24h Range):  Temp:  [98.1 °F (36.7 °C)-98.8 °F (37.1 °C)] 98.4 °F (36.9 °C)  Pulse:  [83-91] 85  Resp:  [16-20] 20  SpO2:  [92 %-97  %] 93 %  BP: (108-140)/(53-88) 137/83     Date 08/14/18 0700 - 08/15/18 0659   Shift 1920-7403 6088-3217 0484-6248 24 Hour Total   INTAKE   P.O. 460 100  560   Shift Total(mL/kg) 460(6.5) 100(1.4)  560(8)   OUTPUT   Urine(mL/kg/hr) 250(0.4) 100  350   Shift Total(mL/kg) 250(3.6) 100(1.4)  350(5)   Weight (kg) 70.4 70.4 70.4 70.4                   Female External Urinary Catheter 08/09/18 0000 (Active)   Skin no redness;no breakdown 8/12/2018  7:48 PM   Tolerance no signs/symptoms of discomfort 8/12/2018  7:48 PM   Suction Continuous suction at 70 mmHg 8/12/2018  7:48 PM   Date of last wick change 08/13/18 8/13/2018  4:00 AM   Time of last wick change 0400 8/13/2018  4:00 AM   Output (mL) 100 mL 8/14/2018  4:00 PM       Neurosurgery Physical Exam   General: well developed, well nourished, no distress.   Head: normocephalic, atraumatic  Neurologic: Alert and oriented. Thought content appropriate.  GCS: Motor: 6/Verbal: 5/Eyes: 4 GCS Total: 15  Mental Status: Awake, Alert, Oriented x 4  Language: No aphasia  Speech: No dysarthria  Cranial nerves: face symmetric, tongue midline, CN II-XII grossly intact.   Eyes: pupils equal, round, reactive to light with accomodation, EOMI.   Pulmonary: normal respirations, no signs of respiratory distress  Abdomen: soft, non-distended, not tender to palpation  Sensory: intact to light touch throughout  Motor Strength:Moves all extremities spontaneously with good tone.  Full strength upper and lower extremities. No abnormal movements seen.   Washburn: absent  Clonus: absent  Skin: Skin is warm, dry and intact.          Significant Labs:  Recent Labs   Lab  08/13/18 0456 08/14/18 0459   GLU  90  107   NA  139  138   K  3.7  4.0   CL  108  108   CO2  20*  21*   BUN  7*  8   CREATININE  0.7  0.7   CALCIUM  7.9*  7.8*     Recent Labs   Lab  08/13/18 0456 08/14/18   0459   WBC  5.61  5.69   HGB  9.2*  9.1*   HCT  28.3*  29.0*   PLT  251  279     No results for input(s): LABPT, INR,  APTT in the last 48 hours.  Microbiology Results (last 7 days)     Procedure Component Value Units Date/Time    Urine culture [[id number]] Collected:  08/12/18 1206    Order Status:  Completed Specimen:  Urine Updated:  08/14/18 1324     Urine Culture, Routine No growth    Narrative:       add on Urine culture order #[id number] per Dr. Ruslan Small @ 12:30    08/13/2018     Blood culture [[id number]] Collected:  08/08/18 1800    Order Status:  Completed Specimen:  Blood Updated:  08/13/18 2212     Blood Culture, Routine No growth after 5 days.    Narrative:       Collection has been rescheduled by FB at 8/8/2018 14:42 Reason: nurse Leonora jain all labs at 1600  Collection has been rescheduled by 2 at 8/8/2018 16:24 Reason:   Patient unavailable  Collection has been rescheduled by 2 at 8/8/2018 16:25 Reason:   Patient unavailable  Collection has been rescheduled by 2 at 8/8/2018 16:37 Reason:   Patient unavailable  Collection has been rescheduled by FB at 8/8/2018 14:42 Reason: nurse Leonora jain all labs at 1600  Collection has been rescheduled by 2 at 8/8/2018 16:24 Reason:   Patient unavailable  Collection has been rescheduled by 2 at 8/8/2018 16:25 Reason:   Patient unavailable  Collection has been rescheduled by 2 at 8/8/2018 16:37 Reason:   Patient unavailable    Blood culture [[id number]] Collected:  08/08/18 1801    Order Status:  Completed Specimen:  Blood Updated:  08/13/18 2212     Blood Culture, Routine No growth after 5 days.    Narrative:       Collection has been rescheduled by FB at 8/8/2018 14:42 Reason: nurse Leonora jain all labs at 1600  Collection has been rescheduled by JM2 at 8/8/2018 16:24 Reason:   Patient unavailable  Collection has been rescheduled by JM2 at 8/8/2018 16:25 Reason:   Patient unavailable  Collection has been rescheduled by JM2 at 8/8/2018 16:37 Reason:   Patient unavailable  Collection has been rescheduled by FB at 8/8/2018 14:42 Reason: nurse Leonora jain  all labs at 1600  Collection has been rescheduled by JM2 at 8/8/2018 16:24 Reason:   Patient unavailable  Collection has been rescheduled by JM2 at 8/8/2018 16:25 Reason:   Patient unavailable  Collection has been rescheduled by JM2 at 8/8/2018 16:37 Reason:   Patient unavailable    Urine culture [[id number]]     Order Status:  Completed Specimen:  Urine     Gram stain [[id number]] Collected:  08/08/18 1558    Order Status:  Completed Specimen:  Urine Updated:  08/09/18 0229     Gram Stain Result Rare WBC's      Few Gram positive rods       Rare budding yeast            Assessment/Plan:     Closed compression fracture of thoracic vertebra    62 year old female with acute onset on bilateral flank pain that began after lifting a heavy object. Imaging shows a T12 compression fracture along with multiple lytic lesions throughout the spine.     -Patient neurologically stable without s/s of myelopathy   -CT Thoracic/Upright and supine x-rays consistent with an unstable fracture  -OR tomorrow for T9-L3 posterior instrumented fusion with Dr. Andres. Patient still agreeable with surgical plan. All of her questions were answered.   -Signed consents in chart. Site marked. NPO at midnight.   -Keep in TLSO brace at all times. Continue bed rest.  -Anemia: H&H 9.1/29, was 9.2/28.3. Please continue to optimize H&H prior to surgery. Continue management per primary team.   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 Plans to discuss therapy once outpatient. Continue management per primary team.   -Hypercalcemia: Ca 7.8 today, was 7.9 Due to PHPT and MM. Zometa 4 mg IV administered 8/7.  Continue management per primary team.   -Schizophrenia: Continue quetiapine 50 mg qHS PRN. Continue management per Psych.   -Continue DVTP per primary team. Hold Lovenox in preparation for surgery.   -Please call with questions or change in neurologic status              KARRIE Keen  Neurosurgery  [facility]

## 2018-08-14 NOTE — SUBJECTIVE & OBJECTIVE
Subjective:     Interval History: Slept well overnight.  Psych changed meds to PRN and feels patient is stable.  Otherwise patient reporrts she is doing well.  Son at bedside and happy with care.  She denies chest pain, abdominal pain, change in urination or change in BM.         Objective:     Vital Signs (Most Recent):  Temp: 98.8 °F (37.1 °C) (08/14/18 0453)  Pulse: 90 (08/14/18 0453)  Resp: 18 (08/14/18 0453)  BP: 132/81 (08/14/18 0453)  SpO2: 97 % (08/14/18 0453) Vital Signs (24h Range):  Temp:  [98.3 °F (36.8 °C)-99.2 °F (37.3 °C)] 98.8 °F (37.1 °C)  Pulse:  [86-91] 90  Resp:  [16-19] 18  SpO2:  [94 %-98 %] 97 %  BP: (108-136)/(53-85) 132/81     Weight: 70.4 kg (155 lb 3.3 oz)  Body mass index is 26.64 kg/m².  Body surface area is 1.78 meters squared.    ECOG SCORE         [unfilled]    Intake/Output - Last 3 Shifts     ** Patient Encounter Information Not Found **          Physical Exam   Constitutional: She is oriented to person, place, and time. She appears well-developed and well-nourished. No distress.   HENT:   Head: Normocephalic.   Eyes: Conjunctivae and EOM are normal. Pupils are equal, round, and reactive to light.   Neck: Normal range of motion. Neck supple.   Cardiovascular: Regular rhythm and normal heart sounds.   tachycardic   Pulmonary/Chest: Effort normal and breath sounds normal. No respiratory distress.   Abdominal: Soft. Bowel sounds are normal. She exhibits no distension.   Musculoskeletal: Normal range of motion. She exhibits no edema.   Neurological: She is alert and oriented to person, place, and time. No cranial nerve deficit.   Skin: Skin is warm and dry. She is not diaphoretic.   Psychiatric: She is not aggressive. Thought content is not paranoid. She exhibits abnormal recent memory.       Significant Labs:   All pertinent labs from the last 24 hours have been reviewed.    Diagnostic Results:  I have reviewed all pertinent imaging results/findings within the past 24 hours.

## 2018-08-15 ENCOUNTER — TELEPHONE (OUTPATIENT)
Dept: HEMATOLOGY/ONCOLOGY | Facility: CLINIC | Age: 63
End: 2018-08-15

## 2018-08-15 DIAGNOSIS — M48.9 SPONDYLOPATHY: ICD-10-CM

## 2018-08-15 DIAGNOSIS — C90.00 MULTIPLE MYELOMA, REMISSION STATUS UNSPECIFIED: Primary | ICD-10-CM

## 2018-08-15 PROBLEM — R41.0 DELIRIUM: Status: RESOLVED | Noted: 2018-08-12 | Resolved: 2018-08-15

## 2018-08-15 LAB
ALBUMIN SERPL BCP-MCNC: 2.8 G/DL
ALBUMIN SERPL BCP-MCNC: 2.8 G/DL
ALP SERPL-CCNC: 77 U/L
ALT SERPL W/O P-5'-P-CCNC: 19 U/L
ANION GAP SERPL CALC-SCNC: 10 MMOL/L
AST SERPL-CCNC: 25 U/L
BASOPHILS # BLD AUTO: 0.02 K/UL
BASOPHILS NFR BLD: 0.4 %
BILIRUB DIRECT SERPL-MCNC: 0.1 MG/DL
BILIRUB SERPL-MCNC: 0.3 MG/DL
BUN SERPL-MCNC: 7 MG/DL
CA-I BLDV-SCNC: 0.97 MMOL/L
CALCIUM SERPL-MCNC: 7.4 MG/DL
CHLORIDE SERPL-SCNC: 108 MMOL/L
CO2 SERPL-SCNC: 18 MMOL/L
CREAT SERPL-MCNC: 0.8 MG/DL
DIFFERENTIAL METHOD: ABNORMAL
EOSINOPHIL # BLD AUTO: 0.1 K/UL
EOSINOPHIL NFR BLD: 2.3 %
ERYTHROCYTE [DISTWIDTH] IN BLOOD BY AUTOMATED COUNT: 14.4 %
EST. GFR  (AFRICAN AMERICAN): >60 ML/MIN/1.73 M^2
EST. GFR  (NON AFRICAN AMERICAN): >60 ML/MIN/1.73 M^2
GLUCOSE SERPL-MCNC: 83 MG/DL
HCT VFR BLD AUTO: 29.7 %
HGB BLD-MCNC: 9.3 G/DL
IMM GRANULOCYTES # BLD AUTO: 0.23 K/UL
IMM GRANULOCYTES NFR BLD AUTO: 4.7 %
INR PPP: 0.9
LYMPHOCYTES # BLD AUTO: 0.9 K/UL
LYMPHOCYTES NFR BLD: 18.7 %
MAGNESIUM SERPL-MCNC: 2.1 MG/DL
MCH RBC QN AUTO: 27.7 PG
MCHC RBC AUTO-ENTMCNC: 31.3 G/DL
MCV RBC AUTO: 88 FL
MONOCYTES # BLD AUTO: 0.5 K/UL
MONOCYTES NFR BLD: 11.1 %
NEUTROPHILS # BLD AUTO: 3.1 K/UL
NEUTROPHILS NFR BLD: 62.8 %
NRBC BLD-RTO: 0 /100 WBC
PHOSPHATE SERPL-MCNC: 2 MG/DL
PLATELET # BLD AUTO: 282 K/UL
PMV BLD AUTO: 9.3 FL
POTASSIUM SERPL-SCNC: 4.6 MMOL/L
PROT SERPL-MCNC: 6.4 G/DL
PROTHROMBIN TIME: 10 SEC
RBC # BLD AUTO: 3.36 M/UL
SODIUM SERPL-SCNC: 136 MMOL/L
WBC # BLD AUTO: 4.86 K/UL

## 2018-08-15 PROCEDURE — 25000003 PHARM REV CODE 250: Performed by: STUDENT IN AN ORGANIZED HEALTH CARE EDUCATION/TRAINING PROGRAM

## 2018-08-15 PROCEDURE — 27201037 HC PRESSURE MONITORING SET UP

## 2018-08-15 PROCEDURE — 25000003 PHARM REV CODE 250: Performed by: PHYSICIAN ASSISTANT

## 2018-08-15 PROCEDURE — 83735 ASSAY OF MAGNESIUM: CPT

## 2018-08-15 PROCEDURE — 99232 SBSQ HOSP IP/OBS MODERATE 35: CPT | Mod: ,,, | Performed by: INTERNAL MEDICINE

## 2018-08-15 PROCEDURE — 97803 MED NUTRITION INDIV SUBSEQ: CPT

## 2018-08-15 PROCEDURE — 82330 ASSAY OF CALCIUM: CPT

## 2018-08-15 PROCEDURE — 20600001 HC STEP DOWN PRIVATE ROOM

## 2018-08-15 PROCEDURE — 80069 RENAL FUNCTION PANEL: CPT

## 2018-08-15 PROCEDURE — 85025 COMPLETE CBC W/AUTO DIFF WBC: CPT

## 2018-08-15 PROCEDURE — 36415 COLL VENOUS BLD VENIPUNCTURE: CPT

## 2018-08-15 PROCEDURE — 85610 PROTHROMBIN TIME: CPT

## 2018-08-15 PROCEDURE — 80076 HEPATIC FUNCTION PANEL: CPT

## 2018-08-15 RX ORDER — CALCIUM CARBONATE 500(1250)
500 TABLET ORAL 2 TIMES DAILY
Status: DISCONTINUED | OUTPATIENT
Start: 2018-08-15 | End: 2018-08-17

## 2018-08-15 RX ORDER — LENALIDOMIDE 25 MG/1
25 CAPSULE ORAL DAILY
Qty: 21 CAPSULE | Refills: 0 | Status: SHIPPED | OUTPATIENT
Start: 2018-08-15 | End: 2018-09-05

## 2018-08-15 RX ADMIN — CALCIUM 500 MG: 500 TABLET ORAL at 08:08

## 2018-08-15 RX ADMIN — CALCIUM 500 MG: 500 TABLET ORAL at 02:08

## 2018-08-15 RX ADMIN — SODIUM CHLORIDE: 0.9 INJECTION, SOLUTION INTRAVENOUS at 03:08

## 2018-08-15 RX ADMIN — FLUTICASONE PROPIONATE 1 PUFF: 44 AEROSOL, METERED RESPIRATORY (INHALATION) at 10:08

## 2018-08-15 RX ADMIN — LABETALOL HCL 300 MG: 100 TABLET, FILM COATED ORAL at 10:08

## 2018-08-15 RX ADMIN — FLUTICASONE PROPIONATE 1 PUFF: 44 AEROSOL, METERED RESPIRATORY (INHALATION) at 08:08

## 2018-08-15 RX ADMIN — POTASSIUM & SODIUM PHOSPHATES POWDER PACK 280-160-250 MG 1 PACKET: 280-160-250 PACK at 08:08

## 2018-08-15 RX ADMIN — LABETALOL HCL 300 MG: 100 TABLET, FILM COATED ORAL at 08:08

## 2018-08-15 RX ADMIN — DICLOFENAC 2 G: 10 GEL TOPICAL at 08:08

## 2018-08-15 NOTE — PROGRESS NOTES
[facility]  Hematology  Bone Marrow Transplant  Progress Note    Patient Name: Janie Zamorano  Admission Date: 8/3/2018  Hospital Length of Stay: 12 days  Code Status: Full Code    Subjective:     Interval History: Slept well overnight.  Plan for surgery today.  Patient continues to report little to no pain.  Son at bedside and updated to plan of care and labs.  She denies chest pain, abdominal pain, change in urination or change in BM.  Still no BM, discussed importance of bowel regimen.           Objective:     Vital Signs (Most Recent):  Temp: 98.6 °F (37 °C) (08/15/18 0433)  Pulse: 86 (08/15/18 0433)  Resp: 18 (08/15/18 0433)  BP: 114/66 (08/15/18 0433)  SpO2: 97 % (08/15/18 0433) Vital Signs (24h Range):  Temp:  [97.7 °F (36.5 °C)-98.6 °F (37 °C)] 98.6 °F (37 °C)  Pulse:  [83-91] 86  Resp:  [18-20] 18  SpO2:  [92 %-97 %] 97 %  BP: (114-140)/(66-88) 114/66     Weight: 70.4 kg (155 lb 3.3 oz)  Body mass index is 26.64 kg/m².  Body surface area is 1.78 meters squared.    ECOG SCORE         [unfilled]    Intake/Output - Last 3 Shifts     ** Patient Encounter Information Not Found **          Physical Exam   Constitutional: She is oriented to person, place, and time. She appears well-developed and well-nourished. No distress.   HENT:   Head: Normocephalic.   Eyes: Conjunctivae and EOM are normal. Pupils are equal, round, and reactive to light.   Neck: Normal range of motion. Neck supple.   Cardiovascular: Regular rhythm and normal heart sounds.   tachycardic   Pulmonary/Chest: Effort normal and breath sounds normal. No respiratory distress.   Abdominal: Soft. Bowel sounds are normal. She exhibits no distension.   Musculoskeletal: Normal range of motion. She exhibits no edema.   Neurological: She is alert and oriented to person, place, and time. No cranial nerve deficit.   Skin: Skin is warm and dry. She is not diaphoretic.   Psychiatric: She is not aggressive. Thought content is not  paranoid. She exhibits abnormal recent memory.       Significant Labs:   All pertinent labs from the last 24 hours have been reviewed.    Diagnostic Results:  I have reviewed all pertinent imaging results/findings within the past 24 hours.    Assessment/Plan:     * Hypercalcemia    Improving/resolved.  Stable, 8.4 on 8/14 Resolved.   -due to PHPT and MM  -PTH elevated as well  -Zometa 4 mg IV administered 8/7            Closed compression fracture of thoracic vertebra    Concern for fracture stability.  Plan for surgery today.  - Lovenox given bed rest  - Neurosug following, appreciate recs  - TLSO brace   - Pain somewhat controlled, continue diclofenac topical and oxy 5-10 mg q6hr prn        Multiple myeloma not having achieved remission    Plan to discuss therapy once outpatient.    -hypercalcemia, anemia, bone fx and lytic lesions. Likely 2/2 to multiple myeloma.  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   -24 hr urine protein=5940  -immunofixation-A free kappa light chain is present in beta.          Paranoid schizophrenia    - Will trial Seroquel PRN and follow up with psych recs.    - Outpatient follow up         GERD (gastroesophageal reflux disease)    Pepcid BID          Constipation    Continue bowel regimen.  Will advance after surgery.  BM w/ multiple BM recorded on 8/5 although CT showed moderate amount of retained stool in the ascending and transverse:, suggestive of constipation          Anemia    Hgb dropping, likely dilutional.    - normocytic anemia; likely 2/2 to underlying malignancy  - transfuse for Hgb < 7 and plt < 10K   - cbc with diff daily while inpatient        Lytic bone lesions on xray             Asthma    -Fluticasone 44mcg/Actuation 1 puffs BID  -No signs of symptoms  of exacerbation        Other secondary hypertension    - Home meds re-started except the valsartan given patient was having GLENIS  - labetalol dose increased to 300 mg TID  - continue restarting valsartan now than GLENIS has resolved.  - continue to monitor BP             VTE Risk Mitigation (From admission, onward)        Ordered     IP VTE HIGH RISK PATIENT  Once      08/03/18 2320     Place MOUNIKA hose  Until discontinued      08/03/18 2320     Place sequential compression device  Until discontinued      08/03/18 1734          Disposition: To OR today.  PT after surgery to assess for SNF vs Rehab     Case to be discussed with staff     Ruslan Small MD  Bone Marrow Transplant  [facility]

## 2018-08-15 NOTE — ASSESSMENT & PLAN NOTE
Improving/resolved.  Stable, 8.4 on 8/14 Resolved.   -due to PHPT and MM  -PTH elevated as well  -Zometa 4 mg IV administered 8/7

## 2018-08-15 NOTE — PLAN OF CARE
Problem: Patient Care Overview  Goal: Plan of Care Review  Outcome: Ongoing (interventions implemented as appropriate)  Patient remains free from falls and injury this shift. Bed in low, locked position with call light in reach. Son at bedside. Patient encouraged to call for assistance when needed.  Patient verbalized understanding. Pt had no complaints of pain or discomfort this shift. Continuous  ml/hr. Afebrile. VS WNL. All belongings within reach will continue to monitor.'

## 2018-08-15 NOTE — PLAN OF CARE
Problem: Patient Care Overview  Goal: Plan of Care Review  Outcome: Ongoing (interventions implemented as appropriate)  Patient AAOx4, on bedrest with back brace in place per MD order. Fall precautions maintained; son at bedside throughout shift. Patient NPO for procedure. IVF maintained. Neuro checks continued q4h. Patient refusing diclofenac gel. Stable and will continue to monitor.

## 2018-08-15 NOTE — PROGRESS NOTES
" [facility]  Adult Nutrition  Progress Note    SUMMARY       Recommendations    Recommendation/Intervention: cont w/ Vegetarian diet. If PO intake < 50% Cont to provide Boost Plus.  Encourage healthy food choices. RD to follow  Goals: nutrient intake >/= 85% EEN/EPN   Nutrition Goal Status: progressing towards goal  Communication of RD Recs: discussed on rounds    Reason for Assessment    Reason for Assessment: RD follow-up  Diagnosis: (Hypercalcemia )  Relevant Medical History: GLENIS, Hypercalcemia, GERD, HTN  Interdisciplinary Rounds: attended  General Information Comments: pt NPO for scheduled sx. states she did not eat much prior to NPO 2/2 nervous about  sx. continues to consume Boost PlusTID. Phos and Mg being replaced today  Nutrition Discharge Planning: Cont. vegetarian diet w/ ONS as needed s/p sx for healing    Nutrition Risk Screen    Nutrition Risk Screen: no indicators present    Nutrition/Diet History    Patient Reported Diet/Restrictions/Preferences: vegetarian  Food Preferences: vegetarian  Do you have any cultural, spiritual, Buddhist conflicts, given your current situation?: none  Food Allergies: shellfish  Factors Affecting Nutritional Intake: NPO, decreased appetite(nervous)    Anthropometrics    Temp: 98.8 °F (37.1 °C)  Height Method: Stated  Height: 5' 4" (162.6 cm)  Height (inches): 64 in  Weight Method: (unable to stand)  Weight: 70.4 kg (155 lb 3.3 oz)  Weight (lb): 155.21 lb  Ideal Body Weight (IBW), Female: 120 lb  % Ideal Body Weight, Female (lb): 129.34 lb  BMI (Calculated): 26.7  BMI Grade: 25 - 29.9 - overweight  Weight Loss: unintentional(x 1 yr)  Usual Body Weight (UBW), k.45 kg  % Usual Body Weight: 93.5  % Weight Change From Usual Weight: -6.69 %       Lab/Procedures/Meds    Pertinent Labs Reviewed: reviewed  Pertinent Labs Comments: ALB-2.8, Ca-7.4, Phos-2.0  Pertinent Medications Reviewed: reviewed  Pertinent Medications Comments: IVF (100ml/hr), " polyethylene glycol    Physical Findings/Assessment    Overall Physical Appearance: overweight  Oral/Mouth Cavity: WDL  Skin: intact    Estimated/Assessed Needs    Weight Used For Calorie Calculations: 70.4 kg (155 lb 3.3 oz)  Energy Calorie Requirements (kcal): 1760-2112kcal/d  Energy Need Method: Kcal/kg(25-30g/kg)  Protein Requirements: 85-99g/d(1.2-1.4g/kg)  Weight Used For Protein Calculations: 70.4 kg (155 lb 3.3 oz)     Fluid Need Method: RDA Method(1ml/kcal or Per MD)  RDA Method (mL): 1760  CHO Requirement: NA      Nutrition Prescription Ordered    Current Diet Order: Veg. Ovo Lacto  Oral Nutrition Supplement: Boost Plus     Evaluation of Received Nutrient/Fluid Intake    Oral Calories (kcal): 1080  Oral Protein (gm): 42  Oral Fluid (mL): 711  % Kcal Needs: 61  % Protein Needs: 49  I/O: -2.9 L since admit  Energy Calories Required: not meeting needs  Protein Required: not meeting needs  Fluid Required: not meeting needs  Comments: LBM: 8/11/18  % Intake of Estimated Energy Needs: 50 - 75 % mostly from Boost Plus  % Meal Intake: NPO     Nutrition Risk    Level of Risk/Frequency of Follow-up: high     Assessment and Plan   Level of Risk/Frequency of Follow-up: low      Assessment and Plan     Nutrition Problem  Increased nutrient needs     Related to (etiology):    multiple myeloma      Signs and Symptoms (as evidenced by):   6% wt loss x 1 yr     Interventions/Recommendations (treatment strategy):  1.Cont w/ Vegetarian diet.   2.If PO intake < 50% cont to provide Boost Plus.    3.Encourage healthy food choices.   4.RD to follow     Nutrition Diagnosis Status:   continues       Monitor and Evaluation    Food and Nutrient Intake: energy intake, food and beverage intake  Food and Nutrient Administration: diet order  Physical Activity and Function: nutrition-related ADLs and IADLs  Anthropometric Measurements: weight, weight change  Biochemical Data, Medical Tests and Procedures: (All labs)  Nutrition-Focused  Physical Findings: overall appearance     Nutrition Follow-Up    RD Follow-up?: Yes

## 2018-08-15 NOTE — ASSESSMENT & PLAN NOTE
Concern for fracture stability.  Plan for surgery today.  - Lovenox given bed rest  - Neurosug following, appreciate recs  - TLSO brace   - Pain somewhat controlled, continue diclofenac topical and oxy 5-10 mg q6hr prn

## 2018-08-15 NOTE — SUBJECTIVE & OBJECTIVE
Subjective:     Interval History: Slept well overnight.  Plan for surgery today.  Patient continues to report little to no pain.  Son at bedside and updated to plan of care and labs.  She denies chest pain, abdominal pain, change in urination or change in BM.  Still no BM, discussed importance of bowel regimen.           Objective:     Vital Signs (Most Recent):  Temp: 98.6 °F (37 °C) (08/15/18 0433)  Pulse: 86 (08/15/18 0433)  Resp: 18 (08/15/18 0433)  BP: 114/66 (08/15/18 0433)  SpO2: 97 % (08/15/18 0433) Vital Signs (24h Range):  Temp:  [97.7 °F (36.5 °C)-98.6 °F (37 °C)] 98.6 °F (37 °C)  Pulse:  [83-91] 86  Resp:  [18-20] 18  SpO2:  [92 %-97 %] 97 %  BP: (114-140)/(66-88) 114/66     Weight: 70.4 kg (155 lb 3.3 oz)  Body mass index is 26.64 kg/m².  Body surface area is 1.78 meters squared.    ECOG SCORE         [unfilled]    Intake/Output - Last 3 Shifts     ** Patient Encounter Information Not Found **          Physical Exam   Constitutional: She is oriented to person, place, and time. She appears well-developed and well-nourished. No distress.   HENT:   Head: Normocephalic.   Eyes: Conjunctivae and EOM are normal. Pupils are equal, round, and reactive to light.   Neck: Normal range of motion. Neck supple.   Cardiovascular: Regular rhythm and normal heart sounds.   tachycardic   Pulmonary/Chest: Effort normal and breath sounds normal. No respiratory distress.   Abdominal: Soft. Bowel sounds are normal. She exhibits no distension.   Musculoskeletal: Normal range of motion. She exhibits no edema.   Neurological: She is alert and oriented to person, place, and time. No cranial nerve deficit.   Skin: Skin is warm and dry. She is not diaphoretic.   Psychiatric: She is not aggressive. Thought content is not paranoid. She exhibits abnormal recent memory.       Significant Labs:   All pertinent labs from the last 24 hours have been reviewed.    Diagnostic Results:  I have reviewed all pertinent imaging results/findings  within the past 24 hours.

## 2018-08-15 NOTE — ASSESSMENT & PLAN NOTE
Continue bowel regimen.  Will advance after surgery.  BM w/ multiple BM recorded on 8/5 although CT showed moderate amount of retained stool in the ascending and transverse:, suggestive of constipation

## 2018-08-16 ENCOUNTER — ANESTHESIA EVENT (OUTPATIENT)
Dept: SURGERY | Facility: HOSPITAL | Age: 63
DRG: 821 | End: 2018-08-16
Payer: MEDICARE

## 2018-08-16 LAB
ALBUMIN SERPL BCP-MCNC: 2.6 G/DL
ANION GAP SERPL CALC-SCNC: 7 MMOL/L
ANISOCYTOSIS BLD QL SMEAR: SLIGHT
BASOPHILS # BLD AUTO: ABNORMAL K/UL
BASOPHILS NFR BLD: 0 %
BUN SERPL-MCNC: 6 MG/DL
CALCIUM SERPL-MCNC: 8.6 MG/DL
CHLORIDE SERPL-SCNC: 112 MMOL/L
CHROM BANDING METHOD: NORMAL
CHROMOSOME ANALYSIS BM ADDITIONAL INFORMATION: NORMAL
CHROMOSOME ANALYSIS BM RELEASED BY: NORMAL
CHROMOSOME ANALYSIS BM RESULT SUMMARY: NORMAL
CLINICAL CYTOGENETICIST REVIEW: NORMAL
CO2 SERPL-SCNC: 18 MMOL/L
CREAT SERPL-MCNC: 0.6 MG/DL
DIFFERENTIAL METHOD: ABNORMAL
EOSINOPHIL # BLD AUTO: ABNORMAL K/UL
EOSINOPHIL NFR BLD: 0 %
ERYTHROCYTE [DISTWIDTH] IN BLOOD BY AUTOMATED COUNT: 14 %
EST. GFR  (AFRICAN AMERICAN): >60 ML/MIN/1.73 M^2
EST. GFR  (NON AFRICAN AMERICAN): >60 ML/MIN/1.73 M^2
GLUCOSE SERPL-MCNC: 81 MG/DL
HCT VFR BLD AUTO: 27 %
HGB BLD-MCNC: 8.7 G/DL
HYPOCHROMIA BLD QL SMEAR: ABNORMAL
IMM GRANULOCYTES # BLD AUTO: ABNORMAL K/UL
IMM GRANULOCYTES NFR BLD AUTO: ABNORMAL %
KARYOTYP MAR: NORMAL
LYMPHOCYTES # BLD AUTO: ABNORMAL K/UL
LYMPHOCYTES NFR BLD: 20 %
MCH RBC QN AUTO: 28.3 PG
MCHC RBC AUTO-ENTMCNC: 32.2 G/DL
MCV RBC AUTO: 88 FL
MONOCYTES # BLD AUTO: ABNORMAL K/UL
MONOCYTES NFR BLD: 5 %
NEUTROPHILS NFR BLD: 75 %
NRBC BLD-RTO: 0 /100 WBC
OVALOCYTES BLD QL SMEAR: ABNORMAL
PHOSPHATE SERPL-MCNC: 2.2 MG/DL
PLATELET # BLD AUTO: 288 K/UL
PMV BLD AUTO: 9.2 FL
POIKILOCYTOSIS BLD QL SMEAR: SLIGHT
POLYCHROMASIA BLD QL SMEAR: ABNORMAL
POTASSIUM SERPL-SCNC: 4.4 MMOL/L
RBC # BLD AUTO: 3.07 M/UL
REASON FOR REFERRAL (NARRATIVE): NORMAL
REF LAB TEST METHOD: NORMAL
SODIUM SERPL-SCNC: 137 MMOL/L
SPECIMEN SOURCE: NORMAL
SPECIMEN: NORMAL
WBC # BLD AUTO: 4.09 K/UL

## 2018-08-16 PROCEDURE — 99232 SBSQ HOSP IP/OBS MODERATE 35: CPT | Mod: ,,, | Performed by: PHYSICIAN ASSISTANT

## 2018-08-16 PROCEDURE — 99233 SBSQ HOSP IP/OBS HIGH 50: CPT | Mod: ,,, | Performed by: INTERNAL MEDICINE

## 2018-08-16 PROCEDURE — 20600001 HC STEP DOWN PRIVATE ROOM

## 2018-08-16 PROCEDURE — 85007 BL SMEAR W/DIFF WBC COUNT: CPT

## 2018-08-16 PROCEDURE — 25000003 PHARM REV CODE 250: Performed by: STUDENT IN AN ORGANIZED HEALTH CARE EDUCATION/TRAINING PROGRAM

## 2018-08-16 PROCEDURE — 80069 RENAL FUNCTION PANEL: CPT

## 2018-08-16 PROCEDURE — 36415 COLL VENOUS BLD VENIPUNCTURE: CPT

## 2018-08-16 PROCEDURE — 27201037 HC PRESSURE MONITORING SET UP

## 2018-08-16 PROCEDURE — 63600175 PHARM REV CODE 636 W HCPCS: Performed by: STUDENT IN AN ORGANIZED HEALTH CARE EDUCATION/TRAINING PROGRAM

## 2018-08-16 PROCEDURE — 85027 COMPLETE CBC AUTOMATED: CPT

## 2018-08-16 PROCEDURE — 25000003 PHARM REV CODE 250: Performed by: INTERNAL MEDICINE

## 2018-08-16 RX ORDER — ENOXAPARIN SODIUM 100 MG/ML
40 INJECTION SUBCUTANEOUS EVERY 24 HOURS
Status: DISCONTINUED | OUTPATIENT
Start: 2018-08-17 | End: 2018-08-16

## 2018-08-16 RX ORDER — ENOXAPARIN SODIUM 100 MG/ML
40 INJECTION SUBCUTANEOUS EVERY 24 HOURS
Status: DISCONTINUED | OUTPATIENT
Start: 2018-08-16 | End: 2018-08-16

## 2018-08-16 RX ORDER — ENOXAPARIN SODIUM 100 MG/ML
40 INJECTION SUBCUTANEOUS ONCE
Status: COMPLETED | OUTPATIENT
Start: 2018-08-16 | End: 2018-08-16

## 2018-08-16 RX ADMIN — FLUTICASONE PROPIONATE 1 PUFF: 44 AEROSOL, METERED RESPIRATORY (INHALATION) at 09:08

## 2018-08-16 RX ADMIN — POTASSIUM & SODIUM PHOSPHATES POWDER PACK 280-160-250 MG 1 PACKET: 280-160-250 PACK at 07:08

## 2018-08-16 RX ADMIN — CALCIUM 500 MG: 500 TABLET ORAL at 09:08

## 2018-08-16 RX ADMIN — DICLOFENAC 2 G: 10 GEL TOPICAL at 09:08

## 2018-08-16 RX ADMIN — POTASSIUM & SODIUM PHOSPHATES POWDER PACK 280-160-250 MG 1 PACKET: 280-160-250 PACK at 12:08

## 2018-08-16 RX ADMIN — SENNOSIDES AND DOCUSATE SODIUM 1 TABLET: 8.6; 5 TABLET ORAL at 09:08

## 2018-08-16 RX ADMIN — POTASSIUM & SODIUM PHOSPHATES POWDER PACK 280-160-250 MG 1 PACKET: 280-160-250 PACK at 08:08

## 2018-08-16 RX ADMIN — FLUTICASONE PROPIONATE 1 PUFF: 44 AEROSOL, METERED RESPIRATORY (INHALATION) at 08:08

## 2018-08-16 RX ADMIN — CALCIUM 500 MG: 500 TABLET ORAL at 08:08

## 2018-08-16 RX ADMIN — LABETALOL HCL 300 MG: 300 TABLET, FILM COATED ORAL at 08:08

## 2018-08-16 RX ADMIN — DICLOFENAC 2 G: 10 GEL TOPICAL at 12:08

## 2018-08-16 RX ADMIN — ENOXAPARIN SODIUM 40 MG: 100 INJECTION SUBCUTANEOUS at 09:08

## 2018-08-16 RX ADMIN — POTASSIUM & SODIUM PHOSPHATES POWDER PACK 280-160-250 MG 1 PACKET: 280-160-250 PACK at 04:08

## 2018-08-16 RX ADMIN — DICLOFENAC 2 G: 10 GEL TOPICAL at 08:08

## 2018-08-16 RX ADMIN — LABETALOL HCL 300 MG: 100 TABLET, FILM COATED ORAL at 09:08

## 2018-08-16 RX ADMIN — FAMOTIDINE 20 MG: 20 TABLET ORAL at 09:08

## 2018-08-16 RX ADMIN — DICLOFENAC 2 G: 10 GEL TOPICAL at 04:08

## 2018-08-16 RX ADMIN — LABETALOL HCL 300 MG: 100 TABLET, FILM COATED ORAL at 03:08

## 2018-08-16 NOTE — PLAN OF CARE
Problem: Patient Care Overview  Goal: Plan of Care Review  Outcome: Ongoing (interventions implemented as appropriate)  Patient remains free from falls and injury this shift. Bed in low, locked position with call light in reach. Son at bedside.  Patient encouraged to call for assistance when needed. Patient verbalized understanding. Pt had complaints of indigestion but refused medication. Continuous  ml/hr. All belongings within reach will continue to monitor.'

## 2018-08-16 NOTE — PROGRESS NOTES
[facility]  Neurosurgery  Progress Note    Subjective:     History of Present Illness: Ms. Zamorano is a 62 year old female with no PMHx of cancer, chronic back pain, or osteoporosis, who presents to the ED with onset of bilateral flank pain that began 1 week ago while lifting a heavy object from the ground. Patient denies any popping or pulling sensation. Denies any thoracic or lumbar back pain. Denies any UE or LE pain, paresthesias, or weakness. Denies any bladder or bowel incontinence. CT renal was performed and showed a T12 compression fracture. Neurosurgery was consulted for treatment recommendations.     Post-Op Info:  Procedure(s) (LRB):  THORACIC FUSION - PEDICLE SCREWS - T9 - L3 AIRO (N/A)   1 Day Post-Op     Interval History:  NAEON.  Stable back pain today. Denies new/worsening weakness today.  Denies pain, paraesthesias, weakness in extremities.  Denies bowel/bladder dysfunction.        Medications:  Continuous Infusions:  Scheduled Meds:   calcium carbonate  500 mg Oral BID    diclofenac sodium  2 g Topical (Top) QID    famotidine  20 mg Oral BID    fluticasone  1 puff Inhalation BID    labetalol  300 mg Oral TID    polyethylene glycol  17 g Oral Daily    potassium, sodium phosphates  1 packet Oral QID (WM & HS)    senna-docusate 8.6-50 mg  1 tablet Oral BID     PRN Meds:albuterol-ipratropium, dextrose 50%, dextrose 50%, glucagon (human recombinant), glucose, glucose, hydrALAZINE, methyl salicylate-menthol 15-10%, ondansetron, QUEtiapine, ramelteon, sodium chloride 0.9%, traMADol     Review of Systems  Objective:     Weight: (pt unable to stand due to MD order bedrest)  Body mass index is 26.64 kg/m².  Vital Signs (Most Recent):  Temp: 98.8 °F (37.1 °C) (08/16/18 1521)  Pulse: 81 (08/16/18 1521)  Resp: 18 (08/16/18 0930)  BP: (!) 134/91 (08/16/18 1521)  SpO2: 99 % (08/16/18 1521) Vital Signs (24h Range):  Temp:  [97.9 °F (36.6 °C)-98.8 °F (37.1 °C)] 98.8 °F (37.1 °C)  Pulse:   [76-88] 81  Resp:  [16-20] 18  SpO2:  [95 %-100 %] 99 %  BP: (134-173)/(78-94) 134/91     Date 08/16/18 0700 - 08/17/18 0659   Shift 8484-6727 7882-0441 4998-7448 24 Hour Total   INTAKE   P.O. 480 120  600   Shift Total(mL/kg) 480(6.8) 120(1.7)  600(8.5)   OUTPUT   Urine(mL/kg/hr) 500(0.9)   500   Shift Total(mL/kg) 500(7.1)   500(7.1)   Weight (kg) 70.4 70.4 70.4 70.4                   Female External Urinary Catheter 08/09/18 0000 (Active)   Skin no redness;no breakdown 8/16/2018  7:35 AM   Tolerance no signs/symptoms of discomfort 8/16/2018  7:35 AM   Suction Continuous suction at 70 mmHg 8/16/2018  7:35 AM   Date of last wick change 08/15/18 8/16/2018  7:35 AM   Time of last wick change 2100 8/16/2018  7:35 AM   Output (mL) 200 mL 8/16/2018 12:02 PM       Neurosurgery Physical Exam     General: no distress  Neurologic: Alert and oriented. Thought content appropriate.  Head: normocephalic  GCS: Motor: 6/Verbal: 5/Eyes: 4 GCS Total: 15  Cranial nerves: face symmetric, tongue midline, pupils equal, round, reactive to light with accomodation, extraocular muscles intact  Sensory: response to light touch throughout  Motor Strength:full strength upper and lower extremities  Pronator Drift: no drift noted  Finger to nose normal  No focal numbness or weakness  Lungs:  normal respiratory effort  Abdomen: soft, non-tender   Extremities: no cyanosis or edema, or clubbing        Significant Labs:  Recent Labs   Lab  08/15/18   0414  08/16/18   0440   GLU  83  81   NA  136  137   K  4.6  4.4   CL  108  112*   CO2  18*  18*   BUN  7*  6*   CREATININE  0.8  0.6   CALCIUM  7.4*  8.6*   MG  2.1   --      Recent Labs   Lab  08/15/18   0414  08/16/18   0439   WBC  4.86  4.09   HGB  9.3*  8.7*   HCT  29.7*  27.0*   PLT  282  288     Recent Labs   Lab  08/15/18   0858   INR  0.9     Microbiology Results (last 7 days)     Procedure Component Value Units Date/Time    Urine culture [[id number]] Collected:  08/12/18 1206    Order Status:   Completed Specimen:  Urine Updated:  08/14/18 1324     Urine Culture, Routine No growth    Narrative:       add on Urine culture order #[id number] per Dr. Ruslan Small @ 12:30    08/13/2018     Blood culture [[id number]] Collected:  08/08/18 1800    Order Status:  Completed Specimen:  Blood Updated:  08/13/18 2212     Blood Culture, Routine No growth after 5 days.    Narrative:       Collection has been rescheduled by FB at 8/8/2018 14:42 Reason: nurse   Leonora wants all labs at 1600  Collection has been rescheduled by JM2 at 8/8/2018 16:24 Reason:   Patient unavailable  Collection has been rescheduled by JM2 at 8/8/2018 16:25 Reason:   Patient unavailable  Collection has been rescheduled by JM2 at 8/8/2018 16:37 Reason:   Patient unavailable  Collection has been rescheduled by FB at 8/8/2018 14:42 Reason: nurse   Leonora clines all labs at 1600  Collection has been rescheduled by JM2 at 8/8/2018 16:24 Reason:   Patient unavailable  Collection has been rescheduled by JM2 at 8/8/2018 16:25 Reason:   Patient unavailable  Collection has been rescheduled by JM2 at 8/8/2018 16:37 Reason:   Patient unavailable    Blood culture [[id number]] Collected:  08/08/18 1801    Order Status:  Completed Specimen:  Blood Updated:  08/13/18 2212     Blood Culture, Routine No growth after 5 days.    Narrative:       Collection has been rescheduled by FB at 8/8/2018 14:42 Reason: nurse   Leonora wants all labs at 1600  Collection has been rescheduled by JM2 at 8/8/2018 16:24 Reason:   Patient unavailable  Collection has been rescheduled by JM2 at 8/8/2018 16:25 Reason:   Patient unavailable  Collection has been rescheduled by JM2 at 8/8/2018 16:37 Reason:   Patient unavailable  Collection has been rescheduled by FB at 8/8/2018 14:42 Reason: nurse   Leonora wants all labs at 1600  Collection has been rescheduled by JM2 at 8/8/2018 16:24 Reason:   Patient unavailable  Collection has been rescheduled by JM2 at 8/8/2018 16:25 Reason:   Patient  unavailable  Collection has been rescheduled by JOYCE at 8/8/2018 16:37 Reason:   Patient unavailable    Urine culture [[id number]]     Order Status:  Completed Specimen:  Urine             Assessment/Plan:     Closed compression fracture of thoracic vertebra    62 year old female with acute onset on bilateral flank pain that began after lifting a heavy object. Imaging shows a T12 compression fracture along with multiple lytic lesions throughout the spine.   -Patient neurologically stable without s/s of myelopathy   -CT Thoracic/Upright and supine x-rays consistent with an unstable fracture  -OR tomorrow morning for T9-L3 posterior instrumented fusion with Dr. Andres. Patient still agreeable with surgical plan. She has no further questions at this time.  -Signed consents in chart. Site marked. NPO at midnight.   -Keep in TLSO brace at all times. Continue bed rest.  -Anemia: Please continue to optimize H&H prior to surgery. Continue management per primary team.   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 Plans to discuss therapy once outpatient. Continue management per primary team.   -Hold Lovenox in preparation for surgery.   -Please call with questions or change in neurologic status  -Discussed with KARRIE Valencia  Neurosurgery  [facility]

## 2018-08-16 NOTE — TELEPHONE ENCOUNTER
CBC, CMP ordered for that appointment and velcade is in from previous orders, although this dose will now be part of cycle 1. Thanks.

## 2018-08-16 NOTE — ANESTHESIA PREPROCEDURE EVALUATION
[facility]  Anesthesia Pre-Operative Evaluation         Patient Name: Janie Zamorano  YOB: 1955  MRN: [id number]    SUBJECTIVE:     Pre-operative evaluation for Procedure(s) (LRB):  SPINE, THORACIC, WITH FUSION T9-L3,neuromonitoring, please. (N/A)     08/16/2018    Janie Zamorano is a 62 y.o. female w/ a significant PMHx of schizophrenia, asthma, GERD, HTN, lytic bone lesions and hypercalcemia (likely myeloma), chronic back pain, osteoporosis, with T12 compression fracture who presents for above procedure.      LDA:       Peripheral IV - Single Lumen 08/15/18 0420 Anterior;Proximal;Right Forearm (Active)   Number of days: 1       Female External Urinary Catheter 08/09/18 0000 (Active)   Number of days: 7       Prev airway:  Present Prior to Hospital Arrival?: No; Placement Date: 10/25/16; Placement Time: 1338; Method of Intubation: Direct laryngoscopy; Inserted by: CRNA; Airway Device: Endotracheal Tube; Mask Ventilation: Easy; Intubated: Postinduction; Blade: Santana #2; Airway Device Size: 7.5; Style: Cuffed; Cuff Inflation: Minimal occlusive pressure; Inflation Amount: 4; Placement Verified By: Auscultation, Capnometry; Grade: Grade I; Complicating Factors: None; Intubation Findings: Positive EtCO2, Bilateral breath sounds, Atraumatic/Condition of teeth unchanged;  Depth of Insertion: 22; Securment: Lips; Complications: None; Breath Sounds: Equal Bilateral; Insertion Attempts: 1; Removal Date: 10/25/16;  Removal Time: 1508    Drips:   sodium chloride 0.9% 100 mL/hr at 08/15/18 1522       Patient Active Problem List   Diagnosis    Other secondary hypertension    Neck pain    Asthma    Paranoid schizophrenia    Gastroesophageal reflux disease    Erosive esophagitis    Closed compression fracture of thoracic vertebra    Closed fracture of twelfth thoracic vertebra    Multiple lesions of metastatic malignancy    Bilateral flank pain    Hypercalcemia     Multiple myeloma not having achieved remission    Lytic bone lesions on xray    Other proteinuria    Anemia    Discharge planning issues    Constipation    Multiple myeloma    GERD (gastroesophageal reflux disease)    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       Review of patient's allergies indicates:   Allergen Reactions    Shellfish containing products Itching       Current Inpatient Medications:   calcium carbonate  500 mg Oral BID    diclofenac sodium  2 g Topical (Top) QID    enoxaparin  40 mg Subcutaneous Daily    famotidine  20 mg Oral BID    fluticasone  1 puff Inhalation BID    labetalol  300 mg Oral TID    polyethylene glycol  17 g Oral Daily    potassium, sodium phosphates  1 packet Oral QID (WM & HS)    senna-docusate 8.6-50 mg  1 tablet Oral BID       No current facility-administered medications on file prior to encounter.      Current Outpatient Medications on File Prior to Encounter   Medication Sig Dispense Refill    albuterol 90 mcg/actuation inhaler Inhale 2 puffs into the lungs every 6 (six) hours as needed for Wheezing. 1 each 11    benztropine (COGENTIN) 1 MG tablet TK 1 T PO  BID (Patient taking differently: Take 1 mg by mouth 2 (two) times daily. ) 60 tablet 2    bisacodyl (DULCOLAX) 5 mg EC tablet Take 5 mg by mouth daily as needed for Constipation.      cholecalciferol, vitamin D3, 2,000 unit Cap Take 1 capsule (2,000 Units total) by mouth once daily. 90 capsule 3    cyanocobalamin, vitamin B-12, (VITAMIN B-12 ORAL) Take 1 tablet by mouth daily as needed.      fluticasone (FLONASE) 50 mcg/actuation nasal spray 1 spray by Each Nare route once daily. (Patient taking differently: 1 spray by Each Nare route daily as needed for Allergies. ) 1 Bottle 4    labetalol (NORMODYNE) 100 MG tablet TAKE 1 TABLET BY MOUTH TWICE DAILY 180 tablet 4    pantoprazole (PROTONIX) 40 MG tablet Take 1 tablet (40 mg total) by mouth every 12 (twelve) hours. 60 tablet 3     risperiDONE (RISPERDAL) 1 MG tablet Take 1 tablet (1 mg total) by mouth once daily. (Patient taking differently: Take 1 tablet by mouth  in the morning and 2 at bedtime) 30 tablet 3    sodium phosphates (DISPOSABLE ENEMA) 19-7 gram/118 mL Enem Place 1 enema rectally as needed (constipation).         Past Surgical History:   Procedure Laterality Date     SECTION      X 1    HYSTERECTOMY  2016    KJB---DLH/BSO    LIPOMA RESECTION      back       Social History     Socioeconomic History    Marital status:      Spouse name: Not on file    Number of children: Not on file    Years of education: Not on file    Highest education level: Not on file   Social Needs    Financial resource strain: Not on file    Food insecurity - worry: Not on file    Food insecurity - inability: Not on file    Transportation needs - medical: Not on file    Transportation needs - non-medical: Not on file   Occupational History    Not on file   Tobacco Use    Smoking status: Never Smoker    Smokeless tobacco: Never Used   Substance and Sexual Activity    Alcohol use: No     Alcohol/week: 0.0 oz    Drug use: No    Sexual activity: Not Currently     Partners: Male     Birth control/protection: Post-menopausal   Other Topics Concern    Not on file   Social History Narrative           OBJECTIVE:     Vital Signs Range (Last 24H):  Temp:  [36.6 °C (97.9 °F)-37.4 °C (99.3 °F)]   Pulse:  [83-95]   Resp:  [16-20]   BP: (120-173)/(66-94)   SpO2:  [93 %-100 %]       CBC:   Recent Labs      08/15/18   0414  18   0439   WBC  4.86  4.09   RBC  3.36*  3.07*   HGB  9.3*  8.7*   HCT  29.7*  27.0*   PLT  282  288   MCV  88  88   MCH  27.7  28.3   MCHC  31.3*  32.2       CMP:   Recent Labs      08/15/18   0414  18   0440   NA  136  137   K  4.6  4.4   CL  108  112*   CO2  18*  18*   BUN  7*  6*   CREATININE  0.8  0.6   GLU  83  81   MG  2.1   --    PHOS  2.0*  2.2*   CALCIUM  7.4*  8.6*   ALBUMIN  2.8*  2.8*   2.6*   PROT  6.4   --    ALKPHOS  77   --    ALT  19   --    AST  25   --    BILITOT  0.3   --        INR:  Recent Labs      08/15/18   0858   INR  0.9       Diagnostic Studies: leukemia/lymphoma screen 8/6/2018  Bone Marrow Interpretation Findings consistent with plasma cell dyscrasia.  Correlation with morphology and any available cytogenetic or molecular studies is recommended.  In addition, correlation with clinical and radiologic data is required for further classification of this   process.         EKG: No recent studies available.    2D ECHO:  No results found for this or any previous visit.      ASSESSMENT/PLAN:         Anesthesia Evaluation    I have reviewed the Patient Summary Reports.    I have reviewed the Nursing Notes.      Review of Systems  Anesthesia Hx:  No problems with previous Anesthesia  History of prior surgery of interest to airway management or planning: Denies Family Hx of Anesthesia complications.   Denies Personal Hx of Anesthesia complications.   Social:  Non-Smoker    Hematology/Oncology:        Current/Recent Cancer.   Cardiovascular:   Hypertension Denies MI.    Denies Angina.            Pulmonary:   Asthma Denies Shortness of breath.  Denies Sleep Apnea.    Renal/:   no renal calculi    Hepatic/GI:   PUD, GERD, poorly controlled Erosive esophagitis   Musculoskeletal:   Thoracic compression fracture   Neurological:   Denies TIA. Denies CVA. Denies Seizures.    Psych:   Psychiatric History depression schizophrenia         Physical Exam  General:  Well nourished    Airway/Jaw/Neck:  Airway Findings: Mouth Opening: Normal Tongue: Normal  General Airway Assessment: Adult  Mallampati: III  TM Distance: 4 - 6 cm  Jaw/Neck Findings:  Neck ROM: Extension Decreased, Mod., Decreased Lateral Motion, to the right, to the left     Eyes/Ears/Nose:  EYES/EARS/NOSE FINDINGS: Normal   Dental:  Dental Findings: molar caps    Chest/Lungs:  Chest/Lungs Findings: Clear to auscultation      Heart/Vascular:  Heart Findings: Rate: Normal  Rhythm: Regular Rhythm  Sounds: Normal     Abdomen:  Abdomen Findings:  Soft, Nontender     Musculoskeletal:  Musculoskeletal Findings: Patient with thoracic brace    Skin:  Skin Findings: Normal    Mental Status:  Mental Status Findings:  Cooperative, Alert and Oriented         Anesthesia Plan  Type of Anesthesia, risks & benefits discussed:  Anesthesia Type:  general  Patient's Preference:   Intra-op Monitoring Plan: standard ASA monitors  Intra-op Monitoring Plan Comments:   Post Op Pain Control Plan: IV/PO Opioids PRN, multimodal analgesia and per primary service following discharge from PACU  Post Op Pain Control Plan Comments:   Induction:   IV  Beta Blocker:  Patient is not currently on a Beta-Blocker (No further documentation required).       Informed Consent: Patient understands risks and agrees with Anesthesia plan.  Questions answered. Anesthesia consent signed with patient.  ASA Score: 3     Day of Surgery Review of History & Physical:    H&P update referred to the surgeon.         Ready For Surgery From Anesthesia Perspective.

## 2018-08-16 NOTE — PLAN OF CARE
Problem: Patient Care Overview  Goal: Plan of Care Review  Outcome: Ongoing (interventions implemented as appropriate)  Patient AAOx4, bedrest continued, fall precautions maintained, and family remained at bedside throughout shift. Back brace in place throughout shift per MD order. Laminectomy scheduled for Friday morning; patient to be NPO after midnight. IVF discontinued this AM, oral hydration encouraged throughout shift. Neuro checks continued q4h. Patient stable and will continue to monitor.

## 2018-08-16 NOTE — ASSESSMENT & PLAN NOTE
Concern for fracture stability.  Surgery delayed and rescheduled until 8/17  - Lovenox given bed rest  - Neurosug following, appreciate recs  - TLSO brace   - Pain somewhat controlled, continue diclofenac topical and oxy 5-10 mg q6hr prn

## 2018-08-16 NOTE — SUBJECTIVE & OBJECTIVE
Interval History:  NAEON.  Stable back pain today. Denies new/worsening weakness today.  Denies pain, paraesthesias, weakness in extremities.  Denies bowel/bladder dysfunction.        Medications:  Continuous Infusions:  Scheduled Meds:   calcium carbonate  500 mg Oral BID    diclofenac sodium  2 g Topical (Top) QID    famotidine  20 mg Oral BID    fluticasone  1 puff Inhalation BID    labetalol  300 mg Oral TID    polyethylene glycol  17 g Oral Daily    potassium, sodium phosphates  1 packet Oral QID (WM & HS)    senna-docusate 8.6-50 mg  1 tablet Oral BID     PRN Meds:albuterol-ipratropium, dextrose 50%, dextrose 50%, glucagon (human recombinant), glucose, glucose, hydrALAZINE, methyl salicylate-menthol 15-10%, ondansetron, QUEtiapine, ramelteon, sodium chloride 0.9%, traMADol     Review of Systems  Objective:     Weight: (pt unable to stand due to MD order bedrest)  Body mass index is 26.64 kg/m².  Vital Signs (Most Recent):  Temp: 98.8 °F (37.1 °C) (08/16/18 1521)  Pulse: 81 (08/16/18 1521)  Resp: 18 (08/16/18 0930)  BP: (!) 134/91 (08/16/18 1521)  SpO2: 99 % (08/16/18 1521) Vital Signs (24h Range):  Temp:  [97.9 °F (36.6 °C)-98.8 °F (37.1 °C)] 98.8 °F (37.1 °C)  Pulse:  [76-88] 81  Resp:  [16-20] 18  SpO2:  [95 %-100 %] 99 %  BP: (134-173)/(78-94) 134/91     Date 08/16/18 0700 - 08/17/18 0659   Shift 5324-0439 0350-7513 4448-9728 24 Hour Total   INTAKE   P.O. 480 120  600   Shift Total(mL/kg) 480(6.8) 120(1.7)  600(8.5)   OUTPUT   Urine(mL/kg/hr) 500(0.9)   500   Shift Total(mL/kg) 500(7.1)   500(7.1)   Weight (kg) 70.4 70.4 70.4 70.4                   Female External Urinary Catheter 08/09/18 0000 (Active)   Skin no redness;no breakdown 8/16/2018  7:35 AM   Tolerance no signs/symptoms of discomfort 8/16/2018  7:35 AM   Suction Continuous suction at 70 mmHg 8/16/2018  7:35 AM   Date of last wick change 08/15/18 8/16/2018  7:35 AM   Time of last wick change 2100 8/16/2018  7:35 AM   Output (mL) 200 mL  8/16/2018 12:02 PM       Neurosurgery Physical Exam     General: no distress  Neurologic: Alert and oriented. Thought content appropriate.  Head: normocephalic  GCS: Motor: 6/Verbal: 5/Eyes: 4 GCS Total: 15  Cranial nerves: face symmetric, tongue midline, pupils equal, round, reactive to light with accomodation, extraocular muscles intact  Sensory: response to light touch throughout  Motor Strength:full strength upper and lower extremities  Pronator Drift: no drift noted  Finger to nose normal  No focal numbness or weakness  Lungs:  normal respiratory effort  Abdomen: soft, non-tender   Extremities: no cyanosis or edema, or clubbing        Significant Labs:  Recent Labs   Lab  08/15/18   0414  08/16/18   0440   GLU  83  81   NA  136  137   K  4.6  4.4   CL  108  112*   CO2  18*  18*   BUN  7*  6*   CREATININE  0.8  0.6   CALCIUM  7.4*  8.6*   MG  2.1   --      Recent Labs   Lab  08/15/18   0414  08/16/18   0439   WBC  4.86  4.09   HGB  9.3*  8.7*   HCT  29.7*  27.0*   PLT  282  288     Recent Labs   Lab  08/15/18   0858   INR  0.9     Microbiology Results (last 7 days)     Procedure Component Value Units Date/Time    Urine culture [[id number]] Collected:  08/12/18 1206    Order Status:  Completed Specimen:  Urine Updated:  08/14/18 1324     Urine Culture, Routine No growth    Narrative:       add on Urine culture order #[id number] per Dr. Ruslan Small @ 12:30    08/13/2018     Blood culture [[id number]] Collected:  08/08/18 1800    Order Status:  Completed Specimen:  Blood Updated:  08/13/18 2212     Blood Culture, Routine No growth after 5 days.    Narrative:       Collection has been rescheduled by FB at 8/8/2018 14:42 Reason: nurse   Leonora wants all labs at 1600  Collection has been rescheduled by JOYCE at 8/8/2018 16:24 Reason:   Patient unavailable  Collection has been rescheduled by JM2 at 8/8/2018 16:25 Reason:   Patient unavailable  Collection has been rescheduled by JM2 at 8/8/2018 16:37 Reason:   Patient  unavailable  Collection has been rescheduled by FB at 8/8/2018 14:42 Reason: nurse   Leonora wants all labs at 1600  Collection has been rescheduled by JM2 at 8/8/2018 16:24 Reason:   Patient unavailable  Collection has been rescheduled by JM2 at 8/8/2018 16:25 Reason:   Patient unavailable  Collection has been rescheduled by JM2 at 8/8/2018 16:37 Reason:   Patient unavailable    Blood culture [[id number]] Collected:  08/08/18 1801    Order Status:  Completed Specimen:  Blood Updated:  08/13/18 2212     Blood Culture, Routine No growth after 5 days.    Narrative:       Collection has been rescheduled by FB at 8/8/2018 14:42 Reason: nurse   Leonora clines all labs at 1600  Collection has been rescheduled by JM2 at 8/8/2018 16:24 Reason:   Patient unavailable  Collection has been rescheduled by JM2 at 8/8/2018 16:25 Reason:   Patient unavailable  Collection has been rescheduled by JM2 at 8/8/2018 16:37 Reason:   Patient unavailable  Collection has been rescheduled by FB at 8/8/2018 14:42 Reason: nurse   Leonora clines all labs at 1600  Collection has been rescheduled by JM2 at 8/8/2018 16:24 Reason:   Patient unavailable  Collection has been rescheduled by JM2 at 8/8/2018 16:25 Reason:   Patient unavailable  Collection has been rescheduled by JM2 at 8/8/2018 16:37 Reason:   Patient unavailable    Urine culture [[id number]]     Order Status:  Completed Specimen:  Urine

## 2018-08-16 NOTE — ASSESSMENT & PLAN NOTE
62 year old female with acute onset on bilateral flank pain that began after lifting a heavy object. Imaging shows a T12 compression fracture along with multiple lytic lesions throughout the spine.   -Patient neurologically stable without s/s of myelopathy   -CT Thoracic/Upright and supine x-rays consistent with an unstable fracture  -OR tomorrow morning for T9-L3 posterior instrumented fusion with Dr. Andres. Patient still agreeable with surgical plan. She has no further questions at this time.  -Signed consents in chart. Site marked. NPO at midnight.   -Keep in TLSO brace at all times. Continue bed rest.  -Anemia: Please continue to optimize H&H prior to surgery. Continue management per primary team.   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 Plans to discuss therapy once outpatient. Continue management per primary team.   -Hold Lovenox in preparation for surgery.   -Please call with questions or change in neurologic status  -Discussed with Dr. Andres

## 2018-08-16 NOTE — SUBJECTIVE & OBJECTIVE
Subjective:     Interval History: Surgery delayed due emergent surgeries.  Patient remains and good spirits and reports she is ok.  She had a BM last night.  Other she reports no new changes, she denies chest pain, abdominal pain, change in urination or change in BM.      Objective:     Vital Signs (Most Recent):  Temp: 98.6 °F (37 °C) (08/16/18 0725)  Pulse: 88 (08/16/18 0725)  Resp: 18 (08/16/18 0725)  BP: (!) 162/94 (08/16/18 0725)  SpO2: 97 % (08/16/18 0725) Vital Signs (24h Range):  Temp:  [97.9 °F (36.6 °C)-99.3 °F (37.4 °C)] 98.6 °F (37 °C)  Pulse:  [83-95] 88  Resp:  [16-20] 18  SpO2:  [93 %-100 %] 97 %  BP: (120-173)/(66-94) 162/94     Weight: (pt unable to stand due to MD order bedrest)  Body mass index is 26.64 kg/m².  Body surface area is 1.78 meters squared.    ECOG SCORE         [unfilled]    Intake/Output - Last 3 Shifts       08/14 0700 - 08/15 0659 08/15 0700 - 08/16 0659 08/16 0700 - 08/17 0659    P.O. 560 25     I.V. (mL/kg)  1643.3 (23.3)     Total Intake(mL/kg) 560 (8) 1668.3 (23.7)     Urine (mL/kg/hr) 350 (0.2) 900 (0.5)     Total Output 350 900     Net +210 +768.3            Stool Occurrence  1 x           Physical Exam   Constitutional: She is oriented to person, place, and time. She appears well-developed and well-nourished. No distress.   HENT:   Head: Normocephalic.   Eyes: Conjunctivae and EOM are normal. Pupils are equal, round, and reactive to light.   Neck: Normal range of motion. Neck supple.   Cardiovascular: Regular rhythm and normal heart sounds.   tachycardic   Pulmonary/Chest: Effort normal and breath sounds normal. No respiratory distress.   Abdominal: Soft. Bowel sounds are normal. She exhibits no distension.   Musculoskeletal: Normal range of motion. She exhibits no edema.   Neurological: She is alert and oriented to person, place, and time. No cranial nerve deficit.   Skin: Skin is warm and dry. She is not diaphoretic.   Psychiatric: She is not aggressive. Thought content is  not paranoid. She exhibits abnormal recent memory.       Significant Labs:   All pertinent labs from the last 24 hours have been reviewed.    Diagnostic Results:  I have reviewed all pertinent imaging results/findings within the past 24 hours.

## 2018-08-16 NOTE — ASSESSMENT & PLAN NOTE
Improving/resolved.  Stable, corrected 9.7 as of 8/16 .   -due to PHPT and MM  -PTH elevated as well  -Zometa 4 mg IV administered 8/7

## 2018-08-16 NOTE — PROGRESS NOTES
[facility]  Hematology  Bone Marrow Transplant  Progress Note    Patient Name: Janie Zamorano  Admission Date: 8/3/2018  Hospital Length of Stay: 13 days  Code Status: Full Code    Subjective:     Interval History: Surgery delayed due emergent surgeries.  Patient remains and good spirits and reports she is ok.  She had a BM last night.  Other she reports no new changes, she denies chest pain, abdominal pain, change in urination or change in BM.      Objective:     Vital Signs (Most Recent):  Temp: 98.6 °F (37 °C) (08/16/18 0725)  Pulse: 88 (08/16/18 0725)  Resp: 18 (08/16/18 0725)  BP: (!) 162/94 (08/16/18 0725)  SpO2: 97 % (08/16/18 0725) Vital Signs (24h Range):  Temp:  [97.9 °F (36.6 °C)-99.3 °F (37.4 °C)] 98.6 °F (37 °C)  Pulse:  [83-95] 88  Resp:  [16-20] 18  SpO2:  [93 %-100 %] 97 %  BP: (120-173)/(66-94) 162/94     Weight: (pt unable to stand due to MD order bedrest)  Body mass index is 26.64 kg/m².  Body surface area is 1.78 meters squared.    ECOG SCORE         [unfilled]    Intake/Output - Last 3 Shifts       08/14 0700 - 08/15 0659 08/15 0700 - 08/16 0659 08/16 0700 - 08/17 0659    P.O. 560 25     I.V. (mL/kg)  1643.3 (23.3)     Total Intake(mL/kg) 560 (8) 1668.3 (23.7)     Urine (mL/kg/hr) 350 (0.2) 900 (0.5)     Total Output 350 900     Net +210 +768.3            Stool Occurrence  1 x           Physical Exam   Constitutional: She is oriented to person, place, and time. She appears well-developed and well-nourished. No distress.   HENT:   Head: Normocephalic.   Eyes: Conjunctivae and EOM are normal. Pupils are equal, round, and reactive to light.   Neck: Normal range of motion. Neck supple.   Cardiovascular: Regular rhythm and normal heart sounds.   tachycardic   Pulmonary/Chest: Effort normal and breath sounds normal. No respiratory distress.   Abdominal: Soft. Bowel sounds are normal. She exhibits no distension.   Musculoskeletal: Normal range of motion. She exhibits no edema.    Neurological: She is alert and oriented to person, place, and time. No cranial nerve deficit.   Skin: Skin is warm and dry. She is not diaphoretic.   Psychiatric: She is not aggressive. Thought content is not paranoid. She exhibits abnormal recent memory.       Significant Labs:   All pertinent labs from the last 24 hours have been reviewed.    Diagnostic Results:  I have reviewed all pertinent imaging results/findings within the past 24 hours.    Assessment/Plan:     * Hypercalcemia    Improving/resolved.  Stable, corrected 9.7 as of 8/16 .   -due to PHPT and MM  -PTH elevated as well  -Zometa 4 mg IV administered 8/7            Closed compression fracture of thoracic vertebra    Concern for fracture stability.  Surgery delayed and rescheduled until 8/17  - Lovenox given bed rest  - Neurosug following, appreciate recs  - TLSO brace   - Pain somewhat controlled, continue diclofenac topical and oxy 5-10 mg q6hr prn        Multiple myeloma not having achieved remission    Plan to discuss therapy once outpatient.    -hypercalcemia, anemia, bone fx and lytic lesions. Likely 2/2 to multiple myeloma.  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   -24 hr urine protein=5940  -immunofixation-A free kappa light chain is present in beta.          Paranoid schizophrenia    - Will trial Seroquel PRN and follow up with psych recs.    - Outpatient follow up         GERD (gastroesophageal reflux disease)    Pepcid BID          Constipation    Continue bowel regimen.    BM w/ multiple BM recorded on 8/5 although CT showed moderate amount of retained stool in the ascending and transverse:, suggestive of constipation          Anemia    Hgb dropping, likely dilutional.    - normocytic anemia; likely 2/2 to underlying  malignancy  - transfuse for Hgb < 7 and plt < 10K   - cbc with diff daily while inpatient        Lytic bone lesions on xray             Asthma    -Fluticasone 44mcg/Actuation 1 puffs BID  -No signs of symptoms of exacerbation        Other secondary hypertension    - Home meds re-started except the valsartan given patient was having GLENIS  - labetalol dose increased to 300 mg TID  - continue restarting valsartan now than GLENIS has resolved.  - continue to monitor BP             VTE Risk Mitigation (From admission, onward)        Ordered     enoxaparin injection 40 mg  Daily      08/16/18 0802     IP VTE HIGH RISK PATIENT  Once      08/03/18 2320     Place MOUNIKA hose  Until discontinued      08/03/18 2320     Place sequential compression device  Until discontinued      08/03/18 1735          Disposition: Surgery reschedule for tomorrow.  Anticipate discharge to SNF 2-4 days after surgery     Case to be discussed with Staff.     Ruslan Small MD  Bone Marrow Transplant  [facility]

## 2018-08-16 NOTE — NURSING
Spoke with BMT team concerning pt elevated /88, asymptomatic, no orders given at this time, will continue to monitor.

## 2018-08-16 NOTE — ASSESSMENT & PLAN NOTE
Continue bowel regimen.    BM w/ multiple BM recorded on 8/5 although CT showed moderate amount of retained stool in the ascending and transverse:, suggestive of constipation

## 2018-08-17 ENCOUNTER — ANESTHESIA (OUTPATIENT)
Dept: SURGERY | Facility: HOSPITAL | Age: 63
DRG: 821 | End: 2018-08-17
Payer: MEDICARE

## 2018-08-17 PROBLEM — D63.0 ANEMIA IN NEOPLASTIC DISEASE: Status: ACTIVE | Noted: 2018-08-03

## 2018-08-17 PROBLEM — Z98.1 S/P SPINAL FUSION: Status: ACTIVE | Noted: 2018-08-17

## 2018-08-17 PROBLEM — M43.20 FUSION OF SPINE: Status: ACTIVE | Noted: 2018-08-17

## 2018-08-17 LAB
ALBUMIN SERPL BCP-MCNC: 2.8 G/DL
ALLENS TEST: ABNORMAL
ANION GAP SERPL CALC-SCNC: 8 MMOL/L
BASOPHILS # BLD AUTO: 0.03 K/UL
BASOPHILS NFR BLD: 0.7 %
BUN SERPL-MCNC: 5 MG/DL
CALCIUM SERPL-MCNC: 9.5 MG/DL
CHLORIDE SERPL-SCNC: 110 MMOL/L
CO2 SERPL-SCNC: 21 MMOL/L
CREAT SERPL-MCNC: 0.7 MG/DL
DELSYS: ABNORMAL
DIFFERENTIAL METHOD: ABNORMAL
EOSINOPHIL # BLD AUTO: 0.1 K/UL
EOSINOPHIL NFR BLD: 2.5 %
ERYTHROCYTE [DISTWIDTH] IN BLOOD BY AUTOMATED COUNT: 13.9 %
EST. GFR  (AFRICAN AMERICAN): >60 ML/MIN/1.73 M^2
EST. GFR  (NON AFRICAN AMERICAN): >60 ML/MIN/1.73 M^2
GLUCOSE SERPL-MCNC: 100 MG/DL
GLUCOSE SERPL-MCNC: 114 MG/DL (ref 70–110)
GLUCOSE SERPL-MCNC: 117 MG/DL (ref 70–110)
HCO3 UR-SCNC: 21.3 MMOL/L (ref 24–28)
HCO3 UR-SCNC: 21.7 MMOL/L (ref 24–28)
HCO3 UR-SCNC: 24.1 MMOL/L (ref 24–28)
HCT VFR BLD AUTO: 28.5 %
HCT VFR BLD CALC: 21 %PCV (ref 36–54)
HCT VFR BLD CALC: 21 %PCV (ref 36–54)
HGB BLD-MCNC: 9 G/DL
IMM GRANULOCYTES # BLD AUTO: 0.15 K/UL
IMM GRANULOCYTES NFR BLD AUTO: 3.4 %
LYMPHOCYTES # BLD AUTO: 0.9 K/UL
LYMPHOCYTES NFR BLD: 20.1 %
MCH RBC QN AUTO: 27.8 PG
MCHC RBC AUTO-ENTMCNC: 31.6 G/DL
MCV RBC AUTO: 88 FL
MODE: ABNORMAL
MONOCYTES # BLD AUTO: 0.5 K/UL
MONOCYTES NFR BLD: 11.4 %
NEUTROPHILS # BLD AUTO: 2.8 K/UL
NEUTROPHILS NFR BLD: 61.9 %
NRBC BLD-RTO: 0 /100 WBC
PCO2 BLDA: 37.3 MMHG (ref 35–45)
PCO2 BLDA: 39 MMHG (ref 35–45)
PCO2 BLDA: 41.9 MMHG (ref 35–45)
PH SMN: 7.32 [PH] (ref 7.35–7.45)
PH SMN: 7.37 [PH] (ref 7.35–7.45)
PH SMN: 7.4 [PH] (ref 7.35–7.45)
PHOSPHATE SERPL-MCNC: 2.7 MG/DL
PLATELET # BLD AUTO: 322 K/UL
PMV BLD AUTO: 9 FL
PO2 BLDA: 221 MMHG (ref 80–100)
PO2 BLDA: 227 MMHG (ref 80–100)
PO2 BLDA: 75 MMHG (ref 80–100)
POC BE: -1 MMOL/L
POC BE: -4 MMOL/L
POC BE: -4 MMOL/L
POC IONIZED CALCIUM: 1.22 MMOL/L (ref 1.06–1.42)
POC IONIZED CALCIUM: 1.23 MMOL/L (ref 1.06–1.42)
POC SATURATED O2: 100 % (ref 95–100)
POC SATURATED O2: 100 % (ref 95–100)
POC SATURATED O2: 95 % (ref 95–100)
POC TCO2: 22 MMOL/L (ref 23–27)
POC TCO2: 23 MMOL/L (ref 23–27)
POC TCO2: 25 MMOL/L (ref 23–27)
POCT GLUCOSE: 113 MG/DL (ref 70–110)
POCT GLUCOSE: 152 MG/DL (ref 70–110)
POTASSIUM BLD-SCNC: 4.1 MMOL/L (ref 3.5–5.1)
POTASSIUM BLD-SCNC: 4.2 MMOL/L (ref 3.5–5.1)
POTASSIUM SERPL-SCNC: 4.4 MMOL/L
RBC # BLD AUTO: 3.24 M/UL
SAMPLE: ABNORMAL
SITE: ABNORMAL
SODIUM BLD-SCNC: 139 MMOL/L (ref 136–145)
SODIUM BLD-SCNC: 139 MMOL/L (ref 136–145)
SODIUM SERPL-SCNC: 139 MMOL/L
SP02: 100
WBC # BLD AUTO: 4.47 K/UL

## 2018-08-17 PROCEDURE — D9220A PRA ANESTHESIA: Mod: ANES,,, | Performed by: ANESTHESIOLOGY

## 2018-08-17 PROCEDURE — 80069 RENAL FUNCTION PANEL: CPT

## 2018-08-17 PROCEDURE — 94761 N-INVAS EAR/PLS OXIMETRY MLT: CPT

## 2018-08-17 PROCEDURE — 63600175 PHARM REV CODE 636 W HCPCS: Performed by: NEUROLOGICAL SURGERY

## 2018-08-17 PROCEDURE — 71000033 HC RECOVERY, INTIAL HOUR: Performed by: NEUROLOGICAL SURGERY

## 2018-08-17 PROCEDURE — C9290 INJ, BUPIVACAINE LIPOSOME: HCPCS | Performed by: NEUROLOGICAL SURGERY

## 2018-08-17 PROCEDURE — 25000242 PHARM REV CODE 250 ALT 637 W/ HCPCS: Performed by: STUDENT IN AN ORGANIZED HEALTH CARE EDUCATION/TRAINING PROGRAM

## 2018-08-17 PROCEDURE — 27201423 OPTIME MED/SURG SUP & DEVICES STERILE SUPPLY: Performed by: NEUROLOGICAL SURGERY

## 2018-08-17 PROCEDURE — 37000009 HC ANESTHESIA EA ADD 15 MINS: Performed by: NEUROLOGICAL SURGERY

## 2018-08-17 PROCEDURE — 37000008 HC ANESTHESIA 1ST 15 MINUTES: Performed by: NEUROLOGICAL SURGERY

## 2018-08-17 PROCEDURE — 36620 INSERTION CATHETER ARTERY: CPT | Mod: 59,,, | Performed by: ANESTHESIOLOGY

## 2018-08-17 PROCEDURE — 36415 COLL VENOUS BLD VENIPUNCTURE: CPT

## 2018-08-17 PROCEDURE — 25000003 PHARM REV CODE 250: Performed by: STUDENT IN AN ORGANIZED HEALTH CARE EDUCATION/TRAINING PROGRAM

## 2018-08-17 PROCEDURE — 82962 GLUCOSE BLOOD TEST: CPT | Performed by: NEUROLOGICAL SURGERY

## 2018-08-17 PROCEDURE — C1729 CATH, DRAINAGE: HCPCS | Performed by: NEUROLOGICAL SURGERY

## 2018-08-17 PROCEDURE — 63600175 PHARM REV CODE 636 W HCPCS: Performed by: ANESTHESIOLOGY

## 2018-08-17 PROCEDURE — 25000003 PHARM REV CODE 250: Performed by: INTERNAL MEDICINE

## 2018-08-17 PROCEDURE — D9220A PRA ANESTHESIA: Mod: CRNA,,, | Performed by: NURSE ANESTHETIST, CERTIFIED REGISTERED

## 2018-08-17 PROCEDURE — 63600175 PHARM REV CODE 636 W HCPCS: Performed by: NURSE ANESTHETIST, CERTIFIED REGISTERED

## 2018-08-17 PROCEDURE — 25000003 PHARM REV CODE 250: Performed by: NURSE PRACTITIONER

## 2018-08-17 PROCEDURE — 0RGA0K1 FUSION OF THORACOLUMBAR VERTEBRAL JOINT WITH NONAUTOLOGOUS TISSUE SUBSTITUTE, POSTERIOR APPROACH, POSTERIOR COLUMN, OPEN APPROACH: ICD-10-PCS | Performed by: NEUROLOGICAL SURGERY

## 2018-08-17 PROCEDURE — 99900035 HC TECH TIME PER 15 MIN (STAT)

## 2018-08-17 PROCEDURE — 27000221 HC OXYGEN, UP TO 24 HOURS

## 2018-08-17 PROCEDURE — 71000039 HC RECOVERY, EACH ADD'L HOUR: Performed by: NEUROLOGICAL SURGERY

## 2018-08-17 PROCEDURE — 25000003 PHARM REV CODE 250: Performed by: NURSE ANESTHETIST, CERTIFIED REGISTERED

## 2018-08-17 PROCEDURE — 22327 TREAT THORAX SPINE FRACTURE: CPT | Mod: ,,, | Performed by: NEUROLOGICAL SURGERY

## 2018-08-17 PROCEDURE — 20930 SP BONE ALGRFT MORSEL ADD-ON: CPT | Mod: ,,, | Performed by: NEUROLOGICAL SURGERY

## 2018-08-17 PROCEDURE — 22614 ARTHRD PST TQ 1NTRSPC EA ADD: CPT | Mod: ,,, | Performed by: NEUROLOGICAL SURGERY

## 2018-08-17 PROCEDURE — P9040 RBC LEUKOREDUCED IRRADIATED: HCPCS

## 2018-08-17 PROCEDURE — 36000710: Performed by: NEUROLOGICAL SURGERY

## 2018-08-17 PROCEDURE — C1713 ANCHOR/SCREW BN/BN,TIS/BN: HCPCS | Performed by: NEUROLOGICAL SURGERY

## 2018-08-17 PROCEDURE — 93005 ELECTROCARDIOGRAM TRACING: CPT

## 2018-08-17 PROCEDURE — 25000003 PHARM REV CODE 250: Performed by: NEUROLOGICAL SURGERY

## 2018-08-17 PROCEDURE — 22610 ARTHRD PST TQ 1NTRSPC THRC: CPT | Mod: 51,,, | Performed by: NEUROLOGICAL SURGERY

## 2018-08-17 PROCEDURE — 85025 COMPLETE CBC W/AUTO DIFF WBC: CPT

## 2018-08-17 PROCEDURE — 0RG70K1 FUSION OF 2 TO 7 THORACIC VERTEBRAL JOINTS WITH NONAUTOLOGOUS TISSUE SUBSTITUTE, POSTERIOR APPROACH, POSTERIOR COLUMN, OPEN APPROACH: ICD-10-PCS | Performed by: NEUROLOGICAL SURGERY

## 2018-08-17 PROCEDURE — 0PS404Z REPOSITION THORACIC VERTEBRA WITH INTERNAL FIXATION DEVICE, OPEN APPROACH: ICD-10-PCS | Performed by: NEUROLOGICAL SURGERY

## 2018-08-17 PROCEDURE — 36000711: Performed by: NEUROLOGICAL SURGERY

## 2018-08-17 PROCEDURE — 99233 SBSQ HOSP IP/OBS HIGH 50: CPT | Mod: ,,, | Performed by: INTERNAL MEDICINE

## 2018-08-17 PROCEDURE — 0SG10K1 FUSION OF 2 OR MORE LUMBAR VERTEBRAL JOINTS WITH NONAUTOLOGOUS TISSUE SUBSTITUTE, POSTERIOR APPROACH, POSTERIOR COLUMN, OPEN APPROACH: ICD-10-PCS | Performed by: NEUROLOGICAL SURGERY

## 2018-08-17 PROCEDURE — 99233 SBSQ HOSP IP/OBS HIGH 50: CPT | Mod: ,,, | Performed by: NURSE PRACTITIONER

## 2018-08-17 PROCEDURE — 27201037 HC PRESSURE MONITORING SET UP

## 2018-08-17 PROCEDURE — 22843 INSERT SPINE FIXATION DEVICE: CPT | Mod: ,,, | Performed by: NEUROLOGICAL SURGERY

## 2018-08-17 PROCEDURE — 37799 UNLISTED PX VASCULAR SURGERY: CPT

## 2018-08-17 PROCEDURE — 20000000 HC ICU ROOM

## 2018-08-17 PROCEDURE — 27800903 OPTIME MED/SURG SUP & DEVICES OTHER IMPLANTS: Performed by: NEUROLOGICAL SURGERY

## 2018-08-17 PROCEDURE — 93010 ELECTROCARDIOGRAM REPORT: CPT | Mod: ,,, | Performed by: INTERNAL MEDICINE

## 2018-08-17 PROCEDURE — 82803 BLOOD GASES ANY COMBINATION: CPT

## 2018-08-17 DEVICE — ROD SPINAL PRECUT 5.5 X 480MM: Type: IMPLANTABLE DEVICE | Site: SPINE THORACIC | Status: FUNCTIONAL

## 2018-08-17 DEVICE — MATRIX BONE CELLR VIVIGEN 10CC: Type: IMPLANTABLE DEVICE | Site: BACK | Status: FUNCTIONAL

## 2018-08-17 DEVICE — SCREW BONE SPINAL 5X40MM TIT: Type: IMPLANTABLE DEVICE | Site: SPINE THORACIC | Status: FUNCTIONAL

## 2018-08-17 DEVICE — SCREW INNER SINGLE SET TITANIU: Type: IMPLANTABLE DEVICE | Site: SPINE THORACIC | Status: FUNCTIONAL

## 2018-08-17 DEVICE — SCREW BONE SPINAL CORT 5X45MM: Type: IMPLANTABLE DEVICE | Site: SPINE THORACIC | Status: FUNCTIONAL

## 2018-08-17 DEVICE — SCREW BONE SPINAL CORT 6X45MM: Type: IMPLANTABLE DEVICE | Site: SPINE THORACIC | Status: FUNCTIONAL

## 2018-08-17 RX ORDER — SODIUM,POTASSIUM PHOSPHATES 280-250MG
2 POWDER IN PACKET (EA) ORAL ONCE
Status: COMPLETED | OUTPATIENT
Start: 2018-08-17 | End: 2018-08-17

## 2018-08-17 RX ORDER — ROCURONIUM BROMIDE 10 MG/ML
INJECTION, SOLUTION INTRAVENOUS
Status: DISCONTINUED | OUTPATIENT
Start: 2018-08-17 | End: 2018-08-17

## 2018-08-17 RX ORDER — DEXAMETHASONE SODIUM PHOSPHATE 4 MG/ML
INJECTION, SOLUTION INTRA-ARTICULAR; INTRALESIONAL; INTRAMUSCULAR; INTRAVENOUS; SOFT TISSUE
Status: DISCONTINUED | OUTPATIENT
Start: 2018-08-17 | End: 2018-08-17

## 2018-08-17 RX ORDER — BACITRACIN 50000 [IU]/1
INJECTION, POWDER, FOR SOLUTION INTRAMUSCULAR
Status: DISCONTINUED | OUTPATIENT
Start: 2018-08-17 | End: 2018-08-17

## 2018-08-17 RX ORDER — GLUCAGON 1 MG
1 KIT INJECTION
Status: DISCONTINUED | OUTPATIENT
Start: 2018-08-17 | End: 2018-08-21 | Stop reason: HOSPADM

## 2018-08-17 RX ORDER — FENTANYL CITRATE 50 UG/ML
INJECTION, SOLUTION INTRAMUSCULAR; INTRAVENOUS
Status: DISCONTINUED | OUTPATIENT
Start: 2018-08-17 | End: 2018-08-17

## 2018-08-17 RX ORDER — ACETAMINOPHEN 325 MG/1
650 TABLET ORAL EVERY 6 HOURS PRN
Status: DISCONTINUED | OUTPATIENT
Start: 2018-08-17 | End: 2018-08-21 | Stop reason: HOSPADM

## 2018-08-17 RX ORDER — BUPIVACAINE HYDROCHLORIDE AND EPINEPHRINE 5; 5 MG/ML; UG/ML
INJECTION, SOLUTION EPIDURAL; INTRACAUDAL; PERINEURAL
Status: DISCONTINUED | OUTPATIENT
Start: 2018-08-17 | End: 2018-08-17

## 2018-08-17 RX ORDER — PHENYLEPHRINE HYDROCHLORIDE 10 MG/ML
INJECTION INTRAVENOUS
Status: DISCONTINUED | OUTPATIENT
Start: 2018-08-17 | End: 2018-08-17

## 2018-08-17 RX ORDER — NEOSTIGMINE METHYLSULFATE 1 MG/ML
INJECTION, SOLUTION INTRAVENOUS
Status: DISCONTINUED | OUTPATIENT
Start: 2018-08-17 | End: 2018-08-17

## 2018-08-17 RX ORDER — SODIUM CHLORIDE 9 MG/ML
INJECTION, SOLUTION INTRAVENOUS CONTINUOUS PRN
Status: DISCONTINUED | OUTPATIENT
Start: 2018-08-17 | End: 2018-08-17

## 2018-08-17 RX ORDER — HEPARIN SODIUM 5000 [USP'U]/ML
5000 INJECTION, SOLUTION INTRAVENOUS; SUBCUTANEOUS EVERY 8 HOURS
Status: DISCONTINUED | OUTPATIENT
Start: 2018-08-18 | End: 2018-08-21 | Stop reason: HOSPADM

## 2018-08-17 RX ORDER — MIDAZOLAM HYDROCHLORIDE 1 MG/ML
INJECTION, SOLUTION INTRAMUSCULAR; INTRAVENOUS
Status: DISCONTINUED | OUTPATIENT
Start: 2018-08-17 | End: 2018-08-17

## 2018-08-17 RX ORDER — GLYCOPYRROLATE 0.2 MG/ML
INJECTION INTRAMUSCULAR; INTRAVENOUS
Status: DISCONTINUED | OUTPATIENT
Start: 2018-08-17 | End: 2018-08-17

## 2018-08-17 RX ORDER — CEFAZOLIN SODIUM 1 G/3ML
INJECTION, POWDER, FOR SOLUTION INTRAMUSCULAR; INTRAVENOUS
Status: DISCONTINUED | OUTPATIENT
Start: 2018-08-17 | End: 2018-08-17

## 2018-08-17 RX ORDER — VANCOMYCIN HYDROCHLORIDE 1 G/20ML
INJECTION, POWDER, LYOPHILIZED, FOR SOLUTION INTRAVENOUS
Status: DISCONTINUED | OUTPATIENT
Start: 2018-08-17 | End: 2018-08-17

## 2018-08-17 RX ORDER — SODIUM CHLORIDE 9 MG/ML
INJECTION, SOLUTION INTRAVENOUS CONTINUOUS
Status: DISCONTINUED | OUTPATIENT
Start: 2018-08-17 | End: 2018-08-20

## 2018-08-17 RX ORDER — HYDROMORPHONE HYDROCHLORIDE 1 MG/ML
0.2 INJECTION, SOLUTION INTRAMUSCULAR; INTRAVENOUS; SUBCUTANEOUS EVERY 5 MIN PRN
Status: DISCONTINUED | OUTPATIENT
Start: 2018-08-17 | End: 2018-08-17 | Stop reason: HOSPADM

## 2018-08-17 RX ORDER — PROPOFOL 10 MG/ML
VIAL (ML) INTRAVENOUS CONTINUOUS PRN
Status: DISCONTINUED | OUTPATIENT
Start: 2018-08-17 | End: 2018-08-17

## 2018-08-17 RX ORDER — LIDOCAINE HCL/PF 100 MG/5ML
SYRINGE (ML) INTRAVENOUS
Status: DISCONTINUED | OUTPATIENT
Start: 2018-08-17 | End: 2018-08-17

## 2018-08-17 RX ORDER — SUCCINYLCHOLINE CHLORIDE 20 MG/ML
INJECTION INTRAMUSCULAR; INTRAVENOUS
Status: DISCONTINUED | OUTPATIENT
Start: 2018-08-17 | End: 2018-08-17

## 2018-08-17 RX ORDER — INSULIN ASPART 100 [IU]/ML
1-10 INJECTION, SOLUTION INTRAVENOUS; SUBCUTANEOUS EVERY 6 HOURS PRN
Status: DISCONTINUED | OUTPATIENT
Start: 2018-08-17 | End: 2018-08-19

## 2018-08-17 RX ORDER — SODIUM CHLORIDE 0.9 % (FLUSH) 0.9 %
3 SYRINGE (ML) INJECTION
Status: DISCONTINUED | OUTPATIENT
Start: 2018-08-17 | End: 2018-08-17 | Stop reason: HOSPADM

## 2018-08-17 RX ORDER — KETAMINE HYDROCHLORIDE 10 MG/ML
INJECTION, SOLUTION INTRAMUSCULAR; INTRAVENOUS
Status: DISCONTINUED | OUTPATIENT
Start: 2018-08-17 | End: 2018-08-17

## 2018-08-17 RX ORDER — PROPOFOL 10 MG/ML
VIAL (ML) INTRAVENOUS
Status: DISCONTINUED | OUTPATIENT
Start: 2018-08-17 | End: 2018-08-17

## 2018-08-17 RX ADMIN — SODIUM CHLORIDE 0.4 MCG/KG/MIN: 9 INJECTION, SOLUTION INTRAVENOUS at 09:08

## 2018-08-17 RX ADMIN — SODIUM CHLORIDE: 9 INJECTION, SOLUTION INTRAVENOUS at 07:08

## 2018-08-17 RX ADMIN — GLYCOPYRROLATE 0.4 MG: 0.2 INJECTION, SOLUTION INTRAMUSCULAR; INTRAVENOUS at 12:08

## 2018-08-17 RX ADMIN — HYDROMORPHONE HYDROCHLORIDE 0.2 MG: 1 INJECTION, SOLUTION INTRAMUSCULAR; INTRAVENOUS; SUBCUTANEOUS at 02:08

## 2018-08-17 RX ADMIN — LABETALOL HCL 300 MG: 300 TABLET, FILM COATED ORAL at 05:08

## 2018-08-17 RX ADMIN — FENTANYL CITRATE 100 MCG: 50 INJECTION, SOLUTION INTRAMUSCULAR; INTRAVENOUS at 08:08

## 2018-08-17 RX ADMIN — PHENYLEPHRINE HYDROCHLORIDE 100 MCG: 10 INJECTION INTRAVENOUS at 09:08

## 2018-08-17 RX ADMIN — PHENYLEPHRINE HYDROCHLORIDE 200 MCG: 10 INJECTION INTRAVENOUS at 08:08

## 2018-08-17 RX ADMIN — SODIUM CHLORIDE: 0.9 INJECTION, SOLUTION INTRAVENOUS at 05:08

## 2018-08-17 RX ADMIN — PHENYLEPHRINE HYDROCHLORIDE 100 MCG: 10 INJECTION INTRAVENOUS at 08:08

## 2018-08-17 RX ADMIN — PHENYLEPHRINE HYDROCHLORIDE 100 MCG: 10 INJECTION INTRAVENOUS at 11:08

## 2018-08-17 RX ADMIN — LABETALOL HCL 300 MG: 300 TABLET, FILM COATED ORAL at 08:08

## 2018-08-17 RX ADMIN — FAMOTIDINE 20 MG: 20 TABLET ORAL at 08:08

## 2018-08-17 RX ADMIN — HYDROMORPHONE HYDROCHLORIDE 0.2 MG: 1 INJECTION, SOLUTION INTRAMUSCULAR; INTRAVENOUS; SUBCUTANEOUS at 05:08

## 2018-08-17 RX ADMIN — TRAMADOL HYDROCHLORIDE 50 MG: 50 TABLET, COATED ORAL at 08:08

## 2018-08-17 RX ADMIN — SODIUM CHLORIDE, SODIUM GLUCONATE, SODIUM ACETATE, POTASSIUM CHLORIDE, MAGNESIUM CHLORIDE, SODIUM PHOSPHATE, DIBASIC, AND POTASSIUM PHOSPHATE: .53; .5; .37; .037; .03; .012; .00082 INJECTION, SOLUTION INTRAVENOUS at 11:08

## 2018-08-17 RX ADMIN — KETAMINE HYDROCHLORIDE 30 MG: 10 INJECTION, SOLUTION INTRAMUSCULAR; INTRAVENOUS at 08:08

## 2018-08-17 RX ADMIN — LIDOCAINE HYDROCHLORIDE 100 MG: 20 INJECTION, SOLUTION INTRAVENOUS at 08:08

## 2018-08-17 RX ADMIN — FLUTICASONE PROPIONATE 1 PUFF: 44 AEROSOL, METERED RESPIRATORY (INHALATION) at 08:08

## 2018-08-17 RX ADMIN — FENTANYL CITRATE 50 MCG: 50 INJECTION, SOLUTION INTRAMUSCULAR; INTRAVENOUS at 09:08

## 2018-08-17 RX ADMIN — FENTANYL CITRATE 25 MCG: 50 INJECTION, SOLUTION INTRAMUSCULAR; INTRAVENOUS at 12:08

## 2018-08-17 RX ADMIN — NEOSTIGMINE METHYLSULFATE 3 MG: 1 INJECTION INTRAVENOUS at 12:08

## 2018-08-17 RX ADMIN — MIDAZOLAM HYDROCHLORIDE 2 MG: 1 INJECTION, SOLUTION INTRAMUSCULAR; INTRAVENOUS at 07:08

## 2018-08-17 RX ADMIN — DEXAMETHASONE SODIUM PHOSPHATE 4 MG: 4 INJECTION, SOLUTION INTRAMUSCULAR; INTRAVENOUS at 08:08

## 2018-08-17 RX ADMIN — REMIFENTANIL HYDROCHLORIDE 0.1 MCG: 1 INJECTION, POWDER, LYOPHILIZED, FOR SOLUTION INTRAVENOUS at 08:08

## 2018-08-17 RX ADMIN — SUCCINYLCHOLINE CHLORIDE 100 MG: 20 INJECTION, SOLUTION INTRAMUSCULAR; INTRAVENOUS at 08:08

## 2018-08-17 RX ADMIN — POTASSIUM & SODIUM PHOSPHATES POWDER PACK 280-160-250 MG 2 PACKET: 280-160-250 PACK at 08:08

## 2018-08-17 RX ADMIN — SODIUM CHLORIDE, SODIUM GLUCONATE, SODIUM ACETATE, POTASSIUM CHLORIDE, MAGNESIUM CHLORIDE, SODIUM PHOSPHATE, DIBASIC, AND POTASSIUM PHOSPHATE: .53; .5; .37; .037; .03; .012; .00082 INJECTION, SOLUTION INTRAVENOUS at 08:08

## 2018-08-17 RX ADMIN — PROPOFOL 100 MG: 10 INJECTION, EMULSION INTRAVENOUS at 08:08

## 2018-08-17 RX ADMIN — PROPOFOL 150 MCG/KG/MIN: 10 INJECTION, EMULSION INTRAVENOUS at 08:08

## 2018-08-17 RX ADMIN — DICLOFENAC 2 G: 10 GEL TOPICAL at 08:08

## 2018-08-17 RX ADMIN — CEFAZOLIN 2 G: 330 INJECTION, POWDER, FOR SOLUTION INTRAMUSCULAR; INTRAVENOUS at 08:08

## 2018-08-17 RX ADMIN — ROCURONIUM BROMIDE 20 MG: 10 INJECTION, SOLUTION INTRAVENOUS at 09:08

## 2018-08-17 RX ADMIN — HYDROMORPHONE HYDROCHLORIDE 0.2 MG: 1 INJECTION, SOLUTION INTRAMUSCULAR; INTRAVENOUS; SUBCUTANEOUS at 01:08

## 2018-08-17 NOTE — ASSESSMENT & PLAN NOTE
62 year old female with acute onset on bilateral flank pain that began after lifting a heavy box. Imaging shows a T12 compression fracture along with multiple lytic lesions throughout the spine.     S/p POD0 T9-L3 spine fusion  -- 2 drains and wound vac in place  -- NSGY following   -- MAP>85  -- TLSO brace when OOB  -- Neuro checks q 1 hr  -- PT/OT/SLP

## 2018-08-17 NOTE — ASSESSMENT & PLAN NOTE
- normocytic anemia; likely 2/2 to underlying malignancy  - transfuse for Hgb < 7   - cbc with diff daily while inpatient

## 2018-08-17 NOTE — ASSESSMENT & PLAN NOTE
Per chart review, Plan to discuss therapy once outpatient.  hypercalcemia, anemia, bone fx and lytic lesions. Likely 2/2 to multiple myeloma.  -zometa 4 mg IV x1 8/7/18

## 2018-08-17 NOTE — ANESTHESIA POSTPROCEDURE EVALUATION
"Anesthesia Post Evaluation    Patient: Janie Zamorano    Procedure(s) Performed: Procedure(s) (LRB):  SPINE, THORACIC, WITH FUSION T9-L3,neuromonitoring, please. (N/A)    Final Anesthesia Type: general  Patient location during evaluation: PACU  Patient participation: Yes- Able to Participate  Level of consciousness: awake and lethargic  Post-procedure vital signs: reviewed and stable  Pain management: adequate  Airway patency: patent  PONV status at discharge: No PONV  Anesthetic complications: no      Cardiovascular status: blood pressure returned to baseline  Respiratory status: unassisted, spontaneous ventilation and face mask  Hydration status: euvolemic  Follow-up not needed.        Visit Vitals  BP (!) 149/76 (BP Location: Right arm, Patient Position: Lying)   Pulse 80   Temp 36.4 °C (97.6 °F) (Oral)   Resp 19   Ht 5' 4" (1.626 m)   Wt 70.4 kg (155 lb 3.3 oz)   LMP  (LMP Unknown)   SpO2 100%   Breastfeeding? No   BMI 26.64 kg/m²       Pain/Debby Score: Pain Assessment Performed: Yes (8/17/2018  1:03 PM)  Presence of Pain: non-verbal indicators absent (8/17/2018  1:03 PM)  Pain Rating Prior to Med Admin: 0 (8/17/2018  1:03 PM)  Pain Rating Post Med Admin: 0 (8/17/2018  4:08 AM)  Debby Score: 4 (8/17/2018  1:03 PM)        "

## 2018-08-17 NOTE — HOSPITAL COURSE
8/17: s/p POD1 T9-L3 spinal fusion, 2 drains and hemovac in place  8/18 POD#2 s/p T9 - L3 spinal fusion. No significant events overnight. Labetalol decreased. Hemodynamically stable will step down to NSGY today.

## 2018-08-17 NOTE — PROGRESS NOTES
[facility]  Hematology  Bone Marrow Transplant  Progress Note    Patient Name: Janie Zamorano  Admission Date: 8/3/2018  Hospital Length of Stay: 14 days  Code Status: Full Code    Subjective:     Interval History: Thoracic fusion to be completed in OR today per neurosurgery. VSS. Afebrile. PT/OT to evaluate patient after surgery for recommendations for discharge. Will discuss starting steroids for MM with family this afternoon.     Objective:     Vital Signs (Most Recent):  Temp: 98.2 °F (36.8 °C) (08/17/18 0650)  Pulse: 74 (08/17/18 0650)  Resp: 16 (08/17/18 0650)  BP: 138/72 (08/17/18 0650)  SpO2: 100 % (08/17/18 0650) Vital Signs (24h Range):  Temp:  [97.6 °F (36.4 °C)-98.8 °F (37.1 °C)] 98.2 °F (36.8 °C)  Pulse:  [74-84] 74  Resp:  [16-18] 16  SpO2:  [98 %-100 %] 100 %  BP: (116-159)/(67-91) 138/72     Weight: (pt unable to stand due to MD order bedrest)  Body mass index is 26.64 kg/m².  Body surface area is 1.78 meters squared.    ECOG SCORE         [unfilled]    Intake/Output - Last 3 Shifts       08/15 0700 - 08/16 0659 08/16 0700 - 08/17 0659 08/17 0700 - 08/18 0659    P.O. 25 720     I.V. (mL/kg) 1643.3 (23.3)  1900 (27)    Blood   500    Total Intake(mL/kg) 1668.3 (23.7) 720 (10.2) 2400 (34.1)    Urine (mL/kg/hr) 900 (0.5) 500 (0.3) 445 (1.1)    Blood   300    Total Output 900 500 745    Net +768.3 +220 +1655           Urine Occurrence  2 x     Stool Occurrence 1 x            Physical Exam   Constitutional: She is oriented to person, place, and time. She appears well-developed and well-nourished. No distress.   HENT:   Head: Normocephalic.   Eyes: Conjunctivae and EOM are normal. Pupils are equal, round, and reactive to light.   Neck: Normal range of motion. Neck supple.   Cardiovascular: Regular rhythm and normal heart sounds.   Pulmonary/Chest: Effort normal and breath sounds normal. No respiratory distress.   Abdominal: Soft. Bowel sounds are normal. She exhibits no  distension.   Musculoskeletal: Normal range of motion. She exhibits no edema.   Neurological: She is alert and oriented to person, place, and time. No cranial nerve deficit.   Skin: Skin is warm and dry. She is not diaphoretic.   Psychiatric: She is not aggressive. Thought content is not paranoid. She exhibits abnormal recent memory.       Significant Labs:   CBC:   Recent Labs   Lab  08/16/18   0439  08/17/18   0418   WBC  4.09  4.47   HGB  8.7*  9.0*   HCT  27.0*  28.5*   PLT  288  322    and CMP:   Recent Labs   Lab  08/16/18   0440  08/17/18   0418   NA  137  139   K  4.4  4.4   CL  112*  110   CO2  18*  21*   GLU  81  100   BUN  6*  5*   CREATININE  0.6  0.7   CALCIUM  8.6*  9.5   ALBUMIN  2.6*  2.8*   ANIONGAP  7*  8   EGFRNONAA  >60.0  >60.0       Diagnostic Results:  I have reviewed all pertinent imaging results/findings within the past 24 hours.    Assessment/Plan:     GERD (gastroesophageal reflux disease)    Pepcid BID          Constipation    Continue bowel regimen.              Anemia in neoplastic disease    - normocytic anemia; likely 2/2 to underlying malignancy  - transfuse for Hgb < 7   - cbc with diff daily while inpatient        Lytic bone lesions on xray             Multiple myeloma not having achieved remission    Plan to discuss therapy once outpatient- likely VRD.    - consider 40mg dex x4 days; will discuss with family this afternoon; monitor psych status with steroids   -hypercalcemia, anemia, bone fx and lytic lesions. Likely 2/2 to multiple myeloma.  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   -24 hr urine protein=5940  -immunofixation-A free kappa light chain is present in beta.          Hypercalcemia    Improving/resolved.  Corrected Ca 10.5.   -due to PHPT and MM  -PTH  elevated as well  -Zometa 4 mg IV administered 8/7            Closed compression fracture of thoracic vertebra    Concern for fracture stability.  Thoracic fusion planned for today in OR per neurosurgery.   - Neurosug following, appreciate recs  - TLSO brace   - Pain somewhat controlled, continue diclofenac topical and oxy 5-10 mg q6hr prn        Paranoid schizophrenia    - Will trial Seroquel PRN and follow up with psych recs.    - Outpatient follow up   - monitor closely if started on steroids         Asthma    -Fluticasone 44mcg/Actuation 1 puffs BID  -No signs of symptoms of exacerbation        Other secondary hypertension    - Home meds re-started except the valsartan given patient was having GLENIS  - labetalol dose increased to 300 mg TID  - consider restarting ARB  - continue to monitor BP             VTE Risk Mitigation (From admission, onward)        Ordered     IP VTE HIGH RISK PATIENT  Once      08/03/18 2320     Place MOUNIKA hose  Until discontinued      08/03/18 2320     Place sequential compression device  Until discontinued      08/03/18 1735          Disposition: inpatient    Adele Guardado NP  Bone Marrow Transplant  [facility]

## 2018-08-17 NOTE — ASSESSMENT & PLAN NOTE
- Will trial Seroquel PRN and follow up with psych recs.    - Outpatient follow up   - monitor closely if started on steroids

## 2018-08-17 NOTE — ANESTHESIA PROCEDURE NOTES
Arterial    Diagnosis: spine fracture    Patient location during procedure: done in OR  Procedure start time: 8/17/2018 8:15 AM  Timeout: 8/17/2018 8:15 AM  Procedure end time: 8/17/2018 8:25 AM  Staffing  Anesthesiologist: Missael Gallo MD  Resident/CRNA: Karen Ma CRNA  Performed: anesthesiologist and resident/CRNA   Anesthesiologist was present at the time of the procedure.  Preanesthetic Checklist  Completed: patient identified, site marked, surgical consent, pre-op evaluation, timeout performed, IV checked, risks and benefits discussed, monitors and equipment checked and anesthesia consent givenArterial  Skin Prep: chlorhexidine gluconate  Local Infiltration: none  Orientation: left  Location: radial  Catheter Size: 20 G  Catheter placement by Anatomical landmarks. Heme positive aspiration all ports.Insertion Attempts: 2  Assessment  Dressing: secured with tape and tegaderm  Patient: Tolerated well

## 2018-08-17 NOTE — ASSESSMENT & PLAN NOTE
Improving/resolved.  Corrected Ca 10.5.   -due to PHPT and MM  -PTH elevated as well  -Zometa 4 mg IV administered 8/7

## 2018-08-17 NOTE — H&P
[facility]  Neurocritical Care  History & Physical    Admit Date: 8/3/2018  Service Date: 2018  Length of Stay: 14    Subjective:     Chief Complaint: S/P spinal fusion    History of Present Illness: Ms. Zamorano is a 62 year old female with no PMHx of asthma, GERD, HTN, lytic bone lesions and hypercalcemia (likely myeloma), chronic back pain, osteoporosis and T12 compression fracture after lifting a storage box from the ground who presents to [facility] s/p T9-L3 spine fusion. Per chart review, the patient stated her pain starts in her back but denies any radiation of pain. The pain worsens when she reaches for objects and upon deep inspiration. She also loses her breath if she aggravates her back pain. Patient denies any trauma to the region. Patient admitted to [facility] for close monitoring and higher level of care.         Past Medical History:   Diagnosis Date    Asthma     Depression     GERD (gastroesophageal reflux disease)     Hypertension     Neck pain     Schizophrenia      Past Surgical History:   Procedure Laterality Date     SECTION      X 1    HYSTERECTOMY      KJB---DLH/BSO    LIPOMA RESECTION      back      No current facility-administered medications on file prior to encounter.      Current Outpatient Medications on File Prior to Encounter   Medication Sig Dispense Refill    albuterol 90 mcg/actuation inhaler Inhale 2 puffs into the lungs every 6 (six) hours as needed for Wheezing. 1 each 11    benztropine (COGENTIN) 1 MG tablet TK 1 T PO  BID (Patient taking differently: Take 1 mg by mouth 2 (two) times daily. ) 60 tablet 2    bisacodyl (DULCOLAX) 5 mg EC tablet Take 5 mg by mouth daily as needed for Constipation.      cholecalciferol, vitamin D3, 2,000 unit Cap Take 1 capsule (2,000 Units total) by mouth once daily. 90 capsule 3    cyanocobalamin, vitamin B-12, (VITAMIN B-12 ORAL) Take 1 tablet by mouth daily as needed.      fluticasone (FLONASE) 50  mcg/actuation nasal spray 1 spray by Each Nare route once daily. (Patient taking differently: 1 spray by Each Nare route daily as needed for Allergies. ) 1 Bottle 4    labetalol (NORMODYNE) 100 MG tablet TAKE 1 TABLET BY MOUTH TWICE DAILY 180 tablet 4    pantoprazole (PROTONIX) 40 MG tablet Take 1 tablet (40 mg total) by mouth every 12 (twelve) hours. 60 tablet 3    risperiDONE (RISPERDAL) 1 MG tablet Take 1 tablet (1 mg total) by mouth once daily. (Patient taking differently: Take 1 tablet by mouth  in the morning and 2 at bedtime) 30 tablet 3    sodium phosphates (DISPOSABLE ENEMA) 19-7 gram/118 mL Enem Place 1 enema rectally as needed (constipation).        Allergies: Shellfish containing products    Family History   Problem Relation Age of Onset    Hypertension Mother     Glaucoma Mother     Macular degeneration Mother     Breast cancer Mother     COPD Father     Hypertension Father     Diabetes Brother     Glaucoma Sister     Liver cancer Paternal Uncle 75        dad brother ETOH    Ovarian cancer Neg Hx     Colon cancer Neg Hx     Colon polyps Neg Hx     Cirrhosis Neg Hx     Celiac disease Neg Hx     Ulcerative colitis Neg Hx     Hemochromatosis Neg Hx     Esophageal cancer Neg Hx      Social History     Tobacco Use    Smoking status: Never Smoker    Smokeless tobacco: Never Used   Substance Use Topics    Alcohol use: No     Alcohol/week: 0.0 oz    Drug use: No     Review of Systems   Constitutional: Denies fevers or chills.  Pulmonary: Denies shortness of breath or cough.  Cardiology: Denies chest pain or palpitations.  GI: Denies abdominal pain or constipation.  Neurologic: Denies weakness,  headache, or paresthesias.  Objective:     Vitals:    Temp: 97.6 °F (36.4 °C)  Pulse: 80  Rhythm: normal sinus rhythm  BP: (!) 146/87  MAP (mmHg): 112  Resp: 15  SpO2: 99 %  O2 Device (Oxygen Therapy): room air    Temp  Min: 97.2 °F (36.2 °C)  Max: 98.2 °F (36.8 °C)  Pulse  Min: 73  Max: 84  BP   Min: 107/71  Max: 173/77  MAP (mmHg)  Min: 84  Max: 118  Resp  Min: 12  Max: 26  SpO2  Min: 96 %  Max: 100 %    08/16 0701 - 08/17 0700  In: 720 [P.O.:720]  Out: 500 [Urine:500]   Unmeasured Output  Urine Occurrence: 1  Stool Occurrence: 1  Emesis Occurrence: 0       Physical Exam  GA: drowsy (post-on), no acute distress.   HEENT: No scleral icterus or JVD.   Pulmonary: Clear to auscultation A/L. No wheezing, crackles, or rhonchi.  Cardiac: RRR S1 & S2 w/o rubs/murmurs/gallops.   Abdominal: Bowel sounds present x 4. No appreciable hepatosplenomegaly.  Skin: No jaundice, rashes, or visible lesions.  Neuro:  --GCS: E3 V4 M6  --Mental Status:  Drowsy post-op, orientedX4, delayed responses, speech clear  --CN II-XII grossly intact.   --Pupils 3->2mm, PERRL.   --Corneal reflex, gag, cough intact.  --LUE strength: 5/5  --RUE strength: 5/5  --LLE strength: 5/5  --RLE strength: 5/5  --sensation to light touch intact  Today I personally reviewed pertinent medications, lines/drains/airways, imaging, lab results,         Assessment/Plan:     Neuro   * S/P spinal fusion    62 year old female with acute onset on bilateral flank pain that began after lifting a heavy box. Imaging shows a T12 compression fracture along with multiple lytic lesions throughout the spine.     S/p POD0 T9-L3 spine fusion  -- 2 drains and wound vac in place  -- NSGY following   -- MAP>85  -- TLSO brace when OOB  -- Neuro checks q 1 hr  -- PT/OT/SLP              Closed compression fracture of thoracic vertebra    S/p POD0 T9-L3 spine fusion        Pulmonary   Asthma    -continue fluticasone inhaler  -PRN duoneb        Cardiac/Vascular   Other secondary hypertension    Hx of HTN  -MAP goal >85  -hold hypertensive meds at this time        Renal/   Hypercalcemia     Hypercalcemia resolved s/p zometa        Oncology   Anemia in neoplastic disease    -H/H stable   -MCV 88  -follow CBC daily  -transfuse if HGB <7        Multiple myeloma not having achieved  remission    Per chart review, Plan to discuss therapy once outpatient.  hypercalcemia, anemia, bone fx and lytic lesions. Likely 2/2 to multiple myeloma.  -zometa 4 mg IV x1 8/7/18        GI   Constipation    -senna and miralax        Gastroesophageal reflux disease    -continue famotidine             Prophylaxis:  Venous Thromboembolism: mechanical  Stress Ulcer: H2B  Ventilator Pneumonia: not applicable     Full Code    Carisa Loco NP  Neurocritical Care  [facility]

## 2018-08-17 NOTE — ASSESSMENT & PLAN NOTE
Concern for fracture stability.  Thoracic fusion planned for today in OR per neurosurgery.   - Neurosug following, appreciate recs  - TLSO brace   - Pain somewhat controlled, continue diclofenac topical and oxy 5-10 mg q6hr prn

## 2018-08-17 NOTE — NURSING TRANSFER
Nursing Transfer Note      8/17/2018     Transfer to Neuro ICU [id number] after Xray    Transfer via bed    Transfer with cardiac monitoring    Transported by PACU RN     Medicines sent:     Chart send with patient: yes      Notified: AMRIT Dunn RN; son

## 2018-08-17 NOTE — HPI
Ms. Zamorano is a 62 year old female with no PMHx of asthma, GERD, HTN, lytic bone lesions and hypercalcemia (likely myeloma), chronic back pain, osteoporosis and T12 compression fracture after lifting a storage box from the ground who presents to [facility] s/p T9-L3 spine fusion. Per chart review, the patient stated her pain starts in her back but denies any radiation of pain. The pain worsens when she reaches for objects and upon deep inspiration. She also loses her breath if she aggravates her back pain. Patient denies any trauma to the region. Patient admitted to [facility] for close monitoring and higher level of care.

## 2018-08-17 NOTE — SUBJECTIVE & OBJECTIVE
Past Medical History:   Diagnosis Date    Asthma     Depression     GERD (gastroesophageal reflux disease)     Hypertension     Neck pain     Schizophrenia      Past Surgical History:   Procedure Laterality Date     SECTION      X 1    HYSTERECTOMY  2016    KJB---DLH/BSO    LIPOMA RESECTION      back      No current facility-administered medications on file prior to encounter.      Current Outpatient Medications on File Prior to Encounter   Medication Sig Dispense Refill    albuterol 90 mcg/actuation inhaler Inhale 2 puffs into the lungs every 6 (six) hours as needed for Wheezing. 1 each 11    benztropine (COGENTIN) 1 MG tablet TK 1 T PO  BID (Patient taking differently: Take 1 mg by mouth 2 (two) times daily. ) 60 tablet 2    bisacodyl (DULCOLAX) 5 mg EC tablet Take 5 mg by mouth daily as needed for Constipation.      cholecalciferol, vitamin D3, 2,000 unit Cap Take 1 capsule (2,000 Units total) by mouth once daily. 90 capsule 3    cyanocobalamin, vitamin B-12, (VITAMIN B-12 ORAL) Take 1 tablet by mouth daily as needed.      fluticasone (FLONASE) 50 mcg/actuation nasal spray 1 spray by Each Nare route once daily. (Patient taking differently: 1 spray by Each Nare route daily as needed for Allergies. ) 1 Bottle 4    labetalol (NORMODYNE) 100 MG tablet TAKE 1 TABLET BY MOUTH TWICE DAILY 180 tablet 4    pantoprazole (PROTONIX) 40 MG tablet Take 1 tablet (40 mg total) by mouth every 12 (twelve) hours. 60 tablet 3    risperiDONE (RISPERDAL) 1 MG tablet Take 1 tablet (1 mg total) by mouth once daily. (Patient taking differently: Take 1 tablet by mouth  in the morning and 2 at bedtime) 30 tablet 3    sodium phosphates (DISPOSABLE ENEMA) 19-7 gram/118 mL Enem Place 1 enema rectally as needed (constipation).        Allergies: Shellfish containing products    Family History   Problem Relation Age of Onset    Hypertension Mother     Glaucoma Mother     Macular degeneration Mother     Breast  cancer Mother     COPD Father     Hypertension Father     Diabetes Brother     Glaucoma Sister     Liver cancer Paternal Uncle 75        dad brother ETOH    Ovarian cancer Neg Hx     Colon cancer Neg Hx     Colon polyps Neg Hx     Cirrhosis Neg Hx     Celiac disease Neg Hx     Ulcerative colitis Neg Hx     Hemochromatosis Neg Hx     Esophageal cancer Neg Hx      Social History     Tobacco Use    Smoking status: Never Smoker    Smokeless tobacco: Never Used   Substance Use Topics    Alcohol use: No     Alcohol/week: 0.0 oz    Drug use: No     Review of Systems   Constitutional: Denies fevers or chills.  Pulmonary: Denies shortness of breath or cough.  Cardiology: Denies chest pain or palpitations.  GI: Denies abdominal pain or constipation.  Neurologic: Denies weakness,  headache, or paresthesias.  Objective:     Vitals:    Temp: 97.6 °F (36.4 °C)  Pulse: 80  Rhythm: normal sinus rhythm  BP: (!) 146/87  MAP (mmHg): 112  Resp: 15  SpO2: 99 %  O2 Device (Oxygen Therapy): room air    Temp  Min: 97.2 °F (36.2 °C)  Max: 98.2 °F (36.8 °C)  Pulse  Min: 73  Max: 84  BP  Min: 107/71  Max: 173/77  MAP (mmHg)  Min: 84  Max: 118  Resp  Min: 12  Max: 26  SpO2  Min: 96 %  Max: 100 %    08/16 0701 - 08/17 0700  In: 720 [P.O.:720]  Out: 500 [Urine:500]   Unmeasured Output  Urine Occurrence: 1  Stool Occurrence: 1  Emesis Occurrence: 0       Physical Exam  GA: drowsy (post-on), no acute distress.   HEENT: No scleral icterus or JVD.   Pulmonary: Clear to auscultation A/L. No wheezing, crackles, or rhonchi.  Cardiac: RRR S1 & S2 w/o rubs/murmurs/gallops.   Abdominal: Bowel sounds present x 4. No appreciable hepatosplenomegaly.  Skin: No jaundice, rashes, or visible lesions.  Neuro:  --GCS: E3 V4 M6  --Mental Status:  Drowsy post-op, orientedX4, delayed responses, speech clear  --CN II-XII grossly intact.   --Pupils 3->2mm, PERRL.   --Corneal reflex, gag, cough intact.  --LUE strength: 5/5  --RUE strength: 5/5  --LLE  strength: 5/5  --RLE strength: 5/5  --sensation to light touch intact  Today I personally reviewed pertinent medications, lines/drains/airways, imaging, lab results,

## 2018-08-17 NOTE — SUBJECTIVE & OBJECTIVE
Subjective:     Interval History: Thoracic fusion to be completed in OR today per neurosurgery. VSS. Afebrile. PT/OT to evaluate patient after surgery for recommendations for discharge. Will discuss starting steroids for MM with family this afternoon.     Objective:     Vital Signs (Most Recent):  Temp: 98.2 °F (36.8 °C) (08/17/18 0650)  Pulse: 74 (08/17/18 0650)  Resp: 16 (08/17/18 0650)  BP: 138/72 (08/17/18 0650)  SpO2: 100 % (08/17/18 0650) Vital Signs (24h Range):  Temp:  [97.6 °F (36.4 °C)-98.8 °F (37.1 °C)] 98.2 °F (36.8 °C)  Pulse:  [74-84] 74  Resp:  [16-18] 16  SpO2:  [98 %-100 %] 100 %  BP: (116-159)/(67-91) 138/72     Weight: (pt unable to stand due to MD order bedrest)  Body mass index is 26.64 kg/m².  Body surface area is 1.78 meters squared.    ECOG SCORE         [unfilled]    Intake/Output - Last 3 Shifts       08/15 0700 - 08/16 0659 08/16 0700 - 08/17 0659 08/17 0700 - 08/18 0659    P.O. 25 720     I.V. (mL/kg) 1643.3 (23.3)  1900 (27)    Blood   500    Total Intake(mL/kg) 1668.3 (23.7) 720 (10.2) 2400 (34.1)    Urine (mL/kg/hr) 900 (0.5) 500 (0.3) 445 (1.1)    Blood   300    Total Output 900 500 745    Net +768.3 +220 +1655           Urine Occurrence  2 x     Stool Occurrence 1 x            Physical Exam   Constitutional: She is oriented to person, place, and time. She appears well-developed and well-nourished. No distress.   HENT:   Head: Normocephalic.   Eyes: Conjunctivae and EOM are normal. Pupils are equal, round, and reactive to light.   Neck: Normal range of motion. Neck supple.   Cardiovascular: Regular rhythm and normal heart sounds.   Pulmonary/Chest: Effort normal and breath sounds normal. No respiratory distress.   Abdominal: Soft. Bowel sounds are normal. She exhibits no distension.   Musculoskeletal: Normal range of motion. She exhibits no edema.   Neurological: She is alert and oriented to person, place, and time. No cranial nerve deficit.   Skin: Skin is warm and dry. She is not  diaphoretic.   Psychiatric: She is not aggressive. Thought content is not paranoid. She exhibits abnormal recent memory.       Significant Labs:   CBC:   Recent Labs   Lab  08/16/18   0439  08/17/18   0418   WBC  4.09  4.47   HGB  8.7*  9.0*   HCT  27.0*  28.5*   PLT  288  322    and CMP:   Recent Labs   Lab  08/16/18   0440  08/17/18   0418   NA  137  139   K  4.4  4.4   CL  112*  110   CO2  18*  21*   GLU  81  100   BUN  6*  5*   CREATININE  0.6  0.7   CALCIUM  8.6*  9.5   ALBUMIN  2.6*  2.8*   ANIONGAP  7*  8   EGFRNONAA  >60.0  >60.0       Diagnostic Results:  I have reviewed all pertinent imaging results/findings within the past 24 hours.

## 2018-08-17 NOTE — PLAN OF CARE
Problem: Patient Care Overview  Goal: Plan of Care Review  Patient remains free from falls and injury this shift. Bed in low, locked position with call bell in reach. Son at bedside. Patient encouraged to call for assistance when needed. Patient verbalized understanding. Pt had no complaints of pain or discomfort this shift. Bedrest. Pt wearing back brace. NPO past midnight for morning surgery. Pt refused some of her medications. VS WNL. Afebrile. All belongings within reach will continue to monitor.'

## 2018-08-17 NOTE — ASSESSMENT & PLAN NOTE
- Home meds re-started except the valsartan given patient was having GLENIS  - labetalol dose increased to 300 mg TID  - consider restarting ARB  - continue to monitor BP

## 2018-08-17 NOTE — TRANSFER OF CARE
"Anesthesia Transfer of Care Note    Patient: Janie Zamorano    Procedure(s) Performed: Procedure(s) (LRB):  SPINE, THORACIC, WITH FUSION T9-L3,neuromonitoring, please. (N/A)    Patient location: PACU    Anesthesia Type: general    Transport from OR: Transported from OR on 6-10 L/min O2 by face mask with adequate spontaneous ventilation    Post pain: adequate analgesia    Post assessment: no apparent anesthetic complications    Post vital signs: stable    Level of consciousness: sedated    Nausea/Vomiting: no nausea/vomiting    Complications: none    Transfer of care protocol was followed      Last vitals:   Visit Vitals  /68 (BP Location: Right arm, Patient Position: Lying)   Pulse 78   Temp 36.4 °C (97.6 °F) (Temporal)   Resp 16   Ht 5' 4" (1.626 m)   Wt 70.4 kg (155 lb 3.3 oz)   LMP  (LMP Unknown)   SpO2 100%   Breastfeeding? No   BMI 26.64 kg/m²     "

## 2018-08-17 NOTE — ASSESSMENT & PLAN NOTE
Plan to discuss therapy once outpatient- likely VRD.    - consider 40mg dex x4 days; will discuss with family this afternoon; monitor psych status with steroids   -hypercalcemia, anemia, bone fx and lytic lesions. Likely 2/2 to multiple myeloma.  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   -24 hr urine protein=5940  -immunofixation-A free kappa light chain is present in beta.

## 2018-08-18 PROBLEM — N17.9 ACUTE KIDNEY INJURY: Status: ACTIVE | Noted: 2018-08-18

## 2018-08-18 LAB
ALBUMIN SERPL BCP-MCNC: 2.6 G/DL
ALP SERPL-CCNC: 69 U/L
ALT SERPL W/O P-5'-P-CCNC: 22 U/L
ANION GAP SERPL CALC-SCNC: 9 MMOL/L
AST SERPL-CCNC: 24 U/L
BASOPHILS # BLD AUTO: 0.03 K/UL
BASOPHILS NFR BLD: 0.4 %
BILIRUB SERPL-MCNC: 0.3 MG/DL
BUN SERPL-MCNC: 7 MG/DL
CALCIUM SERPL-MCNC: 7.7 MG/DL
CHLORIDE SERPL-SCNC: 112 MMOL/L
CO2 SERPL-SCNC: 16 MMOL/L
CREAT SERPL-MCNC: 0.7 MG/DL
DIFFERENTIAL METHOD: ABNORMAL
EOSINOPHIL # BLD AUTO: 0 K/UL
EOSINOPHIL NFR BLD: 0.1 %
ERYTHROCYTE [DISTWIDTH] IN BLOOD BY AUTOMATED COUNT: 14.3 %
EST. GFR  (AFRICAN AMERICAN): >60 ML/MIN/1.73 M^2
EST. GFR  (NON AFRICAN AMERICAN): >60 ML/MIN/1.73 M^2
GLUCOSE SERPL-MCNC: 100 MG/DL
HCT VFR BLD AUTO: 26.1 %
HGB BLD-MCNC: 8.3 G/DL
IMM GRANULOCYTES # BLD AUTO: 0.1 K/UL
IMM GRANULOCYTES NFR BLD AUTO: 1.2 %
LYMPHOCYTES # BLD AUTO: 0.7 K/UL
LYMPHOCYTES NFR BLD: 7.8 %
MAGNESIUM SERPL-MCNC: 1.6 MG/DL
MCH RBC QN AUTO: 27.5 PG
MCHC RBC AUTO-ENTMCNC: 31.8 G/DL
MCV RBC AUTO: 86 FL
MONOCYTES # BLD AUTO: 0.5 K/UL
MONOCYTES NFR BLD: 6.5 %
NEUTROPHILS # BLD AUTO: 7 K/UL
NEUTROPHILS NFR BLD: 84 %
NRBC BLD-RTO: 0 /100 WBC
PHOSPHATE SERPL-MCNC: 3.1 MG/DL
PLATELET # BLD AUTO: 285 K/UL
PMV BLD AUTO: 9.3 FL
POCT GLUCOSE: 111 MG/DL (ref 70–110)
POCT GLUCOSE: 93 MG/DL (ref 70–110)
POCT GLUCOSE: 96 MG/DL (ref 70–110)
POTASSIUM SERPL-SCNC: 4.8 MMOL/L
PROT SERPL-MCNC: 5.3 G/DL
RBC # BLD AUTO: 3.02 M/UL
SODIUM SERPL-SCNC: 137 MMOL/L
WBC # BLD AUTO: 8.32 K/UL

## 2018-08-18 PROCEDURE — 94761 N-INVAS EAR/PLS OXIMETRY MLT: CPT

## 2018-08-18 PROCEDURE — G8996 SWALLOW CURRENT STATUS: HCPCS | Mod: CH

## 2018-08-18 PROCEDURE — 63600175 PHARM REV CODE 636 W HCPCS: Performed by: STUDENT IN AN ORGANIZED HEALTH CARE EDUCATION/TRAINING PROGRAM

## 2018-08-18 PROCEDURE — 25000003 PHARM REV CODE 250: Performed by: INTERNAL MEDICINE

## 2018-08-18 PROCEDURE — 36415 COLL VENOUS BLD VENIPUNCTURE: CPT

## 2018-08-18 PROCEDURE — G8998 SWALLOW D/C STATUS: HCPCS | Mod: CH

## 2018-08-18 PROCEDURE — G8997 SWALLOW GOAL STATUS: HCPCS | Mod: CH

## 2018-08-18 PROCEDURE — 25000003 PHARM REV CODE 250: Performed by: STUDENT IN AN ORGANIZED HEALTH CARE EDUCATION/TRAINING PROGRAM

## 2018-08-18 PROCEDURE — 99233 SBSQ HOSP IP/OBS HIGH 50: CPT | Mod: GC,,, | Performed by: INTERNAL MEDICINE

## 2018-08-18 PROCEDURE — 97168 OT RE-EVAL EST PLAN CARE: CPT

## 2018-08-18 PROCEDURE — 83735 ASSAY OF MAGNESIUM: CPT

## 2018-08-18 PROCEDURE — 92610 EVALUATE SWALLOWING FUNCTION: CPT

## 2018-08-18 PROCEDURE — 99233 SBSQ HOSP IP/OBS HIGH 50: CPT | Mod: ,,, | Performed by: NURSE PRACTITIONER

## 2018-08-18 PROCEDURE — 84100 ASSAY OF PHOSPHORUS: CPT

## 2018-08-18 PROCEDURE — 85025 COMPLETE CBC W/AUTO DIFF WBC: CPT

## 2018-08-18 PROCEDURE — 20600001 HC STEP DOWN PRIVATE ROOM

## 2018-08-18 PROCEDURE — 25000003 PHARM REV CODE 250: Performed by: PSYCHIATRY & NEUROLOGY

## 2018-08-18 PROCEDURE — 80053 COMPREHEN METABOLIC PANEL: CPT

## 2018-08-18 PROCEDURE — 25000003 PHARM REV CODE 250: Performed by: NURSE PRACTITIONER

## 2018-08-18 PROCEDURE — 97530 THERAPEUTIC ACTIVITIES: CPT

## 2018-08-18 RX ORDER — LABETALOL 100 MG/1
100 TABLET, FILM COATED ORAL 3 TIMES DAILY
Status: DISCONTINUED | OUTPATIENT
Start: 2018-08-18 | End: 2018-08-21 | Stop reason: HOSPADM

## 2018-08-18 RX ORDER — OXYCODONE HCL 10 MG/1
10 TABLET, FILM COATED, EXTENDED RELEASE ORAL EVERY 12 HOURS
Status: DISCONTINUED | OUTPATIENT
Start: 2018-08-18 | End: 2018-08-21 | Stop reason: HOSPADM

## 2018-08-18 RX ADMIN — ACETAMINOPHEN 650 MG: 325 TABLET ORAL at 08:08

## 2018-08-18 RX ADMIN — LABETALOL HCL 100 MG: 100 TABLET, FILM COATED ORAL at 08:08

## 2018-08-18 RX ADMIN — TRAMADOL HYDROCHLORIDE 50 MG: 50 TABLET, COATED ORAL at 05:08

## 2018-08-18 RX ADMIN — DICLOFENAC 2 G: 10 GEL TOPICAL at 04:08

## 2018-08-18 RX ADMIN — ACETAMINOPHEN 650 MG: 325 TABLET ORAL at 01:08

## 2018-08-18 RX ADMIN — TRAMADOL HYDROCHLORIDE 50 MG: 50 TABLET, COATED ORAL at 01:08

## 2018-08-18 RX ADMIN — DICLOFENAC 2 G: 10 GEL TOPICAL at 08:08

## 2018-08-18 RX ADMIN — DICLOFENAC 2 G: 10 GEL TOPICAL at 01:08

## 2018-08-18 RX ADMIN — SENNOSIDES AND DOCUSATE SODIUM 1 TABLET: 8.6; 5 TABLET ORAL at 08:08

## 2018-08-18 RX ADMIN — OXYCODONE HYDROCHLORIDE 10 MG: 10 TABLET, FILM COATED, EXTENDED RELEASE ORAL at 06:08

## 2018-08-18 RX ADMIN — LABETALOL HCL 300 MG: 300 TABLET, FILM COATED ORAL at 08:08

## 2018-08-18 RX ADMIN — HEPARIN SODIUM 5000 UNITS: 5000 INJECTION, SOLUTION INTRAVENOUS; SUBCUTANEOUS at 01:08

## 2018-08-18 RX ADMIN — TRAMADOL HYDROCHLORIDE 50 MG: 50 TABLET, COATED ORAL at 08:08

## 2018-08-18 RX ADMIN — FAMOTIDINE 20 MG: 20 TABLET ORAL at 08:08

## 2018-08-18 RX ADMIN — HEPARIN SODIUM 5000 UNITS: 5000 INJECTION, SOLUTION INTRAVENOUS; SUBCUTANEOUS at 10:08

## 2018-08-18 RX ADMIN — ONDANSETRON 4 MG: 2 INJECTION INTRAMUSCULAR; INTRAVENOUS at 10:08

## 2018-08-18 RX ADMIN — FLUTICASONE PROPIONATE 1 PUFF: 44 AEROSOL, METERED RESPIRATORY (INHALATION) at 08:08

## 2018-08-18 RX ADMIN — HEPARIN SODIUM 5000 UNITS: 5000 INJECTION, SOLUTION INTRAVENOUS; SUBCUTANEOUS at 05:08

## 2018-08-18 NOTE — PROGRESS NOTES
[facility]  Neurocritical Care  Progress Note    Admit Date: 8/3/2018  Service Date: 08/18/2018  Length of Stay: 15    Subjective:     Chief Complaint: S/P spinal fusion    History of Present Illness: Ms. Zamorano is a 62 year old female with no PMHx of asthma, GERD, HTN, lytic bone lesions and hypercalcemia (likely myeloma), chronic back pain, osteoporosis and T12 compression fracture after lifting a storage box from the ground who presents to [facility] s/p T9-L3 spine fusion. Per chart review, the patient stated her pain starts in her back but denies any radiation of pain. The pain worsens when she reaches for objects and upon deep inspiration. She also loses her breath if she aggravates her back pain. Patient denies any trauma to the region. Patient admitted to [facility] for close monitoring and higher level of care.                               Hospital Course: 8/17: s/p POD1 T9-L3 spinal fusion, 2 drains and hemovac in place  8/18 POD#2 s/p T9 - L3 spinal fusion. No significant events overnight. Labetalol decreased. Hemodynamically stable will step down to NSGY today.    Interval History: POD#2 s/p T9 - L3 spinal fusion. No significant events overnight. Labetalol decreased. Hemodynamically stable will step down to NSGY today.      Review of Systems: incisional pain  Constitutional: Denies fevers or chills.  Pulmonary: Denies shortness of breath or cough.  Cardiology: Denies chest pain or palpitations.  GI: Denies abdominal pain or constipation.  Neurologic: Denies new weakness,  headache, or paresthesias.    Vitals:   Temp: 98.8 °F (37.1 °C)  Pulse: 84  Rhythm: normal sinus rhythm  BP: 122/74  MAP (mmHg): 83  Resp: 20  SpO2: 96 %  O2 Device (Oxygen Therapy): room air    Temp  Min: 97.8 °F (36.6 °C)  Max: 98.8 °F (37.1 °C)  Pulse  Min: 77  Max: 87  BP  Min: 100/58  Max: 157/97  MAP (mmHg)  Min: 74  Max: 120  Resp  Min: 9  Max: 39  SpO2  Min: 96 %  Max: 100 %    08/17 0701 - 08/18 0700  In: 4429.2  [I.V.:3929.2]  Out: 2440 [Urine:1855; Drains:285]   Unmeasured Output  Urine Occurrence: 1  Stool Occurrence: 0  Emesis Occurrence: 0     Examination:   Constitutional: Well-nourished and -developed. No apparent distress.   Eyes: Conjunctiva clear, anicteric. Lids no lesions.  Head/Ears/Nose/Mouth/Throat/Neck: Moist mucous membranes. External ears, nose atraumatic.   Cardiovascular: Regular rhythm. No murmurs. No leg edema.  Respiratory: Comfortable respirations. Clear to auscultation.  Gastrointestinal: No hernia. Soft, nondistended, nontender. + bowel sounds.    Neurologic:  -GCS [id number]  -Alert. Oriented to person, place, and time. Speech fluent. Follows commands.  -Cranial nerves II-XII grossly intact PERRL 3+.  EOMI. +cough, gag, corneals  -Motor5/5 all extremities, moves spontaneously and to command  -Sensation bilat intact to all extremities    Medications:   Continuous  sodium chloride 0.9% Last Rate: 50 mL/hr at 08/18/18 1605   Scheduled  diclofenac sodium 2 g QID   famotidine 20 mg BID   fluticasone 1 puff BID   heparin (porcine) 5,000 Units Q8H   labetalol 100 mg TID   oxyCODONE 10 mg Q12H   polyethylene glycol 17 g Daily   senna-docusate 8.6-50 mg 1 tablet BID   PRN  acetaminophen 650 mg Q6H PRN   albuterol-ipratropium 3 mL Q4H PRN   dextrose 50% 12.5 g PRN   dextrose 50% 12.5 g PRN   dextrose 50% 25 g PRN   glucagon (human recombinant) 1 mg PRN   glucagon (human recombinant) 1 mg PRN   glucose 16 g PRN   glucose 24 g PRN   hydrALAZINE 25 mg Q8H PRN   insulin aspart U-100 1-10 Units Q6H PRN   methyl salicylate-menthol 15-10%  TID PRN   ondansetron 4 mg Q6H PRN   QUEtiapine 50 mg Q6H PRN   ramelteon 8 mg Nightly PRN   sodium chloride 0.9% 5 mL PRN   traMADol 50 mg Q6H PRN      Today I independently reviewed pertinent medications, lines/drains/airways, imaging, lab results, microbiology results, notably:     ISTAT: No results for input(s): PH, PCO2, PO2, POCSATURATED, HCO3, BE, POCNA, POCK, POCTCO2, POCGLU,  POCICA, POCLAC, SAMPLE in the last 24 hours.   Chem: Recent Labs   Lab  08/18/18   0219   NA  137   K  4.8   CL  112*   CO2  16*   GLU  100   BUN  7*   CREATININE  0.7   ESTGFRAFRICA  >60.0   EGFRNONAA  >60.0   CALCIUM  7.7*   MG  1.6   PHOS  3.1   ANIONGAP  9   PROT  5.3*   ALBUMIN  2.6*   BILITOT  0.3   ALKPHOS  69   AST  24   ALT  22     Heme: Recent Labs   Lab  08/18/18   0217   WBC  8.32   HGB  8.3*   HCT  26.1*   PLT  285     Endo:   Recent Labs   Lab  08/18/18   0513  08/18/18   1054  08/18/18   1701   POCTGLUCOSE  96  111*  93      Assessment/Plan:     Neuro   * S/P spinal fusion    62 year old female with acute onset on bilateral flank pain that began after lifting a heavy box. Imaging shows a T12 compression fracture along with multiple lytic lesions throughout the spine.     S/p POD1 T9-L3 spine fusion  -- 2 drains and wound vac in place  -- NSGY following   -- MAP>65  -- TLSO brace when OOB  -- Neuro checks q 1 hr  IVF dcd  diclofenic 2g q6h  Tramadol 50mg q6prn pain  seroquel 50mg q6h prn agitation  -- PT/OT/SLP              Pulmonary   Asthma    -continue fluticasone inhaler 1 puff bid  duonebs q4 prn          Renal/   Hypercalcemia     Hypercalcemia resolved s/p zometa        Oncology   Anemia in neoplastic disease    -H/H stable 8.3/26.1  -MCV 86  -follow CBC daily  -transfuse if HGB <7        Multiple myeloma not having achieved remission    Per chart review, Plan to discuss therapy once outpatient.  hypercalcemia, anemia, bone fx and lytic lesions.   Likely 2/2 to multiple myeloma.  -zometa 4 mg IV x1 8/7/18        GI   Constipation    -senna and miralax        Gastroesophageal reflux disease    -continue famotidine 20mg daily            Prophylaxis:  Venous Thromboembolism: mechanical chemical  Stress Ulcer: H2B  Ventilator Pneumonia: not applicable     Activity Orders          None        Full Code     I have spent 35 min with this patient, with over 50% of this time spent coordinating care and  speaking with the family  Gladis Page NP  Neurocritical Care  [facility]

## 2018-08-18 NOTE — PROGRESS NOTES
Notified NCC patients MAP <85, NCC to change medication dosages and to continue to monitor at this time until MAP goals reached.

## 2018-08-18 NOTE — PLAN OF CARE
Problem: SLP Goal  Goal: SLP Goal  SLP Clinical Swallow Evaluation completed. See note for details.     Kaitlynn Rojas MS, CCC-SLP  Speech Language Pathologist  Pager: [phone]  8/18/2018

## 2018-08-18 NOTE — SUBJECTIVE & OBJECTIVE
Subjective:     Interval History: POD 1.  Doing welll. Up in chair.  Reports pain is controled with PO meds.  Discussed short term steroids for multiple myeloma.  Otherwise no new complaints.  Discussed expected outpatient course with son at bedside.      Objective:     Vital Signs (Most Recent):  Temp: 98.1 °F (36.7 °C) (08/18/18 0705)  Pulse: 84 (08/18/18 0905)  Resp: 20 (08/18/18 0905)  BP: 109/67 (08/18/18 0905)  SpO2: 100 % (08/18/18 0905) Vital Signs (24h Range):  Temp:  [97.2 °F (36.2 °C)-98.2 °F (36.8 °C)] 98.1 °F (36.7 °C)  Pulse:  [73-87] 84  Resp:  [9-39] 20  SpO2:  [96 %-100 %] 100 %  BP: (106-173)/(60-97) 109/67  Arterial Line BP: (104-163)/(58-95) 136/69     Weight: 68 kg (149 lb 14.6 oz)  Body mass index is 25.73 kg/m².  Body surface area is 1.75 meters squared.    ECOG SCORE         [unfilled]    Intake/Output - Last 3 Shifts       08/16 0700 - 08/17 0659 08/17 0700 - 08/18 0659 08/18 0700 - 08/19 0659    P.O. 720  150    I.V. (mL/kg)  3929.2 (57.8) 200 (2.9)    Blood  500     Total Intake(mL/kg) 720 (10.2) 4429.2 (65.1) 350 (5.1)    Urine (mL/kg/hr) 500 (0.3) 1855 (1.1)     Emesis/NG output   100    Drains  285     Other  0     Stool  0     Blood  300     Total Output 500 2440 100    Net +220 +1989.2 +250           Urine Occurrence 2 x      Stool Occurrence  0 x           Physical Exam   Constitutional: She is oriented to person, place, and time. She appears well-developed and well-nourished. No distress.   TLSO brace    HENT:   Head: Normocephalic.   Eyes: Conjunctivae and EOM are normal. Pupils are equal, round, and reactive to light.   Neck: Normal range of motion. Neck supple.   Cardiovascular: Regular rhythm and normal heart sounds.   Pulmonary/Chest: Effort normal and breath sounds normal. No respiratory distress.   Abdominal: Soft. Bowel sounds are normal. She exhibits no distension.   Musculoskeletal: Normal range of motion. She exhibits no edema.   Neurological: She is alert and oriented to  person, place, and time. No cranial nerve deficit.   Skin: Skin is warm and dry. She is not diaphoretic.   Psychiatric: She is not aggressive. Thought content is not paranoid. She exhibits abnormal recent memory.       Significant Labs:   All pertinent labs from the last 24 hours have been reviewed.    Diagnostic Results:  I have reviewed all pertinent imaging results/findings within the past 24 hours.

## 2018-08-18 NOTE — PLAN OF CARE
Problem: Patient Care Overview  Goal: Plan of Care Review  Outcome: Ongoing (interventions implemented as appropriate)  No acute events throughout the day, VS and assessment per flow sheet, patient progressing towards goal as tolerated. Pt received 500 ml bolus to maintain MAPs >85.  Plan of care reviewed with Janie Zamorano and son at 0400, all concerns addressed. Will continue to monitor.

## 2018-08-18 NOTE — ASSESSMENT & PLAN NOTE
Plan to discuss therapy once outpatient- likely VRD.    - consider 40mg dex x4 days; discussing with neurosurgery    -hypercalcemia, anemia, bone fx and lytic lesions. Likely 2/2 to multiple myeloma.  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   -24 hr urine protein=5940  -immunofixation-A free kappa light chain is present in beta.

## 2018-08-18 NOTE — ASSESSMENT & PLAN NOTE
Concern for fracture stability.  S/P Thoracic fusion. PT/OT per neurocrit.    - Neurosug following, appreciate recs  - TLSO brace   - Pain somewhat controlled, continue diclofenac topical and oxy 5-10 mg q6hr prn

## 2018-08-18 NOTE — ASSESSMENT & PLAN NOTE
62 year old female with acute onset on bilateral flank pain that began after lifting a heavy box. Imaging shows a T12 compression fracture along with multiple lytic lesions throughout the spine.     S/p POD1 T9-L3 spine fusion  -- 2 drains and wound vac in place  -- NSGY following   -- MAP>65  -- TLSO brace when OOB  -- Neuro checks q 1 hr  IVF dcd  diclofenic 2g q6h  Tramadol 50mg q6prn pain  seroquel 50mg q6h prn agitation  -- PT/OT/SLP

## 2018-08-18 NOTE — ASSESSMENT & PLAN NOTE
62 year old female with acute onset on bilateral flank pain that began after lifting a heavy object. Imaging shows a T12 compression fracture along with multiple lytic lesions throughout the spine now s/p T9-L3 fusion 8/17:    -Patient admitted to Neuro-ICU; primary management per NCC   -Stable for TTF  -Patient neurologically stable without s/s of myelopathy   -CT Thoracic/Upright and supine x-rays consistent with an unstable fracture  -Keep in TLSO brace at all times. Continue bed rest.  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 Plans to discuss therapy once outpatient. Continue management per primary team.   -Drain stays in   -SQH/SCDs/TEDs  -Please call with questions or change in neurologic status

## 2018-08-18 NOTE — NURSING TRANSFER
Nursing Transfer Note      8/18/2018     Transfer to 1051    Transfer via bed    Transfer with personal belongings/son    Transported by PCT/RN    Medicines sent: yes    Chart send with patient: Yes    Notified: JUAN CARLOS Dunn    Patient reassessed at: 1635    Upon arrival to floor: patient stable, no acute distress, hemodynamically stable, resting comfortable, ramila RN at bedside to assess, son at bedside, all personal belongings with patient/son

## 2018-08-18 NOTE — PROGRESS NOTES
[facility]  Hematology  Bone Marrow Transplant  Progress Note    Patient Name: Janie Zamorano  Admission Date: 8/3/2018  Hospital Length of Stay: 15 days  Code Status: Full Code    Subjective:     Interval History: POD 1.  Doing welll. Up in chair.  Reports pain is controled with PO meds.  Discussed short term steroids for multiple myeloma.  Otherwise no new complaints.  Discussed expected outpatient course with son at bedside.      Objective:     Vital Signs (Most Recent):  Temp: 98.1 °F (36.7 °C) (08/18/18 0705)  Pulse: 84 (08/18/18 0905)  Resp: 20 (08/18/18 0905)  BP: 109/67 (08/18/18 0905)  SpO2: 100 % (08/18/18 0905) Vital Signs (24h Range):  Temp:  [97.2 °F (36.2 °C)-98.2 °F (36.8 °C)] 98.1 °F (36.7 °C)  Pulse:  [73-87] 84  Resp:  [9-39] 20  SpO2:  [96 %-100 %] 100 %  BP: (106-173)/(60-97) 109/67  Arterial Line BP: (104-163)/(58-95) 136/69     Weight: 68 kg (149 lb 14.6 oz)  Body mass index is 25.73 kg/m².  Body surface area is 1.75 meters squared.    ECOG SCORE         [unfilled]    Intake/Output - Last 3 Shifts       08/16 0700 - 08/17 0659 08/17 0700 - 08/18 0659 08/18 0700 - 08/19 0659    P.O. 720  150    I.V. (mL/kg)  3929.2 (57.8) 200 (2.9)    Blood  500     Total Intake(mL/kg) 720 (10.2) 4429.2 (65.1) 350 (5.1)    Urine (mL/kg/hr) 500 (0.3) 1855 (1.1)     Emesis/NG output   100    Drains  285     Other  0     Stool  0     Blood  300     Total Output 500 2440 100    Net +220 +1989.2 +250           Urine Occurrence 2 x      Stool Occurrence  0 x           Physical Exam   Constitutional: She is oriented to person, place, and time. She appears well-developed and well-nourished. No distress.   TLSO brace    HENT:   Head: Normocephalic.   Eyes: Conjunctivae and EOM are normal. Pupils are equal, round, and reactive to light.   Neck: Normal range of motion. Neck supple.   Cardiovascular: Regular rhythm and normal heart sounds.   Pulmonary/Chest: Effort normal and breath sounds normal.  No respiratory distress.   Abdominal: Soft. Bowel sounds are normal. She exhibits no distension.   Musculoskeletal: Normal range of motion. She exhibits no edema.   Neurological: She is alert and oriented to person, place, and time. No cranial nerve deficit.   Skin: Skin is warm and dry. She is not diaphoretic.   Psychiatric: She is not aggressive. Thought content is not paranoid. She exhibits abnormal recent memory.       Significant Labs:   All pertinent labs from the last 24 hours have been reviewed.    Diagnostic Results:  I have reviewed all pertinent imaging results/findings within the past 24 hours.    Assessment/Plan:     * S/P spinal fusion    8/18, PT/OT expect SNF vs Rehab         Hypercalcemia    Improving/resolved.    -due to PHPT and MM   -PTH elevated as well  -Zometa 4 mg IV administered 8/7            Closed compression fracture of thoracic vertebra    Concern for fracture stability.  S/P Thoracic fusion. PT/OT per neurocrit.    - Neurosug following, appreciate recs  - TLSO brace   - Pain somewhat controlled, continue diclofenac topical and oxy 5-10 mg q6hr prn        Multiple myeloma not having achieved remission    Plan to discuss therapy once outpatient- likely VRD.    - consider 40mg dex x4 days; discussing with neurosurgery    -hypercalcemia, anemia, bone fx and lytic lesions. Likely 2/2 to multiple myeloma.  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   -24 hr urine protein=5940  -immunofixation-A free kappa light chain is present in beta.          Paranoid schizophrenia    - Will trial Seroquel PRN and follow up with psych recs.    - Outpatient follow up   - monitor closely if started on steroids         Fusion of spine    8/18, PT/OT expect SNF vs Rehab         GERD  (gastroesophageal reflux disease)    Pepcid BID          Constipation    Continue bowel regimen.              Anemia in neoplastic disease    - normocytic anemia; likely 2/2 to underlying malignancy  - transfuse for Hgb < 7   - cbc with diff daily while inpatient        Lytic bone lesions on xray             Asthma    -Fluticasone 44mcg/Actuation 1 puffs BID  -No signs of symptoms of exacerbation        Other secondary hypertension    - Home meds re-started except the valsartan given patient was having GLENIS  - labetalol dose increased to 300 mg TID  - consider restarting ARB  - continue to monitor BP             VTE Risk Mitigation (From admission, onward)        Ordered     heparin (porcine) injection 5,000 Units  Every 8 hours      08/17/18 1300     IP VTE HIGH RISK PATIENT  Once      08/03/18 2320     Place MOUNIKA hose  Until discontinued      08/03/18 2320     Place sequential compression device  Until discontinued      08/03/18 1735          Disposition: Continue inpatient care. Expect SNF vs rehab, consider pulse steroid x 4 days.     Case discussed with staff.     Ruslan Small MD  Bone Marrow Transplant  [facility]

## 2018-08-18 NOTE — PLAN OF CARE
Problem: Occupational Therapy Goal  Goal: Occupational Therapy Goal  Goals to be met by: 8/25     Patient will increase functional independence with ADLs by performing:    Pt/CG demo understanding for don/doff TLSO.  Pt/CG verb/demo understanding for log rolling and spinal precautions.  UE Dressing with Set-up Assistance and Stand-by Assistance.  LE Dressing with Set-up Assistance, Minimal Assistance and Assistive Devices as needed.  Grooming while standing at sink with Contact Guard Assistance.  Functional mobility at household distance for ADL task with CGA and AD as needed.    Outcome: Ongoing (interventions implemented as appropriate)  OT re herbert completed post op. Rec  for d/c planning. Will follow up 4x/w in acute setting. ROGELIO Jolley 8/18/2018

## 2018-08-18 NOTE — PROGRESS NOTES
Notified EDITH Griffith patient and son refusing heparin injection s/p education and medication teaching.

## 2018-08-18 NOTE — PT/OT/SLP EVAL
Speech Language Pathology Evaluation  Bedside Swallow  Discharge Summary    Patient Name:  Janie Zamorano   MRN:  [id number]  Admitting Diagnosis: S/P spinal fusion    Recommendations:                 General Recommendations:  Follow-up not indicated  Diet recommendations:  Regular, Thin   Aspiration Precautions: Standard aspiration precautions   General Precautions: Standard, fall  Communication strategies:  none    History:     Past Medical History:   Diagnosis Date    Asthma     Depression     GERD (gastroesophageal reflux disease)     Hypertension     Neck pain     Schizophrenia        Past Surgical History:   Procedure Laterality Date     SECTION      X 1    HYSTERECTOMY  2016    KJB---DLH/BSO    LIPOMA RESECTION      back       Prior Intubation HX:  For surgery only    Prior diet: reg/thin    Subjective   Awake & alert. NSG reports pt has been tolerating regular & thin without difficulty.     Pain/Comfort:  · Pain Rating 1: 6/10  · Location 1: back  · Pain Addressed 2: Nurse notified  · Pain Rating Post-Intervention 2: 6/10    Objective:     Oral Musculature Evaluation  · Oral Musculature: WFL  · Dentition: present and adequate  · Mucosal Quality: good  · Oral Labial Strength and Mobility: WFL  · Lingual Strength and Mobility: WFL  · Volitional Cough: adequate  · Volitional Swallow: adequate  · Voice Prior to PO Intake: clear    Bedside Swallow Eval:   Consistencies Assessed:  · Thin liquids via straw x2  · Solids bite of lettuce     Oral Phase:   · WFL    Pharyngeal Phase:   · no overt clinical signs/symptoms of aspiration  · no overt clinical signs/symptoms of pharyngeal dysphagia        Assessment:     Janie Zamorano is a 62 y.o. female with oropharyngeal swallow appears WFL. No further ST service warranted at this time for swallowing.     Goals:   Multidisciplinary Problems     SLP Goals        Problem: SLP Goal    Goal Priority Disciplines Outcome   SLP Goal     SLP                     Plan:     · Patient to be seen:      · Plan of Care expires:     · Plan of Care reviewed with:  patient, son   · SLP Follow-Up:  No       Discharge recommendations:  home     Time Tracking:     SLP Treatment Date:   08/18/18  Speech Start Time:  1628  Speech Stop Time:  1636     Speech Total Time (min):  8 min    Billable Minutes: Eval Swallow and Oral Function 8    Kaitlynn Rojas CCC-SLP  08/18/2018

## 2018-08-18 NOTE — PROGRESS NOTES
Patient admitted to 10th floor from neuro ICU. Report received from JUAN CARLOS Chisholm. Vital signs stable. No signs of acute distress noted. Son at bedside. Patient and son oriented to call light and instructed to call for assistance. Will continue to monitor.

## 2018-08-18 NOTE — SUBJECTIVE & OBJECTIVE
Interval History:  NAEON, exam stable, MAP goals ended, TTF today       Medications:  Continuous Infusions:   sodium chloride 0.9% 50 mL/hr at 08/18/18 1605     Scheduled Meds:   diclofenac sodium  2 g Topical (Top) QID    famotidine  20 mg Oral BID    fluticasone  1 puff Inhalation BID    heparin (porcine)  5,000 Units Subcutaneous Q8H    labetalol  100 mg Oral TID    polyethylene glycol  17 g Oral Daily    senna-docusate 8.6-50 mg  1 tablet Oral BID     PRN Meds:acetaminophen, albuterol-ipratropium, dextrose 50%, dextrose 50%, dextrose 50%, glucagon (human recombinant), glucagon (human recombinant), glucose, glucose, hydrALAZINE, insulin aspart U-100, methyl salicylate-menthol 15-10%, ondansetron, QUEtiapine, ramelteon, sodium chloride 0.9%, traMADol     Review of Systems    Objective:     Weight: 68 kg (149 lb 14.6 oz)  Body mass index is 25.73 kg/m².  Vital Signs (Most Recent):  Temp: 98.8 °F (37.1 °C) (08/18/18 1700)  Pulse: 84 (08/18/18 1700)  Resp: 20 (08/18/18 1700)  BP: 122/74 (08/18/18 1700)  SpO2: 96 % (08/18/18 1700) Vital Signs (24h Range):  Temp:  [97.8 °F (36.6 °C)-98.8 °F (37.1 °C)] 98.8 °F (37.1 °C)  Pulse:  [77-87] 84  Resp:  [9-39] 20  SpO2:  [96 %-100 %] 96 %  BP: (100-157)/(56-97) 122/74  Arterial Line BP: (104-160)/(54-94) 114/61     Date 08/18/18 0700 - 08/19/18 0659   Shift 2712-3107 2122-1265 7335-7969 24 Hour Total   INTAKE   P.O. 400   400   I.V.(mL/kg) 400(5.9) 100(1.5)  500(7.4)   Shift Total(mL/kg) 800(11.8) 100(1.5)  900(13.2)   OUTPUT   Urine(mL/kg/hr) 350(0.6) 350  700   Emesis/NG output 100   100   Drains 90 45  135   Other 0   0   Shift Total(mL/kg) 540(7.9) 395(5.8)  935(13.8)   Weight (kg) 68 68 68 68                   Female External Urinary Catheter 08/09/18 0000 (Active)   Skin no redness;no breakdown 8/16/2018  7:35 AM   Tolerance no signs/symptoms of discomfort 8/16/2018  7:35 AM   Suction Continuous suction at 70 mmHg 8/16/2018  7:35 AM   Date of last wick change  08/15/18 8/16/2018  7:35 AM   Time of last wick change 2100 8/16/2018  7:35 AM   Output (mL) 200 mL 8/16/2018 12:02 PM       Neurosurgery Physical Exam     General: no distress  Neurologic: Alert and oriented. Thought content appropriate.  Head: normocephalic  GCS: Motor: 6/Verbal: 5/Eyes: 4 GCS Total: 15  Cranial nerves: face symmetric, tongue midline, pupils equal, round, reactive to light with accomodation, extraocular muscles intact  Sensory: response to light touch throughout  Motor Strength:full strength upper and lower extremities  Pronator Drift: no drift noted  Finger to nose normal  No focal numbness or weakness  Lungs:  normal respiratory effort  Abdomen: soft, non-tender   Extremities: no cyanosis or edema, or clubbing    Wound C/D/I well healing    Significant Labs:  Recent Labs   Lab  08/17/18   0418  08/18/18   0219   GLU  100  100   NA  139  137   K  4.4  4.8   CL  110  112*   CO2  21*  16*   BUN  5*  7*   CREATININE  0.7  0.7   CALCIUM  9.5  7.7*   MG   --   1.6     Recent Labs   Lab  08/17/18   0418  08/17/18   1007  08/17/18   1118  08/18/18   0217   WBC  4.47   --    --   8.32   HGB  9.0*   --    --   8.3*   HCT  28.5*  21*  21*  26.1*   PLT  322   --    --   285     No results for input(s): LABPT, INR, APTT in the last 48 hours.  Microbiology Results (last 7 days)     Procedure Component Value Units Date/Time    Urine culture [[id number]] Collected:  08/12/18 1206    Order Status:  Completed Specimen:  Urine Updated:  08/14/18 1324     Urine Culture, Routine No growth    Narrative:       add on Urine culture order #[id number] per Dr. Ruslan Small @ 12:30    08/13/2018     Blood culture [[id number]] Collected:  08/08/18 1800    Order Status:  Completed Specimen:  Blood Updated:  08/13/18 2212     Blood Culture, Routine No growth after 5 days.    Narrative:       Collection has been rescheduled by FB at 8/8/2018 14:42 Reason: nurse   Leonora wants all labs at 1600  Collection has been rescheduled by  JM2 at 8/8/2018 16:24 Reason:   Patient unavailable  Collection has been rescheduled by JM2 at 8/8/2018 16:25 Reason:   Patient unavailable  Collection has been rescheduled by JM2 at 8/8/2018 16:37 Reason:   Patient unavailable  Collection has been rescheduled by FB at 8/8/2018 14:42 Reason: nurse   Leonora wants all labs at 1600  Collection has been rescheduled by JM2 at 8/8/2018 16:24 Reason:   Patient unavailable  Collection has been rescheduled by JM2 at 8/8/2018 16:25 Reason:   Patient unavailable  Collection has been rescheduled by JM2 at 8/8/2018 16:37 Reason:   Patient unavailable    Blood culture [[id number]] Collected:  08/08/18 1801    Order Status:  Completed Specimen:  Blood Updated:  08/13/18 2212     Blood Culture, Routine No growth after 5 days.    Narrative:       Collection has been rescheduled by FB at 8/8/2018 14:42 Reason: nurse   Leonora wants all labs at 1600  Collection has been rescheduled by JM2 at 8/8/2018 16:24 Reason:   Patient unavailable  Collection has been rescheduled by JM2 at 8/8/2018 16:25 Reason:   Patient unavailable  Collection has been rescheduled by JM2 at 8/8/2018 16:37 Reason:   Patient unavailable  Collection has been rescheduled by FB at 8/8/2018 14:42 Reason: nurse   Leonora clines all labs at 1600  Collection has been rescheduled by JM2 at 8/8/2018 16:24 Reason:   Patient unavailable  Collection has been rescheduled by JM2 at 8/8/2018 16:25 Reason:   Patient unavailable  Collection has been rescheduled by JM2 at 8/8/2018 16:37 Reason:   Patient unavailable    Urine culture [[id number]]     Order Status:  Completed Specimen:  Urine

## 2018-08-18 NOTE — PROGRESS NOTES
[facility]  Neurosurgery  Progress Note    Subjective:     History of Present Illness: Ms. Zamorano is a 62 year old female with no PMHx of cancer, chronic back pain, or osteoporosis, who presents to the ED with onset of bilateral flank pain that began 1 week ago while lifting a heavy object from the ground. Patient denies any popping or pulling sensation. Denies any thoracic or lumbar back pain. Denies any UE or LE pain, paresthesias, or weakness. Denies any bladder or bowel incontinence. CT renal was performed and showed a T12 compression fracture. Neurosurgery was consulted for treatment recommendations.     Post-Op Info:  Procedure(s) (LRB):  SPINE, THORACIC, WITH FUSION T9-L3,neuromonitoring, please. (N/A)   1 Day Post-Op     Interval History:  NAEON, exam stable, MAP goals ended, TTF today       Medications:  Continuous Infusions:   sodium chloride 0.9% 50 mL/hr at 08/18/18 1605     Scheduled Meds:   diclofenac sodium  2 g Topical (Top) QID    famotidine  20 mg Oral BID    fluticasone  1 puff Inhalation BID    heparin (porcine)  5,000 Units Subcutaneous Q8H    labetalol  100 mg Oral TID    polyethylene glycol  17 g Oral Daily    senna-docusate 8.6-50 mg  1 tablet Oral BID     PRN Meds:acetaminophen, albuterol-ipratropium, dextrose 50%, dextrose 50%, dextrose 50%, glucagon (human recombinant), glucagon (human recombinant), glucose, glucose, hydrALAZINE, insulin aspart U-100, methyl salicylate-menthol 15-10%, ondansetron, QUEtiapine, ramelteon, sodium chloride 0.9%, traMADol     Review of Systems    Objective:     Weight: 68 kg (149 lb 14.6 oz)  Body mass index is 25.73 kg/m².  Vital Signs (Most Recent):  Temp: 98.8 °F (37.1 °C) (08/18/18 1700)  Pulse: 84 (08/18/18 1700)  Resp: 20 (08/18/18 1700)  BP: 122/74 (08/18/18 1700)  SpO2: 96 % (08/18/18 1700) Vital Signs (24h Range):  Temp:  [97.8 °F (36.6 °C)-98.8 °F (37.1 °C)] 98.8 °F (37.1 °C)  Pulse:  [77-87] 84  Resp:  [9-39] 20  SpO2:   [96 %-100 %] 96 %  BP: (100-157)/(56-97) 122/74  Arterial Line BP: (104-160)/(54-94) 114/61     Date 08/18/18 0700 - 08/19/18 0659   Shift 6487-7991 9119-8681 3999-3400 24 Hour Total   INTAKE   P.O. 400   400   I.V.(mL/kg) 400(5.9) 100(1.5)  500(7.4)   Shift Total(mL/kg) 800(11.8) 100(1.5)  900(13.2)   OUTPUT   Urine(mL/kg/hr) 350(0.6) 350  700   Emesis/NG output 100   100   Drains 90 45  135   Other 0   0   Shift Total(mL/kg) 540(7.9) 395(5.8)  935(13.8)   Weight (kg) 68 68 68 68                   Female External Urinary Catheter 08/09/18 0000 (Active)   Skin no redness;no breakdown 8/16/2018  7:35 AM   Tolerance no signs/symptoms of discomfort 8/16/2018  7:35 AM   Suction Continuous suction at 70 mmHg 8/16/2018  7:35 AM   Date of last wick change 08/15/18 8/16/2018  7:35 AM   Time of last wick change 2100 8/16/2018  7:35 AM   Output (mL) 200 mL 8/16/2018 12:02 PM       Neurosurgery Physical Exam     General: no distress  Neurologic: Alert and oriented. Thought content appropriate.  Head: normocephalic  GCS: Motor: 6/Verbal: 5/Eyes: 4 GCS Total: 15  Cranial nerves: face symmetric, tongue midline, pupils equal, round, reactive to light with accomodation, extraocular muscles intact  Sensory: response to light touch throughout  Motor Strength:full strength upper and lower extremities  Pronator Drift: no drift noted  Finger to nose normal  No focal numbness or weakness  Lungs:  normal respiratory effort  Abdomen: soft, non-tender   Extremities: no cyanosis or edema, or clubbing    Wound C/D/I well healing    Significant Labs:  Recent Labs   Lab  08/17/18   0418  08/18/18   0219   GLU  100  100   NA  139  137   K  4.4  4.8   CL  110  112*   CO2  21*  16*   BUN  5*  7*   CREATININE  0.7  0.7   CALCIUM  9.5  7.7*   MG   --   1.6     Recent Labs   Lab  08/17/18   0418  08/17/18   1007  08/17/18   1118  08/18/18   0217   WBC  4.47   --    --   8.32   HGB  9.0*   --    --   8.3*   HCT  28.5*  21*  21*  26.1*   PLT  322   --     --   285     No results for input(s): LABPT, INR, APTT in the last 48 hours.  Microbiology Results (last 7 days)     Procedure Component Value Units Date/Time    Urine culture [[id number]] Collected:  08/12/18 1206    Order Status:  Completed Specimen:  Urine Updated:  08/14/18 1324     Urine Culture, Routine No growth    Narrative:       add on Urine culture order #[id number] per Dr. Ruslan Small @ 12:30    08/13/2018     Blood culture [[id number]] Collected:  08/08/18 1800    Order Status:  Completed Specimen:  Blood Updated:  08/13/18 2212     Blood Culture, Routine No growth after 5 days.    Narrative:       Collection has been rescheduled by FB at 8/8/2018 14:42 Reason: nurse   Leonora wants all labs at 1600  Collection has been rescheduled by 2 at 8/8/2018 16:24 Reason:   Patient unavailable  Collection has been rescheduled by 2 at 8/8/2018 16:25 Reason:   Patient unavailable  Collection has been rescheduled by 2 at 8/8/2018 16:37 Reason:   Patient unavailable  Collection has been rescheduled by FB at 8/8/2018 14:42 Reason: nurse   Leonora wants all labs at 1600  Collection has been rescheduled by 2 at 8/8/2018 16:24 Reason:   Patient unavailable  Collection has been rescheduled by 2 at 8/8/2018 16:25 Reason:   Patient unavailable  Collection has been rescheduled by 2 at 8/8/2018 16:37 Reason:   Patient unavailable    Blood culture [[id number]] Collected:  08/08/18 1801    Order Status:  Completed Specimen:  Blood Updated:  08/13/18 2212     Blood Culture, Routine No growth after 5 days.    Narrative:       Collection has been rescheduled by FB at 8/8/2018 14:42 Reason: nurse   Leonora wants all labs at 1600  Collection has been rescheduled by JM2 at 8/8/2018 16:24 Reason:   Patient unavailable  Collection has been rescheduled by 2 at 8/8/2018 16:25 Reason:   Patient unavailable  Collection has been rescheduled by 2 at 8/8/2018 16:37 Reason:   Patient unavailable  Collection has been rescheduled  by FB at 8/8/2018 14:42 Reason: nurse   Leonora wants all labs at 1600  Collection has been rescheduled by JOYCE at 8/8/2018 16:24 Reason:   Patient unavailable  Collection has been rescheduled by JM2 at 8/8/2018 16:25 Reason:   Patient unavailable  Collection has been rescheduled by JM2 at 8/8/2018 16:37 Reason:   Patient unavailable    Urine culture [[id number]]     Order Status:  Completed Specimen:  Urine             Assessment/Plan:     Closed compression fracture of thoracic vertebra    62 year old female with acute onset on bilateral flank pain that began after lifting a heavy object. Imaging shows a T12 compression fracture along with multiple lytic lesions throughout the spine now s/p T9-L3 fusion 8/17:    -Patient admitted to Neuro-ICU; primary management per NCC   -Stable for TTF  -Patient neurologically stable without s/s of myelopathy   -CT Thoracic/Upright and supine x-rays consistent with an unstable fracture  -Keep in TLSO brace at all times. Continue bed rest.  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 Plans to discuss therapy once outpatient. Continue management per primary team.   -Drain stays in   -SQH/SCDs/TEDs  -Please call with questions or change in neurologic status            Ramone Prescott MD  Neurosurgery  [facility]

## 2018-08-18 NOTE — H&P
Report called to JUAN CARLOS Dunn, tele box at bedside, awaiting room to be cleaned and patient ready for transfer.

## 2018-08-18 NOTE — ASSESSMENT & PLAN NOTE
Improving/resolved.    -due to PHPT and MM   -PTH elevated as well  -Zometa 4 mg IV administered 8/7

## 2018-08-18 NOTE — PLAN OF CARE
Problem: Patient Care Overview  Goal: Plan of Care Review  Outcome: Ongoing (interventions implemented as appropriate)  POC reviewed with pt and family at bedside by RN and MD. Pt and family verbalized understanding. Questions and concerns addressed. No acute events today. Pt progressing toward goals. Tolerating room air at this time, no acute distress noted. Up in chair per OT/Brace in place per order. Hemodynamically stable, neurologically intact. Patient comfortable at this time. Fall precautions ongoing, bed locked and low, call bell within reach, instructed to call for assistance with needs, son at bedside, verbalized fall education. Will continue to monitor. See flowsheets for full assessment and VS info.

## 2018-08-18 NOTE — PROGRESS NOTES
Pt received from PACU. Placed on ICU monitor. Vital signs as charted. Heart sounds WNL. Lung sounds clear and equal bilaterally. Neuro assessment as document. Will continue to monitor.

## 2018-08-18 NOTE — PT/OT/SLP RE-EVAL
Occupational Therapy   Re-evaluation    Name: Janie Zamorano  MRN: [id number]  Admitting Diagnosis:  S/P spinal fusion 1 Day Post-Op SPINE, THORACIC, WITH FUSION T9-L3  *Multiple myeloma not having achieved remission    Recommendations:     Discharge Recommendations: home health OT, home health PT  Discharge Equipment Recommendations:  bedside commode, walker, rolling, wheelchair, 3-in-1 commode  Barriers to discharge:  Other (Comment)(LOAN home)    History:     Past Medical History:   Diagnosis Date    Asthma     Depression     GERD (gastroesophageal reflux disease)     Hypertension     Neck pain     Schizophrenia        Past Surgical History:   Procedure Laterality Date     SECTION      X 1    HYSTERECTOMY  2016    [facility]---DLH/BSO    LIPOMA RESECTION      back       Subjective     Occupational Profile:  Living Environment: Pt reported that she lives with 2 sisters and son in [facility] with 5 LOAN and B hand rails. Tub/shower combo  Previous level of function: indep with ADLs/self care. Stated she occasionally uses a cane outside the home for mobility.   Roles and Routines: mother, sister, care taker to self, home and community dweller  Equipment Owned:  cane; (has shower chair available)  Assistance upon Discharge: yes, 1 sister home during the day and her son mostly works from home     Chief Complaint: nausea  Patient/Family stated goals: to get out of the bed  Communicated with: RN prior to session. Son at bedside. Pt agreeable to therapy session.   Pain/Comfort:  · Pain Rating 1: 0/10  · Pain Rating Post-Intervention 1: 0/10    Objective:     Patient found with: blood pressure cuff, wound vac, hemovac, SCD, telemetry, peripheral IV, pulse ox (continuous)    General Precautions: Standard, fall, aspiration   Orthopedic Precautions:spinal precautions   Braces: TLSO(at all times)     Occupational Performance:    Bed Mobility:    · Patient completed Rolling/Turning to Left with  minimum assistance and  with side rail  · Patient completed Rolling/Turning to Right with minimum assistance and with side rail  · Patient completed Supine to Sit with moderate assistance    Functional Mobility/Transfers:  · Patient completed Sit <> Stand Transfer with contact guard assistance  with  no assistive device   · Patient completed Bed <> Chair Transfer using Step Transfer technique with minimum assistance with no assistive device   · Functional mobility ~10 ft with min(A)  · Assist in part 2/2 line mgmt and patient nausea     Activities of Daily Living:  · Grooming: supervision seated in chair  · Upper Body Dressing: minimum assistance to don gown as jacket  · Lower Body Dressing:mod(A) and set up B socks- edu on 4 point position and dressing modifications    Cognitive/Visual Perceptual:  Cognitive/Psychosocial Skills:     -       Oriented to: Person, Place, Time and Situation   -       Follows Commands/attention:Follows multistep  commands  -       Communication: clear/fluent  -       Memory: No Deficits noted  -       Safety awareness/insight to disability: intact   -       Mood/Affect/Coping skills/emotional control: Cooperative  Visual/Perceptual:      -Intact      Physical Exam:  Balance:    -       WFL; min(A) for dynamic standing  Postural examination/scapula alignment:    -       Rounded shoulders  -       Forward head  Skin integrity: Visible skin intact  Edema:  None noted  Sensation:    -       Intact  B UE    Motor Planning:    -       Intact B UE  Dominant hand:    -       R  Upper Extremity Range of Motion:     -       Right Upper Extremity: WFL    -       Left Upper Extremity: WFL      Upper Extremity Strength:    -       Right Upper Extremity: 4+/5  -       Left Upper Extremity: 4+/5     Strength:    -       Right Upper Extremity: WFL    -       Left Upper Extremity: WFL    Fine Motor Coordination:    -       Intact  Gross motor coordination:   WFL    Patient left up in chair with all lines intact, call  button in reach, RN notified and son and MD present    [facility] 6 Click:  [facility] Total Score: 18     Body mass index is 25.73 kg/m².    Vitals:    08/18/18 1205   BP: 108/68   Pulse: 83   Resp: (!) 25   Temp:        Treatment & Education:  -Pt alert and agreeable to therapy session; edu on OT role in care  -Edu on spinal precautions and log rolling with demo understanding; discussed dressing modifications and precautions with BLT method with need for reinforcement  -Discussed importance of activity with verbalized understanding  -Discussed being up to chair for all meals and need for staff supervision assist when OOB with verbalized understanding  -Communication board updated; questions/concerns addressed within OT scope of practice    *Pt safe to complete t/f with nursing staff at this time- RN aware on this date  Education:    Assessment:     Janie Zamorano is a 62 y.o. female with a medical diagnosis of S/P spinal fusion.  She presents 1 day post op at this time. She demo good understanding for rehab process with good active engagement in therapy session on this date. She would best benefit from rehab in home environment for best safety and functional outcomes at this time.  Performance deficits affecting function are weakness, impaired endurance, impaired self care skills, impaired functional mobilty, gait instability, orthopedic precautions, decreased upper extremity function, decreased lower extremity function, decreased safety awareness.      Rehab Prognosis:  Good for spine; patient would benefit from acute skilled OT services to address these deficits and reach maximum level of function.       Plan:     Patient to be seen 4 x/week to address the above listed problems via self-care/home management, therapeutic activities, therapeutic exercises, neuromuscular re-education  · Plan of Care Expires: 09/16/18  · Plan of Care Reviewed with: patient, son    This Plan of care has been discussed with the patient  who was involved in its development and understands and is in agreement with the identified goals and treatment plan    GOALS:   Multidisciplinary Problems     Occupational Therapy Goals        Problem: Occupational Therapy Goal    Goal Priority Disciplines Outcome Interventions   Occupational Therapy Goal     OT, PT/OT Ongoing (interventions implemented as appropriate)    Description:  Goals to be met by: 8/25     Patient will increase functional independence with ADLs by performing:    Pt/CG demo understanding for don/doff TLSO.  Pt/CG verb/demo understanding for log rolling and spinal precautions.  UE Dressing with Set-up Assistance and Stand-by Assistance.  LE Dressing with Set-up Assistance, Minimal Assistance and Assistive Devices as needed.  Grooming while standing at sink with Contact Guard Assistance.  Functional mobility at household distance for ADL task with CGA and AD as needed.                      Time Tracking:     OT Date of Treatment: 08/18/18  OT Start Time: 0930  OT Stop Time: 0955  OT Total Time (min): 25 min    Billable Minutes:Re-eval 15  Therapeutic Activity 10    ROGELIO Jolley  8/18/2018

## 2018-08-19 LAB
BASOPHILS # BLD AUTO: 0.02 K/UL
BASOPHILS NFR BLD: 0.3 %
DIFFERENTIAL METHOD: ABNORMAL
EOSINOPHIL # BLD AUTO: 0 K/UL
EOSINOPHIL NFR BLD: 0.6 %
ERYTHROCYTE [DISTWIDTH] IN BLOOD BY AUTOMATED COUNT: 14.5 %
HCT VFR BLD AUTO: 27.4 %
HGB BLD-MCNC: 9 G/DL
HYPOCHROMIA BLD QL SMEAR: ABNORMAL
IMM GRANULOCYTES # BLD AUTO: 0.09 K/UL
IMM GRANULOCYTES NFR BLD AUTO: 1.3 %
LYMPHOCYTES # BLD AUTO: 0.8 K/UL
LYMPHOCYTES NFR BLD: 11.5 %
MAGNESIUM SERPL-MCNC: 2 MG/DL
MCH RBC QN AUTO: 28.8 PG
MCHC RBC AUTO-ENTMCNC: 32.8 G/DL
MCV RBC AUTO: 88 FL
MONOCYTES # BLD AUTO: 0.6 K/UL
MONOCYTES NFR BLD: 8 %
NEUTROPHILS # BLD AUTO: 5.5 K/UL
NEUTROPHILS NFR BLD: 78.3 %
NRBC BLD-RTO: 0 /100 WBC
PHOSPHATE SERPL-MCNC: 2.2 MG/DL
PLATELET # BLD AUTO: 268 K/UL
PLATELET BLD QL SMEAR: ABNORMAL
PMV BLD AUTO: 9.8 FL
RBC # BLD AUTO: 3.12 M/UL
WBC # BLD AUTO: 7.04 K/UL

## 2018-08-19 PROCEDURE — 25000003 PHARM REV CODE 250: Performed by: PSYCHIATRY & NEUROLOGY

## 2018-08-19 PROCEDURE — 84100 ASSAY OF PHOSPHORUS: CPT

## 2018-08-19 PROCEDURE — 36415 COLL VENOUS BLD VENIPUNCTURE: CPT

## 2018-08-19 PROCEDURE — 97161 PT EVAL LOW COMPLEX 20 MIN: CPT

## 2018-08-19 PROCEDURE — 25000003 PHARM REV CODE 250: Performed by: NURSE PRACTITIONER

## 2018-08-19 PROCEDURE — 63600175 PHARM REV CODE 636 W HCPCS: Performed by: STUDENT IN AN ORGANIZED HEALTH CARE EDUCATION/TRAINING PROGRAM

## 2018-08-19 PROCEDURE — 25000003 PHARM REV CODE 250: Performed by: INTERNAL MEDICINE

## 2018-08-19 PROCEDURE — 83735 ASSAY OF MAGNESIUM: CPT

## 2018-08-19 PROCEDURE — 20600001 HC STEP DOWN PRIVATE ROOM

## 2018-08-19 PROCEDURE — 85025 COMPLETE CBC W/AUTO DIFF WBC: CPT

## 2018-08-19 PROCEDURE — 25000003 PHARM REV CODE 250: Performed by: STUDENT IN AN ORGANIZED HEALTH CARE EDUCATION/TRAINING PROGRAM

## 2018-08-19 PROCEDURE — 83520 IMMUNOASSAY QUANT NOS NONAB: CPT | Mod: 59

## 2018-08-19 PROCEDURE — 99232 SBSQ HOSP IP/OBS MODERATE 35: CPT | Mod: GC,,, | Performed by: INTERNAL MEDICINE

## 2018-08-19 PROCEDURE — 97116 GAIT TRAINING THERAPY: CPT

## 2018-08-19 RX ADMIN — LABETALOL HCL 100 MG: 100 TABLET, FILM COATED ORAL at 09:08

## 2018-08-19 RX ADMIN — TRAMADOL HYDROCHLORIDE 50 MG: 50 TABLET, COATED ORAL at 04:08

## 2018-08-19 RX ADMIN — SODIUM CHLORIDE: 0.9 INJECTION, SOLUTION INTRAVENOUS at 07:08

## 2018-08-19 RX ADMIN — OXYCODONE HYDROCHLORIDE 10 MG: 10 TABLET, FILM COATED, EXTENDED RELEASE ORAL at 07:08

## 2018-08-19 RX ADMIN — LABETALOL HCL 100 MG: 100 TABLET, FILM COATED ORAL at 02:08

## 2018-08-19 RX ADMIN — HEPARIN SODIUM 5000 UNITS: 5000 INJECTION, SOLUTION INTRAVENOUS; SUBCUTANEOUS at 02:08

## 2018-08-19 RX ADMIN — FLUTICASONE PROPIONATE 1 PUFF: 44 AEROSOL, METERED RESPIRATORY (INHALATION) at 09:08

## 2018-08-19 RX ADMIN — TRAMADOL HYDROCHLORIDE 50 MG: 50 TABLET, COATED ORAL at 09:08

## 2018-08-19 RX ADMIN — DICLOFENAC 2 G: 10 GEL TOPICAL at 09:08

## 2018-08-19 RX ADMIN — LABETALOL HCL 100 MG: 100 TABLET, FILM COATED ORAL at 07:08

## 2018-08-19 NOTE — PROGRESS NOTES
[facility]  Hematology  Bone Marrow Transplant  Progress Note    Patient Name: Janie Zamorano  Admission Date: 8/3/2018  Hospital Length of Stay: 16 days  Code Status: Full Code    Subjective:     Interval History: POD 2.  Stepped down to neurosurgery.  Doing well and walking with therapy this morning.  Otherwise no new complaints.  Denies fever, chills, abdominal pain, or change in BM.       Objective:     Vital Signs (Most Recent):  Temp: 99.1 °F (37.3 °C) (08/19/18 1418)  Pulse: 89 (08/19/18 1418)  Resp: 18 (08/19/18 1418)  BP: 136/83 (08/19/18 1418)  SpO2: 96 % (08/19/18 1418) Vital Signs (24h Range):  Temp:  [97.9 °F (36.6 °C)-99.1 °F (37.3 °C)] 99.1 °F (37.3 °C)  Pulse:  [] 89  Resp:  [16-24] 18  SpO2:  [96 %-100 %] 96 %  BP: (100-138)/(58-83) 136/83  Arterial Line BP: (114)/(61) 114/61     Weight: 68 kg (149 lb 14.6 oz)  Body mass index is 25.73 kg/m².  Body surface area is 1.75 meters squared.    ECOG SCORE         [unfilled]    Intake/Output - Last 3 Shifts       08/17 0700 - 08/18 0659 08/18 0700 - 08/19 0659 08/19 0700 - 08/20 0659    P.O.  640     I.V. (mL/kg) 3929.2 (57.8) 1227.5 (18.1)     Blood 500      Total Intake(mL/kg) 4429.2 (65.1) 1867.5 (27.5)     Urine (mL/kg/hr) 1855 (1.1) 700 (0.4)     Emesis/NG output  100     Drains 285 175     Other 0 0     Stool 0 0     Blood 300      Total Output 2440 975     Net +1989.2 +892.5            Urine Occurrence  5 x     Stool Occurrence 0 x 0 x 0 x    Emesis Occurrence  0 x           Physical Exam   Constitutional: She is oriented to person, place, and time. She appears well-developed and well-nourished. No distress.   TLSO brace    HENT:   Head: Normocephalic.   Eyes: Conjunctivae and EOM are normal. Pupils are equal, round, and reactive to light.   Neck: Normal range of motion. Neck supple.   Cardiovascular: Regular rhythm and normal heart sounds.   Pulmonary/Chest: Effort normal and breath sounds normal. No respiratory  distress.   Abdominal: Soft. Bowel sounds are normal. She exhibits no distension.   Musculoskeletal: Normal range of motion. She exhibits no edema.   Neurological: She is alert and oriented to person, place, and time. No cranial nerve deficit.   Skin: Skin is warm and dry. She is not diaphoretic.   Psychiatric: She is not aggressive. Thought content is not paranoid. She exhibits abnormal recent memory.       Significant Labs:   All pertinent labs from the last 24 hours have been reviewed.    Diagnostic Results:  I have reviewed all pertinent imaging results/findings within the past 24 hours.    Assessment/Plan:     * S/P spinal fusion    S/P Thoracic fusion. PT/OT per primary, expected discharge early this week.    - Neurosug following, appreciate recs  - TLSO brace   - Pain somewhat controlled, continue diclofenac topical and oxy 5-10 mg q6hr prn        Hypercalcemia    Improving/resolved.    -due to PHPT and MM   -PTH elevated as well  -Zometa 4 mg IV administered 8/7            Multiple myeloma not having achieved remission    Plan to discuss therapy once outpatient- likely VRD.    - consider 40mg dex x4 days; discussing with neurosurgery, likely will hold while healing.     -hypercalcemia, anemia, bone fx and lytic lesions. Likely 2/2 to multiple myeloma.  -zometa 4 mg IV x1 8/7/18          Paranoid schizophrenia    - Will trial Seroquel PRN and follow up with psych recs.    - Outpatient follow up   - monitor closely if started on steroids         Fusion of spine    8/18, PT/OT expect SNF vs Rehab         GERD (gastroesophageal reflux disease)    Pepcid BID          Constipation    Continue bowel regimen.              Anemia in neoplastic disease    - normocytic anemia; likely 2/2 to underlying malignancy  - transfuse for Hgb < 7   - cbc with diff daily while inpatient        Lytic bone lesions on xray             Asthma    -Fluticasone 44mcg/Actuation 1 puffs BID  -No signs of symptoms of exacerbation         Other secondary hypertension    - Home meds re-started except the valsartan given patient was having GLENIS  - labetalol dose increased to 300 mg TID  - consider restarting ARB  - continue to monitor BP             VTE Risk Mitigation (From admission, onward)        Ordered     heparin (porcine) injection 5,000 Units  Every 8 hours      08/17/18 1300     IP VTE HIGH RISK PATIENT  Once      08/03/18 2320     Place MOUNIKA hose  Until discontinued      08/03/18 2320     Place sequential compression device  Until discontinued      08/03/18 1735          We will continue to follow this patient, but do not expect to start MM treatment inpatient.  Continue PT/OT.  Expected discharge early next week with close Heme/Onc follow up.       Case discussed with staff       Ruslan Small MD  Bone Marrow Transplant  [facility]

## 2018-08-19 NOTE — PLAN OF CARE
Problem: Patient Care Overview  Goal: Plan of Care Review  Outcome: Ongoing (interventions implemented as appropriate)  POC reviewed with patient and son. AAOx4, VSS. Patient and son refuse AM meds except Labetalol and PRN Tramadol, see MAR for documentation. Purewick placed per patient request, education provided on how Purewick works, patient needs reinforcement on Purewick use and hygiene. Bedside commode placed at bedside per patient request, patient has not had BM during shift. TLSO brace maintained throughout shift per order. PRN Tramadol q6 given x 2 for pain. No evidence of distress, safety maintained, hourly rounding performed, will continue to monitor.

## 2018-08-19 NOTE — ASSESSMENT & PLAN NOTE
S/P Thoracic fusion. PT/OT and drain care per primary, expected discharge early this week.    - Neurosug following, appreciate recs  - TLSO brace   - Continue oxy 5-10 mg q6hr prn

## 2018-08-19 NOTE — OP NOTE
DATE OF SURGERY: 8/18/18     PREOPERATIVE DIAGNOSIS:  1. Pathologic burst fracture of T12 secondary to multiple myeloma  2. Yo spinal instability with kyphotic deformity     POSTOPERATIVE DIAGNOSIS:  Same.     PROCEDURE PERFORMED:  1. Open reduction and internal fixation of spinal fracture dislocation, T12  2. Posterior spinal fusion, T9-L3   3. Posterior segmental spinal fixation, T9-L3 (DePuy Synthes)  4. Use of intraoperative fluoroscopy  5. Use of intraoperative neuro-monitoring with MEPs  6. Vivigen bone grafting     PRIMARY SURGEON: He Andres M.D.     ASSISTANT: Sam Bowden M.D., Jaun Lewis M.D.     ANESTHESIA: GETA     ESTIMATED BLOOD LOSS: 300mL     COMPLICATIONS: None     DRAINS: One deep and incisional wound vac     SPECIMENS SENT: None     FINDINGS: Excellent open fracture reduction     INDICATIONS:     62F with a recent fall and debilitating axial low back pain. Imaging showed a pathologic burst fracture at T12 with retropulsion but no cord compression. She had tumor infiltration throughout the spine. Workup established the diagnosis as mutliple myeloma. Her initial treatment for the fracture was bracing, but dynamic xrays in the brace showed persistent instability and worsening kyphotic deformity. On exam she had severe pain at T12 and with movement, but she was neurologically intact. I recommended a spinal ORIF from T9-L3. R/B/A/I/M of this procedure were reviewed in detail and she wished to proceed. All questions were answered and no guarantees about the results of the procedure were made.  .  PROCEDURE:     The patient was brought into the operating room where she was intubated and placed under general anesthesia without difficulty.  All lines were placed. She was  repositioned prone onto the operating room table with appropriate padding all pressure points. The region of interest from T9-L3 was localized with AP and lateral x-ray.  The skin was marked and the area was prepped  and draped in the usual sterile fashion.  A timeout was performed prior to procedure.  20 mL's of lidocaine with epinephrine were injected in the skin.     A midline linear incision was made with a 10 blade from approximately T9-L3.  Supra and subfascial midline dissection was carried out with Bovie electrocautery.  Subperiosteal dissection was carried out with Bovie electrocautery and Dunbar elevators to expose the posterior elements from T9-L3.  A pedicle finder was placed into the presumed right T11 pedicle and confirmed on AP x-ray.  T9-L3 pedicle screws  were placed bilaterally using freehand technique and anatomic landmarks. T12 was skipped due to the fracture site, and right L2 was skipped due to an atrophic pedicle.    Bilateral titanium rods were sized, contoured and reduced into the tulip heads.  Set screws were tightened. Sequential compression was performed bilaterally across the fracture site to reduce the fracture and kyphosis.. Final xrays showed excellent reduction of the fracture and deformity and excellent position of all hardware.  The wound was copiously irrigated with sterile normal saline and a dilute Betadine solution. Exposed bony surfaces from T9 to L3 were decorticated bilaterally with a high-speed drill.  Vivigen was placed posteriorly for arthrodesis from T9-L3. One deep Hemovac was placed.  A watertight fascial closure was achieved with interrupted 0 Vicryl sutures and a running Stratafix suture.  The soft tissues were closed in layers. Staples were placed at the skin. A Prevena incisional wound vac was placed due to her underlying neoplasm and risk of poor wound healing.     The patient appeared to tolerate the procedure well from a hemodynamic and neuromonitoring standpoint. MEPs were present and stable in all extremities throughout the case. I was present for all critical portions of the case, and at the end of the case all counts were correct. The patient was repositioned supine onto  the hospital bed where she was extubated and allowed to emerge from anesthesia. She was sent to the PACU in stable condition for recovery.

## 2018-08-19 NOTE — PT/OT/SLP EVAL
Physical Therapy Evaluation    Patient Name:  Janie Zamorano   MRN:  [id number]    Recommendations:     Discharge Recommendations:  home with home health   Discharge Equipment Recommendations: bedside commode, walker, rolling, wheelchair   Barriers to discharge: Inaccessible home environment    Assessment:     Janie Zamorano is a 62 y.o. female admitted with a medical diagnosis of S/P spinal fusion.  She presents with the below impairments/functional limitations.  Pt tolerated evaluation well and is very pleasant and motivated.  Pt is a good candidate for skilled PT services to address the below deficits and to increase functional independence.      Rehab Prognosis: good; patient would benefit from acute skilled PT services to address these deficits and reach maximum level of function.      Rehab Identified impairments/functional limitations:   impaired endurance, weakness, impaired functional mobilty, gait instability, impaired balance, decreased lower extremity function, orthopedic precautions, impaired skin    Recent Surgery: Procedure(s) (LRB):  SPINE, THORACIC, WITH FUSION T9-L3,neuromonitoring, please. (N/A) 2 Days Post-Op    Plan:     During this hospitalization, patient to be seen 4 x/week to address the above listed problems via gait training, therapeutic activities, therapeutic exercises, neuromuscular re-education  · Plan of Care Expires:      Plan of Care Reviewed with: patient, son    Subjective     Communicated with RN prior to session.  Patient found seated in chair with TLSO donned upon PT entry to room, agreeable to evaluation.      Chief Complaint: none stated; reported some discomfort to her incision site   Patient comments/goals: to return home safely    Pain/Comfort:  · Pain Rating 1: 3/10  · Location - Orientation 1: generalized  · Location 1: back  · Pain Addressed 1: Reposition, Distraction    Patients cultural, spiritual, Uatsdin conflicts given the current situation: none  noted    Living Environment:  Living Environment: Pt reported that she lives with 2 sisters and son in [facility] with 5 LOAN and B hand rails. Tub/shower combo  Previous level of function: indep with ADLs/self care. Stated she occasionally uses a cane outside the home for mobility.   Roles and Routines: mother, sister, care taker to self, home and community dweller  Equipment Owned:  cane; (has shower chair available)  Assistance upon Discharge: yes, 1 sister home during the day and her son mostly works from home     Objective:     Patient found with: wound vac, telemetry, hemovac, TLSO, peripheral IV     General Precautions: Standard, fall   Orthopedic Precautions:spinal precautions   Braces: TLSO(at all times)     Exams:    Cognitive/Psychosocial Skills:     -       Oriented to: Person, Place, Time and Situation   -       Follows Commands/attention:Follows multistep  commands  -       Communication: clear/fluent  -       Safety awareness/insight to disability: intact     Physical Exams:  · Gross Motor Coordination:  WFL  · Postural Exam:  Patient presented with the following abnormalities:    · -       No postural abnormalities identified  · Sensation:    · -       Intact  · RLE ROM: WFL  · RLE Strength: WFL  · LLE ROM: WFL  · LLE Strength: WFL    Functional Mobility:    Transfers:  · Sit <> Stand: Contact Guard Assistance with Rolling Walker from bedside chair      Gait:  Pt ambulated ~50 feet with CGA and RW    Deficits noted:  · Small step length  · Slow gait speed  · Narrow RACH      AM-PAC 6 CLICK MOBILITY  Total Score:18     Therapeutic Activities and Exercises:  · PT evaluation performed.  Pt educated on:   Role of PT, safety with functional mobility.   DME and discharge needs  Importance of OOB activity  Spinal precautions  Gait sequencing  Donning/doffing TLSO correctly    Pt safe to transfer with RN staff    Patient left up in chair with all lines intact, call button in reach and RN notified.    GOALS:    Multidisciplinary Problems     Physical Therapy Goals        Problem: Physical Therapy Goal    Goal Priority Disciplines Outcome Goal Variances Interventions   Physical Therapy Goal     PT, PT/OT Ongoing (interventions implemented as appropriate)     Description:  Goals to be met by: 2018     Patient will increase functional independence with mobility by performing:    -Supine to sit with Supervision  -Sit to supine with Supervision   -Sit to stand transfer with Stand-by Assistance  -Gait  x 150 feet with Stand-by Assistance using Rolling Walker.   -Ascend/descend 5 stairs with bilateral handrails with Contact Guard Assistance   -Lower extremity exercise program x 30 reps per handout, with independence                          History:     Past Medical History:   Diagnosis Date    Asthma     Depression     GERD (gastroesophageal reflux disease)     Hypertension     Neck pain     Schizophrenia        Past Surgical History:   Procedure Laterality Date     SECTION      X 1    HYSTERECTOMY  2016    [facility]---DLH/BSO    LIPOMA RESECTION      back       Time Tracking:     PT Received On: 18  PT Start Time: 908     PT Stop Time: 931  PT Total Time (min): 23 min     Billable Minutes: Evaluation 13 and Gait Training 10      Joey Ramos, PT, DPT  8/19/2018   [phone]

## 2018-08-19 NOTE — PLAN OF CARE
Problem: Physical Therapy Goal  Goal: Physical Therapy Goal  Goals to be met by: 8/24/2018     Patient will increase functional independence with mobility by performing:    -Supine to sit with Supervision  -Sit to supine with Supervision   -Sit to stand transfer with Stand-by Assistance  -Gait  x 150 feet with Stand-by Assistance using Rolling Walker.   -Ascend/descend 5 stairs with bilateral handrails with Contact Guard Assistance   -Lower extremity exercise program x 30 reps per handout, with independence        Outcome: Ongoing (interventions implemented as appropriate)  PT eval complete and POC initiated.

## 2018-08-19 NOTE — SUBJECTIVE & OBJECTIVE
Subjective:     Interval History: POD 2.  Stepped down to neurosurgery.  Doing well and walking with therapy this morning.  Otherwise no new complaints.  Denies fever, chills, abdominal pain, or change in BM.       Objective:     Vital Signs (Most Recent):  Temp: 99.1 °F (37.3 °C) (08/19/18 1418)  Pulse: 89 (08/19/18 1418)  Resp: 18 (08/19/18 1418)  BP: 136/83 (08/19/18 1418)  SpO2: 96 % (08/19/18 1418) Vital Signs (24h Range):  Temp:  [97.9 °F (36.6 °C)-99.1 °F (37.3 °C)] 99.1 °F (37.3 °C)  Pulse:  [] 89  Resp:  [16-24] 18  SpO2:  [96 %-100 %] 96 %  BP: (100-138)/(58-83) 136/83  Arterial Line BP: (114)/(61) 114/61     Weight: 68 kg (149 lb 14.6 oz)  Body mass index is 25.73 kg/m².  Body surface area is 1.75 meters squared.    ECOG SCORE         [unfilled]    Intake/Output - Last 3 Shifts       08/17 0700 - 08/18 0659 08/18 0700 - 08/19 0659 08/19 0700 - 08/20 0659    P.O.  640     I.V. (mL/kg) 3929.2 (57.8) 1227.5 (18.1)     Blood 500      Total Intake(mL/kg) 4429.2 (65.1) 1867.5 (27.5)     Urine (mL/kg/hr) 1855 (1.1) 700 (0.4)     Emesis/NG output  100     Drains 285 175     Other 0 0     Stool 0 0     Blood 300      Total Output 2440 975     Net +1989.2 +892.5            Urine Occurrence  5 x     Stool Occurrence 0 x 0 x 0 x    Emesis Occurrence  0 x           Physical Exam   Constitutional: She is oriented to person, place, and time. She appears well-developed and well-nourished. No distress.   TLSO brace    HENT:   Head: Normocephalic.   Eyes: Conjunctivae and EOM are normal. Pupils are equal, round, and reactive to light.   Neck: Normal range of motion. Neck supple.   Cardiovascular: Regular rhythm and normal heart sounds.   Pulmonary/Chest: Effort normal and breath sounds normal. No respiratory distress.   Abdominal: Soft. Bowel sounds are normal. She exhibits no distension.   Musculoskeletal: Normal range of motion. She exhibits no edema.   Neurological: She is alert and oriented to person, place, and  time. No cranial nerve deficit.   Skin: Skin is warm and dry. She is not diaphoretic.   Psychiatric: She is not aggressive. Thought content is not paranoid. She exhibits abnormal recent memory.       Significant Labs:   All pertinent labs from the last 24 hours have been reviewed.    Diagnostic Results:  I have reviewed all pertinent imaging results/findings within the past 24 hours.

## 2018-08-19 NOTE — PLAN OF CARE
Problem: Patient Care Overview  Goal: Plan of Care Review  No acute events overnight.  All vital signs stable SR 70-90 on monitor.  No complaints neurologically intact.  Medicated for incisional back pain x1 full relief obtained.  AAOx4.  Safety maintained.  TLSO brace in place.  Son at bedside.  Patient refused q2hr neuro checks, accuchecks and some medications overnight.  Education of importance and verbalized understanding stated she just wanted to sleep and not to be awakened.  Voiding in bedpan encouraged to use bedside commode with nurse assist to increase mobility but refused as well.  Wound vac to midline back continuous suction therapy no drainage.  Hemovac x2 with minimal serosanguinous drainage overnight.  Plan to increase mobility and compliance.

## 2018-08-19 NOTE — ASSESSMENT & PLAN NOTE
Plan to discuss therapy once outpatient- likely VRD.    - consider 40mg dex x4 days; discussing with neurosurgery, likely will hold while healing.     -hypercalcemia, anemia, bone fx and lytic lesions. Likely 2/2 to multiple myeloma.  -zometa 4 mg IV x1 8/7/18

## 2018-08-20 ENCOUNTER — TELEPHONE (OUTPATIENT)
Dept: TRANSPLANT | Facility: HOSPITAL | Age: 63
End: 2018-08-20

## 2018-08-20 DIAGNOSIS — S22.000A CLOSED COMPRESSION FRACTURE OF THORACIC VERTEBRA, INITIAL ENCOUNTER: Primary | ICD-10-CM

## 2018-08-20 DIAGNOSIS — C79.9 MULTIPLE LESIONS OF METASTATIC MALIGNANCY: Primary | ICD-10-CM

## 2018-08-20 PROBLEM — N17.9 ACUTE KIDNEY INJURY: Status: RESOLVED | Noted: 2018-08-18 | Resolved: 2018-08-20

## 2018-08-20 LAB
BASOPHILS # BLD AUTO: 0.04 K/UL
BASOPHILS NFR BLD: 0.7 %
DIFFERENTIAL METHOD: ABNORMAL
EOSINOPHIL # BLD AUTO: 0.1 K/UL
EOSINOPHIL NFR BLD: 0.8 %
ERYTHROCYTE [DISTWIDTH] IN BLOOD BY AUTOMATED COUNT: 14.5 %
HCT VFR BLD AUTO: 24.7 %
HGB BLD-MCNC: 7.7 G/DL
IMM GRANULOCYTES # BLD AUTO: 0.07 K/UL
IMM GRANULOCYTES NFR BLD AUTO: 1.2 %
KAPPA LC SER QL IA: 397.1 MG/DL
KAPPA LC/LAMBDA SER IA: 559.3
LAMBDA LC SER QL IA: 0.71 MG/DL
LYMPHOCYTES # BLD AUTO: 0.9 K/UL
LYMPHOCYTES NFR BLD: 15.2 %
MAGNESIUM SERPL-MCNC: 1.6 MG/DL
MCH RBC QN AUTO: 27.3 PG
MCHC RBC AUTO-ENTMCNC: 31.2 G/DL
MCV RBC AUTO: 88 FL
MONOCYTES # BLD AUTO: 0.6 K/UL
MONOCYTES NFR BLD: 10.1 %
NEUTROPHILS # BLD AUTO: 4.4 K/UL
NEUTROPHILS NFR BLD: 72 %
NRBC BLD-RTO: 0 /100 WBC
PHOSPHATE SERPL-MCNC: 1.9 MG/DL
PLATELET # BLD AUTO: 279 K/UL
PMV BLD AUTO: 9.6 FL
RBC # BLD AUTO: 2.82 M/UL
WBC # BLD AUTO: 6.04 K/UL

## 2018-08-20 PROCEDURE — 97530 THERAPEUTIC ACTIVITIES: CPT

## 2018-08-20 PROCEDURE — 25000003 PHARM REV CODE 250: Performed by: PSYCHIATRY & NEUROLOGY

## 2018-08-20 PROCEDURE — 99232 SBSQ HOSP IP/OBS MODERATE 35: CPT | Mod: GC,,, | Performed by: INTERNAL MEDICINE

## 2018-08-20 PROCEDURE — 20600001 HC STEP DOWN PRIVATE ROOM

## 2018-08-20 PROCEDURE — 25000003 PHARM REV CODE 250: Performed by: STUDENT IN AN ORGANIZED HEALTH CARE EDUCATION/TRAINING PROGRAM

## 2018-08-20 PROCEDURE — 83735 ASSAY OF MAGNESIUM: CPT

## 2018-08-20 PROCEDURE — 99024 POSTOP FOLLOW-UP VISIT: CPT | Mod: ,,, | Performed by: PHYSICIAN ASSISTANT

## 2018-08-20 PROCEDURE — 25000003 PHARM REV CODE 250: Performed by: INTERNAL MEDICINE

## 2018-08-20 PROCEDURE — 84100 ASSAY OF PHOSPHORUS: CPT

## 2018-08-20 PROCEDURE — 97116 GAIT TRAINING THERAPY: CPT

## 2018-08-20 PROCEDURE — 85025 COMPLETE CBC W/AUTO DIFF WBC: CPT

## 2018-08-20 PROCEDURE — 36415 COLL VENOUS BLD VENIPUNCTURE: CPT

## 2018-08-20 RX ORDER — LABETALOL 100 MG/1
100 TABLET, FILM COATED ORAL 3 TIMES DAILY
Qty: 90 TABLET | Refills: 1 | Status: SHIPPED | OUTPATIENT
Start: 2018-08-20 | End: 2018-10-01

## 2018-08-20 RX ORDER — LABETALOL 100 MG/1
100 TABLET, FILM COATED ORAL 3 TIMES DAILY
Qty: 90 TABLET | Refills: 11 | Status: SHIPPED | OUTPATIENT
Start: 2018-08-20 | End: 2018-08-20

## 2018-08-20 RX ORDER — TRAMADOL HYDROCHLORIDE 50 MG/1
50 TABLET ORAL EVERY 6 HOURS PRN
Qty: 90 TABLET | Refills: 0 | Status: SHIPPED | OUTPATIENT
Start: 2018-08-20 | End: 2018-08-30

## 2018-08-20 RX ORDER — SODIUM,POTASSIUM PHOSPHATES 280-250MG
1 POWDER IN PACKET (EA) ORAL 2 TIMES DAILY
Status: DISCONTINUED | OUTPATIENT
Start: 2018-08-20 | End: 2018-08-21 | Stop reason: HOSPADM

## 2018-08-20 RX ORDER — OXYCODONE HCL 10 MG/1
10 TABLET, FILM COATED, EXTENDED RELEASE ORAL EVERY 12 HOURS
Qty: 28 TABLET | Refills: 0 | Status: SHIPPED | OUTPATIENT
Start: 2018-08-20 | End: 2018-09-03

## 2018-08-20 RX ADMIN — OXYCODONE HYDROCHLORIDE 10 MG: 10 TABLET, FILM COATED, EXTENDED RELEASE ORAL at 11:08

## 2018-08-20 RX ADMIN — OXYCODONE HYDROCHLORIDE 10 MG: 10 TABLET, FILM COATED, EXTENDED RELEASE ORAL at 09:08

## 2018-08-20 RX ADMIN — POTASSIUM & SODIUM PHOSPHATES POWDER PACK 280-160-250 MG 1 PACKET: 280-160-250 PACK at 11:08

## 2018-08-20 RX ADMIN — POLYETHYLENE GLYCOL 3350 17 G: 17 POWDER, FOR SOLUTION ORAL at 09:08

## 2018-08-20 RX ADMIN — TRAMADOL HYDROCHLORIDE 50 MG: 50 TABLET, COATED ORAL at 03:08

## 2018-08-20 RX ADMIN — LABETALOL HCL 100 MG: 100 TABLET, FILM COATED ORAL at 09:08

## 2018-08-20 RX ADMIN — LABETALOL HCL 100 MG: 100 TABLET, FILM COATED ORAL at 03:08

## 2018-08-20 RX ADMIN — SENNOSIDES AND DOCUSATE SODIUM 1 TABLET: 8.6; 5 TABLET ORAL at 11:08

## 2018-08-20 RX ADMIN — POTASSIUM & SODIUM PHOSPHATES POWDER PACK 280-160-250 MG 1 PACKET: 280-160-250 PACK at 09:08

## 2018-08-20 RX ADMIN — SENNOSIDES AND DOCUSATE SODIUM 1 TABLET: 8.6; 5 TABLET ORAL at 09:08

## 2018-08-20 NOTE — PLAN OF CARE
Problem: Patient Care Overview  Goal: Plan of Care Review  Outcome: Ongoing (interventions implemented as appropriate)  Patient AAOX4. Patient c/o pain in bilateral mid-lower back. Will medicate according to MAR/MD order. Patient's accordian drains removed. MD to remove wound vac in AM before discharge. PIV DC'd as all three sites infiltrated. MD aware and is ok with no PIV access overnight. Patient continues with TLSO use.  Patient continues to ambulate to bedside toilet. Will continue to monitor. Call bell inreach, bed lowest position. Son at bedside.

## 2018-08-20 NOTE — PROGRESS NOTES
[facility]  Hematology  Bone Marrow Transplant  Progress Note    Patient Name: Janie Zamorano  Admission Date: 8/3/2018  Hospital Length of Stay: 17 days  Code Status: Full Code    Subjective:     Interval History: POD 3.  Patient reports she is doing well.  Looking forward to upcoming discharge.  Discussed expected follow up in clinic 1 week following discharge.  She currently denies fever, chills, abdominal pain, or change in BM.  Son at bedside.         Objective:     Vital Signs (Most Recent):  Temp: 98.1 °F (36.7 °C) (08/20/18 0313)  Pulse: 86 (08/20/18 0344)  Resp: 18 (08/20/18 0313)  BP: (!) 142/74 (08/20/18 0313)  SpO2: 96 % (08/20/18 0313) Vital Signs (24h Range):  Temp:  [98.1 °F (36.7 °C)-99.1 °F (37.3 °C)] 98.1 °F (36.7 °C)  Pulse:  [] 86  Resp:  [16-18] 18  SpO2:  [96 %-98 %] 96 %  BP: (115-150)/(59-83) 142/74     Weight: 68 kg (149 lb 14.6 oz)  Body mass index is 25.73 kg/m².  Body surface area is 1.75 meters squared.    ECOG SCORE         [unfilled]    Intake/Output - Last 3 Shifts       08/18 0700 - 08/19 0659 08/19 0700 - 08/20 0659 08/20 0700 - 08/21 0659    P.O. 640 480     I.V. (mL/kg) 1227.5 (18.1) 1139.2 (16.8)     Blood       Total Intake(mL/kg) 1867.5 (27.5) 1619.2 (23.8)     Urine (mL/kg/hr) 700 (0.4) 0 (0)     Emesis/NG output 100 0     Drains 175 0     Other 0 0     Stool 0 0     Blood       Total Output 975 0     Net +892.5 +1619.2            Urine Occurrence 5 x 5 x     Stool Occurrence 0 x 0 x     Emesis Occurrence 0 x 0 x           Physical Exam   Constitutional: She is oriented to person, place, and time. She appears well-developed and well-nourished. No distress.   TLSO brace    HENT:   Head: Normocephalic.   Eyes: Conjunctivae and EOM are normal. Pupils are equal, round, and reactive to light.   Neck: Normal range of motion. Neck supple.   Cardiovascular: Regular rhythm and normal heart sounds.   Pulmonary/Chest: Effort normal and breath sounds  normal. No respiratory distress.   Abdominal: Soft. Bowel sounds are normal. She exhibits no distension.   Musculoskeletal: Normal range of motion. She exhibits no edema.   Neurological: She is alert and oriented to person, place, and time. No cranial nerve deficit.   Skin: Skin is warm and dry. She is not diaphoretic.   Psychiatric: She is not aggressive. Thought content is not paranoid. She exhibits abnormal recent memory.       Significant Labs:   All pertinent labs from the last 24 hours have been reviewed.    Diagnostic Results:  I have reviewed all pertinent imaging results/findings within the past 24 hours.    Assessment/Plan:     * S/P spinal fusion    S/P Thoracic fusion. PT/OT and drain care per Neurosurgery, expected discharge early this week.    - Neurosug following, appreciate recs  - TLSO brace   - Continue oxy 5-10 mg q6hr prn        Hypercalcemia    Improving/resolved.    -due to PHPT and MM   -PTH elevated as well  -Zometa 4 mg IV administered 8/7            Multiple myeloma not having achieved remission    Plan to discuss therapy once outpatient- likely VRD.    - consider 40mg dex x4 days; discussing with neurosurgery, likely will hold while healing.     -hypercalcemia, anemia, bone fx and lytic lesions. Likely 2/2 to multiple myeloma.  -zometa 4 mg IV x1 8/7/18          Closed compression fracture of thoracic vertebra    Concern for fracture stability.  S/P Thoracic fusion. PT/OT per neurocrit.    - Neurosug following, appreciate recs  - TLSO brace   - Pain somewhat controlled, continue diclofenac topical and oxy 5-10 mg q6hr prn        Paranoid schizophrenia    - Will trial Seroquel PRN and follow up with psych recs.    - Outpatient follow up   - monitor closely if started on steroids         Fusion of spine    8/18, PT/OT expect SNF vs Rehab         GERD (gastroesophageal reflux disease)    Pepcid BID          Constipation    Continue bowel regimen.              Anemia in neoplastic disease    -  normocytic anemia; likely 2/2 to underlying malignancy  - transfuse for Hgb < 7   - cbc with diff daily while inpatient        Lytic bone lesions on xray             Asthma    -Fluticasone 44mcg/Actuation 1 puffs BID  -No signs of symptoms of exacerbation        Other secondary hypertension    - Home meds re-started except the valsartan given patient was having GLENIS  - labetalol dose increased to 300 mg TID  - consider restarting ARB  - continue to monitor BP             VTE Risk Mitigation (From admission, onward)        Ordered     heparin (porcine) injection 5,000 Units  Every 8 hours      08/17/18 1300     IP VTE HIGH RISK PATIENT  Once      08/03/18 2320     Place MOUNIKA hose  Until discontinued      08/03/18 2320     Place sequential compression device  Until discontinued      08/03/18 1735          Disposition: Will hold MM therapy inpatient.  PT/OT per primary.  Expected discharge early this week.  Will follow up in clinic    Case to be discussed with staff.     Ruslan Small MD  Bone Marrow Transplant  [facility]

## 2018-08-20 NOTE — PLAN OF CARE
TORIE following for DC needs. TORIE in communication with CM.    TORIE sent HH orders to [facility] via [facility]. N will arrange HH.     Shoshana Ojeda LMSW  [facility]  [id number]

## 2018-08-20 NOTE — ASSESSMENT & PLAN NOTE
S/P Thoracic fusion. PT/OT and drain care per Neurosurgery, expected discharge early this week.    - Neurosug following, appreciate recs  - TLSO brace   - Continue oxy 5-10 mg q6hr prn

## 2018-08-20 NOTE — PLAN OF CARE
Problem: Physical Therapy Goal  Goal: Physical Therapy Goal  Goals to be met by: 8/24/2018     Patient will increase functional independence with mobility by performing:    -Supine to sit with Supervision  -Sit to supine with Supervision   -Sit to stand transfer with Stand-by Assistance  -Gait  x 150 feet with Stand-by Assistance using Rolling Walker.   -Ascend/descend 5 stairs with bilateral handrails with Contact Guard Assistance   -Lower extremity exercise program x 30 reps per handout, with independence           Discharge Recommendations: HH PT    Pt safe to transfer OOB/BTB and amb short distance with RN/PCT: Use RW with min A of 1 person.    Goals remain appropriate.     Monika Patton, PTA.   [phone]   8/20/2018

## 2018-08-20 NOTE — PT/OT/SLP PROGRESS
"Physical Therapy Treatment    Patient Name:  Janie Zamorano   MRN:  [id number]  Admitting Diagnosis: S/P spinal fusion  Recent Surgery: Procedure(s) (LRB):  SPINE, THORACIC, WITH FUSION T9-L3,neuromonitoring, please. (N/A) 3 Days Post-Op    Recommendations:     Discharge Recommendations:  home with home health   Discharge Equipment Recommendations: bedside commode, walker, rolling, wheelchair   Barriers to discharge: Inaccessible home    Plan:     During this hospitalization, patient to be seen 4 x/week to address the above listed problems via therapeutic activities, therapeutic exercises, neuromuscular re-education  · Plan of Care Expires:      Plan of Care Reviewed with: patient, son    This Plan of care has been discussed with the patient who was involved in its development and understands and is in agreement with the identified goals and treatment plan    Subjective     Communicated with RN (Santos) prior to session.     Patient comments: "It's heavy wearing all of this"  Pain/Comfort:  · Pain Rating 1: (No rating provided)  · Location - Orientation 1: generalized  · Location 1: leg(back)  · Pain Addressed 1: Pre-medicate for activity, Distraction, Cessation of Activity  · Pain Rating Post-Intervention 1: (No rating provided)    Objective:     Patient found with: SCD, telemetry, wound vac    Patient found sitting at the EOB after using BSC with RN upon PT entry to room, agreeable to treatment.  Son present in the room.    General Precautions: Standard, Cardiac fall   Orthopedic Precautions:spinal precautions   Braces: TLSO       BED MOBILITY (vc's for hand placement sequencing of task):        Sit > sup with mod/min A for B LE's          SITTING AT THE EDGE OF THE BED    Assistance Level Required: SBA for safety with B UE support    Postural deviations noted: post lean   Encouraged: neutral pelvis        TRANSFERS  (vc's for hand placement, sequencing of task and safety)   Patient completed Sit <> Stand " Transfer from EOB with CGA/SBA for safety with rolling walker x2-3 trials   Patient completed Stand <> Sit Transfer to EOB with SBA for safety with rolling walker      GAIT:    Patient ambulated: 65ft   Patient required: CGA/SBA   Patient used:  Rolling Walker   Gait Pattern observed: reciprocal gait   Gait Deviation(s): decreased luis alberto, increased time in double stance, decreased velocity of limb motion, decreased step length, decreased stride length, decreased swing-to-stance ratio, decreased toe-to-floor clearance, decreased weight-shifting ability and slight FFP    Comments: vc's for upright posture, placement of AD, gait pattern, step length and safety      STAIRS  Pt ascended/descended 5 stair(s) with No Assistive Device/R HHA with right handrail with Contact Guard Assistance with vc's for   Sequencing of LE's, hand placement, speed of task and safety.       THERAPEUTIC ACTIVITIES/EXERCISES  Pt was instructed on B LE's in sitting and sup    EDUCATION  Patient provided with daily orientation and goals of this PT session. They were educated to call for assistance and to transfer with hospital staff and family only.  Also, pt was educated on the effects of prolonged immobility and the importance of performing OOB activity to promote healing and reduce recovery time    Whiteboard updated with correct mobility information. RN/PCT notified.  Pt safe to transfer OOB/BTB and amb short distance with RN/PCT: Use RW with min A of 1 person.    Patient left right sidelying, with  all lines intact, call button in reach, RN notified and son present    AM-PAC 6 CLICK MOBILITY  Turning over in bed (including adjusting bedclothes, sheets and blankets)?: 3  Sitting down on and standing up from a chair with arms (e.g., wheelchair, bedside commode, etc.): 3  Moving from lying on back to sitting on the side of the bed?: 3  Moving to and from a bed to a chair (including a wheelchair)?: 3  Need to walk in hospital room?: 3  Climbing  3-5 steps with a railing?: 3  Basic Mobility Total Score: 18     Assessment:     Janie Zamorano is a 62 y.o. female admitted with a medical diagnosis of S/P spinal fusion.  She presents with the following impairments/functional limitations:  weakness, impaired endurance, impaired functional mobilty, gait instability, impaired balance, decreased lower extremity function, decreased safety awareness, pain. requiring assistance and verbal cues for bed mob, transfers, gait and stairs to prevent falls during OOB mobility 2* impaired balance, fatigue and pain.   Pt will cont to benefit from skilled PT intervention to address deficits and improve functional mobility.     Rehab Prognosis:  Good; patient would benefit from acute skilled PT services to address these deficits and reach maximum level of function.      GOALS:   Multidisciplinary Problems     Physical Therapy Goals        Problem: Physical Therapy Goal    Goal Priority Disciplines Outcome Goal Variances Interventions   Physical Therapy Goal     PT, PT/OT Ongoing (interventions implemented as appropriate)     Description:  Goals to be met by: 8/24/2018     Patient will increase functional independence with mobility by performing:    -Supine to sit with Supervision  -Sit to supine with Supervision   -Sit to stand transfer with Stand-by Assistance  -Gait  x 150 feet with Stand-by Assistance using Rolling Walker.   -Ascend/descend 5 stairs with bilateral handrails with Contact Guard Assistance   -Lower extremity exercise program x 30 reps per handout, with independence                          Time Tracking:     PT Received On: 08/20/18  PT Start Time: 1541     PT Stop Time: 1617  PT Total Time (min): 36 min     Billable Minutes: Gait Training 20 and Therapeutic Activity 16    Treatment Type: Treatment  PT/PTA: PTA     PTA Visit Number: 1       Monika Patton PTA.  Pager [phone]    8/20/2018    .

## 2018-08-20 NOTE — SUBJECTIVE & OBJECTIVE
Subjective:     Interval History: POD 3.  Patient reports she is doing well.  Looking forward to upcoming discharge.  Discussed expected follow up in clinic 1 week following discharge.  She currently denies fever, chills, abdominal pain, or change in BM.  Son at bedside.         Objective:     Vital Signs (Most Recent):  Temp: 98.1 °F (36.7 °C) (08/20/18 0313)  Pulse: 86 (08/20/18 0344)  Resp: 18 (08/20/18 0313)  BP: (!) 142/74 (08/20/18 0313)  SpO2: 96 % (08/20/18 0313) Vital Signs (24h Range):  Temp:  [98.1 °F (36.7 °C)-99.1 °F (37.3 °C)] 98.1 °F (36.7 °C)  Pulse:  [] 86  Resp:  [16-18] 18  SpO2:  [96 %-98 %] 96 %  BP: (115-150)/(59-83) 142/74     Weight: 68 kg (149 lb 14.6 oz)  Body mass index is 25.73 kg/m².  Body surface area is 1.75 meters squared.    ECOG SCORE         [unfilled]    Intake/Output - Last 3 Shifts       08/18 0700 - 08/19 0659 08/19 0700 - 08/20 0659 08/20 0700 - 08/21 0659    P.O. 640 480     I.V. (mL/kg) 1227.5 (18.1) 1139.2 (16.8)     Blood       Total Intake(mL/kg) 1867.5 (27.5) 1619.2 (23.8)     Urine (mL/kg/hr) 700 (0.4) 0 (0)     Emesis/NG output 100 0     Drains 175 0     Other 0 0     Stool 0 0     Blood       Total Output 975 0     Net +892.5 +1619.2            Urine Occurrence 5 x 5 x     Stool Occurrence 0 x 0 x     Emesis Occurrence 0 x 0 x           Physical Exam   Constitutional: She is oriented to person, place, and time. She appears well-developed and well-nourished. No distress.   TLSO brace    HENT:   Head: Normocephalic.   Eyes: Conjunctivae and EOM are normal. Pupils are equal, round, and reactive to light.   Neck: Normal range of motion. Neck supple.   Cardiovascular: Regular rhythm and normal heart sounds.   Pulmonary/Chest: Effort normal and breath sounds normal. No respiratory distress.   Abdominal: Soft. Bowel sounds are normal. She exhibits no distension.   Musculoskeletal: Normal range of motion. She exhibits no edema.   Neurological: She is alert and oriented  to person, place, and time. No cranial nerve deficit.   Skin: Skin is warm and dry. She is not diaphoretic.   Psychiatric: She is not aggressive. Thought content is not paranoid. She exhibits abnormal recent memory.       Significant Labs:   All pertinent labs from the last 24 hours have been reviewed.    Diagnostic Results:  I have reviewed all pertinent imaging results/findings within the past 24 hours.

## 2018-08-21 VITALS
BODY MASS INDEX: 25.6 KG/M2 | HEART RATE: 92 BPM | SYSTOLIC BLOOD PRESSURE: 135 MMHG | WEIGHT: 149.94 LBS | HEIGHT: 64 IN | TEMPERATURE: 98 F | RESPIRATION RATE: 16 BRPM | OXYGEN SATURATION: 99 % | DIASTOLIC BLOOD PRESSURE: 84 MMHG

## 2018-08-21 DIAGNOSIS — C90.00 MULTIPLE MYELOMA, REMISSION STATUS UNSPECIFIED: Primary | ICD-10-CM

## 2018-08-21 LAB
BASOPHILS # BLD AUTO: 0.03 K/UL
BASOPHILS NFR BLD: 0.7 %
DIFFERENTIAL METHOD: ABNORMAL
EOSINOPHIL # BLD AUTO: 0.1 K/UL
EOSINOPHIL NFR BLD: 1.8 %
ERYTHROCYTE [DISTWIDTH] IN BLOOD BY AUTOMATED COUNT: 14.4 %
HCT VFR BLD AUTO: 24.4 %
HGB BLD-MCNC: 7.9 G/DL
IMM GRANULOCYTES # BLD AUTO: 0.04 K/UL
IMM GRANULOCYTES NFR BLD AUTO: 0.9 %
LYMPHOCYTES # BLD AUTO: 0.9 K/UL
LYMPHOCYTES NFR BLD: 20.8 %
MAGNESIUM SERPL-MCNC: 1.8 MG/DL
MCH RBC QN AUTO: 28.3 PG
MCHC RBC AUTO-ENTMCNC: 32.4 G/DL
MCV RBC AUTO: 88 FL
MONOCYTES # BLD AUTO: 0.6 K/UL
MONOCYTES NFR BLD: 12.5 %
NEUTROPHILS # BLD AUTO: 2.8 K/UL
NEUTROPHILS NFR BLD: 63.3 %
NRBC BLD-RTO: 0 /100 WBC
PHOSPHATE SERPL-MCNC: 2.2 MG/DL
PLATELET # BLD AUTO: 273 K/UL
PMV BLD AUTO: 9.3 FL
RBC # BLD AUTO: 2.79 M/UL
WBC # BLD AUTO: 4.48 K/UL

## 2018-08-21 PROCEDURE — 25000003 PHARM REV CODE 250: Performed by: PHYSICIAN ASSISTANT

## 2018-08-21 PROCEDURE — 85025 COMPLETE CBC W/AUTO DIFF WBC: CPT

## 2018-08-21 PROCEDURE — 36415 COLL VENOUS BLD VENIPUNCTURE: CPT

## 2018-08-21 PROCEDURE — 84100 ASSAY OF PHOSPHORUS: CPT

## 2018-08-21 PROCEDURE — 83735 ASSAY OF MAGNESIUM: CPT

## 2018-08-21 PROCEDURE — 25000003 PHARM REV CODE 250: Performed by: PSYCHIATRY & NEUROLOGY

## 2018-08-21 PROCEDURE — 97535 SELF CARE MNGMENT TRAINING: CPT

## 2018-08-21 PROCEDURE — 25000003 PHARM REV CODE 250: Performed by: STUDENT IN AN ORGANIZED HEALTH CARE EDUCATION/TRAINING PROGRAM

## 2018-08-21 PROCEDURE — 99238 HOSP IP/OBS DSCHRG MGMT 30/<: CPT | Mod: GC,,, | Performed by: INTERNAL MEDICINE

## 2018-08-21 PROCEDURE — 99024 POSTOP FOLLOW-UP VISIT: CPT | Mod: ,,, | Performed by: PHYSICIAN ASSISTANT

## 2018-08-21 RX ORDER — FERROUS SULFATE 325(65) MG
325 TABLET, DELAYED RELEASE (ENTERIC COATED) ORAL 3 TIMES DAILY
Refills: 0 | COMMUNITY
Start: 2018-08-21 | End: 2018-09-04

## 2018-08-21 RX ORDER — ASCORBIC ACID 250 MG
250 TABLET ORAL 3 TIMES DAILY
Status: DISCONTINUED | OUTPATIENT
Start: 2018-08-21 | End: 2018-08-21 | Stop reason: HOSPADM

## 2018-08-21 RX ORDER — DEXAMETHASONE 4 MG/1
40 TABLET ORAL DAILY
Status: DISCONTINUED | OUTPATIENT
Start: 2018-08-21 | End: 2018-08-21

## 2018-08-21 RX ORDER — FERROUS SULFATE 325(65) MG
325 TABLET, DELAYED RELEASE (ENTERIC COATED) ORAL 3 TIMES DAILY
Status: DISCONTINUED | OUTPATIENT
Start: 2018-08-21 | End: 2018-08-21 | Stop reason: HOSPADM

## 2018-08-21 RX ORDER — DEXAMETHASONE 4 MG/1
40 TABLET ORAL DAILY
Qty: 30 TABLET | Refills: 0 | Status: SHIPPED | OUTPATIENT
Start: 2018-08-21 | End: 2018-08-21 | Stop reason: HOSPADM

## 2018-08-21 RX ORDER — ASCORBIC ACID 250 MG
250 TABLET ORAL 3 TIMES DAILY
COMMUNITY
Start: 2018-08-21 | End: 2018-09-04

## 2018-08-21 RX ORDER — LENALIDOMIDE 25 MG/1
25 CAPSULE ORAL DAILY
Qty: 21 CAPSULE | Refills: 0 | Status: SHIPPED | OUTPATIENT
Start: 2018-08-21 | End: 2018-09-19 | Stop reason: SDUPTHER

## 2018-08-21 RX ADMIN — POLYETHYLENE GLYCOL 3350 17 G: 17 POWDER, FOR SOLUTION ORAL at 08:08

## 2018-08-21 RX ADMIN — SENNOSIDES AND DOCUSATE SODIUM 1 TABLET: 8.6; 5 TABLET ORAL at 08:08

## 2018-08-21 RX ADMIN — POTASSIUM & SODIUM PHOSPHATES POWDER PACK 280-160-250 MG 1 PACKET: 280-160-250 PACK at 08:08

## 2018-08-21 RX ADMIN — LABETALOL HCL 100 MG: 100 TABLET, FILM COATED ORAL at 08:08

## 2018-08-21 RX ADMIN — FERROUS SULFATE TAB EC 325 MG (65 MG FE EQUIVALENT) 325 MG: 325 (65 FE) TABLET DELAYED RESPONSE at 08:08

## 2018-08-21 NOTE — PLAN OF CARE
Problem: Patient Care Overview  Goal: Plan of Care Review  Outcome: Ongoing (interventions implemented as appropriate)  Lying in bed asleep easily awake no c/o pain at this time, tslo brace on at all time, incision to back

## 2018-08-21 NOTE — PT/OT/SLP PROGRESS
"Occupational Therapy   Treatment & Discharge    Name: Janie Zamorano  MRN: [id number]  Admitting Diagnosis:  S/P spinal fusion  4 Days Post-Op    Recommendations:     Discharge Recommendations: home health OT, home health PT  Discharge Equipment Recommendations:  bedside commode, walker, rolling, wheelchair, 3-in-1 commode  Barriers to discharge:  Other (Comment)([facility])    Subjective     Communicated with: RN prior to session. Pt's son present throughout. Pt agreeable to therapy session, reported "I'm feeling much better since I last saw you"  Pain/Comfort:  · Pain Rating 1: 0/10  · Pain Addressed 1: Pre-medicate for activity  · Pain Rating Post-Intervention 1: 0/10    Patients cultural, spiritual, Confucianism conflicts given the current situation: none reported    Objective:     Patient found with: telemetry, wound vac, TLSO, SCD    General Precautions: Standard, fall(cardiac)   Orthopedic Precautions:spinal precautions   Braces: TLSO     Occupational Performance:    Bed Mobility:    · Patient completed Rolling/Turning to Right with contact guard assistance and with side rail  · Patient completed Supine to Sit with contact guard assistance and with side rail     Functional Mobility/Transfers:  · Patient completed Sit <> Stand Transfer with contact guard assistance  with  no assistive device   · Patient completed Bed <> Chair Transfer using Step Transfer technique with minimum assistance with no assistive device  · Functional Mobility: household distance x3 trials with min(A) and no AD    Activities of Daily Living:  · Grooming: contact guard assistance standing at sink for 2 min duration; occasional UE support  · Upper Body Dressing: stand by assistance seated EOB to don gown as jacket  · Re adjusting and donning TLSO while seated EOB with minimal assistance     Patient left up in chair with all lines intact, call button in reach and son present    [facility] 6 Click:  [facility] Total Score: 18    Treatment & " Education:  -Pt alert and agreeable to therapy session  -Re edu on spinal precautions and log rolling with verbalized understanding; discussed dressing modification for return home  -Edu pt/son on wear and adjustment of TLSO with demo understanding  -discussed importance of mobility for healing process; and discussed home return concerns/discussion of home equipment needs  -Communication board updated; questions/concerns addressed within OT scope of practice    Education:    Assessment:     Janie Zamorano is a 62 y.o. female with a medical diagnosis of S/P spinal fusion.  She presents with improvement in overall tolerance for activity at this time. She demo good family support- son at bedside to be home with mother upon d/c. She demo cont gains towards OT goals at this time.  Performance deficits affecting function are weakness, impaired endurance, impaired self care skills, impaired functional mobilty, gait instability, orthopedic precautions, decreased upper extremity function, decreased lower extremity function, decreased safety awareness.      Rehab Prognosis:  Good; patient would benefit from acute skilled OT services to address these deficits and reach maximum level of function.       Plan:     Patient planned for d/c home with home health on this date.   This Plan of care has been discussed with the patient who was involved in its development and understands and is in agreement with the identified goals and treatment plan    GOALS:   Multidisciplinary Problems     Occupational Therapy Goals     Not on file          Multidisciplinary Problems (Resolved)        Problem: Occupational Therapy Goal    Goal Priority Disciplines Outcome Interventions   Occupational Therapy Goal   (Resolved)     OT, PT/OT Outcome(s) achieved    Description:  Goals to be met by: 8/25     Patient will increase functional independence with ADLs by performing:    Pt/CG demo understanding for don/doff TLSO. MET  Pt/CG verb/demo  understanding for log rolling and spinal precautions. MET  UE Dressing with Set-up Assistance and Stand-by Assistance. Met in sitting  LE Dressing with Set-up Assistance, Minimal Assistance and Assistive Devices as needed. Not met  Grooming while standing at sink with Contact Guard Assistance. MET  Functional mobility at household distance for ADL task with CGA and AD as needed. progressing                       Time Tracking:     OT Date of Treatment: 08/21/18  OT Start Time: 1000  OT Stop Time: 1020  OT Total Time (min): 20 min    Billable Minutes:Self Care/Home Management 20    ROGELIO Jolley  8/21/2018

## 2018-08-21 NOTE — PLAN OF CARE
Patient to be discharged home.  The patient will have home health upon discharge which will be set up per [facility].   sent orders to [facility].  Patient will discharge with Prevena Wound Vac.  Family to provide transportation home.  Neurosurgery clinic to schedule follow up appointment.    Future Appointments   Date Time Provider Department Center   8/27/2018  2:20 PM Jaycee Murry RN [facility] NEUROS[address]   8/30/2018  9:00 AM LAB, HEMONC SAME DAY [facility] HO [facility]   8/30/2018 10:00 AM Herrera Sandra MD [facility] BM JAIN [facility] [place]   9/12/2018  3:00 PM Gomez Paulson Jr., MD [facility] PSYCH [address]   9/28/2018  9:00 AM LAB, APPOINTMENT [facility] LAB [facility]   10/1/2018  2:30 PM BAPSHARLENE XROP DR HODGES XRAY OP [facility]   10/1/2018  4:00 PM He Andres MD [facility]        08/21/18 1304   Final Note   Assessment Type Final Discharge Note   Discharge Disposition Home-Health   Hospital Follow Up  Appt(s) scheduled? (Neurosurgery clinic to schedule follow up appointment.)

## 2018-08-21 NOTE — SUBJECTIVE & OBJECTIVE
Subjective:     Interval History: POD 4.  Patient doing great, plan for discharge today with home health.  Discussed expected follow up in clinic 1 week following discharge.  She currently denies fever, chills, abdominal pain, or change in BM.  Son at bedside.         Objective:     Vital Signs (Most Recent):  Temp: 98.2 °F (36.8 °C) (08/21/18 1145)  Pulse: 92 (08/21/18 1145)  Resp: 16 (08/21/18 0738)  BP: 135/84 (08/21/18 1145)  SpO2: 99 % (08/21/18 1145) Vital Signs (24h Range):  Temp:  [98.2 °F (36.8 °C)-99.4 °F (37.4 °C)] 98.2 °F (36.8 °C)  Pulse:  [] 92  Resp:  [16-18] 16  SpO2:  [97 %-99 %] 99 %  BP: (128-146)/(68-91) 135/84     Weight: 68 kg (149 lb 14.6 oz)  Body mass index is 25.73 kg/m².  Body surface area is 1.75 meters squared.    ECOG SCORE         [unfilled]    Intake/Output - Last 3 Shifts       08/19 0700 - 08/20 0659 08/20 0700 - 08/21 0659 08/21 0700 - 08/22 0659    P.O. 480 420     I.V. (mL/kg) 1139.2 (16.8) 300 (4.4)     Other  0     Total Intake(mL/kg) 1619.2 (23.8) 720 (10.6)     Urine (mL/kg/hr) 0 (0) 525 (0.3)     Emesis/NG output 0 0     Drains 0 0     Other 0 0     Stool 0 0     Blood  0     Total Output 0 525     Net +1619.2 +195            Urine Occurrence 5 x 6 x     Stool Occurrence 0 x 0 x     Emesis Occurrence 0 x 0 x           Physical Exam   Constitutional: She is oriented to person, place, and time. She appears well-developed and well-nourished. No distress.   TLSO brace    HENT:   Head: Normocephalic.   Eyes: Conjunctivae and EOM are normal. Pupils are equal, round, and reactive to light.   Neck: Normal range of motion. Neck supple.   Cardiovascular: Regular rhythm and normal heart sounds.   Pulmonary/Chest: Effort normal and breath sounds normal. No respiratory distress.   Abdominal: Soft. Bowel sounds are normal. She exhibits no distension.   Musculoskeletal: Normal range of motion. She exhibits no edema.   Neurological: She is alert and oriented to person, place, and  time. No cranial nerve deficit.   Skin: Skin is warm and dry. She is not diaphoretic.   Psychiatric: She is not aggressive. Thought content is not paranoid. She exhibits abnormal recent memory.       Significant Labs:   All pertinent labs from the last 24 hours have been reviewed.    Diagnostic Results:  I have reviewed all pertinent imaging results/findings within the past 24 hours.

## 2018-08-21 NOTE — DISCHARGE INSTRUCTIONS
Please follow ONLY the instructions that are checked below.    Activity Restrictions:  [x]  Return to work will be determined on an individual basis.  [x]  No lifting greater than 10 pounds.  [x]  Avoid bending and twisting the area of your surgery more than 45 degrees from neutral position in any direction.  [x]  No driving or operating machinery:  [x]  until cleared by your surgeon.  [x]  while taking narcotic pain medications or muscle relaxants.  [x]  Wear your brace at all times except when in bed or while showering.  [x]  Increase ambulation over the next 2 weeks so that you are walking 2 miles per day at 2 weeks post-operatively.  [x]  Walk on paved surfaces only. It is okay to walk up and down stairs while holding onto a side rail.  [x]  No sexual activity for 2-3 weeks.    Discharge Medication/Follow-up:  [x]  Please refer to discharge medication reconciliation form.  [x]  Your blood count was low while admitted to the hospital. Please take over the counter iron and vitamin C for 2 weeks, then stop. This medication can cause constipation. Please take a stool softener such as Colace and Miralax as needed for constipation.  [x]  Do not take ANY Aspirin or Aspirin containing products for 2 weeks after surgery.   [x]  Do not take ANY non-steroidal anti-inflammatory drugs (NSAIDS), including the following: ibuprofen, naprosyn, Aleve, Advil, Indocin, Mobic, or Celebrex for:  [x]  6 weeks  [x]  Prescriptions for appropriate medication will be given upon discharge.  [x]  Take docusate (Colace 100 mg): take one capsule a day as needed for constipation. You can get this over the counter.  [x]  Follow-up appointment:  [x]  10-14 days post-op for wound check by physician assistant/nurse  [x]  4-6 weeks with MD:  [x]  with x-rays  [x]  An appointment will be mailed to you.    Wound Care:  [x]  No bandage required. Keep your incision open to the air.  [x]  You may shower on the 2nd day after your surgery. Keep the  incision and wound vac clean and dry at all times. Do not allow the force of water to hit the incision.  Do not rub or scrub the incision.  [x]  You cannot take a bath until 8 weeks after surgery.      Call your doctor or go to the Emergency Room for any signs of infection, including: increased redness, drainage, pain, or fever (temperature ?101.5 for 24 hours). Call your doctor or go to the Emergency Room if there are any localized neurological changes; problems with speech, vision, numbness, tingling, weakness, or severe headache; or for other concerns.    Special Instructions:  [x]  No use of tobacco products.  [x]  Diet: Please eat a diabetic diet as tolerated.      Physicians need 3 days' notice for pain medicine to be refilled. Pain medicine will only be refilled between 8 AM and 5 PM, Monday through Friday, due to Food and Drug Administration regulation of documentation.    If you have any questions about this form, please call [phone].    Form No. 34793 (Revised 10/31/2013)

## 2018-08-21 NOTE — DISCHARGE SUMMARY
[facility]  Neurosurgery  Discharge Summary      Patient Name: Janie Zamorano  MRN: [id number]  Admission Date: 8/3/2018  Hospital Length of Stay: 18 days  Discharge Date and Time:  08/21/2018   Attending Physician: He Andres MD   Discharging Provider: KARRIE Keen  Primary Care Provider: He Hoyt MD    HPI:   Ms. Zamorano is a 62 year old female with no PMHx of cancer, chronic back pain, or osteoporosis, who presents to the ED with onset of bilateral flank pain that began 1 week ago while lifting a heavy object from the ground. Patient denies any popping or pulling sensation. Denies any thoracic or lumbar back pain. Denies any UE or LE pain, paresthesias, or weakness. Denies any bladder or bowel incontinence. CT renal was performed and showed a T12 compression fracture. Neurosurgery was consulted for treatment recommendations.       DATE OF SURGERY: 8/18/18     PREOPERATIVE DIAGNOSIS:  1. Pathologic burst fracture of T12 secondary to multiple myeloma  2. Yo spinal instability with kyphotic deformity     POSTOPERATIVE DIAGNOSIS:  Same.     PROCEDURE PERFORMED:  1. Open reduction and internal fixation of spinal fracture dislocation, T12  2. Posterior spinal fusion, T9-L3   3. Posterior segmental spinal fixation, T9-L3 (DePuy Synthes)  4. Use of intraoperative fluoroscopy  5. Use of intraoperative neuro-monitoring with MEPs  6. Vivigen bone grafting     PRIMARY SURGEON: He Andres M.D.     ASSISTANT: aSm Bowden M.D., Jaun Lewis M.D.      INDICATIONS:  62F with a recent fall and debilitating axial low back pain. Imaging showed a pathologic burst fracture at T12 with retropulsion but no cord compression. She had tumor infiltration throughout the spine. Workup established the diagnosis as mutliple myeloma. Her initial treatment for the fracture was bracing, but dynamic xrays in the brace showed persistent instability and worsening kyphotic deformity. On exam she  had severe pain at T12 and with movement, but she was neurologically intact. I recommended a spinal ORIF from T9-L3. R/B/A/I/M of this procedure were reviewed in detail and she wished to proceed. All questions were answered and no guarantees about the results of the procedure were made.        Hospital Course:  8/3: Admitted to hospital medication for work up of pathologic T12 fracture, multiple lytic lesions, and elevated Ca/PTH/Cr/BUN. Seen by NSGY who ordered TLSO brace and recommended an MRI with contrast. Started on aggressive fluids for elevated Ca and PTH. Ordered 24 hr protein, urine ADAM, skeletal survey, beta 2 microglobulin, SPEP, LDH, serum immunofixation, light chain studies, quant immunoglobulins, and uric acid to evaluate for multiple myeloma.   8/4: Exam stable. Multiple labs pending. CT chest with contrast ordered. Continue IVF's. MRI T spine on hold until renal function improves.   8/5: Heme Onc consulted for bone marrow biopsy. Labs pending. Pending multiple imaging studies with contrast. Exam stable.   8/6: Bone marrow biopsy today. Given zometa x 1. Transfer to BMT service for metastatic work up. GLENIS resolved. Cr back to baseline. CT chest does shows multiple lytic lesions in the spine, but no clear etiology of metastatic disease seen.   8/7: Correct Ca today is 11.3. Zometa 4 mg IV refused by patient yesterday. Will try again to give Zometa. Labs pending. MRI spine shows progression of thoracic compression fracture with some retropulsion of the posterior fragment. Xrays ordered to eval for instability.  8/8: Zometa 4 mg IV administered 8/7. Corrected Ca today 11.1; improving. Upright and supine x-rays concerning for unstable fracture. NSGY recommends remaining in TLSO brace at all times. CT thoracic spine with 3D reconstruction ordered.   8/9: Corrected Ca 9.6 today. DC IVF's. Bone marrow bx consistent with plasma cell myeloma. Plan for systemic therapy after discharge. NSGY recommending T9-L3  posterior instrumented fusion tomorrow. Patient placed on bed rest.   8/10: Patient refused to sign anesthesia consents last night and stated that she did not want surgical intervention. Continue TLSO brace at all times and bed rest. After a long discussion regarding surgery, patient and son now agreeable to surgical intervention. Awaiting OR availability. Corrected Ca 9.3 today.  8/11 - 8/16: Ca continues to improve. Exam and back pain stable. Continue bed rest and TLSO brace at all times. Awaiting surgical intervention.  8/17: OR today for T9-L3  instrumentation. No intra op complications. Patient tolerated procedure well. Recovered in neuro ICU  8/18: POD#2. No significant events overnight. Labetalol decreased. Hemodynamically stable will step down to NSGY today.  8/19: NAEON, exam stable. BMT recommending a pulse of steroids (dexamethasone 40 mg x 4 days) to help control myeloma until she can begin outpatient chemotherapy - son refusing. PT/OT recommending home health.   8/20: NAEON. Drain output 0 & 0 cc. Both drains removed without complication. Patient tolerated removal well. Post op xrays completed. Shows good positioning of the hardware. Continue incisional wound vac until discharge tomorrow.   8/21: Exam stable. Pain controlled. H&H improving on iron for replacement - continue for 2 weeks. Attempted to order pulse dose of steroids x 4 days but son refusing. States they will discuss at the outpatient appt. DC home with home health and equipment. Follow up in neurosurgery clinic in 2 weeks for wound check. Discharge instructions given verbally to patient and family. All of their questions were answered. They were encouraged to call the clinic with any questions or concerns prior to follow up appt.         Consults: PT, OT, Social work, BMT  Consults (From admission, onward)        Status Ordering Provider     Inpatient consult to Endocrinology  Once     Provider:  (Not yet assigned)    Completed SHIKHA YUN      Inpatient consult to Midline team  Once     Provider:  (Not yet assigned)    Completed ISAAC MCNEAL     Inpatient consult to Neurosurgery  Once     Provider:  (Not yet assigned)    Completed HARSH ALBRIGHT     Inpatient consult to Psychiatry  Once     Provider:  (Not yet assigned)    Completed THANH SANTANA     IP consult to case management  Once     Provider:  (Not yet assigned)    Completed ISAAC MCNEAL          Significant Diagnostic Studies:   Labs:   BMP:   Recent Labs   Lab  08/20/18   0710  08/21/18   0519   MG  1.6  1.8    and CBC   Recent Labs   Lab  08/20/18   0710  08/21/18   0519   WBC  6.04  4.48   HGB  7.7*  7.9*   HCT  24.7*  24.4*   PLT  279  273     Microbiology:   Blood Culture   Lab Results   Component Value Date    LABBLOO No growth after 5 days. 08/08/2018    and Urine Culture    Lab Results   Component Value Date    LABURIN No growth 08/12/2018     Radiology: X-Ray: metastic surgery; abdomen flat; chest; thoracic spine; thoracolumbar spine  MRI: thoracic and lumbar spine  CT scan: chest with contrast; renal stone study; thoracic spine with 3D reconstruction  Cardiac Graphics: ECG:     Pathology: Findings are consistent with plasma cell myeloma    Pending Diagnostic Studies:     Procedure Component Value Units Date/Time    CT 3D RECON WITH INDEPENDENT WS [[id number]] Resulted:  08/08/18 2251    Order Status:  Sent Lab Status:  In process Updated:  08/08/18 2251    Immunofixation, 24 hour Urine [[id number]] Collected:  08/07/18 0615    Order Status:  Sent Lab Status:  In process Updated:  08/07/18 1917    Specimen:  Urine, Clean Catch         Final Active Diagnoses:    Diagnosis Date Noted POA    PRINCIPAL PROBLEM:  S/P spinal fusion [Z98.1] 08/17/2018 Not Applicable    Closed compression fracture of thoracic vertebra [S22.000A] 08/03/2018 Yes    Fusion of spine [M43.20] 08/17/2018 Yes    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]  Yes     "GERD (gastroesophageal reflux disease) [K21.9] 08/10/2018 Yes    Constipation [K59.00] 08/05/2018 Yes    Hypercalcemia [E83.52] 08/03/2018 Yes    Multiple myeloma not having achieved remission [C90.00] 08/03/2018 Yes    Anemia in neoplastic disease [D63.0] 08/03/2018 Yes    Gastroesophageal reflux disease [K21.9] 01/08/2018 Yes    Paranoid schizophrenia [F20.0] 08/06/2016 Yes    Other secondary hypertension [I15.8] 05/14/2016 Yes    Asthma [J45.909] 05/14/2016 Yes      Problems Resolved During this Admission:    Diagnosis Date Noted Date Resolved POA    Acute kidney injury [N17.9] 08/18/2018 08/20/2018 Yes    Delirium [R41.0] 08/12/2018 08/15/2018 Yes    Acute kidney injury [N17.9] 08/03/2018 08/10/2018 Yes      Discharged Condition: good    Disposition: Home-Health Care Svc    Follow Up: 2 weeks with NSGY RN for wound check  Follow-up Information     Herrera Sandra MD On 8/30/2018.    Specialty:  Hematology and Oncology  Why:  Labs- 9:00am, follow up- 10:00am (possible chemo pending insurance auth)  [facility] information:  [facility] [id number] [phone]                 Patient Instructions:      WALKER FOR HOME USE     Order Specific Question Answer Comments   Type of Walker: Adult (5'4"-6'6")    With wheels? Yes    Height: 5' 4" (1.626 m)    Weight: 68 kg (149 lb 14.6 oz)    Length of need (1-99 months): 99    Does patient have medical equipment at home? cane, straight    Does patient have medical equipment at home? shower chair    Please check all that apply: Patient's condition impairs ambulation.    Please check all that apply: Patient needs help to get in and out of chair.    Please check all that apply: Walker will be used for gait training.    Please check all that apply: Altered sensory perception.    Please check all that apply: Patient is unable to safely ambulate without equipment.      COMMODE FOR HOME USE     Order Specific Question Answer Comments   Type: Standard  " "  Height: 5' 4" (1.626 m)    Weight: 68 kg (149 lb 14.6 oz)    Does patient have medical equipment at home? cane, straight    Does patient have medical equipment at home? shower chair    Length of need (1-99 months): 99      CBC auto differential   Standing Status: Future Standing Exp. Date: 10/07/19     Comprehensive metabolic panel   Standing Status: Future Standing Exp. Date: 10/07/19     Ambulatory referral to Home Health   Referral Priority: Routine Referral Type: Home Health   Referral Reason: Specialty Services Required   Requested Specialty: Home Health Services   Number of Visits Requested: 1     Notify your health care provider if you experience any of the following:  temperature >100.4     Notify your health care provider if you experience any of the following:  persistent nausea and vomiting or diarrhea     Notify your health care provider if you experience any of the following:  severe uncontrolled pain     Notify your health care provider if you experience any of the following:  redness, tenderness, or signs of infection (pain, swelling, redness, odor or green/yellow discharge around incision site)     Notify your health care provider if you experience any of the following:  difficulty breathing or increased cough     Notify your health care provider if you experience any of the following:  severe persistent headache     Notify your health care provider if you experience any of the following:  worsening rash     Notify your health care provider if you experience any of the following:  persistent dizziness, light-headedness, or visual disturbances     Notify your health care provider if you experience any of the following:  increased confusion or weakness     No dressing needed     Type & Screen   Standing Status: Future Standing Exp. Date: 10/07/19     Activity as tolerated     Medications:  Reconciled Home Medications:      Medication List      START taking these medications    ascorbic acid (vitamin " C) 250 MG tablet  Commonly known as:  VITAMIN C  Take 1 tablet (250 mg total) by mouth 3 (three) times daily. for 14 days     ferrous sulfate 325 (65 FE) MG EC tablet  Take 1 tablet (325 mg total) by mouth 3 (three) times daily. for 14 days     lenalidomide 25 mg Cap  Commonly known as:  REVLIMID  Take 1 capsule (25 mg total) by mouth once daily. 21 days on a 28 day cycle  auth number [id number] Aug. 15 for 21 days     oxyCODONE 10 mg 12 hr tablet  Commonly known as:  OXYCONTIN  Take 1 tablet (10 mg total) by mouth every 12 (twelve) hours. for 14 days     traMADol 50 mg tablet  Commonly known as:  ULTRAM  Take 1 tablet (50 mg total) by mouth every 6 (six) hours as needed for Pain.        CHANGE how you take these medications    benztropine 1 MG tablet  Commonly known as:  COGENTIN  TK 1 T PO  BID  What changed:    · how much to take  · how to take this  · when to take this  · additional instructions     fluticasone 50 mcg/actuation nasal spray  Commonly known as:  FLONASE  1 spray by Each Nare route once daily.  What changed:    · when to take this  · reasons to take this     labetalol 100 MG tablet  Commonly known as:  NORMODYNE  Take 1 tablet (100 mg total) by mouth 3 (three) times daily.  What changed:  See the new instructions.     risperiDONE 1 MG tablet  Commonly known as:  RISPERDAL  Take 1 tablet (1 mg total) by mouth once daily.  What changed:    · how much to take  · how to take this  · when to take this  · additional instructions        CONTINUE taking these medications    albuterol 90 mcg/actuation inhaler  Inhale 2 puffs into the lungs every 6 (six) hours as needed for Wheezing.     bisacodyl 5 mg EC tablet  Commonly known as:  DULCOLAX  Take 5 mg by mouth daily as needed for Constipation.     cholecalciferol (vitamin D3) 2,000 unit Cap  Take 1 capsule (2,000 Units total) by mouth once daily.     DISPOSABLE ENEMA 19-7 gram/118 mL Enem  Generic drug:  sodium phosphates  Place 1 enema rectally as needed  (constipation).     pantoprazole 40 MG tablet  Commonly known as:  PROTONIX  Take 1 tablet (40 mg total) by mouth every 12 (twelve) hours.     VITAMIN B-12 ORAL  Take 1 tablet by mouth daily as needed.            KARRIE Keen  Neurosurgery  [facility]

## 2018-08-21 NOTE — ASSESSMENT & PLAN NOTE
62 year old female with acute onset on bilateral flank pain that began after lifting a heavy object. Imaging shows a T12 compression fracture along with multiple lytic lesions throughout the spine, now s/p T9-L3 fusion on 8/17:    -Patient neurologically stable on exam  -Xrays show good positioning of hardware  -Drain output 0 & 0 cc. Both drains removed without complication. Patient tolerated removal well.   -Brace on when OOB or working with therapy. OK to remove when lying in bed.   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 Plans to discuss therapy once outpatient.   -Continue SQH/SCDs/TEDs for DVTP  -Continue bowel regimen of Miralax and senna  -Continue current pain regimen  -DC tomorrow with home health

## 2018-08-21 NOTE — CONSULTS
Consult received on patient's prevena incisional wound vac change prior to discharge. Previous dressing removed, incision approximated with staples intact. No drainage or odor noted. Incision cleaned with sterile normal saline, cavilon no sting barrier film applied to periwound, one prevena sponge applied to incision with drape over sponge, no leak noted at 125mmHg. Patient tolerated care well. Patient to follow up with neurosurgery post discharge. Nursing to continue care.        Midline back incision

## 2018-08-21 NOTE — PROGRESS NOTES
[facility]  Neurosurgery  Progress Note    Subjective:     History of Present Illness: Ms. Zamorano is a 62 year old female with no PMHx of cancer, chronic back pain, or osteoporosis, who presents to the ED with onset of bilateral flank pain that began 1 week ago while lifting a heavy object from the ground. Patient denies any popping or pulling sensation. Denies any thoracic or lumbar back pain. Denies any UE or LE pain, paresthesias, or weakness. Denies any bladder or bowel incontinence. CT renal was performed and showed a T12 compression fracture. Neurosurgery was consulted for treatment recommendations.     Post-Op Info:  Procedure(s) (LRB):  SPINE, THORACIC, WITH FUSION T9-L3,neuromonitoring, please. (N/A)   4 Days Post-Op     Interval History:   NAEON. Patient resting comfortably in bed. Denies any new symptoms, weakness, or incontinence. Tolerating diet. Voiding appropriately.     Medications:  Continuous Infusions:  Scheduled Meds:   ascorbic acid (vitamin C)  250 mg Oral TID    diclofenac sodium  2 g Topical (Top) QID    famotidine  20 mg Oral BID    ferrous sulfate  325 mg Oral TID    fluticasone  1 puff Inhalation BID    heparin (porcine)  5,000 Units Subcutaneous Q8H    labetalol  100 mg Oral TID    oxyCODONE  10 mg Oral Q12H    polyethylene glycol  17 g Oral Daily    potassium, sodium phosphates  1 packet Oral BID    senna-docusate 8.6-50 mg  1 tablet Oral BID     PRN Meds:acetaminophen, albuterol-ipratropium, dextrose 50%, dextrose 50%, dextrose 50%, glucagon (human recombinant), glucagon (human recombinant), glucose, glucose, hydrALAZINE, methyl salicylate-menthol 15-10%, ondansetron, QUEtiapine, ramelteon, sodium chloride 0.9%, traMADol     Review of Systems  Objective:     Weight: 68 kg (149 lb 14.6 oz)  Body mass index is 25.73 kg/m².  Vital Signs (Most Recent):  Temp: 98.5 °F (36.9 °C) (08/21/18 0738)  Pulse: 99 (08/21/18 0824)  Resp: 16 (08/21/18 0738)  BP: (!) 141/91  (08/21/18 0738)  SpO2: 97 % (08/21/18 0738) Vital Signs (24h Range):  Temp:  [98.5 °F (36.9 °C)-99.4 °F (37.4 °C)] 98.5 °F (36.9 °C)  Pulse:  [] 99  Resp:  [16-18] 16  SpO2:  [97 %-99 %] 97 %  BP: (128-146)/(68-91) 141/91                           Closed/Suction Drain 08/17/18 1238 Back Other (Comment) (Active)   Site Description Healing 8/20/2018  9:00 AM   Dressing Type Transparent 8/20/2018  9:00 AM   Dressing Status Clean;Dry;Intact 8/20/2018  9:00 AM   Dressing Intervention Dressing reinforced 8/20/2018  9:00 AM   Drainage Serosanguineous 8/20/2018  9:00 AM   Output (mL) 0 mL 8/20/2018  9:00 AM       Neurosurgery Physical Exam  General: well developed, well nourished, no distress.   Head: normocephalic, atraumatic  Neurologic: Alert and oriented. Thought content appropriate.  GCS: Motor: 6/Verbal: 5/Eyes: 4 GCS Total: 15  Mental Status: Awake, Alert, Oriented x 4  Language: No aphasia  Speech: No dysarthria  Cranial nerves: face symmetric, tongue midline, CN II-XII grossly intact.   Eyes: pupils equal, round, reactive to light with accomodation, EOMI.   Pulmonary: normal respirations, no signs of respiratory distress  Abdomen: soft, non-distended, not tender to palpation  Sensory: intact to light touch throughout  Motor Strength:Moves all extremities spontaneously with good tone.  Full strength upper and lower extremities. No abnormal movements seen.   Washburn: absent  Clonus: absent  Skin: Skin is warm, dry and intact.        Incision:  Clean, dry, incisional wound vac in place. No surrounding erythema or edema. No drainage from the incision.           Significant Labs:  Recent Labs   Lab  08/20/18   0710  08/21/18   0519   MG  1.6  1.8     Recent Labs   Lab  08/20/18   0710  08/21/18   0519   WBC  6.04  4.48   HGB  7.7*  7.9*   HCT  24.7*  24.4*   PLT  279  273     No results for input(s): LABPT, INR, APTT in the last 48 hours.  Microbiology Results (last 7 days)     Procedure Component Value Units  Date/Time    Urine culture [[id number]] Collected:  08/12/18 1206    Order Status:  Completed Specimen:  Urine Updated:  08/14/18 1324     Urine Culture, Routine No growth    Narrative:       add on Urine culture order #[id number] per Dr. Ruslan Small @ 12:30    08/13/2018             Assessment/Plan:     Closed compression fracture of thoracic vertebra    62 year old female with acute onset on bilateral flank pain that began after lifting a heavy object. Imaging shows a T12 compression fracture along with multiple lytic lesions throughout the spine, now s/p T9-L3 fusion on 8/17:    -Patient neurologically stable on exam  -Xrays show good positioning of hardware  -Brace on when OOB or working with therapy. OK to remove when lying in bed.   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 Offered to start pulse dose of steroids but son refusing. He does not want to start any treatment until they are seen in clinic. Plans to discuss therapy once outpatient.   -H&H 7.9/24.4. Stable. Begin iron with ascorbic acid for replacement x 14 days.  -Continue bowel regimen of Miralax and senna. Patient and son informed that the pain medication, along with the iron, can lead to constipation.   -Continue current pain regimen  -Continue prevena incisional vac until wound check appt. Vac will be removed at this time.   -Follow up with NSGY RN in 2 weeks for wound check  -Follow up with Dr. Andres in 6 weeks with xrays  -DC with home health and equipment.   -Discharge instructions given verbally to patient and family. All of their questions were answered. They were encouraged to call the clinic with any questions or concerns prior to follow up appt.           Please call with any questions      Margaret Rust PA-C   Neurosurgery   Pager: [phone]

## 2018-08-21 NOTE — SUBJECTIVE & OBJECTIVE
Interval History:   NAEON. Patient resting comfortably in bed. Denies any new symptoms, weakness, or incontinence. Tolerating diet. Voiding appropriately.     Medications:  Continuous Infusions:  Scheduled Meds:   ascorbic acid (vitamin C)  250 mg Oral TID    diclofenac sodium  2 g Topical (Top) QID    famotidine  20 mg Oral BID    ferrous sulfate  325 mg Oral TID    fluticasone  1 puff Inhalation BID    heparin (porcine)  5,000 Units Subcutaneous Q8H    labetalol  100 mg Oral TID    oxyCODONE  10 mg Oral Q12H    polyethylene glycol  17 g Oral Daily    potassium, sodium phosphates  1 packet Oral BID    senna-docusate 8.6-50 mg  1 tablet Oral BID     PRN Meds:acetaminophen, albuterol-ipratropium, dextrose 50%, dextrose 50%, dextrose 50%, glucagon (human recombinant), glucagon (human recombinant), glucose, glucose, hydrALAZINE, methyl salicylate-menthol 15-10%, ondansetron, QUEtiapine, ramelteon, sodium chloride 0.9%, traMADol     Review of Systems  Objective:     Weight: 68 kg (149 lb 14.6 oz)  Body mass index is 25.73 kg/m².  Vital Signs (Most Recent):  Temp: 98.5 °F (36.9 °C) (08/21/18 0738)  Pulse: 99 (08/21/18 0824)  Resp: 16 (08/21/18 0738)  BP: (!) 141/91 (08/21/18 0738)  SpO2: 97 % (08/21/18 0738) Vital Signs (24h Range):  Temp:  [98.5 °F (36.9 °C)-99.4 °F (37.4 °C)] 98.5 °F (36.9 °C)  Pulse:  [] 99  Resp:  [16-18] 16  SpO2:  [97 %-99 %] 97 %  BP: (128-146)/(68-91) 141/91                           Closed/Suction Drain 08/17/18 1238 Back Other (Comment) (Active)   Site Description Healing 8/20/2018  9:00 AM   Dressing Type Transparent 8/20/2018  9:00 AM   Dressing Status Clean;Dry;Intact 8/20/2018  9:00 AM   Dressing Intervention Dressing reinforced 8/20/2018  9:00 AM   Drainage Serosanguineous 8/20/2018  9:00 AM   Output (mL) 0 mL 8/20/2018  9:00 AM       Neurosurgery Physical Exam  General: well developed, well nourished, no distress.   Head: normocephalic, atraumatic  Neurologic: Alert and  oriented. Thought content appropriate.  GCS: Motor: 6/Verbal: 5/Eyes: 4 GCS Total: 15  Mental Status: Awake, Alert, Oriented x 4  Language: No aphasia  Speech: No dysarthria  Cranial nerves: face symmetric, tongue midline, CN II-XII grossly intact.   Eyes: pupils equal, round, reactive to light with accomodation, EOMI.   Pulmonary: normal respirations, no signs of respiratory distress  Abdomen: soft, non-distended, not tender to palpation  Sensory: intact to light touch throughout  Motor Strength:Moves all extremities spontaneously with good tone.  Full strength upper and lower extremities. No abnormal movements seen.   Washburn: absent  Clonus: absent  Skin: Skin is warm, dry and intact.        Incision:  Clean, dry, incisional wound vac in place. No surrounding erythema or edema. No drainage from the incision.           Significant Labs:  Recent Labs   Lab  08/20/18   0710  08/21/18   0519   MG  1.6  1.8     Recent Labs   Lab  08/20/18   0710  08/21/18   0519   WBC  6.04  4.48   HGB  7.7*  7.9*   HCT  24.7*  24.4*   PLT  279  273     No results for input(s): LABPT, INR, APTT in the last 48 hours.  Microbiology Results (last 7 days)     Procedure Component Value Units Date/Time    Urine culture [[id number]] Collected:  08/12/18 1206    Order Status:  Completed Specimen:  Urine Updated:  08/14/18 1324     Urine Culture, Routine No growth    Narrative:       add on Urine culture order #[id number] per Dr. Ruslan Small @ 12:30    08/13/2018

## 2018-08-21 NOTE — SUBJECTIVE & OBJECTIVE
Interval History:   NAEON. Patient resting comfortably in bed. Denies any UE or LE pain, paresthesias, or weakness. Reports pain well controlled. Tolerating diet. Voiding appropriately.     Medications:  Continuous Infusions:  Scheduled Meds:   diclofenac sodium  2 g Topical (Top) QID    famotidine  20 mg Oral BID    fluticasone  1 puff Inhalation BID    heparin (porcine)  5,000 Units Subcutaneous Q8H    labetalol  100 mg Oral TID    oxyCODONE  10 mg Oral Q12H    polyethylene glycol  17 g Oral Daily    potassium, sodium phosphates  1 packet Oral BID    senna-docusate 8.6-50 mg  1 tablet Oral BID     PRN Meds:acetaminophen, albuterol-ipratropium, dextrose 50%, dextrose 50%, dextrose 50%, glucagon (human recombinant), glucagon (human recombinant), glucose, glucose, hydrALAZINE, methyl salicylate-menthol 15-10%, ondansetron, QUEtiapine, ramelteon, sodium chloride 0.9%, traMADol     Review of Systems  Objective:     Weight: 68 kg (149 lb 14.6 oz)  Body mass index is 25.73 kg/m².  Vital Signs (Most Recent):  Temp: 98.7 °F (37.1 °C) (08/20/18 1111)  Pulse: 100 (08/20/18 1535)  Resp: 18 (08/20/18 1111)  BP: (!) 146/71 (08/20/18 1111)  SpO2: 99 % (08/20/18 1111) Vital Signs (24h Range):  Temp:  [98.1 °F (36.7 °C)-99 °F (37.2 °C)] 98.7 °F (37.1 °C)  Pulse:  [] 100  Resp:  [18] 18  SpO2:  [96 %-99 %] 99 %  BP: (142-150)/(71-83) 146/71     Date 08/20/18 0700 - 08/21/18 0659   Shift 0540-2037 5760-8721 6046-3523 24 Hour Total   INTAKE   P.O.  200  200   I.V.(mL/kg)  300(4.4)  300(4.4)   Shift Total(mL/kg)  500(7.4)  500(7.4)   OUTPUT   Urine(mL/kg/hr)  525  525   Drains 0   0   Shift Total(mL/kg) 0(0) 525(7.7)  525(7.7)   Weight (kg) 68 68 68 68                        Closed/Suction Drain 08/17/18 1238 Back Other (Comment) (Active)   Site Description Healing 8/20/2018  9:00 AM   Dressing Type Transparent 8/20/2018  9:00 AM   Dressing Status Clean;Dry;Intact 8/20/2018  9:00 AM   Dressing Intervention Dressing  reinforced 8/20/2018  9:00 AM   Drainage Serosanguineous 8/20/2018  9:00 AM   Output (mL) 0 mL 8/20/2018  9:00 AM       Neurosurgery Physical Exam.  General: well developed, well nourished, no distress.   Head: normocephalic, atraumatic  Neurologic: Alert and oriented. Thought content appropriate.  GCS: Motor: 6/Verbal: 5/Eyes: 4 GCS Total: 15  Mental Status: Awake, Alert, Oriented x 4  Language: No aphasia  Speech: No dysarthria  Cranial nerves: face symmetric, tongue midline, CN II-XII grossly intact.   Eyes: pupils equal, round, reactive to light with accomodation, EOMI.   Pulmonary: normal respirations, no signs of respiratory distress  Abdomen: soft, non-distended, not tender to palpation  Sensory: intact to light touch throughout  Motor Strength:Moves all extremities spontaneously with good tone.  Full strength upper and lower extremities. No abnormal movements seen.   Washburn: absent  Clonus: absent  Skin: Skin is warm, dry and intact.      Incision:  Clean, dry, incisional wound vac in place. 2 HV drains in place. No surrounding erythema or edema. No drainage from the incision.       Significant Labs:  Recent Labs   Lab  08/19/18   0543  08/20/18   0710   MG  2.0  1.6     Recent Labs   Lab  08/19/18   0543  08/20/18   0710   WBC  7.04  6.04   HGB  9.0*  7.7*   HCT  27.4*  24.7*   PLT  268  279     No results for input(s): LABPT, INR, APTT in the last 48 hours.  Microbiology Results (last 7 days)     Procedure Component Value Units Date/Time    Urine culture [[id number]] Collected:  08/12/18 1206    Order Status:  Completed Specimen:  Urine Updated:  08/14/18 1324     Urine Culture, Routine No growth    Narrative:       add on Urine culture order #[id number] per Dr. Ruslan Small @ 12:30    08/13/2018     Blood culture [[id number]] Collected:  08/08/18 1800    Order Status:  Completed Specimen:  Blood Updated:  08/13/18 2212     Blood Culture, Routine No growth after 5 days.    Narrative:       Collection has  been rescheduled by FB at 8/8/2018 14:42 Reason: nurse   Leonora clines all labs at 1600  Collection has been rescheduled by JM2 at 8/8/2018 16:24 Reason:   Patient unavailable  Collection has been rescheduled by JM2 at 8/8/2018 16:25 Reason:   Patient unavailable  Collection has been rescheduled by JM2 at 8/8/2018 16:37 Reason:   Patient unavailable  Collection has been rescheduled by FB at 8/8/2018 14:42 Reason: nurse   Leonora wants all labs at 1600  Collection has been rescheduled by JM2 at 8/8/2018 16:24 Reason:   Patient unavailable  Collection has been rescheduled by JM2 at 8/8/2018 16:25 Reason:   Patient unavailable  Collection has been rescheduled by JM2 at 8/8/2018 16:37 Reason:   Patient unavailable    Blood culture [[id number]] Collected:  08/08/18 1801    Order Status:  Completed Specimen:  Blood Updated:  08/13/18 2212     Blood Culture, Routine No growth after 5 days.    Narrative:       Collection has been rescheduled by FB at 8/8/2018 14:42 Reason: nurse   Leonora wants all labs at 1600  Collection has been rescheduled by JM2 at 8/8/2018 16:24 Reason:   Patient unavailable  Collection has been rescheduled by JM2 at 8/8/2018 16:25 Reason:   Patient unavailable  Collection has been rescheduled by JM2 at 8/8/2018 16:37 Reason:   Patient unavailable  Collection has been rescheduled by FB at 8/8/2018 14:42 Reason: nurse   Leonora clines all labs at 1600  Collection has been rescheduled by JM2 at 8/8/2018 16:24 Reason:   Patient unavailable  Collection has been rescheduled by JM2 at 8/8/2018 16:25 Reason:   Patient unavailable  Collection has been rescheduled by JM2 at 8/8/2018 16:37 Reason:   Patient unavailable            Significant Diagnostics:  Xray thoracolumbar spine:  I independently reviewed the imaging.     There is T12 compression with fixation rods above and below.  There is moderate DJD.  There is good alignment and no complication.

## 2018-08-21 NOTE — NURSING
Wound care to bedside to place pts home wound vac. Discharge instructions provided to patient and son. Both verbalize understanding. Pt leaving with son via wheelchair.

## 2018-08-21 NOTE — ASSESSMENT & PLAN NOTE
62 year old female with acute onset on bilateral flank pain that began after lifting a heavy object. Imaging shows a T12 compression fracture along with multiple lytic lesions throughout the spine, now s/p T9-L3 fusion on 8/17:    -Patient neurologically stable on exam  -Xrays show good positioning of hardware  -Brace on when OOB or working with therapy. OK to remove when lying in bed.   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 Offered to start pulse dose of steroids but son refusing. He does not want to start any treatment until they are seen in clinic. Plans to discuss therapy once outpatient.   -H&H 7.9/24.4. Stable. Begin iron with ascorbic acid for replacement x 14 days.  -Continue bowel regimen of Miralax and senna. Patient and son informed that the pain medication, along with the iron, can lead to constipation.   -Continue current pain regimen  -Continue prevena incisional vac until wound check appt. Vac will be removed at this time.   -Follow up with NSGY RN in 2 weeks for wound check  -Follow up with Dr. Andres in 6 weeks with xrays  -DC with home health and equipment.   -Discharge instructions given verbally to patient and family. All of their questions were answered. They were encouraged to call the clinic with any questions or concerns prior to follow up appt.

## 2018-08-21 NOTE — PROGRESS NOTES
[facility]  Hematology  Bone Marrow Transplant  Progress Note    Patient Name: Janie Zamorano  Admission Date: 8/3/2018  Hospital Length of Stay: 18 days  Code Status: Full Code    Subjective:     Interval History: POD 4.  Patient doing great, plan for discharge today with home health.  Discussed expected follow up in clinic 1 week following discharge.  She currently denies fever, chills, abdominal pain, or change in BM.  Son at bedside.         Objective:     Vital Signs (Most Recent):  Temp: 98.2 °F (36.8 °C) (08/21/18 1145)  Pulse: 92 (08/21/18 1145)  Resp: 16 (08/21/18 0738)  BP: 135/84 (08/21/18 1145)  SpO2: 99 % (08/21/18 1145) Vital Signs (24h Range):  Temp:  [98.2 °F (36.8 °C)-99.4 °F (37.4 °C)] 98.2 °F (36.8 °C)  Pulse:  [] 92  Resp:  [16-18] 16  SpO2:  [97 %-99 %] 99 %  BP: (128-146)/(68-91) 135/84     Weight: 68 kg (149 lb 14.6 oz)  Body mass index is 25.73 kg/m².  Body surface area is 1.75 meters squared.    ECOG SCORE         [unfilled]    Intake/Output - Last 3 Shifts       08/19 0700 - 08/20 0659 08/20 0700 - 08/21 0659 08/21 0700 - 08/22 0659    P.O. 480 420     I.V. (mL/kg) 1139.2 (16.8) 300 (4.4)     Other  0     Total Intake(mL/kg) 1619.2 (23.8) 720 (10.6)     Urine (mL/kg/hr) 0 (0) 525 (0.3)     Emesis/NG output 0 0     Drains 0 0     Other 0 0     Stool 0 0     Blood  0     Total Output 0 525     Net +1619.2 +195            Urine Occurrence 5 x 6 x     Stool Occurrence 0 x 0 x     Emesis Occurrence 0 x 0 x           Physical Exam   Constitutional: She is oriented to person, place, and time. She appears well-developed and well-nourished. No distress.   TLSO brace    HENT:   Head: Normocephalic.   Eyes: Conjunctivae and EOM are normal. Pupils are equal, round, and reactive to light.   Neck: Normal range of motion. Neck supple.   Cardiovascular: Regular rhythm and normal heart sounds.   Pulmonary/Chest: Effort normal and breath sounds normal. No respiratory distress.    Abdominal: Soft. Bowel sounds are normal. She exhibits no distension.   Musculoskeletal: Normal range of motion. She exhibits no edema.   Neurological: She is alert and oriented to person, place, and time. No cranial nerve deficit.   Skin: Skin is warm and dry. She is not diaphoretic.   Psychiatric: She is not aggressive. Thought content is not paranoid. She exhibits abnormal recent memory.       Significant Labs:   All pertinent labs from the last 24 hours have been reviewed.    Diagnostic Results:  I have reviewed all pertinent imaging results/findings within the past 24 hours.    Assessment/Plan:     * S/P spinal fusion    S/P Thoracic fusion. PT/OT and drain care per Neurosurgery, expected discharge early this week.    - Neurosug following, appreciate recs  - TLSO brace   - Continue oxy 5-10 mg q6hr prn        Multiple myeloma not having achieved remission    Plan to discuss therapy once outpatient- likely VRD.  Has outpatient follow up on 8/30             Hypercalcemia    Improving/resolved.    -due to PHPT and MM   -PTH elevated as well  -Zometa 4 mg IV administered 8/7            Closed compression fracture of thoracic vertebra    Concern for fracture stability.  S/P Thoracic fusion. PT/OT per neurocrit.    - Neurosug following, appreciate recs  - TLSO brace   - Pain somewhat controlled, continue diclofenac topical and oxy 5-10 mg q6hr prn        Paranoid schizophrenia    - Will trial Seroquel PRN and follow up with psych recs.    - Outpatient follow up   - monitor closely if started on steroids         Fusion of spine    8/18, PT/OT discharge home with home health        Other secondary hypertension    - Home meds re-started except the valsartan given patient was having GLENIS  - labetalol dose increased to 300 mg TID  - Restart ARB on discharge   - continue to monitor BP             VTE Risk Mitigation (From admission, onward)        Ordered     heparin (porcine) injection 5,000 Units  Every 8 hours       08/17/18 1300     IP VTE HIGH RISK PATIENT  Once      08/03/18 2320     Place MOUNIKA hose  Until discontinued      08/03/18 2320     Place sequential compression device  Until discontinued      08/03/18 1735          Disposition: Discharge home today with home health.     Ruslan Small MD  Bone Marrow Transplant  [facility]

## 2018-08-21 NOTE — SUBJECTIVE & OBJECTIVE
Interval History:  NAEON, exam stable, patient transferred to floor today    Medications:  Continuous Infusions:    Scheduled Meds:   diclofenac sodium  2 g Topical (Top) QID    famotidine  20 mg Oral BID    fluticasone  1 puff Inhalation BID    heparin (porcine)  5,000 Units Subcutaneous Q8H    labetalol  100 mg Oral TID    oxyCODONE  10 mg Oral Q12H    polyethylene glycol  17 g Oral Daily    potassium, sodium phosphates  1 packet Oral BID    senna-docusate 8.6-50 mg  1 tablet Oral BID     PRN Meds:acetaminophen, albuterol-ipratropium, dextrose 50%, dextrose 50%, dextrose 50%, glucagon (human recombinant), glucagon (human recombinant), glucose, glucose, hydrALAZINE, methyl salicylate-menthol 15-10%, ondansetron, QUEtiapine, ramelteon, sodium chloride 0.9%, traMADol     Review of Systems    Objective:     Weight: 68 kg (149 lb 14.6 oz)  Body mass index is 25.73 kg/m².  Vital Signs (Most Recent):  Temp: 98.7 °F (37.1 °C) (08/20/18 1111)  Pulse: 100 (08/20/18 1535)  Resp: 18 (08/20/18 1111)  BP: (!) 146/71 (08/20/18 1111)  SpO2: 99 % (08/20/18 1111) Vital Signs (24h Range):  Temp:  [98.1 °F (36.7 °C)-99 °F (37.2 °C)] 98.7 °F (37.1 °C)  Pulse:  [] 100  Resp:  [18] 18  SpO2:  [96 %-99 %] 99 %  BP: (142-150)/(71-83) 146/71     Date 08/20/18 0700 - 08/21/18 0659   Shift 4767-4076 8429-3170 5204-7710 24 Hour Total   INTAKE   P.O.  200  200   I.V.(mL/kg)  300(4.4)  300(4.4)   Shift Total(mL/kg)  500(7.4)  500(7.4)   OUTPUT   Urine(mL/kg/hr)  525  525   Drains 0   0   Shift Total(mL/kg) 0(0) 525(7.7)  525(7.7)   Weight (kg) 68 68 68 68              Female External Urinary Catheter 08/09/18 0000 (Active)   Skin no redness;no breakdown 8/16/2018  7:35 AM   Tolerance no signs/symptoms of discomfort 8/16/2018  7:35 AM   Suction Continuous suction at 70 mmHg 8/16/2018  7:35 AM   Date of last wick change 08/15/18 8/16/2018  7:35 AM   Time of last wick change 2100 8/16/2018  7:35 AM   Output (mL) 200 mL 8/16/2018  12:02 PM       Neurosurgery Physical Exam     General: no distress  Neurologic: Alert and oriented. Thought content appropriate.  Head: normocephalic  GCS: Motor: 6/Verbal: 5/Eyes: 4 GCS Total: 15  Cranial nerves: face symmetric, tongue midline, pupils equal, round, reactive to light with accomodation, extraocular muscles intact  Sensory: response to light touch throughout  Motor Strength:full strength upper and lower extremities  Pronator Drift: no drift noted  Finger to nose normal  No focal numbness or weakness  Lungs:  normal respiratory effort  Abdomen: soft, non-tender   Extremities: no cyanosis or edema, or clubbing    Wound C/D/I well healing    Significant Labs:  Recent Labs   Lab  08/19/18   0543  08/20/18   0710   MG  2.0  1.6     Recent Labs   Lab  08/19/18   0543  08/20/18   0710   WBC  7.04  6.04   HGB  9.0*  7.7*   HCT  27.4*  24.7*   PLT  268  279     No results for input(s): LABPT, INR, APTT in the last 48 hours.  Microbiology Results (last 7 days)     Procedure Component Value Units Date/Time    Urine culture [[id number]] Collected:  08/12/18 1206    Order Status:  Completed Specimen:  Urine Updated:  08/14/18 1324     Urine Culture, Routine No growth    Narrative:       add on Urine culture order #[id number] per Dr. Ruslan Small @ 12:30    08/13/2018     Blood culture [[id number]] Collected:  08/08/18 1800    Order Status:  Completed Specimen:  Blood Updated:  08/13/18 2212     Blood Culture, Routine No growth after 5 days.    Narrative:       Collection has been rescheduled by FB at 8/8/2018 14:42 Reason: nurse Leonora clines all labs at 1600  Collection has been rescheduled by JM2 at 8/8/2018 16:24 Reason:   Patient unavailable  Collection has been rescheduled by JM2 at 8/8/2018 16:25 Reason:   Patient unavailable  Collection has been rescheduled by JM2 at 8/8/2018 16:37 Reason:   Patient unavailable  Collection has been rescheduled by FB at 8/8/2018 14:42 Reason: nurse Leonora clines all labs at  1600  Collection has been rescheduled by JM2 at 8/8/2018 16:24 Reason:   Patient unavailable  Collection has been rescheduled by JM2 at 8/8/2018 16:25 Reason:   Patient unavailable  Collection has been rescheduled by JM2 at 8/8/2018 16:37 Reason:   Patient unavailable    Blood culture [[id number]] Collected:  08/08/18 1801    Order Status:  Completed Specimen:  Blood Updated:  08/13/18 2212     Blood Culture, Routine No growth after 5 days.    Narrative:       Collection has been rescheduled by FB at 8/8/2018 14:42 Reason: nurse   Leonora jain all labs at 1600  Collection has been rescheduled by JM2 at 8/8/2018 16:24 Reason:   Patient unavailable  Collection has been rescheduled by 2 at 8/8/2018 16:25 Reason:   Patient unavailable  Collection has been rescheduled by JM2 at 8/8/2018 16:37 Reason:   Patient unavailable  Collection has been rescheduled by FB at 8/8/2018 14:42 Reason: nurse   Leonora jain all labs at 1600  Collection has been rescheduled by JM2 at 8/8/2018 16:24 Reason:   Patient unavailable  Collection has been rescheduled by JM2 at 8/8/2018 16:25 Reason:   Patient unavailable  Collection has been rescheduled by JM2 at 8/8/2018 16:37 Reason:   Patient unavailable

## 2018-08-21 NOTE — PROGRESS NOTES
[facility]  Neurosurgery  Progress Note    Subjective:     History of Present Illness: Ms. Zamorano is a 62 year old female with no PMHx of cancer, chronic back pain, or osteoporosis, who presents to the ED with onset of bilateral flank pain that began 1 week ago while lifting a heavy object from the ground. Patient denies any popping or pulling sensation. Denies any thoracic or lumbar back pain. Denies any UE or LE pain, paresthesias, or weakness. Denies any bladder or bowel incontinence. CT renal was performed and showed a T12 compression fracture. Neurosurgery was consulted for treatment recommendations.     Post-Op Info:  Procedure(s) (LRB):  SPINE, THORACIC, WITH FUSION T9-L3,neuromonitoring, please. (N/A)   3 Days Post-Op     Interval History:   NAEON. Patient resting comfortably in bed. Denies any UE or LE pain, paresthesias, or weakness. Reports pain well controlled. Tolerating diet. Voiding appropriately.     Medications:  Continuous Infusions:  Scheduled Meds:   diclofenac sodium  2 g Topical (Top) QID    famotidine  20 mg Oral BID    fluticasone  1 puff Inhalation BID    heparin (porcine)  5,000 Units Subcutaneous Q8H    labetalol  100 mg Oral TID    oxyCODONE  10 mg Oral Q12H    polyethylene glycol  17 g Oral Daily    potassium, sodium phosphates  1 packet Oral BID    senna-docusate 8.6-50 mg  1 tablet Oral BID     PRN Meds:acetaminophen, albuterol-ipratropium, dextrose 50%, dextrose 50%, dextrose 50%, glucagon (human recombinant), glucagon (human recombinant), glucose, glucose, hydrALAZINE, methyl salicylate-menthol 15-10%, ondansetron, QUEtiapine, ramelteon, sodium chloride 0.9%, traMADol     Review of Systems  Objective:     Weight: 68 kg (149 lb 14.6 oz)  Body mass index is 25.73 kg/m².  Vital Signs (Most Recent):  Temp: 98.7 °F (37.1 °C) (08/20/18 1111)  Pulse: 100 (08/20/18 1535)  Resp: 18 (08/20/18 1111)  BP: (!) 146/71 (08/20/18 1111)  SpO2: 99 % (08/20/18 1111) Vital  Signs (24h Range):  Temp:  [98.1 °F (36.7 °C)-99 °F (37.2 °C)] 98.7 °F (37.1 °C)  Pulse:  [] 100  Resp:  [18] 18  SpO2:  [96 %-99 %] 99 %  BP: (142-150)/(71-83) 146/71     Date 08/20/18 0700 - 08/21/18 0659   Shift 2743-7957 4160-7727 3839-6789 24 Hour Total   INTAKE   P.O.  200  200   I.V.(mL/kg)  300(4.4)  300(4.4)   Shift Total(mL/kg)  500(7.4)  500(7.4)   OUTPUT   Urine(mL/kg/hr)  525  525   Drains 0   0   Shift Total(mL/kg) 0(0) 525(7.7)  525(7.7)   Weight (kg) 68 68 68 68                        Closed/Suction Drain 08/17/18 1238 Back Other (Comment) (Active)   Site Description Healing 8/20/2018  9:00 AM   Dressing Type Transparent 8/20/2018  9:00 AM   Dressing Status Clean;Dry;Intact 8/20/2018  9:00 AM   Dressing Intervention Dressing reinforced 8/20/2018  9:00 AM   Drainage Serosanguineous 8/20/2018  9:00 AM   Output (mL) 0 mL 8/20/2018  9:00 AM       Neurosurgery Physical Exam.  General: well developed, well nourished, no distress.   Head: normocephalic, atraumatic  Neurologic: Alert and oriented. Thought content appropriate.  GCS: Motor: 6/Verbal: 5/Eyes: 4 GCS Total: 15  Mental Status: Awake, Alert, Oriented x 4  Language: No aphasia  Speech: No dysarthria  Cranial nerves: face symmetric, tongue midline, CN II-XII grossly intact.   Eyes: pupils equal, round, reactive to light with accomodation, EOMI.   Pulmonary: normal respirations, no signs of respiratory distress  Abdomen: soft, non-distended, not tender to palpation  Sensory: intact to light touch throughout  Motor Strength:Moves all extremities spontaneously with good tone.  Full strength upper and lower extremities. No abnormal movements seen.   Washburn: absent  Clonus: absent  Skin: Skin is warm, dry and intact.      Incision:  Clean, dry, incisional wound vac in place. 2 HV drains in place. No surrounding erythema or edema. No drainage from the incision.       Significant Labs:  Recent Labs   Lab  08/19/18   0543  08/20/18   0710   MG  2.0   1.6     Recent Labs   Lab  08/19/18   0543  08/20/18   0710   WBC  7.04  6.04   HGB  9.0*  7.7*   HCT  27.4*  24.7*   PLT  268  279     No results for input(s): LABPT, INR, APTT in the last 48 hours.  Microbiology Results (last 7 days)     Procedure Component Value Units Date/Time    Urine culture [[id number]] Collected:  08/12/18 1206    Order Status:  Completed Specimen:  Urine Updated:  08/14/18 1324     Urine Culture, Routine No growth    Narrative:       add on Urine culture order #[id number] per Dr. Ruslan Small @ 12:30    08/13/2018     Blood culture [[id number]] Collected:  08/08/18 1800    Order Status:  Completed Specimen:  Blood Updated:  08/13/18 2212     Blood Culture, Routine No growth after 5 days.    Narrative:       Collection has been rescheduled by FB at 8/8/2018 14:42 Reason: nurse   Leonora wants all labs at 1600  Collection has been rescheduled by 2 at 8/8/2018 16:24 Reason:   Patient unavailable  Collection has been rescheduled by 2 at 8/8/2018 16:25 Reason:   Patient unavailable  Collection has been rescheduled by 2 at 8/8/2018 16:37 Reason:   Patient unavailable  Collection has been rescheduled by FB at 8/8/2018 14:42 Reason: nurse   Leonora wants all labs at 1600  Collection has been rescheduled by 2 at 8/8/2018 16:24 Reason:   Patient unavailable  Collection has been rescheduled by 2 at 8/8/2018 16:25 Reason:   Patient unavailable  Collection has been rescheduled by 2 at 8/8/2018 16:37 Reason:   Patient unavailable    Blood culture [[id number]] Collected:  08/08/18 1801    Order Status:  Completed Specimen:  Blood Updated:  08/13/18 2212     Blood Culture, Routine No growth after 5 days.    Narrative:       Collection has been rescheduled by FB at 8/8/2018 14:42 Reason: nurse   Elonora wants all labs at 1600  Collection has been rescheduled by 2 at 8/8/2018 16:24 Reason:   Patient unavailable  Collection has been rescheduled by 2 at 8/8/2018 16:25 Reason:   Patient  unavailable  Collection has been rescheduled by JM2 at 8/8/2018 16:37 Reason:   Patient unavailable  Collection has been rescheduled by FB at 8/8/2018 14:42 Reason: nurse   Leonora wants all labs at 1600  Collection has been rescheduled by JM2 at 8/8/2018 16:24 Reason:   Patient unavailable  Collection has been rescheduled by JM2 at 8/8/2018 16:25 Reason:   Patient unavailable  Collection has been rescheduled by JM2 at 8/8/2018 16:37 Reason:   Patient unavailable            Significant Diagnostics:  Xray thoracolumbar spine:  I independently reviewed the imaging.     There is T12 compression with fixation rods above and below.  There is moderate DJD.  There is good alignment and no complication.      Assessment/Plan:     Closed compression fracture of thoracic vertebra    62 year old female with acute onset on bilateral flank pain that began after lifting a heavy object. Imaging shows a T12 compression fracture along with multiple lytic lesions throughout the spine, now s/p T9-L3 fusion on 8/17:    -Patient neurologically stable on exam  -Xrays show good positioning of hardware  -Drain output 0 & 0 cc. Both drains removed without complication. Patient tolerated removal well.   -Brace on when OOB or working with therapy. OK to remove when lying in bed.   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 Plans to discuss therapy once outpatient.   -Continue SQH/SCDs/TEDs for DVTP  -Continue bowel regimen of Miralax and senna  -Continue current pain regimen  -DC tomorrow with home health          Please call with any questions      Margaret Rust PA-C   Neurosurgery   Pager: [phone]

## 2018-08-21 NOTE — PROGRESS NOTES
[facility]  Neurosurgery  Progress Note    Subjective:     History of Present Illness: Ms. Zamorano is a 62 year old female with no PMHx of cancer, chronic back pain, or osteoporosis, who presents to the ED with onset of bilateral flank pain that began 1 week ago while lifting a heavy object from the ground. Patient denies any popping or pulling sensation. Denies any thoracic or lumbar back pain. Denies any UE or LE pain, paresthesias, or weakness. Denies any bladder or bowel incontinence. CT renal was performed and showed a T12 compression fracture. Neurosurgery was consulted for treatment recommendations.     Post-Op Info:  Procedure(s) (LRB):  SPINE, THORACIC, WITH FUSION T9-L3,neuromonitoring, please. (N/A)   3 Days Post-Op     Interval History:  SERJIOEON, exam stable, patient transferred to floor today    Medications:  Continuous Infusions:    Scheduled Meds:   diclofenac sodium  2 g Topical (Top) QID    famotidine  20 mg Oral BID    fluticasone  1 puff Inhalation BID    heparin (porcine)  5,000 Units Subcutaneous Q8H    labetalol  100 mg Oral TID    oxyCODONE  10 mg Oral Q12H    polyethylene glycol  17 g Oral Daily    potassium, sodium phosphates  1 packet Oral BID    senna-docusate 8.6-50 mg  1 tablet Oral BID     PRN Meds:acetaminophen, albuterol-ipratropium, dextrose 50%, dextrose 50%, dextrose 50%, glucagon (human recombinant), glucagon (human recombinant), glucose, glucose, hydrALAZINE, methyl salicylate-menthol 15-10%, ondansetron, QUEtiapine, ramelteon, sodium chloride 0.9%, traMADol     Review of Systems    Objective:     Weight: 68 kg (149 lb 14.6 oz)  Body mass index is 25.73 kg/m².  Vital Signs (Most Recent):  Temp: 98.7 °F (37.1 °C) (08/20/18 1111)  Pulse: 100 (08/20/18 1535)  Resp: 18 (08/20/18 1111)  BP: (!) 146/71 (08/20/18 1111)  SpO2: 99 % (08/20/18 1111) Vital Signs (24h Range):  Temp:  [98.1 °F (36.7 °C)-99 °F (37.2 °C)] 98.7 °F (37.1 °C)  Pulse:  [] 100  Resp:   [18] 18  SpO2:  [96 %-99 %] 99 %  BP: (142-150)/(71-83) 146/71     Date 08/20/18 0700 - 08/21/18 0659   Shift 5550-8357 3786-0743 3786-7843 24 Hour Total   INTAKE   P.O.  200  200   I.V.(mL/kg)  300(4.4)  300(4.4)   Shift Total(mL/kg)  500(7.4)  500(7.4)   OUTPUT   Urine(mL/kg/hr)  525  525   Drains 0   0   Shift Total(mL/kg) 0(0) 525(7.7)  525(7.7)   Weight (kg) 68 68 68 68              Female External Urinary Catheter 08/09/18 0000 (Active)   Skin no redness;no breakdown 8/16/2018  7:35 AM   Tolerance no signs/symptoms of discomfort 8/16/2018  7:35 AM   Suction Continuous suction at 70 mmHg 8/16/2018  7:35 AM   Date of last wick change 08/15/18 8/16/2018  7:35 AM   Time of last wick change 2100 8/16/2018  7:35 AM   Output (mL) 200 mL 8/16/2018 12:02 PM       Neurosurgery Physical Exam     General: no distress  Neurologic: Alert and oriented. Thought content appropriate.  Head: normocephalic  GCS: Motor: 6/Verbal: 5/Eyes: 4 GCS Total: 15  Cranial nerves: face symmetric, tongue midline, pupils equal, round, reactive to light with accomodation, extraocular muscles intact  Sensory: response to light touch throughout  Motor Strength:full strength upper and lower extremities  Pronator Drift: no drift noted  Finger to nose normal  No focal numbness or weakness  Lungs:  normal respiratory effort  Abdomen: soft, non-tender   Extremities: no cyanosis or edema, or clubbing    Wound C/D/I well healing    Significant Labs:  Recent Labs   Lab  08/19/18   0543  08/20/18   0710   MG  2.0  1.6     Recent Labs   Lab  08/19/18   0543  08/20/18   0710   WBC  7.04  6.04   HGB  9.0*  7.7*   HCT  27.4*  24.7*   PLT  268  279     No results for input(s): LABPT, INR, APTT in the last 48 hours.  Microbiology Results (last 7 days)     Procedure Component Value Units Date/Time    Urine culture [[id number]] Collected:  08/12/18 1206    Order Status:  Completed Specimen:  Urine Updated:  08/14/18 1324     Urine Culture, Routine No growth     Narrative:       add on Urine culture order #[id number] per Dr. Ruslan Small @ 12:30    08/13/2018     Blood culture [[id number]] Collected:  08/08/18 1800    Order Status:  Completed Specimen:  Blood Updated:  08/13/18 2212     Blood Culture, Routine No growth after 5 days.    Narrative:       Collection has been rescheduled by FB at 8/8/2018 14:42 Reason: nurse   Leonora wants all labs at 1600  Collection has been rescheduled by JM2 at 8/8/2018 16:24 Reason:   Patient unavailable  Collection has been rescheduled by JM2 at 8/8/2018 16:25 Reason:   Patient unavailable  Collection has been rescheduled by JM2 at 8/8/2018 16:37 Reason:   Patient unavailable  Collection has been rescheduled by FB at 8/8/2018 14:42 Reason: nurse   Leonora wants all labs at 1600  Collection has been rescheduled by JM2 at 8/8/2018 16:24 Reason:   Patient unavailable  Collection has been rescheduled by 2 at 8/8/2018 16:25 Reason:   Patient unavailable  Collection has been rescheduled by 2 at 8/8/2018 16:37 Reason:   Patient unavailable    Blood culture [[id number]] Collected:  08/08/18 1801    Order Status:  Completed Specimen:  Blood Updated:  08/13/18 2212     Blood Culture, Routine No growth after 5 days.    Narrative:       Collection has been rescheduled by FB at 8/8/2018 14:42 Reason: nurse   Leonora wants all labs at 1600  Collection has been rescheduled by 2 at 8/8/2018 16:24 Reason:   Patient unavailable  Collection has been rescheduled by 2 at 8/8/2018 16:25 Reason:   Patient unavailable  Collection has been rescheduled by JM2 at 8/8/2018 16:37 Reason:   Patient unavailable  Collection has been rescheduled by FB at 8/8/2018 14:42 Reason: nurse   Leonora wants all labs at 1600  Collection has been rescheduled by JM2 at 8/8/2018 16:24 Reason:   Patient unavailable  Collection has been rescheduled by 2 at 8/8/2018 16:25 Reason:   Patient unavailable  Collection has been rescheduled by JM2 at 8/8/2018 16:37 Reason:   Patient  unavailable            Assessment/Plan:     Closed compression fracture of thoracic vertebra    62 year old female with acute onset on bilateral flank pain that began after lifting a heavy object. Imaging shows a T12 compression fracture along with multiple lytic lesions throughout the spine, now s/p T9-L3 fusion on 8/17:    -Patient neurologically stable on exam  -Xrays show good positioning of hardware  -Both HVs remain in in the setting of infection  -Brace on when OOB or working with therapy. OK to remove when lying in bed.   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 Plans to discuss therapy once outpatient.   -Continue SQH/SCDs/TEDs for DVTP  -Continue bowel regimen of Miralax and senna  -Continue current pain regimen  -PT/OT/OOB, f/u dispo reccs            Ramone Prescott MD  Neurosurgery  [facility]

## 2018-08-22 ENCOUNTER — TELEPHONE (OUTPATIENT)
Dept: INTERNAL MEDICINE | Facility: CLINIC | Age: 63
End: 2018-08-22

## 2018-08-22 LAB
BLD PROD TYP BPU: NORMAL
BLD PROD TYP BPU: NORMAL
BLOOD UNIT EXPIRATION DATE: NORMAL
BLOOD UNIT EXPIRATION DATE: NORMAL
BLOOD UNIT TYPE CODE: 5100
BLOOD UNIT TYPE CODE: 5100
BLOOD UNIT TYPE: NORMAL
BLOOD UNIT TYPE: NORMAL
CODING SYSTEM: NORMAL
CODING SYSTEM: NORMAL
DISPENSE STATUS: NORMAL
DISPENSE STATUS: NORMAL
GENETICIST REVIEW: NORMAL
NUM UNITS TRANS PACKED RBC: NORMAL
NUM UNITS TRANS PACKED RBC: NORMAL
PLASMA CELL PROLIF RELEASED BY: NORMAL
PLASMA CELL PROLIF RESULT SUMMARY: NORMAL
PLASMA CELL PROLIF RESULT TABLE: NORMAL
REASON FOR REFERRAL, PLASMA CELL PROLIF (PCPD), FISH: NORMAL
REF LAB TEST METHOD: NORMAL
RESULTS, PLASMA CELL PROLIF (PCPD), FISH: NORMAL
SERVICE CMNT-IMP: NORMAL
SERVICE CMNT-IMP: NORMAL
SPECIMEN SOURCE: NORMAL
SPECIMEN, PLASMA CELL PROLIF (PCPD), FISH: NORMAL

## 2018-08-22 NOTE — TELEPHONE ENCOUNTER
Ruslan and Dr. Sandra    I understand she has been hospitalized with the dx of Multiple myeloma and recent vertebral fracture.  Before all this started I see that she had an abnormal MMG.  I don't know where her work up for this fits in with her treatment plan.  She just got out of the hospital yesterday and has a follow up with Dr Sandra on the 30th of this month, so I am not anxious to have her get her diagnostic mmg anytime this week, but just wanted to know if this is going to affect her treatment otherwise

## 2018-08-22 NOTE — PLAN OF CARE
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]  Yes    GERD (gastroesophageal reflux disease) [K21.9]  Yes    Constipation [K59.00]  Yes    Hypercalcemia [E83.52]  Yes    Multiple myeloma not having achieved remission [C90.00]  Yes    Anemia in neoplastic disease [D63.0]  Yes    Gastroesophageal reflux disease [K21.9]  Yes    Paranoid schizophrenia [F20.0]  Yes    Other secondary hypertension [I15.8]  Yes    Asthma [J45.909]  Yes     Overview:   update  dx update        Resolved Hospital Problems    Diagnosis Date Resolved POA    Acute kidney injury [N17.9] 08/20/2018 Yes    Delirium [R41.0] 08/15/2018 Yes    Acute kidney injury [N17.9] 08/10/2018 Yes       Future Appointments   Date Time Provider Department Center   8/27/2018  2:20 PM Jaycee Murry RN [facility] NEUR[facility]   8/30/2018  9:00 AM LAB, HEMONC SAME DAY [facility]   8/30/2018 10:00 AM Herrera Sandra MD [facility] BM [facility]   9/12/2018  3:00 PM Gomez Paulson Jr., MD [facility] PSYCH [address]   9/28/2018  9:00 AM LAB, APPOINTMENT [place] [facility]   10/1/2018  2:30 PM [facility] XROP DR [facility] XRAY [facility]   10/1/2018  4:00 [facility]     Follow-up Information     Herrera Sandra MD On 8/30/2018.    Specialty:  Hematology and Oncology  Why:  Labs- 9:00am, follow up- 10:00am (possible  chemo pending insurance auth)  Contact information:  [address]  [facility] [address]  [phone]                     I have seen and examined this patient face to face today. My clinical findings that support the need for the home health skilled services and home bound status are the following:  Weakness/numbness causing balance and gait disturbance due to Infection, Weakness/Debility, Malignancy/Cancer and Surgery making it taxing to leave home.  Medical restrictions requiring assistance of another human to leave home due to  Unstable ambulation, IV infusion Needs and Wound care needs.    Allergies:  Review of patient's allergies indicates:   Allergen Reactions    Shellfish containing products Itching       Diet: regular diet    Activities: activity as tolerated    Nursing:   SN to complete comprehensive assessment including routine vital signs. Instruct on disease process and s/s of complications to report to MD. Review/verify medication list sent home with the patient at time of discharge  and instruct patient/caregiver as needed. Frequency may be adjusted depending on start of care date.    Notify MD if SBP > 160 or < 90; DBP > 90 or < 50; HR > 120 or < 50; Temp > 101; Other:         CONSULTS:    Physical Therapy to evaluate and treat. Evaluate for home safety and equipment needs; Establish/upgrade home exercise program. Perform / instruct on therapeutic exercises, gait training, transfer training, and Range of Motion.  Occupational Therapy to evaluate and treat. Evaluate home environment for safety and equipment needs. Perform/Instruct on transfers, ADL training, ROM, and therapeutic exercises.  Skilled nurse to perform incisional vac changes      MISCELLANEOUS CARE:  Wound Care Orders:  yes:  Prevena incisional Vac:   Location:  back           Dressing changes twice weekly. Last changed on 8/21/18        ET Consult      WOUND CARE ORDERS  n/a      Medications: Review discharge medications with  patient and family and provide education.      Discharge Medication List as of 8/21/2018  2:55 PM      START taking these medications    Details   ascorbic acid, vitamin C, (VITAMIN C) 250 MG tablet Take 1 tablet (250 mg total) by mouth 3 (three) times daily. for 14 days, Starting Tue 8/21/2018, Until Tue 9/4/2018, OTC      ferrous sulfate 325 (65 FE) MG EC tablet Take 1 tablet (325 mg total) by mouth 3 (three) times daily. for 14 days, Starting Tue 8/21/2018, Until Tue 9/4/2018, OTC      !! lenalidomide (REVLIMID) 25 mg Cap Take 1 capsule (25 mg total) by mouth once daily. Day 1-21 of a 28 day cycle, Starting Tue 8/21/2018, Normal      oxyCODONE (OXYCONTIN) 10 mg 12 hr tablet Take 1 tablet (10 mg total) by mouth every 12 (twelve) hours. for 14 days, Starting Mon 8/20/2018, Until Mon 9/3/2018, Print      traMADol (ULTRAM) 50 mg tablet Take 1 tablet (50 mg total) by mouth every 6 (six) hours as needed for Pain., Starting Mon 8/20/2018, Until Thu 8/30/2018, Print       !! - Potential duplicate medications found. Please discuss with provider.      CONTINUE these medications which have CHANGED    Details   labetalol (NORMODYNE) 100 MG tablet Take 1 tablet (100 mg total) by mouth 3 (three) times daily., Starting Mon 8/20/2018, Until Tue 8/20/2019, Print         CONTINUE these medications which have NOT CHANGED    Details   albuterol 90 mcg/actuation inhaler Inhale 2 puffs into the lungs every 6 (six) hours as needed for Wheezing., Starting Wed 11/29/2017, Normal      benztropine (COGENTIN) 1 MG tablet TK 1 T PO  BID, Normal      bisacodyl (DULCOLAX) 5 mg EC tablet Take 5 mg by mouth daily as needed for Constipation., Historical Med      cholecalciferol, vitamin D3, 2,000 unit Cap Take 1 capsule (2,000 Units total) by mouth once daily., Starting Tue 5/29/2018, Until Wed 5/29/2019, OTC      cyanocobalamin, vitamin B-12, (VITAMIN B-12 ORAL) Take 1 tablet by mouth daily as needed., Historical Med      fluticasone (FLONASE)  50 mcg/actuation nasal spray 1 spray by Each Nare route once daily., Starting Wed 11/29/2017, Normal      !! lenalidomide (REVLIMID) 25 mg Cap Take 1 capsule (25 mg total) by mouth once daily. 21 days on a 28 day cycle  auth number [id number] Aug. 15 for 21 days, Starting Wed 8/15/2018, Until Wed 9/5/2018, Normal      pantoprazole (PROTONIX) 40 MG tablet Take 1 tablet (40 mg total) by mouth every 12 (twelve) hours., Starting Mon 3/19/2018, Until Mon 8/6/2018, Normal      risperiDONE (RISPERDAL) 1 MG tablet Take 1 tablet (1 mg total) by mouth once daily., Starting Tue 5/15/2018, Normal      sodium phosphates (DISPOSABLE ENEMA) 19-7 gram/118 mL Enem Place 1 enema rectally as needed (constipation)., Historical Med       !! - Potential duplicate medications found. Please discuss with provider.      STOP taking these medications       dexamethasone (DECADRON) 4 MG Tab Comments:   Reason for Stopping:         diclofenac sodium 1 % Gel Comments:   Reason for Stopping:               I certify that this patient is confined to her home and needs intermittent skilled nursing care, physical therapy and occupational therapy.

## 2018-08-22 NOTE — PLAN OF CARE
TORIE following for DC needs. TORIE in communication with CM.    8/22/2018  TORIE notified by PA that HH orders were updated. TORIE spoke to Virginie at [facility] who stated that the patient was assigned to [facility] . [facility]     Shoshana Ojeda, JOSE MIGUEL  [facility]  [id number]

## 2018-08-24 ENCOUNTER — TELEPHONE (OUTPATIENT)
Dept: RADIOLOGY | Facility: HOSPITAL | Age: 63
End: 2018-08-24

## 2018-08-24 NOTE — TELEPHONE ENCOUNTER
Called patient to reschedule abnormal mammogram follow up. Patient not available  left message with a female asking patent to call me back at her earliest convince. Proved my contact information.

## 2018-08-27 ENCOUNTER — CLINICAL SUPPORT (OUTPATIENT)
Dept: NEUROSURGERY | Facility: CLINIC | Age: 63
End: 2018-08-27
Payer: MEDICARE

## 2018-08-27 ENCOUNTER — TELEPHONE (OUTPATIENT)
Dept: HEMATOLOGY/ONCOLOGY | Facility: CLINIC | Age: 63
End: 2018-08-27

## 2018-08-27 VITALS
WEIGHT: 138 LBS | BODY MASS INDEX: 23.69 KG/M2 | HEART RATE: 99 BPM | DIASTOLIC BLOOD PRESSURE: 85 MMHG | TEMPERATURE: 99 F | SYSTOLIC BLOOD PRESSURE: 137 MMHG

## 2018-08-27 DIAGNOSIS — N63.0 BREAST MASS: Primary | ICD-10-CM

## 2018-08-27 DIAGNOSIS — N64.59 OTHER SIGNS AND SYMPTOMS IN BREAST: ICD-10-CM

## 2018-08-27 PROCEDURE — 99999 PR PBB SHADOW E&M-EST. PATIENT-LVL III: CPT | Mod: PBBFAC,,,

## 2018-08-27 NOTE — PROGRESS NOTES
Wound Check   Neurosurgery      Ms. Janie Zamorano is a pleasant 62 year old female s/p posterior T9-L3 fusion on 8/17/18 for T-12 burst fracture by Dr. Andres who presents to clinic for her 2 week follow up. (For complete diagnosis and procedure, see OP note.) Patient is in a w/c, is wearing her brace and endorses compliance. Patient is in NAD, has a somewhat flat affect, denies any fever, chills, night sweats or N/V. Denies incontinence.     Upon inquiry, pt states preoperatively, she had weakness in her legs and hips bilaterally. She only mentions pain when asked. She is walking in the house and feels she is getting stronger, states the pain has gotten a lot better as well.    Pt has a Prevena Plus Wound Vac applied. States there has been no drainage from it.    Contacted Dr Andres for guidance. He requested I remove the wound vac and examine the incision. Wound vac dressing removed. Incision was nicely healed, no erythema or swelling, C/D/I.     As it has only been 10 days since surgery, I will have the patient return Friday for staple removal.    Discussed medications. Patient stopped taking pain meds 4-5 days ago.    Patient given written instructions which include:  - Keep incision open to air.   - Wear brace when out of bed.  - No need to put bacitracin ointment on wound   - Can shower and get incision wet, just pat dry and no vigorous scrubbing. Do not submerge incision for another 6 weeks.   - No lifting more than 10 lbs or excessive bending/twisting.   - Follow up in clinic on Friday for staple removal, then in 4 weeks with x-rays  - Encouraged patient to call if they have any questions or concerns prior to next follow up.    Jaycee Murry RN  Neurosurgery

## 2018-08-28 ENCOUNTER — TELEPHONE (OUTPATIENT)
Dept: RADIOLOGY | Facility: HOSPITAL | Age: 63
End: 2018-08-28

## 2018-08-28 NOTE — TELEPHONE ENCOUNTER
Called patient to reschedule abnormal mammogram follow up. Patient was not available. Left message with a female with my contact information for patient to call back and reschedule follow up. Mailed patient her mammogram results certified mail with a phone number to call and reschedule.

## 2018-08-29 ENCOUNTER — TELEPHONE (OUTPATIENT)
Dept: NEUROSURGERY | Facility: CLINIC | Age: 63
End: 2018-08-29

## 2018-08-29 ENCOUNTER — TELEPHONE (OUTPATIENT)
Dept: HEMATOLOGY/ONCOLOGY | Facility: CLINIC | Age: 63
End: 2018-08-29

## 2018-08-29 NOTE — TELEPHONE ENCOUNTER
----- Message from Edwin Fernandez RN sent at 8/29/2018  8:33 AM CDT -----  Regarding: RE: Prevena  Good afternoon,  If it's the small prevena wound vac machine, it is one patient use and can no longer be used. But if it is a regular home wound vac it would be returned to the company.    Edwin Fernandez RN, [facility]  ----- Message -----  From: Jaycee Murry RN  Sent: 8/28/2018   4:01 PM  To: Edwin Fernandez RN  Subject: Edwin Mackey,    I saw this patient in my post-op clinic yesterday. Dr Andres wanted the dsg removed, which I did. I did not know whether to send the unit back home with them or keep it. I kept it. How do I get it back to you?    Rebeca Murry RN

## 2018-08-29 NOTE — TELEPHONE ENCOUNTER
----- Message from Ruslan Jameson MD sent at 8/8/2018 11:27 AM CDT -----  Dr. Sandra, should assume care of this new myeloma pt in his fellows clinic.  Please schedule with him on 8/16 to start velcade that same day after the md appt .

## 2018-08-30 ENCOUNTER — OFFICE VISIT (OUTPATIENT)
Dept: HEMATOLOGY/ONCOLOGY | Facility: CLINIC | Age: 63
End: 2018-08-30
Payer: MEDICARE

## 2018-08-30 ENCOUNTER — LAB VISIT (OUTPATIENT)
Dept: LAB | Facility: HOSPITAL | Age: 63
End: 2018-08-30
Payer: MEDICARE

## 2018-08-30 VITALS
WEIGHT: 139.75 LBS | DIASTOLIC BLOOD PRESSURE: 85 MMHG | HEIGHT: 64 IN | SYSTOLIC BLOOD PRESSURE: 128 MMHG | OXYGEN SATURATION: 100 % | TEMPERATURE: 99 F | RESPIRATION RATE: 18 BRPM | BODY MASS INDEX: 23.86 KG/M2 | HEART RATE: 102 BPM

## 2018-08-30 DIAGNOSIS — C90.00 MULTIPLE MYELOMA, REMISSION STATUS UNSPECIFIED: ICD-10-CM

## 2018-08-30 DIAGNOSIS — C79.9 MULTIPLE LESIONS OF METASTATIC MALIGNANCY: ICD-10-CM

## 2018-08-30 DIAGNOSIS — C90.00 MULTIPLE MYELOMA NOT HAVING ACHIEVED REMISSION: Primary | ICD-10-CM

## 2018-08-30 LAB
ABO + RH BLD: NORMAL
ALBUMIN SERPL BCP-MCNC: 3.5 G/DL
ALP SERPL-CCNC: 122 U/L
ALT SERPL W/O P-5'-P-CCNC: 14 U/L
ANION GAP SERPL CALC-SCNC: 10 MMOL/L
AST SERPL-CCNC: 20 U/L
BASOPHILS # BLD AUTO: 0.04 K/UL
BASOPHILS NFR BLD: 0.8 %
BILIRUB SERPL-MCNC: 0.7 MG/DL
BLD GP AB SCN CELLS X3 SERPL QL: NORMAL
BUN SERPL-MCNC: 4 MG/DL
CALCIUM SERPL-MCNC: 9.5 MG/DL
CHLORIDE SERPL-SCNC: 102 MMOL/L
CO2 SERPL-SCNC: 23 MMOL/L
CREAT SERPL-MCNC: 0.8 MG/DL
DIFFERENTIAL METHOD: ABNORMAL
EOSINOPHIL # BLD AUTO: 0.1 K/UL
EOSINOPHIL NFR BLD: 1.2 %
ERYTHROCYTE [DISTWIDTH] IN BLOOD BY AUTOMATED COUNT: 15.5 %
EST. GFR  (AFRICAN AMERICAN): >60 ML/MIN/1.73 M^2
EST. GFR  (NON AFRICAN AMERICAN): >60 ML/MIN/1.73 M^2
GLUCOSE SERPL-MCNC: 100 MG/DL
HCT VFR BLD AUTO: 33.4 %
HGB BLD-MCNC: 10.2 G/DL
IMM GRANULOCYTES # BLD AUTO: 0.03 K/UL
IMM GRANULOCYTES NFR BLD AUTO: 0.6 %
LYMPHOCYTES # BLD AUTO: 1.2 K/UL
LYMPHOCYTES NFR BLD: 23.8 %
MAGNESIUM SERPL-MCNC: 2.2 MG/DL
MCH RBC QN AUTO: 27.3 PG
MCHC RBC AUTO-ENTMCNC: 30.5 G/DL
MCV RBC AUTO: 90 FL
MONOCYTES # BLD AUTO: 0.6 K/UL
MONOCYTES NFR BLD: 13 %
NEUTROPHILS # BLD AUTO: 3 K/UL
NEUTROPHILS NFR BLD: 60.6 %
NRBC BLD-RTO: 0 /100 WBC
PHOSPHATE SERPL-MCNC: 2.6 MG/DL
PLATELET # BLD AUTO: 449 K/UL
PMV BLD AUTO: 8.7 FL
POTASSIUM SERPL-SCNC: 4.3 MMOL/L
PROT SERPL-MCNC: 6.8 G/DL
RBC # BLD AUTO: 3.73 M/UL
SODIUM SERPL-SCNC: 135 MMOL/L
WBC # BLD AUTO: 4.91 K/UL

## 2018-08-30 PROCEDURE — 85025 COMPLETE CBC W/AUTO DIFF WBC: CPT

## 2018-08-30 PROCEDURE — 84100 ASSAY OF PHOSPHORUS: CPT

## 2018-08-30 PROCEDURE — 3079F DIAST BP 80-89 MM HG: CPT | Mod: CPTII,GC,S$GLB, | Performed by: STUDENT IN AN ORGANIZED HEALTH CARE EDUCATION/TRAINING PROGRAM

## 2018-08-30 PROCEDURE — 86850 RBC ANTIBODY SCREEN: CPT

## 2018-08-30 PROCEDURE — 99215 OFFICE O/P EST HI 40 MIN: CPT | Mod: GC,S$GLB,, | Performed by: STUDENT IN AN ORGANIZED HEALTH CARE EDUCATION/TRAINING PROGRAM

## 2018-08-30 PROCEDURE — 80053 COMPREHEN METABOLIC PANEL: CPT

## 2018-08-30 PROCEDURE — 83735 ASSAY OF MAGNESIUM: CPT

## 2018-08-30 PROCEDURE — 3074F SYST BP LT 130 MM HG: CPT | Mod: CPTII,GC,S$GLB, | Performed by: STUDENT IN AN ORGANIZED HEALTH CARE EDUCATION/TRAINING PROGRAM

## 2018-08-30 PROCEDURE — 3008F BODY MASS INDEX DOCD: CPT | Mod: CPTII,GC,S$GLB, | Performed by: STUDENT IN AN ORGANIZED HEALTH CARE EDUCATION/TRAINING PROGRAM

## 2018-08-30 PROCEDURE — 99999 PR PBB SHADOW E&M-EST. PATIENT-LVL IV: CPT | Mod: PBBFAC,GC,, | Performed by: STUDENT IN AN ORGANIZED HEALTH CARE EDUCATION/TRAINING PROGRAM

## 2018-08-30 PROCEDURE — 36415 COLL VENOUS BLD VENIPUNCTURE: CPT

## 2018-08-30 NOTE — PROGRESS NOTES
PATIENT: Janie Zamorano  MRN: [id number]  DATE: 8/30/2018      Diagnosis:   1. Multiple myeloma not having achieved remission        Chief Complaint: Multiple myeloma, yet to begin therapy    Hematologic History:     Pt is a 63 yo AAF with PMhx of Multiple Myeloma (secondary hypercalcemia, anemia; with closed compresson fracture of 12th thoracic vertebra), paranoid schizophrenia (currently off risperdal, cogentin), asthma, HTN, erosive esophagitis, and GERD who presents to the clinic for a follow-up visit to discuss treatment of Myeloma. A thoracic fusion procedure for T9-L3 was completed by Dr. Andres on 8/17/18. She has had home PT/OT and has had a home nurse visit her after this point. The pt is accompained by her son, Cameron, who has provided his number as an initial contact ([phone]). Although the pt is verbal, her son does most of talking as the patient has trouble at times with clearly demonstrating understanding of treatment plans.     The pt's son states his mom has been doing well at home with PT/OT and home nurse. The pt has not have any fevers, night sweats, chills, or weight loss. She has also been seen by Neurosurgery, had her wound vac removed a few days ago but is still continuing to take Tramadol 50 for back pain, a few a day. The patient's son thought that he had not heard of visiting our office today to begin treatment for multiple myeloma although he was told this many times in the hospital. He was also insistent on checking kappa/lambda light chains today and felt that additional research was needed from his end before we could begin therapy. The patient seems to feel mostly that his mom is fine and seems to get that she has a malignancy but at times, also seems to lose this understanding temporarily.     We have also discussed with Dr. Hoyt about coordinating an follow-up mammogram given left outer breast asymmetry noted on recent screening mammogram which was completed.      Past  Medical History:   Past Medical History:   Diagnosis Date    Asthma     Depression     GERD (gastroesophageal reflux disease)     Hypertension     Neck pain     Schizophrenia        Past Surgical HIstory:   Past Surgical History:   Procedure Laterality Date     SECTION      X 1    HYSTERECTOMY  2016    KJB---DLH/BSO    LIPOMA RESECTION      back       Family History:   Family History   Problem Relation Age of Onset    Hypertension Mother     Glaucoma Mother     Macular degeneration Mother     Breast cancer Mother     COPD Father     Hypertension Father     Diabetes Brother     Glaucoma Sister     Liver cancer Paternal Uncle 75        dad brother ETOH    Ovarian cancer Neg Hx     Colon cancer Neg Hx     Colon polyps Neg Hx     Cirrhosis Neg Hx     Celiac disease Neg Hx     Ulcerative colitis Neg Hx     Hemochromatosis Neg Hx     Esophageal cancer Neg Hx        Social History:  reports that  has never smoked. she has never used smokeless tobacco. She reports that she does not drink alcohol or use drugs.    Allergies:  Review of patient's allergies indicates:   Allergen Reactions    Shellfish containing products Itching       Medications:  Current Outpatient Medications   Medication Sig Dispense Refill    albuterol 90 mcg/actuation inhaler Inhale 2 puffs into the lungs every 6 (six) hours as needed for Wheezing. 1 each 11    ascorbic acid, vitamin C, (VITAMIN C) 250 MG tablet Take 1 tablet (250 mg total) by mouth 3 (three) times daily. for 14 days      benztropine (COGENTIN) 1 MG tablet TK 1 T PO  BID (Patient taking differently: Take 1 mg by mouth 2 (two) times daily. ) 60 tablet 2    bisacodyl (DULCOLAX) 5 mg EC tablet Take 5 mg by mouth daily as needed for Constipation.      cholecalciferol, vitamin D3, 2,000 unit Cap Take 1 capsule (2,000 Units total) by mouth once daily. 90 capsule 3    cyanocobalamin, vitamin B-12, (VITAMIN B-12 ORAL) Take 1 tablet by mouth daily as  needed.      ferrous sulfate 325 (65 FE) MG EC tablet Take 1 tablet (325 mg total) by mouth 3 (three) times daily. for 14 days  0    fluticasone (FLONASE) 50 mcg/actuation nasal spray 1 spray by Each Nare route once daily. (Patient taking differently: 1 spray by Each Nare route daily as needed for Allergies. ) 1 Bottle 4    labetalol (NORMODYNE) 100 MG tablet Take 1 tablet (100 mg total) by mouth 3 (three) times daily. 90 tablet 1    lenalidomide (REVLIMID) 25 mg Cap Take 1 capsule (25 mg total) by mouth once daily. 21 days on a 28 day cycle  auth number [id number] Aug. 15 for 21 days 21 capsule 0    lenalidomide (REVLIMID) 25 mg Cap Take 1 capsule (25 mg total) by mouth once daily. Day 1-21 of a 28 day cycle 21 capsule 0    oxyCODONE (OXYCONTIN) 10 mg 12 hr tablet Take 1 tablet (10 mg total) by mouth every 12 (twelve) hours. for 14 days 28 tablet 0    risperiDONE (RISPERDAL) 1 MG tablet Take 1 tablet (1 mg total) by mouth once daily. (Patient taking differently: Take 1 tablet by mouth  in the morning and 2 at bedtime) 30 tablet 3    sodium phosphates (DISPOSABLE ENEMA) 19-7 gram/118 mL Enem Place 1 enema rectally as needed (constipation).      traMADol (ULTRAM) 50 mg tablet Take 1 tablet (50 mg total) by mouth every 6 (six) hours as needed for Pain. 90 tablet 0    pantoprazole (PROTONIX) 40 MG tablet Take 1 tablet (40 mg total) by mouth every 12 (twelve) hours. 60 tablet 3     No current facility-administered medications for this visit.      Medications (pt endorses taking these meds):  Tramadol 50 mg q6h PRN  Ferrous Sulfate 325 mg TID  Vitamin D3 2000 U qday  Dulcolax 5 mg PRN  Ascorbic Acid 250 mg TID      Review of Systems   Constitutional: Negative for chills, diaphoresis, fatigue, fever and unexpected weight change.   HENT: Negative for congestion and sore throat.    Eyes: Negative for visual disturbance.   Respiratory: Negative for cough, chest tightness, shortness of breath and wheezing.   "  Cardiovascular: Negative for chest pain and palpitations.   Gastrointestinal: Negative for abdominal pain, blood in stool, constipation, diarrhea, nausea and vomiting.   Genitourinary: Negative for dysuria and hematuria.   Musculoskeletal: Positive for back pain.   Neurological: Negative for dizziness, tremors, syncope, light-headedness and numbness.   Hematological: Negative for adenopathy. Does not bruise/bleed easily.   Psychiatric/Behavioral: Negative for suicidal ideas.       ECOG Performance Status:  2  Objective:      Vitals:   Vitals:    08/30/18 1005   BP: 128/85   Pulse: 102   Resp: 18   Temp: 98.6 °F (37 °C)   SpO2: 100%   Weight: 63.4 kg (139 lb 12.4 oz)   Height: 5' 4" (1.626 m)     BMI: Body mass index is 23.99 kg/m².    Physical Exam   Constitutional: She appears well-developed and well-nourished. No distress.   HENT:   Head: Normocephalic and atraumatic.   Mouth/Throat: No oropharyngeal exudate.   Eyes: Conjunctivae and EOM are normal. Pupils are equal, round, and reactive to light. No scleral icterus.   Neck: Normal range of motion. Neck supple.   Cardiovascular: Normal rate, regular rhythm and normal heart sounds. Exam reveals no gallop and no friction rub.   No murmur heard.  Pulmonary/Chest: Effort normal and breath sounds normal. No stridor. No respiratory distress. She has no wheezes. She has no rales.   Abdominal: Soft. Bowel sounds are normal. She exhibits no distension. There is no tenderness. There is no guarding.   Musculoskeletal: Normal range of motion.   Pt using TLSO brace  Mild bilateral pitting edema   Lymphadenopathy:     She has no cervical adenopathy.   Neurological: No cranial nerve deficit. She exhibits normal muscle tone.   Skin: Skin is warm and dry. No rash noted. She is not diaphoretic.   Psychiatric:   Thought content is variable, doesn't demonstrate full understanding of medical condition/ treatment       Laboratory Data:  Lab Visit on 08/30/2018   Component Date Value " Ref Range Status    Group & Rh 08/30/2018 O POS   Final    Indirect Geovanni 08/30/2018 NEG   Final    WBC 08/30/2018 4.91  3.90 - 12.70 K/uL Final    RBC 08/30/2018 3.73* 4.00 - 5.40 M/uL Final    Hemoglobin 08/30/2018 10.2* 12.0 - 16.0 g/dL Final    Hematocrit 08/30/2018 33.4* 37.0 - 48.5 % Final    MCV 08/30/2018 90  82 - 98 fL Final    MCH 08/30/2018 27.3  27.0 - 31.0 pg Final    MCHC 08/30/2018 30.5* 32.0 - 36.0 g/dL Final    RDW 08/30/2018 15.5* 11.5 - 14.5 % Final    Platelets 08/30/2018 449* 150 - 350 K/uL Final    MPV 08/30/2018 8.7* 9.2 - 12.9 fL Final    Immature Granulocytes 08/30/2018 0.6* 0.0 - 0.5 % Final    Gran # (ANC) 08/30/2018 3.0  1.8 - 7.7 K/uL Final    Immature Grans (Abs) 08/30/2018 0.03  0.00 - 0.04 K/uL Final    Comment: Mild elevation in immature granulocytes is non specific and   can be seen in a variety of conditions including stress response,   acute inflammation, trauma and pregnancy. Correlation with other   laboratory and clinical findings is essential.      Lymph # 08/30/2018 1.2  1.0 - 4.8 K/uL Final    Mono # 08/30/2018 0.6  0.3 - 1.0 K/uL Final    Eos # 08/30/2018 0.1  0.0 - 0.5 K/uL Final    Baso # 08/30/2018 0.04  0.00 - 0.20 K/uL Final    nRBC 08/30/2018 0  0 /100 WBC Final    Gran% 08/30/2018 60.6  38.0 - 73.0 % Final    Lymph% 08/30/2018 23.8  18.0 - 48.0 % Final    Mono% 08/30/2018 13.0  4.0 - 15.0 % Final    Eosinophil% 08/30/2018 1.2  0.0 - 8.0 % Final    Basophil% 08/30/2018 0.8  0.0 - 1.9 % Final    Differential Method 08/30/2018 Automated   Final    Sodium 08/30/2018 135* 136 - 145 mmol/L Final    Potassium 08/30/2018 4.3  3.5 - 5.1 mmol/L Final    Chloride 08/30/2018 102  95 - 110 mmol/L Final    CO2 08/30/2018 23  23 - 29 mmol/L Final    Glucose 08/30/2018 100  70 - 110 mg/dL Final    BUN, Bld 08/30/2018 4* 8 - 23 mg/dL Final    Creatinine 08/30/2018 0.8  0.5 - 1.4 mg/dL Final    Calcium 08/30/2018 9.5  8.7 - 10.5 mg/dL Final    Total  Protein 08/30/2018 6.8  6.0 - 8.4 g/dL Final    Albumin 08/30/2018 3.5  3.5 - 5.2 g/dL Final    Total Bilirubin 08/30/2018 0.7  0.1 - 1.0 mg/dL Final    Comment: For infants and newborns, interpretation of results should be based  on gestational age, weight and in agreement with clinical  observations.  Premature Infant recommended reference ranges:  Up to 24 hours.............<8.0 mg/dL  Up to 48 hours............<12.0 mg/dL  3-5 days..................<15.0 mg/dL  6-29 days.................<15.0 mg/dL      Alkaline Phosphatase 08/30/2018 122  55 - 135 U/L Final    AST 08/30/2018 20  10 - 40 U/L Final    ALT 08/30/2018 14  10 - 44 U/L Final    Anion Gap 08/30/2018 10  8 - 16 mmol/L Final    eGFR if African American 08/30/2018 >60.0  >60 mL/min/1.73 m^2 Final    eGFR if non African American 08/30/2018 >60.0  >60 mL/min/1.73 m^2 Final    Comment: Calculation used to obtain the estimated glomerular filtration  rate (eGFR) is the CKD-EPI equation.       Magnesium 08/30/2018 2.2  1.6 - 2.6 mg/dL Final    Phosphorus 08/30/2018 2.6* 2.7 - 4.5 mg/dL Final           Molecular/ Pathological Tests:       Protein electrophoresis, serum - 8/6/18  \    Ref Range & Units 3wk ago   Protein, Serum 6.0 - 8.4 g/dL 6.9    Albumin grams/dl 3.35 - 5.55 g/dL 3.74    Alpha-1 grams/dl 0.17 - 0.41 g/dL 0.40    Alpha-2 grams/dl 0.43 - 0.99 g/dL 0.92    Beta grams/dl 0.50 - 1.10 g/dL 0.94    Gamma grams/dl 0.67 - 1.58 g/dL 0.90    Resulting Agency  [facility]      Narrative   Performed by: [facility]   Diagnosis:->N17.9 GLENIS (acute kidney injury)      Specimen Collected: 08/03/18 19:21 Last Resulted: 08/06/18 15:18             Immunofixation electrophoresis   Order: [id number]   Status:  Final result   Visible to patient:  Yes (Patient Portal) Next appt:  08/30/2018 at 09:00 AM in Lab (LAB, HEMONC SAME DAY)   Component 3wk ago   Immunofix Interp. SEE COMMENT    Comment: See pathologist's interpretation.   Resulting Agency [facility]      Narrative    Performed by: [facility]   Diagnosis:->E83.52 Hypercalcemia  Collection has been rescheduled by [facility] at 8/4/2018 19:52 Reason: tamar bardales will call        Immunoglobulins (IgG, IgA, IgM) Quantitative   Order: [id number]   Status:  Final result   Visible to patient:  Yes (Patient Portal) Next appt:  08/30/2018 at 09:00 AM in Lab (LAB, HEMONC SAME DAY)    Ref Range & Units 3wk ago   IgG - Serum 650 - 1600 mg/dL 846    Comment: IgG Cord Blood Reference Range: 650-1600 mg/dL.   IgA 40 - 350 mg/dL 58    Comment: IgA Cord Blood Reference Range: <5 mg/dL.   IgM 50 - 300 mg/dL 20 Abnormally low     Comment: IgM Cord Blood Reference Range: <25 mg/dL.   Resulting Agency  [facility]         Specimen Collected: 08/05/18 04:07 Last Resulted: 08/05/18 06:05             Beta 2 Microglobulin, Serum   Order: [id number]   Status:  Final result   Visible to patient:  Yes (Patient Portal) Next appt:  Today at 09:00 AM in Lab (LAB, HEMONC SAME DAY)    Ref Range & Units 3wk ago   Beta-2 Microglobulin 0.0 - 2.5 ug/mL 3.3 Abnormally high                                Plasma Cell Proliferative Disorder (PCPD), FISH   Order: [id number]   Status:  Final result   Visible to patient:  Yes (Patient Portal) Next appt:  08/31/2018 at 09:40 AM in Neurosurgery (Jaycee Murry RN)   Component 3wk ago   Plasma Cell Prolif Result Summary Abnormal    Interp, Plasma Cell Prolif (PCPD), FISH SEE BELOW    Comment: The result is abnormal and indicates a plasma cell clone   with trisomy 3, 9, and 15, which likely indicates a   hyperdiploid clone. At diagnosis, hyperdiploidy is   associated with a favorable prognosis in multiple myeloma   (Chng et al., Leukemia 20:807-813, 2006). The prognostic   significance of hyperdiploidy in MGUS, amyloidosis, or   smoldering multiple myeloma is unknown.   Additional cytogenetic studies are reported separately.    Plasma Cell Prolif Result Table SEE BELOW    Comment: ----------------------------------------------------------    Abnormality Name             Result     # Abn  Total Cells   +3CEN(D3Z1x3)                Abnormal   39     50             +9CEN(D9Z1x3)                Abnormal   36     50             +15CEN(J93S6k3)              Abnormal   19     50             14q32(IGH sep)               Normal     0      50             t(11;14) CCND1-XT/IGH-XT     Normal     0      50             fusion                                                       +11(CCND1-XTx3)              Normal     0      50             -17p13.1(TP53x1,T13M8r9)     Normal     6      50             -17(TP53,D17Z1)x1            Normal     0      50             -13q14(RB1x1,HWDB1r8)        Normal     2      50             -13(RB1,LAMP1)x1             Normal     1      50             +7CEN(D7Z1x3)                Normal     1      50             8q24.1(MYC sep)              Normal     0      50             +1q22(TP73x2,1q22x3)         Normal     0      50             +1(TP73,1q22)x3              Normal     0      50             ----------------------------------------------------------    Results, Plasma Cell Prolif (PCPD), FISH nuc lance(D3Z1,D9Z1,D15Z4)x3    Reason for Referral, Plasma Cell Prolif (PCPD), FISH acute kidney failure (N17.9) VC   Specimen, Plasma Cell Prolif (PCPD), FISH Bone Marrow    Source, Plasma Cell Prolif (PCPD), FISH Test Not Performed    Method, Plasma Cell Prolif (PCPD), FISH SEE BELOW    Comment: Locus and probes                   [Strategy;#Nuclei;Class]   -----------------------------------------------------------   1p36.3(TP73),1q22                            [COPY#;50;LDT]   3CEN(D3Z1),7CEN(D7Z1)                        [COPY#;50;ASR]   8q24(5'MYC,3'MYC)                              [BAP;50;ASR]   9CEN(D9Z1),15CEN(D15Z4)                      [COPY#;50;ASR]   11q13(CCND1-XT),14q32(IGH-XT)                [DFISH;50;ASR]   13q14(RB1),13q34(LAMP1)                      [COPY#;50;ASR]   14q32(3'IGH,5'IGH)                             [BAP;50;LDT]    17p13.1(TP53),17CEN(D17Z1)                   [COPY#;50;ASR]   Probe strategies include: DFISH=dual color, double fusion;   BAP=break-apart probe; COPY#=region gain and loss.    Plasma Cell Prolif Additional Information Test Not Performed    Plasma Cell Prolif Disclaimer SEE BELOW    Comment: Applicable to Analyte Specific Reagent (ASR) and Laboratory   Developed Tests (LDT). This test was developed and its   performance characteristics determined by [facility] in a   manner consistent with CLIA requirements. It has not been   cleared or approved by the U.S. Food and Drug   Administration. This FISH test does not rule out other   chromosome abnormalities.    Plasma Cell Prolif Released by Florentin Lew M.D.                              BONE MARROW BIOPSY - COLLECTED 8/6/18    FINAL PATHOLOGIC DIAGNOSIS  CBC DATA 8/7/2018:  RBC: 3.22 M/UL, H/H :9.2/28.2 , MCV : 88 FL, WBC: 5.54 K/UL, Gran 66.6 %, Lymph 19.5%, Mono 12.3 %,    Eosinophil 0.7 %, Basophil 0.2 %, Platelet: 185 K/UL.  No peripheral blood smear was submitted for evaluation.  LEFT ILIAC CREST BONE MARROW ASPIRATE, BONE MARROW CLOT, AND BONE MARROW CORE  BIOPSY WITH:  CELLULARITY=40-80%, TRILINEAGE HEMATOPOIETIC ACTIVITY (M/E=3.8:1).  CONSISTENT WITH PLASMA CELL MYELOMA. SEE COMMENT.  INCREASED STORAGE IRON.  ADEQUATE NUMBER OF MEGAKARYOCYTES.  COMMENT: Flow cytometry analysis of bone marrow aspirate shows lymph gate(7.7%) containing T and B cells.  No B cell clonality or T-cell aberrancy is evident. Blast gate is not increased. Plasma cell study shows a kappa light  chain restricted plasma cell population with coexpression of CD38/CD56.  Immunohistochemical studies were performed on the clot and core biopsy for further architecture evaluation with  adequate positive and negative controls. Scattered mixed predominantly T cells (CD3 positive) and B cells (CD20  positive) are evident. About 70-80% plasma cells ( positive, few cyclinD1 weak  positive) are noted.  Findings are consistent with plasma cell myeloma. Correlate clinically and with a cytogenetics report.  Diagnosed by: Sabrina Cuevas M.D.  (Electronically Signed: 2018-08-09 11:09:45)  Microscopic Examination  BONE MARROW ASPIRATE DIFFERENTIAL:  Blasts----------------------------------------------3%  Promyelocytes---------------------------------0%  Myelocytes--------------------------------------0%  Metamyelocytes------------------------------ 10%  Bands----------------------------------------------7%  PMN Neutrophils------------------------------13%  Eosinophils---------------------------------------4%  Basophils-----------------------------------------0%  Monocytes---------------------------------------1%  Lymphocytes------------------------------------7%  Plasma cells--------------------------------------45%  Erythroid precursors---------------------------10%  BONE MARROW ASPIRATE:  Myeloid and erythroid maturation shows M:E ratio at 3.8:1. Increased mature plasma cells are noted. No significant  dysplasia is evident. Stainable iron is increased without increased ringed sideroblasts. Megakaryocytes are seen.  BONE MARROW CLOT:  Cellularity is 40-80% with trilineage hematopoiesis. Multiple plasma cell aggregates are seen. Megakaryocytes are  adequate in number. Stainable iron is increased.  BONE MARROW CORE BIOPSY:  Small core biopsy with increased plasma cell infiltrate is seen. Stainable iron is identifeid. Megakaryocytes are seen.      Leukemia/Lymphoma Screen - Bone Marrow Left Posterior Iliac Crest   Order: [id number]   Status:  Final result   Visible to patient:  Yes (Patient Portal) Next appt:  08/30/2018 at 09:00 AM in Lab (LAB, HEMONC SAME DAY)   Component 3wk ago   Clinical Diagnosis - Bone Marrow MM    Body Site - Bone Marrow LPI    Bone Marrow Interpretation Findings consistent with plasma cell dyscrasia.  Correlation with morphology and any available cytogenetic or molecular studies is  recommended.  In addition, correlation with clinical and radiologic data is required for further classification of this   process.       Comment: Interpreted by: Sabrina Cuevas MD, Signed on 08/07/2018 at 13:58   Bone Marrow Antibodies Analyzed All analyzed: CD2, CD3, CD4, CD5, CD7, CD8, CD10, CD13, CD19, CD20, CD34, CD38, CD56, , , KAPPA, Kappa Cytolasmic, LAMBDA, Lambda Cytoplasmic,CD45 and 7AAD.      Bone Marrow Comment Flow cytometric analysis of bone marrow detects a kappa restricted plasma cell population that expresses  and CD38.  CD56 is positive.  CD20 is negative.   Flow differential:  Lymphocytes 7.7%, Monocytes 1.5%, Granulocytes  85.9%, Blast  2.7%,   Debris/nRBC 1.8%,  Viability 90.3%.       Comment: This test was developed and its performance characteristics   determined by [facility].     It has not been cleared or approved by the U.S. Food and Drug   Administration. The FDA has determined that such clearance or   approval is not necessary.  This test is used for clinical purposes.     It should not be regarded as investigational or for research.  This   laboratory is certified under CLIA-88 as qualified to perform high   complexity clinical laboratory testing.            Chromosome Analysis, Bone Marrow   Order: [id number]   Status:  Final result   Visible to patient:  No (Not Released) Next appt:  08/31/2018 at 09:40 AM in Neurosurgery (Jaycee Murry RN)   Component 3wk ago   Chromosome analysis BM Result Summary Normal    Interpretation SEE BELOW    Comment: No clonal abnormality was apparent.   Additional cytogenetic studies are reported separately.    Results 46,XX[20]    Reason for Referral acute kidney failure (N17.9) VC   Specimen Bone Marrow    Source Test Not Performed    Method Culture without mitogens    Banding Methods, BM Chromosome SEE BELOW    Comment: Band Resolution:   400    ----------------------------------------------------------   Stain Name      Cells Analyzed   Cells       Karyograms       Counted     Prepared         GTL             20               0           2               Total           20               0           2               ----------------------------------------------------------   Key to Stain Name: GTL=G-banding; QFQ=Q-banding;   DAPI=DAPI-staining; CBL=C-banding; AGNOR=Silver-staining;   NON=Non-banded   The sum of Cells Analyzed and Cells Counted equals the   total cells examined.    Chromosome analysis BM Additional Information Test Not Performed    Chromosome analysis BM Released By Florentin Lew M.D.    Comment: Test Performed by:   [place] [id number]                Imaging:    EXAMINATION:  MRI THORACIC SPINE W WO CONTRAST; MRI LUMBAR SPINE W WO CONTRAST - 8/7/18    CLINICAL HISTORY:  Abnormal xray, thoracic spine, DJD;Spine fracture, traumatic, thoracic;; Abnormal xray, lumbar spine, DJD;    TECHNIQUE:  Multiplanar, multisequence MR images of the cervical, thoracic, and lumbar spine were performed before and after the administration  gadolinium.    COMPARISON:  CT chest 08/06/2018, CT renal stone study 08/03/2018    FINDINGS:  THORACIC    Alignment: Normal.    Vertebrae: Multiple scattered lesions demonstrating diffuse decrease of T1 signal, increased STIR signal, and diffuse enhancement on post-contrast imaging. Associated pathologic compression fracture of T12 vertebral body retropulsion of fracture and mild associated effacement of the thecal sac.    Discs: Mild loss of disc height with relatively normal signal characteristics.    Cord: Normal.    Degenerative findings: No significant spinal canal or neural foraminal stenosis. Left-sided perineural sheath cyst.    LUMBAR    Alignment: Normal.    Vertebrae: Multiple scattered lesions demonstrating diffuse decrease in T1  signal, increased STIR signal, and diffuse enhancement on post-contrast imaging.    Discs: Relative preservation disc height and normal signal characteristics.    Cord: Normal signal characteristics with termination of the conus at the level of L2.    Degenerative findings:    L1-L2: No significant spinal canal or neural foraminal narrowing.    L2-L3: No significant spinal canal or neural foraminal narrowing.    L3-L4: No significant spinal canal or neural foraminal narrowing.    L4-L5: No significant spinal canal or neural foraminal narrowing.    L5-S1: No significant spinal canal or neural foraminal narrowing.    Other: Nerve sheath cyst at S2. Multiple T2 bright, T1 dark lesions scattered throughout the kidney and liver which do not demonstrate postcontrast enhancement and likely represent cysts. Small bilateral pleural effusions partly visualized.   Impression       Multiple scattered lesions scattered throughout the visualized osseous structures concerning for metastatic disease. Associated pathologic compression fracture of T12 vertebral body with retropulsion of the fracture and mild associated effacement of the thecal sac.    Additional findings as detailed above.    Electronically signed by resident: Dev Mendoza  Date: 08/07/2018  Time: 15:56       X-Ray Metastic Survey Complete - 8/6/18     Details     Reading Physician Reading Date Result Priority   Timothy Maddox III, MD 8/6/2018       Narrative     EXAMINATION:  XR METASTATIC SURVEY    CLINICAL HISTORY:  assess for lytic lesions form multiple myeloma;    FINDINGS:  There are subtle lucent areas in the humeri bilaterally.  There is DJD of the spine and T12 compression deformity.  There are small lytic areas suspected in the ulna and radius bilaterally.  Both femurs demonstrate lytic areas.      Impression       See above    Findings are worrisome for multiple myeloma.               EXAMINATION:  CT CHEST WITH CONTRAST - 8/6/18    CLINICAL  HISTORY:  Neoplasm: chest, other primary, suspected;    TECHNIQUE:  Low dose axial images, sagittal and coronal reformations were obtained from the thoracic inlet to the lung bases following the IV administration of 75 mL of Omnipaque 350.    COMPARISON:  CT round stone study 08/03/2018    FINDINGS:  Base of Neck: No significant abnormality.    Thoracic soft tissues: Unremarkable.    Aorta: Left-sided aortic arch.  No aneurysm and no significant atherosclerosis    Heart: Normal size. No effusion.    Pulmonary vasculature: Pulmonary arteries distribute normally.    Zhane/Mediastinum: No pathologic daksha enlargement.    Airways: Patent.    Lungs: There is a 0.3 cm pulmonary nodule in the anterior aspect of the anterior segment of the left upper lobe (axial series 2, image 23).  Similar findings can be seen in normal individuals and this is too small to account for patient's osseous metastatic disease.  There is chronic elevation of the right hemidiaphragm with atelectatic changes of the right lower lobe.    Pleura: Small bilateral pleural fluid.  No evidence of pneumothorax.    Esophagus: Unremarkable.    Upper Abdomen: Multiple hypodense hepatic lesions largest measuring 3.9 cm and located in the left hepatic lobe demonstrating fluid attenuation likely representing cyst. The other hepatic hypodense lesions are too small to characterize. Moderate size hiatal hernia.    Bones: Multiple lytic lesions in the thoracic spine on with compression fracture of 1 of the lower with vertebral bodies. Please refer to the report of the recent renal stone study from 08/03/2018 for further details.      Impression       1.  Subcentimeter pulmonary in the left upper lobe (axial series 2, image 23). Similar findings can be seen in normal individuals and this is too small to account for patient's osseous metastatic disease.    2.  Small bilateral pleural fluid and small pericardial effusion.    3.  Multiple hypodense hepatic lesions  largest measuring 3.9 cm and located in the left hepatic lobe demonstrating fluid attenuation likely representing cyst. The other hepatic hypodense lesions are too small to characterize.    4.  Multiple lytic lesions in the thoracic spine on with compression fracture of 1 of the lower with vertebral bodies. Please refer to the report of the recent renal stone study from 08/03/2018 for further details.    5.  Additional findings as above.         EXAMINATION:  CT RENAL STONE STUDY ABD PELVIS WO - 8/3/18    CLINICAL HISTORY:  Abdominal pain, unspecified    TECHNIQUE:  Low dose axial images, sagittal and coronal reformations were obtained from the lung bases to the pubic symphysis.  Contrast was not administered.    COMPARISON:  None    FINDINGS:  Heart: Normal in size.  There is a small volume of pericardial fluid.    Lung Bases: Trace bilateral pleural effusions, left greater than right.  The lungs appear otherwise well aerated.    Liver: Liver is normal in size and attenuation, with a 3.9 cm simple cyst within hepatic segment 4.  There are also scattered subcentimeter hypodensities within segments 5 and 8, adjacent to the gallbladder fossa.  These findings are too small to fully characterize, however favored to represent simple hepatic cysts.    Gallbladder: No calcified gallstones.    Bile Ducts: No evidence of dilated ducts.    Pancreas: No mass or peripancreatic fat stranding.    Spleen: Unremarkable.    Adrenals: Unremarkable.    Kidneys/ Ureters: 0.8 cm nonobstructing left renal stone.  No evidence of hydronephrosis.  There is a 1.3 cm hypodense focus on the left kidney, favored to represent a simple renal cysts.    Bladder: No evidence of wall thickening.    Reproductive organs: Status post hysterectomy.    GI Tract/Mesentery: Moderate amount of retained stool in the ascending and transverse:, suggestive of constipation.  No evidence of obstruction.    Peritoneal Space: No ascites. No free  air.    Retroperitoneum: No significant adenopathy.    Abdominal wall: Small fat containing umbilical hernia.    Vasculature: No significant atherosclerosis or aneurysm.    Bones: There are multiple lytic lesions involving the lumbar and thoracic vertebral bodies, with a pathologic fracture of the T12 vertebral body, resulting in minimal height loss.  There is no retropulsion into the spinal canal, however there appears to be soft tissue extension into the canal at this level.  Several lesions extend into the vertebral pedicles, most notably at T9 and T10.  There also lytic lesions of the iliac wings bilaterally these findings are suggestive of metastatic disease.   Impression       Innumerable lytic lesions involving the thoracic and lumbar spine, as well as the iliac wings bilaterally.  These findings are suggestive of metastatic disease.    Pathologic fracture of the T12 vertebral body, resulting in mild height loss.  No retropulsion into the spinal canal, however there is possible soft tissue extension into the canal.    Hypodense foci within the liver, the largest measuring 3.9 cm, favored to represent a hepatic cyst.  The remaining hypodensities are too small to fully characterize.    Findings suggestive of constipation.    Trace bilateral lateral pleural effusions, as well as a small pericardial effusion.    Simple left renal cyst.       Mammo Digital Screening Bilat with Tomosynthesis_CAD    Study Mammography Recommendations      Side Status Due Date   Diagnostic Mammogram and/or Ultrasound Left Needs Follow-up 8/31/2018   Details     Reading Physician Reading Date Result Priority   Tracie Allen MD 8/1/2018 Routine      Physician Responsible for MQSA Outcome Reason    Tracie Allen MD Signed       Narrative & Impression     Result:   Mammo Digital Screening Bilat with Tomosynthesis_CAD     History:  Patient is 62 y.o. and is seen for a screening mammogram.     Films Compared:  05/02/2017 Mammo Digital  Screening Bilat with Tomosynthesis_CAD, and 01/27/2016 Mammo Digital Screening Bilat with CAD     Findings:  This procedure was performed using tomosynthesis.  Computer-aided detection was utilized in the interpretation of this examination.  The breasts are heterogeneously dense, which may obscure small masses.      Left  There is an asymmetry seen in the outer region of the left breast in the middle depth.      Right  There is no evidence of suspicious masses, calcifications, or other abnormal findings.     Impression:  Left  Asymmetry: Left breast asymmetry at the outer middle position. Assessment: 0 - Incomplete. Diagnostic Mammogram and/or Ultrasound is recommended.      Right  There is no mammographic evidence of malignancy.     BI-RADS Category:   Overall: 0 - Incomplete: Needs Additional Imaging Evaluation     Recommendation:  Diagnostic mammogram with possible ultrasound (if indicated) is recommended.     The patient's estimated lifetime risk of breast cancer (to age 85) based on Tyrer-Cuzick - 7 risk assessment model is: Tyrer-Cuzick: 19.88 %. According to the [employer],  patients with a lifetime breast cancer risk of 20% or higher might benefit from supplemental screening tests.                       Assessment:       1. Multiple myeloma not having achieved remission           Plan:     A/P:    Multiple Myeloma        -     ECOG PS of 2, Karnofsky 5  - Hypercalcemia in hospital resolved with zometa in hospital; currently corrected calcium is 9.9  - serologic and urine studies ordered: kappa free light chain 527.1 and kappa/lambda ratio 620.1; repeat testing also showed light chain ratio of about 560; immunoglobulins unremarkable  - Check a kappa/lambda light chain level before next visit  - bone marrow bx consistent with plasma cell myeloma; pt with 70-80% plasma cells and trisomy 3, 9, and 15, which likely indicates a   hyperdiploid clone, not high risk disease per cytogenetics  - -24 hr  urine protein=5940  - immunofixation-A free kappa light chain is present in beta  - see SPEP, ADAM, immunoglobulins above; skeletal survey which reveals mulitple lytic lesions , beta 2 microglobulin - 3.3  - LDH to be drawn at next visit; complete staging of mulitple myeloma per revised ISS; she is a stage II per classical ISS due to beta-2 microglobulin being 3.3 and  Albumin mostly being under 3.5 at initial time of diagnosis  - MRI with T12 fracture for which pt had fusion surgery of spine and then was place in TLSO brace  -    Even per revised ISS, pt is a Stage II Multiple Myeloma with 5 yr overall survival approximated to be 62% and median overall survival of 83 months; the caveat to these statistics is that they have not included that newer therapies used on this patient were not part of that calculation  - Current plan is for four 28 day cycles of VRd (velcade, revilimid, dexamethasone)  - Velcade to be given on days 1,8,15,22 and revilimid to be taken for 21 days straight with one week off; this will complete a 28 day cycle course of treatement  - Eventual plan after induction would be for HDT/ASCT       - Pt's son did not want to begin treatment for the patient today stating that he needed to do his research on therapy more and then try to explain risks/benefits to mom    - Both pt and son are advised that delaying therapy further could cause the patient to face the risks of not treating this cancer, which include life threatening kidney dysfunction requiring dialysis, worsening hypercalcemia, progression of lytic disease; they have stated understanding of risks and would like to re-schedule treatment to begin in 2 weeks   - check a serum free light chain assay prior to visit in 2 weeks; plans for velcade, revilimid, and dexmethasone to start at that visit re-emphasized   - Will discuss with our team regarding approval of Revilimid     - Pt with good dental care by history before administration of zometa,  can continue possibly q3 months; plans to start aspirin 81 mg and  prophylactic valtrex discussed with family as well, when pt begins treatment for multiple myeloma      Anemia  - Related to myeloma, could consider EPO if needed at some point during therapy    Possible Left Breast Mass  - Check left breast asymmetry noted on screening mammogram  - Ordered diagnostic mammogram    Spent extensive time reviewing the medications with patients in addition their side effects, as well as describe nature of disease process to patient's son.   Discussed with Dr. Gisell Sandra MD PGY-IV  Hematology and Oncology

## 2018-08-31 ENCOUNTER — TELEPHONE (OUTPATIENT)
Dept: INTERNAL MEDICINE | Facility: CLINIC | Age: 63
End: 2018-08-31

## 2018-08-31 ENCOUNTER — CLINICAL SUPPORT (OUTPATIENT)
Dept: NEUROSURGERY | Facility: CLINIC | Age: 63
End: 2018-08-31
Payer: MEDICARE

## 2018-08-31 VITALS
HEART RATE: 115 BPM | DIASTOLIC BLOOD PRESSURE: 80 MMHG | WEIGHT: 141.63 LBS | TEMPERATURE: 99 F | SYSTOLIC BLOOD PRESSURE: 158 MMHG | BODY MASS INDEX: 24.31 KG/M2

## 2018-08-31 DIAGNOSIS — Z98.1 S/P SPINAL FUSION: Primary | ICD-10-CM

## 2018-08-31 PROCEDURE — 99999 PR PBB SHADOW E&M-EST. PATIENT-LVL III: CPT | Mod: PBBFAC,,,

## 2018-08-31 NOTE — PROGRESS NOTES
Wound Check   Neurosurgery      Ms. Janie Zamorano is a pleasant 62 year old female s/p posterior T9-L3 fusion on 8/17/18 for T-12 burst fracture by Dr. Andres. Pt was seen in my clinic on 8/27/18 for wound-vac removal. After consulting with Dr Andres, it was felt that the staples should remain in place for the full 2 weeks.    Ms Zamorano returns today for staple removal. The incision remains C/D/I without redness/ swelling. The staples were removed and the edges remained approximated and well healed.       The patient was assisted with her clothes and brace. She will return in 4 weeks for her follow up appointment.    Encouraged patient to call if they have any questions or concerns prior to next follow up.     Jaycee Murry RN  Neurosurgery

## 2018-09-07 ENCOUNTER — LAB VISIT (OUTPATIENT)
Dept: LAB | Facility: HOSPITAL | Age: 63
End: 2018-09-07
Payer: MEDICARE

## 2018-09-07 DIAGNOSIS — C90.00 MULTIPLE MYELOMA NOT HAVING ACHIEVED REMISSION: ICD-10-CM

## 2018-09-07 LAB — LDH SERPL L TO P-CCNC: 186 U/L

## 2018-09-07 PROCEDURE — 36415 COLL VENOUS BLD VENIPUNCTURE: CPT

## 2018-09-07 PROCEDURE — 83615 LACTATE (LD) (LDH) ENZYME: CPT

## 2018-09-13 ENCOUNTER — LAB VISIT (OUTPATIENT)
Dept: LAB | Facility: HOSPITAL | Age: 63
End: 2018-09-13
Payer: MEDICARE

## 2018-09-13 ENCOUNTER — OFFICE VISIT (OUTPATIENT)
Dept: HEMATOLOGY/ONCOLOGY | Facility: CLINIC | Age: 63
End: 2018-09-13
Payer: MEDICARE

## 2018-09-13 VITALS
RESPIRATION RATE: 19 BRPM | OXYGEN SATURATION: 99 % | TEMPERATURE: 98 F | DIASTOLIC BLOOD PRESSURE: 77 MMHG | HEART RATE: 105 BPM | SYSTOLIC BLOOD PRESSURE: 147 MMHG | HEIGHT: 64 IN | WEIGHT: 134.69 LBS | BODY MASS INDEX: 22.99 KG/M2

## 2018-09-13 DIAGNOSIS — C90.00 MULTIPLE MYELOMA NOT HAVING ACHIEVED REMISSION: ICD-10-CM

## 2018-09-13 DIAGNOSIS — C90.00 MULTIPLE MYELOMA, REMISSION STATUS UNSPECIFIED: Primary | ICD-10-CM

## 2018-09-13 LAB
ALBUMIN SERPL BCP-MCNC: 3.5 G/DL
ALP SERPL-CCNC: 123 U/L
ALT SERPL W/O P-5'-P-CCNC: 19 U/L
ANION GAP SERPL CALC-SCNC: 11 MMOL/L
AST SERPL-CCNC: 24 U/L
BASOPHILS # BLD AUTO: 0.04 K/UL
BASOPHILS NFR BLD: 1.1 %
BILIRUB SERPL-MCNC: 0.7 MG/DL
BUN SERPL-MCNC: 3 MG/DL
CALCIUM SERPL-MCNC: 9.4 MG/DL
CHLORIDE SERPL-SCNC: 106 MMOL/L
CO2 SERPL-SCNC: 21 MMOL/L
CREAT SERPL-MCNC: 0.7 MG/DL
DIFFERENTIAL METHOD: ABNORMAL
EOSINOPHIL # BLD AUTO: 0.1 K/UL
EOSINOPHIL NFR BLD: 3.8 %
ERYTHROCYTE [DISTWIDTH] IN BLOOD BY AUTOMATED COUNT: 15.6 %
EST. GFR  (AFRICAN AMERICAN): >60 ML/MIN/1.73 M^2
EST. GFR  (NON AFRICAN AMERICAN): >60 ML/MIN/1.73 M^2
GLUCOSE SERPL-MCNC: 103 MG/DL
HCT VFR BLD AUTO: 37 %
HGB BLD-MCNC: 11.2 G/DL
IMM GRANULOCYTES # BLD AUTO: 0.01 K/UL
IMM GRANULOCYTES NFR BLD AUTO: 0.3 %
LYMPHOCYTES # BLD AUTO: 1.2 K/UL
LYMPHOCYTES NFR BLD: 31.5 %
MAGNESIUM SERPL-MCNC: 2.2 MG/DL
MCH RBC QN AUTO: 27.7 PG
MCHC RBC AUTO-ENTMCNC: 30.3 G/DL
MCV RBC AUTO: 91 FL
MONOCYTES # BLD AUTO: 0.5 K/UL
MONOCYTES NFR BLD: 13.4 %
NEUTROPHILS # BLD AUTO: 1.8 K/UL
NEUTROPHILS NFR BLD: 49.9 %
NRBC BLD-RTO: 0 /100 WBC
PHOSPHATE SERPL-MCNC: 2.4 MG/DL
PLATELET # BLD AUTO: 295 K/UL
PMV BLD AUTO: 9.2 FL
POTASSIUM SERPL-SCNC: 4 MMOL/L
PROT SERPL-MCNC: 6.3 G/DL
RBC # BLD AUTO: 4.05 M/UL
SODIUM SERPL-SCNC: 138 MMOL/L
WBC # BLD AUTO: 3.65 K/UL

## 2018-09-13 PROCEDURE — 99213 OFFICE O/P EST LOW 20 MIN: CPT | Mod: PBBFAC | Performed by: STUDENT IN AN ORGANIZED HEALTH CARE EDUCATION/TRAINING PROGRAM

## 2018-09-13 PROCEDURE — 99214 OFFICE O/P EST MOD 30 MIN: CPT | Mod: S$PBB,GC,, | Performed by: STUDENT IN AN ORGANIZED HEALTH CARE EDUCATION/TRAINING PROGRAM

## 2018-09-13 PROCEDURE — 83520 IMMUNOASSAY QUANT NOS NONAB: CPT | Mod: 59

## 2018-09-13 PROCEDURE — 80053 COMPREHEN METABOLIC PANEL: CPT

## 2018-09-13 PROCEDURE — 3008F BODY MASS INDEX DOCD: CPT | Mod: CPTII,GC,, | Performed by: STUDENT IN AN ORGANIZED HEALTH CARE EDUCATION/TRAINING PROGRAM

## 2018-09-13 PROCEDURE — 3078F DIAST BP <80 MM HG: CPT | Mod: CPTII,GC,, | Performed by: STUDENT IN AN ORGANIZED HEALTH CARE EDUCATION/TRAINING PROGRAM

## 2018-09-13 PROCEDURE — 84100 ASSAY OF PHOSPHORUS: CPT

## 2018-09-13 PROCEDURE — 99999 PR PBB SHADOW E&M-EST. PATIENT-LVL III: CPT | Mod: PBBFAC,GC,, | Performed by: STUDENT IN AN ORGANIZED HEALTH CARE EDUCATION/TRAINING PROGRAM

## 2018-09-13 PROCEDURE — 83735 ASSAY OF MAGNESIUM: CPT

## 2018-09-13 PROCEDURE — 85025 COMPLETE CBC W/AUTO DIFF WBC: CPT

## 2018-09-13 PROCEDURE — 36415 COLL VENOUS BLD VENIPUNCTURE: CPT

## 2018-09-13 PROCEDURE — 3077F SYST BP >= 140 MM HG: CPT | Mod: CPTII,GC,, | Performed by: STUDENT IN AN ORGANIZED HEALTH CARE EDUCATION/TRAINING PROGRAM

## 2018-09-13 NOTE — PROGRESS NOTES
"    Medications:  Current Outpatient Medications   Medication Sig Dispense Refill    albuterol 90 mcg/actuation inhaler Inhale 2 puffs into the lungs every 6 (six) hours as needed for Wheezing. 1 each 11    benztropine (COGENTIN) 1 MG tablet TK 1 T PO  BID (Patient taking differently: Take 1 mg by mouth 2 (two) times daily. ) 60 tablet 2    bisacodyl (DULCOLAX) 5 mg EC tablet Take 5 mg by mouth daily as needed for Constipation.      cholecalciferol, vitamin D3, 2,000 unit Cap Take 1 capsule (2,000 Units total) by mouth once daily. 90 capsule 3    cyanocobalamin, vitamin B-12, (VITAMIN B-12 ORAL) Take 1 tablet by mouth daily as needed.      fluticasone (FLONASE) 50 mcg/actuation nasal spray 1 spray by Each Nare route once daily. (Patient taking differently: 1 spray by Each Nare route daily as needed for Allergies. ) 1 Bottle 4    labetalol (NORMODYNE) 100 MG tablet Take 1 tablet (100 mg total) by mouth 3 (three) times daily. 90 tablet 1    lenalidomide (REVLIMID) 25 mg Cap Take 1 capsule (25 mg total) by mouth once daily. Day 1-21 of a 28 day cycle 21 capsule 0    risperiDONE (RISPERDAL) 1 MG tablet Take 1 tablet (1 mg total) by mouth once daily. (Patient taking differently: Take 1 tablet by mouth  in the morning and 2 at bedtime) 30 tablet 3    sodium phosphates (DISPOSABLE ENEMA) 19-7 gram/118 mL Enem Place 1 enema rectally as needed (constipation).      pantoprazole (PROTONIX) 40 MG tablet Take 1 tablet (40 mg total) by mouth every 12 (twelve) hours. 60 tablet 3     No current facility-administered medications for this visit.        Review of Systems    ECOG Performance Status: {NUMBERS 0-4:16628}   Objective:      Vitals:   Vitals:    09/13/18 1008   BP: (!) 147/77   BP Location: Left arm   Pulse: 105   Resp: 19   Temp: 98.3 °F (36.8 °C)   SpO2: 99%   Weight: 61.1 kg (134 lb 11.2 oz)   Height: 5' 4" (1.626 m)     BMI: Body mass index is 23.12 kg/m².    Physical Exam    Laboratory Data:  Lab Visit on " 09/13/2018   Component Date Value Ref Range Status    WBC 09/13/2018 3.65* 3.90 - 12.70 K/uL Final    RBC 09/13/2018 4.05  4.00 - 5.40 M/uL Final    Hemoglobin 09/13/2018 11.2* 12.0 - 16.0 g/dL Final    Hematocrit 09/13/2018 37.0  37.0 - 48.5 % Final    MCV 09/13/2018 91  82 - 98 fL Final    MCH 09/13/2018 27.7  27.0 - 31.0 pg Final    MCHC 09/13/2018 30.3* 32.0 - 36.0 g/dL Final    RDW 09/13/2018 15.6* 11.5 - 14.5 % Final    Platelets 09/13/2018 295  150 - 350 K/uL Final    MPV 09/13/2018 9.2  9.2 - 12.9 fL Final    Immature Granulocytes 09/13/2018 0.3  0.0 - 0.5 % Final    Gran # (ANC) 09/13/2018 1.8  1.8 - 7.7 K/uL Final    Immature Grans (Abs) 09/13/2018 0.01  0.00 - 0.04 K/uL Final    Comment: Mild elevation in immature granulocytes is non specific and   can be seen in a variety of conditions including stress response,   acute inflammation, trauma and pregnancy. Correlation with other   laboratory and clinical findings is essential.      Lymph # 09/13/2018 1.2  1.0 - 4.8 K/uL Final    Mono # 09/13/2018 0.5  0.3 - 1.0 K/uL Final    Eos # 09/13/2018 0.1  0.0 - 0.5 K/uL Final    Baso # 09/13/2018 0.04  0.00 - 0.20 K/uL Final    nRBC 09/13/2018 0  0 /100 WBC Final    Gran% 09/13/2018 49.9  38.0 - 73.0 % Final    Lymph% 09/13/2018 31.5  18.0 - 48.0 % Final    Mono% 09/13/2018 13.4  4.0 - 15.0 % Final    Eosinophil% 09/13/2018 3.8  0.0 - 8.0 % Final    Basophil% 09/13/2018 1.1  0.0 - 1.9 % Final    Differential Method 09/13/2018 Automated   Final    Sodium 09/13/2018 138  136 - 145 mmol/L Final    Potassium 09/13/2018 4.0  3.5 - 5.1 mmol/L Final    Chloride 09/13/2018 106  95 - 110 mmol/L Final    CO2 09/13/2018 21* 23 - 29 mmol/L Final    Glucose 09/13/2018 103  70 - 110 mg/dL Final    BUN, Bld 09/13/2018 3* 8 - 23 mg/dL Final    Creatinine 09/13/2018 0.7  0.5 - 1.4 mg/dL Final    Calcium 09/13/2018 9.4  8.7 - 10.5 mg/dL Final    Total Protein 09/13/2018 6.3  6.0 - 8.4 g/dL Final     Albumin 09/13/2018 3.5  3.5 - 5.2 g/dL Final    Total Bilirubin 09/13/2018 0.7  0.1 - 1.0 mg/dL Final    Comment: For infants and newborns, interpretation of results should be based  on gestational age, weight and in agreement with clinical  observations.  Premature Infant recommended reference ranges:  Up to 24 hours.............<8.0 mg/dL  Up to 48 hours............<12.0 mg/dL  3-5 days..................<15.0 mg/dL  6-29 days.................<15.0 mg/dL      Alkaline Phosphatase 09/13/2018 123  55 - 135 U/L Final    AST 09/13/2018 24  10 - 40 U/L Final    ALT 09/13/2018 19  10 - 44 U/L Final    Anion Gap 09/13/2018 11  8 - 16 mmol/L Final    eGFR if African American 09/13/2018 >60.0  >60 mL/min/1.73 m^2 Final    eGFR if non African American 09/13/2018 >60.0  >60 mL/min/1.73 m^2 Final    Comment: Calculation used to obtain the estimated glomerular filtration  rate (eGFR) is the CKD-EPI equation.       Magnesium 09/13/2018 2.2  1.6 - 2.6 mg/dL Final    Phosphorus 09/13/2018 2.4* 2.7 - 4.5 mg/dL Final   Lab Visit on 09/07/2018   Component Date Value Ref Range Status    LD 09/07/2018 186  110 - 260 U/L Final    Results are increased in hemolyzed samples.

## 2018-09-13 NOTE — PROGRESS NOTES
Subjective:       Patient ID: Janie Zamorano is a 62 y.o. female.    Chief Complaint: Multiple myeloma, follow-up      HPI     Interval History:  Pt has still been working with PT about three times a week. She is still in TLSO brace but otherwise has not had much in terms of pain, currently taking about 2-3 Norco per day. There have been no systemic symptoms noted at home, such as further weight los, fevers, chills, sweats. The pt still states that she does want further therapy at this time as she wants her back to heel and her pt/son suggested another 2 week follow-up. Upon further questioning about visits after 2 weeks, the pt states that she is still not sure that she has a start date in mind for MM therapy.      Hematologic History:  Pt is a 63 yo AAF with PMhx of Multiple Myeloma (secondary hypercalcemia, anemia; with closed compresson fracture of 12th thoracic vertebra), paranoid schizophrenia (currently off risperdal, cogentin), asthma, HTN, erosive esophagitis, and GERD who presents to the clinic for a follow-up visit to discuss treatment of Myeloma. A thoracic fusion procedure for T9-L3 was completed by Dr. Andres on 8/17/18. She has had home PT/OT and has had a home nurse visit her after this point. The pt is accompained by her son, Cameron, who has provided his number as an initial contact ([phone]). Although the pt is verbal, her son does most of talking as the patient has trouble at times with clearly demonstrating understanding of treatment plans.     The pt's son states his mom has been doing well at home with PT/OT and home nurse. The pt has not have any fevers, night sweats, chills, or weight loss. She has also been seen by Neurosurgery, had her wound vac removed a few days ago but is still continuing to take Tramadol 50 for back pain, a few a day. The patient's son thought that he had not heard of visiting our office today to begin treatment for multiple myeloma although he was told this  many times in the hospital. He was also insistent on checking kappa/lambda light chains today and felt that additional research was needed from his end before we could begin therapy. The patient seems to feel mostly that his mom is fine and seems to get that she has a malignancy but at times, also seems to lose this understanding temporarily.     We have also discussed with Dr. Hoyt about coordinating an follow-up mammogram given left outer breast asymmetry noted on recent screening mammogram which was completed.     Review of Systems   Constitutional: Negative for chills, diaphoresis, fatigue, fever and unexpected weight change.   HENT: Negative for congestion and sore throat.    Eyes: Negative for visual disturbance.   Respiratory: Negative for cough, chest tightness, shortness of breath and wheezing.    Cardiovascular: Negative for chest pain and palpitations.   Gastrointestinal: Negative for abdominal pain, blood in stool, constipation, diarrhea, nausea and vomiting.   Genitourinary: Negative for dysuria and hematuria.   Musculoskeletal: Positive for back pain.   Neurological: Negative for dizziness, tremors, syncope, light-headedness and numbness.   Hematological: Negative for adenopathy. Does not bruise/bleed easily.   Psychiatric/Behavioral: Negative for suicidal ideas.       Objective:      Physical Exam   Constitutional: She appears well-developed and well-nourished. No distress.   HENT:   Head: Normocephalic and atraumatic.   Mouth/Throat: No oropharyngeal exudate.   Eyes: Conjunctivae and EOM are normal. Pupils are equal, round, and reactive to light. No scleral icterus.   Neck: Normal range of motion. Neck supple.   Cardiovascular: Normal rate, regular rhythm and normal heart sounds. Exam reveals no gallop and no friction rub.   No murmur heard.  Pulmonary/Chest: Effort normal and breath sounds normal. No stridor. No respiratory distress. She has no wheezes. She has no rales.   Abdominal: Soft. Bowel  sounds are normal. She exhibits no distension. There is no tenderness. There is no guarding.   Musculoskeletal: Normal range of motion.   Pt using TLSO brace  Mild bilateral pitting edema   Lymphadenopathy:     She has no cervical adenopathy.   Neurological: No cranial nerve deficit. She exhibits normal muscle tone.   Skin: Skin is warm and dry. No rash noted. She is not diaphoretic.   Psychiatric:   Thought content is variable, doesn't demonstrate full understanding of medical condition/ treatment         Medications:  Current Medications          Current Outpatient Medications   Medication Sig Dispense Refill    albuterol 90 mcg/actuation inhaler Inhale 2 puffs into the lungs every 6 (six) hours as needed for Wheezing. 1 each 11    benztropine (COGENTIN) 1 MG tablet TK 1 T PO  BID (Patient taking differently: Take 1 mg by mouth 2 (two) times daily. ) 60 tablet 2    bisacodyl (DULCOLAX) 5 mg EC tablet Take 5 mg by mouth daily as needed for Constipation.        cholecalciferol, vitamin D3, 2,000 unit Cap Take 1 capsule (2,000 Units total) by mouth once daily. 90 capsule 3    cyanocobalamin, vitamin B-12, (VITAMIN B-12 ORAL) Take 1 tablet by mouth daily as needed.        fluticasone (FLONASE) 50 mcg/actuation nasal spray 1 spray by Each Nare route once daily. (Patient taking differently: 1 spray by Each Nare route daily as needed for Allergies. ) 1 Bottle 4    labetalol (NORMODYNE) 100 MG tablet Take 1 tablet (100 mg total) by mouth 3 (three) times daily. 90 tablet 1    lenalidomide (REVLIMID) 25 mg Cap Take 1 capsule (25 mg total) by mouth once daily. Day 1-21 of a 28 day cycle 21 capsule 0    risperiDONE (RISPERDAL) 1 MG tablet Take 1 tablet (1 mg total) by mouth once daily. (Patient taking differently: Take 1 tablet by mouth  in the morning and 2 at bedtime) 30 tablet 3    sodium phosphates (DISPOSABLE ENEMA) 19-7 gram/118 mL Enem Place 1 enema rectally as needed (constipation).        pantoprazole  "(PROTONIX) 40 MG tablet Take 1 tablet (40 mg total) by mouth every 12 (twelve) hours. 60 tablet 3      No current facility-administered medications for this visit.             Review of Systems     ECOG Performance Status: 2   Objective:   Vitals:   Vitals       Vitals:     09/13/18 1008   BP: (!) 147/77   BP Location: Left arm   Pulse: 105   Resp: 19   Temp: 98.3 °F (36.8 °C)   SpO2: 99%   Weight: 61.1 kg (134 lb 11.2 oz)   Height: 5' 4" (1.626 m)         BMI: Body mass index is 23.12 kg/m².     Physical Exam     Laboratory Data:          Lab Visit on 09/13/2018   Component Date Value Ref Range Status    WBC 09/13/2018 3.65* 3.90 - 12.70 K/uL Final    RBC 09/13/2018 4.05  4.00 - 5.40 M/uL Final    Hemoglobin 09/13/2018 11.2* 12.0 - 16.0 g/dL Final    Hematocrit 09/13/2018 37.0  37.0 - 48.5 % Final    MCV 09/13/2018 91  82 - 98 fL Final    MCH 09/13/2018 27.7  27.0 - 31.0 pg Final    MCHC 09/13/2018 30.3* 32.0 - 36.0 g/dL Final    RDW 09/13/2018 15.6* 11.5 - 14.5 % Final    Platelets 09/13/2018 295  150 - 350 K/uL Final    MPV 09/13/2018 9.2  9.2 - 12.9 fL Final    Immature Granulocytes 09/13/2018 0.3  0.0 - 0.5 % Final    Gran # (ANC) 09/13/2018 1.8  1.8 - 7.7 K/uL Final    Immature Grans (Abs) 09/13/2018 0.01  0.00 - 0.04 K/uL Final     Comment: Mild elevation in immature granulocytes is non specific and   can be seen in a variety of conditions including stress response,   acute inflammation, trauma and pregnancy. Correlation with other   laboratory and clinical findings is essential.       Lymph # 09/13/2018 1.2  1.0 - 4.8 K/uL Final    Mono # 09/13/2018 0.5  0.3 - 1.0 K/uL Final    Eos # 09/13/2018 0.1  0.0 - 0.5 K/uL Final    Baso # 09/13/2018 0.04  0.00 - 0.20 K/uL Final    nRBC 09/13/2018 0  0 /100 WBC Final    Gran% 09/13/2018 49.9  38.0 - 73.0 % Final    Lymph% 09/13/2018 31.5  18.0 - 48.0 % Final    Mono% 09/13/2018 13.4  4.0 - 15.0 % Final    Eosinophil% 09/13/2018 3.8  0.0 - 8.0 % " Final    Basophil% 09/13/2018 1.1  0.0 - 1.9 % Final    Differential Method 09/13/2018 Automated    Final    Sodium 09/13/2018 138  136 - 145 mmol/L Final    Potassium 09/13/2018 4.0  3.5 - 5.1 mmol/L Final    Chloride 09/13/2018 106  95 - 110 mmol/L Final    CO2 09/13/2018 21* 23 - 29 mmol/L Final    Glucose 09/13/2018 103  70 - 110 mg/dL Final    BUN, Bld 09/13/2018 3* 8 - 23 mg/dL Final    Creatinine 09/13/2018 0.7  0.5 - 1.4 mg/dL Final    Calcium 09/13/2018 9.4  8.7 - 10.5 mg/dL Final    Total Protein 09/13/2018 6.3  6.0 - 8.4 g/dL Final    Albumin 09/13/2018 3.5  3.5 - 5.2 g/dL Final    Total Bilirubin 09/13/2018 0.7  0.1 - 1.0 mg/dL Final     Comment: For infants and newborns, interpretation of results should be based  on gestational age, weight and in agreement with clinical  observations.  Premature Infant recommended reference ranges:  Up to 24 hours.............<8.0 mg/dL  Up to 48 hours............<12.0 mg/dL  3-5 days..................<15.0 mg/dL  6-29 days.................<15.0 mg/dL       Alkaline Phosphatase 09/13/2018 123  55 - 135 U/L Final    AST 09/13/2018 24  10 - 40 U/L Final    ALT 09/13/2018 19  10 - 44 U/L Final    Anion Gap 09/13/2018 11  8 - 16 mmol/L Final    eGFR if African American 09/13/2018 >60.0  >60 mL/min/1.73 m^2 Final    eGFR if non African American 09/13/2018 >60.0  >60 mL/min/1.73 m^2 Final     Comment: Calculation used to obtain the estimated glomerular filtration  rate (eGFR) is the CKD-EPI equation.        Magnesium 09/13/2018 2.2  1.6 - 2.6 mg/dL Final    Phosphorus 09/13/2018 2.4* 2.7 - 4.5 mg/dL Final   Lab Visit on 09/07/2018   Component Date Value Ref Range Status    LD 09/07/2018 186  110 - 260 U/L Final     Results are increased in hemolyzed samples.           Assessment:       1. Multiple myeloma, remission status unspecified        Plan:           Multiple Myeloma     · ECOG PS 2  · Hypercalcemia in hospital resolved with zometa in hospital;  currently corrected calcium is 9.9  · serologic and urine studies ordered: kappa free light chain 527.1 and kappa/lambda ratio 620.1; repeat testing also showed light chain ratio of about 560; immunoglobulins unremarkable  · repeat lambda light chains pending from today; CBC and CMP unremarkable  · bone marrow bx consistent with plasma cell myeloma; pt with 70-80% plasma cells and trisomy 3, 9, and 15, which likely indicates a   hyperdiploid clone, not high risk disease per cytogenetics  · 24 hr urine protein=5940  · immunofixation-A free kappa light chain is present in beta  · SPEP, ADAM, immunoglobulins noted in initial note from 9/06; skeletal survey which reveals mulitple lytic lesions , beta 2 microglobulin - 3.3  · LDH to be drawn at next visit; complete staging of mulitple myeloma per revised ISS; she is a stage II per classical ISS due to beta-2 microglobulin being 3.3 and  Albumin mostly being under 3.5 at initial time of diagnosis  · MRI with T12 fracture for which pt had fusion surgery of spine and then was place in TLSO brace  -    Even per revised ISS, pt is a Stage II Multiple Myeloma with 5 yr overall survival approximated to be 62% and median overall survival of 83 months; the caveat to these statistics is that they have not included that newer therapies used on this patient were not part of that calculation  · Current plan is for four 28 day cycles of VRd (velcade, revilimid, dexamethasone)  · Velcade to be given on days 1,8,15,22 and revilimid to be taken for 21 days straight with one week off; this will complete a 28 day cycle course of treatement  · Eventual plan after induction would be for HDT/ASCT        - Both pt and son not wanting to start treatment today as they feel that things are getting better and pt states that her back surgery last month needs to heal further    - Both pt and son are again advised that delaying therapy further could cause the patient to face the risks of not treating  this cancer, which include life threatening kidney dysfunction requiring dialysis, worsening hypercalcemia, progression of lytic disease; they have stated understanding of risks and would like to re-schedule treatment to begin in 2 weeks   - check a serum free light chain assay, CBC, CMP prior to visit in 2 weeks; plans for velcade, revilimid, and dexmethasone to start at that visit re-emphasized   - Will discuss with our team regarding approval of Revilimid     - Pt with good dental care by history before administration of zometa, can continue possibly q3 months; plans to start aspirin 81 mg and  prophylactic valtrex discussed with family as well, when pt begins treatment for multiple myeloma    Anemia  · Related to myeloma, could consider EPO if needed at some point during therapy     Possible Left Breast Mass  · Check left breast asymmetry noted on screening mammogram  · Will attempt to get mammogram scheduled again    Discussed with Dr. Gisell Sandra MD  Hematology/Oncology, [facility]

## 2018-09-13 NOTE — Clinical Note
Unless Dr. Jameson lets me know otherwise, plan is for follow-up in 2 weeks with CBC, CMP, serum free light chains. This pt has had a chair for Velcade at all of her last visits but is prone to cancelling, but again I think that we would need to have one available in case she does do the treatment. Will update if any of this plan changes.

## 2018-09-13 NOTE — Clinical Note
-cancel velcade today-cbc, cmp, serum free light chains, appt with Dr. Corona, and velcade injection In 2  Weeks

## 2018-09-14 LAB
KAPPA LC SER QL IA: 609.6 MG/DL
KAPPA LC/LAMBDA SER IA: 823.8
LAMBDA LC SER QL IA: 0.74 MG/DL

## 2018-09-18 ENCOUNTER — TELEPHONE (OUTPATIENT)
Dept: ADMINISTRATIVE | Facility: CLINIC | Age: 63
End: 2018-09-18

## 2018-09-18 NOTE — TELEPHONE ENCOUNTER
Home Health SOC 08/22/2018 - 10/20/2018 with [facility] ([place]) - Dr. He Andres. Patient received SN, PT and OT services.

## 2018-09-19 ENCOUNTER — PATIENT MESSAGE (OUTPATIENT)
Dept: HEMATOLOGY/ONCOLOGY | Facility: CLINIC | Age: 63
End: 2018-09-19

## 2018-09-19 ENCOUNTER — INITIAL CONSULT (OUTPATIENT)
Dept: PSYCHIATRY | Facility: CLINIC | Age: 63
End: 2018-09-19
Payer: MEDICARE

## 2018-09-19 VITALS
DIASTOLIC BLOOD PRESSURE: 97 MMHG | WEIGHT: 132.81 LBS | SYSTOLIC BLOOD PRESSURE: 139 MMHG | HEART RATE: 92 BPM | BODY MASS INDEX: 22.8 KG/M2

## 2018-09-19 DIAGNOSIS — C90.00 MULTIPLE MYELOMA, REMISSION STATUS UNSPECIFIED: ICD-10-CM

## 2018-09-19 DIAGNOSIS — F33.42 MAJOR DEPRESSIVE DISORDER, RECURRENT EPISODE, IN FULL REMISSION: Primary | ICD-10-CM

## 2018-09-19 PROCEDURE — 99215 OFFICE O/P EST HI 40 MIN: CPT | Mod: S$GLB,,, | Performed by: PSYCHIATRY & NEUROLOGY

## 2018-09-19 PROCEDURE — 99499 UNLISTED E&M SERVICE: CPT | Mod: S$GLB,,, | Performed by: PSYCHIATRY & NEUROLOGY

## 2018-09-19 PROCEDURE — 3075F SYST BP GE 130 - 139MM HG: CPT | Mod: CPTII,S$GLB,, | Performed by: PSYCHIATRY & NEUROLOGY

## 2018-09-19 PROCEDURE — 3008F BODY MASS INDEX DOCD: CPT | Mod: CPTII,S$GLB,, | Performed by: PSYCHIATRY & NEUROLOGY

## 2018-09-19 PROCEDURE — 99213 OFFICE O/P EST LOW 20 MIN: CPT | Mod: PBBFAC | Performed by: PSYCHIATRY & NEUROLOGY

## 2018-09-19 PROCEDURE — 3080F DIAST BP >= 90 MM HG: CPT | Mod: CPTII,S$GLB,, | Performed by: PSYCHIATRY & NEUROLOGY

## 2018-09-19 PROCEDURE — 99999 PR PBB SHADOW E&M-EST. PATIENT-LVL III: CPT | Mod: PBBFAC,,, | Performed by: PSYCHIATRY & NEUROLOGY

## 2018-09-19 RX ORDER — LENALIDOMIDE 25 MG/1
25 CAPSULE ORAL DAILY
Qty: 21 CAPSULE | Refills: 0 | Status: SHIPPED | OUTPATIENT
Start: 2018-09-19 | End: 2020-12-11
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
## 2018-09-19 NOTE — Clinical Note
Thank you for referring this nice lady.  I think she has recurrent severe episodes of major depressive disorder about every 4 years.  Rather than treat now, she agrees to call and report first signs of return to begin treatment.  Please see full note.Gomez Paulson MD

## 2018-09-27 ENCOUNTER — LAB VISIT (OUTPATIENT)
Dept: LAB | Facility: HOSPITAL | Age: 63
End: 2018-09-27
Payer: MEDICARE

## 2018-09-27 DIAGNOSIS — C90.00 MULTIPLE MYELOMA, REMISSION STATUS UNSPECIFIED: ICD-10-CM

## 2018-09-27 LAB
ALBUMIN SERPL BCP-MCNC: 3.5 G/DL
ALP SERPL-CCNC: 104 U/L
ALT SERPL W/O P-5'-P-CCNC: 27 U/L
ANION GAP SERPL CALC-SCNC: 7 MMOL/L
AST SERPL-CCNC: 32 U/L
BASOPHILS # BLD AUTO: 0.03 K/UL
BASOPHILS NFR BLD: 0.8 %
BILIRUB SERPL-MCNC: 0.6 MG/DL
BUN SERPL-MCNC: 4 MG/DL
CALCIUM SERPL-MCNC: 9.3 MG/DL
CHLORIDE SERPL-SCNC: 105 MMOL/L
CO2 SERPL-SCNC: 26 MMOL/L
CREAT SERPL-MCNC: 0.7 MG/DL
DIFFERENTIAL METHOD: ABNORMAL
EOSINOPHIL # BLD AUTO: 0.1 K/UL
EOSINOPHIL NFR BLD: 2.6 %
ERYTHROCYTE [DISTWIDTH] IN BLOOD BY AUTOMATED COUNT: 15.4 %
EST. GFR  (AFRICAN AMERICAN): >60 ML/MIN/1.73 M^2
EST. GFR  (NON AFRICAN AMERICAN): >60 ML/MIN/1.73 M^2
GLUCOSE SERPL-MCNC: 95 MG/DL
HCT VFR BLD AUTO: 37.1 %
HGB BLD-MCNC: 11.8 G/DL
IMM GRANULOCYTES # BLD AUTO: 0.02 K/UL
IMM GRANULOCYTES NFR BLD AUTO: 0.5 %
LYMPHOCYTES # BLD AUTO: 1.4 K/UL
LYMPHOCYTES NFR BLD: 35.3 %
MCH RBC QN AUTO: 28.7 PG
MCHC RBC AUTO-ENTMCNC: 31.8 G/DL
MCV RBC AUTO: 90 FL
MONOCYTES # BLD AUTO: 0.4 K/UL
MONOCYTES NFR BLD: 11.1 %
NEUTROPHILS # BLD AUTO: 1.9 K/UL
NEUTROPHILS NFR BLD: 49.7 %
NRBC BLD-RTO: 0 /100 WBC
PLATELET # BLD AUTO: 293 K/UL
PMV BLD AUTO: 9.3 FL
POTASSIUM SERPL-SCNC: 4.6 MMOL/L
PROT SERPL-MCNC: 6.2 G/DL
RBC # BLD AUTO: 4.11 M/UL
SODIUM SERPL-SCNC: 138 MMOL/L
WBC # BLD AUTO: 3.88 K/UL

## 2018-09-27 PROCEDURE — 83520 IMMUNOASSAY QUANT NOS NONAB: CPT | Mod: 59

## 2018-09-27 PROCEDURE — 80053 COMPREHEN METABOLIC PANEL: CPT

## 2018-09-27 PROCEDURE — 85025 COMPLETE CBC W/AUTO DIFF WBC: CPT

## 2018-09-27 PROCEDURE — 36415 COLL VENOUS BLD VENIPUNCTURE: CPT

## 2018-09-28 LAB
KAPPA LC SER QL IA: 550.3 MG/DL
KAPPA LC/LAMBDA SER IA: 714.7
LAMBDA LC SER QL IA: 0.77 MG/DL

## 2018-10-01 ENCOUNTER — OFFICE VISIT (OUTPATIENT)
Dept: SPINE | Facility: CLINIC | Age: 63
End: 2018-10-01
Payer: MEDICARE

## 2018-10-01 ENCOUNTER — HOSPITAL ENCOUNTER (OUTPATIENT)
Dept: RADIOLOGY | Facility: OTHER | Age: 63
Discharge: HOME | End: 2018-10-01
Attending: NEUROLOGICAL SURGERY
Payer: MEDICARE

## 2018-10-01 VITALS
BODY MASS INDEX: 21.97 KG/M2 | DIASTOLIC BLOOD PRESSURE: 106 MMHG | SYSTOLIC BLOOD PRESSURE: 151 MMHG | TEMPERATURE: 98 F | HEART RATE: 86 BPM | WEIGHT: 128 LBS

## 2018-10-01 DIAGNOSIS — Z98.1 HISTORY OF LUMBAR FUSION: Primary | ICD-10-CM

## 2018-10-01 DIAGNOSIS — M48.9 SPONDYLOPATHY: ICD-10-CM

## 2018-10-01 PROCEDURE — 72080 X-RAY EXAM THORACOLMB 2/> VW: CPT | Mod: TC

## 2018-10-01 PROCEDURE — 99024 POSTOP FOLLOW-UP VISIT: CPT | Mod: ,,, | Performed by: NEUROLOGICAL SURGERY

## 2018-10-01 PROCEDURE — 99213 OFFICE O/P EST LOW 20 MIN: CPT | Mod: PBBFAC,25 | Performed by: NEUROLOGICAL SURGERY

## 2018-10-01 PROCEDURE — 99999 PR PBB SHADOW E&M-EST. PATIENT-LVL III: CPT | Mod: PBBFAC,,, | Performed by: NEUROLOGICAL SURGERY

## 2018-10-01 PROCEDURE — 72080 X-RAY EXAM THORACOLMB 2/> VW: CPT | Mod: 26,,, | Performed by: RADIOLOGY

## 2018-10-01 NOTE — PROGRESS NOTES
"CHIEF COMPLAINT:  Post op evaluation 6 weeks after T9-L3 fusion    I, Ramone Dominique, attest that this documentation has been prepared under the direction and in the presence of He Andres MD.    HPI:  Janie Zamorano is a 62 y.o.  female who presents today for a 6 week post op evaluation. Pt is s/p T9-L3 fusion on 2018. Pt reports that she is doing well. She denies any significant back pain and states that she only feels the hardware "pressing against her". She has returned home. Pt is participating in physical therapy and remaining physically active. Pt will be beginning revlimid 25mg soon for her multiple myeloma. Pt presents today wearing a TLSO brace. She states that she has lost approximately 30 lbs since the surgery due to exercising more.       Review of patient's allergies indicates:   Allergen Reactions    Shellfish containing products Itching       Past Medical History:   Diagnosis Date    Anxiety     Asthma     Bronchitis     COPD (chronic obstructive pulmonary disease)     Depression     GERD (gastroesophageal reflux disease)     Hallucination     History of psychiatric hospitalization     Hx of psychiatric care     Hypertension     Neck pain     Psychiatric problem     Schizophrenia     Sleep difficulties     Therapy      Past Surgical History:   Procedure Laterality Date     SECTION      X 1    COLONOSCOPY N/A 2018    Procedure: COLONOSCOPY;  Surgeon: Albert Russell MD;  [facility]  Service: Endoscopy;  Laterality: N/A;  pm prep/MS    COLONOSCOPY N/A 2018    Performed by Albert Russell MD at [facility] (4TH FLR)    CYSTOSCOPY N/A 10/25/2016    Per[facility] by [facility]one Jr., [facility] at [facility] OR    ESOPHAGOGASTRODUODENOSCOPY N/A 2018    Procedure: ESOPHAGOGASTRODUODENOSCOPY (EGD);  Surgeon: Albert Russell MD;  Location: [facility] (2ND FLR);  Service: Endoscopy;  Laterality: N/A;  EGD in 8 weeks on Pantoprazole 40mg every " 12 hours patient needs to take her PPI morning of EGD with sip of water.  Follow up esophagitis.       hx of gastroparesis. per Dr Russell-24 hours clear liquids/ Per Dr. Russell on 6/18/18 Pt to be r/s with     ESOPHAGOGASTRODUODENOSCOPY (EGD) N/A 7/17/2018    Performed by Albert [facility] MD at [facility] (2ND FLR)    ESOPHAGOGASTRODUODENOSCOPY (EGD) N/A 3/19/2018    Performed by Albert Russell MD at [facility] (4TH FLR)    ESOPHAGOGASTRODUODENOSCOPY (EGD) N/A 1/8/2018    Performed by Albert Russell MD at [facility] (4TH FLR)    HYSTERECTOMY  2016    KJB---DLH/BSO    LIPOMA RESECTION      back  x 2    MANOMETRY-ESOPHAGEAL-WITH IMPEDANCE N/A 4/16/2018    Performed by He Palafox MD at [facility] (4TH FLR)    ROBOTIC ASSISTED LAPAROSCOPIC HYSTERECTOMY WITH BSO Bilateral 10/25/2016    [facility]    SPINE, THORACIC, WITH FUSION T9-L3,neuromonitoring, please. N/A 8/17/2018    Performed by He Andres MD at [facility] OR 2ND FLR    THORACIC LAMINECTOMY WITH FUSION N/A 8/17/2018    Procedure: SPINE, THORACIC, WITH FUSION T9-L3,neuromonitoring, please.;  Surgeon: He Andres MD;  Location: [facility] OR [address] [place] [address];  Service: Neurosurgery;  Laterality: N/A;     Family History   Problem Relation Age of Onset    Hypertension Mother     Glaucoma Mother     Macular degeneration Mother     Breast cancer Mother     Stroke Mother     COPD Father     Hypertension Father     Diabetes Brother     Glaucoma Sister     Liver cancer Paternal Uncle 75        dad brother ETOH    Ovarian cancer Neg Hx     Colon cancer Neg Hx     Colon polyps Neg Hx     Cirrhosis Neg Hx     Celiac disease Neg Hx     Ulcerative colitis Neg Hx     Hemochromatosis Neg Hx     Esophageal cancer Neg Hx     Anxiety disorder Neg Hx     Dementia Neg Hx     Bipolar disorder Neg Hx     Depression Neg Hx     Drug abuse Neg Hx     Schizophrenia Neg Hx     Suicide Neg Hx      Social History     Tobacco  Use    Smoking status: Never Smoker    Smokeless tobacco: Never Used   Substance Use Topics    Alcohol use: No     Alcohol/week: 0.0 oz    Drug use: No        Review of Systems   Constitutional: Negative.    HENT: Negative.    Eyes: Negative.    Respiratory: Negative.    Cardiovascular: Negative.    Gastrointestinal: Negative.    Endocrine: Negative.    Genitourinary: Negative.    Musculoskeletal: Negative for back pain, gait problem and neck pain.   Skin: Negative.    Allergic/Immunologic: Negative.    Neurological: Negative for weakness, light-headedness, numbness and headaches.   Hematological: Negative.    Psychiatric/Behavioral: Negative.        OBJECTIVE:   Vital Signs:  Temp: 98 °F (36.7 °C) (10/01/18 1538)  Pulse: 86 (10/01/18 1538)  BP: (!) 151/106 (10/01/18 1538)    Physical Exam:    Vital signs: All nursing notes and vital signs reviewed -- afebrile, vital signs stable.  Constitutional: Patient sitting comfortably in chair. Appears well developed and well nourished.  Skin: Exposed areas are intact without abnormal markings, rashes or other lesions. Incision is well healed.   HEENT: Normocephalic. Normal conjunctivae.  Cardiovascular: Normal rate and regular rhythm.  Respiratory: Chest wall rises and falls symmetrically, without signs of respiratory distress.  Abdomen: Soft and non-tender.  Extremities: Warm and without edema. Calves supple, non-tender.  Psych/Behavior: Normal affect.    Neurological:    Mental status: Alert and oriented. Conversational and appropriate.       Cranial Nerves: Grossly intact.     Motor:    Upper:  Deltoids Triceps Biceps WE WF     R 5/5 5/5 5/5 5/5 5/5 5/5    L 5/5 5/5 5/5 5/5 5/5 5/5      Lower:  HF KE KF DF PF EHL    R 5/5 5/5 5/5 5/5 5/5 5/5    L 5/5 5/5 5/5 5/5 5/5 5/5     Sensory: Intact sensation to light touch in all extremities. Romberg negative.    Reflexes:          DTR: 2+ symmetrically throughout.     Washburn's: Negative.     Babinski's: Negative.      Clonus: Negative.    Cerebellar: Finger-to-nose and rapid alternating movements normal. Gait stable, fluid.    Spine:    Posture: Head well aligned over pelvis in front and side views.  No focal or global spinal deformity visible on inspection. Shoulders and hips even. No obvious leg length discrepancy. No scapula winging.    Bending: Full ROM with forward, back and lateral bending. No rib prominence with forward bend.    Cervical:      ROM: Full with flexion, extension, lateral rotation and ear-to-shoulder bend.      Midline TTP: Negative.     Spurling's test: Negative.     Lhermitte's: Negative.    Thoracic:     Midline TTP: Negative    Lumbar:     Midline TTP: Negative     Straight Leg Test: Negative     Crossed Straight Leg Test: Negative     Sciatic notch tenderness: Negative.    Other:     SI joint TTP: Negative.     Greater trochanter TTP: Negative.     Tenderness with external/internal hip rotation: Negative.    Diagnostic Results:  All imaging was independently reviewed by me.    AP and Lateral X-ray Thoracolumbar spine, dated 10/01/2018:  1. Good spinal alignment and hardware position    ASSESSMENT/PLAN:     Janie Zamorano is doing well 6 weeks after spinal ORIF for a T12 multiple myeloma fracture. She has no significant back pain and her xrays look great. We will see her back in 6 weeks with a CT of the spinal hardware to assess for bony fusion.    The patient understands and agrees with the plan of care. All questions were answered.     1. RTC 6 weeks with CT Lumbar      I, Dr. He Andres personally performed the services described in this documentation. All medical record entries made by the scribe, Ramone Dominique, were at my direction and in my presence.  I have reviewed the chart and agree that the record reflects my personal performance and is accurate and complete.      He Andres M.D.  Department of Neurosurgery  [facility]      .

## 2018-10-02 ENCOUNTER — HOSPITAL ENCOUNTER (OUTPATIENT)
Dept: RADIOLOGY | Facility: HOSPITAL | Age: 63
Discharge: HOME | End: 2018-10-02
Attending: INTERNAL MEDICINE
Payer: MEDICARE

## 2018-10-02 DIAGNOSIS — R92.8 ABNORMAL MAMMOGRAM: ICD-10-CM

## 2018-10-02 PROCEDURE — 77061 BREAST TOMOSYNTHESIS UNI: CPT | Mod: 26,LT,, | Performed by: RADIOLOGY

## 2018-10-02 PROCEDURE — 77065 DX MAMMO INCL CAD UNI: CPT | Mod: TC,PO,LT

## 2018-10-02 PROCEDURE — 76642 ULTRASOUND BREAST LIMITED: CPT | Mod: TC,PO,LT

## 2018-10-02 PROCEDURE — 77065 DX MAMMO INCL CAD UNI: CPT | Mod: 26,LT,, | Performed by: RADIOLOGY

## 2018-10-02 PROCEDURE — 77061 BREAST TOMOSYNTHESIS UNI: CPT | Mod: TC,PO,LT

## 2018-10-02 PROCEDURE — 76642 ULTRASOUND BREAST LIMITED: CPT | Mod: 26,LT,, | Performed by: RADIOLOGY

## 2018-10-07 DIAGNOSIS — D50.9 IRON DEFICIENCY ANEMIA, UNSPECIFIED IRON DEFICIENCY ANEMIA TYPE: ICD-10-CM

## 2018-10-07 DIAGNOSIS — E55.9 VITAMIN D INSUFFICIENCY: ICD-10-CM

## 2018-10-07 RX ORDER — ACETAMINOPHEN 500 MG
1 TABLET ORAL DAILY
Qty: 90 CAPSULE | Refills: 3 | COMMUNITY
Start: 2018-10-07 | End: 2019-10-07

## 2018-10-07 RX ORDER — ERGOCALCIFEROL 1.25 MG/1
50000 CAPSULE ORAL
Qty: 8 CAPSULE | Refills: 0 | Status: SHIPPED | OUTPATIENT
Start: 2018-10-07 | End: 2018-10-08 | Stop reason: SDUPTHER

## 2018-10-08 ENCOUNTER — TELEPHONE (OUTPATIENT)
Dept: GASTROENTEROLOGY | Facility: CLINIC | Age: 63
End: 2018-10-08

## 2018-10-08 DIAGNOSIS — E55.9 VITAMIN D INSUFFICIENCY: ICD-10-CM

## 2018-10-08 DIAGNOSIS — D50.9 IRON DEFICIENCY ANEMIA, UNSPECIFIED IRON DEFICIENCY ANEMIA TYPE: ICD-10-CM

## 2018-10-08 RX ORDER — ERGOCALCIFEROL 1.25 MG/1
50000 CAPSULE ORAL
Qty: 8 CAPSULE | Refills: 0 | Status: SHIPPED | OUTPATIENT
Start: 2018-10-08 | End: 2018-11-27

## 2018-10-08 NOTE — TELEPHONE ENCOUNTER
----- Message from Beverly Hicks sent at 10/8/2018  9:24 AM CDT -----  Contact: [facility]- [phone]   Russell- pharmacy called and stated the rx ergocalciferol (ERGOCALCIFEROL) 50,000 unit Cap needs to be filled through the [facility] local pharmacy- two listed on file

## 2018-10-08 NOTE — TELEPHONE ENCOUNTER
Message left for patient to return my call regarding which local pharmacy would she like for  to send the Vitamin D?

## 2018-10-08 NOTE — TELEPHONE ENCOUNTER
----- Message from Beverly Hicks sent at 10/8/2018 10:24 AM CDT -----  Contact: Self- [phone]  Drew- pt is returning a missed call from Heidy- states she uses the [facility] in [place] listed on file- please contact pt at [phone]

## 2018-10-19 ENCOUNTER — TELEPHONE (OUTPATIENT)
Dept: HEMATOLOGY/ONCOLOGY | Facility: CLINIC | Age: 63
End: 2018-10-19

## 2018-10-19 NOTE — TELEPHONE ENCOUNTER
Spoke to pharmacy.  They have been unable to contact patient.  Numbers verified.  I attempted to call voice mail left regarding prescription.        ----- Message from Ann-Marie Baldwin sent at 10/19/2018 10:44 AM CDT -----  Contact: Anna Cope Calling    Reason for call:trying to set up delivery of medication auth will be expiring tonight   Pharmacy Name:[facility]   Prescription Name:lenalidomide (REVLIMID) 25 mg CapPhone Number:[phone]  Additional Information:  Thank You  LEONIDAS Baldwin

## 2018-10-23 DIAGNOSIS — C90.00 MULTIPLE MYELOMA, REMISSION STATUS UNSPECIFIED: ICD-10-CM

## 2018-10-23 RX ORDER — LENALIDOMIDE 25 MG/1
25 CAPSULE ORAL DAILY
Qty: 21 CAPSULE | Refills: 0 | OUTPATIENT
Start: 2018-10-23

## 2018-10-30 ENCOUNTER — TELEPHONE (OUTPATIENT)
Dept: HEMATOLOGY/ONCOLOGY | Facility: CLINIC | Age: 63
End: 2018-10-30

## 2018-10-30 NOTE — TELEPHONE ENCOUNTER
Progress Notes    Attempted to reach the pt, three calls on different days of the week to her cell phone and also called the pt's son twice, all of these calls have gone to voicemail. Also left a message with callback number, stressing the importance of beginning therapy as soon as possible. When I call the pt's home number that is listed, a relative of hers picks up the phone and has taken two messages about this call from me, but no call has been returned yet.      Herrera Sandra MD  Hematology/Oncology Fellow, PGY IV  [facility]

## 2018-11-12 ENCOUNTER — OFFICE VISIT (OUTPATIENT)
Dept: SPINE | Facility: CLINIC | Age: 63
End: 2018-11-12
Payer: MEDICARE

## 2018-11-12 ENCOUNTER — HOSPITAL ENCOUNTER (OUTPATIENT)
Dept: RADIOLOGY | Facility: OTHER | Age: 63
Discharge: HOME | End: 2018-11-12
Attending: NEUROLOGICAL SURGERY
Payer: MEDICARE

## 2018-11-12 VITALS
HEIGHT: 64 IN | HEART RATE: 90 BPM | DIASTOLIC BLOOD PRESSURE: 105 MMHG | WEIGHT: 120 LBS | BODY MASS INDEX: 20.49 KG/M2 | SYSTOLIC BLOOD PRESSURE: 167 MMHG

## 2018-11-12 DIAGNOSIS — Z98.1 HISTORY OF LUMBAR FUSION: ICD-10-CM

## 2018-11-12 DIAGNOSIS — Z98.1 HISTORY OF LUMBAR FUSION: Primary | ICD-10-CM

## 2018-11-12 PROCEDURE — 72131 CT LUMBAR SPINE W/O DYE: CPT | Mod: TC

## 2018-11-12 PROCEDURE — 99024 POSTOP FOLLOW-UP VISIT: CPT | Mod: S$GLB,,, | Performed by: NEUROLOGICAL SURGERY

## 2018-11-12 PROCEDURE — 99499 UNLISTED E&M SERVICE: CPT | Mod: S$GLB,,, | Performed by: NEUROLOGICAL SURGERY

## 2018-11-12 PROCEDURE — 3008F BODY MASS INDEX DOCD: CPT | Mod: CPTII,S$GLB,, | Performed by: NEUROLOGICAL SURGERY

## 2018-11-12 PROCEDURE — 3080F DIAST BP >= 90 MM HG: CPT | Mod: CPTII,S$GLB,, | Performed by: NEUROLOGICAL SURGERY

## 2018-11-12 PROCEDURE — 3077F SYST BP >= 140 MM HG: CPT | Mod: CPTII,S$GLB,, | Performed by: NEUROLOGICAL SURGERY

## 2018-11-12 PROCEDURE — 72131 CT LUMBAR SPINE W/O DYE: CPT | Mod: 26,,, | Performed by: RADIOLOGY

## 2018-11-12 PROCEDURE — 99999 PR PBB SHADOW E&M-EST. PATIENT-LVL III: CPT | Mod: PBBFAC,,, | Performed by: NEUROLOGICAL SURGERY

## 2018-11-12 NOTE — PROGRESS NOTES
CHIEF COMPLAINT:  Post op evaluation 3 months after T9-L3 fusion     I, Ramone Dominique, attest that this documentation has been prepared under the direction and in the presence of He Andres MD.    HPI:  Janie Zamorano is a 62 y.o.  female with multiple myeloma, on alternative therapies after declining chemotherapy, who presents today for a 3 month post op evaluation with new CT lumbar. Pt is s/p T9-L3 fusion on 2018. Pt reports she is doing well. She notes a tightness in her back with occasional muscle spasms. She denies any significant pain.  Pt is ambulating well and denies any leg weakness or b/b dysfunction. She has been remaining active and walking often. Pt presents today wearing a TLSO brace. Pt is using her bone growth stimulator 2 hours daily.       Review of patient's allergies indicates:   Allergen Reactions    Shellfish containing products Itching       Past Medical History:   Diagnosis Date    Anxiety     Asthma     Bronchitis     COPD (chronic obstructive pulmonary disease)     Depression     GERD (gastroesophageal reflux disease)     Hallucination     History of psychiatric hospitalization     Hx of psychiatric care     Hypertension     Neck pain     Psychiatric problem     Schizophrenia     Sleep difficulties     Therapy      Past Surgical History:   Procedure Laterality Date     SECTION      X 1    COLONOSCOPY N/A 2018    Procedure: COLONOSCOPY;  Surgeon: Albert Russell MD;  Location: [facility] (4TH FLR);  Service: Endoscopy;  Laterality: N/A;  pm prep/MS    COLONOSCOPY N/A 2018    Performed by Albert Russell MD at [facility] (4TH FLR)    CYSTOSCOPY N/A 10/25/2016    Performed by Jimi Capone Jr., MD at [facility] OR    ESOPHAGOGASTRODUODENOSCOPY N/A 2018    Procedure: ESOPHAGOGASTRODUODENOSCOPY (EGD);  Surgeon: Albert Russell MD;  Location: [facility] (2ND FLR);  Service: Endoscopy;  Laterality: N/A;  EGD in 8 weeks on Pantoprazole 40mg  every 12 hours patient needs to take her PPI morning of EGD with sip of water.  Follow up esophagitis.       hx of gastroparesis. per Dr Russell-24 hours clear liquids/ Per Dr. Russell on 6/18/18 Pt to be r/s with     ESOPHAGOGASTRODUODENOSCOPY (EGD) N/A 7/17/2018    Performed by Albert Russell MD at [facility] (2ND FLR)    ESOPHAGOGASTRODUODENOSCOPY (EGD) N/A 3/19/2018    Performed by Albert Russell MD at [facility] (4TH FLR)    ESOPHAGOGASTRODUODENOSCOPY (EGD) N/A 1/8/2018    Performed by Albert Russell MD at [facility] (4TH FLR)    HYSTERECTOMY  2016    KJB---DLH/BSO    LIPOMA RESECTION      back  x 2    MANOMETRY-ESOPHAGEAL-WITH IMPEDANCE N/A 4/16/2018    Performed by He Palafox MD at [facility] (4TH FLR)    ROBOTIC ASSISTED LAPAROSCOPIC HYSTERECTOMY WITH BSO Bilateral 10/25/2016    Per[facility] by [facility]., [facility] at [facility] OR    SPINE, THORACIC, WITH FUSION T9-L3,neuromonitoring, please. N/A 8/17/2018    Performed by He Andres MD at [facility] OR 2ND FLR    THORACIC LAMINECTOMY WITH FUSION N/A 8/17/2018    Procedure: SPINE, THORACIC, WITH FUSION T9-L3,neuromonitoring, please.;  Surgeon: He Andres MD;  Location: [facility] OR [address] [place] [address];  Service: Neurosurgery;  Laterality: N/A;     Family History   Problem Relation Age of Onset    Hypertension Mother     Glaucoma Mother     Macular degeneration Mother     Breast cancer Mother     Stroke Mother     COPD Father     Hypertension Father     Diabetes Brother     Glaucoma Sister     Liver cancer Paternal Uncle 75        dad brother ETOH    Ovarian cancer Neg Hx     Colon cancer Neg Hx     Colon polyps Neg Hx     Cirrhosis Neg Hx     Celiac disease Neg Hx     Ulcerative colitis Neg Hx     Hemochromatosis Neg Hx     Esophageal cancer Neg Hx     Anxiety disorder Neg Hx     Dementia Neg Hx     Bipolar disorder Neg Hx     Depression Neg Hx     Drug abuse Neg Hx     Schizophrenia Neg Hx     Suicide Neg Hx      Social History      Tobacco Use    Smoking status: Never Smoker    Smokeless tobacco: Never Used   Substance Use Topics    Alcohol use: No     Alcohol/week: 0.0 oz    Drug use: No        Review of Systems   Constitutional: Negative.    HENT: Negative.    Eyes: Negative.    Respiratory: Negative.    Cardiovascular: Negative.    Gastrointestinal: Negative.    Endocrine: Negative.    Genitourinary: Negative.    Musculoskeletal: Negative for back pain, gait problem and neck pain.   Skin: Negative.    Allergic/Immunologic: Negative.    Neurological: Negative for weakness, light-headedness, numbness and headaches.   Hematological: Negative.    Psychiatric/Behavioral: Negative.        OBJECTIVE:   Vital Signs:  Pulse: 90 (11/12/18 0843)  BP: (!) 167/105 (11/12/18 0843)    Physical Exam:    Vital signs: All nursing notes and vital signs reviewed -- afebrile, vital signs stable.  Constitutional: Patient sitting comfortably in chair. Appears well developed and well nourished.  Skin: Exposed areas are intact without abnormal markings, rashes or other lesions. Well healed thoracolumbar incision.   HEENT: Normocephalic. Normal conjunctivae.  Cardiovascular: Normal rate and regular rhythm.  Respiratory: Chest wall rises and falls symmetrically, without signs of respiratory distress.  Abdomen: Soft and non-tender.  Extremities: Warm and without edema. Calves supple, non-tender.  Psych/Behavior: Normal affect.    Neurological:    Mental status: Alert and oriented. Conversational and appropriate.       Cranial Nerves: Grossly intact.     Motor:    Upper:  Deltoids Triceps Biceps WE WF     R 5/5 5/5 5/5 5/5 5/5 5/5    L 5/5 5/5 5/5 5/5 5/5 5/5      Lower:  HF KE KF DF PF EHL    R 5/5 5/5 5/5 5/5 5/5 5/5    L 5/5 5/5 5/5 5/5 5/5 5/5     Sensory: Intact sensation to light touch in all extremities. Romberg negative.    Reflexes:          DTR: 2+ symmetrically throughout.     Washburn's: Negative.     Babinski's: Negative.     Clonus:  Negative.    Cerebellar: Finger-to-nose and rapid alternating movements normal. Gait stable, fluid.    Spine:    Posture: Head well aligned over pelvis in front and side views.  No focal or global spinal deformity visible on inspection. Shoulders and hips even. No obvious leg length discrepancy. No scapula winging.    Bending: Full ROM with forward, back and lateral bending. No rib prominence with forward bend.    Cervical:      ROM: Full with flexion, extension, lateral rotation and ear-to-shoulder bend.      Midline TTP: Negative.     Spurling's test: Negative.     Lhermitte's: Negative.    Thoracic:     Midline TTP: Negative    Lumbar:     Midline TTP: Negative     Straight Leg Test: Negative     Crossed Straight Leg Test: Negative     Sciatic notch tenderness: Negative.    Other:     SI joint TTP: Negative.     Greater trochanter TTP: Negative.     Tenderness with external/internal hip rotation: Negative.    Diagnostic Results:  All imaging was independently reviewed by me.    CT L-spine, dated 11/12/2018:  1. Good spinal alignment and hardware position from T9-L3 with evidence of early bony fusion  2. Stable T12 fracture    ASSESSMENT/PLAN:     Janie Zamorano is doing well 3 months after T9-L3 spinal ORIF for pathologic T12 fracture secondary to multiple myeloma. She has minimal back pain and is walking regularly. Her CT shows good hardware position, spinal alignment and early evidence of fusion. I'd like to see more solid fusion prior to discontinuing the brace. I will see her back in 3 months with a repeat CT.    The patient understands and agrees with the plan of care. All questions were answered.     1. RTC 3 months with CT L-spine      I, Dr. He Andres personally performed the services described in this documentation. All medical record entries made by the scribe, Ramone Dominique, were at my direction and in my presence.  I have reviewed the chart and agree that the record reflects my personal  performance and is accurate and complete.      He Andres M.D.  Department of Neurosurgery  [facility]      .

## 2018-12-03 ENCOUNTER — TELEPHONE (OUTPATIENT)
Dept: NEUROSURGERY | Facility: CLINIC | Age: 63
End: 2018-12-03

## 2018-12-03 NOTE — TELEPHONE ENCOUNTER
Per Dr. Andres's last note, pt to FU with new CT scan in 3 months, which would mean in February. Pt agrees, appts rescheduled, pt v/u. Appt reminders in the mail.     ----- Message from Julius Salas sent at 12/3/2018  4:31 PM CST -----  Contact: MARINA SINGH [[id number]]    Name of Who is Calling: MARINA SINGH [[id number]]      What is the request in detail: Patient would like to speak with staff in regards to rescheduling with Dr. Andres only. Will not be in town for the 1/28 appointment and does not want to be seen so early. Please advise      Can the clinic reply by [facility]: yes      What [facility] Back [facility]: [phone]

## 2018-12-11 ENCOUNTER — TELEPHONE (OUTPATIENT)
Dept: ENDOSCOPY | Facility: HOSPITAL | Age: 63
End: 2018-12-11

## 2018-12-19 DIAGNOSIS — C90.00 MULTIPLE MYELOMA, REMISSION STATUS UNSPECIFIED: ICD-10-CM

## 2018-12-19 RX ORDER — LENALIDOMIDE 25 MG/1
25 CAPSULE ORAL DAILY
Qty: 21 CAPSULE | Refills: 0 | OUTPATIENT
Start: 2018-12-19

## 2019-01-07 ENCOUNTER — TELEPHONE (OUTPATIENT)
Dept: INTERNAL MEDICINE | Facility: CLINIC | Age: 64
End: 2019-01-07

## 2019-01-07 NOTE — TELEPHONE ENCOUNTER
----- Message from Rajesh Urbina sent at 1/7/2019  4:44 PM CST -----  Contact: Patient [phone]  RX request - refill or new RX.  Is this a refill or new RX:  Refill  RX name and strength: Patch for sea sickness Rx     Is this a 30 day or 90 day RX:  7 Days request     Pharmacy name and phone #:  [id number]  [place] - [address] AT [facility] [place] [phone] (Phone)  [phone] (Fax    Comments: patient stating will be going on a cruise for seven days and need Rx for motions sickness sent to pharmacy above.    Would like a call to inform Rx has been sent.      Please call an advise  Thank you

## 2019-01-09 ENCOUNTER — PATIENT MESSAGE (OUTPATIENT)
Dept: INTERNAL MEDICINE | Facility: CLINIC | Age: 64
End: 2019-01-09

## 2019-01-09 RX ORDER — SCOLOPAMINE TRANSDERMAL SYSTEM 1 MG/1
1 PATCH, EXTENDED RELEASE TRANSDERMAL
Qty: 5 PATCH | Refills: 2 | Status: SHIPPED | OUTPATIENT
Start: 2019-01-09 | End: 2019-08-16

## 2019-02-01 ENCOUNTER — TELEPHONE (OUTPATIENT)
Dept: ENDOSCOPY | Facility: HOSPITAL | Age: 64
End: 2019-02-01

## 2019-02-04 ENCOUNTER — TELEPHONE (OUTPATIENT)
Dept: SPINE | Facility: CLINIC | Age: 64
End: 2019-02-04

## 2019-02-04 NOTE — TELEPHONE ENCOUNTER
Pt called to rescheduled today's CT and appt with Dr. Andres. Rescheduled to 2/25/19 at [facility]. Pt v/u of date, time, and location of appts, appt reminder in the mail.

## 2019-02-23 ENCOUNTER — TELEPHONE (OUTPATIENT)
Dept: SLEEP MEDICINE | Facility: CLINIC | Age: 64
End: 2019-02-23

## 2019-02-24 NOTE — TELEPHONE ENCOUNTER
Eduardo Bautista,     This patient is scheduled for an upcoming appointment in a specialty clinic participating in [facility]. Upon reviewing the screening/pathology report for mammogram/colorectal screening, there is a finding that requires a review of the current modifier. Please review with the patients PCP if the modifier for mammogram/colorectal screening should be changed based on that finding.    Thank you,     ASHLY Arriaza CCC

## 2019-02-25 ENCOUNTER — TELEPHONE (OUTPATIENT)
Dept: SPINE | Facility: CLINIC | Age: 64
End: 2019-02-25

## 2019-02-25 ENCOUNTER — OFFICE VISIT (OUTPATIENT)
Dept: SPINE | Facility: CLINIC | Age: 64
End: 2019-02-25
Payer: MEDICARE

## 2019-02-25 ENCOUNTER — HOSPITAL ENCOUNTER (OUTPATIENT)
Dept: RADIOLOGY | Facility: OTHER | Age: 64
Discharge: HOME | End: 2019-02-25
Attending: NEUROLOGICAL SURGERY
Payer: MEDICARE

## 2019-02-25 VITALS
DIASTOLIC BLOOD PRESSURE: 96 MMHG | HEART RATE: 78 BPM | SYSTOLIC BLOOD PRESSURE: 175 MMHG | WEIGHT: 112.88 LBS | TEMPERATURE: 98 F | BODY MASS INDEX: 19.38 KG/M2

## 2019-02-25 DIAGNOSIS — Z98.1 HISTORY OF LUMBAR FUSION: ICD-10-CM

## 2019-02-25 DIAGNOSIS — Z98.1 HISTORY OF LUMBAR FUSION: Primary | ICD-10-CM

## 2019-02-25 PROCEDURE — 3008F PR BODY MASS INDEX (BMI) DOCUMENTED: ICD-10-PCS | Mod: CPTII,S$GLB,, | Performed by: NEUROLOGICAL SURGERY

## 2019-02-25 PROCEDURE — 99999 PR PBB SHADOW E&M-EST. PATIENT-LVL III: ICD-10-PCS | Mod: PBBFAC,,, | Performed by: NEUROLOGICAL SURGERY

## 2019-02-25 PROCEDURE — 3077F SYST BP >= 140 MM HG: CPT | Mod: CPTII,S$GLB,, | Performed by: NEUROLOGICAL SURGERY

## 2019-02-25 PROCEDURE — 3077F PR MOST RECENT SYSTOLIC BLOOD PRESSURE >= 140 MM HG: ICD-10-PCS | Mod: CPTII,S$GLB,, | Performed by: NEUROLOGICAL SURGERY

## 2019-02-25 PROCEDURE — 3080F PR MOST RECENT DIASTOLIC BLOOD PRESSURE >= 90 MM HG: ICD-10-PCS | Mod: CPTII,S$GLB,, | Performed by: NEUROLOGICAL SURGERY

## 2019-02-25 PROCEDURE — 99213 OFFICE O/P EST LOW 20 MIN: CPT | Mod: S$GLB,,, | Performed by: NEUROLOGICAL SURGERY

## 2019-02-25 PROCEDURE — 99499 RISK ADDL DX/OHS AUDIT: ICD-10-PCS | Mod: S$GLB,,, | Performed by: NEUROLOGICAL SURGERY

## 2019-02-25 PROCEDURE — 99213 PR OFFICE/OUTPT VISIT, EST, LEVL III, 20-29 MIN: ICD-10-PCS | Mod: S$GLB,,, | Performed by: NEUROLOGICAL SURGERY

## 2019-02-25 PROCEDURE — 99499 UNLISTED E&M SERVICE: CPT | Mod: S$GLB,,, | Performed by: NEUROLOGICAL SURGERY

## 2019-02-25 PROCEDURE — 72131 CT LUMBAR SPINE W/O DYE: CPT | Mod: TC

## 2019-02-25 PROCEDURE — 72131 CT LUMBAR SPINE W/O DYE: CPT | Mod: 26,,, | Performed by: RADIOLOGY

## 2019-02-25 PROCEDURE — 99999 PR PBB SHADOW E&M-EST. PATIENT-LVL III: CPT | Mod: PBBFAC,,, | Performed by: NEUROLOGICAL SURGERY

## 2019-02-25 PROCEDURE — 3080F DIAST BP >= 90 MM HG: CPT | Mod: CPTII,S$GLB,, | Performed by: NEUROLOGICAL SURGERY

## 2019-02-25 PROCEDURE — 72131 CT LUMBAR SPINE WITHOUT CONTRAST: ICD-10-PCS | Mod: 26,,, | Performed by: RADIOLOGY

## 2019-02-25 PROCEDURE — 3008F BODY MASS INDEX DOCD: CPT | Mod: CPTII,S$GLB,, | Performed by: NEUROLOGICAL SURGERY
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
## 2019-02-25 NOTE — TELEPHONE ENCOUNTER
Answered pt's questions regarding PT orders placed by Dr. Andres at her appt in the clinic today. Pt states she wants an order for strength training, not the type of PT that Dr. Andres ordered. I explained that Dr. Andres ordered what he recommended for her treatment. She states she will call back after discussing this with her son.     ----- Message from Jayde Mcdaniel sent at 2/25/2019  2:59 PM CST -----  Contact: MARINA SINGH [[id number]]  Name of Who is Calling: MARINA SINGH [[id number]]      What is the request in detail:  Patient called requesting a call as this pertains to an appointment that was scheduled by this office.   Please give a call back at your earliest convenience.     THANKS!      Can the clinic reply by [facility]: no      Number to Call Back: MARINA SINGH  /  # [phone]

## 2019-03-20 ENCOUNTER — CLINICAL SUPPORT (OUTPATIENT)
Dept: REHABILITATION | Facility: HOSPITAL | Age: 64
End: 2019-03-20
Attending: NEUROLOGICAL SURGERY
Payer: MEDICARE

## 2019-03-20 DIAGNOSIS — Z98.1 HISTORY OF LUMBAR FUSION: ICD-10-CM

## 2019-03-20 PROCEDURE — 97110 THERAPEUTIC EXERCISES: CPT

## 2019-03-20 PROCEDURE — G8979 MOBILITY GOAL STATUS: HCPCS | Mod: CJ

## 2019-03-20 PROCEDURE — G8978 MOBILITY CURRENT STATUS: HCPCS | Mod: CJ

## 2019-03-20 PROCEDURE — 97161 PT EVAL LOW COMPLEX 20 MIN: CPT
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
## 2019-04-05 ENCOUNTER — CLINICAL SUPPORT (OUTPATIENT)
Dept: REHABILITATION | Facility: HOSPITAL | Age: 64
End: 2019-04-05
Attending: NEUROLOGICAL SURGERY
Payer: MEDICARE

## 2019-04-05 DIAGNOSIS — Z98.1 S/P SPINAL FUSION: ICD-10-CM

## 2019-04-05 PROCEDURE — 97110 THERAPEUTIC EXERCISES: CPT

## 2019-04-05 NOTE — PROGRESS NOTES
Physical Therapy Daily Treatment Note     Name: Janie Mondragon [facility] Number: [id number]    Therapy Diagnosis:   Encounter Diagnosis   Name Primary?    S/P spinal fusion      Physician: He Andres MD    Visit Date: 4/5/2019  Physician Orders: PT Eval and Treat   Medical Diagnosis: Hx of lumbar fusion  Evaluation Date: 3/20/2019  Authorization Period Expiration: 02/25/2020  Plan of Care Certification Period: 05/20/19  Visit # / Visits authorized: 2/ 12    Time In: 10:45  Time Out: 11:40  Total Billable Time: 30 minutes    Precautions: Lumbar fusion    Subjective     Pt reports: The back has been feeling ok .  She was compliant with home exercise program.  Response to previous treatment: good  Functional change: None noted     Pain: 4/10  Location: bilateral back      Objective     Janie received therapeutic exercises to develop strength, endurance, posture and core stabilization for 30 minutes including:    Squats   SLR  PPT  Supine marched with PPT  Rows GTB   Pallof Presses GTB   B KKFO  Ball       Home Exercises Provided and Patient Education Provided     Education provided:   - Importance of HEP and use of ice to manage symptoms.    Written Home Exercises Provided: yes.  Exercises were reviewed and Janie was able to demonstrate them prior to the end of the session.  Janie demonstrated good  understanding of the education provided.     See EMR under Patient Instructions for exercises provided prior visit.    Assessment     Patient did well with all therex today. She is still deonstrating decreased balance in some standing activities.   Janie is progressing well towards her goals.   Pt prognosis is Good.     Pt will continue to benefit from skilled outpatient physical therapy to address the deficits listed in the problem list box on initial evaluation, provide pt/family education and to maximize pt's level of independence in the home and community environment.     Pt's spiritual, cultural  and educational needs considered and pt agreeable to plan of care and goals.    Anticipated barriers to physical therapy: s/p fusion     Goals:   Short Term Goals: 2-3 weeks   1) Patient will be compliant with HEP   2) Patient will demonstrate upright posture without the use of TLSO   3) Patient will improve B LE strength to 4+/5 or better     Long Term Goals: 6-8 weeks   1) Patient will improve FOTO score by 1-5%   2) Patient will be able to squat/stoop down to floor with no pain   3) Patient will be able to complete squat like movements without risk of falling backwards      Plan     Outpatient Physical Therapy 2 times weekly for 8 weeks to include the following interventions: Gait Training, Manual Therapy, Moist Heat/ Ice, Patient Education, Self Care and Therapeutic Exercise.        Rui Li, PT

## 2019-04-11 ENCOUNTER — CLINICAL SUPPORT (OUTPATIENT)
Dept: REHABILITATION | Facility: HOSPITAL | Age: 64
End: 2019-04-11
Attending: NEUROLOGICAL SURGERY
Payer: MEDICARE

## 2019-04-11 DIAGNOSIS — Z98.1 S/P SPINAL FUSION: Primary | ICD-10-CM

## 2019-04-11 PROCEDURE — 97110 THERAPEUTIC EXERCISES: CPT

## 2019-04-11 NOTE — PROGRESS NOTES
Physical Therapy Daily Treatment Note     Name: Janie Mondragon [facility] Number: [id number]    Therapy Diagnosis:   No diagnosis found.  Physician: He Andres MD    Visit Date: 4/11/2019  Physician Orders: PT Eval and Treat   Medical Diagnosis: Hx of lumbar fusion  Evaluation Date: 3/20/2019  Authorization Period Expiration: 02/25/2020  Plan of Care Certification Period: 05/20/19  Visit # / Visits authorized: 3/ 12  1:1: 23  Time In: 1:08  Time Out: 1:45  Total Billable Time: 37 minutes    Precautions: Lumbar fusion    Subjective     Pt reports:no LB complaints  She was compliant with home exercise program.  Response to previous treatment: good  Functional change: None noted     Pain: 0/10  Location: bilateral back      Objective     Janie received therapeutic exercises to develop strength, endurance, posture and core stabilization for 37 minutes including:  Nustep x 5 min  Mini Squats 2x10  Rows GTB 3x10  Shoulder ext GTB 2x10  Pallof Presses GTB  20x  SLR 3x10  Bridges 2x10  PPT 3x10  Supine marched with PPT 2x10  BKFO 2x10  Hip add/abd RTB 3x10      Home Exercises Provided and Patient Education Provided     Education provided:   - Importance of HEP and use of ice to manage symptoms.    Written Home Exercises Provided: yes.  Exercises were reviewed and Janie was able to demonstrate them prior to the end of the session.  Janie demonstrated good  understanding of the education provided.     See EMR under Patient Instructions for exercises provided prior visit.    Assessment     Patient did well with all therex today. Exhibits weak core w/ difficulty w/ PPT and BKFO. VC needed to not let hips rot w/ BKFO.Tactile cues for proper squat mechanics.  Cont to advance as katelyn.   Janie is progressing well towards her goals.   Pt prognosis is Good.     Pt will continue to benefit from skilled outpatient physical therapy to address the deficits listed in the problem list box on initial evaluation, provide  pt/family education and to maximize pt's level of independence in the home and community environment.     Pt's spiritual, cultural and educational needs considered and pt agreeable to plan of care and goals.    Anticipated barriers to physical therapy: s/p fusion     Goals:   Short Term Goals: 2-3 weeks   1) Patient will be compliant with HEP   2) Patient will demonstrate upright posture without the use of TLSO   3) Patient will improve B LE strength to 4+/5 or better     Long Term Goals: 6-8 weeks   1) Patient will improve FOTO score by 1-5%   2) Patient will be able to squat/stoop down to floor with no pain   3) Patient will be able to complete squat like movements without risk of falling backwards      Plan     Outpatient Physical Therapy 2 times weekly for 8 weeks to include the following interventions: Gait Training, Manual Therapy, Moist Heat/ Ice, Patient Education, Self Care and Therapeutic Exercise.        Sylwia Liang, PTA

## 2019-04-24 ENCOUNTER — CLINICAL SUPPORT (OUTPATIENT)
Dept: REHABILITATION | Facility: HOSPITAL | Age: 64
End: 2019-04-24
Attending: NEUROLOGICAL SURGERY
Payer: MEDICARE

## 2019-04-24 DIAGNOSIS — Z98.1 S/P SPINAL FUSION: Primary | ICD-10-CM

## 2019-04-24 PROCEDURE — 97110 THERAPEUTIC EXERCISES: CPT

## 2019-04-24 NOTE — PROGRESS NOTES
Physical Therapy Daily Treatment Note     Name: Janie Mondragon [facility] Number: [id number]    Therapy Diagnosis:   Encounter Diagnosis   Name Primary?    S/P spinal fusion Yes     Physician: He Andres MD    Visit Date: 4/24/2019  Physician Orders: PT Eval and Treat   Medical Diagnosis: Hx of lumbar fusion  Evaluation Date: 3/20/2019  Authorization Period Expiration: 02/25/2020  Plan of Care Certification Period: 05/20/19  Visit # / Visits authorized: 4/ 12  1:1: 23  Time In: 10:43 (pt late)   Time Out: 11:30  Total Billable Time: 37 minutes    Precautions: Lumbar fusion    Subjective     Pt reports:she feels like the exercises have been helping   She was compliant with home exercise program.  Response to previous treatment: good  Functional change: None noted     Pain: 0/10  Location: bilateral back      Objective     Janie received therapeutic exercises to develop strength, endurance, posture and core stabilization for 37 minutes including:    Nustep x 5 min  Mini Squats 2x10  Rows GTB 3x10  Shoulder ext GTB 2x10  Pallof Presses GTB  20x  SLR 3x10  Bridges 2x10  PPT 3x10  Supine marched with PPT 2x10  BKFO 2x10  Hip add/abd RTB 3x10      Home Exercises Provided and Patient Education Provided     Education provided:   - Importance of HEP and use of ice to manage symptoms.    Written Home Exercises Provided: yes.  Exercises were reviewed and Janie was able to demonstrate them prior to the end of the session.  Janie demonstrated good  understanding of the education provided.     See EMR under Patient Instructions for exercises provided prior visit.    Assessment     Patient did well with all therex today. She wore her brace during exercises today because she says she was standing for most the morning. No new exercises added today because patient arrived late   Janie is progressing well towards her goals.   Pt prognosis is Good.     Pt will continue to benefit from skilled outpatient physical  therapy to address the deficits listed in the problem list box on initial evaluation, provide pt/family education and to maximize pt's level of independence in the home and community environment.     Pt's spiritual, cultural and educational needs considered and pt agreeable to plan of care and goals.    Anticipated barriers to physical therapy: s/p fusion     Goals:   Short Term Goals: 2-3 weeks   1) Patient will be compliant with HEP   2) Patient will demonstrate upright posture without the use of TLSO   3) Patient will improve B LE strength to 4+/5 or better     Long Term Goals: 6-8 weeks   1) Patient will improve FOTO score by 1-5%   2) Patient will be able to squat/stoop down to floor with no pain   3) Patient will be able to complete squat like movements without risk of falling backwards      Plan     Outpatient Physical Therapy 2 times weekly for 8 weeks to include the following interventions: Gait Training, Manual Therapy, Moist Heat/ Ice, Patient Education, Self Care and Therapeutic Exercise.        Rui Li, PT

## 2019-04-30 ENCOUNTER — CLINICAL SUPPORT (OUTPATIENT)
Dept: REHABILITATION | Facility: HOSPITAL | Age: 64
End: 2019-04-30
Attending: NEUROLOGICAL SURGERY
Payer: MEDICARE

## 2019-04-30 DIAGNOSIS — R10.9 BILATERAL FLANK PAIN: ICD-10-CM

## 2019-04-30 PROCEDURE — 97110 THERAPEUTIC EXERCISES: CPT

## 2019-04-30 NOTE — PROGRESS NOTES
Physical Therapy Daily Treatment Note     Name: Janie Mondragon [facility] Number: [id number]    Therapy Diagnosis:   Encounter Diagnosis   Name Primary?    Bilateral flank pain      Physician: He Andres MD    Visit Date: 4/30/2019  Physician Orders: PT Eval and Treat   Medical Diagnosis: Hx of lumbar fusion  Evaluation Date: 3/20/2019  Authorization Period Expiration: 02/25/2020  Plan of Care Certification Period: 05/20/19  Visit # / Visits authorized: 5/ 12  1:1: 53  Time In: 0900  Time Out: 1005  Total Billable Time: 53 minutes    Precautions: Lumbar fusion    Subjective     Pt reports:she is doing well.  No pain and she has been trying to wean off of the brace.   She was compliant with home exercise program.  Response to previous treatment: good  Functional change: None noted     Pain: 0/10  Location: bilateral back      Objective     Janie received therapeutic exercises to develop strength, endurance, posture and core stabilization for 53 minutes including:    Nustep x 5 min  Mini Squats 2x10  Rows GTB 3x10  Shoulder ext GTB 2x10  Pallof Presses GTB  20x  SLR 3x10  Bridges 2x10  PPT 3x10  Supine marched with PPT 2x10  Shuttle 2 cords 3x10  Hip add/abd RTB 3x10    ICE: 10 minutes to low back    Home Exercises Provided and Patient Education Provided     Education provided:   - Importance of HEP and use of ice to manage symptoms.    Written Home Exercises Provided: yes.  Exercises were reviewed and Janie was able to demonstrate them prior to the end of the session.  Janie demonstrated good  understanding of the education provided.     See EMR under Patient Instructions for exercises provided prior visit.    Assessment     Patient tolerated treatment well.  She needed cueing for correct exercise technique.  She reported no pain following exercises and said ice made her back feel better.     Janie is progressing well towards her goals.   Pt prognosis is Good.     Pt will continue to benefit  from skilled outpatient physical therapy to address the deficits listed in the problem list box on initial evaluation, provide pt/family education and to maximize pt's level of independence in the home and community environment.     Pt's spiritual, cultural and educational needs considered and pt agreeable to plan of care and goals.    Anticipated barriers to physical therapy: s/p fusion     Goals:   Short Term Goals: 2-3 weeks   1) Patient will be compliant with HEP   2) Patient will demonstrate upright posture without the use of TLSO   3) Patient will improve B LE strength to 4+/5 or better     Long Term Goals: 6-8 weeks   1) Patient will improve FOTO score by 1-5%   2) Patient will be able to squat/stoop down to floor with no pain   3) Patient will be able to complete squat like movements without risk of falling backwards      Plan     Continue current plan of care.        Chris Zepeda, PT

## 2019-06-07 ENCOUNTER — PATIENT MESSAGE (OUTPATIENT)
Dept: INTERNAL MEDICINE | Facility: CLINIC | Age: 64
End: 2019-06-07

## 2019-06-07 DIAGNOSIS — H53.9 VISION ABNORMALITIES: Primary | ICD-10-CM

## 2019-07-16 ENCOUNTER — PATIENT MESSAGE (OUTPATIENT)
Dept: OPTOMETRY | Facility: CLINIC | Age: 64
End: 2019-07-16

## 2019-08-16 ENCOUNTER — OFFICE VISIT (OUTPATIENT)
Dept: OPTOMETRY | Facility: CLINIC | Age: 64
End: 2019-08-16
Payer: MEDICARE

## 2019-08-16 DIAGNOSIS — H52.223 REGULAR ASTIGMATISM WITH PRESBYOPIA, BILATERAL: ICD-10-CM

## 2019-08-16 DIAGNOSIS — Z83.511 FAMILY HISTORY OF GLAUCOMA: ICD-10-CM

## 2019-08-16 DIAGNOSIS — H52.4 REGULAR ASTIGMATISM WITH PRESBYOPIA, BILATERAL: ICD-10-CM

## 2019-08-16 DIAGNOSIS — H25.13 NUCLEAR SCLEROTIC CATARACT OF BOTH EYES: Primary | ICD-10-CM

## 2019-08-16 PROCEDURE — 99999 PR PBB SHADOW E&M-EST. PATIENT-LVL III: ICD-10-PCS | Mod: PBBFAC,,, | Performed by: OPTOMETRIST

## 2019-08-16 PROCEDURE — 99999 PR PBB SHADOW E&M-EST. PATIENT-LVL III: CPT | Mod: PBBFAC,,, | Performed by: OPTOMETRIST

## 2019-08-16 PROCEDURE — 92015 PR REFRACTION: ICD-10-PCS | Mod: S$GLB,,, | Performed by: OPTOMETRIST

## 2019-08-16 PROCEDURE — 92014 COMPRE OPH EXAM EST PT 1/>: CPT | Mod: S$GLB,,, | Performed by: OPTOMETRIST

## 2019-08-16 PROCEDURE — 99499 UNLISTED E&M SERVICE: CPT | Mod: S$GLB,,, | Performed by: OPTOMETRIST

## 2019-08-16 PROCEDURE — 92015 DETERMINE REFRACTIVE STATE: CPT | Mod: S$GLB,,, | Performed by: OPTOMETRIST

## 2019-08-16 PROCEDURE — 92014 PR EYE EXAM, EST PATIENT,COMPREHESV: ICD-10-PCS | Mod: S$GLB,,, | Performed by: OPTOMETRIST

## 2019-08-16 PROCEDURE — 99499 RISK ADDL DX/OHS AUDIT: ICD-10-PCS | Mod: S$GLB,,, | Performed by: OPTOMETRIST

## 2019-08-16 NOTE — PROGRESS NOTES
HPI     Ms. Janie Zamorano was referred by He Hoyt MD for vision   abnormalities.    Patient complains of blurred near vision without correction. She reports   no problems with distance vision uncorrected. She previously had a pair of   bifocals, but they no longer fit since her weight loss.     Would patient like a refraction today? Yes    (-)drops  (-)flashes  (+)floaters: OD  (-)diplopia    (-)Diabetes    OCULAR HISTORY  Last Eye Exam 09/19/17 with Dr. Mohan  (-)eye surgery   Cataracts OU    FAMILY HISTORY  (+)Glaucoma: mother and sister         Last edited by Yara Mohan, OD on 8/16/2019  2:35 PM. (History)            Assessment /Plan     For exam results, see Encounter Report.    Nuclear sclerotic cataract of both eyes   Mild and stable. Recommended UV protection. Monitor.    Regular astigmatism with presbyopia, bilateral   New glasses prescription released, adaptation expected.     Discussed lens options. Distance glasses optional. Okay to use OTC readers.  Eyeglass Final Rx     Eyeglass Final Rx       Sphere Cylinder Axis Dist VA Add    Right [place] +0.50 050 20/20 +2.25    Left -0.50 +0.50 150 20/20 +2.25    Expiration Date:  8/16/2020            Family history of glaucoma   No signs of glaucoma today OU. Monitor yearly.         RTC 1 year

## 2019-09-18 ENCOUNTER — EXTERNAL HOSPITAL ADMISSION (OUTPATIENT)
Dept: ADMINISTRATIVE | Facility: CLINIC | Age: 64
End: 2019-09-18

## 2019-09-18 ENCOUNTER — TELEPHONE (OUTPATIENT)
Dept: ADMINISTRATIVE | Facility: CLINIC | Age: 64
End: 2019-09-18

## 2019-09-18 NOTE — PROGRESS NOTES
C3 nurse attempted to contact patient. No answer. The following message was left for the patient to return the call:  Good afternoon I am a nurse calling on behalf of [facility] from the [facility].  This is a Transitional Care Call for Janie Zamorano. When you have a moment please contact us at [phone] or [phone] Monday through Friday, between the hours of 8 am to 4 pm. We look forward to speaking with you. On behalf of [facility] have a nice day.    The patient does not have a scheduled HOSFU appointment within 7-14 days post hospital discharge date 9/17/19. Message sent to Physician staff to assist with HOSFU appointment scheduling.

## 2020-05-27 NOTE — H&P
[facility] Hospital Medicine  History & Physical    Patient Name: Janie Zamorano  MRN: [id number]  Admission Date: 8/3/2018  Attending Physician: Marvin Grant, *   Primary Care Provider: He Hoyt MD    [facility] Medicine Team: [facility] 5 Ladi Bradley MD     Patient information was obtained from patient, relative(s), past medical records and ER records.     Subjective:     Principal Problem: back pain and constipation  Chief Complaint:   Chief Complaint   Patient presents with    Back Pain     x several days        HPI: Ms. Janie Zamorano is a 62 y.o. female who presented with back pain and constipation. Onset of symptoms for back pain occurred a week ago. Patient reports that leaning over to  a storage box initiated her back pain. Patient states the pain starts in her back but denies any radiation of pain. The pain worsens when she reaches for objects and upon deep inspiration. She also loses her breath if she aggravates her back pain. Patient denies any trauma to the region. Patient has been able to ambulate the past week with reluctance. The patient has tried hot and cold packs to relieve her pain. The patient did not report using pain medications to relieve the pain. Patient denies fever, chills, night sweats, dysuria, hematuria, and easy bruising or bleeding. She lost around 8 LB over the course of past two weeks. She denies any shooting pain to LE, no loss of sense in LE or abdominal area, she mentions numbness in her left hand. Denies bowel/urinary incontinence. No saddle anesthesia. Patient has been anemic chronically and was started on iron pills two weeks ago by her primary doctor.  Patient also complains of constipation for the past two weeks. She reports no bowel movements during that span. She has not had constipation prior to this event. She has tried enemas to no avail. She states she is belching and passing gas. She complains of nausea but  reports no vomiting. She denies chest pain, palpitations, and cough.     FHx: Patient reports her mother and cousin both had breast cancer in their 50s and 60s. She had an uncle diagnosed with leukemia.        Past Medical History:   Diagnosis Date    Asthma     Depression     GERD (gastroesophageal reflux disease)     Hypertension     Neck pain     Schizophrenia        Past Surgical History:   Procedure Laterality Date     SECTION      X 1    COLONOSCOPY N/A 2018    Procedure: COLONOSCOPY;  Surgeon: Albert Russell MD;  Location: [facility] (4TH FLR);  Service: Endoscopy;  Laterality: N/A;  pm prep/MS    ESOPHAGOGASTRODUODENOSCOPY N/A 2018    Procedure: ESOPHAGOGASTRODUODENOSCOPY (EGD);  Surgeon: Albert Russell MD;  Location: [facility] (2ND FLR);  Service: Endoscopy;  Laterality: N/A;  EGD in 8 weeks on Pantoprazole 40mg every 12 hours patient needs to take her PPI morning of EGD with sip of water.  Follow up esophagitis.       hx of gastroparesis. per Dr Russell-24 hours clear liquids/ Per Dr. Russell on 18 Pt to be r/s with     HYSTERECTOMY      KJB---DLH/BSO    LIPOMA RESECTION      back       Review of patient's allergies indicates:   Allergen Reactions    Shellfish containing products Itching       No current facility-administered medications on file prior to encounter.      Current Outpatient Prescriptions on File Prior to Encounter   Medication Sig    albuterol 90 mcg/actuation inhaler Inhale 2 puffs into the lungs every 6 (six) hours as needed for Wheezing.    benztropine (COGENTIN) 1 MG tablet TK 1 T PO  BID    cholecalciferol, vitamin D3, 2,000 unit Cap Take 1 capsule (2,000 Units total) by mouth once daily.    diclofenac sodium 1 % Gel Apply 2 g topically 4 (four) times daily. for 10 days    ferrous sulfate 325 mg (65 mg iron) Tab tablet Take 1 tablet (325 mg total) by mouth every 12 (twelve) hours.    fluticasone (FLONASE) 50 mcg/actuation nasal spray 1  spray by Each Nare route once daily.    fluticasone-salmeterol 100-50 mcg/dose (ADVAIR) 100-50 mcg/dose diskus inhaler Inhale 1 puff into the lungs 2 (two) times daily.    labetalol (NORMODYNE) 100 MG tablet TAKE 1 TABLET BY MOUTH TWICE DAILY    methocarbamol (ROBAXIN) 500 MG Tab Take 1 tablet (500 mg total) by mouth 4 (four) times daily. for 10 days    pantoprazole (PROTONIX) 40 MG tablet Take 1 tablet (40 mg total) by mouth every 12 (twelve) hours.    risperiDONE (RISPERDAL) 1 MG tablet Take 1 tablet (1 mg total) by mouth once daily. (Patient taking differently: Take 1 mg by mouth once daily. 1 in the morning and 2 at bedtime)    valsartan (DIOVAN) 160 MG tablet Take 1 tablet (160 mg total) by mouth once daily.     Family History     Problem Relation (Age of Onset)    Breast cancer Mother    COPD Father    Diabetes Brother    Glaucoma Mother, Sister    Hypertension Mother, Father    Liver cancer Paternal Uncle (75)    Macular degeneration Mother        Social History Main Topics    Smoking status: Never Smoker    Smokeless tobacco: Never Used    Alcohol use No    Drug use: No    Sexual activity: Not Currently     Partners: Male     Birth control/ protection: Post-menopausal     Review of Systems   Constitutional: Positive for activity change, appetite change and unexpected weight change. Negative for chills, diaphoresis, fatigue and fever.   Respiratory: Negative for cough, chest tightness and shortness of breath.    Cardiovascular: Negative for chest pain and leg swelling.   Gastrointestinal: Positive for constipation and nausea. Negative for abdominal pain and vomiting.   Genitourinary: Negative for decreased urine volume, flank pain and frequency.   Musculoskeletal: Positive for back pain and gait problem.   Skin: Negative for rash.   Neurological: Positive for numbness (left hand).     Objective:     Vital Signs (Most Recent):  Temp: 98.5 °F (36.9 °C) (08/03/18 1116)  Pulse: 82 (08/03/18  1434)  Resp: 20 (08/03/18 1434)  BP: (!) 191/111 (med team 5 made aware. ) (08/03/18 1900)  SpO2: 96 % (08/03/18 1434) Vital Signs (24h Range):  Temp:  [98.5 °F (36.9 °C)] 98.5 °F (36.9 °C)  Pulse:  [82-93] 82  Resp:  [18-20] 20  SpO2:  [96 %-100 %] 96 %  BP: (160-191)/() 191/111        There is no height or weight on file to calculate BMI.    Physical Exam   Constitutional: She is oriented to person, place, and time. She appears well-developed and well-nourished. No distress.   HENT:   Head: Normocephalic and atraumatic.   Neck: Normal range of motion. Neck supple. No JVD present.   Cardiovascular: Normal rate, regular rhythm, normal heart sounds and intact distal pulses.  Exam reveals no friction rub.    No murmur heard.  Pulmonary/Chest: Effort normal and breath sounds normal. No respiratory distress.   Abdominal: Soft. Bowel sounds are normal. She exhibits distension. There is no tenderness. There is no rebound and no guarding.   Musculoskeletal: She exhibits no edema.   Neurological: She is alert and oriented to person, place, and time. She displays normal reflexes. No sensory deficit. She exhibits normal muscle tone.   Skin: She is not diaphoretic.           Significant Labs:   CBC:   Recent Labs  Lab 08/03/18  1305   WBC 4.31   HGB 11.1*   HCT 34.6*        CMP:   Recent Labs  Lab 08/03/18  1305      K 4.8      CO2 24   GLU 89   BUN 24*   CREATININE 1.7*   CALCIUM 11.8*   PROT 8.1   ALBUMIN 3.9   BILITOT 0.6   ALKPHOS 98   AST 24   ALT 14   ANIONGAP 9   EGFRNONAA 32.1*     Urine Studies:   Recent Labs  Lab 08/03/18  1249   COLORU Yellow   APPEARANCEUA Clear   PHUR 5.0   SPECGRAV 1.020   PROTEINUA 2+*   GLUCUA Negative   KETONESU Negative   BILIRUBINUA Negative   OCCULTUA 1+*   NITRITE Negative   UROBILINOGEN Negative   LEUKOCYTESUR Negative   RBCUA 2   WBCUA 2   BACTERIA Occasional   SQUAMEPITHEL 2   HYALINECASTS 7*       Significant Imaging: CT: I have reviewed all pertinent  results/findings within the past 24 hours and my personal findings are:  Innumerable lytic lesions involving the thoracic and lumbar spine, as well as the iliac wings bilaterally.  These findings are suggestive of metastatic disease.    Assessment/Plan:     Closed compression fracture of thoracic vertebra    -Orthopedics service was consulted  -Patient neurologically stable on exam  -No acute neurosurgical intervention indicated  -TLSO brace ordered from [facility]. Patient to wear when OOB or working with therapy. OK to remove when in bed or lying in recliner.   -Recommend MRI with/without contrast to rule out pathologic fracture and to better evaluate lytic lesions  -Further recs to follow imaging        Acute kidney injury    Patient has not been eating and drinking much during past few days, she also has hypercalcemia which could affect tubules, also patient's BP is not well controlled. Imaging did not show any hydronephrosis or obstruction in the urinary system.    - IV fluids  - UA  - FENa  - monitor Cr and UOP        Hypertension    - Home meds re-started except the valsartan given patient having GLENIS  - Monitor BP            VTE Risk Mitigation         Ordered     Place sequential compression device  Until discontinued      08/03/18 1735     IP VTE HIGH RISK PATIENT  Once      08/03/18 1735             Ladi Bradley MD  Department of Hospital Medicine   [facility]   27-May-2020 22:42

## 2020-10-05 ENCOUNTER — PATIENT MESSAGE (OUTPATIENT)
Dept: ADMINISTRATIVE | Facility: HOSPITAL | Age: 65
End: 2020-10-05

## 2020-11-20 ENCOUNTER — TELEPHONE (OUTPATIENT)
Dept: INTERNAL MEDICINE | Facility: CLINIC | Age: 65
End: 2020-11-20

## 2020-11-20 DIAGNOSIS — Z12.31 OTHER SCREENING MAMMOGRAM: Primary | ICD-10-CM

## 2020-11-20 NOTE — TELEPHONE ENCOUNTER
----- Message from Zane Hwang sent at 11/20/2020 11:55 AM CST -----  Contact: Patient  The pt called and would like for you to put an order in her chart for a mammogram    Please call her when the order is in at [phone]

## 2020-11-24 ENCOUNTER — HOSPITAL ENCOUNTER (OUTPATIENT)
Dept: RADIOLOGY | Facility: HOSPITAL | Age: 65
Discharge: HOME | End: 2020-11-24
Attending: INTERNAL MEDICINE
Payer: MEDICARE

## 2020-11-24 DIAGNOSIS — Z12.31 OTHER SCREENING MAMMOGRAM: ICD-10-CM

## 2020-11-24 PROCEDURE — 77063 MAMMO DIGITAL SCREENING BILAT WITH TOMO: ICD-10-PCS | Mod: 26,,, | Performed by: RADIOLOGY

## 2020-11-24 PROCEDURE — 77067 SCR MAMMO BI INCL CAD: CPT | Mod: TC,PO

## 2020-11-24 PROCEDURE — 77067 MAMMO DIGITAL SCREENING BILAT WITH TOMO: ICD-10-PCS | Mod: 26,,, | Performed by: RADIOLOGY

## 2020-11-24 PROCEDURE — 77063 BREAST TOMOSYNTHESIS BI: CPT | Mod: 26,,, | Performed by: RADIOLOGY

## 2020-11-24 PROCEDURE — 77067 SCR MAMMO BI INCL CAD: CPT | Mod: 26,,, | Performed by: RADIOLOGY

## 2020-12-02 ENCOUNTER — OFFICE VISIT (OUTPATIENT)
Dept: OBSTETRICS AND GYNECOLOGY | Facility: CLINIC | Age: 65
End: 2020-12-02
Payer: MEDICARE

## 2020-12-02 VITALS
BODY MASS INDEX: 25.47 KG/M2 | DIASTOLIC BLOOD PRESSURE: 80 MMHG | WEIGHT: 148.38 LBS | SYSTOLIC BLOOD PRESSURE: 124 MMHG

## 2020-12-02 DIAGNOSIS — Z01.419 ENCOUNTER FOR GYNECOLOGICAL EXAMINATION WITHOUT ABNORMAL FINDING: Primary | ICD-10-CM

## 2020-12-02 PROCEDURE — 3008F PR BODY MASS INDEX (BMI) DOCUMENTED: ICD-10-PCS | Mod: CPTII,S$GLB,, | Performed by: OBSTETRICS & GYNECOLOGY

## 2020-12-02 PROCEDURE — 99999 PR PBB SHADOW E&M-EST. PATIENT-LVL III: CPT | Mod: PBBFAC,,, | Performed by: OBSTETRICS & GYNECOLOGY

## 2020-12-02 PROCEDURE — 3008F BODY MASS INDEX DOCD: CPT | Mod: CPTII,S$GLB,, | Performed by: OBSTETRICS & GYNECOLOGY

## 2020-12-02 PROCEDURE — 99999 PR PBB SHADOW E&M-EST. PATIENT-LVL III: ICD-10-PCS | Mod: PBBFAC,,, | Performed by: OBSTETRICS & GYNECOLOGY

## 2020-12-02 PROCEDURE — G0101 PR CA SCREEN;PELVIC/BREAST EXAM: ICD-10-PCS | Mod: S$GLB,,, | Performed by: OBSTETRICS & GYNECOLOGY

## 2020-12-02 PROCEDURE — G0101 CA SCREEN;PELVIC/BREAST EXAM: HCPCS | Mod: S$GLB,,, | Performed by: OBSTETRICS & GYNECOLOGY

## 2020-12-02 RX ORDER — MIRTAZAPINE 30 MG/1
30 TABLET, ORALLY DISINTEGRATING ORAL DAILY
COMMUNITY
Start: 2020-11-11 | End: 2021-09-17 | Stop reason: SDUPTHER

## 2020-12-02 RX ORDER — HYDROCODONE BITARTRATE AND ACETAMINOPHEN 5; 325 MG/1; MG/1
TABLET ORAL
COMMUNITY
Start: 2020-11-15 | End: 2020-12-11

## 2020-12-02 RX ORDER — TIZANIDINE 2 MG/1
TABLET ORAL
COMMUNITY
Start: 2020-11-15 | End: 2022-02-28

## 2020-12-02 RX ORDER — AMLODIPINE BESYLATE 10 MG/1
10 TABLET ORAL DAILY
COMMUNITY
Start: 2020-11-06 | End: 2021-04-06 | Stop reason: SDUPTHER

## 2020-12-02 RX ORDER — GABAPENTIN 300 MG/1
300 CAPSULE ORAL EVERY 6 HOURS PRN
COMMUNITY
Start: 2020-11-15 | End: 2020-12-11 | Stop reason: SDUPTHER

## 2020-12-02 NOTE — PROGRESS NOTES
PT HERE FOR ANNUAL.  NO PROBLEMS EXCEPT SCIATICA.    ROS:  GENERAL: No fever, chills, fatigability or weight loss.  VULVAR: No pain, no lesions and no itching.  VAGINAL: No relaxation, no itching, no discharge, no abnormal bleeding and no lesions.  ABDOMEN: No abdominal pain. Denies nausea. Denies vomiting. No diarrhea. No constipation  BREAST: Denies pain. No lumps. No discharge.  URINARY: No incontinence, no nocturia, no frequency and no dysuria.  CARDIOVASCULAR: No chest pain. No shortness of breath. No leg cramps.  NEUROLOGICAL: No headaches. No vision changes.  The remainder of the review of systems was negative.    PE:   General Appearance: normal weight Well developed. Well nourished. In no acute distress.  Urethral Meatus: Normal size. Normal location. No lesions. No prolapse.  Vulva: Atrophic. Lesions: No.  Urethra: No masses. No tenderness. No prolapse. No scarring.  Bladder: No masses. No tenderness.  Vagina: Mucosa NI: yes Discharge: no Atrophic: yes Rectocele: no Cystocele: no Vaginal cuff intact: yes  Cervix: Absent.  Uterus: Absent.  Adnexa: Masses: No Tenderness: No       CDS Nodularity: No   Abdomen: normal weight  No masses. No tenderness.  Breasts: No bilateral masses. No bilateral discharge. No bilateral tenderness. No bilateral fibrocystic changes.  Neck: No thyroid enlargement. No thyroid masses.  Skin: Rashes: No    PROCEDURES:    DIAGNOSIS:  1. Encounter for gynecological examination without abnormal finding        PLAN:     MEDICATIONS & ORDERS:       Patient was counseled today on the new ACS guidelines for cervical cytology screening as well as the current recommendations for breast cancer screening. She was counseled to follow up with her PCP for other routine health maintenance. Counseling session lasted approximately 10 minutes, and all her questions were answered.         FOLLOW-UP: With me in PRN. NO NEED FOR GYN ANNUAL EXAMS

## 2020-12-11 ENCOUNTER — OFFICE VISIT (OUTPATIENT)
Dept: INTERNAL MEDICINE | Facility: CLINIC | Age: 65
End: 2020-12-11
Payer: MEDICARE

## 2020-12-11 ENCOUNTER — LAB VISIT (OUTPATIENT)
Dept: LAB | Facility: HOSPITAL | Age: 65
End: 2020-12-11
Attending: INTERNAL MEDICINE
Payer: MEDICARE

## 2020-12-11 ENCOUNTER — IMMUNIZATION (OUTPATIENT)
Dept: PHARMACY | Facility: CLINIC | Age: 65
End: 2020-12-11
Payer: MEDICARE

## 2020-12-11 VITALS
OXYGEN SATURATION: 99 % | BODY MASS INDEX: 24.57 KG/M2 | SYSTOLIC BLOOD PRESSURE: 124 MMHG | HEART RATE: 101 BPM | HEIGHT: 64 IN | WEIGHT: 143.94 LBS | DIASTOLIC BLOOD PRESSURE: 84 MMHG

## 2020-12-11 DIAGNOSIS — R80.8 OTHER PROTEINURIA: ICD-10-CM

## 2020-12-11 DIAGNOSIS — E83.52 HYPERCALCEMIA: ICD-10-CM

## 2020-12-11 DIAGNOSIS — C90.00 MULTIPLE MYELOMA NOT HAVING ACHIEVED REMISSION: ICD-10-CM

## 2020-12-11 DIAGNOSIS — Z98.1 S/P SPINAL FUSION: Primary | ICD-10-CM

## 2020-12-11 DIAGNOSIS — D63.0 ANEMIA IN NEOPLASTIC DISEASE: ICD-10-CM

## 2020-12-11 DIAGNOSIS — F20.0 PARANOID SCHIZOPHRENIA: ICD-10-CM

## 2020-12-11 DIAGNOSIS — C79.9 MULTIPLE LESIONS OF METASTATIC MALIGNANCY: ICD-10-CM

## 2020-12-11 PROBLEM — M89.9 LYTIC BONE LESIONS ON XRAY: Status: RESOLVED | Noted: 2018-08-03 | Resolved: 2020-12-11

## 2020-12-11 PROBLEM — K59.00 CONSTIPATION: Status: RESOLVED | Noted: 2018-08-05 | Resolved: 2020-12-11

## 2020-12-11 PROBLEM — K22.10 EROSIVE ESOPHAGITIS: Status: RESOLVED | Noted: 2018-03-19 | Resolved: 2020-12-11

## 2020-12-11 PROBLEM — Z75.8 DISCHARGE PLANNING ISSUES: Status: RESOLVED | Noted: 2018-08-04 | Resolved: 2020-12-11

## 2020-12-11 PROBLEM — S22.000A CLOSED COMPRESSION FRACTURE OF THORACIC VERTEBRA: Status: RESOLVED | Noted: 2018-08-03 | Resolved: 2020-12-11

## 2020-12-11 LAB
ALBUMIN SERPL BCP-MCNC: 3.9 G/DL (ref 3.5–5.2)
ALP SERPL-CCNC: 88 U/L (ref 55–135)
ALT SERPL W/O P-5'-P-CCNC: 18 U/L (ref 10–44)
ANION GAP SERPL CALC-SCNC: 12 MMOL/L (ref 8–16)
AST SERPL-CCNC: 29 U/L (ref 10–40)
BASOPHILS # BLD AUTO: 0.02 K/UL (ref 0–0.2)
BASOPHILS NFR BLD: 0.4 % (ref 0–1.9)
BILIRUB SERPL-MCNC: 0.3 MG/DL (ref 0.1–1)
BUN SERPL-MCNC: 9 MG/DL (ref 8–23)
CALCIUM SERPL-MCNC: 11 MG/DL (ref 8.7–10.5)
CHLORIDE SERPL-SCNC: 102 MMOL/L (ref 95–110)
CO2 SERPL-SCNC: 27 MMOL/L (ref 23–29)
CREAT SERPL-MCNC: 1.1 MG/DL (ref 0.5–1.4)
DIFFERENTIAL METHOD: ABNORMAL
EOSINOPHIL # BLD AUTO: 0.1 K/UL (ref 0–0.5)
EOSINOPHIL NFR BLD: 1.1 % (ref 0–8)
ERYTHROCYTE [DISTWIDTH] IN BLOOD BY AUTOMATED COUNT: 14.1 % (ref 11.5–14.5)
EST. GFR  (AFRICAN AMERICAN): >60 ML/MIN/1.73 M^2
EST. GFR  (NON AFRICAN AMERICAN): 53.2 ML/MIN/1.73 M^2
GLUCOSE SERPL-MCNC: 76 MG/DL (ref 70–110)
HCT VFR BLD AUTO: 44.5 % (ref 37–48.5)
HGB BLD-MCNC: 13.6 G/DL (ref 12–16)
IMM GRANULOCYTES # BLD AUTO: 0.05 K/UL (ref 0–0.04)
IMM GRANULOCYTES NFR BLD AUTO: 1.1 % (ref 0–0.5)
LYMPHOCYTES # BLD AUTO: 1.1 K/UL (ref 1–4.8)
LYMPHOCYTES NFR BLD: 24.9 % (ref 18–48)
MCH RBC QN AUTO: 29.6 PG (ref 27–31)
MCHC RBC AUTO-ENTMCNC: 30.6 G/DL (ref 32–36)
MCV RBC AUTO: 97 FL (ref 82–98)
MONOCYTES # BLD AUTO: 0.5 K/UL (ref 0.3–1)
MONOCYTES NFR BLD: 11.5 % (ref 4–15)
NEUTROPHILS # BLD AUTO: 2.7 K/UL (ref 1.8–7.7)
NEUTROPHILS NFR BLD: 61 % (ref 38–73)
NRBC BLD-RTO: 1 /100 WBC
PLATELET # BLD AUTO: 272 K/UL (ref 150–350)
PMV BLD AUTO: 10.8 FL (ref 9.2–12.9)
POTASSIUM SERPL-SCNC: 4.8 MMOL/L (ref 3.5–5.1)
PROT SERPL-MCNC: 7.6 G/DL (ref 6–8.4)
RBC # BLD AUTO: 4.59 M/UL (ref 4–5.4)
SODIUM SERPL-SCNC: 141 MMOL/L (ref 136–145)
WBC # BLD AUTO: 4.45 K/UL (ref 3.9–12.7)

## 2020-12-11 PROCEDURE — 3079F PR MOST RECENT DIASTOLIC BLOOD PRESSURE 80-89 MM HG: ICD-10-PCS | Mod: CPTII,S$GLB,, | Performed by: INTERNAL MEDICINE

## 2020-12-11 PROCEDURE — 3008F PR BODY MASS INDEX (BMI) DOCUMENTED: ICD-10-PCS | Mod: CPTII,S$GLB,, | Performed by: INTERNAL MEDICINE

## 2020-12-11 PROCEDURE — 99499 UNLISTED E&M SERVICE: CPT | Mod: S$GLB,,, | Performed by: INTERNAL MEDICINE

## 2020-12-11 PROCEDURE — 85025 COMPLETE CBC W/AUTO DIFF WBC: CPT

## 2020-12-11 PROCEDURE — 3079F DIAST BP 80-89 MM HG: CPT | Mod: CPTII,S$GLB,, | Performed by: INTERNAL MEDICINE

## 2020-12-11 PROCEDURE — 1125F PR PAIN SEVERITY QUANTIFIED, PAIN PRESENT: ICD-10-PCS | Mod: S$GLB,,, | Performed by: INTERNAL MEDICINE

## 2020-12-11 PROCEDURE — 3008F BODY MASS INDEX DOCD: CPT | Mod: CPTII,S$GLB,, | Performed by: INTERNAL MEDICINE

## 2020-12-11 PROCEDURE — 99214 OFFICE O/P EST MOD 30 MIN: CPT | Mod: S$GLB,,, | Performed by: INTERNAL MEDICINE

## 2020-12-11 PROCEDURE — 1125F AMNT PAIN NOTED PAIN PRSNT: CPT | Mod: S$GLB,,, | Performed by: INTERNAL MEDICINE

## 2020-12-11 PROCEDURE — 99999 PR PBB SHADOW E&M-EST. PATIENT-LVL III: CPT | Mod: PBBFAC,,, | Performed by: INTERNAL MEDICINE

## 2020-12-11 PROCEDURE — 3074F PR MOST RECENT SYSTOLIC BLOOD PRESSURE < 130 MM HG: ICD-10-PCS | Mod: CPTII,S$GLB,, | Performed by: INTERNAL MEDICINE

## 2020-12-11 PROCEDURE — 80053 COMPREHEN METABOLIC PANEL: CPT

## 2020-12-11 PROCEDURE — 3074F SYST BP LT 130 MM HG: CPT | Mod: CPTII,S$GLB,, | Performed by: INTERNAL MEDICINE

## 2020-12-11 PROCEDURE — 99499 RISK ADDL DX/OHS AUDIT: ICD-10-PCS | Mod: S$GLB,,, | Performed by: INTERNAL MEDICINE

## 2020-12-11 PROCEDURE — 36415 COLL VENOUS BLD VENIPUNCTURE: CPT

## 2020-12-11 PROCEDURE — 99999 PR PBB SHADOW E&M-EST. PATIENT-LVL III: ICD-10-PCS | Mod: PBBFAC,,, | Performed by: INTERNAL MEDICINE

## 2020-12-11 PROCEDURE — 99214 PR OFFICE/OUTPT VISIT, EST, LEVL IV, 30-39 MIN: ICD-10-PCS | Mod: S$GLB,,, | Performed by: INTERNAL MEDICINE

## 2020-12-11 RX ORDER — GABAPENTIN 300 MG/1
300 CAPSULE ORAL NIGHTLY
Qty: 90 CAPSULE | Refills: 1 | Status: SHIPPED | OUTPATIENT
Start: 2020-12-11 | End: 2022-02-28

## 2020-12-11 NOTE — PROGRESS NOTES
"  Pt is a 63 yo AAF with PMhx of Multiple Myeloma (secondary hypercalcemia, anemia; with closed compresson fracture of 12th thoracic vertebra), paranoid schizophrenia (currently off risperdal, cogentin), asthma, HTN, erosive esophagitis, and GERD who presents to the clinic for a follow-up visit.  I last saw her in 2018.  She tells me that she was "taken around the hospital;s in [place] by her son and she was incoherent and wasted down to 99 #. She then says she was treated with ECT and started eating again.        She sees an oncologist in [facility].  She was seen by Hem/Onc  Here recently and I did review that note.  She tells me she is taking  100 % vitamin C at the [facility] and this cost her >$300 a treatment.  Every week. We dis discuss that this is not a proven or even recommended  Therapy and that some of the Dr's there have lost their lic to practice due to treatment like this.  But she is interested in alliterative medicine.  I reviewed the note from Hem/onc and they mention a breast mass asymmetry and we discussed her recent mmg.        She has reflux-- this has resolved due to changes in her diet.  She tells me that she is vegan and drinking alkaline problems.   She had an EGD in January 2018, a grade C esophageal reflux and esophagitis.  She use to take protonix.         She has hypertension.  Last visit, she was on valsartan.  She is suppose to be on for amlodipine for her htn but she only take it when she feels like she needs it.         Her other medical problems include she has paranoid schizophrenia.    She is followed by the local unit.  She was on Risperdal and Cogentin for this. She is seeing Dr Darlene Palafox  And she  does take Mirtazapine.     She is not having any trouble with hallucinations or confusion.  She says she   has been doing pretty well.     She giordano sciatica and wants a refill on the gabapentin and hydrocodone. For some reason she want the " "hydrocodone  Instead of using alternative medications for this.         ROS : Gen - no fatigue or significant weight change  Eyes - no eye pain or visual changes  ENT - no hoarseness or sore throat  CV - No chest pain or SOB.  NO palpitations.  Pulm - no cough or wheezing  GI - no N/V/D   no dysuria or incontinence  MS - no joint pain or muscle pain-- she does have sciatica on her right leg but nothing from her thoracic  surgery   Skin - no rash, or c/o of skin lesions  Neuro - no HA, dizziness--- memory is doing well.    Heme - no abnormal bleeding or bruising  Endo - no polydipsia, or temperature changes  Psych - no hallucinations or depression per her.       PHYSICAL EXAMINATION:  /84 (BP Location: Right arm, Patient Position: Sitting, BP Method: Medium (Manual))   Pulse 101   Ht 5' 4" (1.626 m)   Wt 65.3 kg (143 lb 15.4 oz)   LMP  (LMP Unknown)   SpO2 99%   BMI 24.71 kg/m²   General appearance: alert, appears stated age and cooperative  Nose: Nares normal. Septum midline. Mucosa normal. No drainage or sinus tenderness.  Throat: lips, mucosa, and tongue normal; teeth and gums normal  Neck: no adenopathy, no carotid bruit, no JVD, supple, symmetrical, trachea midline and thyroid not enlarged, symmetric, no tenderness/mass/nodules  Back: symmetric, no curvature. ROM normal. No CVA tenderness.  Lungs: clear to auscultation bilaterally  Heart: regular rate and rhythm, S1, S2 normal, no murmur, click, rub or gallop  Abdomen: soft, non-tender; bowel sounds normal; no masses,  no organomegaly  Extremities: extremities normal, atraumatic, no cyanosis or edema  Lymph nodes: Cervical, supraclavicular, and axillary nodes normal.  Neurologic: Grossly normal       ASSESSMENT:  1.  Paranoid schizophrenia.  current meds and follow up with Psych    2.  Reflux, resolved  3.  Hypertension.  Blood pressure much better,   We   discussed low-salt diet and follow up again in about four months.  4.  Vitamin D deficiency. " Getting VIt D>  Last level was good.    5. MM-- we discussed that she needs to follow up her with Hem/onc.    6. We discussed the vitamin  Infusions and the fact that they are expensive and are likely not doing anything for her cancer.  She is a beliver  and was appreciative but would like to continue    7. scitica -- will refill gabapentin.

## 2020-12-31 ENCOUNTER — OFFICE VISIT (OUTPATIENT)
Dept: OPTOMETRY | Facility: CLINIC | Age: 65
End: 2020-12-31
Payer: MEDICARE

## 2020-12-31 DIAGNOSIS — H04.123 DRY EYE SYNDROME OF BOTH EYES: ICD-10-CM

## 2020-12-31 DIAGNOSIS — H25.13 SENILE NUCLEAR SCLEROSIS, BILATERAL: ICD-10-CM

## 2020-12-31 DIAGNOSIS — H43.393 VISUAL FLOATERS, BILATERAL: Primary | ICD-10-CM

## 2020-12-31 DIAGNOSIS — H52.203 MYOPIA WITH ASTIGMATISM AND PRESBYOPIA, BILATERAL: ICD-10-CM

## 2020-12-31 DIAGNOSIS — H52.4 MYOPIA WITH ASTIGMATISM AND PRESBYOPIA, BILATERAL: ICD-10-CM

## 2020-12-31 DIAGNOSIS — H10.13 ALLERGIC CONJUNCTIVITIS OF BOTH EYES: ICD-10-CM

## 2020-12-31 DIAGNOSIS — H52.13 MYOPIA WITH ASTIGMATISM AND PRESBYOPIA, BILATERAL: ICD-10-CM

## 2020-12-31 PROCEDURE — 99999 PR PBB SHADOW E&M-EST. PATIENT-LVL II: CPT | Mod: PBBFAC,,, | Performed by: OPTOMETRIST

## 2020-12-31 PROCEDURE — 99999 PR PBB SHADOW E&M-EST. PATIENT-LVL II: ICD-10-PCS | Mod: PBBFAC,,, | Performed by: OPTOMETRIST

## 2020-12-31 PROCEDURE — 1125F AMNT PAIN NOTED PAIN PRSNT: CPT | Mod: S$GLB,,, | Performed by: OPTOMETRIST

## 2020-12-31 PROCEDURE — 92014 PR EYE EXAM, EST PATIENT,COMPREHESV: ICD-10-PCS | Mod: S$GLB,,, | Performed by: OPTOMETRIST

## 2020-12-31 PROCEDURE — 1125F PR PAIN SEVERITY QUANTIFIED, PAIN PRESENT: ICD-10-PCS | Mod: S$GLB,,, | Performed by: OPTOMETRIST

## 2020-12-31 PROCEDURE — 92014 COMPRE OPH EXAM EST PT 1/>: CPT | Mod: S$GLB,,, | Performed by: OPTOMETRIST

## 2020-12-31 NOTE — PROGRESS NOTES
HPI     MALLORY:08/2019  Glasses? no  Contacts? no  H/o eye surgery, injections or laser: no  H/o eye injury: no  Known eye conditions? Cataracts   Family h/o eye conditions? Glaucoma -sister  Mother-AMD   Eye gtts?no    (-) Flashes (+) Floaters OU occasional  (-) Mucous   (-) Tearing (+) Itching OU occasional  (-) Burning   (-) Headaches (+) Eye Pain/discomfort sticky OU but no crusting w/trouble   opening in the morning  (-) Irritation   (-) Redness (-) Double vision (+) Blurry vision intermittent near va    Diabetic? (-)  A1c?  (Hemoglobin A1C       Date                     Value               Ref Range             Status                08/03/2018               5.4                 4.0 - 5.6 %           Final              Comment:    ADA Screening Guidelines:  5.7-6.4%  Consistent with   prediabetes  >or=6.5%  Consistent with diabetes  High levels of fetal   hemoglobin interfere with the HbA1C  assay. Heterozygous hemoglobin   variants (HbS, HgC, etc)do  not significantly interfere with this assay.     However, presence of multiple variants may affect accuracy.         10/27/2017               5.5                 4.0 - 5.6 %           Final              Comment:    According to ADA guidelines, hemoglobin A1c <7.0% represents  optimal   control in non-pregnant diabetic patients. Different  metrics may apply to   specific patient populations.   Standards of Medical Care in   Diabetes-2016.  For the purpose of screening for the presence of   diabetes:  <5.7%     Consistent with the absence of diabetes  5.7-6.4%    Consistent with increasing risk for diabetes   (prediabetes)  >or=6.5%    Consistent with diabetes  Currently, no consensus exists for use of   hemoglobin A1c  for diagnosis of diabetes for children.  This Hemoglobin   A1c assay has significant interference with fetal   hemoglobin   (HbF).   The results are invalid for patients with abnormal amounts of   HbF,     including those with known Hereditary  Persistence   of Fetal Hemoglobin.   Heterozygous hemoglobin variants (HbAS, HbAC,   HbAD, HbAE, HbA2) do not   significantly interfere with this assay;   however, presence of multiple   variants in a sample may impact the %   interference.         01/27/2016               5.8                 4.5 - 6.2 %           Final            ----------)        Last edited by Monica Robison, OD on 12/31/2020 12:39 PM. (History)            Assessment /Plan     For exam results, see Encounter Report.    Visual floaters, bilateral    Allergic conjunctivitis of both eyes    Dry eye syndrome of both eyes    Myopia with astigmatism and presbyopia, bilateral    Senile nuclear sclerosis, bilateral      1. No e/o h/b/t 360 degrees OU. Monitor for worsening of symptoms or S/Sx of RD.   2. Recommend Zaditor or Alaway bid OU and cool compresses to help soothe itching. Patient advised to RTC if condition gets worse.   3. Recommend Systane Ultra or Refresh Optive BID-TID OU to aid with symptoms of dry eyes.  4. Cont OTC readers.   RTC 1 year  5. Nuclear sclerotic cataract - not visually significant. Observe.

## 2021-01-26 ENCOUNTER — TELEPHONE (OUTPATIENT)
Dept: INTERNAL MEDICINE | Facility: CLINIC | Age: 66
End: 2021-01-26

## 2021-01-26 DIAGNOSIS — R26.9 GAIT ABNORMALITY: Primary | ICD-10-CM

## 2021-04-07 RX ORDER — AMLODIPINE BESYLATE 10 MG/1
10 TABLET ORAL DAILY
Qty: 90 TABLET | Refills: 3 | Status: SHIPPED | OUTPATIENT
Start: 2021-04-07 | End: 2021-04-08 | Stop reason: SDUPTHER

## 2021-04-07 RX ORDER — AMLODIPINE BESYLATE 10 MG/1
10 TABLET ORAL DAILY
Qty: 90 TABLET | Refills: 3 | Status: SHIPPED | OUTPATIENT
Start: 2021-04-07 | End: 2021-04-07 | Stop reason: SDUPTHER

## 2021-04-08 RX ORDER — AMLODIPINE BESYLATE 10 MG/1
10 TABLET ORAL DAILY
Qty: 90 TABLET | Refills: 3 | Status: SHIPPED | OUTPATIENT
Start: 2021-04-08 | End: 2021-04-12 | Stop reason: SDUPTHER

## 2021-04-12 ENCOUNTER — OFFICE VISIT (OUTPATIENT)
Dept: INTERNAL MEDICINE | Facility: CLINIC | Age: 66
End: 2021-04-12
Payer: MEDICARE

## 2021-04-12 VITALS
OXYGEN SATURATION: 98 % | HEIGHT: 64 IN | WEIGHT: 132 LBS | DIASTOLIC BLOOD PRESSURE: 102 MMHG | BODY MASS INDEX: 22.53 KG/M2 | SYSTOLIC BLOOD PRESSURE: 150 MMHG | HEART RATE: 100 BPM

## 2021-04-12 DIAGNOSIS — F20.0 PARANOID SCHIZOPHRENIA: ICD-10-CM

## 2021-04-12 DIAGNOSIS — M54.30 SCIATICA, UNSPECIFIED LATERALITY: Primary | ICD-10-CM

## 2021-04-12 DIAGNOSIS — G63 POLYNEUROPATHY IN DISEASES CLASSIFIED ELSEWHERE: ICD-10-CM

## 2021-04-12 DIAGNOSIS — C79.9 MULTIPLE LESIONS OF METASTATIC MALIGNANCY: ICD-10-CM

## 2021-04-12 DIAGNOSIS — E85.81 LIGHT CHAIN (AL) AMYLOIDOSIS: ICD-10-CM

## 2021-04-12 PROCEDURE — 99214 PR OFFICE/OUTPT VISIT, EST, LEVL IV, 30-39 MIN: ICD-10-PCS | Mod: S$GLB,,, | Performed by: INTERNAL MEDICINE

## 2021-04-12 PROCEDURE — 3008F BODY MASS INDEX DOCD: CPT | Mod: CPTII,S$GLB,, | Performed by: INTERNAL MEDICINE

## 2021-04-12 PROCEDURE — 99499 RISK ADDL DX/OHS AUDIT: ICD-10-PCS | Mod: S$GLB,,, | Performed by: INTERNAL MEDICINE

## 2021-04-12 PROCEDURE — 1125F PR PAIN SEVERITY QUANTIFIED, PAIN PRESENT: ICD-10-PCS | Mod: S$GLB,,, | Performed by: INTERNAL MEDICINE

## 2021-04-12 PROCEDURE — 99499 UNLISTED E&M SERVICE: CPT | Mod: S$GLB,,, | Performed by: INTERNAL MEDICINE

## 2021-04-12 PROCEDURE — 1125F AMNT PAIN NOTED PAIN PRSNT: CPT | Mod: S$GLB,,, | Performed by: INTERNAL MEDICINE

## 2021-04-12 PROCEDURE — 99214 OFFICE O/P EST MOD 30 MIN: CPT | Mod: S$GLB,,, | Performed by: INTERNAL MEDICINE

## 2021-04-12 PROCEDURE — 3008F PR BODY MASS INDEX (BMI) DOCUMENTED: ICD-10-PCS | Mod: CPTII,S$GLB,, | Performed by: INTERNAL MEDICINE

## 2021-04-12 PROCEDURE — 99999 PR PBB SHADOW E&M-EST. PATIENT-LVL IV: CPT | Mod: PBBFAC,,, | Performed by: INTERNAL MEDICINE

## 2021-04-12 PROCEDURE — 99999 PR PBB SHADOW E&M-EST. PATIENT-LVL IV: ICD-10-PCS | Mod: PBBFAC,,, | Performed by: INTERNAL MEDICINE

## 2021-04-12 RX ORDER — AMLODIPINE BESYLATE 10 MG/1
10 TABLET ORAL DAILY
Qty: 90 TABLET | Refills: 3 | Status: SHIPPED | OUTPATIENT
Start: 2021-04-12 | End: 2021-09-17 | Stop reason: SDUPTHER

## 2021-04-22 ENCOUNTER — PATIENT MESSAGE (OUTPATIENT)
Dept: UROLOGY | Facility: CLINIC | Age: 66
End: 2021-04-22

## 2021-04-28 ENCOUNTER — IMMUNIZATION (OUTPATIENT)
Dept: PRIMARY CARE CLINIC | Facility: CLINIC | Age: 66
End: 2021-04-28
Payer: MEDICARE

## 2021-04-28 DIAGNOSIS — Z23 NEED FOR VACCINATION: Primary | ICD-10-CM

## 2021-04-28 PROCEDURE — 91301 PR SARS-COV-2 COVID-19 VACCINE, NO PRSV, 100MCG/0.5ML, IM: ICD-10-PCS | Mod: S$GLB,,, | Performed by: INTERNAL MEDICINE

## 2021-04-28 PROCEDURE — 91301 PR SARS-COV-2 COVID-19 VACCINE, NO PRSV, 100MCG/0.5ML, IM: CPT | Mod: S$GLB,,, | Performed by: INTERNAL MEDICINE

## 2021-04-28 PROCEDURE — 0011A PR IMMUNIZ ADMIN, SARS-COV-2 COVID-19 VACC, 100MCG/0.5ML, 1ST DOSE: ICD-10-PCS | Mod: CV19,S$GLB,, | Performed by: INTERNAL MEDICINE

## 2021-04-28 PROCEDURE — 0011A PR IMMUNIZ ADMIN, SARS-COV-2 COVID-19 VACC, 100MCG/0.5ML, 1ST DOSE: CPT | Mod: CV19,S$GLB,, | Performed by: INTERNAL MEDICINE

## 2021-04-28 RX ADMIN — Medication 0.5 ML: at 11:04

## 2021-04-29 ENCOUNTER — TELEPHONE (OUTPATIENT)
Dept: INTERNAL MEDICINE | Facility: CLINIC | Age: 66
End: 2021-04-29

## 2021-04-29 DIAGNOSIS — M43.25 FUSION OF SPINE OF THORACOLUMBAR REGION: Primary | ICD-10-CM

## 2021-05-03 ENCOUNTER — TELEPHONE (OUTPATIENT)
Dept: INTERNAL MEDICINE | Facility: CLINIC | Age: 66
End: 2021-05-03

## 2021-05-11 ENCOUNTER — CLINICAL SUPPORT (OUTPATIENT)
Dept: REHABILITATION | Facility: HOSPITAL | Age: 66
End: 2021-05-11
Payer: MEDICARE

## 2021-05-11 DIAGNOSIS — R29.898 WEAKNESS OF RIGHT LOWER EXTREMITY: ICD-10-CM

## 2021-05-11 DIAGNOSIS — M54.41 CHRONIC RIGHT-SIDED LOW BACK PAIN WITH RIGHT-SIDED SCIATICA: ICD-10-CM

## 2021-05-11 DIAGNOSIS — M54.30 SCIATICA, UNSPECIFIED LATERALITY: ICD-10-CM

## 2021-05-11 DIAGNOSIS — G89.29 CHRONIC RIGHT-SIDED LOW BACK PAIN WITH RIGHT-SIDED SCIATICA: ICD-10-CM

## 2021-05-11 PROBLEM — M54.50 LOW BACK PAIN: Status: ACTIVE | Noted: 2021-05-11

## 2021-05-11 PROCEDURE — 97110 THERAPEUTIC EXERCISES: CPT | Mod: PN

## 2021-05-11 PROCEDURE — 97162 PT EVAL MOD COMPLEX 30 MIN: CPT | Mod: PN

## 2021-05-13 ENCOUNTER — TELEPHONE (OUTPATIENT)
Dept: INTERNAL MEDICINE | Facility: CLINIC | Age: 66
End: 2021-05-13

## 2021-05-13 DIAGNOSIS — R29.898 WEAKNESS OF BOTH LOWER EXTREMITIES: Primary | ICD-10-CM

## 2021-05-26 ENCOUNTER — IMMUNIZATION (OUTPATIENT)
Dept: INTERNAL MEDICINE | Facility: CLINIC | Age: 66
End: 2021-05-26
Payer: MEDICARE

## 2021-05-26 DIAGNOSIS — Z23 NEED FOR VACCINATION: Primary | ICD-10-CM

## 2021-05-26 PROCEDURE — 0012A COVID-19, MRNA, LNP-S, PF, 100 MCG/0.5 ML DOSE VACCINE: CPT | Mod: PBBFAC | Performed by: INTERNAL MEDICINE

## 2021-06-02 ENCOUNTER — CLINICAL SUPPORT (OUTPATIENT)
Dept: REHABILITATION | Facility: HOSPITAL | Age: 66
End: 2021-06-02
Payer: MEDICARE

## 2021-06-02 DIAGNOSIS — R29.898 WEAKNESS OF RIGHT LOWER EXTREMITY: ICD-10-CM

## 2021-06-02 DIAGNOSIS — M54.41 CHRONIC RIGHT-SIDED LOW BACK PAIN WITH RIGHT-SIDED SCIATICA: ICD-10-CM

## 2021-06-02 DIAGNOSIS — M54.30 SCIATICA, UNSPECIFIED LATERALITY: ICD-10-CM

## 2021-06-02 DIAGNOSIS — G89.29 CHRONIC RIGHT-SIDED LOW BACK PAIN WITH RIGHT-SIDED SCIATICA: ICD-10-CM

## 2021-06-02 PROCEDURE — 97110 THERAPEUTIC EXERCISES: CPT | Mod: PN

## 2021-06-04 ENCOUNTER — CLINICAL SUPPORT (OUTPATIENT)
Dept: REHABILITATION | Facility: HOSPITAL | Age: 66
End: 2021-06-04
Payer: MEDICARE

## 2021-06-04 DIAGNOSIS — M54.30 SCIATICA, UNSPECIFIED LATERALITY: ICD-10-CM

## 2021-06-04 DIAGNOSIS — M54.41 CHRONIC RIGHT-SIDED LOW BACK PAIN WITH RIGHT-SIDED SCIATICA: ICD-10-CM

## 2021-06-04 DIAGNOSIS — G89.29 CHRONIC RIGHT-SIDED LOW BACK PAIN WITH RIGHT-SIDED SCIATICA: ICD-10-CM

## 2021-06-04 DIAGNOSIS — R29.898 WEAKNESS OF RIGHT LOWER EXTREMITY: ICD-10-CM

## 2021-06-04 PROCEDURE — 97110 THERAPEUTIC EXERCISES: CPT | Mod: PN

## 2021-06-08 ENCOUNTER — CLINICAL SUPPORT (OUTPATIENT)
Dept: REHABILITATION | Facility: HOSPITAL | Age: 66
End: 2021-06-08
Payer: MEDICARE

## 2021-06-08 DIAGNOSIS — M54.41 CHRONIC RIGHT-SIDED LOW BACK PAIN WITH RIGHT-SIDED SCIATICA: ICD-10-CM

## 2021-06-08 DIAGNOSIS — G89.29 CHRONIC RIGHT-SIDED LOW BACK PAIN WITH RIGHT-SIDED SCIATICA: ICD-10-CM

## 2021-06-08 DIAGNOSIS — M54.30 SCIATICA, UNSPECIFIED LATERALITY: ICD-10-CM

## 2021-06-08 DIAGNOSIS — R29.898 WEAKNESS OF RIGHT LOWER EXTREMITY: ICD-10-CM

## 2021-06-08 PROCEDURE — 97110 THERAPEUTIC EXERCISES: CPT | Mod: PN,CQ

## 2021-06-17 ENCOUNTER — CLINICAL SUPPORT (OUTPATIENT)
Dept: REHABILITATION | Facility: HOSPITAL | Age: 66
End: 2021-06-17
Payer: MEDICARE

## 2021-06-17 DIAGNOSIS — M54.41 CHRONIC RIGHT-SIDED LOW BACK PAIN WITH RIGHT-SIDED SCIATICA: ICD-10-CM

## 2021-06-17 DIAGNOSIS — M54.30 SCIATICA, UNSPECIFIED LATERALITY: ICD-10-CM

## 2021-06-17 DIAGNOSIS — R29.898 WEAKNESS OF RIGHT LOWER EXTREMITY: ICD-10-CM

## 2021-06-17 DIAGNOSIS — G89.29 CHRONIC RIGHT-SIDED LOW BACK PAIN WITH RIGHT-SIDED SCIATICA: ICD-10-CM

## 2021-06-17 PROCEDURE — 97110 THERAPEUTIC EXERCISES: CPT | Mod: PN,CQ

## 2021-06-22 ENCOUNTER — DOCUMENTATION ONLY (OUTPATIENT)
Dept: REHABILITATION | Facility: HOSPITAL | Age: 66
End: 2021-06-22

## 2021-06-22 ENCOUNTER — CLINICAL SUPPORT (OUTPATIENT)
Dept: REHABILITATION | Facility: HOSPITAL | Age: 66
End: 2021-06-22
Payer: MEDICARE

## 2021-06-22 DIAGNOSIS — G89.29 CHRONIC RIGHT-SIDED LOW BACK PAIN WITH RIGHT-SIDED SCIATICA: ICD-10-CM

## 2021-06-22 DIAGNOSIS — M54.41 CHRONIC RIGHT-SIDED LOW BACK PAIN WITH RIGHT-SIDED SCIATICA: ICD-10-CM

## 2021-06-22 DIAGNOSIS — R29.898 WEAKNESS OF RIGHT LOWER EXTREMITY: ICD-10-CM

## 2021-06-22 DIAGNOSIS — M54.30 SCIATICA, UNSPECIFIED LATERALITY: ICD-10-CM

## 2021-06-22 PROCEDURE — 97110 THERAPEUTIC EXERCISES: CPT | Mod: PN

## 2021-06-24 ENCOUNTER — CLINICAL SUPPORT (OUTPATIENT)
Dept: REHABILITATION | Facility: HOSPITAL | Age: 66
End: 2021-06-24
Payer: MEDICARE

## 2021-06-24 DIAGNOSIS — G89.29 CHRONIC RIGHT-SIDED LOW BACK PAIN WITH RIGHT-SIDED SCIATICA: ICD-10-CM

## 2021-06-24 DIAGNOSIS — R29.898 WEAKNESS OF RIGHT LOWER EXTREMITY: ICD-10-CM

## 2021-06-24 DIAGNOSIS — M54.41 CHRONIC RIGHT-SIDED LOW BACK PAIN WITH RIGHT-SIDED SCIATICA: ICD-10-CM

## 2021-06-24 DIAGNOSIS — M54.30 SCIATICA, UNSPECIFIED LATERALITY: ICD-10-CM

## 2021-06-24 PROCEDURE — 97110 THERAPEUTIC EXERCISES: CPT | Mod: PN

## 2021-06-25 ENCOUNTER — OFFICE VISIT (OUTPATIENT)
Dept: INTERNAL MEDICINE | Facility: CLINIC | Age: 66
End: 2021-06-25
Payer: MEDICARE

## 2021-06-25 VITALS
SYSTOLIC BLOOD PRESSURE: 123 MMHG | DIASTOLIC BLOOD PRESSURE: 84 MMHG | WEIGHT: 117.06 LBS | BODY MASS INDEX: 19.99 KG/M2 | HEIGHT: 64 IN

## 2021-06-25 DIAGNOSIS — E83.52 HYPERCALCEMIA: ICD-10-CM

## 2021-06-25 DIAGNOSIS — F20.0 PARANOID SCHIZOPHRENIA: ICD-10-CM

## 2021-06-25 DIAGNOSIS — K21.00 GASTROESOPHAGEAL REFLUX DISEASE WITH ESOPHAGITIS WITHOUT HEMORRHAGE: ICD-10-CM

## 2021-06-25 DIAGNOSIS — D63.0 ANEMIA IN NEOPLASTIC DISEASE: ICD-10-CM

## 2021-06-25 DIAGNOSIS — K21.9 GASTROESOPHAGEAL REFLUX DISEASE WITHOUT ESOPHAGITIS: ICD-10-CM

## 2021-06-25 DIAGNOSIS — C90.00 MULTIPLE MYELOMA NOT HAVING ACHIEVED REMISSION: Primary | ICD-10-CM

## 2021-06-25 PROCEDURE — 99499 UNLISTED E&M SERVICE: CPT | Mod: S$GLB,,, | Performed by: INTERNAL MEDICINE

## 2021-06-25 PROCEDURE — 1126F AMNT PAIN NOTED NONE PRSNT: CPT | Mod: S$GLB,,, | Performed by: INTERNAL MEDICINE

## 2021-06-25 PROCEDURE — 99214 PR OFFICE/OUTPT VISIT, EST, LEVL IV, 30-39 MIN: ICD-10-PCS | Mod: S$GLB,,, | Performed by: INTERNAL MEDICINE

## 2021-06-25 PROCEDURE — 1126F PR PAIN SEVERITY QUANTIFIED, NO PAIN PRESENT: ICD-10-PCS | Mod: S$GLB,,, | Performed by: INTERNAL MEDICINE

## 2021-06-25 PROCEDURE — 99999 PR PBB SHADOW E&M-EST. PATIENT-LVL III: ICD-10-PCS | Mod: PBBFAC,,, | Performed by: INTERNAL MEDICINE

## 2021-06-25 PROCEDURE — 99499 RISK ADDL DX/OHS AUDIT: ICD-10-PCS | Mod: S$GLB,,, | Performed by: INTERNAL MEDICINE

## 2021-06-25 PROCEDURE — 99999 PR PBB SHADOW E&M-EST. PATIENT-LVL III: CPT | Mod: PBBFAC,,, | Performed by: INTERNAL MEDICINE

## 2021-06-25 PROCEDURE — 99214 OFFICE O/P EST MOD 30 MIN: CPT | Mod: S$GLB,,, | Performed by: INTERNAL MEDICINE

## 2021-06-25 PROCEDURE — 3008F PR BODY MASS INDEX (BMI) DOCUMENTED: ICD-10-PCS | Mod: CPTII,S$GLB,, | Performed by: INTERNAL MEDICINE

## 2021-06-25 PROCEDURE — 3008F BODY MASS INDEX DOCD: CPT | Mod: CPTII,S$GLB,, | Performed by: INTERNAL MEDICINE

## 2021-06-25 RX ORDER — DEXAMETHASONE 4 MG/1
TABLET ORAL
COMMUNITY
Start: 2021-06-11 | End: 2023-03-13

## 2021-06-25 RX ORDER — IXAZOMIB 4 MG/1
CAPSULE ORAL
COMMUNITY
Start: 2021-05-24 | End: 2023-01-26

## 2021-06-25 RX ORDER — SUCRALFATE 1 G/1
1 TABLET ORAL 2 TIMES DAILY
COMMUNITY
Start: 2021-04-20 | End: 2022-02-28

## 2021-06-28 ENCOUNTER — TELEPHONE (OUTPATIENT)
Dept: INTERNAL MEDICINE | Facility: CLINIC | Age: 66
End: 2021-06-28

## 2021-06-29 ENCOUNTER — CLINICAL SUPPORT (OUTPATIENT)
Dept: REHABILITATION | Facility: HOSPITAL | Age: 66
End: 2021-06-29
Payer: MEDICARE

## 2021-06-29 DIAGNOSIS — G89.29 CHRONIC RIGHT-SIDED LOW BACK PAIN WITH RIGHT-SIDED SCIATICA: ICD-10-CM

## 2021-06-29 DIAGNOSIS — R29.898 WEAKNESS OF RIGHT LOWER EXTREMITY: ICD-10-CM

## 2021-06-29 DIAGNOSIS — M54.30 SCIATICA, UNSPECIFIED LATERALITY: ICD-10-CM

## 2021-06-29 DIAGNOSIS — M54.41 CHRONIC RIGHT-SIDED LOW BACK PAIN WITH RIGHT-SIDED SCIATICA: ICD-10-CM

## 2021-06-29 PROCEDURE — 97110 THERAPEUTIC EXERCISES: CPT | Mod: PN

## 2021-07-01 ENCOUNTER — CLINICAL SUPPORT (OUTPATIENT)
Dept: REHABILITATION | Facility: HOSPITAL | Age: 66
End: 2021-07-01
Payer: MEDICARE

## 2021-07-01 DIAGNOSIS — G89.29 CHRONIC RIGHT-SIDED LOW BACK PAIN WITH RIGHT-SIDED SCIATICA: ICD-10-CM

## 2021-07-01 DIAGNOSIS — M54.41 CHRONIC RIGHT-SIDED LOW BACK PAIN WITH RIGHT-SIDED SCIATICA: ICD-10-CM

## 2021-07-01 DIAGNOSIS — R29.898 WEAKNESS OF RIGHT LOWER EXTREMITY: ICD-10-CM

## 2021-07-01 DIAGNOSIS — M54.30 SCIATICA, UNSPECIFIED LATERALITY: ICD-10-CM

## 2021-07-01 PROCEDURE — 97110 THERAPEUTIC EXERCISES: CPT | Mod: PN,CQ

## 2021-07-06 ENCOUNTER — CLINICAL SUPPORT (OUTPATIENT)
Dept: REHABILITATION | Facility: HOSPITAL | Age: 66
End: 2021-07-06
Payer: MEDICARE

## 2021-07-06 DIAGNOSIS — G89.29 CHRONIC RIGHT-SIDED LOW BACK PAIN WITH RIGHT-SIDED SCIATICA: ICD-10-CM

## 2021-07-06 DIAGNOSIS — M54.41 CHRONIC RIGHT-SIDED LOW BACK PAIN WITH RIGHT-SIDED SCIATICA: ICD-10-CM

## 2021-07-06 DIAGNOSIS — M54.30 SCIATICA, UNSPECIFIED LATERALITY: ICD-10-CM

## 2021-07-06 DIAGNOSIS — R29.898 WEAKNESS OF RIGHT LOWER EXTREMITY: ICD-10-CM

## 2021-07-06 PROCEDURE — 97110 THERAPEUTIC EXERCISES: CPT | Mod: PN

## 2021-07-08 ENCOUNTER — CLINICAL SUPPORT (OUTPATIENT)
Dept: REHABILITATION | Facility: HOSPITAL | Age: 66
End: 2021-07-08
Payer: MEDICARE

## 2021-07-08 DIAGNOSIS — G89.29 CHRONIC RIGHT-SIDED LOW BACK PAIN WITH RIGHT-SIDED SCIATICA: ICD-10-CM

## 2021-07-08 DIAGNOSIS — M54.41 CHRONIC RIGHT-SIDED LOW BACK PAIN WITH RIGHT-SIDED SCIATICA: ICD-10-CM

## 2021-07-08 DIAGNOSIS — M54.30 SCIATICA, UNSPECIFIED LATERALITY: ICD-10-CM

## 2021-07-08 DIAGNOSIS — R29.898 WEAKNESS OF RIGHT LOWER EXTREMITY: ICD-10-CM

## 2021-07-08 PROCEDURE — 97110 THERAPEUTIC EXERCISES: CPT | Mod: PN

## 2021-07-14 ENCOUNTER — CLINICAL SUPPORT (OUTPATIENT)
Dept: REHABILITATION | Facility: HOSPITAL | Age: 66
End: 2021-07-14
Payer: MEDICARE

## 2021-07-14 DIAGNOSIS — M54.30 SCIATICA, UNSPECIFIED LATERALITY: ICD-10-CM

## 2021-07-14 DIAGNOSIS — R29.898 WEAKNESS OF RIGHT LOWER EXTREMITY: ICD-10-CM

## 2021-07-14 DIAGNOSIS — G89.29 CHRONIC RIGHT-SIDED LOW BACK PAIN WITH RIGHT-SIDED SCIATICA: ICD-10-CM

## 2021-07-14 DIAGNOSIS — M54.41 CHRONIC RIGHT-SIDED LOW BACK PAIN WITH RIGHT-SIDED SCIATICA: ICD-10-CM

## 2021-07-14 PROCEDURE — 97110 THERAPEUTIC EXERCISES: CPT | Mod: PN,CQ

## 2021-07-15 ENCOUNTER — CLINICAL SUPPORT (OUTPATIENT)
Dept: REHABILITATION | Facility: HOSPITAL | Age: 66
End: 2021-07-15
Payer: MEDICARE

## 2021-07-15 DIAGNOSIS — G89.29 CHRONIC RIGHT-SIDED LOW BACK PAIN WITH RIGHT-SIDED SCIATICA: ICD-10-CM

## 2021-07-15 DIAGNOSIS — R29.898 WEAKNESS OF RIGHT LOWER EXTREMITY: ICD-10-CM

## 2021-07-15 DIAGNOSIS — M54.30 SCIATICA, UNSPECIFIED LATERALITY: ICD-10-CM

## 2021-07-15 DIAGNOSIS — M54.41 CHRONIC RIGHT-SIDED LOW BACK PAIN WITH RIGHT-SIDED SCIATICA: ICD-10-CM

## 2021-07-15 PROCEDURE — 97110 THERAPEUTIC EXERCISES: CPT | Mod: PN,CQ

## 2021-07-20 ENCOUNTER — CLINICAL SUPPORT (OUTPATIENT)
Dept: REHABILITATION | Facility: HOSPITAL | Age: 66
End: 2021-07-20
Payer: MEDICARE

## 2021-07-20 DIAGNOSIS — G89.29 CHRONIC RIGHT-SIDED LOW BACK PAIN WITH RIGHT-SIDED SCIATICA: ICD-10-CM

## 2021-07-20 DIAGNOSIS — M54.30 SCIATICA, UNSPECIFIED LATERALITY: ICD-10-CM

## 2021-07-20 DIAGNOSIS — M54.41 CHRONIC RIGHT-SIDED LOW BACK PAIN WITH RIGHT-SIDED SCIATICA: ICD-10-CM

## 2021-07-20 DIAGNOSIS — R29.898 WEAKNESS OF RIGHT LOWER EXTREMITY: ICD-10-CM

## 2021-07-20 PROCEDURE — 97110 THERAPEUTIC EXERCISES: CPT | Mod: PN

## 2021-07-22 ENCOUNTER — CLINICAL SUPPORT (OUTPATIENT)
Dept: REHABILITATION | Facility: HOSPITAL | Age: 66
End: 2021-07-22
Payer: MEDICARE

## 2021-07-22 DIAGNOSIS — G89.29 CHRONIC RIGHT-SIDED LOW BACK PAIN WITH RIGHT-SIDED SCIATICA: ICD-10-CM

## 2021-07-22 DIAGNOSIS — M54.41 CHRONIC RIGHT-SIDED LOW BACK PAIN WITH RIGHT-SIDED SCIATICA: ICD-10-CM

## 2021-07-22 DIAGNOSIS — M54.30 SCIATICA, UNSPECIFIED LATERALITY: ICD-10-CM

## 2021-07-22 DIAGNOSIS — R29.898 WEAKNESS OF RIGHT LOWER EXTREMITY: ICD-10-CM

## 2021-07-22 PROCEDURE — 97110 THERAPEUTIC EXERCISES: CPT | Mod: PN

## 2021-07-27 ENCOUNTER — CLINICAL SUPPORT (OUTPATIENT)
Dept: REHABILITATION | Facility: HOSPITAL | Age: 66
End: 2021-07-27
Payer: MEDICARE

## 2021-07-27 ENCOUNTER — TELEPHONE (OUTPATIENT)
Dept: INTERNAL MEDICINE | Facility: CLINIC | Age: 66
End: 2021-07-27

## 2021-07-27 DIAGNOSIS — M54.41 CHRONIC RIGHT-SIDED LOW BACK PAIN WITH RIGHT-SIDED SCIATICA: ICD-10-CM

## 2021-07-27 DIAGNOSIS — G89.29 CHRONIC RIGHT-SIDED LOW BACK PAIN WITH RIGHT-SIDED SCIATICA: ICD-10-CM

## 2021-07-27 DIAGNOSIS — M54.30 SCIATICA, UNSPECIFIED LATERALITY: ICD-10-CM

## 2021-07-27 DIAGNOSIS — R29.898 WEAKNESS OF RIGHT LOWER EXTREMITY: Primary | ICD-10-CM

## 2021-07-27 DIAGNOSIS — R29.898 WEAKNESS OF RIGHT LOWER EXTREMITY: ICD-10-CM

## 2021-07-27 PROCEDURE — 97110 THERAPEUTIC EXERCISES: CPT | Mod: PN

## 2021-07-28 ENCOUNTER — TELEPHONE (OUTPATIENT)
Dept: INTERNAL MEDICINE | Facility: CLINIC | Age: 66
End: 2021-07-28

## 2021-07-29 ENCOUNTER — CLINICAL SUPPORT (OUTPATIENT)
Dept: REHABILITATION | Facility: HOSPITAL | Age: 66
End: 2021-07-29
Payer: MEDICARE

## 2021-07-29 DIAGNOSIS — G89.29 CHRONIC RIGHT-SIDED LOW BACK PAIN WITH RIGHT-SIDED SCIATICA: ICD-10-CM

## 2021-07-29 DIAGNOSIS — R29.898 WEAKNESS OF RIGHT LOWER EXTREMITY: ICD-10-CM

## 2021-07-29 DIAGNOSIS — M54.41 CHRONIC RIGHT-SIDED LOW BACK PAIN WITH RIGHT-SIDED SCIATICA: ICD-10-CM

## 2021-07-29 DIAGNOSIS — M54.30 SCIATICA, UNSPECIFIED LATERALITY: ICD-10-CM

## 2021-07-29 PROCEDURE — 97110 THERAPEUTIC EXERCISES: CPT | Mod: PN

## 2021-08-02 ENCOUNTER — TELEPHONE (OUTPATIENT)
Dept: REHABILITATION | Facility: HOSPITAL | Age: 66
End: 2021-08-02

## 2021-08-04 ENCOUNTER — CLINICAL SUPPORT (OUTPATIENT)
Dept: REHABILITATION | Facility: HOSPITAL | Age: 66
End: 2021-08-04
Payer: MEDICARE

## 2021-08-04 DIAGNOSIS — R29.898 WEAKNESS OF RIGHT LOWER EXTREMITY: ICD-10-CM

## 2021-08-04 DIAGNOSIS — M54.30 SCIATICA, UNSPECIFIED LATERALITY: ICD-10-CM

## 2021-08-04 DIAGNOSIS — G89.29 CHRONIC RIGHT-SIDED LOW BACK PAIN WITH RIGHT-SIDED SCIATICA: ICD-10-CM

## 2021-08-04 DIAGNOSIS — M54.41 CHRONIC RIGHT-SIDED LOW BACK PAIN WITH RIGHT-SIDED SCIATICA: ICD-10-CM

## 2021-08-04 PROCEDURE — 97110 THERAPEUTIC EXERCISES: CPT | Mod: PN,CQ

## 2021-08-05 ENCOUNTER — CLINICAL SUPPORT (OUTPATIENT)
Dept: REHABILITATION | Facility: HOSPITAL | Age: 66
End: 2021-08-05
Payer: MEDICARE

## 2021-08-05 DIAGNOSIS — G89.29 CHRONIC RIGHT-SIDED LOW BACK PAIN WITH RIGHT-SIDED SCIATICA: ICD-10-CM

## 2021-08-05 DIAGNOSIS — M54.30 SCIATICA, UNSPECIFIED LATERALITY: ICD-10-CM

## 2021-08-05 DIAGNOSIS — M54.41 CHRONIC RIGHT-SIDED LOW BACK PAIN WITH RIGHT-SIDED SCIATICA: ICD-10-CM

## 2021-08-05 DIAGNOSIS — R29.898 WEAKNESS OF RIGHT LOWER EXTREMITY: ICD-10-CM

## 2021-08-05 PROCEDURE — 97110 THERAPEUTIC EXERCISES: CPT | Mod: PN

## 2021-08-11 ENCOUNTER — CLINICAL SUPPORT (OUTPATIENT)
Dept: REHABILITATION | Facility: HOSPITAL | Age: 66
End: 2021-08-11
Payer: MEDICARE

## 2021-08-11 DIAGNOSIS — M54.41 CHRONIC RIGHT-SIDED LOW BACK PAIN WITH RIGHT-SIDED SCIATICA: ICD-10-CM

## 2021-08-11 DIAGNOSIS — M54.30 SCIATICA, UNSPECIFIED LATERALITY: ICD-10-CM

## 2021-08-11 DIAGNOSIS — R29.898 WEAKNESS OF RIGHT LOWER EXTREMITY: ICD-10-CM

## 2021-08-11 DIAGNOSIS — G89.29 CHRONIC RIGHT-SIDED LOW BACK PAIN WITH RIGHT-SIDED SCIATICA: ICD-10-CM

## 2021-08-11 PROCEDURE — 97110 THERAPEUTIC EXERCISES: CPT | Mod: PN,CQ

## 2021-08-12 ENCOUNTER — CLINICAL SUPPORT (OUTPATIENT)
Dept: REHABILITATION | Facility: HOSPITAL | Age: 66
End: 2021-08-12
Payer: MEDICARE

## 2021-08-12 DIAGNOSIS — R29.898 WEAKNESS OF RIGHT LOWER EXTREMITY: ICD-10-CM

## 2021-08-12 DIAGNOSIS — M54.41 CHRONIC RIGHT-SIDED LOW BACK PAIN WITH RIGHT-SIDED SCIATICA: ICD-10-CM

## 2021-08-12 DIAGNOSIS — G89.29 CHRONIC RIGHT-SIDED LOW BACK PAIN WITH RIGHT-SIDED SCIATICA: ICD-10-CM

## 2021-08-12 DIAGNOSIS — M54.30 SCIATICA, UNSPECIFIED LATERALITY: ICD-10-CM

## 2021-08-12 PROCEDURE — 97110 THERAPEUTIC EXERCISES: CPT | Mod: PN

## 2021-08-13 ENCOUNTER — TELEPHONE (OUTPATIENT)
Dept: INTERNAL MEDICINE | Facility: CLINIC | Age: 66
End: 2021-08-13

## 2021-09-13 ENCOUNTER — TELEPHONE (OUTPATIENT)
Dept: INTERNAL MEDICINE | Facility: CLINIC | Age: 66
End: 2021-09-13

## 2021-09-17 ENCOUNTER — OFFICE VISIT (OUTPATIENT)
Dept: INTERNAL MEDICINE | Facility: CLINIC | Age: 66
End: 2021-09-17
Payer: MEDICARE

## 2021-09-17 VITALS
WEIGHT: 107.38 LBS | BODY MASS INDEX: 18.33 KG/M2 | HEART RATE: 52 BPM | OXYGEN SATURATION: 99 % | HEIGHT: 64 IN | DIASTOLIC BLOOD PRESSURE: 80 MMHG | SYSTOLIC BLOOD PRESSURE: 132 MMHG

## 2021-09-17 DIAGNOSIS — C90.00 MULTIPLE MYELOMA NOT HAVING ACHIEVED REMISSION: ICD-10-CM

## 2021-09-17 DIAGNOSIS — F20.0 PARANOID SCHIZOPHRENIA: ICD-10-CM

## 2021-09-17 DIAGNOSIS — I15.8 OTHER SECONDARY HYPERTENSION: ICD-10-CM

## 2021-09-17 DIAGNOSIS — M54.30 SCIATICA, UNSPECIFIED LATERALITY: Primary | ICD-10-CM

## 2021-09-17 PROCEDURE — 3079F PR MOST RECENT DIASTOLIC BLOOD PRESSURE 80-89 MM HG: ICD-10-PCS | Mod: CPTII,S$GLB,, | Performed by: INTERNAL MEDICINE

## 2021-09-17 PROCEDURE — 1159F MED LIST DOCD IN RCRD: CPT | Mod: CPTII,S$GLB,, | Performed by: INTERNAL MEDICINE

## 2021-09-17 PROCEDURE — 3075F SYST BP GE 130 - 139MM HG: CPT | Mod: CPTII,S$GLB,, | Performed by: INTERNAL MEDICINE

## 2021-09-17 PROCEDURE — 3075F PR MOST RECENT SYSTOLIC BLOOD PRESS GE 130-139MM HG: ICD-10-PCS | Mod: CPTII,S$GLB,, | Performed by: INTERNAL MEDICINE

## 2021-09-17 PROCEDURE — 1159F PR MEDICATION LIST DOCUMENTED IN MEDICAL RECORD: ICD-10-PCS | Mod: CPTII,S$GLB,, | Performed by: INTERNAL MEDICINE

## 2021-09-17 PROCEDURE — 99499 UNLISTED E&M SERVICE: CPT | Mod: S$GLB,,, | Performed by: INTERNAL MEDICINE

## 2021-09-17 PROCEDURE — 99214 OFFICE O/P EST MOD 30 MIN: CPT | Mod: S$GLB,,, | Performed by: INTERNAL MEDICINE

## 2021-09-17 PROCEDURE — 3008F PR BODY MASS INDEX (BMI) DOCUMENTED: ICD-10-PCS | Mod: CPTII,S$GLB,, | Performed by: INTERNAL MEDICINE

## 2021-09-17 PROCEDURE — 99499 RISK ADDL DX/OHS AUDIT: ICD-10-PCS | Mod: S$GLB,,, | Performed by: INTERNAL MEDICINE

## 2021-09-17 PROCEDURE — 3079F DIAST BP 80-89 MM HG: CPT | Mod: CPTII,S$GLB,, | Performed by: INTERNAL MEDICINE

## 2021-09-17 PROCEDURE — 99999 PR PBB SHADOW E&M-EST. PATIENT-LVL III: ICD-10-PCS | Mod: PBBFAC,,, | Performed by: INTERNAL MEDICINE

## 2021-09-17 PROCEDURE — 99214 PR OFFICE/OUTPT VISIT, EST, LEVL IV, 30-39 MIN: ICD-10-PCS | Mod: S$GLB,,, | Performed by: INTERNAL MEDICINE

## 2021-09-17 PROCEDURE — 99999 PR PBB SHADOW E&M-EST. PATIENT-LVL III: CPT | Mod: PBBFAC,,, | Performed by: INTERNAL MEDICINE

## 2021-09-17 PROCEDURE — 3008F BODY MASS INDEX DOCD: CPT | Mod: CPTII,S$GLB,, | Performed by: INTERNAL MEDICINE

## 2021-09-17 RX ORDER — AMLODIPINE BESYLATE 10 MG/1
10 TABLET ORAL DAILY
Qty: 90 TABLET | Refills: 3 | Status: SHIPPED | OUTPATIENT
Start: 2021-09-17 | End: 2022-07-15

## 2021-09-17 RX ORDER — MIRTAZAPINE 30 MG/1
30 TABLET, ORALLY DISINTEGRATING ORAL DAILY
Qty: 90 TABLET | Refills: 2 | Status: SHIPPED | OUTPATIENT
Start: 2021-09-17 | End: 2022-02-28 | Stop reason: SDUPTHER

## 2021-10-06 ENCOUNTER — TELEPHONE (OUTPATIENT)
Dept: INTERNAL MEDICINE | Facility: CLINIC | Age: 66
End: 2021-10-06

## 2021-10-07 ENCOUNTER — TELEPHONE (OUTPATIENT)
Dept: INTERNAL MEDICINE | Facility: CLINIC | Age: 66
End: 2021-10-07

## 2021-10-07 DIAGNOSIS — R29.898 WEAKNESS OF BOTH LOWER EXTREMITIES: Primary | ICD-10-CM

## 2021-10-13 ENCOUNTER — TELEPHONE (OUTPATIENT)
Dept: INTERNAL MEDICINE | Facility: CLINIC | Age: 66
End: 2021-10-13

## 2021-10-19 PROBLEM — M54.50 LOW BACK PAIN: Status: RESOLVED | Noted: 2021-05-11 | Resolved: 2021-10-19

## 2021-10-19 PROBLEM — M54.30 SCIATICA: Status: RESOLVED | Noted: 2021-05-11 | Resolved: 2021-10-19

## 2021-10-19 PROBLEM — R29.898 LOWER EXTREMITY WEAKNESS: Status: RESOLVED | Noted: 2021-05-11 | Resolved: 2021-10-19

## 2021-10-25 ENCOUNTER — PES CALL (OUTPATIENT)
Dept: ADMINISTRATIVE | Facility: CLINIC | Age: 66
End: 2021-10-25
Payer: MEDICARE

## 2021-11-19 ENCOUNTER — TELEPHONE (OUTPATIENT)
Dept: INTERNAL MEDICINE | Facility: CLINIC | Age: 66
End: 2021-11-19
Payer: MEDICARE

## 2021-11-19 DIAGNOSIS — R26.9 GAIT ABNORMALITY: Primary | ICD-10-CM

## 2021-12-15 NOTE — ED NOTES
"Back pain and pain to the "left side" for several days; no numbness or tingling in legs; pt states she cannot walk or lay down; pt states that she has not been able to sleep; denies trauma to the area; states that she may have caused an injury when lifting boxing during a move  " no

## 2022-01-12 ENCOUNTER — IMMUNIZATION (OUTPATIENT)
Dept: PHARMACY | Facility: CLINIC | Age: 67
End: 2022-01-12
Payer: MEDICARE

## 2022-01-12 DIAGNOSIS — Z23 NEED FOR VACCINATION: Primary | ICD-10-CM

## 2022-01-25 ENCOUNTER — PES CALL (OUTPATIENT)
Dept: ADMINISTRATIVE | Facility: CLINIC | Age: 67
End: 2022-01-25
Payer: MEDICARE

## 2022-01-31 ENCOUNTER — TELEPHONE (OUTPATIENT)
Dept: INTERNAL MEDICINE | Facility: CLINIC | Age: 67
End: 2022-01-31
Payer: MEDICARE

## 2022-01-31 NOTE — TELEPHONE ENCOUNTER
LVM informing patient of AWV 02/01/2022  [place] location, left [phone] cancel/reschedule

## 2022-02-01 ENCOUNTER — OFFICE VISIT (OUTPATIENT)
Dept: INTERNAL MEDICINE | Facility: CLINIC | Age: 67
End: 2022-02-01
Payer: MEDICARE

## 2022-02-01 VITALS
HEART RATE: 84 BPM | BODY MASS INDEX: 21.86 KG/M2 | WEIGHT: 128.06 LBS | OXYGEN SATURATION: 100 % | DIASTOLIC BLOOD PRESSURE: 80 MMHG | HEIGHT: 64 IN | SYSTOLIC BLOOD PRESSURE: 122 MMHG

## 2022-02-01 DIAGNOSIS — Z99.89 DEPENDENCE ON OTHER ENABLING MACHINES AND DEVICES: ICD-10-CM

## 2022-02-01 DIAGNOSIS — E85.81 LIGHT CHAIN (AL) AMYLOIDOSIS: ICD-10-CM

## 2022-02-01 DIAGNOSIS — C90.00 MULTIPLE MYELOMA NOT HAVING ACHIEVED REMISSION: ICD-10-CM

## 2022-02-01 DIAGNOSIS — K21.9 GASTROESOPHAGEAL REFLUX DISEASE WITHOUT ESOPHAGITIS: ICD-10-CM

## 2022-02-01 DIAGNOSIS — F20.0 PARANOID SCHIZOPHRENIA: ICD-10-CM

## 2022-02-01 DIAGNOSIS — G63 POLYNEUROPATHY IN DISEASES CLASSIFIED ELSEWHERE: ICD-10-CM

## 2022-02-01 DIAGNOSIS — M84.58XS PATHOLOGICAL FRACTURE OF VERTEBRA DUE TO NEOPLASTIC DISEASE, SEQUELA: ICD-10-CM

## 2022-02-01 DIAGNOSIS — D80.1 NONFAMILIAL HYPOGAMMAGLOBULINEMIA: ICD-10-CM

## 2022-02-01 DIAGNOSIS — I15.8 OTHER SECONDARY HYPERTENSION: ICD-10-CM

## 2022-02-01 DIAGNOSIS — R26.9 ABNORMALITY OF GAIT AND MOBILITY: ICD-10-CM

## 2022-02-01 DIAGNOSIS — C79.9 MULTIPLE LESIONS OF METASTATIC MALIGNANCY: ICD-10-CM

## 2022-02-01 DIAGNOSIS — Z00.00 ENCOUNTER FOR PREVENTIVE HEALTH EXAMINATION: Primary | ICD-10-CM

## 2022-02-01 DIAGNOSIS — Z12.31 ENCOUNTER FOR SCREENING MAMMOGRAM FOR MALIGNANT NEOPLASM OF BREAST: ICD-10-CM

## 2022-02-01 PROCEDURE — 1100F PTFALLS ASSESS-DOCD GE2>/YR: CPT | Mod: CPTII,S$GLB,, | Performed by: NURSE PRACTITIONER

## 2022-02-01 PROCEDURE — 99999 PR PBB SHADOW E&M-EST. PATIENT-LVL V: CPT | Mod: PBBFAC,,, | Performed by: NURSE PRACTITIONER

## 2022-02-01 PROCEDURE — 3079F PR MOST RECENT DIASTOLIC BLOOD PRESSURE 80-89 MM HG: ICD-10-PCS | Mod: CPTII,S$GLB,, | Performed by: NURSE PRACTITIONER

## 2022-02-01 PROCEDURE — 99499 RISK ADDL DX/OHS AUDIT: ICD-10-PCS | Mod: S$GLB,,, | Performed by: NURSE PRACTITIONER

## 2022-02-01 PROCEDURE — 3074F PR MOST RECENT SYSTOLIC BLOOD PRESSURE < 130 MM HG: ICD-10-PCS | Mod: CPTII,S$GLB,, | Performed by: NURSE PRACTITIONER

## 2022-02-01 PROCEDURE — 1100F PR PT FALLS ASSESS DOC 2+ FALLS/FALL W/INJURY/YR: ICD-10-PCS | Mod: CPTII,S$GLB,, | Performed by: NURSE PRACTITIONER

## 2022-02-01 PROCEDURE — 1125F PR PAIN SEVERITY QUANTIFIED, PAIN PRESENT: ICD-10-PCS | Mod: CPTII,S$GLB,, | Performed by: NURSE PRACTITIONER

## 2022-02-01 PROCEDURE — 1159F MED LIST DOCD IN RCRD: CPT | Mod: CPTII,S$GLB,, | Performed by: NURSE PRACTITIONER

## 2022-02-01 PROCEDURE — 3008F BODY MASS INDEX DOCD: CPT | Mod: CPTII,S$GLB,, | Performed by: NURSE PRACTITIONER

## 2022-02-01 PROCEDURE — 1125F AMNT PAIN NOTED PAIN PRSNT: CPT | Mod: CPTII,S$GLB,, | Performed by: NURSE PRACTITIONER

## 2022-02-01 PROCEDURE — 1170F PR FUNCTIONAL STATUS ASSESSED: ICD-10-PCS | Mod: CPTII,S$GLB,, | Performed by: NURSE PRACTITIONER

## 2022-02-01 PROCEDURE — 3288F PR FALLS RISK ASSESSMENT DOCUMENTED: ICD-10-PCS | Mod: CPTII,S$GLB,, | Performed by: NURSE PRACTITIONER

## 2022-02-01 PROCEDURE — 1159F PR MEDICATION LIST DOCUMENTED IN MEDICAL RECORD: ICD-10-PCS | Mod: CPTII,S$GLB,, | Performed by: NURSE PRACTITIONER

## 2022-02-01 PROCEDURE — G0402 PR WELCOME MEDICARE PREVENTIVE VISIT NEW ENROLLEE: ICD-10-PCS | Mod: S$GLB,,, | Performed by: NURSE PRACTITIONER

## 2022-02-01 PROCEDURE — 3288F FALL RISK ASSESSMENT DOCD: CPT | Mod: CPTII,S$GLB,, | Performed by: NURSE PRACTITIONER

## 2022-02-01 PROCEDURE — 99499 UNLISTED E&M SERVICE: CPT | Mod: S$GLB,,, | Performed by: NURSE PRACTITIONER

## 2022-02-01 PROCEDURE — G0402 INITIAL PREVENTIVE EXAM: HCPCS | Mod: S$GLB,,, | Performed by: NURSE PRACTITIONER

## 2022-02-01 PROCEDURE — 1160F RVW MEDS BY RX/DR IN RCRD: CPT | Mod: CPTII,S$GLB,, | Performed by: NURSE PRACTITIONER

## 2022-02-01 PROCEDURE — 3074F SYST BP LT 130 MM HG: CPT | Mod: CPTII,S$GLB,, | Performed by: NURSE PRACTITIONER

## 2022-02-01 PROCEDURE — 3079F DIAST BP 80-89 MM HG: CPT | Mod: CPTII,S$GLB,, | Performed by: NURSE PRACTITIONER

## 2022-02-01 PROCEDURE — 3008F PR BODY MASS INDEX (BMI) DOCUMENTED: ICD-10-PCS | Mod: CPTII,S$GLB,, | Performed by: NURSE PRACTITIONER

## 2022-02-01 PROCEDURE — 1170F FXNL STATUS ASSESSED: CPT | Mod: CPTII,S$GLB,, | Performed by: NURSE PRACTITIONER

## 2022-02-01 PROCEDURE — 1160F PR REVIEW ALL MEDS BY PRESCRIBER/CLIN PHARMACIST DOCUMENTED: ICD-10-PCS | Mod: CPTII,S$GLB,, | Performed by: NURSE PRACTITIONER

## 2022-02-01 PROCEDURE — 99999 PR PBB SHADOW E&M-EST. PATIENT-LVL V: ICD-10-PCS | Mod: PBBFAC,,, | Performed by: NURSE PRACTITIONER

## 2022-02-01 RX ORDER — VALACYCLOVIR HYDROCHLORIDE 500 MG/1
500 TABLET, FILM COATED ORAL 2 TIMES DAILY
COMMUNITY
Start: 2021-10-29 | End: 2022-02-28

## 2022-02-01 RX ORDER — IBUPROFEN 100 MG/5ML
1000 SUSPENSION, ORAL (FINAL DOSE FORM) ORAL DAILY
Status: ON HOLD | COMMUNITY
End: 2024-02-05 | Stop reason: HOSPADM

## 2022-02-01 RX ORDER — PANTOPRAZOLE SODIUM 40 MG/1
40 TABLET, DELAYED RELEASE ORAL DAILY
COMMUNITY
Start: 2021-09-20 | End: 2022-02-28

## 2022-02-01 RX ORDER — GRAPE SEED OIL 100 %
OIL (ML) MISCELLANEOUS
Status: ON HOLD | COMMUNITY
End: 2023-10-02

## 2022-02-01 RX ORDER — ASPIRIN 81 MG/1
81 TABLET ORAL DAILY
Status: ON HOLD | COMMUNITY
End: 2024-02-05 | Stop reason: HOSPADM

## 2022-02-01 RX ORDER — FLAXSEED OIL 1000 MG
CAPSULE ORAL
Status: ON HOLD | COMMUNITY
End: 2023-10-02

## 2022-02-01 RX ORDER — CHOLECALCIFEROL (VITAMIN D3) 25 MCG
1000 TABLET ORAL DAILY
Status: ON HOLD | COMMUNITY
End: 2024-02-05 | Stop reason: HOSPADM

## 2022-02-01 NOTE — PROGRESS NOTES
"Janie Zamorano presented for a  Medicare AWV and comprehensive Health Risk Assessment today. The following components were reviewed and updated:    · Medical history  · Family History  · Social history  · Allergies and Current Medications  · Health Risk Assessment  · Health Maintenance  · Care Team         ** See Completed Assessments for Annual Wellness Visit within the encounter summary.**         The following assessments were completed:  · Living Situation  · CAGE  · Depression Screening  · Timed Get Up and Go -  Deferred. Uses cane for ambulation.  · Whisper Test  · Cognitive Function Screening     · Nutrition Screening  · ADL Screening  · PAQ Screening        Vitals:    02/01/22 0936 02/01/22 0948   BP:  122/80   BP Location:  Left arm   Patient Position:  Sitting   Pulse:  84   SpO2:  100%   Weight: 58.1 kg (128 lb 1.4 oz)    Height: 5' 4" (1.626 m)      Body mass index is 21.99 kg/m².     Physical Exam  Vitals reviewed.   Constitutional:       Appearance: Normal appearance.   HENT:      Head: Normocephalic.   Cardiovascular:      Rate and Rhythm: Normal rate and regular rhythm.   Pulmonary:      Effort: Pulmonary effort is normal.      Breath sounds: Normal breath sounds.   Abdominal:      General: Bowel sounds are normal.   Musculoskeletal:         General: Normal range of motion.      Cervical back: Normal range of motion.      Right lower leg: No edema.      Left lower leg: No edema.   Skin:     General: Skin is warm and dry.      Capillary Refill: Capillary refill takes less than 2 seconds.   Neurological:      Mental Status: She is alert and oriented to person, place, and time.   Psychiatric:         Behavior: Behavior normal.         Thought Content: Thought content normal.         Judgment: Judgment normal.               Diagnoses and health risks identified today and associated recommendations/orders:    1. Encounter for preventive health examination  Assessments completed.   recommendations " reviewed. Declines vaccines. States she had DEXA performed with oncologist. Mammogram ordered.  F/u with PCP as scheduled.    2. 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 Followed by PCP / oncologist.    3. Paranoid schizophrenia  Chronic, stable. Not currently on meds. Followed by PCP. No record of psychiatry visit since 10/07/2019 when patient was hospitalized for catatonia.    4. Light chain (AL) amyloidosis  Chronic, stable on current regimen. Followed by outside oncology.    5. Multiple myeloma not having achieved remission  Chronic, stable on current regimen. Followed by outside oncology.    6. Multiple lesions of metastatic malignancy  Chronic, stable on current regimen. Followed by outside oncology.    7. 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 Followed by outside oncology.    8. Nonfamilial hypogammaglobulinemia  Chronic, stable on current regimen. Followed by outside oncology.    9. Other secondary hypertension  Chronic, stable on current regimen. Followed by PCP.    10. Gastroesophageal reflux disease without esophagitis  Chronic, stable on current regimen. Followed by PCP.    11. Abnormality of gait and mobility  Chronic, stable. 2 recent falls, states her cane was broken. No falls with new cane. On wait list for aquatic therapy. Followed by PCP.    12. Dependence on other enabling machines and devices  Chronic, stable. 2 recent falls, states her cane was broken. No falls with new cane. On wait list for aquatic therapy. Followed by PCP.    13. Encounter for screening mammogram for malignant neoplasm of breast   Mammogram ordered.  - Mammo Digital Screening Bilat; Future      Provided Janie with a 5-10 year written screening schedule and personal prevention plan. Recommendations were developed using the USPSTF age appropriate recommendations. Education, counseling, and referrals were provided as  needed. After Visit Summary printed and given to patient which includes a list of additional screenings\tests needed.    Follow up in about 1 year (around 2/1/2023) for Medicare AWV and with PCP as scheduled.       Natalee Muller NP     I offered to discuss advanced care planning, including how to pick a person who would make decisions for you if you were unable to make them for yourself, called a health care power of , and what kind of decisions you might make such as use of life sustaining treatments such as ventilators and tube feeding when faced with a life limiting illness recorded on a living will that they will need to know. (How you want to be cared for as you near the end of your natural life)     X Patient is interested in learning more about how to make advanced directives.  I provided them paperwork and offered to discuss this with them.

## 2022-02-01 NOTE — PATIENT INSTRUCTIONS
1. Follow up with Dr. He Hoyt MD as scheduled.    2. Number for physical therapy [phone].    Counseling and Referral of Other Preventative  (Italic type indicates deductible and co-insurance are waived)    Patient Name: Janie Zamorano  Today's Date: 2/1/2022    Health Maintenance       Date Due Completion Date    DEXA SCAN Never done ---    Shingles Vaccine (1 of 2) Never done ---    Pneumococcal Vaccines (Age 65+) (2 of 4 - PCV13) 01/27/2012 1/27/2011    Influenza Vaccine (1) 09/01/2021 10/27/2017    Mammogram 11/24/2021 11/24/2020    Colorectal Cancer Screening 01/08/2023 9/27/2019    Lipid Panel 11/19/2024 11/19/2019    TETANUS VACCINE 12/11/2030 12/11/2020        Orders Placed This Encounter   Procedures    Mammo Digital Screening Bilat     The following information is provided to all patients.  This information is to help you find resources for any of the problems found today that may be affecting your health:                Living healthy guide: www.[facility].[place].gov      Understanding Diabetes: www.diabetes.org      Eating healthy: www.cdc.gov/healthyweight      CDC home safety checklist: www.cdc.gov/steadi/patient.html      Agency on Aging: www.goea.[place].gov      Alcoholics anonymous (AA): www.aa.org      Physical Activity: www.archana.nih.gov/vb1ozvr      Tobacco use: www.quitwithusla.org

## 2022-02-01 NOTE — Clinical Note
Medicare AWV completed. Declined vaccines. States she had DEXA performed with oncologist. Mammogram ordered.  --Natalee

## 2022-02-09 DIAGNOSIS — D84.9 IMMUNOSUPPRESSED STATUS: ICD-10-CM

## 2022-02-28 ENCOUNTER — HOSPITAL ENCOUNTER (OUTPATIENT)
Dept: RADIOLOGY | Facility: HOSPITAL | Age: 67
Discharge: HOME | End: 2022-02-28
Attending: NURSE PRACTITIONER
Payer: MEDICARE

## 2022-02-28 ENCOUNTER — OFFICE VISIT (OUTPATIENT)
Dept: INTERNAL MEDICINE | Facility: CLINIC | Age: 67
End: 2022-02-28
Payer: MEDICARE

## 2022-02-28 VITALS
WEIGHT: 131.38 LBS | SYSTOLIC BLOOD PRESSURE: 132 MMHG | HEART RATE: 84 BPM | DIASTOLIC BLOOD PRESSURE: 86 MMHG | BODY MASS INDEX: 23.28 KG/M2 | OXYGEN SATURATION: 99 % | HEIGHT: 63 IN

## 2022-02-28 DIAGNOSIS — Z12.31 ENCOUNTER FOR SCREENING MAMMOGRAM FOR MALIGNANT NEOPLASM OF BREAST: ICD-10-CM

## 2022-02-28 DIAGNOSIS — M84.58XS PATHOLOGICAL FRACTURE OF VERTEBRA DUE TO NEOPLASTIC DISEASE, SEQUELA: ICD-10-CM

## 2022-02-28 DIAGNOSIS — C90.00 MULTIPLE MYELOMA NOT HAVING ACHIEVED REMISSION: ICD-10-CM

## 2022-02-28 DIAGNOSIS — I15.8 OTHER SECONDARY HYPERTENSION: ICD-10-CM

## 2022-02-28 DIAGNOSIS — E85.81 LIGHT CHAIN (AL) AMYLOIDOSIS: ICD-10-CM

## 2022-02-28 DIAGNOSIS — F20.0 PARANOID SCHIZOPHRENIA: ICD-10-CM

## 2022-02-28 DIAGNOSIS — D63.0 ANEMIA IN NEOPLASTIC DISEASE: Primary | ICD-10-CM

## 2022-02-28 DIAGNOSIS — C79.9 MULTIPLE LESIONS OF METASTATIC MALIGNANCY: ICD-10-CM

## 2022-02-28 PROCEDURE — 3075F SYST BP GE 130 - 139MM HG: CPT | Mod: CPTII,S$GLB,, | Performed by: INTERNAL MEDICINE

## 2022-02-28 PROCEDURE — 3079F PR MOST RECENT DIASTOLIC BLOOD PRESSURE 80-89 MM HG: ICD-10-PCS | Mod: CPTII,S$GLB,, | Performed by: INTERNAL MEDICINE

## 2022-02-28 PROCEDURE — 1159F PR MEDICATION LIST DOCUMENTED IN MEDICAL RECORD: ICD-10-PCS | Mod: CPTII,S$GLB,, | Performed by: INTERNAL MEDICINE

## 2022-02-28 PROCEDURE — 99999 PR PBB SHADOW E&M-EST. PATIENT-LVL III: ICD-10-PCS | Mod: PBBFAC,,, | Performed by: INTERNAL MEDICINE

## 2022-02-28 PROCEDURE — 1159F MED LIST DOCD IN RCRD: CPT | Mod: CPTII,S$GLB,, | Performed by: INTERNAL MEDICINE

## 2022-02-28 PROCEDURE — 1101F PR PT FALLS ASSESS DOC 0-1 FALLS W/OUT INJ PAST YR: ICD-10-PCS | Mod: CPTII,S$GLB,, | Performed by: INTERNAL MEDICINE

## 2022-02-28 PROCEDURE — 77063 BREAST TOMOSYNTHESIS BI: CPT | Mod: 26,,, | Performed by: RADIOLOGY

## 2022-02-28 PROCEDURE — 1125F PR PAIN SEVERITY QUANTIFIED, PAIN PRESENT: ICD-10-PCS | Mod: CPTII,S$GLB,, | Performed by: INTERNAL MEDICINE

## 2022-02-28 PROCEDURE — 1101F PT FALLS ASSESS-DOCD LE1/YR: CPT | Mod: CPTII,S$GLB,, | Performed by: INTERNAL MEDICINE

## 2022-02-28 PROCEDURE — 3288F PR FALLS RISK ASSESSMENT DOCUMENTED: ICD-10-PCS | Mod: CPTII,S$GLB,, | Performed by: INTERNAL MEDICINE

## 2022-02-28 PROCEDURE — 77067 SCR MAMMO BI INCL CAD: CPT | Mod: 26,,, | Performed by: RADIOLOGY

## 2022-02-28 PROCEDURE — 3075F PR MOST RECENT SYSTOLIC BLOOD PRESS GE 130-139MM HG: ICD-10-PCS | Mod: CPTII,S$GLB,, | Performed by: INTERNAL MEDICINE

## 2022-02-28 PROCEDURE — 3079F DIAST BP 80-89 MM HG: CPT | Mod: CPTII,S$GLB,, | Performed by: INTERNAL MEDICINE

## 2022-02-28 PROCEDURE — 1125F AMNT PAIN NOTED PAIN PRSNT: CPT | Mod: CPTII,S$GLB,, | Performed by: INTERNAL MEDICINE

## 2022-02-28 PROCEDURE — 77067 MAMMO DIGITAL SCREENING BILAT WITH TOMO: ICD-10-PCS | Mod: 26,,, | Performed by: RADIOLOGY

## 2022-02-28 PROCEDURE — 99214 PR OFFICE/OUTPT VISIT, EST, LEVL IV, 30-39 MIN: ICD-10-PCS | Mod: S$GLB,,, | Performed by: INTERNAL MEDICINE

## 2022-02-28 PROCEDURE — 3288F FALL RISK ASSESSMENT DOCD: CPT | Mod: CPTII,S$GLB,, | Performed by: INTERNAL MEDICINE

## 2022-02-28 PROCEDURE — 99214 OFFICE O/P EST MOD 30 MIN: CPT | Mod: S$GLB,,, | Performed by: INTERNAL MEDICINE

## 2022-02-28 PROCEDURE — 77063 BREAST TOMOSYNTHESIS BI: CPT | Mod: TC

## 2022-02-28 PROCEDURE — 3008F BODY MASS INDEX DOCD: CPT | Mod: CPTII,S$GLB,, | Performed by: INTERNAL MEDICINE

## 2022-02-28 PROCEDURE — 77067 SCR MAMMO BI INCL CAD: CPT | Mod: TC

## 2022-02-28 PROCEDURE — 99999 PR PBB SHADOW E&M-EST. PATIENT-LVL III: CPT | Mod: PBBFAC,,, | Performed by: INTERNAL MEDICINE

## 2022-02-28 PROCEDURE — 3008F PR BODY MASS INDEX (BMI) DOCUMENTED: ICD-10-PCS | Mod: CPTII,S$GLB,, | Performed by: INTERNAL MEDICINE

## 2022-02-28 PROCEDURE — 77063 MAMMO DIGITAL SCREENING BILAT WITH TOMO: ICD-10-PCS | Mod: 26,,, | Performed by: RADIOLOGY

## 2022-02-28 RX ORDER — MIRTAZAPINE 30 MG/1
30 TABLET, ORALLY DISINTEGRATING ORAL DAILY
Qty: 90 TABLET | Refills: 2 | Status: SHIPPED | OUTPATIENT
Start: 2022-02-28 | End: 2023-03-13
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
## 2022-03-09 ENCOUNTER — TELEPHONE (OUTPATIENT)
Dept: INTERNAL MEDICINE | Facility: CLINIC | Age: 67
End: 2022-03-09
Payer: MEDICARE

## 2022-03-09 NOTE — TELEPHONE ENCOUNTER
----- Message from Bernie Ku sent at 3/9/2022  3:33 PM CST -----  Contact: [facility] [phone]  Pt needs referral sent to [facility] for pain management ! Due to her canacer diagnose     Pt wants to see doctor   Rowena Hsieh  at [facility]  Phone [phone]

## 2022-03-09 NOTE — TELEPHONE ENCOUNTER
I don't fully understand this message.  I sent message to Bhargavi-- left voice mail.  I am not sending anything to Recovery room.

## 2022-03-10 ENCOUNTER — TELEPHONE (OUTPATIENT)
Dept: INTERNAL MEDICINE | Facility: CLINIC | Age: 67
End: 2022-03-10
Payer: MEDICARE

## 2022-03-10 NOTE — TELEPHONE ENCOUNTER
----- Message from Chacho Hernandez sent at 3/10/2022 10:17 AM CST -----  Contact: Bhargavi([employer])  [phone]  Bhargavi from [facility] stated returning provider's call.    Please call and advise

## 2022-03-11 NOTE — TELEPHONE ENCOUNTER
Had to refuse.  [facility] is not a doctors office.  It is a medical spa without providing an y proven therapeutic services

## 2022-04-19 ENCOUNTER — TELEPHONE (OUTPATIENT)
Dept: INTERNAL MEDICINE | Facility: CLINIC | Age: 67
End: 2022-04-19
Payer: MEDICARE

## 2022-04-19 ENCOUNTER — TELEPHONE (OUTPATIENT)
Dept: CARDIOLOGY | Facility: CLINIC | Age: 67
End: 2022-04-19
Payer: MEDICARE

## 2022-04-19 NOTE — TELEPHONE ENCOUNTER
----- Message from Heather Rahman sent at 4/19/2022  9:41 AM CDT -----  Pt would like to be called back regarding  an appt    Pt can be reached at  [phone]

## 2022-04-19 NOTE — TELEPHONE ENCOUNTER
----- Message from Heather Rahman sent at 4/19/2022  9:52 AM CDT -----  Pt would like to be called back regarding an appt    Pt can be reached at  [phone]

## 2022-04-19 NOTE — TELEPHONE ENCOUNTER
Pt wants to schedule appointment in [place]. Informed her I would send this message to [facility] who does scheduling at that location. Told pt to go to the ER if any symptoms occur before we can get her in.

## 2022-04-19 NOTE — TELEPHONE ENCOUNTER
Tried contacting pt, no answer. Voicemail is full, did send sms message to pt phone via phone call option.     Giorgio MOORE

## 2022-04-19 NOTE — TELEPHONE ENCOUNTER
----- Message from Andrey Underwood sent at 4/19/2022 12:35 PM CDT -----  Contact: pt.  .Type:  Needs Medical Advice    Who Called: pt  Would the patient rather a call back or a response via [facility]? Call back  Best Call Back Number: [phone]   Additional Information: Pt. Is requesting a call back from Anna the nurse.

## 2022-04-20 ENCOUNTER — OFFICE VISIT (OUTPATIENT)
Dept: INTERNAL MEDICINE | Facility: CLINIC | Age: 67
End: 2022-04-20
Payer: MEDICARE

## 2022-04-20 ENCOUNTER — HOSPITAL ENCOUNTER (OUTPATIENT)
Dept: RADIOLOGY | Facility: HOSPITAL | Age: 67
Discharge: HOME | End: 2022-04-20
Attending: STUDENT IN AN ORGANIZED HEALTH CARE EDUCATION/TRAINING PROGRAM
Payer: MEDICARE

## 2022-04-20 ENCOUNTER — TELEPHONE (OUTPATIENT)
Dept: INTERNAL MEDICINE | Facility: CLINIC | Age: 67
End: 2022-04-20
Payer: MEDICARE

## 2022-04-20 VITALS
HEIGHT: 63 IN | SYSTOLIC BLOOD PRESSURE: 140 MMHG | OXYGEN SATURATION: 98 % | DIASTOLIC BLOOD PRESSURE: 86 MMHG | BODY MASS INDEX: 22.27 KG/M2 | HEART RATE: 87 BPM | WEIGHT: 125.69 LBS

## 2022-04-20 DIAGNOSIS — R42 LIGHTHEADEDNESS: ICD-10-CM

## 2022-04-20 DIAGNOSIS — M54.9 BACK PAIN, UNSPECIFIED BACK LOCATION, UNSPECIFIED BACK PAIN LATERALITY, UNSPECIFIED CHRONICITY: Primary | ICD-10-CM

## 2022-04-20 DIAGNOSIS — R10.31 INGUINAL PAIN, RIGHT: ICD-10-CM

## 2022-04-20 DIAGNOSIS — M54.9 BACK PAIN, UNSPECIFIED BACK LOCATION, UNSPECIFIED BACK PAIN LATERALITY, UNSPECIFIED CHRONICITY: ICD-10-CM

## 2022-04-20 DIAGNOSIS — C90.00 MULTIPLE MYELOMA NOT HAVING ACHIEVED REMISSION: ICD-10-CM

## 2022-04-20 PROCEDURE — 99999 PR PBB SHADOW E&M-EST. PATIENT-LVL III: CPT | Mod: PBBFAC,GC,, | Performed by: STUDENT IN AN ORGANIZED HEALTH CARE EDUCATION/TRAINING PROGRAM

## 2022-04-20 PROCEDURE — 99213 OFFICE O/P EST LOW 20 MIN: CPT | Mod: GC,S$GLB,, | Performed by: STUDENT IN AN ORGANIZED HEALTH CARE EDUCATION/TRAINING PROGRAM

## 2022-04-20 PROCEDURE — 73502 X-RAY EXAM HIP UNI 2-3 VIEWS: CPT | Mod: 26,RT,, | Performed by: RADIOLOGY

## 2022-04-20 PROCEDURE — 99213 PR OFFICE/OUTPT VISIT, EST, LEVL III, 20-29 MIN: ICD-10-PCS | Mod: GC,S$GLB,, | Performed by: STUDENT IN AN ORGANIZED HEALTH CARE EDUCATION/TRAINING PROGRAM

## 2022-04-20 PROCEDURE — 99999 PR PBB SHADOW E&M-EST. PATIENT-LVL III: ICD-10-PCS | Mod: PBBFAC,GC,, | Performed by: STUDENT IN AN ORGANIZED HEALTH CARE EDUCATION/TRAINING PROGRAM

## 2022-04-20 PROCEDURE — 73502 X-RAY EXAM HIP UNI 2-3 VIEWS: CPT | Mod: TC,RT

## 2022-04-20 PROCEDURE — 73502 XR HIP WITH PELVIS WHEN PERFORMED, 2 OR 3  VIEWS RIGHT: ICD-10-PCS | Mod: 26,RT,, | Performed by: RADIOLOGY

## 2022-04-20 PROCEDURE — 72100 X-RAY EXAM L-S SPINE 2/3 VWS: CPT | Mod: 26,,, | Performed by: RADIOLOGY

## 2022-04-20 PROCEDURE — 72100 XR LUMBAR SPINE AP AND LATERAL: ICD-10-PCS | Mod: 26,,, | Performed by: RADIOLOGY

## 2022-04-20 PROCEDURE — 72100 X-RAY EXAM L-S SPINE 2/3 VWS: CPT | Mod: TC

## 2022-04-20 NOTE — TELEPHONE ENCOUNTER
Pt scheduled for falls she had recently. Complains of some pain in her thighs and back. Wants to be evaluated properly for her recent falls.     Giorgio MOORE

## 2022-04-20 NOTE — TELEPHONE ENCOUNTER
----- Message from Corazon Silvestre sent at 4/20/2022 10:07 AM CDT -----  Regarding: Questions and Concerns  Contact: [phone]  Patient Janie is calling. Patient Asked to speak directly to nurse WILLINGHAM. Patient stated this is her 3rd call to the office. Please call patient at [phone]      Thank You

## 2022-04-20 NOTE — PROGRESS NOTES
Subjective     Chief Complaint: Falls and right leg pain    History of Present Illness:  Ms. Janie Zamorano is a 66 y.o. female multiple myeloma, schizophrenia, HTN, and erosive esophagitis presenting with lightheadedness, a fall and right leg pain. She notes her lightheadedness started around a year ago, occuring whenever she looks down and improving with sitting down. Denies any associated symptoms. She reports one fall about 1 month ago, where she was walking out of a store and looked down at a step, became lightheaded and fell backwards. No head trauma or loss of consciousness. She denies any dizziness with moving her head, or when switching positions.  Patient reports worsening lower back and right leg pain. Her pain improves with topical menthol/camphor/arnica cream. She notes she had a DEXA scan within this past year and has gotten Zometa.  Patient is currently getting Ninlaro and dexamethasone for her multiple myeloma.       Review of Systems   Constitutional: Positive for malaise/fatigue.   Eyes: Positive for blurred vision. Negative for double vision.   Cardiovascular: Negative for chest pain, palpitations, orthopnea and leg swelling.   Genitourinary: Negative for dysuria.   Musculoskeletal: Positive for joint pain (Right hip, knee and ankle) and myalgias (Right leg).   Neurological: Positive for dizziness (Lightheadedness). Negative for weakness.       PAST HISTORY:     Past Medical History:   Diagnosis Date    Anxiety     Asthma     Bronchitis     COPD (chronic obstructive pulmonary disease)     Depression     GERD (gastroesophageal reflux disease)     Hallucination     History of psychiatric hospitalization     Hypertension     Neck pain     Polyneuropathy in diseases classified elsewhere 2021    Schizophrenia     Sleep difficulties        Past Surgical History:   Procedure Laterality Date     SECTION      X 1    COLONOSCOPY N/A 2018    Surgeon: Albert LAUREN  MD Drew    ESOPHAGOGASTRODUODENOSCOPY N/A 7/17/2018    Surgeon: Albert Russell MD    HYSTERECTOMY  2016    [facility]---DLH/BSO    LIPOMA RESECTION      back  x 2    SALPINGOOPHORECTOMY Bilateral 2016    [facility]---DLH/BSO    THORACIC LAMINECTOMY WITH FUSION N/A 8/17/2018    Surgeon: He Andres MD       Family History   Problem Relation Age of Onset    Hypertension Mother     Glaucoma Mother     Macular degeneration Mother     Breast cancer Mother     Stroke Mother     COPD Father     Hypertension Father     Diabetes Brother     Glaucoma Sister     Liver cancer Paternal Uncle 75        dad brother ETOH    Ovarian cancer Neg Hx     Colon cancer Neg Hx     Colon polyps Neg Hx     Cirrhosis Neg Hx     Celiac disease Neg Hx     Ulcerative colitis Neg Hx     Hemochromatosis Neg Hx     Esophageal cancer Neg Hx     Anxiety disorder Neg Hx     Dementia Neg Hx     Bipolar disorder Neg Hx     Depression Neg Hx     Drug abuse Neg Hx     Schizophrenia Neg Hx     Suicide Neg Hx        Social History     Socioeconomic History    Marital status:     Number of children: 1    Years of education: 16    Highest education level: Bachelor's degree (e.g., BA, AB, BS)   Occupational History    Occupation: Disabled   Tobacco Use    Smoking status: Never Smoker    Smokeless tobacco: Never Used   Substance and Sexual Activity    Alcohol use: No     Alcohol/week: 0.0 standard drinks    Drug use: No    Sexual activity: Not Currently     Partners: Male     Birth control/protection: Post-menopausal   Social History Narrative    Pt was the 4th child of 4 girls and 2 boys from an intact family.  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  She  once at age 29 and  21 years later.  They had 1 son.  Pt lives with her son since back surgery 1 month ago.  She plans to return to live with 2 sisters  after she completes rehabilitation.  Hobbies include movies, spa care, and cooking.  She has no pets.  Pt attended [employer] services regularly until her back surgery.  9/19/2018     Social Determinants of Health     Financial Resource Strain: Medium Risk    Difficulty of Paying Living Expenses: Somewhat hard   Food Insecurity: Food Insecurity Present    Worried About Running Out of Food in the Last Year: Often true    Ran Out of Food in the Last Year: Often true   Transportation Needs: Unmet Transportation Needs    Lack of Transportation (Medical): Yes    Lack of Transportation (Non-Medical): Yes   Stress: Stress Concern Present    Feeling of Stress : To some extent   Social Connections: Moderately Isolated    Frequency of Communication with Friends and Family: Twice a week    Frequency of Social Gatherings with Friends and Family: Once a week    Attends Muslim Services: More than 4 times per year    Active Member of Clubs or Organizations: No    Attends Club or Organization Meetings: Never    Marital Status:    Housing Stability: Low Risk     Unable to Pay for Housing in the Last Year: No    Number of Places Lived in the Last Year: 1    Unstable Housing in the Last Year: No       MEDICATIONS & ALLERGIES:     Current Outpatient Medications on File Prior to Visit   Medication Sig    amLODIPine (NORVASC) 10 MG tablet Take 1 tablet (10 mg total) by mouth once daily.    ascorbic acid, vitamin C, (VITAMIN C) 1000 MG tablet Take 1,000 mg by mouth once daily.    aspirin (ECOTRIN) 81 MG EC tablet Take 81 mg by mouth once daily.    cyanocobalamin, vitamin B-12, (VITAMIN B-12 ORAL) Take 1 tablet by mouth daily as needed.    dexAMETHasone (DECADRON) 4 MG Tab     flaxseed oiL 1,000 mg Cap Take by mouth.    grape seed oil, bulk, 100 % Oil by Misc.(Non-Drug; Combo Route) route.    mirtazapine (REMERON SOL-TAB) 30 MG disintegrating tablet Take 1 tablet (30 mg total) by mouth once daily.     "multivitamin capsule Take 1 capsule by mouth once daily.    NINLARO 4 mg Cap     vitamin D (VITAMIN D3) 1000 units Tab Take 1,000 Units by mouth once daily.     No current facility-administered medications on file prior to visit.       Review of patient's allergies indicates:   Allergen Reactions    Iodine Hives    Shellfish containing products Itching       OBJECTIVE:     Vital Signs:  Vitals:    04/20/22 1404   BP: (!) 140/86   BP Location: Left arm   Patient Position: Sitting   BP Method: Medium (Automatic)   Pulse: 87   SpO2: 98%   Weight: 57 kg (125 lb 10.6 oz)   Height: 5' 3" (1.6 m)       Body mass index is 22.26 kg/m².     Physical Exam  Constitutional:       Appearance: Normal appearance.   HENT:      Head: Normocephalic and atraumatic.      Mouth/Throat:      Mouth: Mucous membranes are dry.   Eyes:      Extraocular Movements: Extraocular movements intact.      Pupils: Pupils are equal, round, and reactive to light.   Neck:      Vascular: No carotid bruit.   Cardiovascular:      Rate and Rhythm: Normal rate and regular rhythm.      Pulses: Normal pulses.      Heart sounds: Normal heart sounds. No murmur heard.  Pulmonary:      Effort: Pulmonary effort is normal. No respiratory distress.      Breath sounds: Normal breath sounds. No wheezing or rales.   Abdominal:      General: Abdomen is flat.      Palpations: Abdomen is soft.      Tenderness: There is no abdominal tenderness.   Musculoskeletal:         General: Normal range of motion.      Comments: Right inguinal and right buttock tenderness on palpation.  Right knee swelling and tenderness   Skin:     General: Skin is warm and dry.   Neurological:      General: No focal deficit present.      Mental Status: She is alert. Mental status is at baseline.      Cranial Nerves: No cranial nerve deficit.      Motor: Weakness (Strength 4/5 right leg) present.      Coordination: Coordination normal.      Comments: Use walker to ambulate, no muscle atrophy " noted.  No pronator drift  Normal finger to nose test           ASSESSMENT & PLAN:   Mrs. Zamorano follows up with oncology in [facility] and her records are not available in Epic. Will obtain imaging to evaluate for pain after her fall and will refer her to physical therapy. Patient deferring oral medications for pain. Discussed use of voltaren gel in areas of worst discomfort.   Unclear source of lightheadedness: physical exam unremarkable for carotid bruits, cardiac murmurs or neurological deficits. Will obtain lab work for further evaluation and have her follow up with her PCP.     Multiple myeloma not having achieved remission  Back pain, unspecified back location, unspecified back pain laterality, unspecified chronicity  -     X-Ray Lumbar Spine AP And Lateral; Future; Expected date: 04/20/2022  -     Ambulatory referral/consult to Physical/Occupational Therapy; Future; Expected date: 04/27/2022    Inguinal pain, right  -     X-Ray Hip 2 or 3 views Right (with Pelvis when performed); Future; Expected date: 04/20/2022    Lightheadedness  -     CBC Auto Differential; Future; Expected date: 04/20/2022  -     Comprehensive Metabolic Panel; Future; Expected date: 04/20/2022      RTC in 3 months    Discussed with Dr. Johnson  - staff attestation to follow      Anna Temple DO  Internal Medicine PGY2  [facility]  [address] [place] [address]

## 2022-04-21 ENCOUNTER — PATIENT MESSAGE (OUTPATIENT)
Dept: INTERNAL MEDICINE | Facility: CLINIC | Age: 67
End: 2022-04-21
Payer: MEDICARE

## 2022-04-21 NOTE — TELEPHONE ENCOUNTER
I called the number on the message but it was for a different patient. I also called Ms. Mondragon's number and left a message to call back if she left a message and needed help and I sent a message to Autumn advising that the number on this message does not belong to this patient.

## 2022-04-26 ENCOUNTER — TELEPHONE (OUTPATIENT)
Dept: INTERNAL MEDICINE | Facility: CLINIC | Age: 67
End: 2022-04-26
Payer: MEDICARE

## 2022-04-26 DIAGNOSIS — C79.9 MULTIPLE LESIONS OF METASTATIC MALIGNANCY: ICD-10-CM

## 2022-04-26 DIAGNOSIS — M25.559 HIP PAIN: Primary | ICD-10-CM

## 2022-04-26 NOTE — TELEPHONE ENCOUNTER
----- Message from Corazon Silvestre sent at 4/26/2022  4:39 PM CDT -----  Regarding: Questions and Concerns  Contact: [phone]  Patient Janie reardon. Patient would like to speak directly to the office. Please call patient at [phone]      Thank You

## 2022-05-02 ENCOUNTER — HOSPITAL ENCOUNTER (OUTPATIENT)
Dept: RADIOLOGY | Facility: HOSPITAL | Age: 67
Discharge: HOME | End: 2022-05-02
Attending: INTERNAL MEDICINE
Payer: MEDICARE

## 2022-05-02 DIAGNOSIS — C79.9 MULTIPLE LESIONS OF METASTATIC MALIGNANCY: ICD-10-CM

## 2022-05-02 DIAGNOSIS — M25.559 HIP PAIN: ICD-10-CM

## 2022-05-02 PROCEDURE — 72192 CT PELVIS WITHOUT CONTRAST: ICD-10-PCS | Mod: 26,,, | Performed by: RADIOLOGY

## 2022-05-02 PROCEDURE — 72192 CT PELVIS W/O DYE: CPT | Mod: TC

## 2022-05-02 PROCEDURE — 72192 CT PELVIS W/O DYE: CPT | Mod: 26,,, | Performed by: RADIOLOGY

## 2022-05-03 ENCOUNTER — TELEPHONE (OUTPATIENT)
Dept: INTERNAL MEDICINE | Facility: CLINIC | Age: 67
End: 2022-05-03
Payer: MEDICARE

## 2022-05-03 NOTE — TELEPHONE ENCOUNTER
----- Message from Catherine Hanson sent at 5/2/2022  5:20 PM CDT -----  Regarding: Patient advice  Contact: Pt  Pt called in regards to getting pain medication       Please advise , Pt can be reached at [phone]

## 2022-05-03 NOTE — TELEPHONE ENCOUNTER
Pt wants to know if you can order her a steroid shot for pain on her right side from the hip down?? Please advise.     Giorgio MOORE

## 2022-05-04 ENCOUNTER — TELEPHONE (OUTPATIENT)
Dept: INTERNAL MEDICINE | Facility: CLINIC | Age: 67
End: 2022-05-04
Payer: MEDICARE

## 2022-05-04 ENCOUNTER — PATIENT OUTREACH (OUTPATIENT)
Dept: ADMINISTRATIVE | Facility: OTHER | Age: 67
End: 2022-05-04
Payer: MEDICARE

## 2022-05-04 DIAGNOSIS — M25.559 HIP PAIN: Primary | ICD-10-CM

## 2022-05-04 NOTE — TELEPHONE ENCOUNTER
Hi please call her, I am covcering Dr He Hoyt MD.  Her pelvic CT scan shows multiple myeloma in her right hip bone. I recommend that she see a special orthopedic or bone doctor who helps patients with cancer in the bone.  please  assist in scheduling    Orders Placed This Encounter    Ambulatory referral/consult to Orthopedics     Let me know if patient has any questions.  Thank you, Matthew Hoyt MD

## 2022-05-04 NOTE — TELEPHONE ENCOUNTER
----- Message from Renita Alex sent at 5/4/2022  1:37 PM CDT -----  Name Of Caller: Janie     Provider Name: He Hoyt Jr.,     Does patient feel the need to be seen today? No     Relationship to the Pt?: pt    Contact Preference?: [phone]    What is the nature of the call?:Pt called and would like to send message to Yadira she spoke to your earlier and wants you to fax her Hematology oncology Dr. Marco Antonio Crenshaw fax [phone]  And the number is [phone]    Fax the CT report

## 2022-05-04 NOTE — TELEPHONE ENCOUNTER
----- Message from Tamiko Isaac sent at 5/4/2022 11:20 AM CDT -----  Contact: [phone] Patient  Patient is returning a phone call.  Who left a message for the patient: Yadira Knight LPN   Does patient know what this is regarding:  hip pain  Would you like a call back, or a response through your [facility] portal?:  call back  Comments:

## 2022-05-04 NOTE — TELEPHONE ENCOUNTER
Spoke to patient and advised that she would have to sigh a release  of information before it can be faxed. Patient verbalized understanding

## 2022-05-04 NOTE — PROGRESS NOTES
I have reviewed the notes, assessments, and/or procedures performed by Dr Temple, I concur with her documentation of Janie Zamorano. Right iliac one lytic lesions observed on XRay. Subsequent CT scan confirmed. Patient's multiple myeloma being managed by outside provider. Details of her treatment plan unclear. Urgent follow up with oncologist advised.

## 2022-05-04 NOTE — TELEPHONE ENCOUNTER
----- Message from Jagdeep Sanderson sent at 5/4/2022  4:15 PM CDT -----  Regarding: Return Call  Contact: [phone]  Patient Returning Call    Who Called: Janie    Who Left Message for Patient: Yadira Knight LPN    Does the patient know what this is regarding? Paperwork    Would the patient rather a call back or a response via [facility]? Call Back    Best Call Back Number:[phone]

## 2022-05-05 NOTE — PROGRESS NOTES
Requested updates within Care Everywhere.  Patient's chart was reviewed for overdue PRAKASH topics.  Health maintenance:updated  Immunizations:reconciled   Legacy:   Media:  Orders placed:  Tasked appts:  Labs Linked:  Upcoming appt:

## 2022-05-12 ENCOUNTER — TELEPHONE (OUTPATIENT)
Dept: INTERNAL MEDICINE | Facility: CLINIC | Age: 67
End: 2022-05-12
Payer: MEDICARE

## 2022-05-12 NOTE — TELEPHONE ENCOUNTER
----- Message from Tamiko Isaac sent at 5/12/2022  4:50 PM CDT -----  Contact: Megan with  [phone]  Megan states the pt is requesting a power scooter. Megan states there needs to be a face to face evaluation. Megan states the Rx, MD orders, CMN and clinic notes can be faxed to

## 2022-05-13 ENCOUNTER — TELEPHONE (OUTPATIENT)
Dept: INTERNAL MEDICINE | Facility: CLINIC | Age: 67
End: 2022-05-13
Payer: MEDICARE

## 2022-05-13 ENCOUNTER — NURSE TRIAGE (OUTPATIENT)
Dept: ADMINISTRATIVE | Facility: CLINIC | Age: 67
End: 2022-05-13
Payer: MEDICARE

## 2022-05-13 NOTE — TELEPHONE ENCOUNTER
----- Message from Aaliyah Presley sent at 5/13/2022  9:44 AM CDT -----  .Type:  Needs Medical Advice    Who Called: PT  Symptoms (please be specific):    How long has patient had these symptoms:    Pharmacy name and phone #:    Would the patient rather a call back or a response via [facility]?   Best Call Back Number: [phone]  Additional Information: PT DOESN'T KNOW WHO IS THIS PROVIDER AND WHY SCHED FOR REHAB  ?

## 2022-05-13 NOTE — TELEPHONE ENCOUNTER
Tried calling pt to schedule evaluation appt for scooter but no answer. Unable to leave voicemail.    Giorgio MOORE

## 2022-05-13 NOTE — TELEPHONE ENCOUNTER
So couple of things.  1.  I don't do scooter evals.  That is PMR.  I will be happy to put a referral in, but she has not discussed this with me.  I get a lot of calls from/concerning her  .   A lot of time the request are unorganized and she asks for a lot of stuff that are not appropriate like the referral to the local vitamin infusion [facility].   We can discuss this at her next visit or sooner if she needs

## 2022-05-13 NOTE — TELEPHONE ENCOUNTER
Call x2 no answer, voicemail box full    Reason for Disposition   Second attempt to contact caller AND no contact made. Phone number verified.    Protocols used: NO CONTACT OR DUPLICATE CONTACT CALL-A-AH

## 2022-05-13 NOTE — TELEPHONE ENCOUNTER
----- Message from Aaliyah Shabazzroe sent at 5/13/2022  9:41 AM CDT -----  Type:  Sooner Apoointment Request    Caller is requesting a sooner appointment.  Caller declined first available appointment listed below.  Caller will not accept being placed on the waitlist and is requesting a message be sent to doctor.  Name of Caller:PT  When is the first available appointment? JULY  Symptoms: EVAL FOR MOTOR SCOOTER  Would the patient rather a call back or a response via [facility]? CALL  Best Call Back Number:[phone]  Additional Information:

## 2022-05-17 ENCOUNTER — TELEPHONE (OUTPATIENT)
Dept: INTERNAL MEDICINE | Facility: CLINIC | Age: 67
End: 2022-05-17
Payer: MEDICARE

## 2022-05-17 DIAGNOSIS — Z00.00 EVALUATION BY MEDICAL SERVICE REQUIRED: Primary | ICD-10-CM

## 2022-05-17 NOTE — TELEPHONE ENCOUNTER
----- Message from Ana Paula Boggs sent at 5/17/2022 11:12 AM CDT -----  Contact: Patient  Type:  Requesting a call       Name of Caller:Patient   Patient stated regarding medical equipment   Would the patient rather a call back or a response via [facility]? Call back   Best Call Back Number:[phone]  Additional Information: Please  advice

## 2022-05-18 NOTE — TELEPHONE ENCOUNTER
Called pt to schedule referral, unable to schedule. Pt given physical medicine dept number directly to schedule.    Giorgio MOORE

## 2022-05-23 ENCOUNTER — TELEPHONE (OUTPATIENT)
Dept: INTERNAL MEDICINE | Facility: CLINIC | Age: 67
End: 2022-05-23
Payer: MEDICARE

## 2022-05-23 NOTE — TELEPHONE ENCOUNTER
----- Message from Stanford Anderson sent at 5/23/2022  9:37 AM CDT -----  Contact: [facility] [phone]  Gio is walling to get an rx for a POB scooter and clinic notes Fax # [phone].

## 2022-05-24 ENCOUNTER — OFFICE VISIT (OUTPATIENT)
Dept: INTERNAL MEDICINE | Facility: CLINIC | Age: 67
End: 2022-05-24
Payer: MEDICARE

## 2022-05-24 DIAGNOSIS — R26.2 WALKING DIFFICULTY DUE TO MULTIPLE SITES: ICD-10-CM

## 2022-05-24 DIAGNOSIS — F20.0 PARANOID SCHIZOPHRENIA: ICD-10-CM

## 2022-05-24 DIAGNOSIS — M43.25 FUSION OF SPINE OF THORACOLUMBAR REGION: Primary | ICD-10-CM

## 2022-05-24 DIAGNOSIS — C79.9 MULTIPLE LESIONS OF METASTATIC MALIGNANCY: ICD-10-CM

## 2022-05-24 PROBLEM — C90.00 MULTIPLE MYELOMA: Status: ACTIVE | Noted: 2019-09-23

## 2022-05-24 PROCEDURE — 99443 PR PHYSICIAN TELEPHONE EVALUATION 21-30 MIN: ICD-10-PCS | Mod: 95,,, | Performed by: INTERNAL MEDICINE

## 2022-05-24 PROCEDURE — 99443 PR PHYSICIAN TELEPHONE EVALUATION 21-30 MIN: CPT | Mod: 95,,, | Performed by: INTERNAL MEDICINE

## 2022-05-24 RX ORDER — LENALIDOMIDE 25 MG/1
CAPSULE ORAL
COMMUNITY
Start: 2021-12-06 | End: 2023-01-26

## 2022-05-24 RX ORDER — AMLODIPINE BESYLATE AND ATORVASTATIN CALCIUM 10; 10 MG/1; MG/1
1 TABLET, FILM COATED ORAL
COMMUNITY
End: 2022-05-24

## 2022-05-24 RX ORDER — GABAPENTIN 300 MG/1
300 CAPSULE ORAL 3 TIMES DAILY
COMMUNITY
Start: 2022-05-20 | End: 2023-03-13

## 2022-05-24 NOTE — PROGRESS NOTES
"Established Patient - Audio Only Telehealth Visit     The patient location is: [place]  The chief complaint leading to consultation is: difficulty walking   Visit type: Virtual visit with audio only (telephone)  Total time spent with patient: 24 minutes        The reason for the audio only service rather than synchronous audio and video virtual visit was related to technical difficulties or patient preference/necessity.     Each patient to whom I provide medical services by telemedicine is:  (1) informed of the relationship between the physician and patient and the respective role of any other health care provider with respect to management of the patient; and (2) notified that they may decline to receive medical services by telemedicine and may withdraw from such care at any time. Patient verbally consented to receive this service via voice-only telephone call.       HPI:     Past Medical History:   Diagnosis Date    Anxiety     Asthma     Bronchitis     COPD (chronic obstructive pulmonary disease)     Depression     GERD (gastroesophageal reflux disease)     Hallucination     History of psychiatric hospitalization     Hypertension     Neck pain     Polyneuropathy in diseases classified elsewhere 4/12/2021    Schizophrenia     Sleep difficulties                 Fell in restaurant.  Went to hospital and they ran extensive test test on her and SHe "was hardly walking." She  can't walk right now at all.  She states she can't do with a wheelchair. Things are too have  For her to hold or push .  She had Multiple myolemma r.  Going to pt Wednesday and see oncologist afterwards for treatment. She says she has no broken bones but bruises a lot.  She is getting Treatment at [facility]  For her multiple myeloma and her bone pain so I don't have all their records.  She also states "only God knows what is going on.  "    I have told her that usually I send people to [facility] for scooter evaluation  but she has assured me " "that she spoke with [employer] and the only thing She needs is for me to send order in to [employer].  I have not seen her since the  fall or since Feb of this year.  She tells me she is taking gabapentin for pain and something that starts with a "z" for muscle relaxer. 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  She says she is getting cryotherapy and heat therapy for  All her aches and pains and they help.  She says she is unable to drive.  She says she can;''t  legs to drive , press brake/acclerator.       Assessment and plan:  Difficulty walking: I offered to have her set up with PMR for a scooter evaluation.  She said " I got nuts and bolts in my back and that should tell people that I needs a scooter"      Will put in order for scooter    She will follow up at [facility] for her PT and her cancer treatment.  I think she is still see Psych over there also.                   This service was not originating from a related E/M service provided within the previous 7 days nor will  to an E/M service or procedure within the next 24 hours or my soonest available appointment.  Prevailing standard of care was able to be met in this audio-only visit.        "

## 2022-07-13 RX ORDER — AMLODIPINE BESYLATE 10 MG/1
TABLET ORAL
Qty: 90 TABLET | Refills: 3 | Status: CANCELLED | OUTPATIENT
Start: 2022-07-13

## 2022-07-13 NOTE — TELEPHONE ENCOUNTER
Care Due:                  Date            Visit Type   Department     Provider  --------------------------------------------------------------------------------                                VIRTUAL      [facility] INTERNAL  Last Visit: 05-      AUDIO ONLY   MEDICINE       He Hoyt                              EP -                              PRIMARY      [facility] INTERNAL  Next Visit: 09-      CARE ([facility])   MEDICINE       He Hoyt                                                            Last  Test          Frequency    Reason                     Performed    Due Date  --------------------------------------------------------------------------------    Lipid Panel.  12 months..  mirtazapine..............  Not Found    Overdue    [employer] Embedded Care Gaps. Reference number: [id number]. 7/13/2022   3:04:18 PM CDT

## 2022-07-15 RX ORDER — AMLODIPINE BESYLATE 10 MG/1
TABLET ORAL
Qty: 90 TABLET | Refills: 3 | Status: SHIPPED | OUTPATIENT
Start: 2022-07-15 | End: 2022-10-10

## 2022-07-15 NOTE — TELEPHONE ENCOUNTER
Refill Routing Note   Medication(s) are not appropriate for processing by [facility] for the following reason(s):      - Required vitals are abnormal    [facility] action(s):  Defer Medication-related problems identified: Requires labs        Medication reconciliation completed: No     Appointments  past 12m or future 3m with PCP    Date Provider   Last Visit   5/24/2022 He Hoyt Jr., MD   Next Visit   9/29/2022 He Hoyt Jr., MD   ED visits in past 90 days: 0        Note composed:9:59 PM 07/14/2022

## 2022-08-31 DIAGNOSIS — Z78.0 MENOPAUSE: ICD-10-CM

## 2022-09-23 ENCOUNTER — TELEPHONE (OUTPATIENT)
Dept: INTERNAL MEDICINE | Facility: CLINIC | Age: 67
End: 2022-09-23
Payer: MEDICARE

## 2022-09-23 DIAGNOSIS — C79.9 MULTIPLE LESIONS OF METASTATIC MALIGNANCY: ICD-10-CM

## 2022-09-23 DIAGNOSIS — M43.25 FUSION OF SPINE OF THORACOLUMBAR REGION: Primary | ICD-10-CM

## 2022-09-23 NOTE — TELEPHONE ENCOUNTER
----- Message from Bernie Ku sent at 9/23/2022  8:32 AM CDT -----  Contact: [facility] [phone] ext [phone]  Needs orders faxed for Ravin tang.    Fax [phone]

## 2022-12-20 ENCOUNTER — TELEPHONE (OUTPATIENT)
Dept: INTERNAL MEDICINE | Facility: CLINIC | Age: 67
End: 2022-12-20
Payer: MEDICARE

## 2022-12-20 NOTE — TELEPHONE ENCOUNTER
----- Message from Deborah Delong sent at 12/20/2022 11:41 AM CST -----  Contact: pt/[phone]  Type:  Sooner Apoointment Request    Caller is requesting a sooner appointment.  Name of Caller:PT   When is the first available appointment?03/01/2023  Symptoms:N/A    Would the patient rather a call back or a response via [facility]? CALL   Best Call Back Number:[phone]  Additional Information: Pt  is requesting a call  from nurse  regarding  a  sooner  date

## 2023-01-11 ENCOUNTER — TELEPHONE (OUTPATIENT)
Dept: INTERNAL MEDICINE | Facility: CLINIC | Age: 68
End: 2023-01-11
Payer: MEDICARE

## 2023-01-11 NOTE — TELEPHONE ENCOUNTER
----- Message from Benita Bliss sent at 1/10/2023  1:57 PM CST -----  Regardinnd message  Contact: [phone]  Patient called, in regards f/u on call from Dr Hoyt's appointment. Patient stated that she was offered for something in March and now the next available is until April. Please call and advise. Thank you

## 2023-01-26 ENCOUNTER — OFFICE VISIT (OUTPATIENT)
Dept: INTERNAL MEDICINE | Facility: CLINIC | Age: 68
End: 2023-01-26
Payer: MEDICARE

## 2023-01-26 VITALS
HEIGHT: 63 IN | OXYGEN SATURATION: 98 % | WEIGHT: 120.13 LBS | SYSTOLIC BLOOD PRESSURE: 180 MMHG | BODY MASS INDEX: 21.29 KG/M2 | DIASTOLIC BLOOD PRESSURE: 110 MMHG | HEART RATE: 84 BPM

## 2023-01-26 DIAGNOSIS — I16.0 HYPERTENSIVE URGENCY: Primary | ICD-10-CM

## 2023-01-26 DIAGNOSIS — I15.8 OTHER SECONDARY HYPERTENSION: ICD-10-CM

## 2023-01-26 DIAGNOSIS — C90.00 MULTIPLE MYELOMA NOT HAVING ACHIEVED REMISSION: ICD-10-CM

## 2023-01-26 DIAGNOSIS — C79.9 MULTIPLE LESIONS OF METASTATIC MALIGNANCY: ICD-10-CM

## 2023-01-26 PROCEDURE — 1160F PR REVIEW ALL MEDS BY PRESCRIBER/CLIN PHARMACIST DOCUMENTED: ICD-10-PCS | Mod: CPTII,S$GLB,, | Performed by: INTERNAL MEDICINE

## 2023-01-26 PROCEDURE — 1160F RVW MEDS BY RX/DR IN RCRD: CPT | Mod: CPTII,S$GLB,, | Performed by: INTERNAL MEDICINE

## 2023-01-26 PROCEDURE — 99499 RISK ADDL DX/OHS AUDIT: ICD-10-PCS | Mod: S$GLB,,, | Performed by: INTERNAL MEDICINE

## 2023-01-26 PROCEDURE — 1159F PR MEDICATION LIST DOCUMENTED IN MEDICAL RECORD: ICD-10-PCS | Mod: CPTII,S$GLB,, | Performed by: INTERNAL MEDICINE

## 2023-01-26 PROCEDURE — 99999 PR PBB SHADOW E&M-EST. PATIENT-LVL IV: CPT | Mod: PBBFAC,,, | Performed by: INTERNAL MEDICINE

## 2023-01-26 PROCEDURE — 3288F PR FALLS RISK ASSESSMENT DOCUMENTED: ICD-10-PCS | Mod: CPTII,S$GLB,, | Performed by: INTERNAL MEDICINE

## 2023-01-26 PROCEDURE — 1159F MED LIST DOCD IN RCRD: CPT | Mod: CPTII,S$GLB,, | Performed by: INTERNAL MEDICINE

## 2023-01-26 PROCEDURE — 1125F AMNT PAIN NOTED PAIN PRSNT: CPT | Mod: CPTII,S$GLB,, | Performed by: INTERNAL MEDICINE

## 2023-01-26 PROCEDURE — 3077F SYST BP >= 140 MM HG: CPT | Mod: CPTII,S$GLB,, | Performed by: INTERNAL MEDICINE

## 2023-01-26 PROCEDURE — 3080F DIAST BP >= 90 MM HG: CPT | Mod: CPTII,S$GLB,, | Performed by: INTERNAL MEDICINE

## 2023-01-26 PROCEDURE — 3288F FALL RISK ASSESSMENT DOCD: CPT | Mod: CPTII,S$GLB,, | Performed by: INTERNAL MEDICINE

## 2023-01-26 PROCEDURE — 1101F PT FALLS ASSESS-DOCD LE1/YR: CPT | Mod: CPTII,S$GLB,, | Performed by: INTERNAL MEDICINE

## 2023-01-26 PROCEDURE — 3077F PR MOST RECENT SYSTOLIC BLOOD PRESSURE >= 140 MM HG: ICD-10-PCS | Mod: CPTII,S$GLB,, | Performed by: INTERNAL MEDICINE

## 2023-01-26 PROCEDURE — 3080F PR MOST RECENT DIASTOLIC BLOOD PRESSURE >= 90 MM HG: ICD-10-PCS | Mod: CPTII,S$GLB,, | Performed by: INTERNAL MEDICINE

## 2023-01-26 PROCEDURE — 3008F BODY MASS INDEX DOCD: CPT | Mod: CPTII,S$GLB,, | Performed by: INTERNAL MEDICINE

## 2023-01-26 PROCEDURE — 99213 PR OFFICE/OUTPT VISIT, EST, LEVL III, 20-29 MIN: ICD-10-PCS | Mod: S$GLB,,, | Performed by: INTERNAL MEDICINE

## 2023-01-26 PROCEDURE — 99499 UNLISTED E&M SERVICE: CPT | Mod: S$GLB,,, | Performed by: INTERNAL MEDICINE

## 2023-01-26 PROCEDURE — 99999 PR PBB SHADOW E&M-EST. PATIENT-LVL IV: ICD-10-PCS | Mod: PBBFAC,,, | Performed by: INTERNAL MEDICINE

## 2023-01-26 PROCEDURE — 99213 OFFICE O/P EST LOW 20 MIN: CPT | Mod: S$GLB,,, | Performed by: INTERNAL MEDICINE

## 2023-01-26 PROCEDURE — 1125F PR PAIN SEVERITY QUANTIFIED, PAIN PRESENT: ICD-10-PCS | Mod: CPTII,S$GLB,, | Performed by: INTERNAL MEDICINE

## 2023-01-26 PROCEDURE — 3008F PR BODY MASS INDEX (BMI) DOCUMENTED: ICD-10-PCS | Mod: CPTII,S$GLB,, | Performed by: INTERNAL MEDICINE

## 2023-01-26 PROCEDURE — 1101F PR PT FALLS ASSESS DOC 0-1 FALLS W/OUT INJ PAST YR: ICD-10-PCS | Mod: CPTII,S$GLB,, | Performed by: INTERNAL MEDICINE

## 2023-01-26 RX ORDER — IBUPROFEN 600 MG/1
600 TABLET ORAL EVERY 6 HOURS PRN
COMMUNITY
Start: 2022-12-16 | End: 2023-03-13

## 2023-01-26 RX ORDER — VALSARTAN 80 MG/1
80 TABLET ORAL DAILY
Qty: 90 TABLET | Refills: 3 | Status: ON HOLD | OUTPATIENT
Start: 2023-01-26 | End: 2023-02-27 | Stop reason: HOSPADM

## 2023-01-26 RX ORDER — HYDROCHLOROTHIAZIDE 25 MG/1
25 TABLET ORAL
Status: ON HOLD | COMMUNITY
Start: 2022-11-23 | End: 2023-02-27 | Stop reason: HOSPADM

## 2023-01-26 NOTE — PROGRESS NOTES
Subjective:       Patient ID: Janie Zamorano is a 67 y.o. female.    Chief Complaint:   Hypertension    HPI - Complex patient of Dr. Hoyt's who arrived 20 minutes late for her 20-minute appointment.  Here for BP check and medication adjustment.  She is taking dexamethasone for cancer treatment and has been using ibuprofen for pain relief.  Seen at [facility], so records incomplete here.    Pmh/meds:  Reviewed and reconciled in EPIC with patient during visit today.    Review of Systems   Constitutional:  Negative for fever.   HENT:  Negative for congestion.    Respiratory:  Negative for shortness of breath.    Cardiovascular:  Negative for chest pain.   Genitourinary:  Negative for difficulty urinating.   Musculoskeletal:  Positive for arthralgias, myalgias and neck pain.   Skin:  Negative for rash.   Neurological:  Negative for headaches.   Psychiatric/Behavioral:  Negative for sleep disturbance.      Objective:      Physical Exam  Constitutional:       Comments: Fatigued-appearing woman   HENT:      Head: Normocephalic.   Pulmonary:      Effort: Pulmonary effort is normal.   Neurological:      General: No focal deficit present.      Mental Status: She is alert.   Psychiatric:         Mood and Affect: Mood normal.       Assessment:       1. Hypertensive urgency    2. Other secondary hypertension    3. Multiple lesions of metastatic malignancy    4. Multiple myeloma not having achieved remission          Plan:       Janie was seen today for hypertension.    Diagnoses and all orders for this visit:    Hypertensive urgency - considered ER referral, but she has no symptoms o/t elevated BP.  Will start valsartan now; f/u with primary in 2-3 weeks for recheck   -     valsartan (DIOVAN) 80 MG tablet; Take 1 tablet (80 mg total) by mouth once daily.    Other secondary hypertension  -     valsartan (DIOVAN) 80 MG tablet; Take 1 tablet (80 mg total) by mouth once daily.    Multiple lesions of metastatic malignancy -  noted in record    Multiple myeloma not having achieved remission - noted in record.    Rtc prn    G Laura Jiang MD MPH  Staff Internist

## 2023-02-21 ENCOUNTER — HOSPITAL ENCOUNTER (INPATIENT)
Facility: HOSPITAL | Age: 68
LOS: 4 days | Discharge: HOME | DRG: 872 | End: 2023-02-27
Attending: EMERGENCY MEDICINE | Admitting: HOSPITALIST
Payer: MEDICARE

## 2023-02-21 DIAGNOSIS — S09.90XA TRAUMATIC INJURY OF HEAD, INITIAL ENCOUNTER: ICD-10-CM

## 2023-02-21 DIAGNOSIS — R55 SYNCOPE: Primary | ICD-10-CM

## 2023-02-21 DIAGNOSIS — R07.9 CHEST PAIN: ICD-10-CM

## 2023-02-21 DIAGNOSIS — N17.9 AKI (ACUTE KIDNEY INJURY): ICD-10-CM

## 2023-02-21 DIAGNOSIS — R00.0 TACHYCARDIA: ICD-10-CM

## 2023-02-21 LAB
ALBUMIN SERPL BCP-MCNC: 2.8 G/DL (ref 3.5–5.2)
ALP SERPL-CCNC: 94 U/L (ref 55–135)
ALT SERPL W/O P-5'-P-CCNC: 20 U/L (ref 10–44)
ANION GAP SERPL CALC-SCNC: 15 MMOL/L (ref 8–16)
AST SERPL-CCNC: 22 U/L (ref 10–40)
BASOPHILS # BLD AUTO: 0 K/UL (ref 0–0.2)
BASOPHILS NFR BLD: 0 % (ref 0–1.9)
BILIRUB SERPL-MCNC: 0.5 MG/DL (ref 0.1–1)
BUN SERPL-MCNC: 41 MG/DL (ref 8–23)
CALCIUM SERPL-MCNC: 9.1 MG/DL (ref 8.7–10.5)
CHLORIDE SERPL-SCNC: 98 MMOL/L (ref 95–110)
CO2 SERPL-SCNC: 23 MMOL/L (ref 23–29)
CREAT SERPL-MCNC: 2.1 MG/DL (ref 0.5–1.4)
D DIMER PPP IA.FEU-MCNC: 4.31 MG/L FEU
DIFFERENTIAL METHOD: ABNORMAL
EOSINOPHIL # BLD AUTO: 0 K/UL (ref 0–0.5)
EOSINOPHIL NFR BLD: 0 % (ref 0–8)
ERYTHROCYTE [DISTWIDTH] IN BLOOD BY AUTOMATED COUNT: 15.2 % (ref 11.5–14.5)
EST. GFR  (NO RACE VARIABLE): 25.5 ML/MIN/1.73 M^2
GLUCOSE SERPL-MCNC: 101 MG/DL (ref 70–110)
HCT VFR BLD AUTO: 31.1 % (ref 37–48.5)
HGB BLD-MCNC: 9.6 G/DL (ref 12–16)
IMM GRANULOCYTES # BLD AUTO: 0.04 K/UL (ref 0–0.04)
IMM GRANULOCYTES NFR BLD AUTO: 0.4 % (ref 0–0.5)
LYMPHOCYTES # BLD AUTO: 0.2 K/UL (ref 1–4.8)
LYMPHOCYTES NFR BLD: 2.7 % (ref 18–48)
MAGNESIUM SERPL-MCNC: 2.6 MG/DL (ref 1.6–2.6)
MCH RBC QN AUTO: 27.9 PG (ref 27–31)
MCHC RBC AUTO-ENTMCNC: 30.9 G/DL (ref 32–36)
MCV RBC AUTO: 90 FL (ref 82–98)
MONOCYTES # BLD AUTO: 0.4 K/UL (ref 0.3–1)
MONOCYTES NFR BLD: 4.9 % (ref 4–15)
NEUTROPHILS # BLD AUTO: 8.3 K/UL (ref 1.8–7.7)
NEUTROPHILS NFR BLD: 92 % (ref 38–73)
NRBC BLD-RTO: 0 /100 WBC
PLATELET # BLD AUTO: 343 K/UL (ref 150–450)
PMV BLD AUTO: 9.5 FL (ref 9.2–12.9)
POTASSIUM SERPL-SCNC: 3.5 MMOL/L (ref 3.5–5.1)
PROT SERPL-MCNC: 7.1 G/DL (ref 6–8.4)
RBC # BLD AUTO: 3.44 M/UL (ref 4–5.4)
SODIUM SERPL-SCNC: 136 MMOL/L (ref 136–145)
TROPONIN I SERPL DL<=0.01 NG/ML-MCNC: 0.01 NG/ML (ref 0–0.03)
WBC # BLD AUTO: 8.99 K/UL (ref 3.9–12.7)

## 2023-02-21 PROCEDURE — 84484 ASSAY OF TROPONIN QUANT: CPT

## 2023-02-21 PROCEDURE — 99285 EMERGENCY DEPT VISIT HI MDM: CPT | Mod: ,,, | Performed by: EMERGENCY MEDICINE

## 2023-02-21 PROCEDURE — 93010 EKG 12-LEAD: ICD-10-PCS | Mod: ,,, | Performed by: INTERNAL MEDICINE

## 2023-02-21 PROCEDURE — 85379 FIBRIN DEGRADATION QUANT: CPT

## 2023-02-21 PROCEDURE — 99285 EMERGENCY DEPT VISIT HI MDM: CPT | Mod: 25

## 2023-02-21 PROCEDURE — 93010 ELECTROCARDIOGRAM REPORT: CPT | Mod: ,,, | Performed by: INTERNAL MEDICINE

## 2023-02-21 PROCEDURE — 85025 COMPLETE CBC W/AUTO DIFF WBC: CPT

## 2023-02-21 PROCEDURE — 25000003 PHARM REV CODE 250

## 2023-02-21 PROCEDURE — 93005 ELECTROCARDIOGRAM TRACING: CPT

## 2023-02-21 PROCEDURE — 83735 ASSAY OF MAGNESIUM: CPT

## 2023-02-21 PROCEDURE — 96361 HYDRATE IV INFUSION ADD-ON: CPT

## 2023-02-21 PROCEDURE — 80053 COMPREHEN METABOLIC PANEL: CPT

## 2023-02-21 PROCEDURE — 99285 PR EMERGENCY DEPT VISIT,LEVEL V: ICD-10-PCS | Mod: ,,, | Performed by: EMERGENCY MEDICINE

## 2023-02-21 PROCEDURE — 96360 HYDRATION IV INFUSION INIT: CPT

## 2023-02-21 RX ADMIN — SODIUM CHLORIDE 1000 ML: 9 INJECTION, SOLUTION INTRAVENOUS at 09:02

## 2023-02-22 PROBLEM — R55 SYNCOPE: Status: ACTIVE | Noted: 2023-02-22

## 2023-02-22 LAB
ANION GAP SERPL CALC-SCNC: 12 MMOL/L (ref 8–16)
ASCENDING AORTA: 2.92 CM
AV INDEX (PROSTH): 0.69
AV MEAN GRADIENT: 8 MMHG
AV PEAK GRADIENT: 14 MMHG
AV VALVE AREA: 2.44 CM2
AV VELOCITY RATIO: 0.7
BACTERIA #/AREA URNS AUTO: ABNORMAL /HPF
BASOPHILS # BLD AUTO: 0 K/UL (ref 0–0.2)
BASOPHILS NFR BLD: 0 % (ref 0–1.9)
BILIRUB UR QL STRIP: NEGATIVE
BSA FOR ECHO PROCEDURE: 1.56 M2
BUN SERPL-MCNC: 37 MG/DL (ref 8–23)
CALCIUM SERPL-MCNC: 8.5 MG/DL (ref 8.7–10.5)
CHLORIDE SERPL-SCNC: 102 MMOL/L (ref 95–110)
CLARITY UR REFRACT.AUTO: ABNORMAL
CO2 SERPL-SCNC: 22 MMOL/L (ref 23–29)
COLOR UR AUTO: YELLOW
CREAT SERPL-MCNC: 1.8 MG/DL (ref 0.5–1.4)
CV ECHO LV RWT: 0.59 CM
DIFFERENTIAL METHOD: ABNORMAL
DOP CALC AO PEAK VEL: 1.86 M/S
DOP CALC AO VTI: 28.87 CM
DOP CALC LVOT AREA: 3.5 CM2
DOP CALC LVOT DIAMETER: 2.12 CM
DOP CALC LVOT PEAK VEL: 1.31 M/S
DOP CALC LVOT STROKE VOLUME: 70.35 CM3
DOP CALCLVOT PEAK VEL VTI: 19.94 CM
E WAVE DECELERATION TIME: 191.32 MSEC
E/A RATIO: 0.55
E/E' RATIO: 10.6 M/S
ECHO LV POSTERIOR WALL: 1.16 CM (ref 0.6–1.1)
EJECTION FRACTION: 70 %
EOSINOPHIL # BLD AUTO: 0 K/UL (ref 0–0.5)
EOSINOPHIL NFR BLD: 0 % (ref 0–8)
ERYTHROCYTE [DISTWIDTH] IN BLOOD BY AUTOMATED COUNT: 15.3 % (ref 11.5–14.5)
EST. GFR  (NO RACE VARIABLE): 30.7 ML/MIN/1.73 M^2
FRACTIONAL SHORTENING: 35 % (ref 28–44)
GLUCOSE SERPL-MCNC: 112 MG/DL (ref 70–110)
GLUCOSE UR QL STRIP: NEGATIVE
HCT VFR BLD AUTO: 26.4 % (ref 37–48.5)
HGB BLD-MCNC: 8.5 G/DL (ref 12–16)
HGB UR QL STRIP: NEGATIVE
HYALINE CASTS UR QL AUTO: 0 /LPF
IMM GRANULOCYTES # BLD AUTO: 0.03 K/UL (ref 0–0.04)
IMM GRANULOCYTES NFR BLD AUTO: 0.4 % (ref 0–0.5)
INTERVENTRICULAR SEPTUM: 1.21 CM (ref 0.6–1.1)
KETONES UR QL STRIP: NEGATIVE
LA MAJOR: 4.72 CM
LA MINOR: 4.52 CM
LA WIDTH: 3.78 CM
LACTATE SERPL-SCNC: 0.6 MMOL/L (ref 0.5–2.2)
LACTATE SERPL-SCNC: 0.7 MMOL/L (ref 0.5–2.2)
LEFT ATRIUM SIZE: 3.17 CM
LEFT ATRIUM VOLUME INDEX MOD: 34.4 ML/M2
LEFT ATRIUM VOLUME INDEX: 30.1 ML/M2
LEFT ATRIUM VOLUME MOD: 53.64 CM3
LEFT ATRIUM VOLUME: 47.03 CM3
LEFT INTERNAL DIMENSION IN SYSTOLE: 2.55 CM (ref 2.1–4)
LEFT VENTRICLE DIASTOLIC VOLUME INDEX: 43.41 ML/M2
LEFT VENTRICLE DIASTOLIC VOLUME: 67.72 ML
LEFT VENTRICLE MASS INDEX: 102 G/M2
LEFT VENTRICLE SYSTOLIC VOLUME INDEX: 15 ML/M2
LEFT VENTRICLE SYSTOLIC VOLUME: 23.37 ML
LEFT VENTRICULAR INTERNAL DIMENSION IN DIASTOLE: 3.94 CM (ref 3.5–6)
LEFT VENTRICULAR MASS: 158.74 G
LEUKOCYTE ESTERASE UR QL STRIP: NEGATIVE
LV LATERAL E/E' RATIO: 10.6 M/S
LV SEPTAL E/E' RATIO: 10.6 M/S
LYMPHOCYTES # BLD AUTO: 0.2 K/UL (ref 1–4.8)
LYMPHOCYTES NFR BLD: 2.8 % (ref 18–48)
MAGNESIUM SERPL-MCNC: 2.5 MG/DL (ref 1.6–2.6)
MCH RBC QN AUTO: 28.8 PG (ref 27–31)
MCHC RBC AUTO-ENTMCNC: 32.2 G/DL (ref 32–36)
MCV RBC AUTO: 90 FL (ref 82–98)
MICROSCOPIC COMMENT: ABNORMAL
MONOCYTES # BLD AUTO: 0.4 K/UL (ref 0.3–1)
MONOCYTES NFR BLD: 5.1 % (ref 4–15)
MV PEAK A VEL: 0.97 M/S
MV PEAK E VEL: 0.53 M/S
MV STENOSIS PRESSURE HALF TIME: 55.48 MS
MV VALVE AREA P 1/2 METHOD: 3.97 CM2
NEUTROPHILS # BLD AUTO: 7.2 K/UL (ref 1.8–7.7)
NEUTROPHILS NFR BLD: 91.7 % (ref 38–73)
NITRITE UR QL STRIP: NEGATIVE
NRBC BLD-RTO: 0 /100 WBC
PH UR STRIP: 5 [PH] (ref 5–8)
PHOSPHATE SERPL-MCNC: 2.7 MG/DL (ref 2.7–4.5)
PISA TR MAX VEL: 2.5 M/S
PLATELET # BLD AUTO: 343 K/UL (ref 150–450)
PMV BLD AUTO: 9.2 FL (ref 9.2–12.9)
POCT GLUCOSE: 108 MG/DL (ref 70–110)
POCT GLUCOSE: 119 MG/DL (ref 70–110)
POTASSIUM SERPL-SCNC: 3.1 MMOL/L (ref 3.5–5.1)
PROCALCITONIN SERPL IA-MCNC: 0.68 NG/ML
PROT UR QL STRIP: ABNORMAL
RA MAJOR: 4.37 CM
RA WIDTH: 3.4 CM
RBC # BLD AUTO: 2.95 M/UL (ref 4–5.4)
RBC #/AREA URNS AUTO: 1 /HPF (ref 0–4)
RIGHT VENTRICULAR END-DIASTOLIC DIMENSION: 3.03 CM
SINUS: 3.26 CM
SODIUM SERPL-SCNC: 136 MMOL/L (ref 136–145)
SP GR UR STRIP: 1.01 (ref 1–1.03)
SQUAMOUS #/AREA URNS AUTO: 1 /HPF
STJ: 2.62 CM
TDI LATERAL: 0.05 M/S
TDI SEPTAL: 0.05 M/S
TDI: 0.05 M/S
TR MAX PG: 25 MMHG
TRICUSPID ANNULAR PLANE SYSTOLIC EXCURSION: 1.53 CM
URN SPEC COLLECT METH UR: ABNORMAL
WBC # BLD AUTO: 7.83 K/UL (ref 3.9–12.7)
WBC #/AREA URNS AUTO: 2 /HPF (ref 0–5)

## 2023-02-22 PROCEDURE — 96372 THER/PROPH/DIAG INJ SC/IM: CPT

## 2023-02-22 PROCEDURE — 87040 BLOOD CULTURE FOR BACTERIA: CPT | Mod: 59 | Performed by: PHYSICIAN ASSISTANT

## 2023-02-22 PROCEDURE — G0378 HOSPITAL OBSERVATION PER HR: HCPCS

## 2023-02-22 PROCEDURE — 99223 PR INITIAL HOSPITAL CARE,LEVL III: ICD-10-PCS | Mod: ,,,

## 2023-02-22 PROCEDURE — 25000003 PHARM REV CODE 250

## 2023-02-22 PROCEDURE — 87150 DNA/RNA AMPLIFIED PROBE: CPT | Mod: 59 | Performed by: PHYSICIAN ASSISTANT

## 2023-02-22 PROCEDURE — 85025 COMPLETE CBC W/AUTO DIFF WBC: CPT

## 2023-02-22 PROCEDURE — 63600175 PHARM REV CODE 636 W HCPCS

## 2023-02-22 PROCEDURE — 83605 ASSAY OF LACTIC ACID: CPT | Mod: 91

## 2023-02-22 PROCEDURE — 99223 1ST HOSP IP/OBS HIGH 75: CPT | Mod: ,,,

## 2023-02-22 PROCEDURE — 80048 BASIC METABOLIC PNL TOTAL CA: CPT

## 2023-02-22 PROCEDURE — 94761 N-INVAS EAR/PLS OXIMETRY MLT: CPT

## 2023-02-22 PROCEDURE — 83735 ASSAY OF MAGNESIUM: CPT

## 2023-02-22 PROCEDURE — 36415 COLL VENOUS BLD VENIPUNCTURE: CPT

## 2023-02-22 PROCEDURE — 83605 ASSAY OF LACTIC ACID: CPT | Performed by: PHYSICIAN ASSISTANT

## 2023-02-22 PROCEDURE — 81001 URINALYSIS AUTO W/SCOPE: CPT

## 2023-02-22 PROCEDURE — 84145 PROCALCITONIN (PCT): CPT

## 2023-02-22 PROCEDURE — 25000003 PHARM REV CODE 250: Performed by: HOSPITALIST

## 2023-02-22 PROCEDURE — 84100 ASSAY OF PHOSPHORUS: CPT

## 2023-02-22 PROCEDURE — 63600175 PHARM REV CODE 636 W HCPCS: Mod: TB,JG | Performed by: HOSPITALIST

## 2023-02-22 RX ORDER — SODIUM CHLORIDE 9 MG/ML
INJECTION, SOLUTION INTRAVENOUS CONTINUOUS
Status: DISCONTINUED | OUTPATIENT
Start: 2023-02-22 | End: 2023-02-24

## 2023-02-22 RX ORDER — POTASSIUM CHLORIDE 750 MG/1
30 CAPSULE, EXTENDED RELEASE ORAL ONCE
Status: COMPLETED | OUTPATIENT
Start: 2023-02-22 | End: 2023-02-22

## 2023-02-22 RX ORDER — POTASSIUM CHLORIDE 750 MG/1
30 CAPSULE, EXTENDED RELEASE ORAL ONCE
Status: DISCONTINUED | OUTPATIENT
Start: 2023-02-22 | End: 2023-02-22

## 2023-02-22 RX ORDER — SIMETHICONE 80 MG
1 TABLET,CHEWABLE ORAL 4 TIMES DAILY PRN
Status: DISCONTINUED | OUTPATIENT
Start: 2023-02-22 | End: 2023-02-27 | Stop reason: HOSPADM

## 2023-02-22 RX ORDER — DEXTROSE 40 %
30 GEL (GRAM) ORAL
Status: DISCONTINUED | OUTPATIENT
Start: 2023-02-22 | End: 2023-02-27 | Stop reason: HOSPADM

## 2023-02-22 RX ORDER — ACETAMINOPHEN 325 MG/1
650 TABLET ORAL EVERY 4 HOURS PRN
Status: DISCONTINUED | OUTPATIENT
Start: 2023-02-22 | End: 2023-02-27 | Stop reason: HOSPADM

## 2023-02-22 RX ORDER — ENOXAPARIN SODIUM 100 MG/ML
30 INJECTION SUBCUTANEOUS EVERY 24 HOURS
Status: DISCONTINUED | OUTPATIENT
Start: 2023-02-22 | End: 2023-02-27 | Stop reason: HOSPADM

## 2023-02-22 RX ORDER — DEXTROSE 40 %
15 GEL (GRAM) ORAL
Status: DISCONTINUED | OUTPATIENT
Start: 2023-02-22 | End: 2023-02-27 | Stop reason: HOSPADM

## 2023-02-22 RX ORDER — ACETAMINOPHEN 500 MG
1000 TABLET ORAL EVERY 8 HOURS PRN
Status: DISCONTINUED | OUTPATIENT
Start: 2023-02-22 | End: 2023-02-27 | Stop reason: HOSPADM

## 2023-02-22 RX ORDER — NALOXONE HCL 0.4 MG/ML
0.02 VIAL (ML) INJECTION
Status: DISCONTINUED | OUTPATIENT
Start: 2023-02-22 | End: 2023-02-27 | Stop reason: HOSPADM

## 2023-02-22 RX ORDER — GLUCAGON 1 MG
1 KIT INJECTION
Status: DISCONTINUED | OUTPATIENT
Start: 2023-02-22 | End: 2023-02-27 | Stop reason: HOSPADM

## 2023-02-22 RX ORDER — TALC
6 POWDER (GRAM) TOPICAL NIGHTLY PRN
Status: DISCONTINUED | OUTPATIENT
Start: 2023-02-22 | End: 2023-02-27 | Stop reason: HOSPADM

## 2023-02-22 RX ORDER — MAG HYDROX/ALUMINUM HYD/SIMETH 200-200-20
30 SUSPENSION, ORAL (FINAL DOSE FORM) ORAL 4 TIMES DAILY PRN
Status: DISCONTINUED | OUTPATIENT
Start: 2023-02-22 | End: 2023-02-27 | Stop reason: HOSPADM

## 2023-02-22 RX ORDER — POLYETHYLENE GLYCOL 3350 17 G/17G
17 POWDER, FOR SOLUTION ORAL 2 TIMES DAILY PRN
Status: DISCONTINUED | OUTPATIENT
Start: 2023-02-22 | End: 2023-02-23

## 2023-02-22 RX ORDER — PROCHLORPERAZINE EDISYLATE 5 MG/ML
5 INJECTION INTRAMUSCULAR; INTRAVENOUS EVERY 6 HOURS PRN
Status: DISCONTINUED | OUTPATIENT
Start: 2023-02-22 | End: 2023-02-27 | Stop reason: HOSPADM

## 2023-02-22 RX ORDER — SODIUM CHLORIDE 0.9 % (FLUSH) 0.9 %
5 SYRINGE (ML) INJECTION
Status: DISCONTINUED | OUTPATIENT
Start: 2023-02-22 | End: 2023-02-27 | Stop reason: HOSPADM

## 2023-02-22 RX ORDER — BISACODYL 10 MG
10 SUPPOSITORY, RECTAL RECTAL DAILY PRN
Status: DISCONTINUED | OUTPATIENT
Start: 2023-02-22 | End: 2023-02-27 | Stop reason: HOSPADM

## 2023-02-22 RX ADMIN — SODIUM CHLORIDE: 9 INJECTION, SOLUTION INTRAVENOUS at 03:02

## 2023-02-22 RX ADMIN — ACETAMINOPHEN 650 MG: 325 TABLET ORAL at 04:02

## 2023-02-22 RX ADMIN — SODIUM CHLORIDE: 9 INJECTION, SOLUTION INTRAVENOUS at 10:02

## 2023-02-22 RX ADMIN — VANCOMYCIN HYDROCHLORIDE 1250 MG: 1.25 INJECTION, POWDER, LYOPHILIZED, FOR SOLUTION INTRAVENOUS at 05:02

## 2023-02-22 RX ADMIN — POTASSIUM CHLORIDE 30 MEQ: 10 CAPSULE, COATED, EXTENDED RELEASE ORAL at 03:02

## 2023-02-22 RX ADMIN — ENOXAPARIN SODIUM 30 MG: 30 INJECTION SUBCUTANEOUS at 04:02

## 2023-02-22 RX ADMIN — POTASSIUM CHLORIDE 30 MEQ: 10 CAPSULE, COATED, EXTENDED RELEASE ORAL at 12:02

## 2023-02-22 RX ADMIN — ACETAMINOPHEN 650 MG: 325 TABLET ORAL at 08:02

## 2023-02-22 RX ADMIN — CEFEPIME 1 G: 1 INJECTION, POWDER, FOR SOLUTION INTRAMUSCULAR; INTRAVENOUS at 05:02

## 2023-02-22 NOTE — SUBJECTIVE & OBJECTIVE
Past Medical History:   Diagnosis Date    Anxiety     Asthma     Bronchitis     COPD (chronic obstructive pulmonary disease)     Depression     GERD (gastroesophageal reflux disease)     Hallucination     History of psychiatric hospitalization     Hypertension     Neck pain     Polyneuropathy in diseases classified elsewhere 2021    Schizophrenia     Sleep difficulties        Past Surgical History:   Procedure Laterality Date     SECTION      X 1    COLONOSCOPY N/A 2018    Surgeon: Albert Russell MD    ESOPHAGOGASTRODUODENOSCOPY N/A 2018    Surgeon: Albert Russell MD    HYSTERECTOMY  2016    [facility]---DLH/BSO    LIPOMA RESECTION      back  x 2    SALPINGOOPHORECTOMY Bilateral 2016    [facility]---DLH/BSO    THORACIC LAMINECTOMY WITH FUSION N/A 2018    Surgeon: He Andres MD       Review of patient's allergies indicates:   Allergen Reactions    Iodine Hives    Shellfish containing products Itching    Ondansetron hcl Nausea Only       No current facility-administered medications on file prior to encounter.     Current Outpatient Medications on File Prior to Encounter   Medication Sig    amLODIPine (NORVASC) 10 MG tablet TAKE 1 TABLET(10 MG) BY MOUTH EVERY DAY    ascorbic acid, vitamin C, (VITAMIN C) 1000 MG tablet Take 1,000 mg by mouth once daily.    aspirin (ECOTRIN) 81 MG EC tablet Take 81 mg by mouth once daily.    cyanocobalamin, vitamin B-12, (VITAMIN B-12 ORAL) Take 1 tablet by mouth daily as needed.    dexAMETHasone (DECADRON) 4 MG Tab     flaxseed oiL 1,000 mg Cap Take by mouth.    gabapentin (NEURONTIN) 300 MG capsule Take 300 mg by mouth 3 (three) times daily.    grape seed oil, bulk, 100 % Oil by Misc.(Non-Drug; Combo Route) route.    hydroCHLOROthiazide (HYDRODIURIL) 25 MG tablet Take 25 mg by mouth.    ibuprofen (ADVIL,MOTRIN) 600 MG tablet Take 600 mg by mouth every 6 (six) hours as needed.    mirtazapine (REMERON SOL-TAB) 30 MG disintegrating tablet Take 1 tablet (30 mg total)  by mouth once daily.    multivitamin capsule Take 1 capsule by mouth once daily.    valsartan (DIOVAN) 80 MG tablet Take 1 tablet (80 mg total) by mouth once daily.    vitamin D (VITAMIN D3) 1000 units Tab Take 1,000 Units by mouth once daily.     Family History       Problem Relation (Age of Onset)    Breast cancer Mother    COPD Father    Diabetes Brother    Glaucoma Mother, Sister    Hypertension Mother, Father    Liver cancer Paternal Uncle (75)    Macular degeneration Mother    Stroke Mother          Tobacco Use    Smoking status: Never    Smokeless tobacco: Never   Substance and Sexual Activity    Alcohol use: No     Alcohol/week: 0.0 standard drinks    Drug use: No    Sexual activity: Not Currently     Partners: Male     Birth control/protection: Post-menopausal     Review of Systems   Constitutional:  Positive for diaphoresis. Negative for activity change, chills and fever.   HENT:  Negative for trouble swallowing.    Eyes:  Negative for photophobia and visual disturbance.   Respiratory:  Negative for cough, chest tightness and shortness of breath.    Cardiovascular:  Negative for chest pain, palpitations and leg swelling.   Gastrointestinal:  Negative for abdominal pain, constipation, diarrhea, nausea and vomiting.   Genitourinary:  Positive for frequency. Negative for dysuria and hematuria.   Musculoskeletal:  Negative for back pain, gait problem and neck pain.   Skin:  Negative for rash and wound.   Neurological:  Positive for syncope and weakness. Negative for dizziness, seizures, speech difficulty and light-headedness.   Psychiatric/Behavioral:  Negative for agitation and confusion. The patient is not nervous/anxious.    Objective:     Vital Signs (Most Recent):  Temp: 98 °F (36.7 °C) (02/21/23 1934)  Pulse: 94 (02/22/23 0102)  Resp: 18 (02/21/23 2114)  BP: 109/63 (02/22/23 0102)  SpO2: 95 % (02/22/23 0102) Vital Signs (24h Range):  Temp:  [98 °F (36.7 °C)] 98 °F (36.7 °C)  Pulse:  [] 94  Resp:   [16-18] 18  SpO2:  [95 %-100 %] 95 %  BP: (105-116)/(63-74) 109/63     Weight: 54.5 kg (120 lb 2.4 oz)  Body mass index is 21.28 kg/m².    Physical Exam  Vitals and nursing note reviewed.   Constitutional:       General: She is not in acute distress.     Appearance: She is well-developed.   HENT:      Head: Normocephalic and atraumatic.      Mouth/Throat:      Mouth: Mucous membranes are dry.   Eyes:      Extraocular Movements: Extraocular movements intact.      Conjunctiva/sclera: Conjunctivae normal.   Cardiovascular:      Rate and Rhythm: Normal rate. Rhythm irregular.      Heart sounds: Normal heart sounds.   Pulmonary:      Effort: Pulmonary effort is normal. No respiratory distress.      Breath sounds: Normal breath sounds. No wheezing.   Abdominal:      General: Bowel sounds are normal. There is no distension.      Palpations: Abdomen is soft.      Tenderness: There is no abdominal tenderness.   Musculoskeletal:         General: Normal range of motion.      Cervical back: Normal range of motion and neck supple. No tenderness.      Right lower leg: No edema.      Left lower leg: No edema.   Skin:     General: Skin is warm and dry.      Capillary Refill: Capillary refill takes less than 2 seconds.   Neurological:      Mental Status: She is alert and oriented to person, place, and time.      Cranial Nerves: No cranial nerve deficit.      Motor: Weakness present.      Comments: Right leg 3/5 strength. 4/5 left left strength   Normal and equal  strength    Psychiatric:         Mood and Affect: Mood normal.         Behavior: Behavior normal.           Significant Labs: All pertinent labs within the past 24 hours have been reviewed.  CBC:   Recent Labs   Lab 02/21/23 2128   WBC 8.99   HGB 9.6*   HCT 31.1*        CMP:   Recent Labs   Lab 02/21/23 2128      K 3.5   CL 98   CO2 23      BUN 41*   CREATININE 2.1*   CALCIUM 9.1   PROT 7.1   ALBUMIN 2.8*   BILITOT 0.5   ALKPHOS 94   AST 22   ALT  20   ANIONGAP 15     Magnesium:   Recent Labs   Lab 02/21/23 2128   MG 2.6     Troponin:   Recent Labs   Lab 02/21/23 2128   TROPONINI 0.013       Significant Imaging: I have reviewed all pertinent imaging results/findings within the past 24 hours.  CT Cervical Spine Without Contrast  Narrative: EXAMINATION:  CT HEAD WITHOUT CONTRAST; CT CERVICAL SPINE WITHOUT CONTRAST    CLINICAL HISTORY:  Head trauma, minor (Age >= 65y);; Neck trauma (Age >= 65y);    TECHNIQUE:  Low dose axial CT images obtained throughout the head without the use of intravenous contrast.  Axial, sagittal and coronal reconstructions were performed.    Low dose axial images, sagittal and coronal reformations were performed though the cervical spine.  Contrast was not administered.    COMPARISON:  CT head 03/20/2008    FINDINGS:  INTRACRANIAL COMPARTMENT:    Ventricles and sulci are normal in size for age without evidence of hydrocephalus.    Mild patchy hypoattenuation in the supratentorial white matter, nonspecific but most likely reflecting chronic microvascular ischemic changes. No recent or remote major vascular distribution infarct. No acute hemorrhage.  No mass effect or midline shift.    No extra-axial blood or fluid collections.    SKULL/EXTRACRANIAL CONTENTS (limited evaluation):    No fracture.  Near total opacification of the sphenoid sinuses.  Mild patchy mucosal thickening of the ethmoid sinuses.  Remaining visualized paranasal sinuses and mastoid air cells are clear.  Several punched-out lucencies in the calvarium probably related to history of multiple myeloma.    Skull Base and Craniocervical Junction (partially imaged): Normal.    CERVICAL SPINE:    Spinal Alignment: Normal.    Vertebrae: No fracture.  Small scattered lucent lesions throughout the cervical spine, most prominent in a lytic lesion in the right C5 facet.  Degenerative endplate change at C6-7.    Discs: Mild disc height loss C6-7.  Remainder of disc heights are  preserved.    Degenerative findings: Mild multilevel degenerative changes most prominent at C6-7 with posterior disc osteophyte complex and uncovertebral spurring resulting in mild left neural foraminal narrowing and mild spinal canal stenosis.    Paraspinal muscles & soft tissues: Unremarkable.  Impression: No evidence of acute intracranial pathology.    No acute cervical spine fracture.    Small scattered lucent lesions throughout the cervical spine, most prominent a lytic lesion in the right C5 facet, likely secondary to reported history of multiple myeloma.  Several punched-out lucent lesions in the calvarium likely related to history of multiple myeloma.    Cervical spondylosis, as above.    Sinus disease, as above, with near complete opacification of the sphenoid sinuses.    Electronically signed by resident: Lc Mckeon  Date:    02/21/2023  Time:    23:36    Electronically signed by: Vicente Anderson MD  Date:    02/22/2023  Time:    00:26  CT Head Without Contrast  Narrative: EXAMINATION:  CT HEAD WITHOUT CONTRAST; CT CERVICAL SPINE WITHOUT CONTRAST    CLINICAL HISTORY:  Head trauma, minor (Age >= 65y);; Neck trauma (Age >= 65y);    TECHNIQUE:  Low dose axial CT images obtained throughout the head without the use of intravenous contrast.  Axial, sagittal and coronal reconstructions were performed.    Low dose axial images, sagittal and coronal reformations were performed though the cervical spine.  Contrast was not administered.    COMPARISON:  CT head 03/20/2008    FINDINGS:  INTRACRANIAL COMPARTMENT:    Ventricles and sulci are normal in size for age without evidence of hydrocephalus.    Mild patchy hypoattenuation in the supratentorial white matter, nonspecific but most likely reflecting chronic microvascular ischemic changes. No recent or remote major vascular distribution infarct. No acute hemorrhage.  No mass effect or midline shift.    No extra-axial blood or fluid  collections.    SKULL/EXTRACRANIAL CONTENTS (limited evaluation):    No fracture.  Near total opacification of the sphenoid sinuses.  Mild patchy mucosal thickening of the ethmoid sinuses.  Remaining visualized paranasal sinuses and mastoid air cells are clear.  Several punched-out lucencies in the calvarium probably related to history of multiple myeloma.    Skull Base and Craniocervical Junction (partially imaged): Normal.    CERVICAL SPINE:    Spinal Alignment: Normal.    Vertebrae: No fracture.  Small scattered lucent lesions throughout the cervical spine, most prominent in a lytic lesion in the right C5 facet.  Degenerative endplate change at C6-7.    Discs: Mild disc height loss C6-7.  Remainder of disc heights are preserved.    Degenerative findings: Mild multilevel degenerative changes most prominent at C6-7 with posterior disc osteophyte complex and uncovertebral spurring resulting in mild left neural foraminal narrowing and mild spinal canal stenosis.    Paraspinal muscles & soft tissues: Unremarkable.  Impression: No evidence of acute intracranial pathology.    No acute cervical spine fracture.    Small scattered lucent lesions throughout the cervical spine, most prominent a lytic lesion in the right C5 facet, likely secondary to reported history of multiple myeloma.  Several punched-out lucent lesions in the calvarium likely related to history of multiple myeloma.    Cervical spondylosis, as above.    Sinus disease, as above, with near complete opacification of the sphenoid sinuses.    Electronically signed by resident: Lc Mckeon  Date:    02/21/2023  Time:    23:36    Electronically signed by: Vicente Anderson MD  Date:    02/22/2023  Time:    00:26

## 2023-02-22 NOTE — PROGRESS NOTES
Pharmacokinetic Initial Assessment: IV Vancomycin    Assessment/Plan:    Initiate intravenous vancomycin with loading dose of 1250 mg once   Subsequent doses when random concentrations are less than 20 mcg/mL  Desired empiric serum trough concentration is 15 to 20 mcg/mL  Draw vancomycin random level on 2/23 at 0400.  Pharmacy will continue to follow and monitor vancomycin.      Please contact pharmacy at extension [phone] with any questions regarding this assessment.     Thank you for the consult,   Stuart Mi       Patient brief summary:  Janie Zamorano is a 67 y.o. female initiated on antimicrobial therapy with IV Vancomycin for treatment of suspected bacteremia    Drug Allergies:   Review of patient's allergies indicates:   Allergen Reactions    Iodine Hives    Shellfish containing products Itching    Ondansetron hcl Nausea Only       Actual Body Weight:   56.7 kg    Renal Function:   Estimated Creatinine Clearance: 25.4 mL/min (A) (based on SCr of 1.8 mg/dL (H)).,     Dialysis Method (if applicable):  N/A    CBC (last 72 hours):  Recent Labs   Lab Result Units 02/21/23 2128 02/22/23  0625   WBC K/uL 8.99 7.83   Hemoglobin g/dL 9.6* 8.5*   Hematocrit % 31.1* 26.4*   Platelets K/uL 343 343   Gran % % 92.0* 91.7*   Lymph % % 2.7* 2.8*   Mono % % 4.9 5.1   Eosinophil % % 0.0 0.0   Basophil % % 0.0 0.0   Differential Method  Automated Automated       Metabolic Panel (last 72 hours):  Recent Labs   Lab Result Units 02/21/23 2128 02/22/23  0625 02/22/23  0647   Sodium mmol/L 136 136  --    Potassium mmol/L 3.5 3.1*  --    Chloride mmol/L 98 102  --    CO2 mmol/L 23 22*  --    Glucose mg/dL 101 112*  --    Glucose, UA   --   --  Negative   BUN mg/dL 41* 37*  --    Creatinine mg/dL 2.1* 1.8*  --    Albumin g/dL 2.8*  --   --    Total Bilirubin mg/dL 0.5  --   --    Alkaline Phosphatase U/L 94  --   --    AST U/L 22  --   --    ALT U/L 20  --   --    Magnesium mg/dL 2.6 2.5  --    Phosphorus mg/dL  --  2.7  --         Drug levels (last 3 results):  No results for input(s): VANCOMYCINRA, VANCORANDOM, VANCOMYCINPE, VANCOPEAK, VANCOMYCINTR, VANCOTROUGH in the last 72 hours.    Microbiologic Results:  Microbiology Results (last 7 days)       Procedure Component Value Units Date/Time    Blood culture [[id number]] Collected: 02/22/23 1217    Order Status: Sent Specimen: Blood from Peripheral, Antecubital, Right Updated: 02/22/23 1223    Blood culture [[id number]] Collected: 02/22/23 1217    Order Status: Sent Specimen: Blood from Peripheral, Antecubital, Left Updated: 02/22/23 1223

## 2023-02-22 NOTE — ASSESSMENT & PLAN NOTE
-Currently follows with heme/onc. Had radiation treatment 2/13/23. Has weakness in the right leg that is not new.

## 2023-02-22 NOTE — ED PROVIDER NOTES
Encounter Date: 2023       History     Chief Complaint   Patient presents with    Loss of Consciousness     Pt was standing, warming up food and had a syncopal episode. On EMS arrival, pt sitting on floor. +orthostatics per ems, on BP meds.      66 y/o F with medical hx of HTN, Multiple Myeloma, depression, schizophrenia, and anxiety who presents to the ED for syncopal episode earlier tonight, 23. Pt states she was at home heating up soup in her kitchen when she blacked out and woke up on the floor. Fall was witnessed by her son, who told pt she hit her head on the stove when she fell. Pt denies any post-ictal confusion, tongue biting, or urinary/fecal incontinence. She also denies any bruising, trauma, or bleeding from her head. Pt states she has been very weak over the past week, due to a recent COVID infection on 23 and reportedly getting radiation tx for her multiple myeloma on 23. She has been drinking plenty of water but has had decreased appetite. She was recently started on Valsartan 80 mg for HTN urgency on 23. Pt had only been taking amlodipine and hydrochlorothiazide before then. She denies chest pain, SOB, nausea, vomiting, diarrhea, recent falls, vision changes, confusion, difficulty with speech, difficulty with ambulation, or episodes of amnesia.     Review of patient's allergies indicates:   Allergen Reactions    Iodine Hives    Shellfish containing products Itching    Ondansetron hcl Nausea Only     Past Medical History:   Diagnosis Date    Anxiety     Asthma     Bronchitis     COPD (chronic obstructive pulmonary disease)     Depression     GERD (gastroesophageal reflux disease)     Hallucination     History of psychiatric hospitalization     Hypertension     Neck pain     Polyneuropathy in diseases classified elsewhere 2021    Schizophrenia     Sleep difficulties      Past Surgical History:   Procedure Laterality Date     SECTION      X 1    COLONOSCOPY N/A  1/8/2018    Surgeon: Albert Russell MD    ESOPHAGOGASTRODUODENOSCOPY N/A 7/17/2018    Surgeon: Albert Russell MD    HYSTERECTOMY  2016    [facility]---DLH/BSO    LIPOMA RESECTION      back  x 2    SALPINGOOPHORECTOMY Bilateral 2016    [facility]---DLH/BSO    THORACIC LAMINECTOMY WITH FUSION N/A 8/17/2018    Surgeon: He Andres MD     Family History   Problem Relation Age of Onset    Hypertension Mother     Glaucoma Mother     Macular degeneration Mother     Breast cancer Mother     Stroke Mother     COPD Father     Hypertension Father     Diabetes Brother     Glaucoma Sister     Liver cancer Paternal Uncle 75        dad brother ETOH    Ovarian cancer Neg Hx     Colon cancer Neg Hx     Colon polyps Neg Hx     Cirrhosis Neg Hx     Celiac disease Neg Hx     Ulcerative colitis Neg Hx     Hemochromatosis Neg Hx     Esophageal cancer Neg Hx     Anxiety disorder Neg Hx     Dementia Neg Hx     Bipolar disorder Neg Hx     Depression Neg Hx     Drug abuse Neg Hx     Schizophrenia Neg Hx     Suicide Neg Hx      Social History     Tobacco Use    Smoking status: Never    Smokeless tobacco: Never   Substance Use Topics    Alcohol use: No     Alcohol/week: 0.0 standard drinks    Drug use: No     Review of Systems    Physical Exam     Initial Vitals [02/21/23 1934]   BP Pulse Resp Temp SpO2   106/71 (!) 120 16 98 °F (36.7 °C) 99 %      MAP       --         Physical Exam    Nursing note and vitals reviewed.  Constitutional: She appears cachectic. She is cooperative.   HENT:   Head: Normocephalic and atraumatic.   Nose: Nose normal.   Eyes: Conjunctivae and EOM are normal. Pupils are equal, round, and reactive to light.   Neck:   Normal range of motion.  Cardiovascular:  Normal heart sounds. An irregularly irregular rhythm present.           No murmur heard.  Pulmonary/Chest: Breath sounds normal. No respiratory distress. She has no wheezes. She has no rales.   Abdominal: Abdomen is soft. Bowel sounds are normal. She exhibits no  distension. There is no abdominal tenderness.   Musculoskeletal:         General: No tenderness or edema. Normal range of motion.      Cervical back: Normal range of motion.     Neurological: She is alert and oriented to person, place, and time. She has normal strength and normal reflexes. No cranial nerve deficit. GCS score is 15. GCS eye subscore is 4. GCS verbal subscore is 5. GCS motor subscore is 6.   3/5 strength in RLE, pt states this weakness is chronic and did not start today, 2/21/23   Skin: Skin is warm and dry. Capillary refill takes less than 2 seconds.   Psychiatric: She has a normal mood and affect.       ED Course   Procedures  Labs Reviewed   COMPREHENSIVE METABOLIC PANEL - Abnormal; Notable for the following components:       Result Value    BUN 41 (*)     Creatinine 2.1 (*)     Albumin 2.8 (*)     eGFR 25.5 (*)     All other components within normal limits    Narrative:     Add on Trop per Dr. Gauthier @ 21:35 pm to order # [id number]   CBC W/ AUTO DIFFERENTIAL - Abnormal; Notable for the following components:    RBC 3.44 (*)     Hemoglobin 9.6 (*)     Hematocrit 31.1 (*)     MCHC 30.9 (*)     RDW 15.2 (*)     Gran # (ANC) 8.3 (*)     Lymph # 0.2 (*)     Gran % 92.0 (*)     Lymph % 2.7 (*)     All other components within normal limits   D DIMER, QUANTITATIVE - Abnormal; Notable for the following components:    D-Dimer 4.31 (*)     All other components within normal limits   MAGNESIUM    Narrative:     Add on Trop per Dr. Gauthier @ 21:35 pm to order # [id number]   TROPONIN I   TROPONIN I    Narrative:     Add on Trop per Dr. Gauthier @ 21:35 pm to order # [id number]   URINALYSIS, REFLEX TO URINE CULTURE          Imaging Results              CT Head Without Contrast (In process)  Result time 02/21/23 23:52:09                     CT Cervical Spine Without Contrast (In process)                      X-Ray Chest AP Portable (Final result)  Result time 02/21/23 22:06:48      Final result by Vicente Anderson,  MD (02/21/23 22:06:48)                   Impression:      Stable cardiomegaly.  No acute findings in the chest.    Partially visualized thoracolumbar spinal hardware.      Electronically signed by: Vicente Anderson MD  Date:    02/21/2023  Time:    22:06               Narrative:    EXAMINATION:  XR CHEST AP PORTABLE    CLINICAL HISTORY:  syncope;    TECHNIQUE:  Single frontal view of the chest was performed.    COMPARISON:  Chest x-ray 08/08/2018. lumbar spine x-ray 04/20/2022.    FINDINGS:  Partially visualized instrumented hardware in the thoracolumbar spine, similar to 04/20/2022.    Cardiomegaly, similar to prior 08/08/2018.    No consolidation, pleural effusion or pneumothorax.                                       Medications   sodium chloride 0.9% bolus 1,000 mL 1,000 mL (1,000 mLs Intravenous New Bag 2/21/23 2128)     Medical Decision Making:   Initial Assessment:   66 y/o F with recent COVID infection, radiation tx, and decreased appetite who presents to ED for syncope.   Differential Diagnosis:   Vasovagal Syncope  Dehydration  Orthostatic Hypotension  Subdural Hematoma  Pulmonary Embolism  COVID infection sequelae   Clinical Tests:   Lab Tests: Ordered and Reviewed  Radiological Study: Ordered and Reviewed  Medical Tests: Ordered and Reviewed  ED Management:  CBC, CMP, Mg, Trop, D Dimer, UA, CT Head w/out contrast, CT C Spine, ordered. EKG showed tachycardia and frequent PAC's. Pt is not hypoxic on exam, satting 100% on RA, but she is tachycardic with a hx of malignancy. Will order D-dimer and f/u to rule out PE. Orthostatics negative when performed by nurse. Bolus of IV NS given in ED. CBC unremarkable. Troponin wnl. CMP showed Cr of 2.1, pt has GLENIS. D-dimer elevated at 4.31. Pt needs CTA but has current GFR of 25. V/Q scan ordered. Pt will be admitted to hospital medicine for further management of GLENIS and elevated D-Dimer.   Other:   I discussed test(s) with the performing physician.                         Clinical Impression:   Final diagnoses:  [R00.0] Tachycardia  [R55] Syncope (Primary)  [S09.90XA] Traumatic injury of head, initial encounter  [N17.9] GLENIS (acute kidney injury)               He Fernandez DO  Resident  02/21/23 2357       He Fernandez DO  Resident  02/22/23 0004

## 2023-02-22 NOTE — AI DETERIORATION ALERT
Evaluation of Early Detection of Sepsis Risk     Patient Name: Janie Zamorano  MRN:  [id number]  Primary Care Team: [facility]  Date of Admission:  2/21/2023     Principal Problem:  Syncope   Date of Review: 02/22/2023    Patient reviewed for elevated sepsis risk score. Sepsis Screen Positive  Will check screening lactate and notify attending team for additional orders as indicated. Please notify Rapid Response Team for any hypotension or decompensation.    Febrile this AM. Lactate and blood cultures orderd.     Sepsis Screen Results     IP Sepsis Screen (most recent)       Sepsis Screen (IP) - 02/22/23 0941       Is the patient's history or complaint suggestive of a possible infection? Yes  -SS    Are there at least two of the following signs and symptoms present? Yes  -SS    Sepsis signs/symptoms - Hyper or Hypothermia Hyperthermia >100.4 or Hypothermia < 96.8  -SS    Sepsis signs/symptoms - Tachycardia Tachycardia     >90  -SS    Are any of the following organ dysfunction criteria present and not considered to be due to a chronic condition? No  -SS    Initiate Sepsis Protocol Yes  -SS              User Key  (r) = Recorded By, (t) = Taken By, (c) = Cosigned By      Initials Name    SS Stephen Saenz, PA-C Stephen Saenz, PA-C [facility]

## 2023-02-22 NOTE — H&P
[facility] Emergency Dept  [facility] Medicine  History & Physical    Patient Name: Janie Zamorano  MRN: [id number]  Patient Class: OP- Observation  Admission Date: 2/21/2023  Attending Physician: Gunjan Alamo MD   Primary Care Provider: He Hoyt MD         Patient information was obtained from patient and ER records.     Subjective:     Principal Problem:Syncope    Chief Complaint:   Chief Complaint   Patient presents with    Loss of Consciousness     Pt was standing, warming up food and had a syncopal episode. On EMS arrival, pt sitting on floor. +orthostatics per ems, on BP meds.         HPI: Janie aZmorano is a 68 y/o F with medical hx of HTN, Multiple Myeloma, depression, schizophrenia, and anxiety who presents to the ED for syncopal episode that occurred last night. She reports that she was at home and walked from her room to the kitchen to heat up some food. She felt fine but says the next thing she remembers is waking up bent over with her head resting on the stovetop that was turned off with associated diaphoresis. Her son, who she lives with, then came home and found her like that and guided her to the ground to sit and called 911. According to the ED note, she reported waking up on the floor, not over the stovetop, and that her son witnessed the fall. It appears she may have some confusion related to the event. Pt denies any confusion or urinary/fecal incontinence. States this has never happened to her before. Denies any head pain or bleeding.  She has been increasingly weak lately, which she attributes mostly to the radiation she is getting for multiple myeloma. She also has increased urinary frequency on occasion. Otherwise denies feeling dizzy or lightheaded prior to losing consciousness. Denies chest pain, dyspnea, palpitations, visual changes, numbness, new focal weakness. She does have weakness in the right leg from her multiple myeloma that she states is unchanged. On chart  review, she did have a recent COVID infection on 23. She reports taking valsartan (new medication), amlodipine and hydrochlorothiazide for hypertension. On review of ED notes, EMS did report positive orthostatic vitals.     In the ED, afebrile. Tachycardic and hypotensive with SBP in 100s. Negative orthostatic vitals in the ED. Labs with anemia, increased D-Dimer 4.31, and GLENIS with Cr 2.1 (baseline 0.9). troponin 0.013. CXR with stable cardiomegaly. CT head and CT cervical spine with no acute abnormality. Findings consistent with known multiple myeloma. VQ scan ordered. Given 1L NS and admitted to hospital medicine.        Past Medical History:   Diagnosis Date    Anxiety     Asthma     Bronchitis     COPD (chronic obstructive pulmonary disease)     Depression     GERD (gastroesophageal reflux disease)     Hallucination     History of psychiatric hospitalization     Hypertension     Neck pain     Polyneuropathy in diseases classified elsewhere 2021    Schizophrenia     Sleep difficulties        Past Surgical History:   Procedure Laterality Date     SECTION      X 1    COLONOSCOPY N/A 2018    Surgeon: Albert Russell MD    ESOPHAGOGASTRODUODENOSCOPY N/A 2018    Surgeon: Albert Russell MD    HYSTERECTOMY  2016    [facility]---DLH/BSO    LIPOMA RESECTION      back  x 2    SALPINGOOPHORECTOMY Bilateral     [facility]---DLH/BSO    THORACIC LAMINECTOMY WITH FUSION N/A 2018    Surgeon: He Andres MD       Review of patient's allergies indicates:   Allergen Reactions    Iodine Hives    Shellfish containing products Itching    Ondansetron hcl Nausea Only       No current facility-administered medications on file prior to encounter.     Current Outpatient Medications on File Prior to Encounter   Medication Sig    amLODIPine (NORVASC) 10 MG tablet TAKE 1 TABLET(10 MG) BY MOUTH EVERY DAY    ascorbic acid, vitamin C, (VITAMIN C) 1000 MG tablet Take 1,000 mg by mouth once  daily.    aspirin (ECOTRIN) 81 MG EC tablet Take 81 mg by mouth once daily.    cyanocobalamin, vitamin B-12, (VITAMIN B-12 ORAL) Take 1 tablet by mouth daily as needed.    dexAMETHasone (DECADRON) 4 MG Tab     flaxseed oiL 1,000 mg Cap Take by mouth.    gabapentin (NEURONTIN) 300 MG capsule Take 300 mg by mouth 3 (three) times daily.    grape seed oil, bulk, 100 % Oil by Misc.(Non-Drug; Combo Route) route.    hydroCHLOROthiazide (HYDRODIURIL) 25 MG tablet Take 25 mg by mouth.    ibuprofen (ADVIL,MOTRIN) 600 MG tablet Take 600 mg by mouth every 6 (six) hours as needed.    mirtazapine (REMERON SOL-TAB) 30 MG disintegrating tablet Take 1 tablet (30 mg total) by mouth once daily.    multivitamin capsule Take 1 capsule by mouth once daily.    valsartan (DIOVAN) 80 MG tablet Take 1 tablet (80 mg total) by mouth once daily.    vitamin D (VITAMIN D3) 1000 units Tab Take 1,000 Units by mouth once daily.     Family History       Problem Relation (Age of Onset)    Breast cancer Mother    COPD Father    Diabetes Brother    Glaucoma Mother, Sister    Hypertension Mother, Father    Liver cancer Paternal Uncle (75)    Macular degeneration Mother    Stroke Mother          Tobacco Use    Smoking status: Never    Smokeless tobacco: Never   Substance and Sexual Activity    Alcohol use: No     Alcohol/week: 0.0 standard drinks    Drug use: No    Sexual activity: Not Currently     Partners: Male     Birth control/protection: Post-menopausal     Review of Systems   Constitutional:  Positive for diaphoresis. Negative for activity change, chills and fever.   HENT:  Negative for trouble swallowing.    Eyes:  Negative for photophobia and visual disturbance.   Respiratory:  Negative for cough, chest tightness and shortness of breath.    Cardiovascular:  Negative for chest pain, palpitations and leg swelling.   Gastrointestinal:  Negative for abdominal pain, constipation, diarrhea, nausea and vomiting.   Genitourinary:   Positive for frequency. Negative for dysuria and hematuria.   Musculoskeletal:  Negative for back pain, gait problem and neck pain.   Skin:  Negative for rash and wound.   Neurological:  Positive for syncope and weakness. Negative for dizziness, seizures, speech difficulty and light-headedness.   Psychiatric/Behavioral:  Negative for agitation and confusion. The patient is not nervous/anxious.    Objective:     Vital Signs (Most Recent):  Temp: 98 °F (36.7 °C) (02/21/23 1934)  Pulse: 94 (02/22/23 0102)  Resp: 18 (02/21/23 2114)  BP: 109/63 (02/22/23 0102)  SpO2: 95 % (02/22/23 0102) Vital Signs (24h Range):  Temp:  [98 °F (36.7 °C)] 98 °F (36.7 °C)  Pulse:  [] 94  Resp:  [16-18] 18  SpO2:  [95 %-100 %] 95 %  BP: (105-116)/(63-74) 109/63     Weight: 54.5 kg (120 lb 2.4 oz)  Body mass index is 21.28 kg/m².    Physical Exam  Vitals and nursing note reviewed.   Constitutional:       General: She is not in acute distress.     Appearance: She is well-developed.   HENT:      Head: Normocephalic and atraumatic.      Mouth/Throat:      Mouth: Mucous membranes are dry.   Eyes:      Extraocular Movements: Extraocular movements intact.      Conjunctiva/sclera: Conjunctivae normal.   Cardiovascular:      Rate and Rhythm: Normal rate. Rhythm irregular.      Heart sounds: Normal heart sounds.   Pulmonary:      Effort: Pulmonary effort is normal. No respiratory distress.      Breath sounds: Normal breath sounds. No wheezing.   Abdominal:      General: Bowel sounds are normal. There is no distension.      Palpations: Abdomen is soft.      Tenderness: There is no abdominal tenderness.   Musculoskeletal:         General: Normal range of motion.      Cervical back: Normal range of motion and neck supple. No tenderness.      Right lower leg: No edema.      Left lower leg: No edema.   Skin:     General: Skin is warm and dry.      Capillary Refill: Capillary refill takes less than 2 seconds.   Neurological:      Mental Status: She  is alert and oriented to person, place, and time.      Cranial Nerves: No cranial nerve deficit.      Motor: Weakness present.      Comments: Right leg 3/5 strength. 4/5 left left strength   Normal and equal  strength    Psychiatric:         Mood and Affect: Mood normal.         Behavior: Behavior normal.           Significant Labs: All pertinent labs within the past 24 hours have been reviewed.  CBC:   Recent Labs   Lab 02/21/23 2128   WBC 8.99   HGB 9.6*   HCT 31.1*        CMP:   Recent Labs   Lab 02/21/23 2128      K 3.5   CL 98   CO2 23      BUN 41*   CREATININE 2.1*   CALCIUM 9.1   PROT 7.1   ALBUMIN 2.8*   BILITOT 0.5   ALKPHOS 94   AST 22   ALT 20   ANIONGAP 15     Magnesium:   Recent Labs   Lab 02/21/23 2128   MG 2.6     Troponin:   Recent Labs   Lab 02/21/23 2128   TROPONINI 0.013       Significant Imaging: I have reviewed all pertinent imaging results/findings within the past 24 hours.  CT Cervical Spine Without Contrast  Narrative: EXAMINATION:  CT HEAD WITHOUT CONTRAST; CT CERVICAL SPINE WITHOUT CONTRAST    CLINICAL HISTORY:  Head trauma, minor (Age >= 65y);; Neck trauma (Age >= 65y);    TECHNIQUE:  Low dose axial CT images obtained throughout the head without the use of intravenous contrast.  Axial, sagittal and coronal reconstructions were performed.    Low dose axial images, sagittal and coronal reformations were performed though the cervical spine.  Contrast was not administered.    COMPARISON:  CT head 03/20/2008    FINDINGS:  INTRACRANIAL COMPARTMENT:    Ventricles and sulci are normal in size for age without evidence of hydrocephalus.    Mild patchy hypoattenuation in the supratentorial white matter, nonspecific but most likely reflecting chronic microvascular ischemic changes. No recent or remote major vascular distribution infarct. No acute hemorrhage.  No mass effect or midline shift.    No extra-axial blood or fluid collections.    SKULL/EXTRACRANIAL CONTENTS  (limited evaluation):    No fracture.  Near total opacification of the sphenoid sinuses.  Mild patchy mucosal thickening of the ethmoid sinuses.  Remaining visualized paranasal sinuses and mastoid air cells are clear.  Several punched-out lucencies in the calvarium probably related to history of multiple myeloma.    Skull Base and Craniocervical Junction (partially imaged): Normal.    CERVICAL SPINE:    Spinal Alignment: Normal.    Vertebrae: No fracture.  Small scattered lucent lesions throughout the cervical spine, most prominent in a lytic lesion in the right C5 facet.  Degenerative endplate change at C6-7.    Discs: Mild disc height loss C6-7.  Remainder of disc heights are preserved.    Degenerative findings: Mild multilevel degenerative changes most prominent at C6-7 with posterior disc osteophyte complex and uncovertebral spurring resulting in mild left neural foraminal narrowing and mild spinal canal stenosis.    Paraspinal muscles & soft tissues: Unremarkable.  Impression: No evidence of acute intracranial pathology.    No acute cervical spine fracture.    Small scattered lucent lesions throughout the cervical spine, most prominent a lytic lesion in the right C5 facet, likely secondary to reported history of multiple myeloma.  Several punched-out lucent lesions in the calvarium likely related to history of multiple myeloma.    Cervical spondylosis, as above.    Sinus disease, as above, with near complete opacification of the sphenoid sinuses.    Electronically signed by resident: Lc Mckeon  Date:    02/21/2023  Time:    23:36    Electronically signed by: Vicente Anderson MD  Date:    02/22/2023  Time:    00:26  CT Head Without Contrast  Narrative: EXAMINATION:  CT HEAD WITHOUT CONTRAST; CT CERVICAL SPINE WITHOUT CONTRAST    CLINICAL HISTORY:  Head trauma, minor (Age >= 65y);; Neck trauma (Age >= 65y);    TECHNIQUE:  Low dose axial CT images obtained throughout the head without the use of intravenous  contrast.  Axial, sagittal and coronal reconstructions were performed.    Low dose axial images, sagittal and coronal reformations were performed though the cervical spine.  Contrast was not administered.    COMPARISON:  CT head 03/20/2008    FINDINGS:  INTRACRANIAL COMPARTMENT:    Ventricles and sulci are normal in size for age without evidence of hydrocephalus.    Mild patchy hypoattenuation in the supratentorial white matter, nonspecific but most likely reflecting chronic microvascular ischemic changes. No recent or remote major vascular distribution infarct. No acute hemorrhage.  No mass effect or midline shift.    No extra-axial blood or fluid collections.    SKULL/EXTRACRANIAL CONTENTS (limited evaluation):    No fracture.  Near total opacification of the sphenoid sinuses.  Mild patchy mucosal thickening of the ethmoid sinuses.  Remaining visualized paranasal sinuses and mastoid air cells are clear.  Several punched-out lucencies in the calvarium probably related to history of multiple myeloma.    Skull Base and Craniocervical Junction (partially imaged): Normal.    CERVICAL SPINE:    Spinal Alignment: Normal.    Vertebrae: No fracture.  Small scattered lucent lesions throughout the cervical spine, most prominent in a lytic lesion in the right C5 facet.  Degenerative endplate change at C6-7.    Discs: Mild disc height loss C6-7.  Remainder of disc heights are preserved.    Degenerative findings: Mild multilevel degenerative changes most prominent at C6-7 with posterior disc osteophyte complex and uncovertebral spurring resulting in mild left neural foraminal narrowing and mild spinal canal stenosis.    Paraspinal muscles & soft tissues: Unremarkable.  Impression: No evidence of acute intracranial pathology.    No acute cervical spine fracture.    Small scattered lucent lesions throughout the cervical spine, most prominent a lytic lesion in the right C5 facet, likely secondary to reported history of multiple  myeloma.  Several punched-out lucent lesions in the calvarium likely related to history of multiple myeloma.    Cervical spondylosis, as above.    Sinus disease, as above, with near complete opacification of the sphenoid sinuses.    Electronically signed by resident: Lc Mckeon  Date:    02/21/2023  Time:    23:36    Electronically signed by: Vicente Anderson MD  Date:    02/22/2023  Time:    00:26       Assessment/Plan:     Syncope  67 y.o. who presents following first syncopal episode with associated LOC. No previous history. Denies associated bowel/bladder incontinence. Denies light headedness, dizziness, visual changes prior to event. Mental status at time of exam is back to baseline.  - Tachycardic and hypotensive on presentation  - Orthostatics positive in EMS, negative in ED. Repeat daily.  - No acute findings on head/cervical spine CT  - Given 1L NS in ED  - EKG with PACs and sinus tachycardia   - BPM with GLENIS otherwise no electolyte abnormaility. Glucose 101 on admit  - D-dimer elevated. No edema to BLEs concerning for DVT. Did recently have COVID infection and has multiple myeloma  - Denies history of arrythmias   - obtain CBC, CMP daily   - VG scan ordered  - 2D ECHO ordered   - Telemetry     GLENIS (acute kidney injury)  Patient with acute kidney injury likely due to IVVD/dehydration GLENIS is currently stable. Labs reviewed- Renal function/electrolytes with Estimated Creatinine Clearance: 21.8 mL/min (A) (based on SCr of 2.1 mg/dL (H)). according to latest data. Monitor urine output and serial BMP and adjust therapy as needed. Avoid nephrotoxins and renally dose meds for GFR listed above.   -Given 1L IVFs in ED  -check in AM, additional fluids as needed    Anemia in neoplastic disease  -Noted on labs. H/H 9.6/31.1. Similar to values  from 10 months ago.    Multiple myeloma not having achieved remission  -Currently follows with heme/onc. Had radiation treatment 2/13/23. Has weakness in the right leg that  is not new.     Paranoid schizophrenia  -Denies taking medications  -No AH/VH noted on exam    Other secondary hypertension  -Hypotensive on admission  -On valsartan 80 mg once daily, amlodipine 10 mg daily, and HCTZ 25 mg daily.   -Hold home meds in setting of hypotensive.   -Resume amlodipine if hypertensive  -Hold HCTZ and valsartan with GLENIS      VTE Risk Mitigation (From admission, onward)         Ordered     enoxaparin injection 30 mg  Daily         02/22/23 0051     IP VTE HIGH RISK PATIENT  Once         02/22/23 0051     Place sequential compression device  Until discontinued         02/22/23 0051                   ROBERT HawleyC  Department of Hospital Medicine   [facility] Dept

## 2023-02-22 NOTE — ASSESSMENT & PLAN NOTE
-Hypotensive on admission  -On valsartan 80 mg once daily, amlodipine 10 mg daily, and HCTZ 25 mg daily.   -Hold home meds in setting of hypotensive.   -Resume amlodipine if hypertensive  -Hold HCTZ and valsartan with GLENIS

## 2023-02-22 NOTE — ASSESSMENT & PLAN NOTE
Patient with acute kidney injury likely due to IVVD/dehydration GLENIS is currently stable. Labs reviewed- Renal function/electrolytes with Estimated Creatinine Clearance: 21.8 mL/min (A) (based on SCr of 2.1 mg/dL (H)). according to latest data. Monitor urine output and serial BMP and adjust therapy as needed. Avoid nephrotoxins and renally dose meds for GFR listed above.   -Given 1L IVFs in ED  -check in AM, additional fluids as needed

## 2023-02-22 NOTE — PROGRESS NOTES
Nurses Note -- 4 Eyes      2/22/2023   4:05 PM      Skin assessed during: Admit      [x] No Pressure Injuries Present    []Prevention Measures Documented      [] Yes- Altered Skin Integrity Present or Discovered   [] LDA Added if Not in Epic (Describe Wound)   [] New Altered Skin Integrity was Present on Admit and Documented in LDA   [] Wound Image Taken    Wound Care Consulted? No    Attending Nurse:  Gianfranco Gtz RN     Second RN/Staff Member:  Rakan Ochoa RN

## 2023-02-22 NOTE — CARE UPDATE
Care Update    Patient seen and examined at bedside. Sepsis Screen alert. Patient febrile, tachycardic and increased respiratory rate.    Trending lactic, initial wnl. Follow repeat.   Blood cultures x 2 ordered.   IVF 150mL/hr ordered.   Starting broad spectrum antibiotics Vanco (pharm to dose) and Cefepime (given compromised CrCl and GLENIS).     Sandor Martin PA-C  [facility]

## 2023-02-22 NOTE — ED NOTES
Pt difficult stick. Attempted PIV x2 without success. Another RN at bedside attempting PIV insertion.

## 2023-02-22 NOTE — ASSESSMENT & PLAN NOTE
67 y.o. who presents following first syncopal episode with associated LOC. No previous history. Denies associated bowel/bladder incontinence. Denies light headedness, dizziness, visual changes prior to event. Mental status at time of exam is back to baseline.  - Tachycardic and hypotensive on presentation  - Orthostatics positive in EMS, negative in ED. Repeat daily.  - No acute findings on head/cervical spine CT  - Given 1L NS in ED  - EKG with PACs and sinus tachycardia   - BPM with GLENIS otherwise no electolyte abnormaility. Glucose 101 on admit  - D-dimer elevated. No edema to BLEs concerning for DVT. Did recently have COVID infection and has multiple myeloma  - Denies history of arrythmias   - obtain CBC, CMP daily   - VG scan ordered  - 2D ECHO ordered   - Telemetry

## 2023-02-22 NOTE — HPI
Janie Zamorano is a 68 y/o F with medical hx of HTN, Multiple Myeloma, depression, schizophrenia, and anxiety who presents to the ED for syncopal episode that occurred last night. She reports that she was at home and walked from her room to the kitchen to heat up some food. She felt fine but says the next thing she remembers is waking up bent over with her head resting on the stovetop that was turned off with associated diaphoresis. Her son, who she lives with, then came home and found her like that and guided her to the ground to sit and called 911. According to the ED note, she reported waking up on the floor, not over the stovetop, and that her son witnessed the fall. It appears she may have some confusion related to the event. Pt denies any confusion or urinary/fecal incontinence. States this has never happened to her before. Denies any head pain or bleeding.  She has been increasingly weak lately, which she attributes mostly to the radiation she is getting for multiple myeloma. She also has increased urinary frequency on occasion. Otherwise denies feeling dizzy or lightheaded prior to losing consciousness. Denies chest pain, dyspnea, palpitations, visual changes, numbness, new focal weakness. She does have weakness in the right leg from her multiple myeloma that she states is unchanged. On chart review, she did have a recent COVID infection on 2/12/23. She reports taking valsartan (new medication), amlodipine and hydrochlorothiazide for hypertension. On review of ED notes, EMS did report positive orthostatic vitals.     In the ED, afebrile. Tachycardic and hypotensive with SBP in 100s. Negative orthostatic vitals in the ED. Labs with anemia, increased D-Dimer 4.31, and GLENIS with Cr 2.1 (baseline 0.9). troponin 0.013. CXR with stable cardiomegaly. CT head and CT cervical spine with no acute abnormality. Findings consistent with known multiple myeloma. VQ scan ordered. Given 1L NS and admitted to hospital  medicine.

## 2023-02-23 PROBLEM — A41.9 SEPSIS WITHOUT ACUTE ORGAN DYSFUNCTION: Status: ACTIVE | Noted: 2023-02-23

## 2023-02-23 PROBLEM — N17.9 SEPSIS WITH ACUTE RENAL FAILURE WITHOUT SEPTIC SHOCK: Status: ACTIVE | Noted: 2023-02-23

## 2023-02-23 PROBLEM — R65.20 SEPSIS WITH ACUTE RENAL FAILURE WITHOUT SEPTIC SHOCK: Status: ACTIVE | Noted: 2023-02-23

## 2023-02-23 PROBLEM — A41.9 SEPSIS WITHOUT ACUTE ORGAN DYSFUNCTION: Status: RESOLVED | Noted: 2023-02-23 | Resolved: 2023-02-23

## 2023-02-23 PROBLEM — E87.6 HYPOKALEMIA: Status: ACTIVE | Noted: 2023-02-23

## 2023-02-23 PROBLEM — R78.81 BACTEREMIA: Status: ACTIVE | Noted: 2023-02-23

## 2023-02-23 LAB
ANION GAP SERPL CALC-SCNC: 10 MMOL/L (ref 8–16)
BASOPHILS # BLD AUTO: 0 K/UL (ref 0–0.2)
BASOPHILS NFR BLD: 0 % (ref 0–1.9)
BUN SERPL-MCNC: 24 MG/DL (ref 8–23)
CALCIUM SERPL-MCNC: 7.9 MG/DL (ref 8.7–10.5)
CHLORIDE SERPL-SCNC: 108 MMOL/L (ref 95–110)
CO2 SERPL-SCNC: 20 MMOL/L (ref 23–29)
CREAT SERPL-MCNC: 1.3 MG/DL (ref 0.5–1.4)
DIFFERENTIAL METHOD: ABNORMAL
EOSINOPHIL # BLD AUTO: 0 K/UL (ref 0–0.5)
EOSINOPHIL NFR BLD: 0 % (ref 0–8)
ERYTHROCYTE [DISTWIDTH] IN BLOOD BY AUTOMATED COUNT: 15 % (ref 11.5–14.5)
EST. GFR  (NO RACE VARIABLE): 45.4 ML/MIN/1.73 M^2
GLUCOSE SERPL-MCNC: 95 MG/DL (ref 70–110)
HCT VFR BLD AUTO: 23.4 % (ref 37–48.5)
HGB BLD-MCNC: 7.3 G/DL (ref 12–16)
IMM GRANULOCYTES # BLD AUTO: 0.03 K/UL (ref 0–0.04)
IMM GRANULOCYTES NFR BLD AUTO: 0.5 % (ref 0–0.5)
LYMPHOCYTES # BLD AUTO: 0.2 K/UL (ref 1–4.8)
LYMPHOCYTES NFR BLD: 2.6 % (ref 18–48)
MAGNESIUM SERPL-MCNC: 2.3 MG/DL (ref 1.6–2.6)
MCH RBC QN AUTO: 28.4 PG (ref 27–31)
MCHC RBC AUTO-ENTMCNC: 31.2 G/DL (ref 32–36)
MCV RBC AUTO: 91 FL (ref 82–98)
MONOCYTES # BLD AUTO: 0.4 K/UL (ref 0.3–1)
MONOCYTES NFR BLD: 7 % (ref 4–15)
MRSA ID BY PCR: NEGATIVE
NEUTROPHILS # BLD AUTO: 5.3 K/UL (ref 1.8–7.7)
NEUTROPHILS NFR BLD: 89.9 % (ref 38–73)
NRBC BLD-RTO: 0 /100 WBC
PHOSPHATE SERPL-MCNC: 2.4 MG/DL (ref 2.7–4.5)
PLATELET # BLD AUTO: 315 K/UL (ref 150–450)
PMV BLD AUTO: 9.4 FL (ref 9.2–12.9)
POCT GLUCOSE: 103 MG/DL (ref 70–110)
POCT GLUCOSE: 165 MG/DL (ref 70–110)
POCT GLUCOSE: 90 MG/DL (ref 70–110)
POCT GLUCOSE: 92 MG/DL (ref 70–110)
POTASSIUM SERPL-SCNC: 3.4 MMOL/L (ref 3.5–5.1)
RBC # BLD AUTO: 2.57 M/UL (ref 4–5.4)
SODIUM SERPL-SCNC: 138 MMOL/L (ref 136–145)
STAPH AUREUS ID BY PCR: NEGATIVE
VANCOMYCIN SERPL-MCNC: 12.9 UG/ML
VANCOMYCIN SERPL-MCNC: 23.6 UG/ML
WBC # BLD AUTO: 5.87 K/UL (ref 3.9–12.7)

## 2023-02-23 PROCEDURE — 80202 ASSAY OF VANCOMYCIN: CPT | Mod: 91 | Performed by: HOSPITALIST

## 2023-02-23 PROCEDURE — 85025 COMPLETE CBC W/AUTO DIFF WBC: CPT

## 2023-02-23 PROCEDURE — 87040 BLOOD CULTURE FOR BACTERIA: CPT | Mod: 59

## 2023-02-23 PROCEDURE — 25000003 PHARM REV CODE 250: Performed by: HOSPITALIST

## 2023-02-23 PROCEDURE — 63600175 PHARM REV CODE 636 W HCPCS: Performed by: HOSPITALIST

## 2023-02-23 PROCEDURE — 99233 SBSQ HOSP IP/OBS HIGH 50: CPT | Mod: ,,,

## 2023-02-23 PROCEDURE — 63600175 PHARM REV CODE 636 W HCPCS

## 2023-02-23 PROCEDURE — 80048 BASIC METABOLIC PNL TOTAL CA: CPT

## 2023-02-23 PROCEDURE — 99233 PR SUBSEQUENT HOSPITAL CARE,LEVL III: ICD-10-PCS | Mod: ,,,

## 2023-02-23 PROCEDURE — 25000003 PHARM REV CODE 250

## 2023-02-23 PROCEDURE — 83735 ASSAY OF MAGNESIUM: CPT

## 2023-02-23 PROCEDURE — 84100 ASSAY OF PHOSPHORUS: CPT

## 2023-02-23 PROCEDURE — 36415 COLL VENOUS BLD VENIPUNCTURE: CPT | Performed by: HOSPITALIST

## 2023-02-23 PROCEDURE — 11000001 HC ACUTE MED/SURG PRIVATE ROOM

## 2023-02-23 RX ORDER — POTASSIUM CHLORIDE 20 MEQ/1
40 TABLET, EXTENDED RELEASE ORAL ONCE
Status: COMPLETED | OUTPATIENT
Start: 2023-02-23 | End: 2023-02-23

## 2023-02-23 RX ORDER — POLYETHYLENE GLYCOL 3350 17 G/17G
17 POWDER, FOR SOLUTION ORAL DAILY
Status: DISCONTINUED | OUTPATIENT
Start: 2023-02-23 | End: 2023-02-27 | Stop reason: HOSPADM

## 2023-02-23 RX ADMIN — SODIUM CHLORIDE: 9 INJECTION, SOLUTION INTRAVENOUS at 03:02

## 2023-02-23 RX ADMIN — VANCOMYCIN HYDROCHLORIDE 750 MG: 750 INJECTION, POWDER, LYOPHILIZED, FOR SOLUTION INTRAVENOUS at 09:02

## 2023-02-23 RX ADMIN — ENOXAPARIN SODIUM 30 MG: 30 INJECTION SUBCUTANEOUS at 04:02

## 2023-02-23 RX ADMIN — CEFEPIME 1 G: 1 INJECTION, POWDER, FOR SOLUTION INTRAMUSCULAR; INTRAVENOUS at 05:02

## 2023-02-23 RX ADMIN — SODIUM CHLORIDE: 9 INJECTION, SOLUTION INTRAVENOUS at 06:02

## 2023-02-23 RX ADMIN — ACETAMINOPHEN 1000 MG: 500 TABLET ORAL at 11:02

## 2023-02-23 RX ADMIN — ACETAMINOPHEN 1000 MG: 500 TABLET ORAL at 01:02

## 2023-02-23 RX ADMIN — SODIUM CHLORIDE: 9 INJECTION, SOLUTION INTRAVENOUS at 11:02

## 2023-02-23 RX ADMIN — POTASSIUM CHLORIDE 40 MEQ: 1500 TABLET, EXTENDED RELEASE ORAL at 09:02

## 2023-02-23 NOTE — CONSULTS
Pharmacokinetic Assessment Follow Up: IV Vancomycin    Vancomycin serum concentration assessment(s):    Random level = 23.6 mcg/mL is above the desired definitive target range of 15 to 20 mcg/mL (~ 10 hours post dose)  SCr 1.8 > 1.3 mg/dL (baseline ~ 0.9)    Vancomycin Regimen Plan:    Continue pulse dosing  Re-dose when the random level is less than 20 mcg/mL, next level to be drawn at 1730 on 2/23/23 (~24 hours post dose)    Drug levels (last 3 results):  Recent Labs   Lab Result Units 02/23/23  0321   Vancomycin, Random ug/mL 23.6       Pharmacy will continue to follow and monitor vancomycin.    Please contact pharmacy at extension [phone] for questions regarding this assessment.    Thank you for the consult,   Betsy Farias, PharmD       Patient brief summary:  Janie Zamorano is a 67 y.o. female initiated on antimicrobial therapy with IV Vancomycin for treatment of suspected bacteremia    Drug Allergies:   Review of patient's allergies indicates:   Allergen Reactions    Iodine Hives    Shellfish containing products Itching    Ondansetron hcl Nausea Only       Actual Body Weight:   56.3 kg    Renal Function:   Estimated Creatinine Clearance: 35.2 mL/min (based on SCr of 1.3 mg/dL).,     Dialysis Method (if applicable):  N/A    CBC (last 72 hours):  Recent Labs   Lab Result Units 02/21/23 2128 02/22/23 0625 02/23/23  0321   WBC K/uL 8.99 7.83 5.87   Hemoglobin g/dL 9.6* 8.5* 7.3*   Hematocrit % 31.1* 26.4* 23.4*   Platelets K/uL 343 343 315   Gran % % 92.0* 91.7* 89.9*   Lymph % % 2.7* 2.8* 2.6*   Mono % % 4.9 5.1 7.0   Eosinophil % % 0.0 0.0 0.0   Basophil % % 0.0 0.0 0.0   Differential Method  Automated Automated Automated       Metabolic Panel (last 72 hours):  Recent Labs   Lab Result Units 02/21/23 2128 02/22/23 0625 02/22/23  0647 02/23/23  0321   Sodium mmol/L 136 136  --  138   Potassium mmol/L 3.5 3.1*  --  3.4*   Chloride mmol/L 98 102  --  108   CO2 mmol/L 23 22*  --  20*   Glucose mg/dL 101 112*   --  95   Glucose, UA   --   --  Negative  --    BUN mg/dL 41* 37*  --  24*   Creatinine mg/dL 2.1* 1.8*  --  1.3   Albumin g/dL 2.8*  --   --   --    Total Bilirubin mg/dL 0.5  --   --   --    Alkaline Phosphatase U/L 94  --   --   --    AST U/L 22  --   --   --    ALT U/L 20  --   --   --    Magnesium mg/dL 2.6 2.5  --  2.3   Phosphorus mg/dL  --  2.7  --  2.4*       Vancomycin Administrations:  vancomycin given in the last 96 hours                     vancomycin 1,250 mg in dextrose 5 % (D5W) 250 mL IVPB (Vial-Mate) (mg) 1,250 mg New Bag 02/22/23 4997                    Microbiologic Results:  Microbiology Results (last 7 days)       Procedure Component Value Units Date/Time    Blood culture [[id number]] Collected: 02/22/23 1217    Order Status: Completed Specimen: Blood from Peripheral, Antecubital, Right Updated: 02/22/23 1915     Blood Culture, Routine No Growth to date    Blood culture [[id number]] Collected: 02/22/23 1217    Order Status: Completed Specimen: Blood from Peripheral, Antecubital, Left Updated: 02/22/23 1915     Blood Culture, Routine No Growth to date

## 2023-02-24 PROBLEM — J01.30 ACUTE NON-RECURRENT SPHENOIDAL SINUSITIS: Status: ACTIVE | Noted: 2023-02-24

## 2023-02-24 LAB
ANION GAP SERPL CALC-SCNC: 10 MMOL/L (ref 8–16)
BASOPHILS # BLD AUTO: 0 K/UL (ref 0–0.2)
BASOPHILS NFR BLD: 0 % (ref 0–1.9)
BUN SERPL-MCNC: 19 MG/DL (ref 8–23)
CALCIUM SERPL-MCNC: 8.2 MG/DL (ref 8.7–10.5)
CHLORIDE SERPL-SCNC: 107 MMOL/L (ref 95–110)
CO2 SERPL-SCNC: 18 MMOL/L (ref 23–29)
CREAT SERPL-MCNC: 1 MG/DL (ref 0.5–1.4)
DIFFERENTIAL METHOD: ABNORMAL
EOSINOPHIL # BLD AUTO: 0 K/UL (ref 0–0.5)
EOSINOPHIL NFR BLD: 0 % (ref 0–8)
ERYTHROCYTE [DISTWIDTH] IN BLOOD BY AUTOMATED COUNT: 14.9 % (ref 11.5–14.5)
EST. GFR  (NO RACE VARIABLE): >60 ML/MIN/1.73 M^2
GLUCOSE SERPL-MCNC: 105 MG/DL (ref 70–110)
HCT VFR BLD AUTO: 24.6 % (ref 37–48.5)
HGB BLD-MCNC: 7.5 G/DL (ref 12–16)
IMM GRANULOCYTES # BLD AUTO: 0.04 K/UL (ref 0–0.04)
IMM GRANULOCYTES NFR BLD AUTO: 1.1 % (ref 0–0.5)
LYMPHOCYTES # BLD AUTO: 0.1 K/UL (ref 1–4.8)
LYMPHOCYTES NFR BLD: 3.8 % (ref 18–48)
MAGNESIUM SERPL-MCNC: 1.9 MG/DL (ref 1.6–2.6)
MCH RBC QN AUTO: 28.2 PG (ref 27–31)
MCHC RBC AUTO-ENTMCNC: 30.5 G/DL (ref 32–36)
MCV RBC AUTO: 93 FL (ref 82–98)
MONOCYTES # BLD AUTO: 0.3 K/UL (ref 0.3–1)
MONOCYTES NFR BLD: 8.6 % (ref 4–15)
NEUTROPHILS # BLD AUTO: 3.2 K/UL (ref 1.8–7.7)
NEUTROPHILS NFR BLD: 86.5 % (ref 38–73)
NRBC BLD-RTO: 0 /100 WBC
PHOSPHATE SERPL-MCNC: 1.7 MG/DL (ref 2.7–4.5)
PLATELET # BLD AUTO: 313 K/UL (ref 150–450)
PMV BLD AUTO: 9.6 FL (ref 9.2–12.9)
POCT GLUCOSE: 111 MG/DL (ref 70–110)
POCT GLUCOSE: 126 MG/DL (ref 70–110)
POCT GLUCOSE: 127 MG/DL (ref 70–110)
POCT GLUCOSE: 213 MG/DL (ref 70–110)
POTASSIUM SERPL-SCNC: 3.5 MMOL/L (ref 3.5–5.1)
RBC # BLD AUTO: 2.66 M/UL (ref 4–5.4)
SODIUM SERPL-SCNC: 135 MMOL/L (ref 136–145)
WBC # BLD AUTO: 3.72 K/UL (ref 3.9–12.7)

## 2023-02-24 PROCEDURE — 63600175 PHARM REV CODE 636 W HCPCS

## 2023-02-24 PROCEDURE — 83735 ASSAY OF MAGNESIUM: CPT

## 2023-02-24 PROCEDURE — 25000003 PHARM REV CODE 250

## 2023-02-24 PROCEDURE — 63600175 PHARM REV CODE 636 W HCPCS: Performed by: HOSPITALIST

## 2023-02-24 PROCEDURE — 80048 BASIC METABOLIC PNL TOTAL CA: CPT

## 2023-02-24 PROCEDURE — 84100 ASSAY OF PHOSPHORUS: CPT

## 2023-02-24 PROCEDURE — 25000003 PHARM REV CODE 250: Performed by: HOSPITALIST

## 2023-02-24 PROCEDURE — 94761 N-INVAS EAR/PLS OXIMETRY MLT: CPT

## 2023-02-24 PROCEDURE — 99233 SBSQ HOSP IP/OBS HIGH 50: CPT | Mod: ,,,

## 2023-02-24 PROCEDURE — 99233 PR SUBSEQUENT HOSPITAL CARE,LEVL III: ICD-10-PCS | Mod: ,,,

## 2023-02-24 PROCEDURE — 11000001 HC ACUTE MED/SURG PRIVATE ROOM

## 2023-02-24 PROCEDURE — 36415 COLL VENOUS BLD VENIPUNCTURE: CPT

## 2023-02-24 PROCEDURE — 85025 COMPLETE CBC W/AUTO DIFF WBC: CPT

## 2023-02-24 RX ORDER — SODIUM,POTASSIUM PHOSPHATES 280-250MG
2 POWDER IN PACKET (EA) ORAL EVERY 4 HOURS
Status: COMPLETED | OUTPATIENT
Start: 2023-02-24 | End: 2023-02-24

## 2023-02-24 RX ORDER — AMLODIPINE BESYLATE 10 MG/1
10 TABLET ORAL DAILY
Status: DISCONTINUED | OUTPATIENT
Start: 2023-02-24 | End: 2023-02-25

## 2023-02-24 RX ADMIN — POTASSIUM & SODIUM PHOSPHATES POWDER PACK 280-160-250 MG 2 PACKET: 280-160-250 PACK at 10:02

## 2023-02-24 RX ADMIN — POTASSIUM & SODIUM PHOSPHATES POWDER PACK 280-160-250 MG 2 PACKET: 280-160-250 PACK at 02:02

## 2023-02-24 RX ADMIN — AMPICILLIN AND SULBACTAM 1.5 G: 1; .5 INJECTION, POWDER, FOR SOLUTION INTRAVENOUS at 03:02

## 2023-02-24 RX ADMIN — ENOXAPARIN SODIUM 30 MG: 30 INJECTION SUBCUTANEOUS at 05:02

## 2023-02-24 RX ADMIN — AMLODIPINE BESYLATE 10 MG: 10 TABLET ORAL at 03:02

## 2023-02-24 RX ADMIN — CEFEPIME 1 G: 1 INJECTION, POWDER, FOR SOLUTION INTRAMUSCULAR; INTRAVENOUS at 10:02

## 2023-02-24 RX ADMIN — AMPICILLIN AND SULBACTAM 1.5 G: 1; .5 INJECTION, POWDER, FOR SOLUTION INTRAVENOUS at 11:02

## 2023-02-24 RX ADMIN — ACETAMINOPHEN 650 MG: 325 TABLET ORAL at 12:02

## 2023-02-24 RX ADMIN — POTASSIUM & SODIUM PHOSPHATES POWDER PACK 280-160-250 MG 2 PACKET: 280-160-250 PACK at 05:02

## 2023-02-24 RX ADMIN — POLYETHYLENE GLYCOL 3350 17 G: 17 POWDER, FOR SOLUTION ORAL at 08:02

## 2023-02-24 NOTE — PROGRESS NOTES
Pharmacist Renal Dose Adjustment Note    Janie Zamorano is a 67 y.o. female being treated with the medication Cefepime.    Patient Data:    Vital Signs (Most Recent):  Temp: 98.4 °F (36.9 °C) (02/24/23 0733)  Pulse: 84 (02/24/23 0753)  Resp: 16 (02/24/23 0733)  BP: (!) 144/78 (02/24/23 0733)  SpO2: 99 % (02/24/23 0733)   Vital Signs (72h Range):  Temp:  [98 °F (36.7 °C)-101.3 °F (38.5 °C)]   Pulse:  []   Resp:  [14-33]   BP: (105-144)/(59-82)   SpO2:  [94 %-100 %]      Recent Labs   Lab 02/22/23  0625 02/23/23  0321 02/24/23  0421   CREATININE 1.8* 1.3 1.0     Serum creatinine: 1 mg/dL 02/24/23 0421  Estimated creatinine clearance: 45.8 mL/min    Medication: Cefepime 1 g IV Q24h will be changed to Cefepime 1 g IV Q12h for CrCl 30-60 mL/min per pharmacy protocol.    Pharmacist's Name: Betsy Farias, PharmD  Pharmacist's Extension: [id number]

## 2023-02-24 NOTE — ASSESSMENT & PLAN NOTE
-Hypotensive on admission  -On valsartan 80 mg once daily, amlodipine 10 mg daily, and HCTZ 25 mg daily.   -initially holding all home meds in setting of hypotensive/normotensive  -Will resume home amlodipine- 02/24 and monitor BP closely  -Hold HCTZ and valsartan

## 2023-02-24 NOTE — ASSESSMENT & PLAN NOTE
This patient does have evidence of infective focus  My overall impression is sepsis.  Source: possibly from sinusitis noted on CT  Antibiotics given-   Antibiotics (72h ago, onward)    Start     Stop Route Frequency Ordered    02/24/23 1600  ampicillin-sulbactam (UNASYN) 1.5 g in sodium chloride 0.9 % 100 mL IVPB (MB+)         -- IV Every 6 hours (non-standard times) 02/24/23 1454        Latest lactate reviewed-  Recent Labs   Lab 02/22/23  1217 02/22/23  1750   LACTATE 0.7 0.6     Organ dysfunction indicated by Acute kidney injury- now resolved with IVFs  Will Not start Pressors- Levophed for MAP of 65  Source control achieved by: see above  - UA noninfectious  - CXR without acute process  - skin exam without open wounds  - Blood cx 1/2 + Gram + cocci in clusters resembling Staph. Repeat blood cx pending.   - CT head with sinusitis   - Will continue abx

## 2023-02-24 NOTE — PROGRESS NOTES
[facility] Medicine  Progress Note    Patient Name: Janie Zamorano  MRN: [id number]  Patient Class: IP- Inpatient   Admission Date: 2/21/2023  Length of Stay: 0 days  Attending Physician: Tino Johnson MD  Primary Care Provider: He Hoyt MD        Subjective:     Principal Problem:Sepsis with acute renal failure without septic shock        HPI:  Janie Zamorano is a 68 y/o F with medical hx of HTN, Multiple Myeloma, depression, schizophrenia, and anxiety who presents to the ED for syncopal episode that occurred last night. She reports that she was at home and walked from her room to the kitchen to heat up some food. She felt fine but says the next thing she remembers is waking up bent over with her head resting on the stovetop that was turned off with associated diaphoresis. Her son, who she lives with, then came home and found her like that and guided her to the ground to sit and called 911. According to the ED note, she reported waking up on the floor, not over the stovetop, and that her son witnessed the fall. It appears she may have some confusion related to the event. Pt denies any confusion or urinary/fecal incontinence. States this has never happened to her before. Denies any head pain or bleeding.  She has been increasingly weak lately, which she attributes mostly to the radiation she is getting for multiple myeloma. She also has increased urinary frequency on occasion. Otherwise denies feeling dizzy or lightheaded prior to losing consciousness. Denies chest pain, dyspnea, palpitations, visual changes, numbness, new focal weakness. She does have weakness in the right leg from her multiple myeloma that she states is unchanged. On chart review, she did have a recent COVID infection on 2/12/23. She reports taking valsartan (new medication), amlodipine and hydrochlorothiazide for hypertension. On review of ED notes, EMS did report positive orthostatic vitals.     In the  ED, afebrile. Tachycardic and hypotensive with SBP in 100s. Negative orthostatic vitals in the ED. Labs with anemia, increased D-Dimer 4.31, and GLENIS with Cr 2.1 (baseline 0.9). troponin 0.013. CXR with stable cardiomegaly. CT head and CT cervical spine with no acute abnormality. Findings consistent with known multiple myeloma. VQ scan ordered. Given 1L NS and admitted to hospital medicine.        Overview/Hospital Course:  Janie Zamorano is a 68 yo F admitted to hospital medicine for further evaluation of syncopal episode and GLENIS. Cr down trending closer to baseline. Orthostatics positive. VQ scan with low probability of PE. Noted to be SIRS 3/4 on 02/22- started on vancomycin and cefepime. UA noninfectious. CXR without acute process. Blood cx 1/2 + Gram + cocci in clusters resembling Staph. Repeat blood cx pending. Will continue abx and IVFs.       Interval History: NAEON. Intermittently tachycardic, but otherwise VSS. T max 100.2. Patient reports HA and chronic fatigue. Also admits recent cough with yellow sputum for the past few days. 1/2 blood cx + Gram + cocci in clusters resembling Staph. Repeat blood cx pending. Will continue abx and IVFs.     Review of Systems   Constitutional:  Positive for fatigue and fever. Negative for activity change, chills and diaphoresis.   HENT:  Negative for trouble swallowing.    Eyes:  Negative for photophobia and visual disturbance.   Respiratory:  Negative for cough, chest tightness and shortness of breath.    Cardiovascular:  Negative for chest pain, palpitations and leg swelling.   Gastrointestinal:  Negative for abdominal pain, constipation, diarrhea, nausea and vomiting.   Genitourinary:  Negative for dysuria, frequency and hematuria.   Musculoskeletal:  Negative for back pain, gait problem and neck pain.   Skin:  Negative for rash and wound.   Neurological:  Positive for syncope, weakness and headaches. Negative for dizziness, seizures, speech difficulty and  light-headedness.   Psychiatric/Behavioral:  Negative for agitation and confusion. The patient is not nervous/anxious.    Objective:     Vital Signs (Most Recent):  Temp: 98.6 °F (37 °C) (02/23/23 1521)  Pulse: 92 (02/23/23 1521)  Resp: 18 (02/23/23 1521)  BP: 124/66 (02/23/23 1521)  SpO2: 98 % (02/23/23 1521) Vital Signs (24h Range):  Temp:  [98.6 °F (37 °C)-100.2 °F (37.9 °C)] 98.6 °F (37 °C)  Pulse:  [] 92  Resp:  [18-20] 18  SpO2:  [98 %-99 %] 98 %  BP: (124-141)/(66-77) 124/66     Weight: 56.3 kg (124 lb 1.9 oz)  Body mass index is 21.99 kg/m².    Intake/Output Summary (Last 24 hours) at 2/23/2023 1852  Last data filed at 2/23/2023 1803  Gross per 24 hour   Intake 2000 ml   Output 2000 ml   Net 0 ml      Physical Exam  Vitals and nursing note reviewed.   Constitutional:       General: She is not in acute distress.     Appearance: She is well-developed.   HENT:      Head: Normocephalic and atraumatic.      Mouth/Throat:      Mouth: Mucous membranes are dry.   Eyes:      Extraocular Movements: Extraocular movements intact.      Conjunctiva/sclera: Conjunctivae normal.   Cardiovascular:      Rate and Rhythm: Normal rate. Rhythm irregular.      Heart sounds: Normal heart sounds.   Pulmonary:      Effort: Pulmonary effort is normal. No respiratory distress.      Breath sounds: Normal breath sounds. No wheezing.   Abdominal:      General: Bowel sounds are normal. There is no distension.      Palpations: Abdomen is soft.      Tenderness: There is no abdominal tenderness.   Musculoskeletal:         General: Normal range of motion.      Cervical back: Normal range of motion and neck supple. No tenderness.      Right lower leg: No edema.      Left lower leg: No edema.   Skin:     General: Skin is warm and dry.      Capillary Refill: Capillary refill takes less than 2 seconds.   Neurological:      Mental Status: She is alert and oriented to person, place, and time.      Cranial Nerves: No cranial nerve deficit.       Motor: Weakness present.      Comments: Right leg 3/5 strength. 4/5 left left strength   Normal and equal  strength    Psychiatric:         Mood and Affect: Mood normal.         Behavior: Behavior normal.       Significant Labs: All pertinent labs within the past 24 hours have been reviewed.  CBC:   Recent Labs   Lab 02/21/23 2128 02/22/23 0625 02/23/23  0321   WBC 8.99 7.83 5.87   HGB 9.6* 8.5* 7.3*   HCT 31.1* 26.4* 23.4*    343 315     CMP:   Recent Labs   Lab 02/21/23 2128 02/22/23 0625 02/23/23  0321    136 138   K 3.5 3.1* 3.4*   CL 98 102 108   CO2 23 22* 20*    112* 95   BUN 41* 37* 24*   CREATININE 2.1* 1.8* 1.3   CALCIUM 9.1 8.5* 7.9*   PROT 7.1  --   --    ALBUMIN 2.8*  --   --    BILITOT 0.5  --   --    ALKPHOS 94  --   --    AST 22  --   --    ALT 20  --   --    ANIONGAP 15 12 10       Significant Imaging: I have reviewed all pertinent imaging results/findings within the past 24 hours.      Assessment/Plan:      * Sepsis with acute renal failure without septic shock  Bacteremia  This patient does have evidence of infective focus  My overall impression is sepsis.  Source: bacteremia  Antibiotics given-   Antibiotics (72h ago, onward)    Start     Stop Route Frequency Ordered    02/23/23 2000  vancomycin 750 mg in dextrose 5 % (D5W) 250 mL IVPB (Vial-Mate)         -- IV Once 02/23/23 1848    02/22/23 1745  ceFEPIme (MAXIPIME) 1 g in dextrose 5 % in water (D5W) 5 % 50 mL IVPB (MB+)         -- IV Every 24 hours (non-standard times) 02/22/23 1633    02/22/23 1707  vancomycin - pharmacy to dose  (vancomycin IVPB)        See Donald for full Linked Orders Report.    -- IV pharmacy to manage frequency 02/22/23 1610        Latest lactate reviewed-  Recent Labs   Lab 02/22/23  1217 02/22/23  1750   LACTATE 0.7 0.6     Organ dysfunction indicated by Acute kidney injury- now improving with IVFs  Will Not start Pressors- Levophed for MAP of 65  Source control achieved by: see above  -  UA noninfectious  - CXR without acute process  - skin exam without open wounds  - Blood cx 1/2 + Gram + cocci in clusters resembling Staph. Repeat blood cx pending.   - Will continue abx and cIVFs.     Syncope  67 y.o. who presents following first syncopal episode with associated LOC. No previous history. Denies associated bowel/bladder incontinence. Denies light headedness, dizziness, visual changes prior to event. Mental status at time of exam is back to baseline.    - Tachycardic and hypotensive on presentation  - Orthostatics positive in EMS, negative in ED. Repeat positive  - No acute findings on head/cervical spine CT  - Given 1L NS in ED  - EKG with PACs and sinus tachycardia   - BPM with GLENIS otherwise no electolyte abnormaility. Glucose 101 on admit  - D-dimer elevated. No edema to BLEs concerning for DVT. Did recently have COVID infection and has multiple myeloma. VQ scan with low suspicion for PE  - obtain CBC, CMP daily   - 2D ECHO ordered - results below  - Telemetry   - blood cx positive- bacteremia possibly contributing to BP changes causing syncopal episode  Results for orders placed during the hospital encounter of 02/21/23    Echo    Interpretation Summary  · The left ventricle is normal in size with concentric hypertrophy and normal systolic function.  · The estimated ejection fraction is 70%.  · Normal left ventricular diastolic function.  · Normal right ventricular size with normal right ventricular systolic function.  · The estimated PA systolic pressure is 28 mm Hg.  · Low central venous pressure.    Other secondary hypertension  -Hypotensive on admission  -On valsartan 80 mg once daily, amlodipine 10 mg daily, and HCTZ 25 mg daily.   -Hold home meds in setting of hypotensive/normotensive  -Resume amlodipine if hypertensive  -Hold HCTZ and valsartan with GLENIS    GLENIS (acute kidney injury)  Patient with acute kidney injury likely due to IVVD/dehydration GLENIS is currently stable. Labs reviewed-  Renal function/electrolytes with Estimated Creatinine Clearance: 35.2 mL/min (based on SCr of 1.3 mg/dL). according to latest data. Monitor urine output and serial BMP and adjust therapy as needed. Avoid nephrotoxins and renally dose meds for GFR listed above.   -Given 1L IVFs in ED  -Cr downtrending. Now 1.3.   - will continue IVFs    Multiple myeloma not having achieved remission  -Currently follows with heme/onc. Had radiation treatment 2/13/23. Has weakness in the right leg that is not new.     Anemia in neoplastic disease  -Noted on labs. H/H 9.6/31.1. Similar to values  from 10 months ago.    Paranoid schizophrenia  -Denies taking medications  -No AH/VH noted on exam      VTE Risk Mitigation (From admission, onward)         Ordered     enoxaparin injection 30 mg  Daily         02/22/23 0051     IP VTE HIGH RISK PATIENT  Once         02/22/23 0051     Place sequential compression device  Until discontinued         02/22/23 0051                Discharge Planning   EMELY: 2/25/2023     Code Status: Full Code   Is the patient medically ready for discharge?: No    Reason for patient still in hospital (select all that apply): Patient new problem, Patient trending condition, Laboratory test and Treatment                     Megan Charles PA-C  Department of Hospital Medicine   [facility] - Med Surg

## 2023-02-24 NOTE — SUBJECTIVE & OBJECTIVE
Interval History: NAEON. Intermittently tachycardic, but otherwise VSS. T max 100.2. Patient reports HA and chronic fatigue. Also admits recent cough with yellow sputum for the past few days. 1/2 blood cx + Gram + cocci in clusters resembling Staph. Repeat blood cx pending. Will continue abx and IVFs.     Review of Systems   Constitutional:  Positive for fatigue and fever. Negative for activity change, chills and diaphoresis.   HENT:  Negative for trouble swallowing.    Eyes:  Negative for photophobia and visual disturbance.   Respiratory:  Negative for cough, chest tightness and shortness of breath.    Cardiovascular:  Negative for chest pain, palpitations and leg swelling.   Gastrointestinal:  Negative for abdominal pain, constipation, diarrhea, nausea and vomiting.   Genitourinary:  Negative for dysuria, frequency and hematuria.   Musculoskeletal:  Negative for back pain, gait problem and neck pain.   Skin:  Negative for rash and wound.   Neurological:  Positive for syncope, weakness and headaches. Negative for dizziness, seizures, speech difficulty and light-headedness.   Psychiatric/Behavioral:  Negative for agitation and confusion. The patient is not nervous/anxious.    Objective:     Vital Signs (Most Recent):  Temp: 98.6 °F (37 °C) (02/23/23 1521)  Pulse: 92 (02/23/23 1521)  Resp: 18 (02/23/23 1521)  BP: 124/66 (02/23/23 1521)  SpO2: 98 % (02/23/23 1521) Vital Signs (24h Range):  Temp:  [98.6 °F (37 °C)-100.2 °F (37.9 °C)] 98.6 °F (37 °C)  Pulse:  [] 92  Resp:  [18-20] 18  SpO2:  [98 %-99 %] 98 %  BP: (124-141)/(66-77) 124/66     Weight: 56.3 kg (124 lb 1.9 oz)  Body mass index is 21.99 kg/m².    Intake/Output Summary (Last 24 hours) at 2/23/2023 1852  Last data filed at 2/23/2023 1803  Gross per 24 hour   Intake 2000 ml   Output 2000 ml   Net 0 ml      Physical Exam  Vitals and nursing note reviewed.   Constitutional:       General: She is not in acute distress.     Appearance: She is well-developed.    HENT:      Head: Normocephalic and atraumatic.      Mouth/Throat:      Mouth: Mucous membranes are dry.   Eyes:      Extraocular Movements: Extraocular movements intact.      Conjunctiva/sclera: Conjunctivae normal.   Cardiovascular:      Rate and Rhythm: Normal rate. Rhythm irregular.      Heart sounds: Normal heart sounds.   Pulmonary:      Effort: Pulmonary effort is normal. No respiratory distress.      Breath sounds: Normal breath sounds. No wheezing.   Abdominal:      General: Bowel sounds are normal. There is no distension.      Palpations: Abdomen is soft.      Tenderness: There is no abdominal tenderness.   Musculoskeletal:         General: Normal range of motion.      Cervical back: Normal range of motion and neck supple. No tenderness.      Right lower leg: No edema.      Left lower leg: No edema.   Skin:     General: Skin is warm and dry.      Capillary Refill: Capillary refill takes less than 2 seconds.   Neurological:      Mental Status: She is alert and oriented to person, place, and time.      Cranial Nerves: No cranial nerve deficit.      Motor: Weakness present.      Comments: Right leg 3/5 strength. 4/5 left left strength   Normal and equal  strength    Psychiatric:         Mood and Affect: Mood normal.         Behavior: Behavior normal.       Significant Labs: All pertinent labs within the past 24 hours have been reviewed.  CBC:   Recent Labs   Lab 02/21/23 2128 02/22/23  0625 02/23/23  0321   WBC 8.99 7.83 5.87   HGB 9.6* 8.5* 7.3*   HCT 31.1* 26.4* 23.4*    343 315     CMP:   Recent Labs   Lab 02/21/23 2128 02/22/23  0625 02/23/23  0321    136 138   K 3.5 3.1* 3.4*   CL 98 102 108   CO2 23 22* 20*    112* 95   BUN 41* 37* 24*   CREATININE 2.1* 1.8* 1.3   CALCIUM 9.1 8.5* 7.9*   PROT 7.1  --   --    ALBUMIN 2.8*  --   --    BILITOT 0.5  --   --    ALKPHOS 94  --   --    AST 22  --   --    ALT 20  --   --    ANIONGAP 15 12 10       Significant Imaging: I have reviewed  all pertinent imaging results/findings within the past 24 hours.

## 2023-02-24 NOTE — SUBJECTIVE & OBJECTIVE
Interval History: T max overnight 101.3. Intermittently tachycardic, but otherwise VSS.  Will restart home amlodipine today. Patient reports HA, cough, chronic fatigue and weakness. Denies any other complaints. Will transition to IV unasyn today and discontinue IVFs.    Review of Systems   Constitutional:  Positive for fatigue and fever. Negative for chills.   Respiratory:  Positive for cough. Negative for chest tightness and shortness of breath.    Cardiovascular:  Negative for chest pain and leg swelling.   Gastrointestinal:  Positive for constipation. Negative for abdominal pain and nausea.   Neurological:  Positive for dizziness, syncope, weakness and headaches.   Objective:     Vital Signs (Most Recent):  Temp: 99.8 °F (37.7 °C) (02/24/23 1544)  Pulse: (!) 117 (02/24/23 1544)  Resp: 16 (02/24/23 1544)  BP: 131/85 (02/24/23 1544)  SpO2: 99 % (02/24/23 1544)   Vital Signs (24h Range):  Temp:  [98.4 °F (36.9 °C)-101.3 °F (38.5 °C)] 99.8 °F (37.7 °C)  Pulse:  [] 117  Resp:  [16-18] 16  SpO2:  [98 %-100 %] 99 %  BP: (131-144)/(74-88) 131/85     Weight: 56.2 kg (123 lb 14.4 oz)  Body mass index is 21.95 kg/m².    Intake/Output Summary (Last 24 hours) at 2/24/2023 1741  Last data filed at 2/23/2023 2119  Gross per 24 hour   Intake 1250 ml   Output 500 ml   Net 750 ml      Physical Exam  Vitals and nursing note reviewed.   Constitutional:       Appearance: She is well-developed. She is ill-appearing.   HENT:      Mouth/Throat:      Mouth: Mucous membranes are dry.   Eyes:      Pupils: Pupils are equal, round, and reactive to light.   Cardiovascular:      Rate and Rhythm: Normal rate and regular rhythm.   Pulmonary:      Effort: Pulmonary effort is normal.      Breath sounds: Normal breath sounds.   Abdominal:      Palpations: Abdomen is soft.      Tenderness: There is no abdominal tenderness.   Musculoskeletal:         General: No tenderness.      Right lower leg: No edema.      Left lower leg: No edema.    Skin:     General: Skin is warm and dry.   Neurological:      Mental Status: She is alert and oriented to person, place, and time.      Motor: Weakness present.   Psychiatric:         Behavior: Behavior normal.       Significant Labs: All pertinent labs within the past 24 hours have been reviewed.  CBC:   Recent Labs   Lab 02/23/23 0321 02/24/23 0421   WBC 5.87 3.72*   HGB 7.3* 7.5*   HCT 23.4* 24.6*    313     CMP:   Recent Labs   Lab 02/23/23 0321 02/24/23  0421    135*   K 3.4* 3.5    107   CO2 20* 18*   GLU 95 105   BUN 24* 19   CREATININE 1.3 1.0   CALCIUM 7.9* 8.2*   ANIONGAP 10 10       Significant Imaging: I have reviewed all pertinent imaging results/findings within the past 24 hours.

## 2023-02-24 NOTE — ASSESSMENT & PLAN NOTE
-Currently follows with heme/onc. Had radiation treatment 2/13/23. Has weakness in the right leg that is not new.   - CT head with Small scattered lucent lesions throughout the cervical spine, most prominent a lytic lesion in the right C5 facet, likely secondary to reported history of multiple myeloma.  Several punched-out lucent lesions in the calvarium likely related to history of multiple myeloma.

## 2023-02-24 NOTE — ASSESSMENT & PLAN NOTE
Resolved   Patient with acute kidney injury likely due to IVVD/dehydration GLENIS is currently stable. Labs reviewed- Renal function/electrolytes with Estimated Creatinine Clearance: 45.8 mL/min (based on SCr of 1 mg/dL). according to latest data. Monitor urine output and serial BMP and adjust therapy as needed. Avoid nephrotoxins and renally dose meds for GFR listed above.   -Given 1L IVFs in ED. Continue gentle IVFs- stopped 02/24  -Cr downtrending. Now back to baseline after IVFs

## 2023-02-24 NOTE — ASSESSMENT & PLAN NOTE
- CT head with near total opacification of the sphenoid sinuses.  Mild patchy mucosal thickening of the ethmoid sinuses.  Remaining visualized paranasal sinuses and mastoid air cells are clear.  - will continue IV unasyn

## 2023-02-24 NOTE — PROGRESS NOTES
[facility] Medicine  Progress Note    Patient Name: Janie Zamorano  MRN: [id number]  Patient Class: IP- Inpatient   Admission Date: 2/21/2023  Length of Stay: 1 days  Attending Physician: Tino Johnson MD  Primary Care Provider: He Hoyt MD        Subjective:     Principal Problem:Sepsis with acute renal failure without septic shock        HPI:  Janie Zamroano is a 68 y/o F with medical hx of HTN, Multiple Myeloma, depression, schizophrenia, and anxiety who presents to the ED for syncopal episode that occurred last night. She reports that she was at home and walked from her room to the kitchen to heat up some food. She felt fine but says the next thing she remembers is waking up bent over with her head resting on the stovetop that was turned off with associated diaphoresis. Her son, who she lives with, then came home and found her like that and guided her to the ground to sit and called 911. According to the ED note, she reported waking up on the floor, not over the stovetop, and that her son witnessed the fall. It appears she may have some confusion related to the event. Pt denies any confusion or urinary/fecal incontinence. States this has never happened to her before. Denies any head pain or bleeding.  She has been increasingly weak lately, which she attributes mostly to the radiation she is getting for multiple myeloma. She also has increased urinary frequency on occasion. Otherwise denies feeling dizzy or lightheaded prior to losing consciousness. Denies chest pain, dyspnea, palpitations, visual changes, numbness, new focal weakness. She does have weakness in the right leg from her multiple myeloma that she states is unchanged. On chart review, she did have a recent COVID infection on 2/12/23. She reports taking valsartan (new medication), amlodipine and hydrochlorothiazide for hypertension. On review of ED notes, EMS did report positive orthostatic vitals.     In the  ED, afebrile. Tachycardic and hypotensive with SBP in 100s. Negative orthostatic vitals in the ED. Labs with anemia, increased D-Dimer 4.31, and GLENIS with Cr 2.1 (baseline 0.9). troponin 0.013. CXR with stable cardiomegaly. CT head and CT cervical spine with no acute abnormality. Findings consistent with known multiple myeloma. VQ scan ordered. Given 1L NS and admitted to hospital medicine.        Overview/Hospital Course:  Janie Zamorano is a 66 yo F admitted to hospital medicine for further evaluation of syncopal episode and GLENIS. Cr down trending closer to baseline. Orthostatics positive. Given IVFs. VQ scan with low probability of PE. Noted to be SIRS 3/4 on 02/22- started on vancomycin and cefepime and IVFs. UA noninfectious. CXR without acute process. Blood cx 1/2 + coag-negative Staph; likely contaminant. Repeat blood cx pending.CT head showing sinus disease with near complete opacification of sphenoid sinuses- possible source of sepsis. Changed abx to IV unasyn on 02/24. Plan to discharge home when medically ready.       Interval History: T max overnight 101.3. Intermittently tachycardic, but otherwise VSS.  Will restart home amlodipine today. Patient reports HA, cough, chronic fatigue and weakness. Denies any other complaints. Will transition to IV unasyn today and discontinue IVFs.    Review of Systems   Constitutional:  Positive for fatigue and fever. Negative for chills.   Respiratory:  Positive for cough. Negative for chest tightness and shortness of breath.    Cardiovascular:  Negative for chest pain and leg swelling.   Gastrointestinal:  Positive for constipation. Negative for abdominal pain and nausea.   Neurological:  Positive for dizziness, syncope, weakness and headaches.   Objective:     Vital Signs (Most Recent):  Temp: 99.8 °F (37.7 °C) (02/24/23 1544)  Pulse: (!) 117 (02/24/23 1544)  Resp: 16 (02/24/23 1544)  BP: 131/85 (02/24/23 1544)  SpO2: 99 % (02/24/23 1544)   Vital Signs (24h  Range):  Temp:  [98.4 °F (36.9 °C)-101.3 °F (38.5 °C)] 99.8 °F (37.7 °C)  Pulse:  [] 117  Resp:  [16-18] 16  SpO2:  [98 %-100 %] 99 %  BP: (131-144)/(74-88) 131/85     Weight: 56.2 kg (123 lb 14.4 oz)  Body mass index is 21.95 kg/m².    Intake/Output Summary (Last 24 hours) at 2/24/2023 1741  Last data filed at 2/23/2023 2119  Gross per 24 hour   Intake 1250 ml   Output 500 ml   Net 750 ml      Physical Exam  Vitals and nursing note reviewed.   Constitutional:       Appearance: She is well-developed. She is ill-appearing.   HENT:      Mouth/Throat:      Mouth: Mucous membranes are dry.   Eyes:      Pupils: Pupils are equal, round, and reactive to light.   Cardiovascular:      Rate and Rhythm: Normal rate and regular rhythm.   Pulmonary:      Effort: Pulmonary effort is normal.      Breath sounds: Normal breath sounds.   Abdominal:      Palpations: Abdomen is soft.      Tenderness: There is no abdominal tenderness.   Musculoskeletal:         General: No tenderness.      Right lower leg: No edema.      Left lower leg: No edema.   Skin:     General: Skin is warm and dry.   Neurological:      Mental Status: She is alert and oriented to person, place, and time.      Motor: Weakness present.   Psychiatric:         Behavior: Behavior normal.       Significant Labs: All pertinent labs within the past 24 hours have been reviewed.  CBC:   Recent Labs   Lab 02/23/23  0321 02/24/23  0421   WBC 5.87 3.72*   HGB 7.3* 7.5*   HCT 23.4* 24.6*    313     CMP:   Recent Labs   Lab 02/23/23  0321 02/24/23  0421    135*   K 3.4* 3.5    107   CO2 20* 18*   GLU 95 105   BUN 24* 19   CREATININE 1.3 1.0   CALCIUM 7.9* 8.2*   ANIONGAP 10 10       Significant Imaging: I have reviewed all pertinent imaging results/findings within the past 24 hours.      Assessment/Plan:      * Sepsis with acute renal failure without septic shock  This patient does have evidence of infective focus  My overall impression is  sepsis.  Source: possibly from sinusitis noted on CT  Antibiotics given-   Antibiotics (72h ago, onward)    Start     Stop Route Frequency Ordered    02/24/23 1600  ampicillin-sulbactam (UNASYN) 1.5 g in sodium chloride 0.9 % 100 mL IVPB (MB+)         -- IV Every 6 hours (non-standard times) 02/24/23 1454        Latest lactate reviewed-  Recent Labs   Lab 02/22/23  1217 02/22/23  1750   LACTATE 0.7 0.6     Organ dysfunction indicated by Acute kidney injury- now resolved with IVFs  Will Not start Pressors- Levophed for MAP of 65  Source control achieved by: see above  - UA noninfectious  - CXR without acute process  - skin exam without open wounds  - Blood cx 1/2 + Gram + cocci in clusters resembling Staph. Repeat blood cx pending.   - CT head with sinusitis   - Will continue abx     Syncope  67 y.o. who presents following first syncopal episode with associated LOC. No previous history. Denies associated bowel/bladder incontinence. Denies light headedness, dizziness, visual changes prior to event. Mental status at time of exam is back to baseline.    - Tachycardic and hypotensive on presentation  - Orthostatics positive in EMS, negative in ED. Repeat positive  - No acute findings on head/cervical spine CT except sinusitis  - Given 1L NS in ED  - EKG with PACs and sinus tachycardia   - BPM with GLENIS otherwise no electolyte abnormaility. Glucose 101 on admit  - D-dimer elevated. No edema to BLEs concerning for DVT. Did recently have COVID infection and has multiple myeloma. VQ scan with low suspicion for PE  - obtain CBC, CMP daily   - 2D ECHO ordered - results below  - Telemetry     Results for orders placed during the hospital encounter of 02/21/23    Echo    Interpretation Summary  · The left ventricle is normal in size with concentric hypertrophy and normal systolic function.  · The estimated ejection fraction is 70%.  · Normal left ventricular diastolic function.  · Normal right ventricular size with normal right  ventricular systolic function.  · The estimated PA systolic pressure is 28 mm Hg.  · Low central venous pressure.    Other secondary hypertension  -Hypotensive on admission  -On valsartan 80 mg once daily, amlodipine 10 mg daily, and HCTZ 25 mg daily.   -initially holding all home meds in setting of hypotensive/normotensive  -Will resume home amlodipine- 02/24 and monitor BP closely  -Hold HCTZ and valsartan     GLENIS (acute kidney injury)  Resolved   Patient with acute kidney injury likely due to IVVD/dehydration GLENIS is currently stable. Labs reviewed- Renal function/electrolytes with Estimated Creatinine Clearance: 45.8 mL/min (based on SCr of 1 mg/dL). according to latest data. Monitor urine output and serial BMP and adjust therapy as needed. Avoid nephrotoxins and renally dose meds for GFR listed above.   -Given 1L IVFs in ED. Continue gentle IVFs- stopped 02/24  -Cr downtrending. Now back to baseline after IVFs    Multiple myeloma not having achieved remission  -Currently follows with heme/onc. Had radiation treatment 2/13/23. Has weakness in the right leg that is not new.   - CT head with Small scattered lucent lesions throughout the cervical spine, most prominent a lytic lesion in the right C5 facet, likely secondary to reported history of multiple myeloma.  Several punched-out lucent lesions in the calvarium likely related to history of multiple myeloma.     Acute non-recurrent sphenoidal sinusitis  - CT head with near total opacification of the sphenoid sinuses.  Mild patchy mucosal thickening of the ethmoid sinuses.  Remaining visualized paranasal sinuses and mastoid air cells are clear.  - will continue IV unasyn    Anemia in neoplastic disease  -Noted on labs. H/H 9.6/31.1. Similar to values  from 10 months ago.    Paranoid schizophrenia  -Denies taking medications  -No AH/VH noted on exam      VTE Risk Mitigation (From admission, onward)         Ordered     enoxaparin injection 30 mg  Daily          02/22/23 0051     IP VTE HIGH RISK PATIENT  Once         02/22/23 0051     Place sequential compression device  Until discontinued         02/22/23 0051                Discharge Planning   EMELY: 2/25/2023     Code Status: Full Code   Is the patient medically ready for discharge?: No    Reason for patient still in hospital (select all that apply): Patient trending condition and Treatment  Discharge Plan A: Home Health                  Megan Charles PA-C  Department of Hospital Medicine   [facility] Surg

## 2023-02-24 NOTE — PLAN OF CARE
[facility] - Med Surg  Initial Discharge Assessment       Primary Care Provider: He Hoyt MD    Admission Diagnosis: Syncope [R55]  Tachycardia [R00.0]  GLENIS (acute kidney injury) [N17.9]  Chest pain [R07.9]  Traumatic injury of head, initial encounter [S09.90XA]    Admission Date: 2/21/2023  Expected Discharge Date: 2/25/2023    Discharge Barriers Identified: None    Payor: [employer] / Plan: [employer] / Product Type: Medicare Advantage /     Extended Emergency Contact Information  Primary Emergency Contact: Tiff Mondragon   [facility]  Home Phone: [phone]  Mobile Phone: [phone]  Relation: Sister  Secondary Emergency Contact: Saud Zamorano   [facility]  Home Phone: [phone]  Mobile Phone: [phone]  Relation: Son    Discharge Plan A: Home Health  Discharge Plan B: Home with family    CM met with patient in room at bedside for Initial Discharge Planning Assessment. Role fo CM discussed. Patient able to answer all questions asked.  Per patient she lives with her two sisters: Tiff Mondragon (who is ill) and Alyssa Weaver (who has eyesight problems,  in a single story home with four steps to enter. Per patient she is independent with ADLS/personal care and uses a quad cane for ambulation. Patient states she also has a walker(not sure if it is a standard walker or FWW because she has not used it for ambulation in a very long time. Patient will be transported to home by her son: Saud Zamorano -tel# [phone]  upon discharge. Patient will benefit for  services upon discharge.  Discharge Planning Booklet given to patient and discussed. CM allowed time for questions. CM Plan: Will continue to follow through discharge.    [facility] [id number] - [place], [place] - [address] AT [facility]  [address]  [facility] [address]  Phone: [phone] Fax: [phone]    [id number] - [place]  [address]   AT [facility] [address]  [address] [place] [address]  Phone: [phone] Fax: [phone]    [id number] - [place], [place] - [address] AT [facility] [address]  [address]  [place] [address]  Phone: [phone] Fax: [phone]      Initial Assessment (most recent)       Adult Discharge Assessment - 02/24/23 1113          Discharge Assessment    Assessment Type Discharge Planning Assessment     Confirmed/corrected address, phone number and insurance Yes     Confirmed Demographics Correct on Facesheet     Source of Information patient     Reason For Admission Sepsis with acute renal failure without septic shock     People in Home sibling(s)   Pt states she lives with two sisters: Tiff Mondragon who is ill and Alyssa Weaver who has trouble with her eyesight.    Facility Arrived From: Home     Do you expect to return to your current living situation? Yes     Prior to hospitilization cognitive status: Alert/Oriented     Current cognitive status: Alert/Oriented     Walking or Climbing Stairs ambulation difficulty, requires equipment   Pt states prior to hospitalization she was ambulating with a quad cane.    Mobility Management Quad Cane     Dressing/Bathing --   Pt denies difficulty with bathing and dressing.    Do you have any problems with: --   Pt states her son does the grocery shopping    Home Layout Able to live on 1st floor   Pt states she lives in a single story home with four steps to enter.    Equipment Currently Used at Home walker, standard;rollator;cane, quad   Pt not sure if her walker is a standard walker vs RW d/t not using it to ambulate for a long time.    Readmission within 30 days? No     Patient currently being followed by outpatient case management? No     Do you currently have service(s) that help you manage your care at home? No     Do you take prescription medications? Yes     Do you have prescription coverage? Yes      Coverage [employer] - [facility]     Do you have any problems affording any of your prescribed medications? No     Is the patient taking medications as prescribed? yes     Who is going to help you get home at discharge? Saud Zamorano (son) tel# [phone]     How do you get to doctors appointments? family or friend will provide   Son, other family members or friends transports the pt to and from md appointments;    Are you on dialysis? No     Do you take coumadin? No   Pt states she does not take coumadin.    Discharge Plan A Home Health     Discharge Plan B Home with family     DME Needed Upon Discharge  none     Discharge Plan discussed with: Patient     Discharge Barriers Identified None        OTHER    Name(s) of People in Home Tiff Mondragon (sister) tel#[phone] who is ill & Alyssa Weaver (sister)-pt did not have her tel#available.                 Yeni Figueroa R.N., CCM, [facility]

## 2023-02-24 NOTE — HOSPITAL COURSE
Janie Zamorano is a 68 yo F admitted to hospital medicine for further evaluation of syncopal episode and GLENIS. Cr down trending closer to baseline. Patient reports recently having valsartan and HCTZ added to BP regimen (was previously taking only amlodipine). Orthostatics positive. All BP meds held. Given IVFs. VQ scan with low probability of PE. Noted to be SIRS 3/4 on 02/22- started on vancomycin and cefepime and IVFs. UA noninfectious. CXR without acute process. Blood cx 1/2 + coag-negative Staph; likely contaminant. Repeat blood cx NGTD. CT head showing sinus disease with near complete opacification of sphenoid sinuses- possible source of sepsis. Transitioned to po Augmentin on 02/25; doing well with >24 hrs afebrile. Syncopal episode in the shower on 02/25 after restarting amlodipine. Syncopal episodes related to hypotension. Will not restart any antihypertensives on discharge to avoid further episodes. Would benefit from a home BP journal that she can bring to PCP appointments. Would also benefit from BP digital home monitoring program. PT/OT consulted; recommending SNF. Discussed option of SNF vs. HH with patient on multiple occasions and she is not interested in either. Discussed using a walker at home over the next few weeks for more support and to avoid falls. Discharged home in stable condition with son and remaining doses of Augmentin to complete course. PCP f/u scheduled.

## 2023-02-24 NOTE — PROGRESS NOTES
Pharmacokinetic Assessment Follow Up: IV Vancomycin    Vancomycin serum concentration assessment(s):    The random level was drawn correctly and can be used to guide therapy at this time.   The measurement is within the desired definitive target range of < 20 mcg/mL.    Vancomycin Regimen Plan:    Will order 1 time dose of Vancomycin 750 mg   Re-dose when the random level is less than 20 mcg/mL, next level to be drawn at 1900 on 2/24    Drug levels (last 3 results):  Recent Labs   Lab Result Units 02/23/23 0321 02/23/23  1727   Vancomycin, Random ug/mL 23.6 12.9       Pharmacy will continue to follow and monitor vancomycin.    Please contact pharmacy at extension [phone] for questions regarding this assessment.    Thank you for the consult,   Stuart Mi       Patient brief summary:  Janie Zamorano is a 67 y.o. female initiated on antimicrobial therapy with IV Vancomycin for treatment of bacteremia    The patient's current regimen is pulse dose regimen    Drug Allergies:   Review of patient's allergies indicates:   Allergen Reactions    Iodine Hives    Shellfish containing products Itching    Ondansetron hcl Nausea Only       Actual Body Weight:   56.3 kg    Renal Function:   Estimated Creatinine Clearance: 35.2 mL/min (based on SCr of 1.3 mg/dL).,     Dialysis Method (if applicable):  N/A    CBC (last 72 hours):  Recent Labs   Lab Result Units 02/21/23 2128 02/22/23 0625 02/23/23 0321   WBC K/uL 8.99 7.83 5.87   Hemoglobin g/dL 9.6* 8.5* 7.3*   Hematocrit % 31.1* 26.4* 23.4*   Platelets K/uL 343 343 315   Gran % % 92.0* 91.7* 89.9*   Lymph % % 2.7* 2.8* 2.6*   Mono % % 4.9 5.1 7.0   Eosinophil % % 0.0 0.0 0.0   Basophil % % 0.0 0.0 0.0   Differential Method  Automated Automated Automated       Metabolic Panel (last 72 hours):  Recent Labs   Lab Result Units 02/21/23 2128 02/22/23 0625 02/22/23  0647 02/23/23  0321   Sodium mmol/L 136 136  --  138   Potassium mmol/L 3.5 3.1*  --  3.4*   Chloride mmol/L 98  102  --  108   CO2 mmol/L 23 22*  --  20*   Glucose mg/dL 101 112*  --  95   Glucose, UA   --   --  Negative  --    BUN mg/dL 41* 37*  --  24*   Creatinine mg/dL 2.1* 1.8*  --  1.3   Albumin g/dL 2.8*  --   --   --    Total Bilirubin mg/dL 0.5  --   --   --    Alkaline Phosphatase U/L 94  --   --   --    AST U/L 22  --   --   --    ALT U/L 20  --   --   --    Magnesium mg/dL 2.6 2.5  --  2.3   Phosphorus mg/dL  --  2.7  --  2.4*       Vancomycin Administrations:  vancomycin given in the last 96 hours                     vancomycin 1,250 mg in dextrose 5 % (D5W) 250 mL IVPB (Vial-Mate) (mg) 1,250 mg New Bag 02/22/23 1743                    Microbiologic Results:  Microbiology Results (last 7 days)       Procedure Component Value Units Date/Time    Blood culture [[id number]] Collected: 02/23/23 1727    Order Status: Sent Specimen: Blood Updated: 02/23/23 1747    Blood culture [[id number]] Collected: 02/23/23 1727    Order Status: Sent Specimen: Blood Updated: 02/23/23 1745    MRSA/SA Rapid ID by PCR from Blood culture [[id number]] Collected: 02/22/23 1217    Order Status: Completed Updated: 02/23/23 1434     Staph aureus ID by PCR Negative     MRSA ID by PCR Negative    Blood culture [[id number]] Collected: 02/22/23 1217    Order Status: Completed Specimen: Blood from Peripheral, Antecubital, Left Updated: 02/23/23 1412     Blood Culture, Routine No Growth to date      No Growth to date    Blood culture [[id number]] Collected: 02/22/23 1217    Order Status: Completed Specimen: Blood from Peripheral, Antecubital, Right Updated: 02/23/23 1315     Blood Culture, Routine Gram stain aer bottle: Gram positive cocci in clusters resembling Staph      Results called to and read back by: Mallorie RANGEL 02/23/2023  13:14

## 2023-02-24 NOTE — ASSESSMENT & PLAN NOTE
67 y.o. who presents following first syncopal episode with associated LOC. No previous history. Denies associated bowel/bladder incontinence. Denies light headedness, dizziness, visual changes prior to event. Mental status at time of exam is back to baseline.    - Tachycardic and hypotensive on presentation  - Orthostatics positive in EMS, negative in ED. Repeat positive  - No acute findings on head/cervical spine CT  - Given 1L NS in ED  - EKG with PACs and sinus tachycardia   - BPM with GLENIS otherwise no electolyte abnormaility. Glucose 101 on admit  - D-dimer elevated. No edema to BLEs concerning for DVT. Did recently have COVID infection and has multiple myeloma. VQ scan with low suspicion for PE  - obtain CBC, CMP daily   - 2D ECHO ordered - results below  - Telemetry   - blood cx positive- bacteremia possibly contributing to BP changes causing syncopal episode  Results for orders placed during the hospital encounter of 02/21/23    Echo    Interpretation Summary  · The left ventricle is normal in size with concentric hypertrophy and normal systolic function.  · The estimated ejection fraction is 70%.  · Normal left ventricular diastolic function.  · Normal right ventricular size with normal right ventricular systolic function.  · The estimated PA systolic pressure is 28 mm Hg.  · Low central venous pressure.

## 2023-02-24 NOTE — ASSESSMENT & PLAN NOTE
Bacteremia  This patient does have evidence of infective focus  My overall impression is sepsis.  Source: bacteremia  Antibiotics given-   Antibiotics (72h ago, onward)    Start     Stop Route Frequency Ordered    02/23/23 2000  vancomycin 750 mg in dextrose 5 % (D5W) 250 mL IVPB (Vial-Mate)         -- IV Once 02/23/23 1848    02/22/23 1745  ceFEPIme (MAXIPIME) 1 g in dextrose 5 % in water (D5W) 5 % 50 mL IVPB (MB+)         -- IV Every 24 hours (non-standard times) 02/22/23 1633    02/22/23 1707  vancomycin - pharmacy to dose  (vancomycin IVPB)        See [facility] for full Linked Orders Report.    -- IV pharmacy to manage frequency 02/22/23 1610        Latest lactate reviewed-  Recent Labs   Lab 02/22/23  1217 02/22/23  1750   LACTATE 0.7 0.6     Organ dysfunction indicated by Acute kidney injury- now improving with IVFs  Will Not start Pressors- Levophed for MAP of 65  Source control achieved by: see above  - UA noninfectious  - CXR without acute process  - skin exam without open wounds  - Blood cx 1/2 + Gram + cocci in clusters resembling Staph. Repeat blood cx pending.   - Will continue abx and cIVFs.

## 2023-02-24 NOTE — ASSESSMENT & PLAN NOTE
67 y.o. who presents following first syncopal episode with associated LOC. No previous history. Denies associated bowel/bladder incontinence. Denies light headedness, dizziness, visual changes prior to event. Mental status at time of exam is back to baseline.    - Tachycardic and hypotensive on presentation  - Orthostatics positive in EMS, negative in ED. Repeat positive  - No acute findings on head/cervical spine CT except sinusitis  - Given 1L NS in ED  - EKG with PACs and sinus tachycardia   - BPM with GLENIS otherwise no electolyte abnormaility. Glucose 101 on admit  - D-dimer elevated. No edema to BLEs concerning for DVT. Did recently have COVID infection and has multiple myeloma. VQ scan with low suspicion for PE  - obtain CBC, CMP daily   - 2D ECHO ordered - results below  - Telemetry     Results for orders placed during the hospital encounter of 02/21/23    Echo    Interpretation Summary  · The left ventricle is normal in size with concentric hypertrophy and normal systolic function.  · The estimated ejection fraction is 70%.  · Normal left ventricular diastolic function.  · Normal right ventricular size with normal right ventricular systolic function.  · The estimated PA systolic pressure is 28 mm Hg.  · Low central venous pressure.

## 2023-02-24 NOTE — ASSESSMENT & PLAN NOTE
-Hypotensive on admission  -On valsartan 80 mg once daily, amlodipine 10 mg daily, and HCTZ 25 mg daily.   -Hold home meds in setting of hypotensive/normotensive  -Resume amlodipine if hypertensive  -Hold HCTZ and valsartan with GLENIS

## 2023-02-24 NOTE — ASSESSMENT & PLAN NOTE
Patient with acute kidney injury likely due to IVVD/dehydration GLENIS is currently stable. Labs reviewed- Renal function/electrolytes with Estimated Creatinine Clearance: 35.2 mL/min (based on SCr of 1.3 mg/dL). according to latest data. Monitor urine output and serial BMP and adjust therapy as needed. Avoid nephrotoxins and renally dose meds for GFR listed above.   -Given 1L IVFs in ED  -Cr downtrending. Now 1.3.   - will continue IVFs

## 2023-02-25 LAB
ANION GAP SERPL CALC-SCNC: 10 MMOL/L (ref 8–16)
BACTERIA BLD CULT: ABNORMAL
BUN SERPL-MCNC: 13 MG/DL (ref 8–23)
CALCIUM SERPL-MCNC: 8.7 MG/DL (ref 8.7–10.5)
CHLORIDE SERPL-SCNC: 95 MMOL/L (ref 95–110)
CO2 SERPL-SCNC: 25 MMOL/L (ref 23–29)
CREAT SERPL-MCNC: 0.9 MG/DL (ref 0.5–1.4)
ERYTHROCYTE [DISTWIDTH] IN BLOOD BY AUTOMATED COUNT: 14.5 % (ref 11.5–14.5)
EST. GFR  (NO RACE VARIABLE): >60 ML/MIN/1.73 M^2
GLUCOSE SERPL-MCNC: 99 MG/DL (ref 70–110)
HCT VFR BLD AUTO: 26 % (ref 37–48.5)
HGB BLD-MCNC: 8.4 G/DL (ref 12–16)
MCH RBC QN AUTO: 28.2 PG (ref 27–31)
MCHC RBC AUTO-ENTMCNC: 32.3 G/DL (ref 32–36)
MCV RBC AUTO: 87 FL (ref 82–98)
PLATELET # BLD AUTO: 289 K/UL (ref 150–450)
PMV BLD AUTO: 9.1 FL (ref 9.2–12.9)
POCT GLUCOSE: 105 MG/DL (ref 70–110)
POCT GLUCOSE: 111 MG/DL (ref 70–110)
POCT GLUCOSE: 116 MG/DL (ref 70–110)
POCT GLUCOSE: 124 MG/DL (ref 70–110)
POCT GLUCOSE: 97 MG/DL (ref 70–110)
POTASSIUM SERPL-SCNC: 3.4 MMOL/L (ref 3.5–5.1)
RBC # BLD AUTO: 2.98 M/UL (ref 4–5.4)
SODIUM SERPL-SCNC: 130 MMOL/L (ref 136–145)
WBC # BLD AUTO: 3.36 K/UL (ref 3.9–12.7)

## 2023-02-25 PROCEDURE — 99233 PR SUBSEQUENT HOSPITAL CARE,LEVL III: ICD-10-PCS | Mod: ,,,

## 2023-02-25 PROCEDURE — 63600175 PHARM REV CODE 636 W HCPCS

## 2023-02-25 PROCEDURE — 25000003 PHARM REV CODE 250

## 2023-02-25 PROCEDURE — 93010 ELECTROCARDIOGRAM REPORT: CPT | Mod: ,,, | Performed by: INTERNAL MEDICINE

## 2023-02-25 PROCEDURE — 93010 EKG 12-LEAD: ICD-10-PCS | Mod: ,,, | Performed by: INTERNAL MEDICINE

## 2023-02-25 PROCEDURE — 93005 ELECTROCARDIOGRAM TRACING: CPT

## 2023-02-25 PROCEDURE — 80048 BASIC METABOLIC PNL TOTAL CA: CPT

## 2023-02-25 PROCEDURE — 36415 COLL VENOUS BLD VENIPUNCTURE: CPT

## 2023-02-25 PROCEDURE — 85027 COMPLETE CBC AUTOMATED: CPT

## 2023-02-25 PROCEDURE — 11000001 HC ACUTE MED/SURG PRIVATE ROOM

## 2023-02-25 PROCEDURE — 99233 SBSQ HOSP IP/OBS HIGH 50: CPT | Mod: ,,,

## 2023-02-25 RX ORDER — AMOXICILLIN AND CLAVULANATE POTASSIUM 875; 125 MG/1; MG/1
1 TABLET, FILM COATED ORAL EVERY 12 HOURS
Status: DISCONTINUED | OUTPATIENT
Start: 2023-02-25 | End: 2023-02-27 | Stop reason: HOSPADM

## 2023-02-25 RX ORDER — POTASSIUM CHLORIDE 20 MEQ/1
40 TABLET, EXTENDED RELEASE ORAL ONCE
Status: COMPLETED | OUTPATIENT
Start: 2023-02-25 | End: 2023-02-25

## 2023-02-25 RX ADMIN — AMLODIPINE BESYLATE 10 MG: 10 TABLET ORAL at 09:02

## 2023-02-25 RX ADMIN — AMOXICILLIN AND CLAVULANATE POTASSIUM 1 TABLET: 875; 125 TABLET, FILM COATED ORAL at 08:02

## 2023-02-25 RX ADMIN — AMPICILLIN AND SULBACTAM 1.5 G: 1; .5 INJECTION, POWDER, FOR SOLUTION INTRAVENOUS at 07:02

## 2023-02-25 RX ADMIN — ENOXAPARIN SODIUM 30 MG: 30 INJECTION SUBCUTANEOUS at 05:02

## 2023-02-25 RX ADMIN — POTASSIUM CHLORIDE 40 MEQ: 1500 TABLET, EXTENDED RELEASE ORAL at 03:02

## 2023-02-25 RX ADMIN — AMPICILLIN AND SULBACTAM 1.5 G: 1; .5 INJECTION, POWDER, FOR SOLUTION INTRAVENOUS at 10:02

## 2023-02-25 RX ADMIN — POLYETHYLENE GLYCOL 3350 17 G: 17 POWDER, FOR SOLUTION ORAL at 08:02

## 2023-02-25 NOTE — SUBJECTIVE & OBJECTIVE
Interval History: T max overnight 100.5. Intermittently tachycardic. Restarted amlodipine yesterday and BP stable. Still c/o fatigue and weakness. Denies any other complaints. Plan to walk with RN today and see how she tolerates ADLs.Transitioning to po Augmentin.     Was notified by RN that patient was taking a shower and when she was finished, she pulled the light for assistance to get back to the bed. RN found patient not responsive sitting on the shower chair leaned backward and diaphoretic. She began to respond slowly with sternal rubs and calling her name. She then returned to bed with assistance and was answering questions appropriately. Blood sugar 124 B/P 124/77 MAP 96 P 95 O2 sat 99% at that time. Patient reports this is similar to the episode that initially brought her to the hospital. Ordered EKG and repeat orthostatics.   Review of Systems   Constitutional:  Positive for fatigue and fever. Negative for chills.   Respiratory:  Positive for cough. Negative for chest tightness and shortness of breath.    Cardiovascular:  Negative for chest pain and leg swelling.   Gastrointestinal:  Positive for constipation. Negative for abdominal pain and nausea.   Neurological:  Positive for dizziness, syncope, weakness and headaches.   Objective:     Vital Signs (Most Recent):  Temp: 99.7 °F (37.6 °C) (02/25/23 1133)  Pulse: 92 (02/25/23 1133)  Resp: 16 (02/25/23 1133)  BP: 127/81 (02/25/23 1133)  SpO2: 98 % (02/25/23 1133) Vital Signs (24h Range):  Temp:  [98.3 °F (36.8 °C)-100.5 °F (38.1 °C)] 99.7 °F (37.6 °C)  Pulse:  [] 92  Resp:  [16-18] 16  SpO2:  [97 %-99 %] 98 %  BP: (127-150)/(81-88) 127/81     Weight: 56.2 kg (123 lb 14.4 oz)  Body mass index is 21.95 kg/m².    Intake/Output Summary (Last 24 hours) at 2/25/2023 1310  Last data filed at 2/25/2023 0516  Gross per 24 hour   Intake 500 ml   Output 2200 ml   Net -1700 ml      Physical Exam  Vitals and nursing note reviewed.   Constitutional:        Appearance: She is well-developed. She is ill-appearing.   HENT:      Mouth/Throat:      Mouth: Mucous membranes are dry.   Eyes:      Pupils: Pupils are equal, round, and reactive to light.   Cardiovascular:      Rate and Rhythm: Normal rate and regular rhythm.   Pulmonary:      Effort: Pulmonary effort is normal.      Breath sounds: Normal breath sounds.   Abdominal:      Palpations: Abdomen is soft.      Tenderness: There is no abdominal tenderness.   Musculoskeletal:         General: No tenderness.      Right lower leg: No edema.      Left lower leg: No edema.   Skin:     General: Skin is warm and dry.   Neurological:      Mental Status: She is alert and oriented to person, place, and time.      Motor: Weakness present.   Psychiatric:         Behavior: Behavior normal.       Significant Labs: All pertinent labs within the past 24 hours have been reviewed.  CBC:   Recent Labs   Lab 02/24/23  0421 02/25/23  0808   WBC 3.72* 3.36*   HGB 7.5* 8.4*   HCT 24.6* 26.0*    289     CMP:   Recent Labs   Lab 02/24/23  0421 02/25/23  0808   * 130*   K 3.5 3.4*    95   CO2 18* 25    99   BUN 19 13   CREATININE 1.0 0.9   CALCIUM 8.2* 8.7   ANIONGAP 10 10       Significant Imaging: I have reviewed all pertinent imaging results/findings within the past 24 hours.

## 2023-02-25 NOTE — PROGRESS NOTES
Pt requested a shower, she was assisted to the BR with a PCT and me. she took her shower, turned the shower off pulled the light for assistance to get back to the bed. I entered the BR pt was not responsive sitting on the shower chair leaned backward diaphoretic. she began to respond slowly with sternal rubs and calling her name. She is currently back in the bed, very drowsy but responding and answering questions appropriately. Blood sugar 124, B/P 124/77, MAP 96, P 95, O2 sat 99%. Reported to med team.

## 2023-02-25 NOTE — ASSESSMENT & PLAN NOTE
- CT head with near total opacification of the sphenoid sinuses.  Mild patchy mucosal thickening of the ethmoid sinuses.  Remaining visualized paranasal sinuses and mastoid air cells are clear.  - transitioned to po Augmentin

## 2023-02-25 NOTE — PROGRESS NOTES
Pt has been resting quietly she refused to get up at the bedside, states she still feels weak and light headed at times. She has taken food and fluids well.pure wick in place and a brief is in place.IV fluids discontinues earlier. IV antibiotics continue.

## 2023-02-25 NOTE — ASSESSMENT & PLAN NOTE
67 y.o. who presents following first syncopal episode with associated LOC. No previous history. Denies associated bowel/bladder incontinence. Denies light headedness, dizziness, visual changes prior to event. Mental status at time of exam is back to baseline.    - Tachycardic and hypotensive on presentation  - Orthostatics positive in EMS, negative in ED. Repeat positive  - No acute findings on head/cervical spine CT except sinusitis  - Given 1L NS in ED  - EKG with PACs and sinus tachycardia   - BPM with GLENIS otherwise no electolyte abnormaility. Glucose 101 on admit  - D-dimer elevated. No edema to BLEs concerning for DVT. Did recently have COVID infection and has multiple myeloma. VQ scan with low suspicion for PE  - obtain CBC, CMP daily   - 2D ECHO ordered - results below  - Telemetry   - a similar syncopal episode occurred when patient was showering today. EKG ordered   - will repeat orthostatics  - PT/OT ordered    Results for orders placed during the hospital encounter of 02/21/23    Echo    Interpretation Summary  · The left ventricle is normal in size with concentric hypertrophy and normal systolic function.  · The estimated ejection fraction is 70%.  · Normal left ventricular diastolic function.  · Normal right ventricular size with normal right ventricular systolic function.  · The estimated PA systolic pressure is 28 mm Hg.  · Low central venous pressure.

## 2023-02-25 NOTE — ASSESSMENT & PLAN NOTE
-Hypotensive on admission  -On valsartan 80 mg once daily, amlodipine 10 mg daily, and HCTZ 25 mg daily.   -initially holding all home meds in setting of hypotensive/normotensive  -Resumed home amlodipine- 02/24 and monitor BP closely  -Hold HCTZ and valsartan

## 2023-02-25 NOTE — ASSESSMENT & PLAN NOTE
This patient does have evidence of infective focus  My overall impression is sepsis.  Source: possibly from sinusitis noted on CT  Antibiotics given-   Antibiotics (72h ago, onward)    Start     Stop Route Frequency Ordered    02/25/23 2100  amoxicillin-clavulanate 875-125mg per tablet 1 tablet         -- Oral Every 12 hours 02/25/23 1511        Latest lactate reviewed-  Recent Labs   Lab 02/22/23  1750   LACTATE 0.6     Organ dysfunction indicated by Acute kidney injury- now resolved with IVFs  Will Not start Pressors- Levophed for MAP of 65  Source control achieved by: Initially placed of vanc and cefepime>> unasyn>> transitioned to po Augmentin 02/25  - UA noninfectious  - CXR without acute process  - skin exam without open wounds  - Blood cx 1/2 + Gram + cocci in clusters resembling Staph. Repeat blood cx NGTD  - CT head with sinusitis   - Will continue abx

## 2023-02-25 NOTE — PROGRESS NOTES
Pt has been resting quietly, easily aroused alert and oriented. No further episodes as reported earlier.

## 2023-02-25 NOTE — PROGRESS NOTES
[facility] Medicine  Progress Note    Patient Name: Janie Zamorano  MRN: [id number]  Patient Class: IP- Inpatient   Admission Date: 2/21/2023  Length of Stay: 2 days  Attending Physician: Tino Johnson MD  Primary Care Provider: He Hoyt MD        Subjective:     Principal Problem:Sepsis with acute renal failure without septic shock        HPI:  Janie Zamorano is a 66 y/o F with medical hx of HTN, Multiple Myeloma, depression, schizophrenia, and anxiety who presents to the ED for syncopal episode that occurred last night. She reports that she was at home and walked from her room to the kitchen to heat up some food. She felt fine but says the next thing she remembers is waking up bent over with her head resting on the stovetop that was turned off with associated diaphoresis. Her son, who she lives with, then came home and found her like that and guided her to the ground to sit and called 911. According to the ED note, she reported waking up on the floor, not over the stovetop, and that her son witnessed the fall. It appears she may have some confusion related to the event. Pt denies any confusion or urinary/fecal incontinence. States this has never happened to her before. Denies any head pain or bleeding.  She has been increasingly weak lately, which she attributes mostly to the radiation she is getting for multiple myeloma. She also has increased urinary frequency on occasion. Otherwise denies feeling dizzy or lightheaded prior to losing consciousness. Denies chest pain, dyspnea, palpitations, visual changes, numbness, new focal weakness. She does have weakness in the right leg from her multiple myeloma that she states is unchanged. On chart review, she did have a recent COVID infection on 2/12/23. She reports taking valsartan (new medication), amlodipine and hydrochlorothiazide for hypertension. On review of ED notes, EMS did report positive orthostatic vitals.     In the  ED, afebrile. Tachycardic and hypotensive with SBP in 100s. Negative orthostatic vitals in the ED. Labs with anemia, increased D-Dimer 4.31, and GLENIS with Cr 2.1 (baseline 0.9). troponin 0.013. CXR with stable cardiomegaly. CT head and CT cervical spine with no acute abnormality. Findings consistent with known multiple myeloma. VQ scan ordered. Given 1L NS and admitted to hospital medicine.        Overview/Hospital Course:  Janie Zamorano is a 66 yo F admitted to hospital medicine for further evaluation of syncopal episode and GLENIS. Cr down trending closer to baseline. Orthostatics positive. Given IVFs. VQ scan with low probability of PE. Noted to be SIRS 3/4 on 02/22- started on vancomycin and cefepime and IVFs. UA noninfectious. CXR without acute process. Blood cx 1/2 + coag-negative Staph; likely contaminant. Repeat blood cx pending.CT head showing sinus disease with near complete opacification of sphenoid sinuses- possible source of sepsis. Transitioned to po Augmentin on 02/25. Plan to discharge home when medically ready. Will need PCP f/u.       Interval History: T max overnight 100.5. Intermittently tachycardic. Restarted amlodipine yesterday and BP stable. Still c/o fatigue and weakness. Denies any other complaints. Plan to walk with RN today and see how she tolerates ADLs.Transitioning to po Augmentin.     Was notified by RN that patient was taking a shower and when she was finished, she pulled the light for assistance to get back to the bed. RN found patient not responsive sitting on the shower chair leaned backward and diaphoretic. She began to respond slowly with sternal rubs and calling her name. She then returned to bed with assistance and was answering questions appropriately. Blood sugar 124 B/P 124/77 MAP 96 P 95 O2 sat 99% at that time. Patient reports this is similar to the episode that initially brought her to the hospital. Ordered EKG and repeat orthostatics.   Review of Systems    Constitutional:  Positive for fatigue and fever. Negative for chills.   Respiratory:  Positive for cough. Negative for chest tightness and shortness of breath.    Cardiovascular:  Negative for chest pain and leg swelling.   Gastrointestinal:  Positive for constipation. Negative for abdominal pain and nausea.   Neurological:  Positive for dizziness, syncope, weakness and headaches.   Objective:     Vital Signs (Most Recent):  Temp: 99.7 °F (37.6 °C) (02/25/23 1133)  Pulse: 92 (02/25/23 1133)  Resp: 16 (02/25/23 1133)  BP: 127/81 (02/25/23 1133)  SpO2: 98 % (02/25/23 1133) Vital Signs (24h Range):  Temp:  [98.3 °F (36.8 °C)-100.5 °F (38.1 °C)] 99.7 °F (37.6 °C)  Pulse:  [] 92  Resp:  [16-18] 16  SpO2:  [97 %-99 %] 98 %  BP: (127-150)/(81-88) 127/81     Weight: 56.2 kg (123 lb 14.4 oz)  Body mass index is 21.95 kg/m².    Intake/Output Summary (Last 24 hours) at 2/25/2023 1310  Last data filed at 2/25/2023 0516  Gross per 24 hour   Intake 500 ml   Output 2200 ml   Net -1700 ml      Physical Exam  Vitals and nursing note reviewed.   Constitutional:       Appearance: She is well-developed. She is ill-appearing.   HENT:      Mouth/Throat:      Mouth: Mucous membranes are dry.   Eyes:      Pupils: Pupils are equal, round, and reactive to light.   Cardiovascular:      Rate and Rhythm: Normal rate and regular rhythm.   Pulmonary:      Effort: Pulmonary effort is normal.      Breath sounds: Normal breath sounds.   Abdominal:      Palpations: Abdomen is soft.      Tenderness: There is no abdominal tenderness.   Musculoskeletal:         General: No tenderness.      Right lower leg: No edema.      Left lower leg: No edema.   Skin:     General: Skin is warm and dry.   Neurological:      Mental Status: She is alert and oriented to person, place, and time.      Motor: Weakness present.   Psychiatric:         Behavior: Behavior normal.       Significant Labs: All pertinent labs within the past 24 hours have been  reviewed.  CBC:   Recent Labs   Lab 02/24/23  0421 02/25/23  0808   WBC 3.72* 3.36*   HGB 7.5* 8.4*   HCT 24.6* 26.0*    289     CMP:   Recent Labs   Lab 02/24/23  0421 02/25/23  0808   * 130*   K 3.5 3.4*    95   CO2 18* 25    99   BUN 19 13   CREATININE 1.0 0.9   CALCIUM 8.2* 8.7   ANIONGAP 10 10       Significant Imaging: I have reviewed all pertinent imaging results/findings within the past 24 hours.      Assessment/Plan:      * Sepsis with acute renal failure without septic shock  This patient does have evidence of infective focus  My overall impression is sepsis.  Source: possibly from sinusitis noted on CT  Antibiotics given-   Antibiotics (72h ago, onward)    Start     Stop Route Frequency Ordered    02/25/23 2100  amoxicillin-clavulanate 875-125mg per tablet 1 tablet         -- Oral Every 12 hours 02/25/23 1511        Latest lactate reviewed-  Recent Labs   Lab 02/22/23  1750   LACTATE 0.6     Organ dysfunction indicated by Acute kidney injury- now resolved with IVFs  Will Not start Pressors- Levophed for MAP of 65  Source control achieved by: Initially placed of vanc and cefepime>> unasyn>> transitioned to po Augmentin 02/25  - UA noninfectious  - CXR without acute process  - skin exam without open wounds  - Blood cx 1/2 + Gram + cocci in clusters resembling Staph. Repeat blood cx NGTD  - CT head with sinusitis   - Will continue abx     Syncope  67 y.o. who presents following first syncopal episode with associated LOC. No previous history. Denies associated bowel/bladder incontinence. Denies light headedness, dizziness, visual changes prior to event. Mental status at time of exam is back to baseline.    - Tachycardic and hypotensive on presentation  - Orthostatics positive in EMS, negative in ED. Repeat positive  - No acute findings on head/cervical spine CT except sinusitis  - Given 1L NS in ED  - EKG with PACs and sinus tachycardia   - BPM with GLENIS otherwise no electolyte  abnormaility. Glucose 101 on admit  - D-dimer elevated. No edema to BLEs concerning for DVT. Did recently have COVID infection and has multiple myeloma. VQ scan with low suspicion for PE  - obtain CBC, CMP daily   - 2D ECHO ordered - results below  - Telemetry   - a similar syncopal episode occurred when patient was showering today. EKG ordered   - will repeat orthostatics  - PT/OT ordered    Results for orders placed during the hospital encounter of 02/21/23    Echo    Interpretation Summary  · The left ventricle is normal in size with concentric hypertrophy and normal systolic function.  · The estimated ejection fraction is 70%.  · Normal left ventricular diastolic function.  · Normal right ventricular size with normal right ventricular systolic function.  · The estimated PA systolic pressure is 28 mm Hg.  · Low central venous pressure.    Other secondary hypertension  -Hypotensive on admission  -On valsartan 80 mg once daily, amlodipine 10 mg daily, and HCTZ 25 mg daily.   -initially holding all home meds in setting of hypotensive/normotensive  -Resumed home amlodipine- 02/24 and monitor BP closely  -Hold HCTZ and valsartan     GLENIS (acute kidney injury)  Resolved   Patient with acute kidney injury likely due to IVVD/dehydration GLENIS is currently stable. Labs reviewed- Renal function/electrolytes with Estimated Creatinine Clearance: 45.8 mL/min (based on SCr of 1 mg/dL). according to latest data. Monitor urine output and serial BMP and adjust therapy as needed. Avoid nephrotoxins and renally dose meds for GFR listed above.   -Given 1L IVFs in ED. Continue gentle IVFs- stopped 02/24  -Cr downtrending. Now back to baseline after IVFs    Multiple myeloma not having achieved remission  -Currently follows with heme/onc. Had radiation treatment 2/13/23. Has weakness in the right leg that is not new.   - CT head with Small scattered lucent lesions throughout the cervical spine, most prominent a lytic lesion in the right  C5 facet, likely secondary to reported history of multiple myeloma.  Several punched-out lucent lesions in the calvarium likely related to history of multiple myeloma.     Acute non-recurrent sphenoidal sinusitis  - CT head with near total opacification of the sphenoid sinuses.  Mild patchy mucosal thickening of the ethmoid sinuses.  Remaining visualized paranasal sinuses and mastoid air cells are clear.  - transitioned to po Augmentin    Anemia in neoplastic disease  -Noted on labs. H/H 9.6/31.1. Similar to values  from 10 months ago.    Paranoid schizophrenia  -Denies taking medications  -No AH/VH noted on exam      VTE Risk Mitigation (From admission, onward)         Ordered     enoxaparin injection 30 mg  Daily         02/22/23 0051     IP VTE HIGH RISK PATIENT  Once         02/22/23 0051     Place sequential compression device  Until discontinued         02/22/23 0051                Discharge Planning   EMELY: 2/26/2023     Code Status: Full Code   Is the patient medically ready for discharge?: No    Reason for patient still in hospital (select all that apply): Patient trending condition, Treatment and PT / OT recommendations  Discharge Plan A: Home Health                  Megan Charles PA-C  Department of Hospital Medicine   [facility]

## 2023-02-26 LAB
ANION GAP SERPL CALC-SCNC: 10 MMOL/L (ref 8–16)
BUN SERPL-MCNC: 18 MG/DL (ref 8–23)
CALCIUM SERPL-MCNC: 8.3 MG/DL (ref 8.7–10.5)
CHLORIDE SERPL-SCNC: 93 MMOL/L (ref 95–110)
CO2 SERPL-SCNC: 23 MMOL/L (ref 23–29)
CREAT SERPL-MCNC: 1 MG/DL (ref 0.5–1.4)
ERYTHROCYTE [DISTWIDTH] IN BLOOD BY AUTOMATED COUNT: 14.6 % (ref 11.5–14.5)
EST. GFR  (NO RACE VARIABLE): >60 ML/MIN/1.73 M^2
GLUCOSE SERPL-MCNC: 91 MG/DL (ref 70–110)
HCT VFR BLD AUTO: 24 % (ref 37–48.5)
HGB BLD-MCNC: 7.8 G/DL (ref 12–16)
MCH RBC QN AUTO: 28.6 PG (ref 27–31)
MCHC RBC AUTO-ENTMCNC: 32.5 G/DL (ref 32–36)
MCV RBC AUTO: 88 FL (ref 82–98)
PLATELET # BLD AUTO: 254 K/UL (ref 150–450)
PMV BLD AUTO: 9.4 FL (ref 9.2–12.9)
POCT GLUCOSE: 102 MG/DL (ref 70–110)
POCT GLUCOSE: 104 MG/DL (ref 70–110)
POCT GLUCOSE: 135 MG/DL (ref 70–110)
POCT GLUCOSE: 95 MG/DL (ref 70–110)
POTASSIUM SERPL-SCNC: 3.9 MMOL/L (ref 3.5–5.1)
RBC # BLD AUTO: 2.73 M/UL (ref 4–5.4)
SODIUM SERPL-SCNC: 126 MMOL/L (ref 136–145)
WBC # BLD AUTO: 3.13 K/UL (ref 3.9–12.7)

## 2023-02-26 PROCEDURE — 97116 GAIT TRAINING THERAPY: CPT

## 2023-02-26 PROCEDURE — 99233 SBSQ HOSP IP/OBS HIGH 50: CPT | Mod: ,,,

## 2023-02-26 PROCEDURE — 25000003 PHARM REV CODE 250

## 2023-02-26 PROCEDURE — 11000001 HC ACUTE MED/SURG PRIVATE ROOM

## 2023-02-26 PROCEDURE — 63600175 PHARM REV CODE 636 W HCPCS

## 2023-02-26 PROCEDURE — 99233 PR SUBSEQUENT HOSPITAL CARE,LEVL III: ICD-10-PCS | Mod: ,,,

## 2023-02-26 PROCEDURE — 36415 COLL VENOUS BLD VENIPUNCTURE: CPT

## 2023-02-26 PROCEDURE — 97162 PT EVAL MOD COMPLEX 30 MIN: CPT

## 2023-02-26 PROCEDURE — 85027 COMPLETE CBC AUTOMATED: CPT

## 2023-02-26 PROCEDURE — 80048 BASIC METABOLIC PNL TOTAL CA: CPT

## 2023-02-26 RX ADMIN — ENOXAPARIN SODIUM 30 MG: 30 INJECTION SUBCUTANEOUS at 06:02

## 2023-02-26 RX ADMIN — AMOXICILLIN AND CLAVULANATE POTASSIUM 1 TABLET: 875; 125 TABLET, FILM COATED ORAL at 08:02

## 2023-02-26 NOTE — PT/OT/SLP EVAL
"Physical Therapy Evaluation    Patient Name:  Janie Zamorano   MRN:  [id number]    Recommendations:     Discharge Recommendations: nursing facility, skilled   Discharge Equipment Recommendations:  (TBD as pt progresses)   Barriers to discharge: Inaccessible home 4 LOAN with B UE rails    Assessment:     Janie Zamorano is a 67 y.o. female admitted with a medical diagnosis of Sepsis with acute renal failure without septic shock.  She presents with the following impairments/functional limitations: weakness, impaired balance, impaired self care skills, impaired functional mobility, gait instability, decreased lower extremity function . Pt is unsafe with functional mobility at this time due to pt requires no assist for bed mobility, CGA for transfers, and minimal to moderate assist for gait due to instability with pt reaching for the door and furniture for support. Pt is motivated to progress with functional mobility.     Rehab Prognosis: Good; patient would benefit from acute skilled PT services to address these deficits and reach maximum level of function.    Recent Surgery: * No surgery found *      Plan:     During this hospitalization, patient to be seen 3 x/week to address the identified rehab impairments via gait training, therapeutic activities, therapeutic exercises, neuromuscular re-education and progress toward the following goals:    Plan of Care Expires:  03/28/23    Subjective   "I used to work out at the gym before I got sick"    Pain/Comfort:  Pain Rating 1: 0/10  Pain Rating Post-Intervention 1: 0/10    Patients cultural, spiritual, [employer] conflicts given the current situation: no    Living Environment:  Pt lives with family in a 1 story home with 4 LOAN with B UE rails  Prior to admission, patients level of function was independent.  Equipment used at home: cane, quad, rollator, walker, standard, wheelchair, shower chair, grab bar, raised toilet (pt wasn't using the w/c).  Upon " discharge, patient will have assistance from family.    Objective:     Communicated with nurse prior to session.  Patient found HOB elevated with PureWick, telemetry  upon PT entry to room.    General Precautions: Standard, fall  Orthopedic Precautions:N/A   Braces: N/A  Respiratory Status: Room air    Exams:  Cognitive Exam:  Patient is oriented to Person, Place, and Time  Sensation:    -       Intact  light/touch B LE  RLE ROM: WFL  RLE Strength: Deficits: hip flex 3+/5; knee ext/flex 4-/5; ankle DF 4-/5  LLE ROM: WFL  LLE Strength: Deficits: hip flex 4-/5; knee ext/flex 4-/5; ankle DF 4-/5    Functional Mobility:  Bed Mobility:     Supine to Sit: supervision  Sit to Supine: supervision  Transfers:     Sit to Stand:  minimum assistance and moderate assistance with no AD  Gait: 8ft x 2 trials to/from bathroom with HHA with minimal to moderate assist. Pt required moderate assist to turn around to sit on the commode and for turning to sit on the bed . Pt limited with gait distance due to weakness and instability.    AM-PAC 6 CLICK MOBILITY  Total Score:19     Treatment & Education:  Pt ambulated to the bathroom as above and urinated and had a BM.Nurse notified Pt required assist to clean herself after. Pt stood with minimal assist with sink support to be cleaned and for brief to be donned.    Patient left HOB elevated with all lines intact, call button in reach, and nurse notified.    GOALS:   Multidisciplinary Problems       Physical Therapy Goals          Problem: Physical Therapy    Goal Priority Disciplines Outcome Goal Variances Interventions   Physical Therapy Goal     PT, PT/OT Ongoing, Progressing     Description: PT goals until 3/6/23    1. Pt sit to stand with no AD with CGA-not met  2. Pt to perform gait 100ft with no AD with CGA.-not met  3. Pt to up/down 4 steps with B UE rail with CGA.-not met  4. Pt to perform B LE exs in sitting or supine x 15 reps to strengthen B LE to improve functional  mobility.-not met                        History:     Past Medical History:   Diagnosis Date    Anxiety     Asthma     Bronchitis     COPD (chronic obstructive pulmonary disease)     Depression     GERD (gastroesophageal reflux disease)     Hallucination     History of psychiatric hospitalization     Hypertension     Neck pain     Polyneuropathy in diseases classified elsewhere 2021    Schizophrenia     Sleep difficulties        Past Surgical History:   Procedure Laterality Date     SECTION      X 1    COLONOSCOPY N/A 2018    Surgeon: Albert Russell MD    ESOPHAGOGASTRODUODENOSCOPY N/A 2018    Surgeon: Albert Russell MD    HYSTERECTOMY  2016    [facility]---DLH/BSO    LIPOMA RESECTION      back  x 2    SALPINGOOPHORECTOMY Bilateral 2016    [facility]---DLH/BSO    THORACIC LAMINECTOMY WITH FUSION N/A 2018    Surgeon: He Andres MD       Time Tracking:     PT Received On: 23  PT Start Time: 1139     PT Stop Time: 1210  PT Total Time (min): 31 min     Billable Minutes: Evaluation 10 and Gait Training 2023

## 2023-02-26 NOTE — PLAN OF CARE
Problem: Physical Therapy  Goal: Physical Therapy Goal  Description: PT goals until 3/6/23    1. Pt sit to stand with no AD with CGA-not met  2. Pt to perform gait 100ft with no AD with CGA.-not met  3. Pt to up/down 4 steps with B UE rail with CGA.-not met  4. Pt to perform B LE exs in sitting or supine x 15 reps to strengthen B LE to improve functional mobility.-not met   Outcome: Ongoing, Progressing   Pt's goals set and pt will benefit from skilled PT services to work towards improved functional mobility including: up/down steps, transfers, and gait.   2/26/2023

## 2023-02-27 VITALS
RESPIRATION RATE: 20 BRPM | BODY MASS INDEX: 21.95 KG/M2 | SYSTOLIC BLOOD PRESSURE: 145 MMHG | WEIGHT: 123.88 LBS | HEART RATE: 81 BPM | TEMPERATURE: 99 F | DIASTOLIC BLOOD PRESSURE: 83 MMHG | OXYGEN SATURATION: 99 % | HEIGHT: 63 IN

## 2023-02-27 LAB
ANION GAP SERPL CALC-SCNC: 8 MMOL/L (ref 8–16)
BACTERIA BLD CULT: NORMAL
BUN SERPL-MCNC: 18 MG/DL (ref 8–23)
CALCIUM SERPL-MCNC: 8.4 MG/DL (ref 8.7–10.5)
CHLORIDE SERPL-SCNC: 100 MMOL/L (ref 95–110)
CO2 SERPL-SCNC: 23 MMOL/L (ref 23–29)
CREAT SERPL-MCNC: 1 MG/DL (ref 0.5–1.4)
ERYTHROCYTE [DISTWIDTH] IN BLOOD BY AUTOMATED COUNT: 14.6 % (ref 11.5–14.5)
EST. GFR  (NO RACE VARIABLE): >60 ML/MIN/1.73 M^2
GLUCOSE SERPL-MCNC: 100 MG/DL (ref 70–110)
HCT VFR BLD AUTO: 25 % (ref 37–48.5)
HGB BLD-MCNC: 7.7 G/DL (ref 12–16)
MCH RBC QN AUTO: 27.8 PG (ref 27–31)
MCHC RBC AUTO-ENTMCNC: 30.8 G/DL (ref 32–36)
MCV RBC AUTO: 90 FL (ref 82–98)
PLATELET # BLD AUTO: 265 K/UL (ref 150–450)
PMV BLD AUTO: 9.7 FL (ref 9.2–12.9)
POTASSIUM SERPL-SCNC: 4 MMOL/L (ref 3.5–5.1)
RBC # BLD AUTO: 2.77 M/UL (ref 4–5.4)
SODIUM SERPL-SCNC: 131 MMOL/L (ref 136–145)
WBC # BLD AUTO: 2.8 K/UL (ref 3.9–12.7)

## 2023-02-27 PROCEDURE — 25000003 PHARM REV CODE 250

## 2023-02-27 PROCEDURE — 97535 SELF CARE MNGMENT TRAINING: CPT

## 2023-02-27 PROCEDURE — 99239 PR HOSPITAL DISCHARGE DAY,>30 MIN: ICD-10-PCS | Mod: ,,,

## 2023-02-27 PROCEDURE — 97530 THERAPEUTIC ACTIVITIES: CPT

## 2023-02-27 PROCEDURE — 63600175 PHARM REV CODE 636 W HCPCS

## 2023-02-27 PROCEDURE — 80048 BASIC METABOLIC PNL TOTAL CA: CPT

## 2023-02-27 PROCEDURE — 97116 GAIT TRAINING THERAPY: CPT

## 2023-02-27 PROCEDURE — 36415 COLL VENOUS BLD VENIPUNCTURE: CPT

## 2023-02-27 PROCEDURE — 94761 N-INVAS EAR/PLS OXIMETRY MLT: CPT

## 2023-02-27 PROCEDURE — 85027 COMPLETE CBC AUTOMATED: CPT

## 2023-02-27 PROCEDURE — 99239 HOSP IP/OBS DSCHRG MGMT >30: CPT | Mod: ,,,

## 2023-02-27 PROCEDURE — 25000003 PHARM REV CODE 250: Performed by: HOSPITALIST

## 2023-02-27 PROCEDURE — 97165 OT EVAL LOW COMPLEX 30 MIN: CPT

## 2023-02-27 RX ORDER — AMOXICILLIN AND CLAVULANATE POTASSIUM 875; 125 MG/1; MG/1
1 TABLET, FILM COATED ORAL EVERY 12 HOURS
Qty: 2 TABLET | Refills: 0 | Status: SHIPPED | OUTPATIENT
Start: 2023-02-28 | End: 2023-03-01

## 2023-02-27 RX ORDER — AMOXICILLIN AND CLAVULANATE POTASSIUM 875; 125 MG/1; MG/1
1 TABLET, FILM COATED ORAL EVERY 12 HOURS
Status: CANCELLED | OUTPATIENT
Start: 2023-02-27 | End: 2023-02-28

## 2023-02-27 RX ADMIN — ENOXAPARIN SODIUM 30 MG: 30 INJECTION SUBCUTANEOUS at 05:02

## 2023-02-27 RX ADMIN — AMOXICILLIN AND CLAVULANATE POTASSIUM 1 TABLET: 875; 125 TABLET, FILM COATED ORAL at 08:02

## 2023-02-27 RX ADMIN — AMOXICILLIN AND CLAVULANATE POTASSIUM 1 TABLET: 875; 125 TABLET, FILM COATED ORAL at 10:02

## 2023-02-27 NOTE — ASSESSMENT & PLAN NOTE
67 y.o. who presents following first syncopal episode with associated LOC. No previous history. Denies associated bowel/bladder incontinence. Denies light headedness, dizziness, visual changes prior to event. Mental status at time of exam is back to baseline.    - Tachycardic and hypotensive on presentation  - Orthostatics positive in EMS, negative in ED. Repeat positive  - No acute findings on head/cervical spine CT except sinusitis  - Given 1L NS in ED  - EKG with PACs and sinus tachycardia   - BPM with GLENIS otherwise no electolyte abnormaility. Glucose 101 on admit  - D-dimer elevated. No edema to BLEs concerning for DVT. Did recently have COVID infection and has multiple myeloma. VQ scan with low suspicion for PE  - obtain CBC, CMP daily   - 2D ECHO ordered - results below  - Telemetry   - a similar syncopal episode occurred after patient took a hot shower on 02/25.   - Episodes likely 2/2 hypotension after recent increase in BP meds. Will continue to hold all BP meds on discharge  - PT/OT ordered; recommending SNF    Results for orders placed during the hospital encounter of 02/21/23    Echo    Interpretation Summary  · The left ventricle is normal in size with concentric hypertrophy and normal systolic function.  · The estimated ejection fraction is 70%.  · Normal left ventricular diastolic function.  · Normal right ventricular size with normal right ventricular systolic function.  · The estimated PA systolic pressure is 28 mm Hg.  · Low central venous pressure.

## 2023-02-27 NOTE — PLAN OF CARE
Problem: Adult Inpatient Plan of Care  Goal: Plan of Care Review  Outcome: Ongoing, Progressing  Goal: Patient-Specific Goal (Individualized)  Outcome: Ongoing, Progressing  Goal: Absence of Hospital-Acquired Illness or Injury  Outcome: Ongoing, Progressing  Goal: Optimal Comfort and Wellbeing  Outcome: Ongoing, Progressing  Goal: Readiness for Transition of Care  Outcome: Ongoing, Progressing     Problem: Infection  Goal: Absence of Infection Signs and Symptoms  Outcome: Ongoing, Progressing     Problem: Fluid and Electrolyte Imbalance (Acute Kidney Injury/Impairment)  Goal: Fluid and Electrolyte Balance  Outcome: Ongoing, Progressing     Problem: Oral Intake Inadequate (Acute Kidney Injury/Impairment)  Goal: Optimal Nutrition Intake  Outcome: Ongoing, Progressing     Problem: Renal Function Impairment (Acute Kidney Injury/Impairment)  Goal: Effective Renal Function  Outcome: Ongoing, Progressing     Problem: Adjustment to Illness (Sepsis/Septic Shock)  Goal: Optimal Coping  Outcome: Ongoing, Progressing     Problem: Bleeding (Sepsis/Septic Shock)  Goal: Absence of Bleeding  Outcome: Ongoing, Progressing     Problem: Glycemic Control Impaired (Sepsis/Septic Shock)  Goal: Blood Glucose Level Within Desired Range  Outcome: Ongoing, Progressing     Problem: Infection Progression (Sepsis/Septic Shock)  Goal: Absence of Infection Signs and Symptoms  Outcome: Ongoing, Progressing     Problem: Nutrition Impaired (Sepsis/Septic Shock)  Goal: Optimal Nutrition Intake  Outcome: Ongoing, Progressing     Problem: Skin Injury Risk Increased  Goal: Skin Health and Integrity  Outcome: Ongoing, Progressing

## 2023-02-27 NOTE — PT/OT/SLP PROGRESS
"Physical Therapy Treatment    Patient Name:  Janie Zamorano   MRN:  [id number]    Recommendations:     Discharge Recommendations: nursing facility, skilled  Discharge Equipment Recommendations:  (TBD)  Barriers to discharge: Inaccessible home and Decreased caregiver support    Assessment:     Janie Zamorano is a 67 y.o. female admitted with a medical diagnosis of Sepsis with acute renal failure without septic shock.  She presents with the following impairments/functional limitations: weakness, impaired endurance, impaired self care skills, impaired functional mobility, gait instability, impaired balance, impaired cardiopulmonary response to activity. She demonstrated improved mobility this session, requiring modified independence for bed mobility with increased time this session. She was able to ambulate approx. 35' with RW and CGA with occasional min A for RW management and increased time this session as well. She will continue to benefit from skilled acute PT services in order to return towards PLOF.    Rehab Prognosis: Good; patient would benefit from acute skilled PT services to address these deficits and reach maximum level of function.    Recent Surgery: * No surgery found *      Plan:     During this hospitalization, patient to be seen 3 x/week to address the identified rehab impairments via gait training, therapeutic activities, therapeutic exercises, neuromuscular re-education and progress toward the following goals:    Plan of Care Expires:  03/28/23    Subjective     Chief Complaint: "I need to use the bathroom"  Patient/Family Comments/goals: to return home and to PLOF  Pain/Comfort:  Pain Rating 1: 0/10      Objective:     Communicated with nursing prior to session.  Patient found supine with telemetry upon PT entry to room.     General Precautions: Standard, fall  Orthopedic Precautions: N/A  Braces: N/A  Respiratory Status: Room air     Functional Mobility:  Bed Mobility:     Rolling " Left:  modified independence with increased time  Scooting: modified independence with increased time  Supine to Sit: modified independence with increased time  Sit to Supine: modified independence with increased time  Transfers:     Sit to Stand:  contact guard assistance and minimum assistance with rolling walker and increased time  Gait: Patient was able to ambulate approx. 15' from EOB to bathroom with CGA and RW and increased time. She had a seated rest break, and then was able to ambulate another 5' from the bathroom to the door of the room, and then another 15' from the from the door back to EOB (~35' total). She demonstrated significantly decreased luis alberto, stride length, and step length during ambulation. She also reported some slight dizziness during ambulation.      AM-PAC 6 CLICK MOBILITY  Turning over in bed (including adjusting bedclothes, sheets and blankets)?: 4  Sitting down on and standing up from a chair with arms (e.g., wheelchair, bedside commode, etc.): 3  Moving from lying on back to sitting on the side of the bed?: 4  Moving to and from a bed to a chair (including a wheelchair)?: 3  Need to walk in hospital room?: 3  Climbing 3-5 steps with a railing?: 2  Basic Mobility Total Score: 19       Treatment & Education:  Patient educated on role of acute care PT and PT POC, safety while in hospital including calling nurse for mobility, call light usage, benefits of out of bed mobility, breathing technique, fall risk, gait technique, posture, and benefits of continued PT by explanation.    Patient demonstrates good understanding of education provided this day.       Patient left supine with all lines intact and call button in reach..    GOALS:   Multidisciplinary Problems       Physical Therapy Goals          Problem: Physical Therapy    Goal Priority Disciplines Outcome Goal Variances Interventions   Physical Therapy Goal     PT, PT/OT Ongoing, Progressing     Description: PT goals until  3/6/23    1. Pt sit to stand with no AD with CGA-not met  2. Pt to perform gait 100ft with no AD with CGA.-not met  3. Pt to up/down 4 steps with B UE rail with CGA.-not met  4. Pt to perform B LE exs in sitting or supine x 15 reps to strengthen B LE to improve functional mobility.-not met                        Time Tracking:     PT Received On: 02/27/23  PT Start Time: 1058     PT Stop Time: 1129  PT Total Time (min): 31 min     Billable Minutes: Gait Training 15 minutes and Therapeutic Activity 16 minutes    Treatment Type: Treatment  PT/PTA: PT           02/27/2023

## 2023-02-27 NOTE — PT/OT/SLP EVAL
Occupational Therapy   Evaluation    Name: Janie Zamorano  MRN: [id number]  Admitting Diagnosis:  Sepsis with acute renal failure without septic shock    Length of Stay: 4 days    Recommendations:     Discharge Recommendations: nursing facility, skilled  Discharge Equipment Recommendations:  other (see comments) (TBD progress pending)  Barriers to discharge:  Decreased caregiver support    Plan:     Patient to be seen 3 x/week to address the above listed problems via self-care/home management, therapeutic activities, therapeutic exercises  Plan of Care Expires: 03/29/23  Plan of Care Reviewed with: patient    Assessment:     Janie Zamorano is a 67 y.o. female with a medical diagnosis of Sepsis with acute renal failure without septic shock.  She presents with the following performance deficits affecting function: weakness, impaired endurance, impaired self care skills, impaired functional mobility, gait instability, impaired balance, decreased safety awareness, decreased lower extremity function, decreased upper extremity function, decreased coordination, impaired coordination, impaired cardiopulmonary response to activity.      Pt presenting with increased weakness, impaired endurance, decreased activity tolerance, increased fatigue, impaired balance and decreased safety awareness upon evaluation this date, requiring increased time and assistance to complete functional tasks. Patient eager and motivated to maintain independence, expressing she can do most things without assistance. Patient required Min A for sit>stand transfer and functional mobility of household distance using hand-held assist. Patient expressed increased fatigue with minimal exertion during activity. Pt would benefit from continued acute skilled OT services at this time to increase functional performance, and improve quality of life. OT to recommend SNF may progress at discharge once medically appropriate for increased functional  "independence and to improve patient safety before returning home.    Rehab Prognosis: Fair; patient would benefit from acute skilled OT services to address these deficits and reach maximum level of function.       Subjective   Patient states: " I can do all of that "    Communicated with: Nurse prior to session.  Patient found HOB elevated with Edmundo stoner upon OT entry to room.    Chief Complaint: Loss of Consciousness (Pt was standing, warming up food and had a syncopal episode. On EMS arrival, pt sitting on floor. +orthostatics per ems, on BP meds. )    Patient/Family Comments/goals: to return home at [facility] [address]    Pain/Comfort:  Pain Rating 1: 0/10  Pain Rating Post-Intervention 1: 0/10    Patients cultural, spiritual, L[employer] day conflicts given the current situation: no    Occupational Profile:    Unclear living situation  Living Environment: reports living both in 1st floor apt with son 0 LOAN raised toilet and grab bars AND in sister's home (primary?)  [facility] 4 LOAN B rails. Walk-in shower with grab bars, shower chair, raised toilet   Prior Level of Function: Patient reports being Mod-Independent using quad cane with mobility & with ADLs.   Patient uses DME as follows: cane, quad, shower chair, raised toilet, grab bar.   DME owned (not currently used): none.  Roles/Repsonsibilities:   Hand Dominance: right   Work: no.   Drive: no.   Managing Medicines/Managing Home: yes.   Equipment Used at Home:  cane, quad, shower chair, raised toilet, grab bar    Patient reports they will have assistance from unknown upon discharge.      Objective:     Patient found with: Edmundo stoner   General Precautions: Standard, Cardiac fall   Orthopedic Precautions:N/A   Braces: N/A   Oxygen Device: Room Air  Vitals: BP (!) 146/81 (BP Location: Left arm, Patient Position: Lying)   Pulse 94   Temp 98.8 °F (37.1 °C) (Oral)   Resp 18   Ht 5' 3" (1.6 m)   Wt 56.2 kg (123 lb 14.4 oz)   LMP  (LMP Unknown)   SpO2 98%   " Breastfeeding No   BMI 21.95 kg/m²     Cognitive and Psychosocial Function:   AxOx4 -- Person, Place, Time, and Situation   Follows Commands/attention:follows one-step commands  Communication:  clear/fluent  Memory: No Deficits noted  Safety awareness/insight to disability: impaired   Mood/Affect/Coping skills/emotional control: Appropriate to situation    Hearing: Intact    Vision:  Intact visual fields    Physical Exam:  Postural examination/scapula alignment:    -       Rounded shoulders  Skin integrity: Visible skin intact     Left UE Right UE   UE Edema absent absent   UE ROM AROM WFL AROM WFL   UE Strength 3+/5 3+/5    Strength 3+/5 3+/5   Sensation LUE INTACT:WFL RUE INTACT: WFL   Fine Motor Coordination:  LUE INTACT: WFL RUE INTACT: WFL   Gross Motor Coordination: LUE INTACT: WFL RUE INTACT:WFL     Occupational Performance:  Bed Mobility:    Patient completed Rolling/Turning to Left with  stand by assistance  Patient completed Supine to Sit with stand by assistance on L side of bed; HOB elevated, increased time  Scooting anteriorly to EOB to have both feet planted on floor: stand by assistance  Patient completed Sit to Supine with stand by assistance on L side of bed; HOB elevated, increased time    Functional Mobility/Transfers:  Static Sitting EOB: SBA  Patient completed Sit <> Stand Transfer from EOB with minimum assistance  with  hand-held assist   Patient completed stand from toilet level with CGA using no AD  Static Standing Balance: CGA  Patient completed Toilet Transfer Step Transfer technique with contact guard assistance with  no AD  Pt completed functional mobility of household distance ~20ft with Min A using hand held assist. Required increased time due to slow gait      Activities of Daily Living:  Lower Body Dressing: moderate assistance to don brief, SBA to doff for toileting tasks  Toileting: independence to perform pericare seated at toilet level      [facility] 6 Click ADL:  [facility] Total  Score: 21    Treatment & Education:  -Pt education on OT role and POC.  -Importance of E/OOB activity with staff assistance, emphasis on daily participation  -Safety during functional transfer and mobility ensured  -Patient provided with education on importance of Bilateral UB/LB integration during functional tasks for improvement in functional performance.   -Education provided/reviewed, questions answered within OT scope of practice.   -Patient demonstrates understanding and learning this date.         Patient left HOB elevated with all lines intact and call button in reach    GOALS:   Multidisciplinary Problems       Occupational Therapy Goals          Problem: Occupational Therapy    Goal Priority Disciplines Outcome Interventions   Occupational Therapy Goal     OT, PT/OT Ongoing, Progressing    Description: Goals set on 2/27, with expiration date 3/27:  Patient will increase functional independence with ADLs by performing:    Bed mobility with Bosque  Grooming while standing at sink with Bosque  UB Dressing with Bosque.  LB Dressing with Bosque.  Toileting from toilet with Bosque for hygiene and clothing management.   Functional mobility of household and community distance with Mod-Bosque and AD as needed  Pt will demonstrate understanding of education provided regarding energy conservation and task modification through teach-back method.  Pt will demonstrate Bosque in HEP for BUE strenghtening GM/FM exercises to improve functional performance.                          History:     Past Medical History:   Diagnosis Date    Anxiety     Asthma     Bronchitis     COPD (chronic obstructive pulmonary disease)     Depression     GERD (gastroesophageal reflux disease)     Hallucination     History of psychiatric hospitalization     Hypertension     Neck pain     Polyneuropathy in diseases classified elsewhere 4/12/2021    Schizophrenia     Sleep difficulties          Past  Surgical History:   Procedure Laterality Date     SECTION      X 1    COLONOSCOPY N/A 2018    Surgeon: Albert Russell MD    ESOPHAGOGASTRODUODENOSCOPY N/A 2018    Surgeon: Albert Russell MD    HYSTERECTOMY  2016    [facility]---DLH/BSO    LIPOMA RESECTION      back  x 2    SALPINGOOPHORECTOMY Bilateral 2016    [facility]---DLH/BSO    THORACIC LAMINECTOMY WITH FUSION N/A 2018    Surgeon: He Andres MD       Time Tracking:       OT Date of Treatment: 23  OT Start Time: 1426  OT Stop Time: 1457  OT Total Time (min): 31 min    Billable Minutes:Evaluation 8  Self Care/Home Management 2023

## 2023-02-27 NOTE — PROGRESS NOTES
[facility] Medicine  Progress Note    Patient Name: Janie Zamorano  MRN: [id number]  Patient Class: IP- Inpatient   Admission Date: 2/21/2023  Length of Stay: 3 days  Attending Physician: Tino Johnson MD  Primary Care Provider: He Hoyt MD        Subjective:     Principal Problem:Sepsis with acute renal failure without septic shock        HPI:  Janie Zamorano is a 66 y/o F with medical hx of HTN, Multiple Myeloma, depression, schizophrenia, and anxiety who presents to the ED for syncopal episode that occurred last night. She reports that she was at home and walked from her room to the kitchen to heat up some food. She felt fine but says the next thing she remembers is waking up bent over with her head resting on the stovetop that was turned off with associated diaphoresis. Her son, who she lives with, then came home and found her like that and guided her to the ground to sit and called 911. According to the ED note, she reported waking up on the floor, not over the stovetop, and that her son witnessed the fall. It appears she may have some confusion related to the event. Pt denies any confusion or urinary/fecal incontinence. States this has never happened to her before. Denies any head pain or bleeding.  She has been increasingly weak lately, which she attributes mostly to the radiation she is getting for multiple myeloma. She also has increased urinary frequency on occasion. Otherwise denies feeling dizzy or lightheaded prior to losing consciousness. Denies chest pain, dyspnea, palpitations, visual changes, numbness, new focal weakness. She does have weakness in the right leg from her multiple myeloma that she states is unchanged. On chart review, she did have a recent COVID infection on 2/12/23. She reports taking valsartan (new medication), amlodipine and hydrochlorothiazide for hypertension. On review of ED notes, EMS did report positive orthostatic vitals.     In the  ED, afebrile. Tachycardic and hypotensive with SBP in 100s. Negative orthostatic vitals in the ED. Labs with anemia, increased D-Dimer 4.31, and GLENIS with Cr 2.1 (baseline 0.9). troponin 0.013. CXR with stable cardiomegaly. CT head and CT cervical spine with no acute abnormality. Findings consistent with known multiple myeloma. VQ scan ordered. Given 1L NS and admitted to hospital medicine.        Overview/Hospital Course:  Janie Zamorano is a 66 yo F admitted to hospital medicine for further evaluation of syncopal episode and GLENIS. Cr down trending closer to baseline. Patient reports recently having valsartan and HCTZ added to BP regimen (was previously taking only amlodipine). Orthostatics positive. All BP meds held. Given IVFs. VQ scan with low probability of PE. Noted to be SIRS 3/4 on 02/22- started on vancomycin and cefepime and IVFs. UA noninfectious. CXR without acute process. Blood cx 1/2 + coag-negative Staph; likely contaminant. Repeat blood cx NGTD. CT head showing sinus disease with near complete opacification of sphenoid sinuses- possible source of sepsis. Transitioned to po Augmentin on 02/25; doing well with >24 hrs afebrile. Syncopal episode in the shower on 02/25 after restarting amlodipine. Syncopal episodes related to hypotension. Will not restart any antihypertensives on discharge to avoid further episodes. Would benefit from a home BP journal that she can bring to PCP appointments. PT/OT consulted; recommending SNF. Plan to discharge when medically ready. Will need PCP f/u.       Interval History: NAEON. VSSAF. Still c/o Fatigue and weakness. Denies any other complaints. PT consulted; recommending SNF.     Review of Systems   Constitutional:  Positive for fatigue and fever. Negative for chills.   Respiratory:  Positive for cough. Negative for chest tightness and shortness of breath.    Cardiovascular:  Negative for chest pain and leg swelling.   Gastrointestinal:  Positive for  constipation. Negative for abdominal pain and nausea.   Neurological:  Positive for dizziness, syncope, weakness and headaches.   Objective:     Vital Signs (Most Recent):  Temp: 98.6 °F (37 °C) (02/26/23 1547)  Pulse: 107 (02/26/23 1547)  Resp: 16 (02/26/23 1547)  BP: 110/73 (02/26/23 1547)  SpO2: 99 % (02/26/23 1547) Vital Signs (24h Range):  Temp:  [98.4 °F (36.9 °C)-99.5 °F (37.5 °C)] 98.6 °F (37 °C)  Pulse:  [] 107  Resp:  [16-18] 16  SpO2:  [98 %-99 %] 99 %  BP: (110-142)/(71-94) 110/73     Weight: 56.2 kg (123 lb 14.4 oz)  Body mass index is 21.95 kg/m².    Intake/Output Summary (Last 24 hours) at 2/26/2023 1843  Last data filed at 2/26/2023 1758  Gross per 24 hour   Intake 420 ml   Output 450 ml   Net -30 ml      Physical Exam  Vitals and nursing note reviewed.   Constitutional:       Appearance: She is well-developed. She is ill-appearing.   HENT:      Mouth/Throat:      Mouth: Mucous membranes are dry.   Eyes:      Pupils: Pupils are equal, round, and reactive to light.   Cardiovascular:      Rate and Rhythm: Normal rate and regular rhythm.   Pulmonary:      Effort: Pulmonary effort is normal.      Breath sounds: Normal breath sounds.   Abdominal:      Palpations: Abdomen is soft.      Tenderness: There is no abdominal tenderness.   Musculoskeletal:         General: No tenderness.      Right lower leg: No edema.      Left lower leg: No edema.   Skin:     General: Skin is warm and dry.   Neurological:      Mental Status: She is alert and oriented to person, place, and time.      Motor: Weakness present.   Psychiatric:         Behavior: Behavior normal.       Significant Labs: All pertinent labs within the past 24 hours have been reviewed.  CBC:   Recent Labs   Lab 02/25/23  0808 02/26/23  0512   WBC 3.36* 3.13*   HGB 8.4* 7.8*   HCT 26.0* 24.0*    254     CMP:   Recent Labs   Lab 02/25/23  0808 02/26/23  0512   * 126*   K 3.4* 3.9   CL 95 93*   CO2 25 23   GLU 99 91   BUN 13 18    CREATININE 0.9 1.0   CALCIUM 8.7 8.3*   ANIONGAP 10 10       Significant Imaging: I have reviewed all pertinent imaging results/findings within the past 24 hours.      Assessment/Plan:      * Sepsis with acute renal failure without septic shock  This patient does have evidence of infective focus  My overall impression is sepsis.  Source: possibly from sinusitis noted on CT  Antibiotics given-   Antibiotics (72h ago, onward)      Start     Stop Route Frequency Ordered    02/25/23 2100  amoxicillin-clavulanate 875-125mg per tablet 1 tablet         -- Oral Every 12 hours 02/25/23 1511          Latest lactate reviewed-  No results for input(s): LACTATE in the last 72 hours.  Organ dysfunction indicated by Acute kidney injury- now resolved with IVFs  Will Not start Pressors- Levophed for MAP of 65  Source control achieved by: Initially placed of vanc and cefepime>> unasyn>> transitioned to po Augmentin 02/25  - UA noninfectious  - CXR without acute process  - skin exam without open wounds  - Blood cx 1/2 + Gram + cocci in clusters resembling Staph. Repeat blood cx NGTD  - CT head with sinusitis   - Will continue abx     Syncope  67 y.o. who presents following first syncopal episode with associated LOC. No previous history. Denies associated bowel/bladder incontinence. Denies light headedness, dizziness, visual changes prior to event. Mental status at time of exam is back to baseline.    - Tachycardic and hypotensive on presentation  - Orthostatics positive in EMS, negative in ED. Repeat positive  - No acute findings on head/cervical spine CT except sinusitis  - Given 1L NS in ED  - EKG with PACs and sinus tachycardia   - BPM with GLENIS otherwise no electolyte abnormaility. Glucose 101 on admit  - D-dimer elevated. No edema to BLEs concerning for DVT. Did recently have COVID infection and has multiple myeloma. VQ scan with low suspicion for PE  - obtain CBC, CMP daily   - 2D ECHO ordered - results below  - Telemetry   - a  similar syncopal episode occurred after patient took a hot shower on 02/25.   - Episodes likely 2/2 hypotension after recent increase in BP meds. Will continue to hold all BP meds on discharge  - PT/OT ordered; recommending SNF    Results for orders placed during the hospital encounter of 02/21/23    Echo    Interpretation Summary  · The left ventricle is normal in size with concentric hypertrophy and normal systolic function.  · The estimated ejection fraction is 70%.  · Normal left ventricular diastolic function.  · Normal right ventricular size with normal right ventricular systolic function.  · The estimated PA systolic pressure is 28 mm Hg.  · Low central venous pressure.    Other secondary hypertension  -Hypotensive on admission  -On valsartan 80 mg once daily, amlodipine 10 mg daily, and HCTZ 25 mg daily.   -initially holding all home meds in setting of hypotensive/normotensive  -Hold HCTZ and valsartan   -Resumed home amlodipine on 02/24. Patient had another syncopal episode after taking a hot shower on 02/25. Discontinued amlodipine. Will not restart antihypertensives on discharge as hypotension is likely the cause for these syncopal episodes.    GLENIS (acute kidney injury)  Resolved   Patient with acute kidney injury likely due to IVVD/dehydration GLENIS is currently stable. Labs reviewed- Renal function/electrolytes with Estimated Creatinine Clearance: 45.8 mL/min (based on SCr of 1 mg/dL). according to latest data. Monitor urine output and serial BMP and adjust therapy as needed. Avoid nephrotoxins and renally dose meds for GFR listed above.   -Given 1L IVFs in ED. Continue gentle IVFs- stopped 02/24  -Cr downtrending. Now back to baseline after IVFs    Multiple myeloma not having achieved remission  -Currently follows with heme/onc. Had radiation treatment 2/13/23. Has weakness in the right leg that is not new.   - CT head with Small scattered lucent lesions throughout the cervical spine, most prominent a  lytic lesion in the right C5 facet, likely secondary to reported history of multiple myeloma.  Several punched-out lucent lesions in the calvarium likely related to history of multiple myeloma.     Acute non-recurrent sphenoidal sinusitis  - CT head with near total opacification of the sphenoid sinuses.  Mild patchy mucosal thickening of the ethmoid sinuses.  Remaining visualized paranasal sinuses and mastoid air cells are clear.  - transitioned to po Augmentin    Anemia in neoplastic disease  -Noted on labs. H/H 9.6/31.1. Similar to values  from 10 months ago.    Paranoid schizophrenia  -Denies taking medications  -No AH/VH noted on exam      VTE Risk Mitigation (From admission, onward)           Ordered     enoxaparin injection 30 mg  Daily         02/22/23 0051     IP VTE HIGH RISK PATIENT  Once         02/22/23 0051     Place sequential compression device  Until discontinued         02/22/23 0051                    Discharge Planning   EMELY: 2/27/2023     Code Status: Full Code   Is the patient medically ready for discharge?: No    Reason for patient still in hospital (select all that apply): Patient trending condition, Treatment, PT / OT recommendations and Pending disposition  Discharge Plan A: Home Health                  Megan Charles PA-C  Department of Hospital Medicine   [facility]

## 2023-02-27 NOTE — PLAN OF CARE
OT evaluation completed, POC established as appropriate. OT recommending SNF with potential to progress once medically stable for discharge.    Problem: Occupational Therapy  Goal: Occupational Therapy Goal  Description: Goals set on 2/27, with expiration date 3/27:  Patient will increase functional independence with ADLs by performing:    Bed mobility with [place]  Grooming while standing at sink with [place]  UB Dressing with [place].  LB Dressing with [place].  Toileting from toilet with [place] for hygiene and clothing management.   Functional mobility of household and community distance with Mod-[place] and AD as needed  Pt will demonstrate understanding of education provided regarding energy conservation and task modification through teach-back method.  Pt will demonstrate [place] in HEP for BUE strenghtening GM/FM exercises to improve functional performance.     Outcome: Ongoing, Progressing

## 2023-02-27 NOTE — SUBJECTIVE & OBJECTIVE
Interval History: NAEON. VSSAF. Still c/o Fatigue and weakness. Denies any other complaints. PT consulted; recommending SNF.     Review of Systems   Constitutional:  Positive for fatigue and fever. Negative for chills.   Respiratory:  Positive for cough. Negative for chest tightness and shortness of breath.    Cardiovascular:  Negative for chest pain and leg swelling.   Gastrointestinal:  Positive for constipation. Negative for abdominal pain and nausea.   Neurological:  Positive for dizziness, syncope, weakness and headaches.   Objective:     Vital Signs (Most Recent):  Temp: 98.6 °F (37 °C) (02/26/23 1547)  Pulse: 107 (02/26/23 1547)  Resp: 16 (02/26/23 1547)  BP: 110/73 (02/26/23 1547)  SpO2: 99 % (02/26/23 1547) Vital Signs (24h Range):  Temp:  [98.4 °F (36.9 °C)-99.5 °F (37.5 °C)] 98.6 °F (37 °C)  Pulse:  [] 107  Resp:  [16-18] 16  SpO2:  [98 %-99 %] 99 %  BP: (110-142)/(71-94) 110/73     Weight: 56.2 kg (123 lb 14.4 oz)  Body mass index is 21.95 kg/m².    Intake/Output Summary (Last 24 hours) at 2/26/2023 1843  Last data filed at 2/26/2023 1758  Gross per 24 hour   Intake 420 ml   Output 450 ml   Net -30 ml      Physical Exam  Vitals and nursing note reviewed.   Constitutional:       Appearance: She is well-developed. She is ill-appearing.   HENT:      Mouth/Throat:      Mouth: Mucous membranes are dry.   Eyes:      Pupils: Pupils are equal, round, and reactive to light.   Cardiovascular:      Rate and Rhythm: Normal rate and regular rhythm.   Pulmonary:      Effort: Pulmonary effort is normal.      Breath sounds: Normal breath sounds.   Abdominal:      Palpations: Abdomen is soft.      Tenderness: There is no abdominal tenderness.   Musculoskeletal:         General: No tenderness.      Right lower leg: No edema.      Left lower leg: No edema.   Skin:     General: Skin is warm and dry.   Neurological:      Mental Status: She is alert and oriented to person, place, and time.      Motor: Weakness present.    Psychiatric:         Behavior: Behavior normal.       Significant Labs: All pertinent labs within the past 24 hours have been reviewed.  CBC:   Recent Labs   Lab 02/25/23  0808 02/26/23  0512   WBC 3.36* 3.13*   HGB 8.4* 7.8*   HCT 26.0* 24.0*    254     CMP:   Recent Labs   Lab 02/25/23  0808 02/26/23  0512   * 126*   K 3.4* 3.9   CL 95 93*   CO2 25 23   GLU 99 91   BUN 13 18   CREATININE 0.9 1.0   CALCIUM 8.7 8.3*   ANIONGAP 10 10       Significant Imaging: I have reviewed all pertinent imaging results/findings within the past 24 hours.

## 2023-02-27 NOTE — PROGRESS NOTES
Pt has been resting quietly in bed, easily aroused, VSS she refused to allow me to get orthostatic B/P stating she does not feel steady enough to stand. She is taking fluids well her appetite is good as well.

## 2023-02-27 NOTE — DISCHARGE INSTRUCTIONS
Mrs. Zamorano,     We have stopped all of your blood pressure medicines. I would like for you to keep a BP journal to better track your pressure. I am going to message your primary care doctor to see if we can get you enrolled in the home digital BP monitoring program.    I have ordered for one more day of Augmentin to complete your course of antibiotics.     It has been a pleasure taking care of you and I wish you the best!    Megan Charles PA-C

## 2023-02-27 NOTE — ASSESSMENT & PLAN NOTE
This patient does have evidence of infective focus  My overall impression is sepsis.  Source: possibly from sinusitis noted on CT  Antibiotics given-   Antibiotics (72h ago, onward)    Start     Stop Route Frequency Ordered    02/25/23 2100  amoxicillin-clavulanate 875-125mg per tablet 1 tablet         -- Oral Every 12 hours 02/25/23 1511        Latest lactate reviewed-  No results for input(s): LACTATE in the last 72 hours.  Organ dysfunction indicated by Acute kidney injury- now resolved with IVFs  Will Not start Pressors- Levophed for MAP of 65  Source control achieved by: Initially placed of vanc and cefepime>> unasyn>> transitioned to po Augmentin 02/25  - UA noninfectious  - CXR without acute process  - skin exam without open wounds  - Blood cx 1/2 + Gram + cocci in clusters resembling Staph. Repeat blood cx NGTD  - CT head with sinusitis   - Will continue abx

## 2023-02-27 NOTE — ASSESSMENT & PLAN NOTE
-Hypotensive on admission  -On valsartan 80 mg once daily, amlodipine 10 mg daily, and HCTZ 25 mg daily.   -initially holding all home meds in setting of hypotensive/normotensive  -Hold HCTZ and valsartan   -Resumed home amlodipine on 02/24. Patient had another syncopal episode after taking a hot shower on 02/25. Discontinued amlodipine. Will not restart antihypertensives on discharge as hypotension is likely the cause for these syncopal episodes.

## 2023-02-28 ENCOUNTER — PATIENT OUTREACH (OUTPATIENT)
Dept: ADMINISTRATIVE | Facility: CLINIC | Age: 68
End: 2023-02-28
Payer: MEDICARE

## 2023-02-28 LAB
BACTERIA BLD CULT: NORMAL
BACTERIA BLD CULT: NORMAL

## 2023-02-28 NOTE — TELEPHONE ENCOUNTER
Tried contacting pt to assist with scheduling hosp f/u, no answer. Voicemail's full so unable to leave msg.    Giorgio MOORE

## 2023-02-28 NOTE — PROGRESS NOTES
2nd attempt to make TCC Call. Left voicemail. Please call [phone] leave your first and last name and date of birth for Mary.  I will return your call.

## 2023-02-28 NOTE — PROGRESS NOTES
C3 nurse attempted to contact Janie Zamorano for a TCC post hospital discharge follow up call. No answer.   Unable to leave a voicemail. The patient does not have a scheduled HOSFU appointment. Message sent to PCP staff for assistance with scheduling visit with patient.

## 2023-02-28 NOTE — PLAN OF CARE
Pt already received antibiotic delivered to bedside and discharge instructions given to and explained to pt. She is currently awaiting her son to arrive with her clothes and to pick her up. Peripheral IV was not taken out as of yet.   Problem: Adult Inpatient Plan of Care  Goal: Plan of Care Review  Outcome: Met  Goal: Patient-Specific Goal (Individualized)  Outcome: Met  Goal: Absence of Hospital-Acquired Illness or Injury  Outcome: Met  Goal: Optimal Comfort and Wellbeing  Outcome: Met  Goal: Readiness for Transition of Care  Outcome: Met     Problem: Infection  Goal: Absence of Infection Signs and Symptoms  Outcome: Met     Problem: Fluid and Electrolyte Imbalance (Acute Kidney Injury/Impairment)  Goal: Fluid and Electrolyte Balance  Outcome: Met     Problem: Oral Intake Inadequate (Acute Kidney Injury/Impairment)  Goal: Optimal Nutrition Intake  Outcome: Met     Problem: Renal Function Impairment (Acute Kidney Injury/Impairment)  Goal: Effective Renal Function  Outcome: Met     Problem: Adjustment to Illness (Sepsis/Septic Shock)  Goal: Optimal Coping  Outcome: Met     Problem: Bleeding (Sepsis/Septic Shock)  Goal: Absence of Bleeding  Outcome: Met     Problem: Glycemic Control Impaired (Sepsis/Septic Shock)  Goal: Blood Glucose Level Within Desired Range  Outcome: Met     Problem: Infection Progression (Sepsis/Septic Shock)  Goal: Absence of Infection Signs and Symptoms  Outcome: Met     Problem: Nutrition Impaired (Sepsis/Septic Shock)  Goal: Optimal Nutrition Intake  Outcome: Met     Problem: Skin Injury Risk Increased  Goal: Skin Health and Integrity  Outcome: Met

## 2023-03-01 NOTE — PROGRESS NOTES
3rd attempt for TCC Call; Left voicemail. Please call [phone] please leave first and last name and  for Mary. We will return your call.

## 2023-03-02 ENCOUNTER — TELEPHONE (OUTPATIENT)
Dept: INTERNAL MEDICINE | Facility: CLINIC | Age: 68
End: 2023-03-02
Payer: MEDICARE

## 2023-03-02 NOTE — TELEPHONE ENCOUNTER
Pt scheduled for f/u.     She wants to know if her b/p meds can be sent it? She stated that when she was discharged from the hospital, she was advised to see her PCP for further instructions.     Giorgio MOORE

## 2023-03-02 NOTE — TELEPHONE ENCOUNTER
Tried contacting pt to inform her she isn't supposed to be taking any b/p at the moment. No answer and voicemail is full.     Giorgio MOORE

## 2023-03-02 NOTE — TELEPHONE ENCOUNTER
Hi, I am covering Dr. He Hoyt MD, please let the patient know.    I read the last note from the inpatient team -- they recommended she stop her pressure meds since she recently passed out and her pressures were low in the hospital.    Please ask how her pressures are doing?    Also, please offer a hospital discharge appt woth Dr He Hoyt MD      Let me know if patient has any questions.  Thank you, Matthew Hoyt MD

## 2023-03-02 NOTE — TELEPHONE ENCOUNTER
"----- Message from Violeta Triana sent at 3/2/2023 11:43 AM CST -----  Contact: Pt Mobile [phone]  Caller is requesting an earlier appointment then we can schedule.  Caller is requesting a message be sent to the provider.  If this is for urgent care symptoms, did you offer other providers at this location, providers at other locations, or [facility]? (yes, no, n/a):  N/A  If this is for the patients physical, did you offer to schedule next available and put on wait list, or to see NP or PA for their physical?  (yes, no, n/a):  N/A  When is the next available appointment with their provider:  06/29/2023.  Reason for the appointment:  Patient need to schedule a HOSFU"   Patient preference of timeframe to be scheduled:  As soon as possible.   Would the patient like a call back, or a response through their [facility] portal?:   Call        "

## 2023-03-02 NOTE — DISCHARGE SUMMARY
[facility] Medicine  Discharge Summary      Patient Name: Janie Zamorano  MRN: [id number]  TODD: [id number]  Patient Class: IP- Inpatient  Admission Date: 2/21/2023  Hospital Length of Stay: 4 days  Discharge Date and Time: 2/27/2023  9:21 PM  Attending Physician: No att. providers found   Discharging Provider: Megan Charles PA-C  Primary Care Provider: He Hoyt MD  [facility] Medicine Team: [facility] MED Y Megan Charles PA-C  Primary Care Team: [facility] MED Y    HPI:   Janie Zamorano is a 68 y/o F with medical hx of HTN, Multiple Myeloma, depression, schizophrenia, and anxiety who presents to the ED for syncopal episode that occurred last night. She reports that she was at home and walked from her room to the kitchen to heat up some food. She felt fine but says the next thing she remembers is waking up bent over with her head resting on the stovetop that was turned off with associated diaphoresis. Her son, who she lives with, then came home and found her like that and guided her to the ground to sit and called 911. According to the ED note, she reported waking up on the floor, not over the stovetop, and that her son witnessed the fall. It appears she may have some confusion related to the event. Pt denies any confusion or urinary/fecal incontinence. States this has never happened to her before. Denies any head pain or bleeding.  She has been increasingly weak lately, which she attributes mostly to the radiation she is getting for multiple myeloma. She also has increased urinary frequency on occasion. Otherwise denies feeling dizzy or lightheaded prior to losing consciousness. Denies chest pain, dyspnea, palpitations, visual changes, numbness, new focal weakness. She does have weakness in the right leg from her multiple myeloma that she states is unchanged. On chart review, she did have a recent COVID infection on 2/12/23. She reports taking valsartan (new medication),  amlodipine and hydrochlorothiazide for hypertension. On review of ED notes, EMS did report positive orthostatic vitals.     In the ED, afebrile. Tachycardic and hypotensive with SBP in 100s. Negative orthostatic vitals in the ED. Labs with anemia, increased D-Dimer 4.31, and GLENIS with Cr 2.1 (baseline 0.9). troponin 0.013. CXR with stable cardiomegaly. CT head and CT cervical spine with no acute abnormality. Findings consistent with known multiple myeloma. VQ scan ordered. Given 1L NS and admitted to hospital medicine.        * No surgery found *      Hospital Course:   Janie Zamorano is a 68 yo F admitted to hospital medicine for further evaluation of syncopal episode and GLENIS. Cr down trending closer to baseline. Patient reports recently having valsartan and HCTZ added to BP regimen (was previously taking only amlodipine). Orthostatics positive. All BP meds held. Given IVFs. VQ scan with low probability of PE. Noted to be SIRS 3/4 on 02/22- started on vancomycin and cefepime and IVFs. UA noninfectious. CXR without acute process. Blood cx 1/2 + coag-negative Staph; likely contaminant. Repeat blood cx NGTD. CT head showing sinus disease with near complete opacification of sphenoid sinuses- possible source of sepsis. Transitioned to po Augmentin on 02/25; doing well with >24 hrs afebrile. Syncopal episode in the shower on 02/25 after restarting amlodipine. Syncopal episodes related to hypotension. Will not restart any antihypertensives on discharge to avoid further episodes. Would benefit from a home BP journal that she can bring to PCP appointments. Would also benefit from BP digital home monitoring program. PT/OT consulted; recommending SNF. Discussed option of SNF vs. HH with patient on multiple occasions and she is not interested in either. Discussed using a walker at home over the next few weeks for more support and to avoid falls. Discharged home in stable condition with son and remaining doses of  Augmentin to complete course. PCP f/u scheduled.        Goals of Care Treatment Preferences:  Code Status: Full Code      Consults:   Consults (From admission, onward)        Status Ordering Provider     Inpatient consult to Registered Dietitian/Nutritionist  Once        Provider:  (Not yet assigned)    Completed PARUL RODRIGUEZ            Final Active Diagnoses:    Diagnosis Date Noted POA    PRINCIPAL PROBLEM:  Sepsis with acute renal failure without septic shock [A41.9, R65.20, N17.9] 02/23/2023 Yes    Syncope [R55] 02/22/2023 Yes    Other secondary hypertension [I15.8] 05/14/2016 Yes    GLENIS (acute kidney injury) [N17.9] 08/18/2018 Yes    Multiple myeloma not having achieved remission [C90.00] 08/03/2018 Yes    Acute non-recurrent sphenoidal sinusitis [J01.30] 02/24/2023 Yes    Hypokalemia [E87.6] 02/23/2023 Yes    Bacteremia [R78.81] 02/23/2023 Yes    Anemia in neoplastic disease [D63.0] 08/03/2018 Yes    Paranoid schizophrenia [F20.0] 08/06/2016 Yes      Problems Resolved During this Admission:       Discharged Condition: stable    Disposition: Home or Self Care    Follow Up:    Patient Instructions:      Diet Adult Regular   Order Comments: Vegan     Notify your health care provider if you experience any of the following:  persistent dizziness, light-headedness, or visual disturbances     Notify your health care provider if you experience any of the following:  increased confusion or weakness     Activity as tolerated       Pending Diagnostic Studies:     None         Medications:  Reconciled Home Medications:      Medication List      CONTINUE taking these medications    ascorbic acid (vitamin C) 1000 MG tablet  Commonly known as: VITAMIN C  Take 1,000 mg by mouth once daily.     aspirin 81 MG EC tablet  Commonly known as: ECOTRIN  Take 81 mg by mouth once daily.     dexAMETHasone 4 MG Tab  Commonly known as: DECADRON     flaxseed oiL 1,000 mg Cap  Take by mouth.     gabapentin 300 MG  capsule  Commonly known as: NEURONTIN  Take 300 mg by mouth 3 (three) times daily.     grape seed oil (bulk) 100 % Oil  by Misc.(Non-Drug; Combo Route) route.     ibuprofen 600 MG tablet  Commonly known as: ADVIL,MOTRIN  Take 600 mg by mouth every 6 (six) hours as needed.     mirtazapine 30 MG disintegrating tablet  Commonly known as: REMERON SOL-TAB  Take 1 tablet (30 mg total) by mouth once daily.     multivitamin capsule  Take 1 capsule by mouth once daily.     VITAMIN B-12 ORAL  Take 1 tablet by mouth daily as needed.     vitamin D 1000 units Tab  Commonly known as: VITAMIN D3  Take 1,000 Units by mouth once daily.        STOP taking these medications    amLODIPine 10 MG tablet  Commonly known as: NORVASC     hydroCHLOROthiazide 25 MG tablet  Commonly known as: HYDRODIURIL     valsartan 80 MG tablet  Commonly known as: DIOVAN        ASK your doctor about these medications    amoxicillin-clavulanate 875-125mg 875-125 mg per tablet  Commonly known as: AUGMENTIN  Take 1 tablet by mouth every 12 (twelve) hours. for 1 day  Ask about: Should I take this medication?            Indwelling Lines/Drains at time of discharge:   Lines/Drains/Airways     None                 Time spent on the discharge of patient: 35 minutes         Megan Charles PA-C  Department of Hospital Medicine  [facility]

## 2023-03-13 ENCOUNTER — OFFICE VISIT (OUTPATIENT)
Dept: INTERNAL MEDICINE | Facility: CLINIC | Age: 68
End: 2023-03-13
Payer: MEDICARE

## 2023-03-13 VITALS
WEIGHT: 112.44 LBS | HEART RATE: 97 BPM | DIASTOLIC BLOOD PRESSURE: 112 MMHG | OXYGEN SATURATION: 98 % | HEIGHT: 63 IN | BODY MASS INDEX: 19.92 KG/M2 | SYSTOLIC BLOOD PRESSURE: 150 MMHG

## 2023-03-13 DIAGNOSIS — Z01.00 EYE EXAM, ROUTINE: ICD-10-CM

## 2023-03-13 DIAGNOSIS — D69.6 THROMBOCYTOPENIA, UNSPECIFIED: ICD-10-CM

## 2023-03-13 DIAGNOSIS — H93.19 TINNITUS, UNSPECIFIED LATERALITY: ICD-10-CM

## 2023-03-13 DIAGNOSIS — R55 SYNCOPE, UNSPECIFIED SYNCOPE TYPE: ICD-10-CM

## 2023-03-13 DIAGNOSIS — F20.0 PARANOID SCHIZOPHRENIA: ICD-10-CM

## 2023-03-13 DIAGNOSIS — E85.81 LIGHT CHAIN (AL) AMYLOIDOSIS: ICD-10-CM

## 2023-03-13 DIAGNOSIS — N17.9 AKI (ACUTE KIDNEY INJURY): ICD-10-CM

## 2023-03-13 DIAGNOSIS — C79.9 MULTIPLE LESIONS OF METASTATIC MALIGNANCY: ICD-10-CM

## 2023-03-13 DIAGNOSIS — C90.00 MULTIPLE MYELOMA NOT HAVING ACHIEVED REMISSION: ICD-10-CM

## 2023-03-13 DIAGNOSIS — E44.0 MODERATE PROTEIN-CALORIE MALNUTRITION: ICD-10-CM

## 2023-03-13 DIAGNOSIS — D63.0 ANEMIA IN NEOPLASTIC DISEASE: Primary | ICD-10-CM

## 2023-03-13 PROBLEM — G63 POLYNEUROPATHY IN DISEASES CLASSIFIED ELSEWHERE: Status: RESOLVED | Noted: 2021-04-12 | Resolved: 2023-03-13

## 2023-03-13 PROBLEM — R65.20 SEPSIS WITH ACUTE RENAL FAILURE WITHOUT SEPTIC SHOCK: Status: RESOLVED | Noted: 2023-02-23 | Resolved: 2023-03-13

## 2023-03-13 PROBLEM — J01.30 ACUTE NON-RECURRENT SPHENOIDAL SINUSITIS: Status: RESOLVED | Noted: 2023-02-24 | Resolved: 2023-03-13

## 2023-03-13 PROBLEM — K21.9 GASTROESOPHAGEAL REFLUX DISEASE: Status: RESOLVED | Noted: 2018-01-08 | Resolved: 2023-03-13

## 2023-03-13 PROBLEM — E87.6 HYPOKALEMIA: Status: RESOLVED | Noted: 2023-02-23 | Resolved: 2023-03-13

## 2023-03-13 PROBLEM — R78.81 BACTEREMIA: Status: RESOLVED | Noted: 2023-02-23 | Resolved: 2023-03-13

## 2023-03-13 PROBLEM — A41.9 SEPSIS WITH ACUTE RENAL FAILURE WITHOUT SEPTIC SHOCK: Status: RESOLVED | Noted: 2023-02-23 | Resolved: 2023-03-13

## 2023-03-13 PROCEDURE — 3077F PR MOST RECENT SYSTOLIC BLOOD PRESSURE >= 140 MM HG: ICD-10-PCS | Mod: CPTII,S$GLB,, | Performed by: INTERNAL MEDICINE

## 2023-03-13 PROCEDURE — 1159F PR MEDICATION LIST DOCUMENTED IN MEDICAL RECORD: ICD-10-PCS | Mod: CPTII,S$GLB,, | Performed by: INTERNAL MEDICINE

## 2023-03-13 PROCEDURE — 3080F DIAST BP >= 90 MM HG: CPT | Mod: CPTII,S$GLB,, | Performed by: INTERNAL MEDICINE

## 2023-03-13 PROCEDURE — 1159F MED LIST DOCD IN RCRD: CPT | Mod: CPTII,S$GLB,, | Performed by: INTERNAL MEDICINE

## 2023-03-13 PROCEDURE — 4010F ACE/ARB THERAPY RXD/TAKEN: CPT | Mod: CPTII,S$GLB,, | Performed by: INTERNAL MEDICINE

## 2023-03-13 PROCEDURE — 3008F PR BODY MASS INDEX (BMI) DOCUMENTED: ICD-10-PCS | Mod: CPTII,S$GLB,, | Performed by: INTERNAL MEDICINE

## 2023-03-13 PROCEDURE — 1100F PTFALLS ASSESS-DOCD GE2>/YR: CPT | Mod: CPTII,S$GLB,, | Performed by: INTERNAL MEDICINE

## 2023-03-13 PROCEDURE — 1126F AMNT PAIN NOTED NONE PRSNT: CPT | Mod: CPTII,S$GLB,, | Performed by: INTERNAL MEDICINE

## 2023-03-13 PROCEDURE — 1111F PR DISCHARGE MEDS RECONCILED W/ CURRENT OUTPATIENT MED LIST: ICD-10-PCS | Mod: CPTII,S$GLB,, | Performed by: INTERNAL MEDICINE

## 2023-03-13 PROCEDURE — 99999 PR PBB SHADOW E&M-EST. PATIENT-LVL IV: ICD-10-PCS | Mod: PBBFAC,,, | Performed by: INTERNAL MEDICINE

## 2023-03-13 PROCEDURE — 4010F PR ACE/ARB THEARPY RXD/TAKEN: ICD-10-PCS | Mod: CPTII,S$GLB,, | Performed by: INTERNAL MEDICINE

## 2023-03-13 PROCEDURE — 3080F PR MOST RECENT DIASTOLIC BLOOD PRESSURE >= 90 MM HG: ICD-10-PCS | Mod: CPTII,S$GLB,, | Performed by: INTERNAL MEDICINE

## 2023-03-13 PROCEDURE — 1111F DSCHRG MED/CURRENT MED MERGE: CPT | Mod: CPTII,S$GLB,, | Performed by: INTERNAL MEDICINE

## 2023-03-13 PROCEDURE — 99214 OFFICE O/P EST MOD 30 MIN: CPT | Mod: S$GLB,,, | Performed by: INTERNAL MEDICINE

## 2023-03-13 PROCEDURE — 3288F PR FALLS RISK ASSESSMENT DOCUMENTED: ICD-10-PCS | Mod: CPTII,S$GLB,, | Performed by: INTERNAL MEDICINE

## 2023-03-13 PROCEDURE — 3077F SYST BP >= 140 MM HG: CPT | Mod: CPTII,S$GLB,, | Performed by: INTERNAL MEDICINE

## 2023-03-13 PROCEDURE — 99999 PR PBB SHADOW E&M-EST. PATIENT-LVL IV: CPT | Mod: PBBFAC,,, | Performed by: INTERNAL MEDICINE

## 2023-03-13 PROCEDURE — 3008F BODY MASS INDEX DOCD: CPT | Mod: CPTII,S$GLB,, | Performed by: INTERNAL MEDICINE

## 2023-03-13 PROCEDURE — 99214 PR OFFICE/OUTPT VISIT, EST, LEVL IV, 30-39 MIN: ICD-10-PCS | Mod: S$GLB,,, | Performed by: INTERNAL MEDICINE

## 2023-03-13 PROCEDURE — 1100F PR PT FALLS ASSESS DOC 2+ FALLS/FALL W/INJURY/YR: ICD-10-PCS | Mod: CPTII,S$GLB,, | Performed by: INTERNAL MEDICINE

## 2023-03-13 PROCEDURE — 3288F FALL RISK ASSESSMENT DOCD: CPT | Mod: CPTII,S$GLB,, | Performed by: INTERNAL MEDICINE

## 2023-03-13 PROCEDURE — 1126F PR PAIN SEVERITY QUANTIFIED, NO PAIN PRESENT: ICD-10-PCS | Mod: CPTII,S$GLB,, | Performed by: INTERNAL MEDICINE

## 2023-03-13 RX ORDER — DEXAMETHASONE 4 MG/1
TABLET ORAL
Status: ON HOLD
Start: 2023-03-13 | End: 2023-06-04 | Stop reason: HOSPADM

## 2023-03-13 RX ORDER — AMLODIPINE BESYLATE 10 MG/1
10 TABLET ORAL DAILY
Qty: 90 TABLET | Refills: 3 | Status: ON HOLD | OUTPATIENT
Start: 2023-03-13 | End: 2023-06-04 | Stop reason: HOSPADM

## 2023-03-13 NOTE — PROGRESS NOTES
This note was generated with [employer]ing[employer] voice recognition software. I apologize for any possible typographical errors.  Naomi seems to have trouble with pronouns so I apologize if I Mis pronoun anyone     She is a 67-year-old female coming in today for follow-up of her hypertension had last few months she is been in in the hospital for both hypertensive emergency and orthostatic hypotension.  She was on amlodipine 10 mg a day and she will eventually put on valsartan and hydrochlorothiazide and then she had episode of hypotension.  Currently she is not taking any medication and her blood pressure is 150/112 on recheck.  I reviewed her note when she was in the hospital on February 27th through March 2nd at that time they left her off her blood pressure medicine which was the hydrochlorothiazide valsartan and her amlodipine.  So today she is on no blood pressure medications.  She also had COVID the middle of last month.    Other medical problems includes multiple myeloma for which she is getting treatment by a doctor in [facility].  She says she is doing him because he is a  like she is however she is getting chemotherapy and when she is off her chemotherapy she is on Decadron.    She is also had some pancytopenia.  White blood cell count when she was in the hospital was 28.2 and H&H was 7.7 and 25.  Platelet count was borderline low at 265.  She also had acute renal failure when she was hospitalized recently.  Creatinine was 2.1 when she came in it went down to 1.4.  Baseline creatinine from April 2022 was 0.9.        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  She is a elderly appearing 67-year-old female who appears older than her stated age.  She is alert and oriented x4.  She is using a walker today.  She is thin and looks frail.  BMI is 19.9.  She is able to stand up without difficulty.  Her neck is supple.  Her chest is clear.  Cardiovascular exam shows that she is borderline tachycardic.  Blood pressure is elevated at 1 50/112.  BP (!) 150/112   Pulse 97   Ht 5' 3" (1.6 m)   Wt 51 kg (112 lb 7 oz)   LMP  (LMP Unknown)   SpO2 98%   BMI 19.92 kg/m² her chest is thin and she does look cachectic.  Abdomen is soft and non obese.  Both the upper and lower extremities are in.  Mood is good.      Assessment and plan:  Anemia in neoplastic disease    Eye exam, routine  -     Ambulatory referral/consult to Optometry; Future; Expected date: 03/20/2023    Tinnitus, unspecified laterality  -     Ambulatory referral/consult to ENT; Future; Expected date: 03/20/2023    GLENIS (acute kidney injury)    Multiple lesions of metastatic malignancy    Multiple myeloma not having achieved remission    Paranoid schizophrenia    Syncope, unspecified syncope type    Moderate protein-calorie malnutrition    Thrombocytopenia, unspecified    Light chain (AL) amyloidosis    Other orders  -     dexAMETHasone (DECADRON) 4 MG Tab; Gets in her off weeks of treatment  -     amLODIPine (NORVASC) 10 MG tablet; Take 1 tablet (10 mg total) by mouth once daily.  Dispense: 90 tablet; Refill: 3     I can not see all of her records from [facility] but looks like she is getting chemotherapy and she says she is getting some but she is not quite sure what she is on and also she is getting some radiation therapy.  Understand that she has bone lesions " and she has had some hypercalcemia a recent labs do not show that.  We discussed the importance of eating well.  We also discussed that she needs to make sure she is keeping up follow-ups.  Her blood pressure is very elevated today song put her back on her amlodipine 10 mg a day.  She is very receptive to this even though she is a natural list and does not like to take medicines she is okay with taking the amlodipine.  For someone so thin and malnourished I do not think going to have her on diuretics since she is getting chemo am not quite sure however p.o. intake is we are going to leave her blood pressure little on the higher side we have a choice.  She has a history of paranoid schizophrenia but she is not taking any antipsychotics.  She is has Remeron on her medicine list or according to her she has not taken this for years.  She feels well and does not think she needs to see Psychiatry.  I am going to have her see ENT at her request for tinnitus and she wishes see Optometry for a regular vision exam is all set that up also.

## 2023-03-14 ENCOUNTER — TELEPHONE (OUTPATIENT)
Dept: INTERNAL MEDICINE | Facility: CLINIC | Age: 68
End: 2023-03-14
Payer: MEDICARE

## 2023-03-14 NOTE — TELEPHONE ENCOUNTER
Tried contacting pt in regards to questions she had, no answer and unable to leave voicemail due to full inbox.    Giorgio MOORE

## 2023-03-14 NOTE — TELEPHONE ENCOUNTER
----- Message from Stanford Anderson sent at 3/14/2023  1:38 PM CDT -----  Contact: [phone]  Pt said she needs a call back about some questions she has. Please call pt back.

## 2023-04-27 ENCOUNTER — PATIENT OUTREACH (OUTPATIENT)
Dept: ADMINISTRATIVE | Facility: CLINIC | Age: 68
End: 2023-04-27
Payer: MEDICARE

## 2023-04-27 ENCOUNTER — EXTERNAL HOSPITAL ADMISSION (OUTPATIENT)
Dept: ADMINISTRATIVE | Facility: CLINIC | Age: 68
End: 2023-04-27
Payer: MEDICARE

## 2023-05-03 ENCOUNTER — PATIENT MESSAGE (OUTPATIENT)
Dept: ADMINISTRATIVE | Facility: CLINIC | Age: 68
End: 2023-05-03
Payer: MEDICARE

## 2023-05-03 ENCOUNTER — EXTERNAL HOSPITAL ADMISSION (OUTPATIENT)
Dept: ADMINISTRATIVE | Facility: CLINIC | Age: 68
End: 2023-05-03
Payer: MEDICARE

## 2023-05-03 ENCOUNTER — PATIENT OUTREACH (OUTPATIENT)
Dept: ADMINISTRATIVE | Facility: CLINIC | Age: 68
End: 2023-05-03
Payer: MEDICARE

## 2023-05-03 NOTE — PROGRESS NOTES
C3 nurse attempted to contact Janie Zamorano for a TCC post hospital discharge follow up call. No answer. No voicemail available.The patient does not have a scheduled HOSFU appointment. Message sent to PCP staff for assistance with scheduling visit with patient.

## 2023-05-04 PROCEDURE — G0180 PR HOME HEALTH MD CERTIFICATION: ICD-10-PCS | Mod: ,,, | Performed by: INTERNAL MEDICINE

## 2023-05-04 PROCEDURE — G0180 MD CERTIFICATION HHA PATIENT: HCPCS | Mod: ,,, | Performed by: INTERNAL MEDICINE

## 2023-05-04 NOTE — PROGRESS NOTES
C3 nurse attempted to contact Janie Zamorano for a TCC post hospital discharge follow up call. No answer. No voicemail available.The patient does not have a scheduled HOSFU appointment. Message previously sent to PCP staff for assistance with scheduling visit with patient.

## 2023-05-05 NOTE — PROGRESS NOTES
3rd attempt. C3 nurse attempted to contact Janie Zamorano for a TCC post hospital discharge follow up call. No answer. No voicemail available. The patient has a scheduled HOSFU appointment with He Hoyt Jr, MD (PCP) 5/9/23 @ 11:00am.

## 2023-05-29 PROBLEM — N17.9 AKI (ACUTE KIDNEY INJURY): Status: RESOLVED | Noted: 2018-08-18 | Resolved: 2023-05-29

## 2023-05-30 ENCOUNTER — EXTERNAL HOME HEALTH (OUTPATIENT)
Dept: HOME HEALTH SERVICES | Facility: HOSPITAL | Age: 68
End: 2023-05-30
Payer: MEDICARE

## 2023-06-01 ENCOUNTER — HOSPITAL ENCOUNTER (OUTPATIENT)
Facility: HOSPITAL | Age: 68
Discharge: HOME | End: 2023-06-04
Attending: EMERGENCY MEDICINE | Admitting: EMERGENCY MEDICINE
Payer: MEDICARE

## 2023-06-01 DIAGNOSIS — R53.83 FATIGUE: ICD-10-CM

## 2023-06-01 DIAGNOSIS — Z85.79 HISTORY OF MULTIPLE MYELOMA: Primary | ICD-10-CM

## 2023-06-01 DIAGNOSIS — R42 LIGHTHEADEDNESS: ICD-10-CM

## 2023-06-01 DIAGNOSIS — N17.9 AKI (ACUTE KIDNEY INJURY): ICD-10-CM

## 2023-06-01 DIAGNOSIS — R41.82 AMS (ALTERED MENTAL STATUS): ICD-10-CM

## 2023-06-01 DIAGNOSIS — R93.0 MASS IN REGION OF SELLA TURCICA PRESENT ON MAGNETIC RESONANCE IMAGING: ICD-10-CM

## 2023-06-01 DIAGNOSIS — R07.9 CHEST PAIN: ICD-10-CM

## 2023-06-01 DIAGNOSIS — I95.1 ORTHOSTASIS: ICD-10-CM

## 2023-06-01 DIAGNOSIS — J34.89 MASS OF SPHENOID SINUS: ICD-10-CM

## 2023-06-01 PROCEDURE — 99285 EMERGENCY DEPT VISIT HI MDM: CPT | Mod: GC,,, | Performed by: EMERGENCY MEDICINE

## 2023-06-01 PROCEDURE — 94761 N-INVAS EAR/PLS OXIMETRY MLT: CPT

## 2023-06-01 PROCEDURE — 99285 EMERGENCY DEPT VISIT HI MDM: CPT

## 2023-06-01 PROCEDURE — 99285 PR EMERGENCY DEPT VISIT,LEVEL V: ICD-10-PCS | Mod: GC,,, | Performed by: EMERGENCY MEDICINE

## 2023-06-02 PROBLEM — G93.40 ACUTE ENCEPHALOPATHY: Status: ACTIVE | Noted: 2023-06-02

## 2023-06-02 PROBLEM — R55 SYNCOPE: Status: ACTIVE | Noted: 2023-06-02

## 2023-06-02 PROBLEM — R91.8 LUNG MASS: Status: ACTIVE | Noted: 2023-06-02

## 2023-06-02 LAB
ALBUMIN SERPL BCP-MCNC: 3.5 G/DL (ref 3.5–5.2)
ALP SERPL-CCNC: 69 U/L (ref 55–135)
ALT SERPL W/O P-5'-P-CCNC: 8 U/L (ref 10–44)
ANION GAP SERPL CALC-SCNC: 11 MMOL/L (ref 8–16)
AST SERPL-CCNC: 18 U/L (ref 10–40)
BACTERIA #/AREA URNS AUTO: ABNORMAL /HPF
BASOPHILS # BLD AUTO: 0.01 K/UL (ref 0–0.2)
BASOPHILS NFR BLD: 0.4 % (ref 0–1.9)
BILIRUB SERPL-MCNC: 0.3 MG/DL (ref 0.1–1)
BILIRUB UR QL STRIP: NEGATIVE
BUN SERPL-MCNC: 28 MG/DL (ref 8–23)
CALCIUM SERPL-MCNC: 9.6 MG/DL (ref 8.7–10.5)
CHLORIDE SERPL-SCNC: 110 MMOL/L (ref 95–110)
CLARITY UR REFRACT.AUTO: CLEAR
CO2 SERPL-SCNC: 21 MMOL/L (ref 23–29)
COLOR UR AUTO: YELLOW
CORTIS SERPL-MCNC: 7.4 UG/DL (ref 4.3–22.4)
CREAT SERPL-MCNC: 1.7 MG/DL (ref 0.5–1.4)
D DIMER PPP IA.FEU-MCNC: 1.7 MG/L FEU
DIFFERENTIAL METHOD: ABNORMAL
EOSINOPHIL # BLD AUTO: 0 K/UL (ref 0–0.5)
EOSINOPHIL NFR BLD: 0.7 % (ref 0–8)
ERYTHROCYTE [DISTWIDTH] IN BLOOD BY AUTOMATED COUNT: 14.6 % (ref 11.5–14.5)
EST. GFR  (NO RACE VARIABLE): 32.9 ML/MIN/1.73 M^2
ESTIMATED AVG GLUCOSE: 94 MG/DL (ref 68–131)
FERRITIN SERPL-MCNC: 51 NG/ML (ref 20–300)
FSH SERPL-ACNC: 3.7 MIU/ML
GLUCOSE SERPL-MCNC: 99 MG/DL (ref 70–110)
GLUCOSE UR QL STRIP: ABNORMAL
HBA1C MFR BLD: 4.9 % (ref 4–5.6)
HCT VFR BLD AUTO: 32.4 % (ref 37–48.5)
HCV AB SERPL QL IA: NORMAL
HGB BLD-MCNC: 10 G/DL (ref 12–16)
HGB UR QL STRIP: ABNORMAL
HIV 1+2 AB+HIV1 P24 AG SERPL QL IA: NORMAL
HYALINE CASTS UR QL AUTO: 1 /LPF
IMM GRANULOCYTES # BLD AUTO: 0.01 K/UL (ref 0–0.04)
IMM GRANULOCYTES NFR BLD AUTO: 0.4 % (ref 0–0.5)
IRON SERPL-MCNC: 42 UG/DL (ref 30–160)
KETONES UR QL STRIP: ABNORMAL
LEUKOCYTE ESTERASE UR QL STRIP: NEGATIVE
LH SERPL-ACNC: <0.2 MIU/ML
LIPASE SERPL-CCNC: 48 U/L (ref 4–60)
LYMPHOCYTES # BLD AUTO: 0.3 K/UL (ref 1–4.8)
LYMPHOCYTES NFR BLD: 12.5 % (ref 18–48)
MCH RBC QN AUTO: 28.2 PG (ref 27–31)
MCHC RBC AUTO-ENTMCNC: 30.9 G/DL (ref 32–36)
MCV RBC AUTO: 91 FL (ref 82–98)
MICROSCOPIC COMMENT: ABNORMAL
MONOCYTES # BLD AUTO: 0.2 K/UL (ref 0.3–1)
MONOCYTES NFR BLD: 8.8 % (ref 4–15)
NEUTROPHILS # BLD AUTO: 2.1 K/UL (ref 1.8–7.7)
NEUTROPHILS NFR BLD: 77.2 % (ref 38–73)
NITRITE UR QL STRIP: NEGATIVE
NRBC BLD-RTO: 0 /100 WBC
PH UR STRIP: 6 [PH] (ref 5–8)
PLATELET # BLD AUTO: 168 K/UL (ref 150–450)
PMV BLD AUTO: 10.3 FL (ref 9.2–12.9)
POTASSIUM SERPL-SCNC: 4.8 MMOL/L (ref 3.5–5.1)
PROLACTIN SERPL IA-MCNC: 12.8 NG/ML (ref 5.2–26.5)
PROT SERPL-MCNC: 6.1 G/DL (ref 6–8.4)
PROT UR QL STRIP: ABNORMAL
RBC # BLD AUTO: 3.55 M/UL (ref 4–5.4)
RBC #/AREA URNS AUTO: 9 /HPF (ref 0–4)
SATURATED IRON: 13 % (ref 20–50)
SODIUM SERPL-SCNC: 142 MMOL/L (ref 136–145)
SP GR UR STRIP: 1.01 (ref 1–1.03)
SQUAMOUS #/AREA URNS AUTO: 6 /HPF
T4 FREE SERPL-MCNC: 1.12 NG/DL (ref 0.71–1.51)
TOTAL IRON BINDING CAPACITY: 321 UG/DL (ref 250–450)
TRANSFERRIN SERPL-MCNC: 217 MG/DL (ref 200–375)
TROPONIN I SERPL DL<=0.01 NG/ML-MCNC: 0.02 NG/ML (ref 0–0.03)
TROPONIN I SERPL DL<=0.01 NG/ML-MCNC: 0.02 NG/ML (ref 0–0.03)
TSH SERPL DL<=0.005 MIU/L-ACNC: 0.23 UIU/ML (ref 0.4–4)
URN SPEC COLLECT METH UR: ABNORMAL
WBC # BLD AUTO: 2.72 K/UL (ref 3.9–12.7)
WBC #/AREA URNS AUTO: 2 /HPF (ref 0–5)

## 2023-06-02 PROCEDURE — 99222 PR INITIAL HOSPITAL CARE,LEVL II: ICD-10-PCS | Mod: ,,, | Performed by: INTERNAL MEDICINE

## 2023-06-02 PROCEDURE — 63600175 PHARM REV CODE 636 W HCPCS: Performed by: STUDENT IN AN ORGANIZED HEALTH CARE EDUCATION/TRAINING PROGRAM

## 2023-06-02 PROCEDURE — 84484 ASSAY OF TROPONIN QUANT: CPT | Performed by: EMERGENCY MEDICINE

## 2023-06-02 PROCEDURE — 93010 ELECTROCARDIOGRAM REPORT: CPT | Mod: ,,, | Performed by: INTERNAL MEDICINE

## 2023-06-02 PROCEDURE — 25000003 PHARM REV CODE 250

## 2023-06-02 PROCEDURE — 81001 URINALYSIS AUTO W/SCOPE: CPT | Performed by: EMERGENCY MEDICINE

## 2023-06-02 PROCEDURE — 80053 COMPREHEN METABOLIC PANEL: CPT | Performed by: EMERGENCY MEDICINE

## 2023-06-02 PROCEDURE — 87389 HIV-1 AG W/HIV-1&-2 AB AG IA: CPT | Performed by: PHYSICIAN ASSISTANT

## 2023-06-02 PROCEDURE — G0378 HOSPITAL OBSERVATION PER HR: HCPCS

## 2023-06-02 PROCEDURE — 84305 ASSAY OF SOMATOMEDIN: CPT | Performed by: STUDENT IN AN ORGANIZED HEALTH CARE EDUCATION/TRAINING PROGRAM

## 2023-06-02 PROCEDURE — 25000003 PHARM REV CODE 250: Performed by: STUDENT IN AN ORGANIZED HEALTH CARE EDUCATION/TRAINING PROGRAM

## 2023-06-02 PROCEDURE — 83003 ASSAY GROWTH HORMONE (HGH): CPT | Performed by: STUDENT IN AN ORGANIZED HEALTH CARE EDUCATION/TRAINING PROGRAM

## 2023-06-02 PROCEDURE — 82533 TOTAL CORTISOL: CPT | Performed by: STUDENT IN AN ORGANIZED HEALTH CARE EDUCATION/TRAINING PROGRAM

## 2023-06-02 PROCEDURE — 83036 HEMOGLOBIN GLYCOSYLATED A1C: CPT | Performed by: INTERNAL MEDICINE

## 2023-06-02 PROCEDURE — 96375 TX/PRO/DX INJ NEW DRUG ADDON: CPT | Mod: 59

## 2023-06-02 PROCEDURE — 84484 ASSAY OF TROPONIN QUANT: CPT | Mod: 91 | Performed by: EMERGENCY MEDICINE

## 2023-06-02 PROCEDURE — 25500020 PHARM REV CODE 255

## 2023-06-02 PROCEDURE — 96374 THER/PROPH/DIAG INJ IV PUSH: CPT

## 2023-06-02 PROCEDURE — 25000003 PHARM REV CODE 250: Performed by: INTERNAL MEDICINE

## 2023-06-02 PROCEDURE — 96361 HYDRATE IV INFUSION ADD-ON: CPT

## 2023-06-02 PROCEDURE — A9585 GADOBUTROL INJECTION: HCPCS | Performed by: INTERNAL MEDICINE

## 2023-06-02 PROCEDURE — 84146 ASSAY OF PROLACTIN: CPT | Performed by: STUDENT IN AN ORGANIZED HEALTH CARE EDUCATION/TRAINING PROGRAM

## 2023-06-02 PROCEDURE — 82024 ASSAY OF ACTH: CPT | Performed by: STUDENT IN AN ORGANIZED HEALTH CARE EDUCATION/TRAINING PROGRAM

## 2023-06-02 PROCEDURE — 86803 HEPATITIS C AB TEST: CPT | Performed by: PHYSICIAN ASSISTANT

## 2023-06-02 PROCEDURE — 63600175 PHARM REV CODE 636 W HCPCS: Performed by: INTERNAL MEDICINE

## 2023-06-02 PROCEDURE — 83001 ASSAY OF GONADOTROPIN (FSH): CPT | Performed by: STUDENT IN AN ORGANIZED HEALTH CARE EDUCATION/TRAINING PROGRAM

## 2023-06-02 PROCEDURE — 85379 FIBRIN DEGRADATION QUANT: CPT | Performed by: STUDENT IN AN ORGANIZED HEALTH CARE EDUCATION/TRAINING PROGRAM

## 2023-06-02 PROCEDURE — 84443 ASSAY THYROID STIM HORMONE: CPT | Performed by: INTERNAL MEDICINE

## 2023-06-02 PROCEDURE — 99205 PR OFFICE/OUTPT VISIT, NEW, LEVL V, 60-74 MIN: ICD-10-PCS | Mod: ,,, | Performed by: INTERNAL MEDICINE

## 2023-06-02 PROCEDURE — 25500020 PHARM REV CODE 255: Performed by: INTERNAL MEDICINE

## 2023-06-02 PROCEDURE — 83690 ASSAY OF LIPASE: CPT | Performed by: EMERGENCY MEDICINE

## 2023-06-02 PROCEDURE — 83002 ASSAY OF GONADOTROPIN (LH): CPT | Performed by: STUDENT IN AN ORGANIZED HEALTH CARE EDUCATION/TRAINING PROGRAM

## 2023-06-02 PROCEDURE — 99222 1ST HOSP IP/OBS MODERATE 55: CPT | Mod: ,,, | Performed by: INTERNAL MEDICINE

## 2023-06-02 PROCEDURE — 99205 OFFICE O/P NEW HI 60 MIN: CPT | Mod: ,,, | Performed by: INTERNAL MEDICINE

## 2023-06-02 PROCEDURE — 93010 EKG 12-LEAD: ICD-10-PCS | Mod: ,,, | Performed by: INTERNAL MEDICINE

## 2023-06-02 PROCEDURE — 93005 ELECTROCARDIOGRAM TRACING: CPT

## 2023-06-02 PROCEDURE — 84439 ASSAY OF FREE THYROXINE: CPT | Performed by: INTERNAL MEDICINE

## 2023-06-02 PROCEDURE — 84466 ASSAY OF TRANSFERRIN: CPT | Performed by: INTERNAL MEDICINE

## 2023-06-02 PROCEDURE — 82728 ASSAY OF FERRITIN: CPT | Performed by: INTERNAL MEDICINE

## 2023-06-02 PROCEDURE — 85025 COMPLETE CBC W/AUTO DIFF WBC: CPT | Performed by: EMERGENCY MEDICINE

## 2023-06-02 RX ORDER — AMOXICILLIN 250 MG
1 CAPSULE ORAL 2 TIMES DAILY PRN
Status: DISCONTINUED | OUTPATIENT
Start: 2023-06-02 | End: 2023-06-04 | Stop reason: HOSPADM

## 2023-06-02 RX ORDER — GADOBUTROL 604.72 MG/ML
3 INJECTION INTRAVENOUS
Status: COMPLETED | OUTPATIENT
Start: 2023-06-02 | End: 2023-06-02

## 2023-06-02 RX ORDER — NALOXONE HCL 0.4 MG/ML
0.02 VIAL (ML) INJECTION
Status: DISCONTINUED | OUTPATIENT
Start: 2023-06-02 | End: 2023-06-04 | Stop reason: HOSPADM

## 2023-06-02 RX ORDER — LORAZEPAM 0.5 MG/1
0.5 TABLET ORAL 2 TIMES DAILY PRN
Status: ON HOLD | COMMUNITY
Start: 2023-05-02 | End: 2023-10-02

## 2023-06-02 RX ORDER — LORAZEPAM 0.5 MG/1
0.5 TABLET ORAL 2 TIMES DAILY
Status: DISCONTINUED | OUTPATIENT
Start: 2023-06-02 | End: 2023-06-04 | Stop reason: HOSPADM

## 2023-06-02 RX ORDER — DEXTROSE 40 %
30 GEL (GRAM) ORAL
Status: DISCONTINUED | OUTPATIENT
Start: 2023-06-02 | End: 2023-06-04 | Stop reason: HOSPADM

## 2023-06-02 RX ORDER — OLANZAPINE 2.5 MG/1
10 TABLET ORAL EVERY 8 HOURS PRN
Status: DISCONTINUED | OUTPATIENT
Start: 2023-06-02 | End: 2023-06-04 | Stop reason: HOSPADM

## 2023-06-02 RX ORDER — HYOSCYAMINE SULFATE 0.12 MG/1
0.12 TABLET SUBLINGUAL EVERY 4 HOURS PRN
Status: DISCONTINUED | OUTPATIENT
Start: 2023-06-02 | End: 2023-06-04 | Stop reason: HOSPADM

## 2023-06-02 RX ORDER — GLUCAGON 1 MG
1 KIT INJECTION
Status: DISCONTINUED | OUTPATIENT
Start: 2023-06-02 | End: 2023-06-04 | Stop reason: HOSPADM

## 2023-06-02 RX ORDER — ASCORBIC ACID 500 MG
1000 TABLET ORAL DAILY
Status: DISCONTINUED | OUTPATIENT
Start: 2023-06-03 | End: 2023-06-04 | Stop reason: HOSPADM

## 2023-06-02 RX ORDER — MIRTAZAPINE 30 MG/1
30 TABLET, FILM COATED ORAL NIGHTLY
Status: ON HOLD | COMMUNITY
Start: 2023-05-02 | End: 2024-02-05 | Stop reason: HOSPADM

## 2023-06-02 RX ORDER — MIRTAZAPINE 30 MG/1
30 TABLET, FILM COATED ORAL NIGHTLY
Status: DISCONTINUED | OUTPATIENT
Start: 2023-06-02 | End: 2023-06-04 | Stop reason: HOSPADM

## 2023-06-02 RX ORDER — ACETAMINOPHEN 325 MG/1
975 TABLET ORAL EVERY 6 HOURS PRN
Status: DISCONTINUED | OUTPATIENT
Start: 2023-06-02 | End: 2023-06-02

## 2023-06-02 RX ORDER — OLANZAPINE 10 MG/1
10 TABLET ORAL
Status: DISCONTINUED | OUTPATIENT
Start: 2023-06-02 | End: 2023-06-02

## 2023-06-02 RX ORDER — METOCLOPRAMIDE HYDROCHLORIDE 5 MG/ML
10 INJECTION INTRAMUSCULAR; INTRAVENOUS EVERY 6 HOURS PRN
Status: DISCONTINUED | OUTPATIENT
Start: 2023-06-02 | End: 2023-06-04 | Stop reason: HOSPADM

## 2023-06-02 RX ORDER — ACETAMINOPHEN 325 MG/1
650 TABLET ORAL EVERY 4 HOURS PRN
Status: DISCONTINUED | OUTPATIENT
Start: 2023-06-02 | End: 2023-06-04 | Stop reason: HOSPADM

## 2023-06-02 RX ORDER — CALCIUM CARBONATE 200(500)MG
500 TABLET,CHEWABLE ORAL DAILY PRN
Status: DISCONTINUED | OUTPATIENT
Start: 2023-06-02 | End: 2023-06-04 | Stop reason: HOSPADM

## 2023-06-02 RX ORDER — SODIUM CHLORIDE 0.9 % (FLUSH) 0.9 %
10 SYRINGE (ML) INJECTION EVERY 12 HOURS PRN
Status: DISCONTINUED | OUTPATIENT
Start: 2023-06-02 | End: 2023-06-04 | Stop reason: HOSPADM

## 2023-06-02 RX ORDER — SODIUM CHLORIDE, SODIUM LACTATE, POTASSIUM CHLORIDE, CALCIUM CHLORIDE 600; 310; 30; 20 MG/100ML; MG/100ML; MG/100ML; MG/100ML
INJECTION, SOLUTION INTRAVENOUS CONTINUOUS
Status: ACTIVE | OUTPATIENT
Start: 2023-06-02 | End: 2023-06-03

## 2023-06-02 RX ORDER — AMLODIPINE BESYLATE 5 MG/1
5 TABLET ORAL
Status: COMPLETED | OUTPATIENT
Start: 2023-06-02 | End: 2023-06-02

## 2023-06-02 RX ORDER — DIPHENHYDRAMINE HYDROCHLORIDE 50 MG/ML
12.5 INJECTION INTRAMUSCULAR; INTRAVENOUS
Status: COMPLETED | OUTPATIENT
Start: 2023-06-02 | End: 2023-06-02

## 2023-06-02 RX ORDER — OXYCODONE HYDROCHLORIDE 5 MG/1
5 TABLET ORAL EVERY 6 HOURS PRN
Status: DISCONTINUED | OUTPATIENT
Start: 2023-06-02 | End: 2023-06-04 | Stop reason: HOSPADM

## 2023-06-02 RX ORDER — FLUTICASONE FUROATE AND VILANTEROL 100; 25 UG/1; UG/1
1 POWDER RESPIRATORY (INHALATION) DAILY
Status: DISCONTINUED | OUTPATIENT
Start: 2023-06-03 | End: 2023-06-04 | Stop reason: HOSPADM

## 2023-06-02 RX ORDER — CAPSAICIN 0.03 G/100G
CREAM TOPICAL DAILY PRN
Status: DISCONTINUED | OUTPATIENT
Start: 2023-06-02 | End: 2023-06-04 | Stop reason: HOSPADM

## 2023-06-02 RX ORDER — DEXTROSE 40 %
15 GEL (GRAM) ORAL
Status: DISCONTINUED | OUTPATIENT
Start: 2023-06-02 | End: 2023-06-04 | Stop reason: HOSPADM

## 2023-06-02 RX ORDER — POLYETHYLENE GLYCOL 3350 17 G/17G
17 POWDER, FOR SOLUTION ORAL DAILY
Status: DISCONTINUED | OUTPATIENT
Start: 2023-06-03 | End: 2023-06-04 | Stop reason: HOSPADM

## 2023-06-02 RX ORDER — TAMSULOSIN HYDROCHLORIDE 0.4 MG/1
0.4 CAPSULE ORAL DAILY
Status: DISCONTINUED | OUTPATIENT
Start: 2023-06-03 | End: 2023-06-04 | Stop reason: HOSPADM

## 2023-06-02 RX ORDER — METHYLPREDNISOLONE SOD SUCC 125 MG
125 VIAL (EA) INJECTION
Status: COMPLETED | OUTPATIENT
Start: 2023-06-02 | End: 2023-06-02

## 2023-06-02 RX ORDER — CHOLECALCIFEROL (VITAMIN D3) 25 MCG
1000 TABLET ORAL DAILY
Status: DISCONTINUED | OUTPATIENT
Start: 2023-06-03 | End: 2023-06-04 | Stop reason: HOSPADM

## 2023-06-02 RX ORDER — PROCHLORPERAZINE EDISYLATE 5 MG/ML
5 INJECTION INTRAMUSCULAR; INTRAVENOUS EVERY 6 HOURS PRN
Status: DISCONTINUED | OUTPATIENT
Start: 2023-06-02 | End: 2023-06-04 | Stop reason: HOSPADM

## 2023-06-02 RX ORDER — ACETAMINOPHEN 500 MG
500 TABLET ORAL DAILY PRN
Status: ON HOLD | COMMUNITY
End: 2024-02-05 | Stop reason: HOSPADM

## 2023-06-02 RX ORDER — TALC
6 POWDER (GRAM) TOPICAL NIGHTLY PRN
Status: DISCONTINUED | OUTPATIENT
Start: 2023-06-02 | End: 2023-06-04 | Stop reason: HOSPADM

## 2023-06-02 RX ORDER — AMLODIPINE BESYLATE 10 MG/1
10 TABLET ORAL
Status: COMPLETED | OUTPATIENT
Start: 2023-06-02 | End: 2023-06-02

## 2023-06-02 RX ORDER — AMLODIPINE BESYLATE 10 MG/1
10 TABLET ORAL DAILY
Status: DISCONTINUED | OUTPATIENT
Start: 2023-06-03 | End: 2023-06-02

## 2023-06-02 RX ADMIN — MIRTAZAPINE 30 MG: 30 TABLET, FILM COATED ORAL at 08:06

## 2023-06-02 RX ADMIN — DIPHENHYDRAMINE HYDROCHLORIDE 12.5 MG: 50 INJECTION, SOLUTION INTRAMUSCULAR; INTRAVENOUS at 07:06

## 2023-06-02 RX ADMIN — AMLODIPINE BESYLATE 10 MG: 10 TABLET ORAL at 05:06

## 2023-06-02 RX ADMIN — SODIUM CHLORIDE, POTASSIUM CHLORIDE, SODIUM LACTATE AND CALCIUM CHLORIDE: 600; 310; 30; 20 INJECTION, SOLUTION INTRAVENOUS at 08:06

## 2023-06-02 RX ADMIN — AMLODIPINE BESYLATE 5 MG: 5 TABLET ORAL at 10:06

## 2023-06-02 RX ADMIN — METHYLPREDNISOLONE SODIUM SUCCINATE 125 MG: 125 INJECTION, POWDER, FOR SOLUTION INTRAMUSCULAR; INTRAVENOUS at 08:06

## 2023-06-02 RX ADMIN — SODIUM CHLORIDE, POTASSIUM CHLORIDE, SODIUM LACTATE AND CALCIUM CHLORIDE 1000 ML: 600; 310; 30; 20 INJECTION, SOLUTION INTRAVENOUS at 02:06

## 2023-06-02 RX ADMIN — IOHEXOL 75 ML: 350 INJECTION, SOLUTION INTRAVENOUS at 08:06

## 2023-06-02 RX ADMIN — LORAZEPAM 0.5 MG: 0.5 TABLET ORAL at 08:06

## 2023-06-02 RX ADMIN — GADOBUTROL 3 ML: 604.72 INJECTION INTRAVENOUS at 03:06

## 2023-06-02 NOTE — ED PROVIDER NOTES
Encounter Date: 2023       History     Chief Complaint   Patient presents with    Weakness     Pt reports increased fatigue starting today with one episode of emesis.      Patient is a 67-year-old female with a past medical history of multiple myeloma on immunotherapy and paranoid schizophrenia presenting to the ED for complaints of fatigue and a near syncopal event.  Her son at bedside provides the history.  He states that she was sitting down in the chair when she appeared to be slumping over, but had no head trauma as he caught her before she fell.  She additionally had 1 episode of emesis.  She stated later that she felt lightheaded.  There was no chest pain or shortness of breath.  No associated fevers or recent illnesses.  Son reports that he is concerned, as she presented similarly a month prior and was septic from bacteremia (though per chart review, this appeared to be a contaminate).     Has had prior admission for syncope, found to be orthostatic.    Review of patient's allergies indicates:   Allergen Reactions    Iodine Hives    Shellfish containing products Itching    Ondansetron hcl Nausea Only     Past Medical History:   Diagnosis Date    Anxiety     Asthma     Bronchitis     COPD (chronic obstructive pulmonary disease)     Depression     GERD (gastroesophageal reflux disease)     Hallucination     History of psychiatric hospitalization     Hypertension     Neck pain     Polyneuropathy in diseases classified elsewhere 2021    Schizophrenia     Sleep difficulties      Past Surgical History:   Procedure Laterality Date     SECTION      X 1    COLONOSCOPY N/A 2018    Surgeon: Albert Russell MD    ESOPHAGOGASTRODUODENOSCOPY N/A 2018    Surgeon: Albert Russell MD    HYSTERECTOMY      KJB---DLH/BSO    LIPOMA RESECTION      back  x 2    SALPINGOOPHORECTOMY Bilateral 2016    KJB---DLH/BSO    THORACIC LAMINECTOMY WITH FUSION N/A 2018    Surgeon: He Andres MD      Family History   Problem Relation Age of Onset    Hypertension Mother     Glaucoma Mother     Macular degeneration Mother     Breast cancer Mother     Stroke Mother     COPD Father     Hypertension Father     Diabetes Brother     Glaucoma Sister     Liver cancer Paternal Uncle 75        dad brother ETOH    Ovarian cancer Neg Hx     Colon cancer Neg Hx     Colon polyps Neg Hx     Cirrhosis Neg Hx     Celiac disease Neg Hx     Ulcerative colitis Neg Hx     Hemochromatosis Neg Hx     Esophageal cancer Neg Hx     Anxiety disorder Neg Hx     Dementia Neg Hx     Bipolar disorder Neg Hx     Depression Neg Hx     Drug abuse Neg Hx     Schizophrenia Neg Hx     Suicide Neg Hx      Social History     Tobacco Use    Smoking status: Never    Smokeless tobacco: Never   Substance Use Topics    Alcohol use: No     Alcohol/week: 0.0 standard drinks    Drug use: No       Physical Exam     Initial Vitals [06/01/23 2236]   BP Pulse Resp Temp SpO2   (!) 123/90 78 16 97.6 °F (36.4 °C) 99 %      MAP       --         Physical Exam    Nursing note and vitals reviewed.  Constitutional: She appears well-developed and well-nourished. She is not diaphoretic. No distress.   Appears fatigued, but nontoxic appearing.  Speaking full sentences.  No acute distress.  Has emesis on clothing.   HENT:   Head: Normocephalic and atraumatic.   Right Ear: External ear normal.   Left Ear: External ear normal.   Neck: Neck supple.   Cardiovascular:  Normal rate, regular rhythm, normal heart sounds and intact distal pulses.           Pulmonary/Chest: Breath sounds normal. No respiratory distress. She has no wheezes. She has no rhonchi. She has no rales.   Abdominal: Abdomen is soft. She exhibits no distension. There is no abdominal tenderness.   No abdominal TTP.  There is no rebound and no guarding.   Musculoskeletal:      Cervical back: Neck supple.     Neurological: She is alert and oriented to person, place, and time. GCS score is 15. GCS eye subscore  is 4. GCS verbal subscore is 5. GCS motor subscore is 6.   Skin: Skin is warm. Capillary refill takes less than 2 seconds. No rash noted.   Psychiatric: She has a normal mood and affect.       ED Course   Procedures  Labs Reviewed   CBC W/ AUTO DIFFERENTIAL - Abnormal; Notable for the following components:       Result Value    WBC 2.72 (*)     RBC 3.55 (*)     Hemoglobin 10.0 (*)     Hematocrit 32.4 (*)     MCHC 30.9 (*)     RDW 14.6 (*)     Lymph # 0.3 (*)     Mono # 0.2 (*)     Gran % 77.2 (*)     Lymph % 12.5 (*)     All other components within normal limits    Narrative:     Release to patient->Immediate   COMPREHENSIVE METABOLIC PANEL - Abnormal; Notable for the following components:    CO2 21 (*)     BUN 28 (*)     Creatinine 1.7 (*)     ALT 8 (*)     eGFR 32.9 (*)     All other components within normal limits    Narrative:     Release to patient->Immediate  Add on Trop per Dr. Dumont @ 01:50 am to order # [id number]   URINALYSIS, REFLEX TO URINE CULTURE - Abnormal; Notable for the following components:    Protein, UA 1+ (*)     Glucose, UA 1+ (*)     Ketones, UA Trace (*)     Occult Blood UA 2+ (*)     All other components within normal limits    Narrative:     Specimen Source->Urine   D DIMER, QUANTITATIVE - Abnormal; Notable for the following components:    D-Dimer 1.70 (*)     All other components within normal limits   URINALYSIS MICROSCOPIC - Abnormal; Notable for the following components:    RBC, UA 9 (*)     All other components within normal limits    Narrative:     Specimen Source->Urine   HIV 1 / 2 ANTIBODY    Narrative:     Release to patient->Immediate  Add on Trop per Dr. Dumont @ 01:50 am to order # [id number]   HEPATITIS C ANTIBODY    Narrative:     Release to patient->Immediate  Add on Trop per Dr. Dumont @ 01:50 am to order # [id number]   LIPASE    Narrative:     Release to patient->Immediate  Add on Trop per Dr. Dumont @ 01:50 am to order # [id number]   TROPONIN I   TROPONIN I    Narrative:      Release to patient->Immediate  Add on Trop per Dr. Dumont @ 01:50 am to order # [id number]   TROPONIN I   ISTAT CHEM8     EKG Readings: (Independently Interpreted)   Initial Reading: No STEMI. Previous EKG: Compared with most recent EKG Rhythm: Sinus Arrhythmia. Heart Rate: 68. Ectopy: No Ectopy. Conduction: Normal.     Imaging Results    None          Medications   diphenhydrAMINE injection 12.5 mg (has no administration in time range)   methylPREDNISolone sodium succinate injection 125 mg (has no administration in time range)   lactated ringers bolus 500 mL (500 mLs Intravenous Not Given 6/2/23 0545)   lactated ringers bolus 1,000 mL (0 mLs Intravenous Stopped 6/2/23 0536)   amLODIPine tablet 10 mg (10 mg Oral Given 6/2/23 0545)     Medical Decision Making:   History:   I obtained history from: someone other than patient.  Old Medical Records: I decided to obtain old medical records.  Old Records Summarized: records from previous admission(s).  Initial Assessment:   Emergent evaluation of fatigue.  She is afebrile and hemodynamically stable.  Differential Diagnosis:   Orthostatic syncope, lower suspicion for cardiogenic syncope, electrolyte abnormality, GLENIS  Clinical Tests:   Lab Tests: Ordered and Reviewed  Radiological Study: Ordered  Medical Tests: Ordered and Reviewed  ED Management:  Presentation and labs appear to be consistent with orthostatic presyncope.  Labs are remarkable for hemoconcentration with a hemoglobin of 10, baseline is 7.  BUN mildly elevated to 27 as well as a mild GLENIS to 1.7; though does have history of multiple myeloma. D-dimer also ordered and is positive. Given IVF with some improvement. There was concern for altered mental status on reassessment, as I was alerted by RN that patient had wanted to speak to me.  However, upon speaking to patient, she would not answer any questions and would just stare at me.  Per her son, this is occasionally normal for her. The patient was signed out  to the oncoming team at shift change pending CTA PE study and CT head anticipate likely discharge if CTs are without abnormal findings.          Attending Attestation:   Physician Attestation Statement for Resident:  As the supervising MD   Physician Attestation Statement: I have personally seen and examined this patient.   I agree with the above history.  -:   As the supervising MD I agree with the above PE.     As the supervising MD I agree with the above treatment, course, plan, and disposition.   -: 67-year-old female presents to the emergency department status post fall patient reports that she was sitting down when she started feeling lightheaded and she fell to the ground   She can not explain exactly how she landed on the floor however she is certain she did not lose consciousness  Patient denies any headache, chest pain, abdominal pain, back pain, palpitations before or after the near-syncope episode   Patient hypertensive in the room will check orthostatics  Blood work with GLENIS, IV fluids D-dimer elevated will obtain CTA chest     Patient frail, weak  Clear to auscultation bilaterally   Regular rate and rhythm  Abdomen soft nondistended nontender   No CVA tenderness   No midline CTL tenderness palpation   Upper extremity 5/5 strength and normal sensation   Lower extremity difficulty holding her right lower extremity up initially however likely due to poor effort as with the 2nd attempt she had normal strength  Normal sensation     Labs reviewed: neg trop x2 , ua not suggestive of uti. D-dimer elevated  Orthostatic in the ED, IVF.also hypertensive, she did not take her amlodipine at home, will give her home med  Patient was signed-out to Dr. Velarde at the change of shift with plan for:  CT h and CTA chest pending                                Clinical Impression:   Final diagnoses:  [R53.83] Fatigue  [Z85.79] History of multiple myeloma (Primary)  [N17.9] GLENIS (acute kidney injury)  [I95.1]  Orthostasis  [R42] Lightheadedness               Richard Dumont MD  Resident  06/02/23 3514       Jeannine Mims MD  06/02/23 8836

## 2023-06-02 NOTE — ASSESSMENT & PLAN NOTE
Incidentally noted TERI 2.1 x 3 x 2.2 cm lobulated mass adjacent to the pleura.  The son believes the patient was receiving radiation in this area.  Outside records are needed to confirm.

## 2023-06-02 NOTE — ED NOTES
Bed saturated with urine  Linen changed, diaper changed and patient reconnected to [facility]

## 2023-06-02 NOTE — ED NOTES
Pt identifiers Janie Zamorano were checked and are correct  LOC: The patient is awake, alert, aware of environment with an appropriate affect. Oriented x4, speaking appropriately  APPEARANCE: Pt resting comfortably, in no acute distress, pt is clean and well groomed, clothing properly fastened  SKIN: Skin warm, dry and intact, normal skin turgor, moist mucus membranes  RESPIRATORY: Airway is open and patent, respirations are spontaneous, even and unlabored, normal effort and rate  CARDIAC: Normal rate and rhythm, no peripheral edema noted, capillary refill < 3 seconds, bilateral radial pulses 2+  Pt is on a cardiac monitor and pulse oximetry   ABDOMEN: Soft, nontender, nondistended. Bowel sounds present to all four quad of abd on auscultation  NEUROLOGIC: PERRL, facial expression is symmetrical, patient moving all extremities spontaneously, normal sensation in all extremities when touched with a finger.  Follows all commands appropriately  MUSCULOSKELETAL: No obvious deformities.

## 2023-06-02 NOTE — ED NOTES
Pt found sitting on edge of bed  Pt removed  socks Charge nurse MARINA Thayer ,RN notified that pt will need a sitter

## 2023-06-02 NOTE — ED NOTES
Pt transported to room 655 a on tele box via stretcher with escort Pt condition stable on leaving the ED, pt belongings are with and pt's son was notified of room number

## 2023-06-02 NOTE — ASSESSMENT & PLAN NOTE
Outside surveillance imaging from the patient's oncologist would be helpful, as would records of chemotherapy.  There is concern that the sellar mass could be a plasmacytoma related to her multiple myeloma.   Consult case management to obtain outside records.
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
## 2023-06-02 NOTE — ED NOTES
Pt may transport to MRI with out telemetry monitor verbal order read back Dr Osborn/SOPHIE Jasso,RN

## 2023-06-02 NOTE — ED NOTES
Telemetry Verification   Patient placed on Telemetry Box  Verified on ED monitor  Box [id number]   Monitor Tech  Inan   Rate 88   Rhythm  NSR

## 2023-06-02 NOTE — PROVIDER PROGRESS NOTES - EMERGENCY DEPT.
Encounter Date: 6/1/2023    ED Physician Progress Notes         EKG - STEMI Decision  Initial Reading: No STEMI present.  Response: No Action Needed.

## 2023-06-02 NOTE — PHARMACY MED REC
"  Admission Medication History     The home medication history was taken by Nubia Cantu.    You may go to "Admission" then "Reconcile Home Medications" tabs to review and/or act upon these items.     The home medication list has been updated by the Pharmacy department.   Please read ALL comments highlighted in yellow.   Please address this information as you see fit.    Feel free to contact us if you have any questions or require assistance.      Medications listed below were obtained from: Patient  Current Outpatient Medications on File Prior to Encounter   Medication Sig    acetaminophen (TYLENOL) 500 MG tablet   Take 500 mg by mouth daily as needed for Pain.    amLODIPine (NORVASC) 10 MG tablet   Take 1 tablet (10 mg total) by mouth once daily.    ascorbic acid, vitamin C, (VITAMIN C) 1000 MG tablet   Take 1,000 mg by mouth once daily.    aspirin (ECOTRIN) 81 MG EC tablet   Take 81 mg by mouth once daily.    BIOTIN ORAL Take 1 tablet by mouth once daily.      calcium carbonate (TUMS ORAL)   Take 1 tablet by mouth daily as needed (Heartburn).    capsaicin (ARTHRITIS PAIN RELIEF,CAPSAIC, TOP)   Apply topically daily as needed (Pain).    cyanocobalamin, vitamin B-12, (VITAMIN B-12 ORAL)   Take 1 tablet by mouth daily as needed.    LORazepam (ATIVAN) 0.5 MG tablet Take 0.5 mg by mouth 2 (two) times daily as needed for Anxiety.      mirtazapine (REMERON) 30 MG tablet   Take 30 mg by mouth every evening.    multivitamin capsule Take 1 capsule by mouth once daily.    vitamin D (VITAMIN D3) 1000 units Tab   Take 1,000 Units by mouth once daily.    dexAMETHasone (DECADRON) 4 MG Tab   Gets in her off weeks of treatment    flaxseed oiL 1,000 mg Cap   Take by mouth.    grape seed oil, bulk, 100 % Oil by Misc.(Non-Drug; Combo Route) route.     Potential issues to be addressed PRIOR TO DISCHARGE  Please discuss with the patient barriers to adherence with medication treatment plans    Patient requires education regarding drug " therapies           Nubia Cantu  EXT [id number]                .

## 2023-06-02 NOTE — SUBJECTIVE & OBJECTIVE
Past Medical History:   Diagnosis Date    Anxiety     Asthma     Bronchitis     COPD (chronic obstructive pulmonary disease)     Depression     GERD (gastroesophageal reflux disease)     Hallucination     History of psychiatric hospitalization     Hypertension     Neck pain     Polyneuropathy in diseases classified elsewhere 2021    Schizophrenia     Sleep difficulties        Past Surgical History:   Procedure Laterality Date     SECTION      X 1    COLONOSCOPY N/A 2018    Surgeon: Albert Russell MD    ESOPHAGOGASTRODUODENOSCOPY N/A 2018    Surgeon: Albert Russell MD    HYSTERECTOMY  2016    [facility]---[facility]/BSO    LIPOMA RESECTION      back  x 2    SALPINGOOPHORECTOMY Bilateral 2016    [facility]---[facility]/BSO    THORACIC LAMINECTOMY WITH FUSION N/A 2018    Surgeon: He Andres MD       Review of patient's allergies indicates:   Allergen Reactions    Iodine Hives    Shellfish containing products Itching    Ondansetron hcl Nausea Only       No current facility-administered medications on file prior to encounter.     Current Outpatient Medications on File Prior to Encounter   Medication Sig    acetaminophen (TYLENOL) 500 MG tablet Take 500 mg by mouth daily as needed for Pain.    amLODIPine (NORVASC) 10 MG tablet Take 1 tablet (10 mg total) by mouth once daily.    ascorbic acid, vitamin C, (VITAMIN C) 1000 MG tablet Take 1,000 mg by mouth once daily.    aspirin (ECOTRIN) 81 MG EC tablet Take 81 mg by mouth once daily.    BIOTIN ORAL Take 1 tablet by mouth once daily.    calcium carbonate (TUMS ORAL) Take 1 tablet by mouth daily as needed (Heartburn).    capsaicin (ARTHRITIS PAIN RELIEF,CAPSAIC, TOP) Apply topically daily as needed (Pain).    cyanocobalamin, vitamin B-12, (VITAMIN B-12 ORAL) Take 1 tablet by mouth daily as needed.    LORazepam (ATIVAN) 0.5 MG tablet Take 0.5 mg by mouth 2 (two) times daily as needed for Anxiety.    mirtazapine (REMERON) 30 MG tablet Take 30 mg by mouth every evening.     multivitamin capsule Take 1 capsule by mouth once daily.    vitamin D (VITAMIN D3) 1000 units Tab Take 1,000 Units by mouth once daily.    dexAMETHasone (DECADRON) 4 MG Tab Gets in her off weeks of treatment    flaxseed oiL 1,000 mg Cap Take by mouth.    grape seed oil, bulk, 100 % Oil by Misc.(Non-Drug; Combo Route) route.     Family History       Problem Relation (Age of Onset)    Breast cancer Mother    COPD Father    Diabetes Brother    Glaucoma Mother, Sister    Hypertension Mother, Father    Liver cancer Paternal Uncle (75)    Macular degeneration Mother    Stroke Mother          Tobacco Use    Smoking status: Never    Smokeless tobacco: Never   Substance and Sexual Activity    Alcohol use: No     Alcohol/week: 0.0 standard drinks    Drug use: No    Sexual activity: Not Currently     Partners: Male     Birth control/protection: Post-menopausal     Review of Systems   Constitutional:  Positive for activity change and fatigue. Negative for fever.   HENT: Negative.     Eyes: Negative.    Respiratory: Negative.     Gastrointestinal: Negative.    Genitourinary:         Episode of incontinence     Musculoskeletal: Negative.    Skin: Negative.    Neurological:  Positive for syncope, weakness and headaches.   Hematological: Negative.    Psychiatric/Behavioral:  Positive for behavioral problems and sleep disturbance.    Objective:     Vital Signs (Most Recent):  Temp: 97.9 °F (36.6 °C) (06/02/23 1611)  Pulse: 64 (06/02/23 1611)  Resp: 15 (06/02/23 1611)  BP: (!) 155/91 (06/02/23 1611)  SpO2: 100 % (06/02/23 1611) Vital Signs (24h Range):  Temp:  [96.5 °F (35.8 °C)-97.9 °F (36.6 °C)] 97.9 °F (36.6 °C)  Pulse:  [56-85] 64  Resp:  [15-20] 15  SpO2:  [99 %-100 %] 100 %  BP: (123-230)/() 155/91     Weight: 50.8 kg (112 lb)  Body mass index is 19.84 kg/m².     Physical Exam  Constitutional:       Appearance: She is ill-appearing.   HENT:      Head: Normocephalic and atraumatic.      Nose: Nose normal.       Mouth/Throat:      Mouth: Mucous membranes are moist.   Eyes:      Extraocular Movements: Extraocular movements intact.      Conjunctiva/sclera: Conjunctivae normal.      Pupils: Pupils are equal, round, and reactive to light.   Cardiovascular:      Rate and Rhythm: Normal rate and regular rhythm.      Pulses: Normal pulses.      Heart sounds: No murmur heard.  Pulmonary:      Effort: Pulmonary effort is normal. No respiratory distress.      Breath sounds: Normal breath sounds. No wheezing or rales.   Abdominal:      General: Abdomen is flat. Bowel sounds are normal. There is no distension.      Palpations: Abdomen is soft.      Tenderness: There is no abdominal tenderness.   Musculoskeletal:      Right lower leg: No edema.      Left lower leg: No edema.   Skin:     General: Skin is warm and dry.      Capillary Refill: Capillary refill takes 2 to 3 seconds.   Neurological:      General: No focal deficit present.      Mental Status: She is alert.      Cranial Nerves: Cranial nerves 2-12 are intact.      Sensory: Sensation is intact.   Psychiatric:         Mood and Affect: Affect is flat.         Behavior: Behavior is withdrawn.      Comments: Did not actively participate in the conversation, but occasionally interjected thoughts- some appropriate and some tangential            CRANIAL NERVES     CN III, IV, VI   Pupils are equal, round, and reactive to light.     Significant Labs: All pertinent labs within the past 24 hours have been reviewed.    Significant Imaging: I have reviewed all pertinent imaging results/findings within the past 24 hours.    MRI: 4/19/23: C:  2.6 cm mildly enhancing mass with abnormal diffusivity centered in the sphenoid sinus with erosion through the anterior right sella turcica. The mass contacts the right ICA with less than 270 degrees encasement. This has grown rapidly compared to 11/22/2022. Differential considerations include lymphoma, malignant aggressive salivary gland neoplasm such  as adenoid cystic carcinoma, skull base plasmacytoma, sinonasal adenocarcinoma or squamous cell carcinoma. Tissue sampling is recommended.

## 2023-06-02 NOTE — ASSESSMENT & PLAN NOTE
Labs reviewed- Renal function/electrolytes with Estimated Creatinine Clearance: 26.1 mL/min (A) (based on SCr of 1.7 mg/dL (H)). according to latest data. Monitor urine output and serial BMP and adjust therapy as needed. Avoid nephrotoxins and renally dose meds for GFR listed above.     · Check urine electrolytes.  · If no improvement after hydration, will plan for renal ultrasound.

## 2023-06-02 NOTE — ED TRIAGE NOTES
Janie Zamorano, a 67 y.o. female presents to the ED w/ complaint of weakness. Reports fatigue that started today with one episode of emesis    Adult Physical Assessment  LOC: Janie Zamorano, 67 y.o. female verified via two identifiers.  The patient is awake, alert, oriented and speaking appropriately at this time.  APPEARANCE: Patient resting comfortably and appears to be in no acute distress at this time. Patient is clean and well groomed, patient's clothing is properly fastened.  SKIN:The skin is warm and dry, color consistent with ethnicity, patient has normal skin turgor and moist mucus membranes, skin intact, no breakdown or brusing noted.  MUSCULOSKELETAL: Patient moving all extremities well, no obvious swelling or deformities noted. Patient reports fatigue that has increased starting today  RESPIRATORY: Airway is open and patent, respirations are spontaneous, patient has a normal effort and rate, no accessory muscle use noted.  CARDIAC: Patient has a normal rate and rhythm, no periphreal edema noted in any extremity, capillary refill < 3 seconds in all extremities  ABDOMEN: Soft and non tender to palpation, no abdominal distention noted. Bowel sounds present in all four quadrants.  NEUROLOGIC: Eyes open spontaneously, behavior appropriate to situation, follows commands, facial expression symmetrical, bilateral hand grasp equal and even, purposeful motor response noted, normal sensation in all extremities when touched with a finger.      Triage note:  Chief Complaint   Patient presents with    Weakness     Pt reports increased fatigue starting today with one episode of emesis.      Review of patient's allergies indicates:   Allergen Reactions    Iodine Hives    Shellfish containing products Itching    Ondansetron hcl Nausea Only     Past Medical History:   Diagnosis Date    Anxiety     Asthma     Bronchitis     COPD (chronic obstructive pulmonary disease)     Depression     GERD  (gastroesophageal reflux disease)     Hallucination     History of psychiatric hospitalization     Hypertension     Neck pain     Polyneuropathy in diseases classified elsewhere 4/12/2021    Schizophrenia     Sleep difficulties

## 2023-06-02 NOTE — ASSESSMENT & PLAN NOTE
Continue mirtazapine and lorazepam.   Zyprexa prn for agitation.  Monitor QT.   No Residual Tumor Seen Histology Text: There were no malignant cells seen in the sections examined.
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
## 2023-06-02 NOTE — ASSESSMENT & PLAN NOTE
Likely 2/2 orthostatic hypotension-- diastolic dropped 15mmHg from lying to standing.  Patient's so reports that the patient is frequently dehydrated and has even had to skip chemo for the last month as the nurses are 'unable to find a vein'.     Patient received a liter of LR in the ED.   Continue gentle hydration over the next 24 hours.  Encourage po intake

## 2023-06-02 NOTE — PROVIDER PROGRESS NOTES - EMERGENCY DEPT.
Encounter Date: 6/1/2023    ED Physician Progress Notes          ED Resident HAND-OFF NOTE:  Janie Zamorano is a 67 y.o. female who presented to the ED on 6/2/2023, patient C/O weakness. I assumed care of patient from off-going ED physician team patient pending  CT PE, CT head, ambulation.    Briefly, patient states that she is feeling lightheaded and fell to the ground.  Had 1 episode of emesis after fall.  Uncertain how she fell.  Complaining of no pain in her extremities or torso at this time.  Denies head pain or neck pain.    On my evaluation, Janie Zamorano appears well, hemodynamically stable and in NAD. Thus far, Janie Zamorano has received:  Medications   diphenhydrAMINE injection 12.5 mg (has no administration in time range)   methylPREDNISolone sodium succinate injection 125 mg (has no administration in time range)   lactated ringers bolus 500 mL (500 mLs Intravenous Not Given 6/2/23 0545)   lactated ringers bolus 1,000 mL (0 mLs Intravenous Stopped 6/2/23 0536)   amLODIPine tablet 10 mg (10 mg Oral Given 6/2/23 0545)       BP (!) 184/97 (BP Location: Right arm, Patient Position: Lying)   Pulse 76   Temp 97.5 °F (36.4 °C) (Oral)   Resp 15   Wt 50.8 kg (112 lb)   LMP  (LMP Unknown)   SpO2 100%   BMI 19.84 kg/m²         Disposition: I anticipate patient will depend on workup results  ______________________  Mona Osborn MD   Emergency Medicine Resident      UPDATE:   CT head showing 2.8 cm sellar mass.  Recommended MRI follow-up.  CTA showing left upper lobe mass, undetermined if it is a primary mass or secondary to multiple myeloma.      Throughout patient's ED stay, waxing and waning mentality.  Patient incontinent of urine, trying to get out of bed without help after being told to stay in bed.    Discussed patient's case with Hospital Medicine who agreed to admit patient for observation for undifferentiated AMS/MRI follow-up.    :  Fatigue  History of multiple myeloma  (Primary)  GLENIS (acute kidney injury)  Orthostasis  Lightheadedness

## 2023-06-03 PROBLEM — I95.1 ORTHOSTATIC HYPOTENSION: Status: ACTIVE | Noted: 2023-06-03

## 2023-06-03 LAB
ALBUMIN SERPL BCP-MCNC: 3.4 G/DL (ref 3.5–5.2)
ALP SERPL-CCNC: 66 U/L (ref 55–135)
ALT SERPL W/O P-5'-P-CCNC: 6 U/L (ref 10–44)
ANION GAP SERPL CALC-SCNC: 10 MMOL/L (ref 8–16)
AST SERPL-CCNC: 15 U/L (ref 10–40)
BASOPHILS # BLD AUTO: 0.01 K/UL (ref 0–0.2)
BASOPHILS NFR BLD: 0.4 % (ref 0–1.9)
BILIRUB SERPL-MCNC: 0.3 MG/DL (ref 0.1–1)
BUN SERPL-MCNC: 22 MG/DL (ref 8–23)
CALCIUM SERPL-MCNC: 9.1 MG/DL (ref 8.7–10.5)
CHLORIDE SERPL-SCNC: 106 MMOL/L (ref 95–110)
CHOLEST SERPL-MCNC: 223 MG/DL (ref 120–199)
CHOLEST/HDLC SERPL: 4.1 {RATIO} (ref 2–5)
CO2 SERPL-SCNC: 22 MMOL/L (ref 23–29)
CREAT SERPL-MCNC: 1.3 MG/DL (ref 0.5–1.4)
DIFFERENTIAL METHOD: ABNORMAL
EOSINOPHIL # BLD AUTO: 0 K/UL (ref 0–0.5)
EOSINOPHIL NFR BLD: 0 % (ref 0–8)
ERYTHROCYTE [DISTWIDTH] IN BLOOD BY AUTOMATED COUNT: 14.4 % (ref 11.5–14.5)
EST. GFR  (NO RACE VARIABLE): 45.4 ML/MIN/1.73 M^2
GLUCOSE SERPL-MCNC: 104 MG/DL (ref 70–110)
HCT VFR BLD AUTO: 33.6 % (ref 37–48.5)
HDLC SERPL-MCNC: 54 MG/DL (ref 40–75)
HDLC SERPL: 24.2 % (ref 20–50)
HGB BLD-MCNC: 10.4 G/DL (ref 12–16)
IMM GRANULOCYTES # BLD AUTO: 0.01 K/UL (ref 0–0.04)
IMM GRANULOCYTES NFR BLD AUTO: 0.4 % (ref 0–0.5)
LDLC SERPL CALC-MCNC: 141 MG/DL (ref 63–159)
LYMPHOCYTES # BLD AUTO: 0.4 K/UL (ref 1–4.8)
LYMPHOCYTES NFR BLD: 16.3 % (ref 18–48)
MAGNESIUM SERPL-MCNC: 2.1 MG/DL (ref 1.6–2.6)
MCH RBC QN AUTO: 28.1 PG (ref 27–31)
MCHC RBC AUTO-ENTMCNC: 31 G/DL (ref 32–36)
MCV RBC AUTO: 91 FL (ref 82–98)
MONOCYTES # BLD AUTO: 0.2 K/UL (ref 0.3–1)
MONOCYTES NFR BLD: 9.3 % (ref 4–15)
NEUTROPHILS # BLD AUTO: 1.8 K/UL (ref 1.8–7.7)
NEUTROPHILS NFR BLD: 73.6 % (ref 38–73)
NONHDLC SERPL-MCNC: 169 MG/DL
NRBC BLD-RTO: 0 /100 WBC
PHOSPHATE SERPL-MCNC: 3.3 MG/DL (ref 2.7–4.5)
PLATELET # BLD AUTO: 177 K/UL (ref 150–450)
PMV BLD AUTO: 10.8 FL (ref 9.2–12.9)
POTASSIUM SERPL-SCNC: 4.6 MMOL/L (ref 3.5–5.1)
PROT SERPL-MCNC: 5.5 G/DL (ref 6–8.4)
RBC # BLD AUTO: 3.7 M/UL (ref 4–5.4)
SODIUM SERPL-SCNC: 138 MMOL/L (ref 136–145)
TRIGL SERPL-MCNC: 140 MG/DL (ref 30–150)
WBC # BLD AUTO: 2.46 K/UL (ref 3.9–12.7)

## 2023-06-03 PROCEDURE — 90792 PSYCH DIAG EVAL W/MED SRVCS: CPT | Mod: ,,, | Performed by: STUDENT IN AN ORGANIZED HEALTH CARE EDUCATION/TRAINING PROGRAM

## 2023-06-03 PROCEDURE — 80061 LIPID PANEL: CPT | Performed by: INTERNAL MEDICINE

## 2023-06-03 PROCEDURE — 99233 PR SUBSEQUENT HOSPITAL CARE,LEVL III: ICD-10-PCS | Mod: ,,, | Performed by: HOSPITALIST

## 2023-06-03 PROCEDURE — 96360 HYDRATION IV INFUSION INIT: CPT

## 2023-06-03 PROCEDURE — G0378 HOSPITAL OBSERVATION PER HR: HCPCS

## 2023-06-03 PROCEDURE — 83735 ASSAY OF MAGNESIUM: CPT | Performed by: INTERNAL MEDICINE

## 2023-06-03 PROCEDURE — 80053 COMPREHEN METABOLIC PANEL: CPT | Performed by: INTERNAL MEDICINE

## 2023-06-03 PROCEDURE — 85025 COMPLETE CBC W/AUTO DIFF WBC: CPT | Performed by: INTERNAL MEDICINE

## 2023-06-03 PROCEDURE — 25000003 PHARM REV CODE 250: Performed by: INTERNAL MEDICINE

## 2023-06-03 PROCEDURE — 63600175 PHARM REV CODE 636 W HCPCS: Performed by: HOSPITALIST

## 2023-06-03 PROCEDURE — 84100 ASSAY OF PHOSPHORUS: CPT | Performed by: INTERNAL MEDICINE

## 2023-06-03 PROCEDURE — 25000242 PHARM REV CODE 250 ALT 637 W/ HCPCS: Performed by: INTERNAL MEDICINE

## 2023-06-03 PROCEDURE — 96361 HYDRATE IV INFUSION ADD-ON: CPT

## 2023-06-03 PROCEDURE — 90792 PR PSYCHIATRIC DIAGNOSTIC EVALUATION W/MEDICAL SERVICES: ICD-10-PCS | Mod: ,,, | Performed by: STUDENT IN AN ORGANIZED HEALTH CARE EDUCATION/TRAINING PROGRAM

## 2023-06-03 PROCEDURE — 36415 COLL VENOUS BLD VENIPUNCTURE: CPT | Performed by: INTERNAL MEDICINE

## 2023-06-03 PROCEDURE — 99233 SBSQ HOSP IP/OBS HIGH 50: CPT | Mod: ,,, | Performed by: HOSPITALIST

## 2023-06-03 RX ORDER — SODIUM CHLORIDE, SODIUM LACTATE, POTASSIUM CHLORIDE, CALCIUM CHLORIDE 600; 310; 30; 20 MG/100ML; MG/100ML; MG/100ML; MG/100ML
INJECTION, SOLUTION INTRAVENOUS CONTINUOUS
Status: DISCONTINUED | OUTPATIENT
Start: 2023-06-03 | End: 2023-06-03

## 2023-06-03 RX ADMIN — OXYCODONE HYDROCHLORIDE AND ACETAMINOPHEN 1000 MG: 500 TABLET ORAL at 08:06

## 2023-06-03 RX ADMIN — LORAZEPAM 0.5 MG: 0.5 TABLET ORAL at 08:06

## 2023-06-03 RX ADMIN — TAMSULOSIN HYDROCHLORIDE 0.4 MG: 0.4 CAPSULE ORAL at 08:06

## 2023-06-03 RX ADMIN — Medication 6 MG: at 08:06

## 2023-06-03 RX ADMIN — THERA TABS 1 TABLET: TAB at 09:06

## 2023-06-03 RX ADMIN — CHOLECALCIFEROL TAB 25 MCG (1000 UNIT) 1000 UNITS: 25 TAB at 08:06

## 2023-06-03 RX ADMIN — SODIUM CHLORIDE, POTASSIUM CHLORIDE, SODIUM LACTATE AND CALCIUM CHLORIDE: 600; 310; 30; 20 INJECTION, SOLUTION INTRAVENOUS at 12:06

## 2023-06-03 RX ADMIN — MIRTAZAPINE 30 MG: 30 TABLET, FILM COATED ORAL at 08:06

## 2023-06-03 RX ADMIN — POLYETHYLENE GLYCOL 3350 17 G: 17 POWDER, FOR SOLUTION ORAL at 08:06

## 2023-06-03 RX ADMIN — OLANZAPINE 10 MG: 2.5 TABLET, FILM COATED ORAL at 10:06

## 2023-06-03 RX ADMIN — FLUTICASONE FUROATE AND VILANTEROL TRIFENATATE 1 PUFF: 100; 25 POWDER RESPIRATORY (INHALATION) at 08:06

## 2023-06-03 NOTE — ASSESSMENT & PLAN NOTE
Likely 2/2 orthostatic hypotension-- diastolic dropped 15mmHg from lying to standing.  Patient's so reports that the patient is frequently dehydrated and has even had to skip chemo for the last month as the nurses are 'unable to find a vein'.     Patient received a liter of LR in the ED.   Continue gentle hydration over the next 24 hours.  Encourage po intake    6/3- repeat postural BPs  CT and MRI head  reveal  mass within the sphenoid sinus and sella

## 2023-06-03 NOTE — CONSULTS
[facility] - [facility] Surg  Neurosurgery  Consult Note    Inpatient consult to Neurosurgery  Consult performed by: Abner Youngblood MD  Consult ordered by: Heidy Beasley MD        Subjective:      reason for consult sellar mass    History of Present Illness: 68 y/o F with historyon multile myeloma with recent vasovagal fall and recent history of sepsis with known sellar lesion presenting after a vasovagal episode. She has had a prior fusion with Dr. Andres but denies any newfound weakness back or neck pain. NSGY was consulted due to known sellar lesion. She denies newfound vision changes. She denies any functional changes such as change in head shape or shoe size.    She endorses recent night sweats.  Outside hospital was managing this lesion conservatively.          Past Medical History:   Diagnosis Date    Anxiety     Asthma     Bronchitis     COPD (chronic obstructive pulmonary disease)     Depression     GERD (gastroesophageal reflux disease)     Hallucination     History of psychiatric hospitalization     Hypertension     Neck pain     Polyneuropathy in diseases classified elsewhere 2021    Schizophrenia     Sleep difficulties      Past Surgical History:   Procedure Laterality Date     SECTION      X 1    COLONOSCOPY N/A 2018    Surgeon: Albert Russell MD    ESOPHAGOGASTRODUODENOSCOPY N/A 2018    Surgeon: Albert Russell MD    HYSTERECTOMY  2016    [facility]---DLH/BSO    LIPOMA RESECTION      back  x 2    SALPINGOOPHORECTOMY Bilateral 2016    [facility]---DLH/BSO    THORACIC LAMINECTOMY WITH FUSION N/A 2018    Surgeon: He Andres MD       Scheduled Meds:   [START ON 6/3/2023] ascorbic acid (vitamin C)  1,000 mg Oral Daily    [START ON 6/3/2023] fluticasone furoate-vilanteroL  1 puff Inhalation Daily    LORazepam  0.5 mg Oral BID    mirtazapine  30 mg Oral QHS    [START ON 6/3/2023] multivitamin  1 tablet Oral Daily    [START ON 6/3/2023] polyethylene glycol  17 g Oral  Daily    [START ON 6/3/2023] tamsulosin  0.4 mg Oral Daily    [START ON 6/3/2023] vitamin D  1,000 Units Oral Daily     Continuous Infusions:   lactated ringers 100 mL/hr at 06/02/23 2028     PRN Meds:.acetaminophen, calcium carbonate, capsaicin, dextrose 10%, dextrose 10%, dextrose, dextrose, glucagon (human recombinant), hyoscyamine, melatonin, metoclopramide HCl, naloxone, OLANZapine, oxyCODONE, prochlorperazine, senna-docusate 8.6-50 mg, sodium chloride 0.9%    Exam    aox3 maew perrl  eomi   4/5 diffuse weakness and atrophy with cachexia.   Vision intact in all quadrants.       MRI with large sellar mass with suprasellar extension.      Assessment/Plan:     Mass of sphenoid sinus  68 y/o F with sellar mass no acute vision changes normotensive at this time so do not suspect adrenal source of transient hypotension and fall which brought her to hospital.     pituitary labs ordered.   Consider optho for baseline vision eval.   No urgent nsgy intervention.  Further medical work of fall   prior to consideration of transphenoidal.   Would need medical clearance and risk eval/optimization prior to surgical consideration.           Discussed with Dr. Mckeon     Thank you for your consult.     Abner Youngblood MD  Neurosurgery  [facility]

## 2023-06-03 NOTE — PROGRESS NOTES
[facility] - [facility] Surg  Neurosurgery  Progress Note    Subjective:     History of Present Illness: 66 y/o F with historyon multile myeloma with recent vasovagal fall and recent history of sepsis with known sellar lesion presenting after a vasovagal episode. She has had a prior fusion with Dr. Andres but denies any newfound weakness back or neck pain. NSGY was consulted due to known sellar lesion. She denies newfound vision changes. She denies any functional changes such as change in head shape or shoe size.    She endorses recent night sweats.  Outside hospital was managing this lesion conservatively.         Post-Op Info:  * No surgery found *         Interval History: no acute overnight events     Medications:  Continuous Infusions:  Scheduled Meds:   ascorbic acid (vitamin C)  1,000 mg Oral Daily    fluticasone furoate-vilanteroL  1 puff Inhalation Daily    LORazepam  0.5 mg Oral BID    mirtazapine  30 mg Oral QHS    multivitamin  1 tablet Oral Daily    polyethylene glycol  17 g Oral Daily    tamsulosin  0.4 mg Oral Daily    vitamin D  1,000 Units Oral Daily     PRN Meds:acetaminophen, calcium carbonate, capsaicin, dextrose 10%, dextrose 10%, dextrose, dextrose, glucagon (human recombinant), hyoscyamine, melatonin, metoclopramide HCl, naloxone, OLANZapine, oxyCODONE, prochlorperazine, senna-docusate 8.6-50 mg, sodium chloride 0.9%     Review of Systems  Objective:     Weight: 50.8 kg (112 lb)  Body mass index is 19.84 kg/m².  Vital Signs (Most Recent):  Temp: 97.9 °F (36.6 °C) (06/03/23 0759)  Pulse: (!) 126 (06/03/23 0945)  Resp: 16 (06/03/23 0838)  BP: 129/82 (06/03/23 0945)  SpO2: 100 % (06/03/23 0945) Vital Signs (24h Range):  Temp:  [96.5 °F (35.8 °C)-98.3 °F (36.8 °C)] 97.9 °F (36.6 °C)  Pulse:  [] 126  Resp:  [15-20] 16  SpO2:  [98 %-100 %] 100 %  BP: (127-166)/() 129/82     Date 06/03/23 0700 - 06/04/23 0659   Shift [id number] 9532-9125 7930-0659 24 Hour Total   INTAKE   P.O. 360   360    Shift Total(mL/kg) 360(7.1)   360(7.1)   OUTPUT   Shift Total(mL/kg)       Weight (kg) 50.8 50.8 50.8 50.8                               Physical Exam     Neurosurgery Physical Exam    AOX3 maew perrl eomi  VOF full to finger confrontation      Significant Labs:  Recent Labs   Lab 06/02/23  0103 06/03/23  0256   GLU 99 104    138   K 4.8 4.6    106   CO2 21* 22*   BUN 28* 22   CREATININE 1.7* 1.3   CALCIUM 9.6 9.1   MG  --  2.1     Recent Labs   Lab 06/02/23  0103 06/03/23  0256   WBC 2.72* 2.46*   HGB 10.0* 10.4*   HCT 32.4* 33.6*    177     No results for input(s): LABPT, INR, APTT in the last 48 hours.  Microbiology Results (last 7 days)       ** No results found for the last 168 hours. **          All pertinent labs from the last 24 hours have been reviewed.    Significant Diagnostics:  I have reviewed all pertinent imaging results/findings within the past 24 hours.    Assessment/Plan:     Mass of sphenoid sinus  68 y/o F with sellar mass no acute vision changes normotensive at this time so do not suspect adrenal source of transient hypotension and fall which brought her to hospital.     pituitary labs ordered.   Consider optho for baseline vision eval.   No urgent nsgy intervention.  Further medical work up of fall,  prior to consideration of transphenoidal.   Would need medical clearance and risk eval/optimization prior to surgical consideration.     Recommend endocrine consult.  May consider biopsy in near future with ENT support.           Abner Youngblood MD  Neurosurgery  [facility] Surg

## 2023-06-03 NOTE — SUBJECTIVE & OBJECTIVE
Past Medical History:   Diagnosis Date    Anxiety     Asthma     Bronchitis     COPD (chronic obstructive pulmonary disease)     Depression     GERD (gastroesophageal reflux disease)     Hallucination     History of psychiatric hospitalization     Hypertension     Neck pain     Polyneuropathy in diseases classified elsewhere 2021    Schizophrenia     Sleep difficulties        Past Surgical History:   Procedure Laterality Date     SECTION      X 1    COLONOSCOPY N/A 2018    Surgeon: Albert Russell MD    ESOPHAGOGASTRODUODENOSCOPY N/A 2018    Surgeon: Albert Russell MD    HYSTERECTOMY  2016    [facility]---DLH/BSO    LIPOMA RESECTION      back  x 2    SALPINGOOPHORECTOMY Bilateral 2016    [facility]---DLH/BSO    THORACIC LAMINECTOMY WITH FUSION N/A 2018    Surgeon: He Andres MD       Review of patient's allergies indicates:   Allergen Reactions    Iodine Hives    Shellfish containing products Itching    Ondansetron hcl Nausea Only       No current facility-administered medications on file prior to encounter.     Current Outpatient Medications on File Prior to Encounter   Medication Sig    acetaminophen (TYLENOL) 500 MG tablet Take 500 mg by mouth daily as needed for Pain.    amLODIPine (NORVASC) 10 MG tablet Take 1 tablet (10 mg total) by mouth once daily.    ascorbic acid, vitamin C, (VITAMIN C) 1000 MG tablet Take 1,000 mg by mouth once daily.    aspirin (ECOTRIN) 81 MG EC tablet Take 81 mg by mouth once daily.    BIOTIN ORAL Take 1 tablet by mouth once daily.    calcium carbonate (TUMS ORAL) Take 1 tablet by mouth daily as needed (Heartburn).    capsaicin (ARTHRITIS PAIN RELIEF,CAPSAIC, TOP) Apply topically daily as needed (Pain).    cyanocobalamin, vitamin B-12, (VITAMIN B-12 ORAL) Take 1 tablet by mouth daily as needed.    LORazepam (ATIVAN) 0.5 MG tablet Take 0.5 mg by mouth 2 (two) times daily as needed for Anxiety.    mirtazapine (REMERON) 30 MG tablet Take 30 mg by mouth every evening.     multivitamin capsule Take 1 capsule by mouth once daily.    vitamin D (VITAMIN D3) 1000 units Tab Take 1,000 Units by mouth once daily.    dexAMETHasone (DECADRON) 4 MG Tab Gets in her off weeks of treatment    flaxseed oiL 1,000 mg Cap Take by mouth.    grape seed oil, bulk, 100 % Oil by Misc.(Non-Drug; Combo Route) route.     Family History       Problem Relation (Age of Onset)    Breast cancer Mother    COPD Father    Diabetes Brother    Glaucoma Mother, Sister    Hypertension Mother, Father    Liver cancer Paternal Uncle (75)    Macular degeneration Mother    Stroke Mother          Tobacco Use    Smoking status: Never    Smokeless tobacco: Never   Substance and Sexual Activity    Alcohol use: No     Alcohol/week: 0.0 standard drinks    Drug use: No    Sexual activity: Not Currently     Partners: Male     Birth control/protection: Post-menopausal     Review of Systems   Constitutional:  Positive for activity change and fatigue. Negative for chills and fever.   HENT:  Negative for congestion and trouble swallowing.    Eyes:  Negative for visual disturbance.   Respiratory:  Negative for cough and shortness of breath.    Cardiovascular:  Negative for chest pain and leg swelling.   Gastrointestinal:  Negative for abdominal distention, abdominal pain, nausea and vomiting.   Endocrine: Negative for cold intolerance, heat intolerance, polydipsia and polyuria.   Genitourinary:  Negative for difficulty urinating and dysuria.        Episode of incontinence    Musculoskeletal:  Negative for back pain and myalgias.   Skin:  Negative for rash and wound.   Neurological:  Positive for syncope, weakness and headaches. Negative for dizziness and light-headedness.   Hematological:  Negative for adenopathy. Does not bruise/bleed easily.   Psychiatric/Behavioral:  Positive for behavioral problems and sleep disturbance. Negative for confusion.    Objective:     Vital Signs (Most Recent):  Temp: 96.9 °F (36.1 °C) (06/02/23  2006)  Pulse: 92 (06/02/23 2006)  Resp: 16 (06/02/23 2006)  BP: (!) 133/91 (06/02/23 2006)  SpO2: 100 % (06/02/23 2006) Vital Signs (24h Range):  Temp:  [96.5 °F (35.8 °C)-98 °F (36.7 °C)] 96.9 °F (36.1 °C)  Pulse:  [56-92] 92  Resp:  [15-20] 16  SpO2:  [99 %-100 %] 100 %  BP: (123-230)/() 133/91     Weight: 50.8 kg (112 lb)  Body mass index is 19.84 kg/m².     Physical Exam  Constitutional:       Appearance: She is well-developed. She is ill-appearing.   HENT:      Head: Normocephalic and atraumatic.   Eyes:      General: No scleral icterus.     Extraocular Movements: Extraocular movements intact.      Pupils: Pupils are equal, round, and reactive to light.   Neck:      Vascular: No JVD.   Cardiovascular:      Rate and Rhythm: Normal rate and regular rhythm.      Heart sounds: No murmur heard.    No friction rub. No gallop.   Pulmonary:      Effort: Pulmonary effort is normal. No respiratory distress.      Breath sounds: Normal breath sounds. No wheezing.   Abdominal:      General: Bowel sounds are normal. There is no distension.      Palpations: Abdomen is soft.      Tenderness: There is no abdominal tenderness. There is no guarding or rebound.   Musculoskeletal:         General: No deformity. Normal range of motion.      Cervical back: Neck supple.   Lymphadenopathy:      Cervical: No cervical adenopathy.   Skin:     General: Skin is warm and dry.      Capillary Refill: Capillary refill takes less than 2 seconds.      Findings: No erythema or rash.   Neurological:      Mental Status: She is alert and oriented to person, place, and time.      Cranial Nerves: No cranial nerve deficit.      Sensory: No sensory deficit.   Psychiatric:         Mood and Affect: Affect is flat.         Behavior: Behavior is withdrawn.      Comments: Did not actively participate in the conversation, but occasionally interjected thoughts- some appropriate and some tangential             CRANIAL NERVES     CN III, IV, VI   Pupils are  equal, round, and reactive to light.     Significant Labs: A1C:   Recent Labs   Lab 06/02/23  1839   HGBA1C 4.9     CBC:   Recent Labs   Lab 06/02/23 0103   WBC 2.72*   HGB 10.0*   HCT 32.4*        CMP:   Recent Labs   Lab 06/02/23  0103      K 4.8      CO2 21*   GLU 99   BUN 28*   CREATININE 1.7*   CALCIUM 9.6   PROT 6.1   ALBUMIN 3.5   BILITOT 0.3   ALKPHOS 69   AST 18   ALT 8*   ANIONGAP 11     Coagulation: No results for input(s): PT, INR, APTT in the last 48 hours.  Lactic Acid: No results for input(s): LACTATE in the last 48 hours.  Lipid Panel: No results for input(s): CHOL, HDL, LDLCALC, TRIG, CHOLHDL in the last 48 hours.  POCT Glucose: No results for input(s): POCTGLUCOSE in the last 48 hours.  Troponin:   Recent Labs   Lab 06/02/23 0103 06/02/23  0618   TROPONINI 0.018 0.016     TSH:   Recent Labs   Lab 06/02/23  1839   TSH 0.225*     Urine Studies:   Recent Labs   Lab 06/02/23  0500   COLORU Yellow   APPEARANCEUA Clear   PHUR 6.0   SPECGRAV 1.015   PROTEINUA 1+*   GLUCUA 1+*   KETONESU Trace*   BILIRUBINUA Negative   OCCULTUA 2+*   NITRITE Negative   LEUKOCYTESUR Negative   RBCUA 9*   WBCUA 2   BACTERIA Rare   SQUAMEPITHEL 6   HYALINECASTS 1       Significant Imaging: I have reviewed all pertinent imaging results/findings within the past 24 hours.

## 2023-06-03 NOTE — ASSESSMENT & PLAN NOTE
Labs reviewed- Renal function/electrolytes with Estimated Creatinine Clearance: 34.1 mL/min (based on SCr of 1.3 mg/dL). according to latest data. Monitor urine output and serial BMP and adjust therapy as needed. Avoid nephrotoxins and renally dose meds for GFR listed above.     · Check urine electrolytes.  · If no improvement after hydration, will plan for renal ultrasound.    6/3- cr 1.7-> 1.3

## 2023-06-03 NOTE — ASSESSMENT & PLAN NOTE
68 y/o F with sellar mass no acute vision changes normotensive at this time so do not suspect adrenal source of transient hypotension and fall which brought her to hospital.     pituitary labs ordered.   Consider optho for baseline vision eval.   No urgent nsgy intervention.  Further medical work of fall   prior to consideration of transphenoidal.   Would need medical clearance and risk eval/optimization prior to surgical consideration.

## 2023-06-03 NOTE — HPI
66 y/o F with historyon multile myeloma with recent vasovagal fall and recent history of sepsis with known sellar lesion presenting after a vasovagal episode. She has had a prior fusion with Dr. Andres but denies any newfound weakness back or neck pain. NSGY was consulted due to known sellar lesion. She denies newfound vision changes. She denies any functional changes such as change in head shape or shoe size.    She endorses recent night sweats.  Outside hospital was managing this lesion conservatively.

## 2023-06-03 NOTE — ASSESSMENT & PLAN NOTE
Holding anti-hypertensives due to syncopal event and orthostasis.  Continue to monitor BP.  6/3- /93

## 2023-06-03 NOTE — NURSING
Nurses Note -- 4 Eyes      6/2/2023   9:56 PM      Skin assessed during: Admit      [x] No Altered Skin Integrity Present    [x]Prevention Measures Documented      [] Yes- Altered Skin Integrity Present or Discovered   [] LDA Added if Not in Epic (Describe Wound)   [] New Altered Skin Integrity was Present on Admit and Documented in LDA   [] Wound Image Taken    Wound Care Consulted? No    Attending Nurse:  Corazon Delgado RN     Second RN/Staff Member:  Idalmis Ross RN

## 2023-06-03 NOTE — ASSESSMENT & PLAN NOTE
Rapidly growing mass noted on MRI.  Consulting neurosurgery for further evaluation.  Appreciate NS recs  Ophthalmology consulted

## 2023-06-03 NOTE — SUBJECTIVE & OBJECTIVE
Interval History: see above    Review of Systems   Constitutional:  Positive for activity change and appetite change.   HENT:  Negative for trouble swallowing.    Respiratory:  Negative for shortness of breath.    Cardiovascular:  Negative for chest pain and leg swelling.   Gastrointestinal:  Negative for constipation, diarrhea, nausea and vomiting.   Genitourinary:  Negative for difficulty urinating.   Neurological:  Negative for numbness.   Psychiatric/Behavioral:  Negative for behavioral problems.    Objective:     Vital Signs (Most Recent):  Temp: 97.9 °F (36.6 °C) (06/03/23 0759)  Pulse: 89 (06/03/23 0838)  Resp: 16 (06/03/23 0838)  BP: (!) 149/93 (06/03/23 0759)  SpO2: 98 % (06/03/23 0839) Vital Signs (24h Range):  Temp:  [96.5 °F (35.8 °C)-98.3 °F (36.8 °C)] 97.9 °F (36.6 °C)  Pulse:  [60-92] 89  Resp:  [15-20] 16  SpO2:  [98 %-100 %] 98 %  BP: (133-182)/() 149/93     Weight: 50.8 kg (112 lb)  Body mass index is 19.84 kg/m².  No intake or output data in the 24 hours ending 06/03/23 0842      Physical Exam  Constitutional:       General: She is not in acute distress.     Appearance: Normal appearance.   HENT:      Head: Normocephalic and atraumatic.      Nose: Nose normal.      Mouth/Throat:      Mouth: Mucous membranes are moist.   Eyes:      General: No scleral icterus.     Extraocular Movements: Extraocular movements intact.      Pupils: Pupils are equal, round, and reactive to light.   Cardiovascular:      Rate and Rhythm: Normal rate and regular rhythm.      Pulses: Normal pulses.      Heart sounds: Normal heart sounds.   Pulmonary:      Effort: Pulmonary effort is normal.      Breath sounds: Normal breath sounds. No wheezing or rhonchi.   Chest:      Chest wall: No tenderness.   Abdominal:      General: Abdomen is flat. Bowel sounds are normal. There is no distension.      Palpations: Abdomen is soft.      Tenderness: There is no abdominal tenderness. There is no right CVA tenderness, left CVA  tenderness, guarding or rebound.   Musculoskeletal:         General: No swelling, tenderness, deformity or signs of injury. Normal range of motion.      Cervical back: Normal range of motion and neck supple. No rigidity or tenderness.   Skin:     General: Skin is warm and dry.      Coloration: Skin is not jaundiced or pale.      Findings: No erythema or rash.   Neurological:      General: No focal deficit present.      Mental Status: She is alert and oriented to person, place, and time. Mental status is at baseline.      Cranial Nerves: No cranial nerve deficit.      Motor: No weakness.   Psychiatric:         Mood and Affect: Mood normal.           Significant Labs: All pertinent labs within the past 24 hours have been reviewed.  CBC:   Recent Labs   Lab 06/02/23  0103 06/03/23  0256   WBC 2.72* 2.46*   HGB 10.0* 10.4*   HCT 32.4* 33.6*    177     CMP:   Recent Labs   Lab 06/02/23  0103 06/03/23  0256    138   K 4.8 4.6    106   CO2 21* 22*   GLU 99 104   BUN 28* 22   CREATININE 1.7* 1.3   CALCIUM 9.6 9.1   PROT 6.1 5.5*   ALBUMIN 3.5 3.4*   BILITOT 0.3 0.3   ALKPHOS 69 66   AST 18 15   ALT 8* 6*   ANIONGAP 11 10       Significant Imaging: I have reviewed all pertinent imaging results/findings within the past 24 hours.

## 2023-06-03 NOTE — HOSPITAL COURSE
"6/3- Per NS: pituitary labs ordered.  optho consulted for baseline vision eval. No urgent nsgy intervention. Further medical work of fall prior to consideration of transphenoidal. Would need medical clearance and risk eval/optimization prior to surgical consideration.   + OH per HR, 108-> 126 with "head swimming" upon standing.  Consult ENDOCRINE once surgery is scheduled.  If not having surgery pt can follow up at Endocrine pituitary clinic.  Cortisol nl.     6/4- NSY wants endocrine consult. Her son is declining surgery. He wants to f/u w pt' regular oncologist and neurology. I reviewed available endocrine la. Thus far, all NL, except TSH which is low. Pt eating, walking in the room. Denes dizziness. I educated him about Orthostatic Hypotesnion. There is a need to monitor her po intake.  He want to take her home. Will dc to home.   "

## 2023-06-03 NOTE — ASSESSMENT & PLAN NOTE
Monitor QT.    Psych meds:  Continue home Remeron 30 mg HS  Continue home Ativan 0.5 mg BID  RESTART home Zyprexa 2.5 mg HS

## 2023-06-03 NOTE — SUBJECTIVE & OBJECTIVE
Interval History: no acute overnight events     Medications:  Continuous Infusions:  Scheduled Meds:   ascorbic acid (vitamin C)  1,000 mg Oral Daily    fluticasone furoate-vilanteroL  1 puff Inhalation Daily    LORazepam  0.5 mg Oral BID    mirtazapine  30 mg Oral QHS    multivitamin  1 tablet Oral Daily    polyethylene glycol  17 g Oral Daily    tamsulosin  0.4 mg Oral Daily    vitamin D  1,000 Units Oral Daily     PRN Meds:acetaminophen, calcium carbonate, capsaicin, dextrose 10%, dextrose 10%, dextrose, dextrose, glucagon (human recombinant), hyoscyamine, melatonin, metoclopramide HCl, naloxone, OLANZapine, oxyCODONE, prochlorperazine, senna-docusate 8.6-50 mg, sodium chloride 0.9%     Review of Systems  Objective:     Weight: 50.8 kg (112 lb)  Body mass index is 19.84 kg/m².  Vital Signs (Most Recent):  Temp: 97.9 °F (36.6 °C) (06/03/23 0759)  Pulse: (!) 126 (06/03/23 0945)  Resp: 16 (06/03/23 0838)  BP: 129/82 (06/03/23 0945)  SpO2: 100 % (06/03/23 0945) Vital Signs (24h Range):  Temp:  [96.5 °F (35.8 °C)-98.3 °F (36.8 °C)] 97.9 °F (36.6 °C)  Pulse:  [] 126  Resp:  [15-20] 16  SpO2:  [98 %-100 %] 100 %  BP: (127-166)/() 129/82     Date 06/03/23 0700 - 06/04/23 0659   Shift 1422-1404 7129-3696 7264-9040 24 Hour Total   INTAKE   P.O. 360   360   Shift Total(mL/kg) 360(7.1)   360(7.1)   OUTPUT   Shift Total(mL/kg)       Weight (kg) 50.8 50.8 50.8 50.8                               Physical Exam     Neurosurgery Physical Exam    AOX3 maew perrl eomi  VOF full to finger confrontation      Significant Labs:  Recent Labs   Lab 06/02/23  0103 06/03/23  0256   GLU 99 104    138   K 4.8 4.6    106   CO2 21* 22*   BUN 28* 22   CREATININE 1.7* 1.3   CALCIUM 9.6 9.1   MG  --  2.1     Recent Labs   Lab 06/02/23  0103 06/03/23  0256   WBC 2.72* 2.46*   HGB 10.0* 10.4*   HCT 32.4* 33.6*    177     No results for input(s): LABPT, INR, APTT in the last 48 hours.  Microbiology Results (last 7 days)        ** No results found for the last 168 hours. **          All pertinent labs from the last 24 hours have been reviewed.    Significant Diagnostics:  I have reviewed all pertinent imaging results/findings within the past 24 hours.
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
## 2023-06-03 NOTE — PROGRESS NOTES
[facility]  Neurosurgery  Progress Note    Subjective:     History of Present Illness: 68 y/o F with historyon multile myeloma with recent vasovagal fall and recent history of sepsis with known sellar lesion presenting after a vasovagal episode. She has had a prior fusion with Dr. Andres but denies any newfound weakness back or neck pain. NSGY was consulted due to known sellar lesion. She denies newfound vision changes. She denies any functional changes such as change in head shape or shoe size.    She endorses recent night sweats.  Outside hospital was managing this lesion conservatively.         Past Medical History:   Diagnosis Date    Anxiety     Asthma     Bronchitis     COPD (chronic obstructive pulmonary disease)     Depression     GERD (gastroesophageal reflux disease)     Hallucination     History of psychiatric hospitalization     Hypertension     Neck pain     Polyneuropathy in diseases classified elsewhere 4/12/2021    Schizophrenia     Sleep difficulties          Review of Systems   Constitutional: Positive for diaphoresis.   HENT: Negative for hearing loss.    Eyes: Negative for discharge.   Respiratory: Negative for hemoptysis.    Cardiovascular: Negative for chest pain.   Musculoskeletal: Positive for falls.   Neurological: Positive for dizziness.       Post-Op Info:  * No surgery found *       No new subjective & objective note has been filed under this hospital service since the last note was generated.    Assessment/Plan:     Mass of sphenoid sinus  68 y/o F with sellar mass no acute vision changes normotensive at this time so do not suspect adrenal source of transient hypotension and fall which brought her to hospital.     pituitary labs ordered.   Consider optho for baseline vision eval.   No urgent nsgy intervention.  Further medical work of fall   prior to consideration of transphenoidal.   Would need medical clearance and risk eval/optimization prior to surgical  consideration.               Abner Youngblood MD  Neurosurgery  [facility]
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
## 2023-06-03 NOTE — ASSESSMENT & PLAN NOTE
68 y/o F with sellar mass no acute vision changes normotensive at this time so do not suspect adrenal source of transient hypotension and fall which brought her to hospital.     pituitary labs ordered.   Consider optho for baseline vision eval.   No urgent nsgy intervention.  Further medical work up of fall,  prior to consideration of transphenoidal.   Would need medical clearance and risk eval/optimization prior to surgical consideration.     Recommend endocrine consult.  May consider biopsy in near future with ENT support.

## 2023-06-03 NOTE — PLAN OF CARE
[facility] - Med Surg  Initial Discharge Assessment       Primary Care Provider: He Hoyt MD    Admission Diagnosis: Lightheadedness [R42]  Fatigue [R53.83]  Orthostasis [I95.1]  History of multiple myeloma [Z85.79]  GLENIS (acute kidney injury) [N17.9]  Chest pain [R07.9]  AMS (altered mental status) [R41.82]    Admission Date: 6/1/2023  Expected Discharge Date: 6/5/2023    Transition of Care Barriers: None    Payor: [employer] MEDICARE / Plan: [employer] / Product Type: Medicare Advantage /     Extended Emergency Contact Information  Primary Emergency Contact: Tiff Mondragon   [facility]  Home Phone: [phone]  Mobile Phone: [phone]  Relation: Sister  Secondary Emergency Contact: Saud Zamorano   [facility]  Home Phone: [phone]  Mobile Phone: [phone]  Relation: Son    Discharge Plan A: Home Health  Discharge Plan B: Home      [facility] [id number] [place] [address] [place] - [address] [facility]  [address]  [facility] [address]  Phone: [phone] Fax: [phone]    [id number] - [place] [address] [facility]  AT [facility] [address] [place] [address] [place] [address]  Phone: [phone] Fax: [phone]    [facility] #[id number] - [place]  [address] [facility] AT [facility] & [address]  [place] [address]  Phone: [phone] Fax: [phone]      Initial Assessment (most recent)       Adult Discharge Assessment - 06/03/23 1351          Discharge Assessment    Assessment Type Discharge Planning Assessment     Confirmed/corrected address, phone number and insurance Yes     Confirmed Demographics Correct on Facesheet     Source of Information patient     When was your last doctors appointment? 03/13/23     Does patient/caregiver understand observation status Yes     Communicated EMELY with patient/caregiver Yes     Reason For Admission Dizzy      People in Home alone     Facility Arrived From: Home     Do you expect to return to your current living situation? Yes     Do you have help at home or someone to help you manage your care at home? Yes     Who are your caregiver(s) and their phone number(s)? Sister Mary Mondragon     Prior to hospitilization cognitive status: Alert/Oriented;No Deficits     Current cognitive status: No Deficits;Alert/Oriented     Walking or Climbing Stairs ambulation difficulty, requires equipment     Home Accessibility wheelchair accessible     Home Layout Able to live on 1st floor     Equipment Currently Used at Home cane, straight;walker, rolling     Readmission within 30 days? No     Patient currently being followed by outpatient case management? No     Do you currently have service(s) that help you manage your care at home? Yes     Name and Contact number of agency Unsure of the HH name     Is the pt/caregiver preference to resume services with current agency Yes     Do you take prescription medications? Yes     Do you have prescription coverage? Yes     Do you have any problems affording any of your prescribed medications? No     Is the patient taking medications as prescribed? yes     Who is going to help you get home at discharge? Family, may need ride if family can't assist     How do you get to doctors appointments? car, drives self     Are you on dialysis? No     Do you take coumadin? No     Discharge Plan A Home Health     Discharge Plan B Home     DME Needed Upon Discharge  bedside commode     Discharge Plan discussed with: Patient     Transition of Care Barriers None        Physical Activity    On average, how many days per week do you engage in moderate to strenuous exercise (like a brisk walk)? 0 days     On average, how many minutes do you engage in exercise at this level? 0 min        Financial Resource Strain    How hard is it for you to pay for the very basics like food, housing, medical care, and heating? Somewhat  hard        Housing Stability    In the last 12 months, was there a time when you were not able to pay the mortgage or rent on time? No     In the last 12 months, how many places have you lived? 1     In the last 12 months, was there a time when you did not have a steady place to sleep or slept in a shelter (including now)? No        Transportation Needs    In the past 12 months, has lack of transportation kept you from medical appointments or from getting medications? No     In the past 12 months, has lack of transportation kept you from meetings, work, or from getting things needed for daily living? No        Food Insecurity    Within the past 12 months, you worried that your food would run out before you got the money to buy more. Sometimes true     Within the past 12 months, the food you bought just didn't last and you didn't have money to get more. Never true        Stress    Do you feel stress - tense, restless, nervous, or anxious, or unable to sleep at night because your mind is troubled all the time - these days? To some extent        Social Connections    In a typical week, how many times do you talk on the phone with family, friends, or neighbors? Once a week     How often do you get together with friends or relatives? Once a week     How often do you attend Quaker or Church services? Patient refused     Do you belong to any clubs or organizations such as Quaker groups, unions, fraternal or athletic groups, or school groups? Patient refused     How often do you attend meetings of the clubs or organizations you belong to? Patient refused     Are you , , , , never , or living with a partner?         Alcohol Use    Q1: How often do you have a drink containing alcohol? Patient refused     Q2: How many drinks containing alcohol do you have on a typical day when you are drinking? Patient refused     Q3: How often do you have six or more drinks on one occasion?  Patient refused

## 2023-06-03 NOTE — NURSING
Orthostatic blood pressure completed   Patient tolerated well.   Free from injury  Personal items and call bell within reach   Bed in lowest position and locked.

## 2023-06-04 VITALS
BODY MASS INDEX: 19.84 KG/M2 | HEART RATE: 89 BPM | WEIGHT: 112 LBS | TEMPERATURE: 96 F | HEIGHT: 63 IN | RESPIRATION RATE: 20 BRPM | DIASTOLIC BLOOD PRESSURE: 88 MMHG | SYSTOLIC BLOOD PRESSURE: 148 MMHG | OXYGEN SATURATION: 99 %

## 2023-06-04 PROBLEM — R55 VASOVAGAL SYNCOPE: Status: RESOLVED | Noted: 2023-02-22 | Resolved: 2023-06-04

## 2023-06-04 PROBLEM — Z01.818 PREOP EXAMINATION: Status: ACTIVE | Noted: 2023-06-04

## 2023-06-04 LAB
ALBUMIN SERPL BCP-MCNC: 2.9 G/DL (ref 3.5–5.2)
ALP SERPL-CCNC: 56 U/L (ref 55–135)
ALT SERPL W/O P-5'-P-CCNC: 7 U/L (ref 10–44)
ANION GAP SERPL CALC-SCNC: 8 MMOL/L (ref 8–16)
AST SERPL-CCNC: 15 U/L (ref 10–40)
BASOPHILS # BLD AUTO: 0.01 K/UL (ref 0–0.2)
BASOPHILS NFR BLD: 0.4 % (ref 0–1.9)
BILIRUB SERPL-MCNC: 0.3 MG/DL (ref 0.1–1)
BUN SERPL-MCNC: 26 MG/DL (ref 8–23)
CALCIUM SERPL-MCNC: 8.8 MG/DL (ref 8.7–10.5)
CHLORIDE SERPL-SCNC: 109 MMOL/L (ref 95–110)
CO2 SERPL-SCNC: 24 MMOL/L (ref 23–29)
CREAT SERPL-MCNC: 1.3 MG/DL (ref 0.5–1.4)
DIFFERENTIAL METHOD: ABNORMAL
EOSINOPHIL # BLD AUTO: 0 K/UL (ref 0–0.5)
EOSINOPHIL NFR BLD: 0.4 % (ref 0–8)
ERYTHROCYTE [DISTWIDTH] IN BLOOD BY AUTOMATED COUNT: 14.8 % (ref 11.5–14.5)
EST. GFR  (NO RACE VARIABLE): 45.4 ML/MIN/1.73 M^2
GLUCOSE SERPL-MCNC: 88 MG/DL (ref 70–110)
HCT VFR BLD AUTO: 28.7 % (ref 37–48.5)
HGB BLD-MCNC: 8.7 G/DL (ref 12–16)
IMM GRANULOCYTES # BLD AUTO: 0.01 K/UL (ref 0–0.04)
IMM GRANULOCYTES NFR BLD AUTO: 0.4 % (ref 0–0.5)
LYMPHOCYTES # BLD AUTO: 0.4 K/UL (ref 1–4.8)
LYMPHOCYTES NFR BLD: 15.7 % (ref 18–48)
MAGNESIUM SERPL-MCNC: 2 MG/DL (ref 1.6–2.6)
MCH RBC QN AUTO: 27.8 PG (ref 27–31)
MCHC RBC AUTO-ENTMCNC: 30.3 G/DL (ref 32–36)
MCV RBC AUTO: 92 FL (ref 82–98)
MONOCYTES # BLD AUTO: 0.3 K/UL (ref 0.3–1)
MONOCYTES NFR BLD: 10.1 % (ref 4–15)
NEUTROPHILS # BLD AUTO: 1.8 K/UL (ref 1.8–7.7)
NEUTROPHILS NFR BLD: 73 % (ref 38–73)
NRBC BLD-RTO: 0 /100 WBC
PHOSPHATE SERPL-MCNC: 2.4 MG/DL (ref 2.7–4.5)
PLATELET # BLD AUTO: 163 K/UL (ref 150–450)
PMV BLD AUTO: 11.3 FL (ref 9.2–12.9)
POTASSIUM SERPL-SCNC: 4.1 MMOL/L (ref 3.5–5.1)
PROT SERPL-MCNC: 4.8 G/DL (ref 6–8.4)
RBC # BLD AUTO: 3.13 M/UL (ref 4–5.4)
SODIUM SERPL-SCNC: 141 MMOL/L (ref 136–145)
WBC # BLD AUTO: 2.48 K/UL (ref 3.9–12.7)

## 2023-06-04 PROCEDURE — 84100 ASSAY OF PHOSPHORUS: CPT | Performed by: INTERNAL MEDICINE

## 2023-06-04 PROCEDURE — 80053 COMPREHEN METABOLIC PANEL: CPT | Performed by: INTERNAL MEDICINE

## 2023-06-04 PROCEDURE — 99239 PR HOSPITAL DISCHARGE DAY,>30 MIN: ICD-10-PCS | Mod: ,,, | Performed by: HOSPITALIST

## 2023-06-04 PROCEDURE — G0378 HOSPITAL OBSERVATION PER HR: HCPCS

## 2023-06-04 PROCEDURE — 36415 COLL VENOUS BLD VENIPUNCTURE: CPT | Performed by: INTERNAL MEDICINE

## 2023-06-04 PROCEDURE — 99239 HOSP IP/OBS DSCHRG MGMT >30: CPT | Mod: ,,, | Performed by: HOSPITALIST

## 2023-06-04 PROCEDURE — 25000003 PHARM REV CODE 250: Performed by: INTERNAL MEDICINE

## 2023-06-04 PROCEDURE — 99232 PR SUBSEQUENT HOSPITAL CARE,LEVL II: ICD-10-PCS | Mod: GC,,, | Performed by: STUDENT IN AN ORGANIZED HEALTH CARE EDUCATION/TRAINING PROGRAM

## 2023-06-04 PROCEDURE — 85025 COMPLETE CBC W/AUTO DIFF WBC: CPT | Performed by: INTERNAL MEDICINE

## 2023-06-04 PROCEDURE — 83735 ASSAY OF MAGNESIUM: CPT | Performed by: INTERNAL MEDICINE

## 2023-06-04 PROCEDURE — 99232 SBSQ HOSP IP/OBS MODERATE 35: CPT | Mod: GC,,, | Performed by: STUDENT IN AN ORGANIZED HEALTH CARE EDUCATION/TRAINING PROGRAM

## 2023-06-04 RX ORDER — AMOXICILLIN 250 MG
1 CAPSULE ORAL 2 TIMES DAILY PRN
Qty: 60 TABLET | Refills: 0 | Status: SHIPPED | OUTPATIENT
Start: 2023-06-04 | End: 2023-10-30

## 2023-06-04 RX ORDER — TAMSULOSIN HYDROCHLORIDE 0.4 MG/1
0.4 CAPSULE ORAL DAILY
Qty: 30 CAPSULE | Refills: 11 | Status: ON HOLD | OUTPATIENT
Start: 2023-06-05 | End: 2023-10-02

## 2023-06-04 RX ORDER — OLANZAPINE 2.5 MG/1
2.5 TABLET ORAL 2 TIMES DAILY
Qty: 30 TABLET | Refills: 1 | Status: SHIPPED | OUTPATIENT
Start: 2023-06-04 | End: 2023-06-04 | Stop reason: SDUPTHER

## 2023-06-04 RX ORDER — OLANZAPINE 2.5 MG/1
2.5 TABLET ORAL NIGHTLY
Qty: 30 TABLET | Refills: 1 | Status: ON HOLD | OUTPATIENT
Start: 2023-06-04 | End: 2023-10-02

## 2023-06-04 RX ADMIN — TAMSULOSIN HYDROCHLORIDE 0.4 MG: 0.4 CAPSULE ORAL at 12:06

## 2023-06-04 RX ADMIN — LORAZEPAM 0.5 MG: 0.5 TABLET ORAL at 12:06

## 2023-06-04 RX ADMIN — POLYETHYLENE GLYCOL 3350 17 G: 17 POWDER, FOR SOLUTION ORAL at 12:06

## 2023-06-04 RX ADMIN — CHOLECALCIFEROL TAB 25 MCG (1000 UNIT) 1000 UNITS: 25 TAB at 12:06

## 2023-06-04 RX ADMIN — OXYCODONE HYDROCHLORIDE AND ACETAMINOPHEN 1000 MG: 500 TABLET ORAL at 12:06

## 2023-06-04 RX ADMIN — THERA TABS 1 TABLET: TAB at 12:06

## 2023-06-04 NOTE — ASSESSMENT & PLAN NOTE
68 y/o F with sellar mass no acute vision changes normotensive at this time so do not suspect adrenal source of transient hypotension and fall which brought her to hospital.     pituitary labs ordered.   Appreciate optho eval noting diminished right vision.    No urgent nsgy intervention but will consider ENT guided support for near future intervention given.   Further medical work up of fall,  prior to consideration of transphenoidal.   Would need medical clearance and risk eval/optimization prior to surgical consideration.     Recommend endocrine consult.

## 2023-06-04 NOTE — ASSESSMENT & PLAN NOTE
Pre Operative Evaluation: for resection of mass in sella. Suspect lesion is Multiple Myeloma, and not pituitary in origin. Labs ordered and Endocrine consulted.  DIAGNOSTIC STUDIES:  CXR - No acute findings  EKG/Tele -  NSR w sinus arrhythmia, HR 68, on 6/2/23  ECHO:   The left ventricle is normal in size with concentric hypertrophy and normal systolic function.   The estimated ejection fraction is 70%.   Normal left ventricular diastolic function.   Normal right ventricular size with normal right ventricular systolic function.   The estimated PA systolic pressure is 28 mm Hg.   Low central venous pressure.  1. CARDIAC EVALUATION:   A. Ischemic Cardiac Risk. Risk factors are  HTN,   B. Ventricular function. 70% (2/2023)  C. Valvular heart disease. No  D. Arrhythmias. None  E. Beta blockade. None  F. Hypertension - Yes  2. PULMONARY EVALUATION: has COPD  3. HEMATOLOGIC EVALUATION  A. Bleeding Risk. low  - Goal Hemoglobin > 7  B. VTE Prophylaxis/Thrombotic risk and recommendations.  Post on lovenox SQ    4. ENDOCRINE EVALUATION:  A. Diabetes mellitus. NO  B. Adrenal insufficiency risk. Labs ordered  C. Thyroid function NL    Endocrine consulted to review pituitary function before surger    NSQIP risk: serious complication 18%, any complication 23%, Cardiac complication 0%, Readmission. 15%, dc to NH risk 45%. prdicted LOS 8 days.    Assessment:  No heart disease, Paranoid schizophrenia, Hs of metastatc multiple myeloma     Pt is medically stable for surgery. Has low cardiovascular risk. Will likely need supportive care in post -operative setting.  She has orthostatic hypotension due to lack of oral intake which is resolving. Will need close monitoring on BP.  Given this is a pituitary resection, ndocrine consulted for pre and post op recommendations.  She will need serial EKGs for QTC monitoting and to keep psych meds stable as much as possible. Consider oncology evaluation to see if there are other options.

## 2023-06-04 NOTE — NURSING
"Surgery consult visit done at bedside with Dr. Rider and christiane Villavicencio regarding surgery of tumor affecting pituitary gland.  Christiane Villavicencio states that the patient "has other pressing conditions" and declines the surgery  "
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
## 2023-06-04 NOTE — PLAN OF CARE
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     Patient is a 67 year old female with history of multiple myeloma, recent vasovagal syncope and with known sellar lesion presented after a vasovagal episode. She has had a prior fusion with Dr. Andres but denies any new found weakness back or neck pain. She denies newfound vision changes. She denies any functional changes such as change in head shape or shoe size.     Reviewed blood work showing cortisol of 7.40 at 9.44 pm on 6.22.23, normal fT4, normal glucose, normal sodium and normal potassium. 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 The most recent blood pressure is 148/88 done at 1203 pm today.               MRI Brain April 2023:              MRI Brain June 2nd 2023:       Mild heterogeneously  enhancing mass within the sella and sphenoid sinus measuring up to 3.0 x 2.7 x 3.3 cm AP by TV by CC.  The mass appears to be predominantly centered within the sphenoid sinus, but there is intracranial extension through the sellar floor into the anterior sella and right cavernous sinus with suprasellar extension causing mild leftward and posterior deviation of the infundibulum.  Mass has diffuse diffusion restriction.  The mass abuts the anterior optic chiasm.  Normal pituitary tissue can be seen on the sagittal postcontrast images.      Neurosurgery evaluated the patient and as per documentation, Son Saud states that the patient "has other pressing conditions" and declines surgery at this time.  Patient also seen by ophthalmology noting diminished right vision.        Based on review of the clinical chart, doubt that currently there is an adrenal cause of transient hypotension. The patient has hypogonadotrophic hypogonadism.  Considering the size of pituitary mass, there is always a risk of adrenal insufficiency in the future even if she is not " insufficient right now.   Recommend discharging on Hydrocortisone 20 mg in the morning and 10 mg in the evening. Can repeat check for 8 am Cortisol at pituitary clinic. Since patient not pursuing surgery, can follow up outpatient in pituitary clinic.

## 2023-06-04 NOTE — ASSESSMENT & PLAN NOTE
Acute on chronic  Seems to be improving w IVFs  Endocrine labs ordered    Likely 2/2 orthostatic hypotension-- diastolic dropped 15mmHg from lying to standing.  Patient's so reports that the patient is frequently dehydrated and has even had to skip chemo for the last month as the nurses are 'unable to find a vein'.     Patient received a liter of LR in the ED.   Continue gentle hydration over the next 24 hours.  Encourage po intake    6/3- repeat postural BPs - still + per HR  CT and MRI head  reveal  mass within the sphenoid sinus and sella

## 2023-06-04 NOTE — NURSING
Spoke with KARRIE Zamora, on call,and she said we can leave the PIV out since we can't force the pt to be compliant with putting a new one in, after she refused it. PRN Zyprexa is going to be given to calm pt's agitation.

## 2023-06-04 NOTE — PROGRESS NOTES
"CONSULTATION LIAISON PSYCHIATRY PROGRESS NOTE    Patient Name: Janie Zamorano  MRN: [id number]  Patient Class: OP- Observation  Admission Date: 6/1/2023  Attending Physician: Charity Mcgee MD      SUBJECTIVE:   Janie Zamorano is a 67 y.o. female with past psychiatric history of encephalopathy MDD with catatonia (requiring ECT), neurocognitive disorder, intracranial mass & past pertinent medical history of multiple myeloma, GLENIS, lung mass and intracranial mass presents to the ED/admitted to the hospital for Vasovagal syncope, fatigue and emesis.   The patient reportedly slumped over at home into a chair. Her son helped her to the ground where she vomited and had an episode of urinary incontinence. She was unresponsive for several minutes.  EMS called and found that her blood pressure was low.     A similar event happened in late February.  She was admitted after a syncopal event with concern for sepsis.  At that time, there was  concern that her sinuses could be the source.  Her blood pressure medications were discontinued upon discharge to prevent further episodes of syncope.      Psychiatry consulted for "depression, anxiety, and management of schizophrenia"     Per chart review, pt received PRN Zyprexa 10 mg @ 2254 for agitation. Was not restarted on home dose of 2.5 nightly.     Today, patient seen lying in bed, calm and in NAD. Drowsy and minimally able to engage in interview. Mumbles her name and that she is in the hospital. States that her energy is better today, though objective appears very tired. Does not expand on any thoughts or ideas. Denies SI/HI/AVH.        OBJECTIVE:    Mental Status Exam:  General Appearance: unremarkable, appears stated age  Behavior: normal, cooperative, drowsy and minimally engaged in interview   Involuntary Movements and Motor Activity: no abnormal involuntary movements noted; no tics, no tremors, no akathisia, no dystonia, no evidence of tardive dyskinesia; no " psychomotor agitation or retardation  Gait and Station: unable to assess - patient lying down or seated  Speech and Language: mumbling  Mood: neutral   Affect: blunted  Thought Process and Associations:  paucity of thought  Thought Content and Perceptions:: no suicidal ideation, no homicidal ideation  Sensorium and Orientation: drowsy, oriented to person and place  Recent and Remote Memory: recent memory impaired, remote memory impaired  Attention and Concentration: impaired  Fund of Knowledge: grossly intact  Insight: limited/partial awareness of illness  Judgment: behavior is adequate/appropriate to circumstances    CAM ICU positive? Unable to fully assess given patient continued to fall back asleep       ASSESSMENT & RECOMMENDATIONS   Major Depressive Disorder, with catatonic and psychotic features  Neurocognitive Deficit     MDD  PSYCH MEDICATIONS  Continue home Remeron 30 mg HS  Continue home Ativan 0.5 mg BID  RESTART home Zyprexa 2.5 mg QHS  Can utilize Vistaril 25 mg q 8 PRN for anxiety or agitation      RISK ASSESSMENT  NO NEED FOR PEC patient NOT in any imminent danger of hurting self or others and not gravely disabled.      FOLLOW UP  Will follow up while in house     DISPOSITION - once medically cleared:   Defer to medical team     Please contact ON CALL psychiatry service (24/7) for any acute issues that may arise.     Discussed with Dr Holcomb.     Dr. Stephania Hernandez   Psychiatry  [facility]  6/4/2023 2:28 PM        --------------------------------------------------------------------------------------------------------------------------------------------------------------------------------------------------------------------------------------    CONTINUED OBJECTIVE clinical data & findings reviewed and noted for above decision making    Current Medications:   Scheduled Meds:    ascorbic acid (vitamin C)  1,000 mg Oral Daily    fluticasone furoate-vilanteroL  1 puff Inhalation Daily     LORazepam  0.5 mg Oral BID    mirtazapine  30 mg Oral QHS    multivitamin  1 tablet Oral Daily    polyethylene glycol  17 g Oral Daily    tamsulosin  0.4 mg Oral Daily    vitamin D  1,000 Units Oral Daily     PRN Meds: acetaminophen, calcium carbonate, capsaicin, dextrose 10%, dextrose 10%, dextrose, dextrose, glucagon (human recombinant), hyoscyamine, melatonin, metoclopramide HCl, naloxone, OLANZapine, oxyCODONE, prochlorperazine, senna-docusate 8.6-50 mg, sodium chloride 0.9%    Allergies:   Review of patient's allergies indicates:   Allergen Reactions    Iodine Hives    Shellfish containing products Itching    Ondansetron hcl Nausea Only       Vitals  Vitals:    06/04/23 1203   BP: (!) 148/88   Pulse: 89   Resp: 20   Temp: 96.1 °F (35.6 °C)       Labs/Imaging/Studies:  Recent Results (from the past 24 hour(s))   Comprehensive Metabolic Panel (CMP)    Collection Time: 06/04/23  5:14 AM   Result Value Ref Range    Sodium 141 136 - 145 mmol/L    Potassium 4.1 3.5 - 5.1 mmol/L    Chloride 109 95 - 110 mmol/L    CO2 24 23 - 29 mmol/L    Glucose 88 70 - 110 mg/dL    BUN 26 (H) 8 - 23 mg/dL    Creatinine 1.3 0.5 - 1.4 mg/dL    Calcium 8.8 8.7 - 10.5 mg/dL    Total Protein 4.8 (L) 6.0 - 8.4 g/dL    Albumin 2.9 (L) 3.5 - 5.2 g/dL    Total Bilirubin 0.3 0.1 - 1.0 mg/dL    Alkaline Phosphatase 56 55 - 135 U/L    AST 15 10 - 40 U/L    ALT 7 (L) 10 - 44 U/L    Anion Gap 8 8 - 16 mmol/L    eGFR 45.4 (A) >60 mL/min/1.73 m^2   Magnesium    Collection Time: 06/04/23  5:14 AM   Result Value Ref Range    Magnesium 2.0 1.6 - 2.6 mg/dL   Phosphorus    Collection Time: 06/04/23  5:14 AM   Result Value Ref Range    Phosphorus 2.4 (L) 2.7 - 4.5 mg/dL   CBC with Automated Differential    Collection Time: 06/04/23  5:14 AM   Result Value Ref Range    WBC 2.48 (L) 3.90 - 12.70 K/uL    RBC 3.13 (L) 4.00 - 5.40 M/uL    Hemoglobin 8.7 (L) 12.0 - 16.0 g/dL    Hematocrit 28.7 (L) 37.0 - 48.5 %    MCV 92 82 - 98 fL    MCH 27.8 27.0 - 31.0 pg     MCHC 30.3 (L) 32.0 - 36.0 g/dL    RDW 14.8 (H) 11.5 - 14.5 %    Platelets 163 150 - 450 K/uL    MPV 11.3 9.2 - 12.9 fL    Immature Granulocytes 0.4 0.0 - 0.5 %    Gran # (ANC) 1.8 1.8 - 7.7 K/uL    Immature Grans (Abs) 0.01 0.00 - 0.04 K/uL    Lymph # 0.4 (L) 1.0 - 4.8 K/uL    Mono # 0.3 0.3 - 1.0 K/uL    Eos # 0.0 0.0 - 0.5 K/uL    Baso # 0.01 0.00 - 0.20 K/uL    nRBC 0 0 /100 WBC    Gran % 73.0 38.0 - 73.0 %    Lymph % 15.7 (L) 18.0 - 48.0 %    Mono % 10.1 4.0 - 15.0 %    Eosinophil % 0.4 0.0 - 8.0 %    Basophil % 0.4 0.0 - 1.9 %    Differential Method Automated      Imaging Results               MRI Brain W WO Contrast (Final result)  Result time 06/02/23 16:18:30      Final result by He Do MD (06/02/23 16:18:30)                   Impression:      Mass within the sphenoid sinus, sella, suprasellar cistern, and right cavernous sinus, as detailed above.  Given the patient's history of multiple myeloma, this could represent a plasmacytoma.  Other primary differential considerations include inverted papilloma, sinonasal carcinoma, lymphoma, and metastatic disease.  Further investigation and biopsy recommended.    This report was flagged in Epic as abnormal.    Electronically signed by resident: Mona Ramos  Date:    06/02/2023  Time:    15:26    Electronically signed by: He oD MD  Date:    06/02/2023  Time:    16:18               Narrative:    EXAMINATION:  MRI BRAIN W WO CONTRAST    CLINICAL HISTORY:  Presenting for weakness/presyncope and fall.  CTA showing sellar mass;    TECHNIQUE:  Multiplanar multisequence MR imaging of the brain and sella was performed before and after the administration of 3 ml Gadavist intravenous contrast.    COMPARISON:  CT head without contrast 06/02/2023.    FINDINGS:  Ventricles are normal in size.  No hydrocephalus.    The T2/FLAIR hyperintense foci in the supratentorial white matter, nonspecific and suggestive of microvascular ischemic change.  No hemorrhage,  edema or recent or remote major vascular distribution infarct.    Mild heterogeneously  enhancing mass within the sella and sphenoid sinus measuring up to 3.0 x 2.7 x 3.3 cm AP by TV by CC.  The mass appears to be predominantly centered within the sphenoid sinus, but there is intracranial extension through the sellar floor into the anterior sella and right cavernous sinus with suprasellar extension causing mild leftward and posterior deviation of the infundibulum.  Mass has diffuse diffusion restriction.  The mass abuts the anterior optic chiasm.  Normal pituitary tissue can be seen on the sagittal postcontrast images.    No extra-axial blood or fluid collections.    The T2 skull base flow voids are preserved.  Bone marrow signal intensity unremarkable.                                        CT Head Without Contrast (Final result)  Result time 06/02/23 08:45:18      Final result by He Do MD (06/02/23 08:45:18)                   Impression:      2.8 cm mass within the sphenoid sinus and sella, as above.  MRI with contrast recommended for further evaluation.    This report was flagged in Epic as abnormal.      Electronically signed by: He Do MD  Date:    06/02/2023  Time:    08:45               Narrative:    EXAMINATION:  CT HEAD WITHOUT CONTRAST    CLINICAL HISTORY:  Mental status change, unknown cause;    TECHNIQUE:  Low dose axial images were obtained through the head.  Coronal and sagittal reformations were also performed. Contrast was not administered.    COMPARISON:  CT of the head 02/21/2023    FINDINGS:  Noncontrast CT of the head demonstrates the presence of a hyperattenuating mass within the sella and sphenoid sinus measuring up to about 2.8 cm transverse by 2.8 cm AP 2 cm craniocaudal.  Is mostly within the sphenoid sinus, but there is a 2 cm intracranial portion centered within the anterior right sella and right cavernous sinus.  The lesion appears to extend through the floor of the  sella to communicate intracranially.  Differential considerations include sinus mass such as mucocele versus benign or malignant soft tissue tumor.  Hyperattenuating pituitary adenoma or craniopharyngioma extending into the sphenoid sinuses no possibility.  MRI of the brain and sella with contrast is recommended.    Remainder of the brain demonstrates no significant acute finding.  Mild white matter changes are present similar to the prior head CT.  No hydrocephalus is noted.  A few small lytic lesions are again seen within the skull which are stable from the prior study.                                       CTA Chest Non-Coronary (PE Studies) (Final result)  Result time 06/02/23 09:28:15      Final result by Gianfranco Butler MD (06/02/23 09:28:15)                   Impression:      1. Examination considered diagnostic to the proximal segmental pulmonary arterial level without convincing evidence of acute pulmonary embolism.  2. In the left upper lobe, there appears to be a lobulated mass lesion measuring approximately 21 x 33 x 22 mm.  This appears new when compared to remote CT chest performed 08/06/2018 as well as when compared to 02/21/2023 cervical spine CT.  Finding concerning for neoplasm.  Unclear whether this may relate to patient's myeloma diagnosis noting that the adjoining posterior left 3rd and 4th rib cortex appears grossly intact, versus primary lung malignancy.  3. Pathologic appearing lower left cervical lymph node, possibly related to the above.      Electronically signed by: Gianfranco Butler  Date:    06/02/2023  Time:    09:28               Narrative:    EXAMINATION:  CTA CHEST NON CORONARY (PE STUDIES)    CLINICAL HISTORY:  Pulmonary embolism (PE) suspected, high prob;    TECHNIQUE:  Low dose axial images, sagittal and coronal reformations were obtained from the thoracic inlet to the lung bases following the IV administration of 100 mL of Omnipaque 350.  Contrast timing was optimized to  evaluate the pulmonary arteries.  MIP images were performed.    COMPARISON:  V/Q scan 02/22/2023.  CT chest 08/06/2020 T    FINDINGS:  Exam quality: Examination considered diagnostic to the proximal segmental pulmonary arterial level.    Pulmonary embolism: No acute or chronic pulmonary embolism.    Central pulmonary arteries: Normal caliber.    Aorta: Nonaneurysmal.  Left-sided arch with conventional 3 vessel arch anatomy.  Mild-to-moderate atherosclerotic calcification.  Ectasia and tortuosity of brachiocephalic and right common and subclavian arteries noted.    Heart: No right heart strain. Normal size.    Coronary arteries: At least mild atherosclerotic calcifications.    Pericardium: Normal. No effusion, thickening, or calcification.    Support tubes and lines: None.    Base of neck/thyroid: Normal.    Lymph nodes: Suspect pathologic left level 4 lymph node measuring 21 mm short axis (axial series 2, image 53).    Esophagus: Normal.    Pleura: No effusion, thickening, or calcification.    Upper abdomen: Fluid attenuation lesions within the liver would be in keeping with cysts.  Additional subcentimeter hypoattenuating lesions in the liver too small to definitely characterize.  Dependent hyperattenuation within the gallbladder lumen would be in keeping with small stones and/or sludge.  No definite focal pericholecystic inflammation seen.    Body wall: Unremarkable    Airways: Central airways appear patent.    Lungs: Assessment of the lungs appears limited due to motion.  In the left upper lobe, there appears to be a lobulated mass lesion measuring approximately 21 x 33 x 22 mm (axial series 2, image 65; coronal reformat series 601, image 96).  This appears new when compared to remote CT chest performed 08/06/2018 as well as when compared to 02/21/2023 cervical spine CT.  Elsewhere, areas of atelectasis and scar suggested.    Bones: Sequela of patient's multiple myeloma noted.  Mildly expansile lucent lesion is  suggested in the proximal left humerus (axial series 3, image 84; coronal reformat series 601, image 77) with question of cortical thinning.  Status post posterior instrumented fusion of the thoracic and lumbar spine, with the more caudal aspects of the construct beyond the field of view examination.  Paired transpedicular screws secured with vertical stabilization rods spanning T9-T11 and L1.  No definite hardware complications seen.  Chronic presumed pathologic compression fracture T12, similar configuration to remote CT imaging of the lumbar spine performed 02/25/2019.

## 2023-06-04 NOTE — ASSESSMENT & PLAN NOTE
Incidentally noted TERI 2.1 x 3 x 2.2 cm lobulated mass adjacent to the pleura.  The son believes the patient was receiving radiation in this area.  Outside records are needed to confirm.    F/u oncologist

## 2023-06-04 NOTE — SUBJECTIVE & OBJECTIVE
Interval History: no acute overnight events     Medications:  Continuous Infusions:  Scheduled Meds:   ascorbic acid (vitamin C)  1,000 mg Oral Daily    fluticasone furoate-vilanteroL  1 puff Inhalation Daily    LORazepam  0.5 mg Oral BID    mirtazapine  30 mg Oral QHS    multivitamin  1 tablet Oral Daily    polyethylene glycol  17 g Oral Daily    tamsulosin  0.4 mg Oral Daily    vitamin D  1,000 Units Oral Daily     PRN Meds:acetaminophen, calcium carbonate, capsaicin, dextrose 10%, dextrose 10%, dextrose, dextrose, glucagon (human recombinant), hyoscyamine, melatonin, metoclopramide HCl, naloxone, OLANZapine, oxyCODONE, prochlorperazine, senna-docusate 8.6-50 mg, sodium chloride 0.9%     Review of Systems  Objective:     Weight: 50.8 kg (112 lb)  Body mass index is 19.84 kg/m².  Vital Signs (Most Recent):  Temp: 97.6 °F (36.4 °C) (06/04/23 0413)  Pulse: 84 (06/04/23 0413)  Resp: 17 (06/03/23 1920)  BP: (!) 149/85 (06/04/23 0413)  SpO2: 97 % (06/04/23 0413) Vital Signs (24h Range):  Temp:  [97.6 °F (36.4 °C)-98.9 °F (37.2 °C)] 97.6 °F (36.4 °C)  Pulse:  [] 84  Resp:  [16-17] 17  SpO2:  [97 %-100 %] 97 %  BP: (119-158)/(66-93) 149/85                                   Physical Exam     Neurosurgery Physical Exam    AOX3 maew perrl eomi  VOF full to finger confrontation (appreciate more in depth optho evaluation. )       Significant Labs:  Recent Labs   Lab 06/03/23  0256         K 4.6      CO2 22*   BUN 22   CREATININE 1.3   CALCIUM 9.1   MG 2.1       Recent Labs   Lab 06/03/23  0256   WBC 2.46*   HGB 10.4*   HCT 33.6*          No results for input(s): LABPT, INR, APTT in the last 48 hours.  Microbiology Results (last 7 days)       ** No results found for the last 168 hours. **          All pertinent labs from the last 24 hours have been reviewed.    Significant Diagnostics:  I have reviewed all pertinent imaging results/findings within the past 24 hours.
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
## 2023-06-04 NOTE — PLAN OF CARE
[facility] - Med Surg  Discharge Final Note    Primary Care Provider: He Hoyt MD    Expected Discharge Date: 6/4/2023    Final Discharge Note (most recent)       Final Note - 06/04/23 1515          Final Note    Assessment Type Final Discharge Note     Anticipated Discharge Disposition Home or Self Care        Post-Acute Status    Post-Acute Authorization Other     Other Status No Post-Acute Service Needs     Discharge Delays None known at this time                     Important Message from Medicare

## 2023-06-04 NOTE — CONSULTS
Consultation Report  Ophthalmology Service    Date: 2023    Chief complaint/Reason for Consult: baseline eye exam for sella turcia mass      History of Present Illness: Janie Zamorano is a 67 y.o. female with PMH multiple myeloma, paranoid schizophrenia, and sphenoid sinus mass (discovered 2023) who is currently admitted for vasovagal syncope. Repeat brain imaging during current hospital admission showed enlargement of the sellar mass. Opthalmology was consulted for a baseline eye exam per neurosurgery request. Patient denies any visual changes. Endorses stable floaters OD >OS. Denies ocular discomfort OU.    POcularHx:   Cataracts  Presbyopia   Refractive error     Current eye gtts: Visine PRN    Family Hx: Denies family history of glaucoma, macular degeneration, or blindness. family history includes Breast cancer in her mother; COPD in her father; Diabetes in her brother; Glaucoma in her mother and sister; Hypertension in her father and mother; Liver cancer (age of onset: 75) in her paternal uncle; Macular degeneration in her mother; Stroke in her mother.     PMHx:  has a past medical history of Anxiety, Asthma, Bronchitis, COPD (chronic obstructive pulmonary disease), Depression, GERD (gastroesophageal reflux disease), Hallucination, History of psychiatric hospitalization, Hypertension, Neck pain, Polyneuropathy in diseases classified elsewhere (2021), Schizophrenia, and Sleep difficulties.     PSurgHx:  has a past surgical history that includes  section; Lipoma resection; Hysterectomy (); Colonoscopy (N/A, 2018); Esophagogastroduodenoscopy (N/A, 2018); Thoracic laminectomy with fusion (N/A, 2018); and Salpingoophorectomy (Bilateral, ).     Home Medications:   Prior to Admission medications    Medication Sig Start Date End Date Taking? Authorizing Provider   acetaminophen (TYLENOL) 500 MG tablet Take 500 mg by mouth daily as needed for Pain.   Yes Historical  Provider   amLODIPine (NORVASC) 10 MG tablet Take 1 tablet (10 mg total) by mouth once daily. 3/13/23  Yes He Hoyt Jr., MD   ascorbic acid, vitamin C, (VITAMIN C) 1000 MG tablet Take 1,000 mg by mouth once daily.   Yes Historical Provider   aspirin (ECOTRIN) 81 MG EC tablet Take 81 mg by mouth once daily.   Yes Historical Provider   BIOTIN ORAL Take 1 tablet by mouth once daily.   Yes Historical Provider   calcium carbonate (TUMS ORAL) Take 1 tablet by mouth daily as needed (Heartburn).   Yes Historical Provider   capsaicin (ARTHRITIS PAIN RELIEF,CAPSAIC, TOP) Apply topically daily as needed (Pain).   Yes Historical Provider   cyanocobalamin, vitamin B-12, (VITAMIN B-12 ORAL) Take 1 tablet by mouth daily as needed.   Yes Historical Provider   LORazepam (ATIVAN) 0.5 MG tablet Take 0.5 mg by mouth 2 (two) times daily as needed for Anxiety. 5/2/23  Yes Historical Provider   mirtazapine (REMERON) 30 MG tablet Take 30 mg by mouth every evening. 5/2/23  Yes Historical Provider   multivitamin capsule Take 1 capsule by mouth once daily.   Yes Historical Provider   vitamin D (VITAMIN D3) 1000 units Tab Take 1,000 Units by mouth once daily.   Yes Historical Provider   dexAMETHasone (DECADRON) 4 MG Tab Gets in her off weeks of treatment 3/13/23   He Hoyt Jr., MD   flaxseed oiL 1,000 mg Cap Take by mouth.    Historical Provider   grape seed oil, bulk, 100 % Oil by Misc.(Non-Drug; Combo Route) route.    Historical Provider        Medications this encounter:    ascorbic acid (vitamin C)  1,000 mg Oral Daily    fluticasone furoate-vilanteroL  1 puff Inhalation Daily    LORazepam  0.5 mg Oral BID    mirtazapine  30 mg Oral QHS    multivitamin  1 tablet Oral Daily    polyethylene glycol  17 g Oral Daily    tamsulosin  0.4 mg Oral Daily    vitamin D  1,000 Units Oral Daily       Allergies: is allergic to iodine, shellfish containing products, and ondansetron hcl.     Social:  reports that she has never smoked. She has  never used smokeless tobacco. She reports that she does not drink alcohol and does not use drugs.     ROS: As per HPI    Ocular examination/Dilated fundus examination:  Base Eye Exam       Visual Acuity (Snellen - Linear)         Right Left    Dist sc 20/40 20/20    Dist ph sc NI               Tonometry (Tonopen, 8:08 PM)         Right Left    Pressure 12 15              Pupils         Pupils Dark Light Shape React APD    Right PERRL 3 2 Round Sluggish None    Left PERRL 3 2 Round Brisk Trace              Visual Fields         Right Left     Full Full              Extraocular Movement         Right Left     Full, Ortho Full, Ortho              Neuro/Psych       Oriented x3: Yes              Dilation       Both eyes: 1% Mydriacyl, 2.5% Phenylephrine @ 8:08 PM                  Additional Tests       Color         Right Left    Ishihara 1/11 11/11   Patient naming animals and shapes when looking at color plates OD   Patient seeing red, gold, purple, brown colors with red desaturation OD  Normal red desaturation OS                 Slit Lamp and Fundus Exam       External Exam         Right Left    External Normal Normal              Slit Lamp Exam         Right Left    Lids/Lashes Normal Normal    Conjunctiva/Sclera White and quiet White and quiet    Cornea Clear Clear    Anterior Chamber Deep and quiet Deep and quiet    Iris Round and reactive Round and reactive    Lens Nuclear sclerosis Nuclear sclerosis              Fundus Exam         Right Left    Disc Normal Normal    C/D Ratio 0.2 0.2    Macula Normal Normal    Vessels Normal Normal    Periphery Normal Normal                    MRI Brain April 2023:           MRI Brain June 2023:      Assessment/Plan:     1. Mass in sella turcica  2. Optic neuropathy, OD  - VA 20//40 // 20/20 uncorrected. Sluggish pupil with trace APD OD. IOP normal. CVF full. Patient missed all color plates OD other than control plate. Unable to pass red sat test.   - MRI brain with superior  displacement of right optic nerve from mass present since April (see pictures above). 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     Patient discussed with Dr. Guardado and Dr. Zuri Olguin MD PGY-2  [facility] Ophthalmology Resident  06/03/2023  8:18 PM     I have reviewed the history and exam of the patient and agree with the resident's exam, assessment and plan.

## 2023-06-04 NOTE — ASSESSMENT & PLAN NOTE
Rapidly growing mass noted on MRI.  Consulting neurosurgery for further evaluation.  Appreciate NS recs  Ophthalmology consulted    F/u Oncologist

## 2023-06-04 NOTE — NURSING
Patient is medically cleared for discharge.  AOX4, room air, ambulates independently.  Tele equipment and all peripheral lines have been removed.  Medications have been delivered and AVS reviewed with patient and/or caregiver.  Transport requested.

## 2023-06-04 NOTE — PLAN OF CARE
Problem: Adult Inpatient Plan of Care  Goal: Plan of Care Review  6/4/2023 1532 by Jessy Serrato RN  Outcome: Met  6/4/2023 1532 by Jessy Serrato RN  Outcome: Ongoing, Progressing  Goal: Patient-Specific Goal (Individualized)  6/4/2023 1532 by Jessy Serrato RN  Outcome: Met  6/4/2023 1532 by Jessy Serrato RN  Outcome: Ongoing, Progressing  Goal: Absence of Hospital-Acquired Illness or Injury  6/4/2023 1532 by Jessy Serrato RN  Outcome: Met  6/4/2023 1532 by Jessy Serrato RN  Outcome: Ongoing, Progressing  Goal: Optimal Comfort and Wellbeing  6/4/2023 1532 by Jessy Serrato RN  Outcome: Met  6/4/2023 1532 by Jessy Serrato RN  Outcome: Ongoing, Progressing  Goal: Readiness for Transition of Care  6/4/2023 1532 by Jessy Serrato RN  Outcome: Met  6/4/2023 1532 by Jessy Serrato RN  Outcome: Ongoing, Progressing     Problem: Fluid and Electrolyte Imbalance (Acute Kidney Injury/Impairment)  Goal: Fluid and Electrolyte Balance  6/4/2023 1532 by Jessy Serrato RN  Outcome: Met  6/4/2023 1532 by Jessy Serrato RN  Outcome: Ongoing, Progressing     Problem: Renal Function Impairment (Acute Kidney Injury/Impairment)  Goal: Effective Renal Function  6/4/2023 1532 by Jessy Serrato RN  Outcome: Met  6/4/2023 1532 by Jessy Serrato RN  Outcome: Ongoing, Progressing       Reviewed plan of care with emphasis on fluid volume management and renal function.  Patient verbalized understanding and agreement with plan of care.

## 2023-06-04 NOTE — PROGRESS NOTES
[facility] S[facility]  Neurosurgery  Progress Note    Subjective:     History of Present Illness: 66 y/o F with historyon multile myeloma with recent vasovagal fall and recent history of sepsis with known sellar lesion presenting after a vasovagal episode. She has had a prior fusion with Dr. Andres but denies any newfound weakness back or neck pain. NSGY was consulted due to known sellar lesion. She denies newfound vision changes. She denies any functional changes such as change in head shape or shoe size.    She endorses recent night sweats.  Outside hospital was managing this lesion conservatively.         Post-Op Info:  * No surgery found *         Interval History: no acute overnight events     Medications:  Continuous Infusions:  Scheduled Meds:   ascorbic acid (vitamin C)  1,000 mg Oral Daily    fluticasone furoate-vilanteroL  1 puff Inhalation Daily    LORazepam  0.5 mg Oral BID    mirtazapine  30 mg Oral QHS    multivitamin  1 tablet Oral Daily    polyethylene glycol  17 g Oral Daily    tamsulosin  0.4 mg Oral Daily    vitamin D  1,000 Units Oral Daily     PRN Meds:acetaminophen, calcium carbonate, capsaicin, dextrose 10%, dextrose 10%, dextrose, dextrose, glucagon (human recombinant), hyoscyamine, melatonin, metoclopramide HCl, naloxone, OLANZapine, oxyCODONE, prochlorperazine, senna-docusate 8.6-50 mg, sodium chloride 0.9%     Review of Systems  Objective:     Weight: 50.8 kg (112 lb)  Body mass index is 19.84 kg/m².  Vital Signs (Most Recent):  Temp: 97.6 °F (36.4 °C) (06/04/23 0413)  Pulse: 84 (06/04/23 0413)  Resp: 17 (06/03/23 1920)  BP: (!) 149/85 (06/04/23 0413)  SpO2: 97 % (06/04/23 0413) Vital Signs (24h Range):  Temp:  [97.6 °F (36.4 °C)-98.9 °F (37.2 °C)] 97.6 °F (36.4 °C)  Pulse:  [] 84  Resp:  [16-17] 17  SpO2:  [97 %-100 %] 97 %  BP: (119-158)/(66-93) 149/85                                  Physical Exam     Neurosurgery Physical Exam    AOX3 maew perrl eomi  VOF full to finger  confrontation (appreciate more in depth optho evaluation. )       Significant Labs:  Recent Labs   Lab 06/03/23  0256         K 4.6      CO2 22*   BUN 22   CREATININE 1.3   CALCIUM 9.1   MG 2.1       Recent Labs   Lab 06/03/23  0256   WBC 2.46*   HGB 10.4*   HCT 33.6*          No results for input(s): LABPT, INR, APTT in the last 48 hours.  Microbiology Results (last 7 days)       ** No results found for the last 168 hours. **          All pertinent labs from the last 24 hours have been reviewed.    Significant Diagnostics:  I have reviewed all pertinent imaging results/findings within the past 24 hours.    Assessment/Plan:     Mass of sphenoid sinus  68 y/o F with sellar mass no acute vision changes normotensive at this time so do not suspect adrenal source of transient hypotension and fall which brought her to hospital.     pituitary labs ordered.  Some results still pending but so far no sign of functional component to tumor or hypopit  Appreciate optho eval noting diminished right vision.    No urgent nsgy intervention but will consider ENT guided support for near future intervention given.   Further medical work up of fall,  prior to consideration of transphenoidal.   Would need medical clearance and risk eval/optimization prior to surgical consideration.     Recommend endocrine consult.           Abner Youngblood MD  Neurosurgery  [facility]

## 2023-06-05 ENCOUNTER — TELEPHONE (OUTPATIENT)
Dept: NEUROSURGERY | Facility: CLINIC | Age: 68
End: 2023-06-05
Payer: MEDICARE

## 2023-06-05 ENCOUNTER — TELEPHONE (OUTPATIENT)
Dept: OPHTHALMOLOGY | Facility: CLINIC | Age: 68
End: 2023-06-05
Payer: MEDICARE

## 2023-06-06 ENCOUNTER — TELEPHONE (OUTPATIENT)
Dept: OPHTHALMOLOGY | Facility: CLINIC | Age: 68
End: 2023-06-06
Payer: MEDICARE

## 2023-06-06 ENCOUNTER — TELEPHONE (OUTPATIENT)
Dept: NEUROSURGERY | Facility: CLINIC | Age: 68
End: 2023-06-06
Payer: MEDICARE

## 2023-06-06 DIAGNOSIS — D35.2 PITUITARY ADENOMA: Primary | ICD-10-CM

## 2023-06-06 LAB
ACTH PLAS-MCNC: <5 PG/ML (ref 0–46)
GH SERPL-MCNC: 0.1 NG/ML (ref 0–8)
IGF-I SERPL-MCNC: 15 NG/ML (ref 34–194)
IGF-I Z-SCORE SERPL: <-3 SD

## 2023-06-08 ENCOUNTER — PATIENT MESSAGE (OUTPATIENT)
Dept: NEUROSURGERY | Facility: CLINIC | Age: 68
End: 2023-06-08
Payer: MEDICARE

## 2023-06-08 ENCOUNTER — TELEPHONE (OUTPATIENT)
Dept: NEUROSURGERY | Facility: CLINIC | Age: 68
End: 2023-06-08
Payer: MEDICARE

## 2023-06-08 LAB
BUN SERPL-MCNC: 22 MG/DL (ref 6–30)
CHLORIDE SERPL-SCNC: 113 MMOL/L (ref 95–110)
CREAT SERPL-MCNC: 1.3 MG/DL (ref 0.5–1.4)
GLUCOSE SERPL-MCNC: 80 MG/DL (ref 70–110)
HCT VFR BLD CALC: <15 %PCV (ref 36–54)
POC IONIZED CALCIUM: 1.17 MMOL/L (ref 1.06–1.42)
POC TCO2 (MEASURED): 21 MMOL/L (ref 23–29)
POTASSIUM BLD-SCNC: 3.6 MMOL/L (ref 3.5–5.1)
SAMPLE: ABNORMAL
SODIUM BLD-SCNC: 145 MMOL/L (ref 136–145)

## 2023-06-09 ENCOUNTER — TELEPHONE (OUTPATIENT)
Dept: NEUROSURGERY | Facility: CLINIC | Age: 68
End: 2023-06-09
Payer: MEDICARE

## 2023-06-12 ENCOUNTER — TELEPHONE (OUTPATIENT)
Dept: NEUROSURGERY | Facility: CLINIC | Age: 68
End: 2023-06-12
Payer: MEDICARE

## 2023-06-19 ENCOUNTER — DOCUMENT SCAN (OUTPATIENT)
Dept: HOME HEALTH SERVICES | Facility: HOSPITAL | Age: 68
End: 2023-06-19
Payer: MEDICARE

## 2023-07-16 NOTE — TELEPHONE ENCOUNTER
No care due was identified.  [facility] Embedded Care Due Messages. Reference number: [id number].   7/15/2023 7:03:38 PM CDT

## 2023-07-17 RX ORDER — AMLODIPINE BESYLATE 10 MG/1
TABLET ORAL
Qty: 90 TABLET | Refills: 3 | Status: ON HOLD | OUTPATIENT
Start: 2023-07-17 | End: 2024-02-05 | Stop reason: HOSPADM

## 2023-08-09 ENCOUNTER — PATIENT OUTREACH (OUTPATIENT)
Dept: ADMINISTRATIVE | Facility: CLINIC | Age: 68
End: 2023-08-09
Payer: MEDICARE

## 2023-08-09 ENCOUNTER — EXTERNAL HOSPITAL ADMISSION (OUTPATIENT)
Dept: ADMINISTRATIVE | Facility: CLINIC | Age: 68
End: 2023-08-09
Payer: MEDICARE

## 2023-08-24 ENCOUNTER — PATIENT OUTREACH (OUTPATIENT)
Dept: ADMINISTRATIVE | Facility: CLINIC | Age: 68
End: 2023-08-24
Payer: MEDICARE

## 2023-08-24 ENCOUNTER — EXTERNAL HOSPITAL ADMISSION (OUTPATIENT)
Dept: ADMINISTRATIVE | Facility: CLINIC | Age: 68
End: 2023-08-24
Payer: MEDICARE

## 2023-09-04 PROBLEM — N17.9 ACUTE KIDNEY INJURY: Status: RESOLVED | Noted: 2018-08-03 | Resolved: 2023-09-04

## 2023-09-08 ENCOUNTER — EXTERNAL HOSPITAL ADMISSION (OUTPATIENT)
Dept: ADMINISTRATIVE | Facility: CLINIC | Age: 68
End: 2023-09-08
Payer: MEDICARE

## 2023-09-08 ENCOUNTER — PATIENT OUTREACH (OUTPATIENT)
Dept: ADMINISTRATIVE | Facility: CLINIC | Age: 68
End: 2023-09-08
Payer: MEDICARE

## 2023-09-20 DIAGNOSIS — Z78.0 MENOPAUSE: ICD-10-CM

## 2023-09-21 ENCOUNTER — TELEPHONE (OUTPATIENT)
Dept: INTERNAL MEDICINE | Facility: CLINIC | Age: 68
End: 2023-09-21
Payer: MEDICARE

## 2023-09-21 ENCOUNTER — EXTERNAL HOSPITAL ADMISSION (OUTPATIENT)
Dept: ADMINISTRATIVE | Facility: CLINIC | Age: 68
End: 2023-09-21
Payer: MEDICARE

## 2023-09-21 ENCOUNTER — PATIENT OUTREACH (OUTPATIENT)
Dept: ADMINISTRATIVE | Facility: CLINIC | Age: 68
End: 2023-09-21
Payer: MEDICARE

## 2023-09-21 NOTE — TELEPHONE ENCOUNTER
----- Message from Rosa Dyer sent at 9/21/2023  9:39 AM CDT -----  Contact: Erin with [facility]   Erin called in regards to the pt will go with another home health care company [facility] health please advise.

## 2023-09-21 NOTE — TELEPHONE ENCOUNTER
Nurse from [facility] was sent out to  home, he wanted to admit her but she wouldn't allow it. Informed nurse that she and her son decided to go with another home health agency.     Giorgio MOORE

## 2023-10-02 ENCOUNTER — HOSPITAL ENCOUNTER (OUTPATIENT)
Facility: HOSPITAL | Age: 68
Discharge: PSYCHIATRIC OR SUBSTANCE USE FACILITY | End: 2023-10-03
Attending: EMERGENCY MEDICINE | Admitting: HOSPITALIST
Payer: MEDICARE

## 2023-10-02 DIAGNOSIS — R79.89 ELEVATED TROPONIN: ICD-10-CM

## 2023-10-02 DIAGNOSIS — R11.10 VOMITING, UNSPECIFIED VOMITING TYPE, UNSPECIFIED WHETHER NAUSEA PRESENT: Primary | ICD-10-CM

## 2023-10-02 DIAGNOSIS — F20.0 PARANOID SCHIZOPHRENIA: ICD-10-CM

## 2023-10-02 PROBLEM — Z87.19 HISTORY OF SMALL BOWEL OBSTRUCTION: Status: ACTIVE | Noted: 2023-10-02

## 2023-10-02 PROBLEM — N17.9 AKI (ACUTE KIDNEY INJURY): Status: ACTIVE | Noted: 2023-10-02

## 2023-10-02 LAB
ABO + RH BLD: NORMAL
ALBUMIN SERPL BCP-MCNC: 2.8 G/DL (ref 3.5–5.2)
ALBUMIN SERPL BCP-MCNC: 3.1 G/DL (ref 3.5–5.2)
ALP SERPL-CCNC: 85 U/L (ref 55–135)
ALT SERPL W/O P-5'-P-CCNC: 14 U/L (ref 10–44)
ANION GAP SERPL CALC-SCNC: 9 MMOL/L (ref 8–16)
ANION GAP SERPL CALC-SCNC: 9 MMOL/L (ref 8–16)
AORTIC ROOT ANNULUS: 2.45 CM
APTT PPP: 23.6 SEC (ref 21–32)
ASCENDING AORTA: 3.16 CM
AST SERPL-CCNC: 21 U/L (ref 10–40)
AV INDEX (PROSTH): 0.88
AV MEAN GRADIENT: 6 MMHG
AV PEAK GRADIENT: 8 MMHG
AV VALVE AREA BY VELOCITY RATIO: 1.81 CM²
AV VALVE AREA: 1.94 CM²
AV VELOCITY RATIO: 0.83
BASOPHILS # BLD AUTO: 0.01 K/UL (ref 0–0.2)
BASOPHILS NFR BLD: 0.3 % (ref 0–1.9)
BILIRUB SERPL-MCNC: 0.2 MG/DL (ref 0.1–1)
BLD GP AB SCN CELLS X3 SERPL QL: NORMAL
BNP SERPL-MCNC: 93 PG/ML (ref 0–99)
BSA FOR ECHO PROCEDURE: 1.56 M2
BUN SERPL-MCNC: 32 MG/DL (ref 8–23)
BUN SERPL-MCNC: 33 MG/DL (ref 8–23)
CALCIUM SERPL-MCNC: 8.5 MG/DL (ref 8.7–10.5)
CALCIUM SERPL-MCNC: 8.8 MG/DL (ref 8.7–10.5)
CHLORIDE SERPL-SCNC: 109 MMOL/L (ref 95–110)
CHLORIDE SERPL-SCNC: 110 MMOL/L (ref 95–110)
CK SERPL-CCNC: 39 U/L (ref 20–180)
CK SERPL-CCNC: 41 U/L (ref 20–180)
CO2 SERPL-SCNC: 22 MMOL/L (ref 23–29)
CO2 SERPL-SCNC: 24 MMOL/L (ref 23–29)
CREAT SERPL-MCNC: 2 MG/DL (ref 0.5–1.4)
CREAT SERPL-MCNC: 2.1 MG/DL (ref 0.5–1.4)
CV ECHO LV RWT: 0.47 CM
CV STRESS BASE HR: 86 BPM
DIASTOLIC BLOOD PRESSURE: 80 MMHG
DIFFERENTIAL METHOD: ABNORMAL
DOP CALC AO PEAK VEL: 1.45 M/S
DOP CALC AO VTI: 27.7 CM
DOP CALC LVOT AREA: 2.2 CM2
DOP CALC LVOT DIAMETER: 1.67 CM
DOP CALC LVOT PEAK VEL: 1.2 M/S
DOP CALC LVOT STROKE VOLUME: 53.64 CM3
DOP CALC MV VTI: 24.3 CM
DOP CALCLVOT PEAK VEL VTI: 24.5 CM
E WAVE DECELERATION TIME: 221.63 MSEC
E/A RATIO: 0.77
E/E' RATIO: 10.62 M/S
ECHO LV POSTERIOR WALL: 1.04 CM (ref 0.6–1.1)
EOSINOPHIL # BLD AUTO: 0 K/UL (ref 0–0.5)
EOSINOPHIL NFR BLD: 0.8 % (ref 0–8)
ERYTHROCYTE [DISTWIDTH] IN BLOOD BY AUTOMATED COUNT: 17.6 % (ref 11.5–14.5)
EST. GFR  (NO RACE VARIABLE): 26 ML/MIN/1.73 M^2
EST. GFR  (NO RACE VARIABLE): 27 ML/MIN/1.73 M^2
FERRITIN SERPL-MCNC: 230 NG/ML (ref 20–300)
FRACTIONAL SHORTENING: 40 % (ref 28–44)
GLUCOSE SERPL-MCNC: 118 MG/DL (ref 70–110)
GLUCOSE SERPL-MCNC: 89 MG/DL (ref 70–110)
HCT VFR BLD AUTO: 28.7 % (ref 37–48.5)
HGB BLD-MCNC: 7.7 G/DL (ref 12–16)
HGB BLD-MCNC: 8.1 G/DL (ref 12–16)
HGB BLD-MCNC: 8.9 G/DL (ref 12–16)
IMM GRANULOCYTES # BLD AUTO: 0.02 K/UL (ref 0–0.04)
IMM GRANULOCYTES NFR BLD AUTO: 0.6 % (ref 0–0.5)
INR PPP: 1 (ref 0.8–1.2)
INTERVENTRICULAR SEPTUM: 1.01 CM (ref 0.6–1.1)
IRON SERPL-MCNC: 72 UG/DL (ref 30–160)
IVC DIAMETER: 1.27 CM
LA MAJOR: 4.1 CM
LA MINOR: 4.3 CM
LA WIDTH: 3.7 CM
LEFT ATRIUM SIZE: 4 CM
LEFT ATRIUM VOLUME INDEX: 33.9 ML/M2
LEFT ATRIUM VOLUME: 52.81 CM3
LEFT INTERNAL DIMENSION IN SYSTOLE: 2.67 CM (ref 2.1–4)
LEFT VENTRICLE DIASTOLIC VOLUME INDEX: 58.31 ML/M2
LEFT VENTRICLE DIASTOLIC VOLUME: 90.97 ML
LEFT VENTRICLE MASS INDEX: 101 G/M2
LEFT VENTRICLE SYSTOLIC VOLUME INDEX: 16.8 ML/M2
LEFT VENTRICLE SYSTOLIC VOLUME: 26.16 ML
LEFT VENTRICULAR INTERNAL DIMENSION IN DIASTOLE: 4.47 CM (ref 3.5–6)
LEFT VENTRICULAR MASS: 156.89 G
LV LATERAL E/E' RATIO: 11.5 M/S
LV SEPTAL E/E' RATIO: 9.86 M/S
LVOT MG: 3.48 MMHG
LVOT MV: 0.9 CM/S
LYMPHOCYTES # BLD AUTO: 0.7 K/UL (ref 1–4.8)
LYMPHOCYTES NFR BLD: 19 % (ref 18–48)
MAGNESIUM SERPL-MCNC: 2.2 MG/DL (ref 1.6–2.6)
MCH RBC QN AUTO: 27.8 PG (ref 27–31)
MCHC RBC AUTO-ENTMCNC: 31 G/DL (ref 32–36)
MCV RBC AUTO: 90 FL (ref 82–98)
MONOCYTES # BLD AUTO: 0.4 K/UL (ref 0.3–1)
MONOCYTES NFR BLD: 11 % (ref 4–15)
MV MEAN GRADIENT: 2 MMHG
MV PEAK A VEL: 0.9 M/S
MV PEAK E VEL: 0.69 M/S
MV PEAK GRADIENT: 4 MMHG
MV STENOSIS PRESSURE HALF TIME: 64.27 MS
MV VALVE AREA BY CONTINUITY EQUATION: 2.21 CM2
MV VALVE AREA P 1/2 METHOD: 3.42 CM2
NEUTROPHILS # BLD AUTO: 2.5 K/UL (ref 1.8–7.7)
NEUTROPHILS NFR BLD: 68.3 % (ref 38–73)
NRBC BLD-RTO: 0 /100 WBC
NUC STRESS EJECTION FRACTION: 89 %
OHS CV CPX 85 PERCENT MAX PREDICTED HEART RATE MALE: 126
OHS CV CPX MAX PREDICTED HEART RATE: 148
OHS CV CPX PATIENT IS FEMALE: 1
OHS CV CPX PATIENT IS MALE: 0
OHS CV CPX PEAK DIASTOLIC BLOOD PRESSURE: 65 MMHG
OHS CV CPX PEAK HEAR RATE: 115 BPM
OHS CV CPX PEAK RATE PRESSURE PRODUCT: NORMAL
OHS CV CPX PEAK SYSTOLIC BLOOD PRESSURE: 100 MMHG
OHS CV CPX PERCENT MAX PREDICTED HEART RATE ACHIEVED: 78
OHS CV CPX RATE PRESSURE PRODUCT PRESENTING: NORMAL
PHOSPHATE SERPL-MCNC: 3 MG/DL (ref 2.7–4.5)
PISA TR MAX VEL: 2.7 M/S
PLATELET # BLD AUTO: 207 K/UL (ref 150–450)
PMV BLD AUTO: 9.4 FL (ref 9.2–12.9)
POTASSIUM SERPL-SCNC: 4.6 MMOL/L (ref 3.5–5.1)
POTASSIUM SERPL-SCNC: 4.9 MMOL/L (ref 3.5–5.1)
PROT SERPL-MCNC: 6 G/DL (ref 6–8.4)
PROTHROMBIN TIME: 10.3 SEC (ref 9–12.5)
PV PEAK GRADIENT: 5 MMHG
PV PEAK VELOCITY: 1.07 M/S
RA MAJOR: 4.6 CM
RA PRESSURE ESTIMATED: 3 MMHG
RA WIDTH: 2.7 CM
RBC # BLD AUTO: 3.2 M/UL (ref 4–5.4)
RIGHT VENTRICULAR END-DIASTOLIC DIMENSION: 3.41 CM
RV TB RVSP: 6 MMHG
RV TISSUE DOPPLER FREE WALL SYSTOLIC VELOCITY 1 (APICAL 4 CHAMBER VIEW): 16.57 CM/S
SATURATED IRON: 33 % (ref 20–50)
SINUS: 2.74 CM
SODIUM SERPL-SCNC: 141 MMOL/L (ref 136–145)
SODIUM SERPL-SCNC: 142 MMOL/L (ref 136–145)
SPECIMEN OUTDATE: NORMAL
STJ: 2.21 CM
SYSTOLIC BLOOD PRESSURE: 130 MMHG
TDI LATERAL: 0.06 M/S
TDI SEPTAL: 0.07 M/S
TDI: 0.07 M/S
TOTAL IRON BINDING CAPACITY: 215 UG/DL (ref 250–450)
TR MAX PG: 29 MMHG
TRANSFERRIN SERPL-MCNC: 145 MG/DL (ref 200–375)
TROPONIN I SERPL DL<=0.01 NG/ML-MCNC: 0.17 NG/ML (ref 0–0.03)
TROPONIN I SERPL DL<=0.01 NG/ML-MCNC: <0.006 NG/ML (ref 0–0.03)
TV REST PULMONARY ARTERY PRESSURE: 32 MMHG
URATE SERPL-MCNC: 8.3 MG/DL (ref 2.4–5.7)
WBC # BLD AUTO: 3.63 K/UL (ref 3.9–12.7)
Z-SCORE OF LEFT VENTRICULAR DIMENSION IN END DIASTOLE: -0.06
Z-SCORE OF LEFT VENTRICULAR DIMENSION IN END SYSTOLE: -0.35

## 2023-10-02 PROCEDURE — 85610 PROTHROMBIN TIME: CPT | Performed by: EMERGENCY MEDICINE

## 2023-10-02 PROCEDURE — 93005 ELECTROCARDIOGRAM TRACING: CPT | Mod: 59

## 2023-10-02 PROCEDURE — 36415 COLL VENOUS BLD VENIPUNCTURE: CPT | Performed by: NURSE PRACTITIONER

## 2023-10-02 PROCEDURE — 83735 ASSAY OF MAGNESIUM: CPT | Performed by: EMERGENCY MEDICINE

## 2023-10-02 PROCEDURE — 82728 ASSAY OF FERRITIN: CPT | Performed by: NURSE PRACTITIONER

## 2023-10-02 PROCEDURE — 86900 BLOOD TYPING SEROLOGIC ABO: CPT | Performed by: EMERGENCY MEDICINE

## 2023-10-02 PROCEDURE — 93010 EKG 12-LEAD: ICD-10-PCS | Mod: ,,, | Performed by: INTERNAL MEDICINE

## 2023-10-02 PROCEDURE — 99285 EMERGENCY DEPT VISIT HI MDM: CPT | Mod: 25

## 2023-10-02 PROCEDURE — 83540 ASSAY OF IRON: CPT | Performed by: NURSE PRACTITIONER

## 2023-10-02 PROCEDURE — 93010 ELECTROCARDIOGRAM REPORT: CPT | Mod: ,,, | Performed by: INTERNAL MEDICINE

## 2023-10-02 PROCEDURE — 84466 ASSAY OF TRANSFERRIN: CPT | Performed by: NURSE PRACTITIONER

## 2023-10-02 PROCEDURE — 83880 ASSAY OF NATRIURETIC PEPTIDE: CPT | Performed by: NURSE PRACTITIONER

## 2023-10-02 PROCEDURE — G0378 HOSPITAL OBSERVATION PER HR: HCPCS

## 2023-10-02 PROCEDURE — 63600175 PHARM REV CODE 636 W HCPCS: Performed by: NURSE PRACTITIONER

## 2023-10-02 PROCEDURE — 82550 ASSAY OF CK (CPK): CPT | Mod: 91 | Performed by: EMERGENCY MEDICINE

## 2023-10-02 PROCEDURE — 82550 ASSAY OF CK (CPK): CPT | Performed by: NURSE PRACTITIONER

## 2023-10-02 PROCEDURE — 85025 COMPLETE CBC W/AUTO DIFF WBC: CPT | Performed by: EMERGENCY MEDICINE

## 2023-10-02 PROCEDURE — 85018 HEMOGLOBIN: CPT | Performed by: NURSE PRACTITIONER

## 2023-10-02 PROCEDURE — 96374 THER/PROPH/DIAG INJ IV PUSH: CPT | Mod: 59

## 2023-10-02 PROCEDURE — 25000003 PHARM REV CODE 250: Performed by: NURSE PRACTITIONER

## 2023-10-02 PROCEDURE — 80053 COMPREHEN METABOLIC PANEL: CPT | Performed by: EMERGENCY MEDICINE

## 2023-10-02 PROCEDURE — 99223 PR INITIAL HOSPITAL CARE,LEVL III: ICD-10-PCS | Mod: 25,,, | Performed by: INTERNAL MEDICINE

## 2023-10-02 PROCEDURE — 25000003 PHARM REV CODE 250: Performed by: EMERGENCY MEDICINE

## 2023-10-02 PROCEDURE — 84100 ASSAY OF PHOSPHORUS: CPT | Performed by: NURSE PRACTITIONER

## 2023-10-02 PROCEDURE — 63600175 PHARM REV CODE 636 W HCPCS: Performed by: EMERGENCY MEDICINE

## 2023-10-02 PROCEDURE — 25000003 PHARM REV CODE 250: Performed by: PHYSICIAN ASSISTANT

## 2023-10-02 PROCEDURE — 84484 ASSAY OF TROPONIN QUANT: CPT | Mod: 91 | Performed by: EMERGENCY MEDICINE

## 2023-10-02 PROCEDURE — 84550 ASSAY OF BLOOD/URIC ACID: CPT | Performed by: NURSE PRACTITIONER

## 2023-10-02 PROCEDURE — C9113 INJ PANTOPRAZOLE SODIUM, VIA: HCPCS | Performed by: EMERGENCY MEDICINE

## 2023-10-02 PROCEDURE — C9113 INJ PANTOPRAZOLE SODIUM, VIA: HCPCS | Performed by: NURSE PRACTITIONER

## 2023-10-02 PROCEDURE — 96376 TX/PRO/DX INJ SAME DRUG ADON: CPT | Mod: 59

## 2023-10-02 PROCEDURE — 96361 HYDRATE IV INFUSION ADD-ON: CPT

## 2023-10-02 PROCEDURE — 99223 1ST HOSP IP/OBS HIGH 75: CPT | Mod: 25,,, | Performed by: INTERNAL MEDICINE

## 2023-10-02 PROCEDURE — 63600175 PHARM REV CODE 636 W HCPCS: Performed by: INTERNAL MEDICINE

## 2023-10-02 PROCEDURE — 85730 THROMBOPLASTIN TIME PARTIAL: CPT | Performed by: EMERGENCY MEDICINE

## 2023-10-02 RX ORDER — LIDOCAINE 50 MG/G
1 PATCH TOPICAL
Status: DISCONTINUED | OUTPATIENT
Start: 2023-10-02 | End: 2023-10-03 | Stop reason: HOSPADM

## 2023-10-02 RX ORDER — AMLODIPINE BESYLATE 5 MG/1
10 TABLET ORAL DAILY
Status: DISCONTINUED | OUTPATIENT
Start: 2023-10-03 | End: 2023-10-03 | Stop reason: HOSPADM

## 2023-10-02 RX ORDER — PANTOPRAZOLE SODIUM 40 MG/10ML
40 INJECTION, POWDER, LYOPHILIZED, FOR SOLUTION INTRAVENOUS 2 TIMES DAILY
Status: DISCONTINUED | OUTPATIENT
Start: 2023-10-02 | End: 2023-10-03 | Stop reason: HOSPADM

## 2023-10-02 RX ORDER — SODIUM CHLORIDE 0.9 % (FLUSH) 0.9 %
10 SYRINGE (ML) INJECTION
Status: DISCONTINUED | OUTPATIENT
Start: 2023-10-02 | End: 2023-10-03 | Stop reason: HOSPADM

## 2023-10-02 RX ORDER — ACETAMINOPHEN 500 MG
500 TABLET ORAL DAILY PRN
Status: DISCONTINUED | OUTPATIENT
Start: 2023-10-02 | End: 2023-10-03 | Stop reason: HOSPADM

## 2023-10-02 RX ORDER — ASPIRIN 325 MG
325 TABLET ORAL
Status: COMPLETED | OUTPATIENT
Start: 2023-10-02 | End: 2023-10-02

## 2023-10-02 RX ORDER — REGADENOSON 0.08 MG/ML
0.4 INJECTION, SOLUTION INTRAVENOUS ONCE
Status: COMPLETED | OUTPATIENT
Start: 2023-10-02 | End: 2023-10-02

## 2023-10-02 RX ORDER — SODIUM CHLORIDE, SODIUM LACTATE, POTASSIUM CHLORIDE, CALCIUM CHLORIDE 600; 310; 30; 20 MG/100ML; MG/100ML; MG/100ML; MG/100ML
INJECTION, SOLUTION INTRAVENOUS CONTINUOUS
Status: DISCONTINUED | OUTPATIENT
Start: 2023-10-02 | End: 2023-10-03

## 2023-10-02 RX ORDER — AMOXICILLIN 250 MG
1 CAPSULE ORAL 2 TIMES DAILY PRN
Status: DISCONTINUED | OUTPATIENT
Start: 2023-10-02 | End: 2023-10-03 | Stop reason: HOSPADM

## 2023-10-02 RX ORDER — ACETAMINOPHEN 500 MG
1000 TABLET ORAL EVERY 12 HOURS PRN
Status: DISCONTINUED | OUTPATIENT
Start: 2023-10-03 | End: 2023-10-03 | Stop reason: HOSPADM

## 2023-10-02 RX ORDER — ASPIRIN 81 MG/1
81 TABLET ORAL DAILY
Status: DISCONTINUED | OUTPATIENT
Start: 2023-10-02 | End: 2023-10-03 | Stop reason: HOSPADM

## 2023-10-02 RX ORDER — PANTOPRAZOLE SODIUM 40 MG/10ML
80 INJECTION, POWDER, LYOPHILIZED, FOR SOLUTION INTRAVENOUS
Status: COMPLETED | OUTPATIENT
Start: 2023-10-02 | End: 2023-10-02

## 2023-10-02 RX ORDER — AMIODARONE HYDROCHLORIDE 200 MG/1
200 TABLET ORAL DAILY
Status: DISCONTINUED | OUTPATIENT
Start: 2023-10-02 | End: 2023-10-03 | Stop reason: HOSPADM

## 2023-10-02 RX ORDER — DIPHENHYDRAMINE HCL 25 MG
25 CAPSULE ORAL ONCE
Status: COMPLETED | OUTPATIENT
Start: 2023-10-03 | End: 2023-10-02

## 2023-10-02 RX ADMIN — PANTOPRAZOLE SODIUM 40 MG: 40 INJECTION, POWDER, FOR SOLUTION INTRAVENOUS at 08:10

## 2023-10-02 RX ADMIN — LIDOCAINE 1 PATCH: 50 PATCH TOPICAL at 10:10

## 2023-10-02 RX ADMIN — PANTOPRAZOLE SODIUM 80 MG: 40 INJECTION, POWDER, FOR SOLUTION INTRAVENOUS at 06:10

## 2023-10-02 RX ADMIN — ASPIRIN 325 MG ORAL TABLET 325 MG: 325 PILL ORAL at 05:10

## 2023-10-02 RX ADMIN — ACETAMINOPHEN 500 MG: 500 TABLET ORAL at 07:10

## 2023-10-02 RX ADMIN — AMIODARONE HYDROCHLORIDE 200 MG: 200 TABLET ORAL at 02:10

## 2023-10-02 RX ADMIN — REGADENOSON 0.4 MG: 0.08 INJECTION, SOLUTION INTRAVENOUS at 11:10

## 2023-10-02 RX ADMIN — SODIUM CHLORIDE, POTASSIUM CHLORIDE, SODIUM LACTATE AND CALCIUM CHLORIDE: 600; 310; 30; 20 INJECTION, SOLUTION INTRAVENOUS at 09:10

## 2023-10-02 RX ADMIN — PANTOPRAZOLE SODIUM 40 MG: 40 INJECTION, POWDER, FOR SOLUTION INTRAVENOUS at 02:10

## 2023-10-02 RX ADMIN — SODIUM CHLORIDE, POTASSIUM CHLORIDE, SODIUM LACTATE AND CALCIUM CHLORIDE: 600; 310; 30; 20 INJECTION, SOLUTION INTRAVENOUS at 08:10

## 2023-10-02 RX ADMIN — ASPIRIN 81 MG: 81 TABLET, COATED ORAL at 03:10

## 2023-10-02 RX ADMIN — DIPHENHYDRAMINE HYDROCHLORIDE 25 MG: 25 CAPSULE ORAL at 11:10

## 2023-10-02 NOTE — H&P
[facility] Medicine  History & Physical    Patient Name: Janie Zamorano  MRN: [id number]  Patient Class: OP- Observation  Admission Date: 10/2/2023  Attending Physician: Dany Payton MD   Primary Care Provider: He Hoyt Jr., MD         Patient information was obtained from patient and ER records.     Subjective:     Principal Problem:Elevated troponin    Chief Complaint:   Chief Complaint   Patient presents with    Hematemesis     Pt arrived via ems, pt chief complaint is Hematemesis. Pt sent from [facility] for vomiting blood, per staff pt had 4 bouts of dark red blood.         HPI: Mili Zamorano is a 67 with significant history for hypertension, paranoid schizophrenia with multiple inpatient psychiatric admissions, multiple myeloma dx 2018 followed by [facility], sphenoid/pituitary sella mass status post sphenoid sinustomy 7/23/23 bx MM then received radiation,recent small bowel obstruction sp small bowel resection (8/5/2023) , recent a fib on amiodarone/eliquis, and asthma who was sent from [facility] to ED for hematemesis.  Patient states patient drank colored Partha-Aid for lunch and dinner and vomited while she was sleeping.  No abdominal pain nausea or vomiting.  In ED guaicic neg.  Patient was also noted to have elevated troponin.  No chest pain or shortness a breath.  In ED also found to have GLENIS serum creatinine 2.2 baseline 1.2. Placed in observation for Cardiology consult    X ray pelvis   Impression:     1. No convincing acute displaced fracture or dislocation of the right hip noting overall limited evaluation given patient positioning.  2. Please note, patient has osseous destructive lesion with large soft tissue component involving the right iliac wing, better evaluated on CT 05/02/2022.  There has been ongoing sclerotic change of the right iliac wing, no recent exams are available for comparison.  Correlation is needed.  3. Sclerotic  focus involving the left inferior pubic ramus, similar in appearance to the previous CT.  Healing fracture would have a similar appearance.     23  MRI brain w wo brain    1. 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   2.   No acute intracranial abnormality or evidence of parenchymal metastases      Past Medical History:   Diagnosis Date    Anxiety     Asthma     Bronchitis     COPD (chronic obstructive pulmonary disease)     Depression     GERD (gastroesophageal reflux disease)     Hallucination     History of psychiatric hospitalization     Hypertension     Neck pain     Polyneuropathy in diseases classified elsewhere 2021    Schizophrenia     Sleep difficulties        Past Surgical History:   Procedure Laterality Date     SECTION      X 1    COLONOSCOPY N/A 2018    Surgeon: Albert Russell MD    ESOPHAGOGASTRODUODENOSCOPY N/A 2018    Surgeon: Albert Russell MD    HYSTERECTOMY  2016    [facility]---DLH/BSO    LIPOMA RESECTION      back  x 2    SALPINGOOPHORECTOMY Bilateral 2016    [facility]---DLH/BSO    THORACIC LAMINECTOMY WITH FUSION N/A 2018    Surgeon: He Andres MD       Review of patient's allergies indicates:   Allergen Reactions    Iodine Hives    Shellfish containing products Itching    Ondansetron hcl Nausea Only       No current facility-administered medications on file prior to encounter.     Current Outpatient Medications on File Prior to Encounter   Medication Sig    acetaminophen (TYLENOL) 500 MG tablet Take 500 mg by mouth daily as needed for Pain.    amLODIPine (NORVASC) 10 MG tablet TAKE 1 TABLET(10 MG) BY MOUTH EVERY DAY    ascorbic acid, vitamin C, (VITAMIN C) 1000 MG tablet Take 1,000 mg by mouth once daily.    aspirin (ECOTRIN) 81 MG EC tablet Take 81 mg by mouth once daily.    BIOTIN ORAL  Take 1 tablet by mouth once daily.    calcium carbonate (TUMS ORAL) Take 1 tablet by mouth daily as needed (Heartburn).    cyanocobalamin, vitamin B-12, (VITAMIN B-12 ORAL) Take 1 tablet by mouth daily as needed.    mirtazapine (REMERON) 30 MG tablet Take 30 mg by mouth every evening.    senna-docusate 8.6-50 mg (PERICOLACE) 8.6-50 mg per tablet Take 1 tablet by mouth 2 (two) times daily as needed for Constipation.    vitamin D (VITAMIN D3) 1000 units Tab Take 1,000 Units by mouth once daily.    [DISCONTINUED] capsaicin (ARTHRITIS PAIN RELIEF,CAPSAIC, TOP) Apply topically daily as needed (Pain).    [DISCONTINUED] flaxseed oiL 1,000 mg Cap Take by mouth.    [DISCONTINUED] grape seed oil, bulk, 100 % Oil by Misc.(Non-Drug; Combo Route) route.    [DISCONTINUED] LORazepam (ATIVAN) 0.5 MG tablet Take 0.5 mg by mouth 2 (two) times daily as needed for Anxiety.    [DISCONTINUED] multivitamin capsule Take 1 capsule by mouth once daily.    [DISCONTINUED] OLANZapine (ZYPREXA) 2.5 MG tablet Take 1 tablet (2.5 mg total) by mouth every evening.    [DISCONTINUED] tamsulosin (FLOMAX) 0.4 mg Cap Take 1 capsule (0.4 mg total) by mouth once daily.     Family History       Problem Relation (Age of Onset)    Breast cancer Mother    COPD Father    Diabetes Brother    Glaucoma Mother, Sister    Hypertension Mother, Father    Liver cancer Paternal Uncle (75)    Macular degeneration Mother    Stroke Mother          Tobacco Use    Smoking status: Never    Smokeless tobacco: Never   Substance and Sexual Activity    Alcohol use: No     Alcohol/week: 0.0 standard drinks of alcohol    Drug use: No    Sexual activity: Not Currently     Partners: Male     Birth control/protection: Post-menopausal     Review of Systems   Constitutional:  Positive for activity change and fatigue. Negative for appetite change, chills, diaphoresis and fever.   HENT: Negative.     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    Respiratory: Negative.     Cardiovascular: Negative.    Gastrointestinal:  Positive for abdominal pain and vomiting. Negative for blood in stool, constipation, diarrhea and nausea.   Endocrine: Negative.    Genitourinary: Negative.    Musculoskeletal:  Positive for arthralgias, gait problem and myalgias.   Skin: Negative.    Hematological: Negative.    Psychiatric/Behavioral: Negative.       Objective:     Vital Signs (Most Recent):  Temp: 97.7 °F (36.5 °C) (10/02/23 1245)  Pulse: 92 (10/02/23 1245)  Resp: 18 (10/02/23 1245)  BP: 114/64 (10/02/23 1245)  SpO2: 100 % (10/02/23 1245) Vital Signs (24h Range):  Temp:  [97.7 °F (36.5 °C)-98.4 °F (36.9 °C)] 97.7 °F (36.5 °C)  Pulse:  [74-92] 92  Resp:  [17-21] 18  SpO2:  [100 %] 100 %  BP: (103-135)/(57-82) 114/64     Weight: 54.4 kg (120 lb)  Body mass index is 21.26 kg/m².     Physical Exam  Constitutional:       General: She is not in acute distress.     Appearance: She is ill-appearing.      Comments: frail   HENT:      Head: Normocephalic and atraumatic.   Cardiovascular:      Rate and Rhythm: Normal rate and regular rhythm.   Pulmonary:      Effort: Pulmonary effort is normal.   Abdominal:      General: There is no distension.   Musculoskeletal:         General: Tenderness (right hip) present.      Right lower leg: No edema.   Skin:     General: Skin is warm and dry.   Neurological:      General: No focal deficit present.      Mental Status: She is alert and oriented to person, place, and time. Mental status is at baseline.                Significant Labs: All pertinent labs within the past 24 hours have been reviewed.    Significant Imaging: I have reviewed all pertinent imaging results/findings within the past 24 hours.    Assessment/Plan:     * Elevated troponin  Presents to the ED for suspected hematemesis.  Suspect color from emesis due to Partha-Aid at lunch and dinner per patient.  In ED found to have elevated troponin.  No chest pain or shortness a  "breath  Negative stress test.  2 D echo normal EF 75 80%, no mention of wall motion abnormality.  Continnue asa    History of small bowel obstruction  Recent small-bowel obstruction in July status post resection  Not acute abdomen on exam  Tolerated lunch  Continue to monitor      GLENIS (acute kidney injury)  Baseline creatinine is 1.3.  Patient admission with creatinine of 2.1 to 2.0 after IV fluid.  Possible volume depletion due to vomiting  Uric acid   IVF  If no improvement will ask Nephrology to see        Lung mass  As above follow by oncologist at [facility] and [facility]      Mass of sphenoid sinus  Status post resection and radiation continue follow up with oncologist at [facility] and [facility]      GERD (gastroesophageal reflux disease)  PPI      Multiple myeloma not having achieved remission  Under the care of oncologist at [facility] and [facility] Continue      Paranoid schizophrenia  Patient recently admitted to inpatient psychiatric facility [facility].  Patient son stated he brought his mother to the emergency room at [facility] for "full body scan" then found overnight that patient was placed in a psychiatric facility for bizzare bx restless and agitattion.  Continue CEC.         VTE Risk Mitigation (From admission, onward)         Ordered     Place MOUNIKA hose  Until discontinued         10/02/23 1637                     On 10/02/2023, patient should be placed in hospital observation services under my care in collaboration with Dany Payton MD.      Nelly Hernandez NP  Department of Hospital Medicine  [facility] [place] - Telemetry  "

## 2023-10-02 NOTE — NURSING
[facility] [place]  Nurses Note -- 4 Eyes      10/2/2023       Skin assessed on: Transfer      [x] No Pressure Injuries Present    []Prevention Measures Documented    [] Yes LDA  for Pressure Injury Previously documented     [] Yes New Pressure Injury Discovered   [] LDA for New Pressure Injury Added      Attending :  Tianna Clemente LPN     Second RN:  Olya RANGEL

## 2023-10-02 NOTE — ASSESSMENT & PLAN NOTE
Presents to the ED for suspected hematemesis.  Suspect color from emesis due to Partha-Aid at lunch and dinner per patient.  In ED found to have elevated troponin.  No chest pain or shortness a breath  Negative stress test.  2 D echo normal EF 75 80%, no mention of wall motion abnormality.  Continnue asa

## 2023-10-02 NOTE — SUBJECTIVE & OBJECTIVE
Past Medical History:   Diagnosis Date    Anxiety     Asthma     Bronchitis     COPD (chronic obstructive pulmonary disease)     Depression     GERD (gastroesophageal reflux disease)     Hallucination     History of psychiatric hospitalization     Hypertension     Neck pain     Polyneuropathy in diseases classified elsewhere 2021    Schizophrenia     Sleep difficulties        Past Surgical History:   Procedure Laterality Date     SECTION      X 1    COLONOSCOPY N/A 2018    Surgeon: Albert Russell MD    ESOPHAGOGASTRODUODENOSCOPY N/A 2018    Surgeon: Albert Russell MD    HYSTERECTOMY  2016    [facility]---DLH/BSO    LIPOMA RESECTION      back  x 2    SALPINGOOPHORECTOMY Bilateral 2016    [facility]---DLH/BSO    THORACIC LAMINECTOMY WITH FUSION N/A 2018    Surgeon: He Andres MD       Review of patient's allergies indicates:   Allergen Reactions    Iodine Hives    Shellfish containing products Itching    Ondansetron hcl Nausea Only       No current facility-administered medications on file prior to encounter.     Current Outpatient Medications on File Prior to Encounter   Medication Sig    acetaminophen (TYLENOL) 500 MG tablet Take 500 mg by mouth daily as needed for Pain.    amLODIPine (NORVASC) 10 MG tablet TAKE 1 TABLET(10 MG) BY MOUTH EVERY DAY    ascorbic acid, vitamin C, (VITAMIN C) 1000 MG tablet Take 1,000 mg by mouth once daily.    aspirin (ECOTRIN) 81 MG EC tablet Take 81 mg by mouth once daily.    BIOTIN ORAL Take 1 tablet by mouth once daily.    calcium carbonate (TUMS ORAL) Take 1 tablet by mouth daily as needed (Heartburn).    capsaicin (ARTHRITIS PAIN RELIEF,CAPSAIC, TOP) Apply topically daily as needed (Pain).    cyanocobalamin, vitamin B-12, (VITAMIN B-12 ORAL) Take 1 tablet by mouth daily as needed.    flaxseed oiL 1,000 mg Cap Take by mouth.    grape seed oil, bulk, 100 % Oil by Misc.(Non-Drug; Combo Route) route.    LORazepam (ATIVAN) 0.5 MG tablet Take 0.5 mg by mouth 2  (two) times daily as needed for Anxiety.    mirtazapine (REMERON) 30 MG tablet Take 30 mg by mouth every evening.    multivitamin capsule Take 1 capsule by mouth once daily.    OLANZapine (ZYPREXA) 2.5 MG tablet Take 1 tablet (2.5 mg total) by mouth every evening.    senna-docusate 8.6-50 mg (PERICOLACE) 8.6-50 mg per tablet Take 1 tablet by mouth 2 (two) times daily as needed for Constipation.    tamsulosin (FLOMAX) 0.4 mg Cap Take 1 capsule (0.4 mg total) by mouth once daily.    vitamin D (VITAMIN D3) 1000 units Tab Take 1,000 Units by mouth once daily.     Family History       Problem Relation (Age of Onset)    Breast cancer Mother    COPD Father    Diabetes Brother    Glaucoma Mother, Sister    Hypertension Mother, Father    Liver cancer Paternal Uncle (75)    Macular degeneration Mother    Stroke Mother          Tobacco Use    Smoking status: Never    Smokeless tobacco: Never   Substance and Sexual Activity    Alcohol use: No     Alcohol/week: 0.0 standard drinks of alcohol    Drug use: No    Sexual activity: Not Currently     Partners: Male     Birth control/protection: Post-menopausal     Review of Systems   Constitutional: Negative for decreased appetite.   HENT:  Negative for ear discharge.    Eyes:  Negative for blurred vision.   Respiratory:  Negative for hemoptysis.    Endocrine: Negative for polyphagia.   Hematologic/Lymphatic: Negative for adenopathy.   Skin:  Negative for color change.   Musculoskeletal:  Negative for joint swelling.   Genitourinary:  Negative for bladder incontinence.   Neurological:  Negative for brief paralysis.   Psychiatric/Behavioral:  Negative for hallucinations.    Allergic/Immunologic: Negative for hives.     Objective:     Vital Signs (Most Recent):  Temp: 98.1 °F (36.7 °C) (10/02/23 0829)  Pulse: 88 (10/02/23 0829)  Resp: 18 (10/02/23 0829)  BP: 135/80 (10/02/23 0829)  SpO2: 100 % (10/02/23 0829) Vital Signs (24h Range):  Temp:  [98.1 °F (36.7 °C)-98.4 °F (36.9 °C)] 98.1 °F  (36.7 °C)  Pulse:  [74-88] 88  Resp:  [17-21] 18  SpO2:  [100 %] 100 %  BP: (103-135)/(57-82) 135/80     Weight: 54.4 kg (120 lb)  Body mass index is 21.26 kg/m².    SpO2: 100 %       No intake or output data in the 24 hours ending 10/02/23 1155    Lines/Drains/Airways       Peripheral Intravenous Line  Duration                  Peripheral IV - Single Lumen 10/02/23 0415 18 G Anterior;Left Upper Arm <1 day                     Physical Exam  Constitutional:       Appearance: She is well-developed.   HENT:      Head: Normocephalic and atraumatic.   Eyes:      Conjunctiva/sclera: Conjunctivae normal.      Pupils: Pupils are equal, round, and reactive to light.   Cardiovascular:      Rate and Rhythm: Normal rate.      Pulses: Intact distal pulses.      Heart sounds: Normal heart sounds.   Pulmonary:      Effort: Pulmonary effort is normal.      Breath sounds: Normal breath sounds.   Abdominal:      General: Bowel sounds are normal.      Palpations: Abdomen is soft.   Musculoskeletal:         General: Normal range of motion.      Cervical back: Normal range of motion and neck supple.   Skin:     General: Skin is warm and dry.   Neurological:      Mental Status: She is alert and oriented to person, place, and time.          Significant Labs: All pertinent lab results from the last 24 hours have been reviewed.    Significant Imaging: Echocardiogram: 2D echo with color flow doppler: No results found for this or any previous visit.

## 2023-10-02 NOTE — PLAN OF CARE
Problem: Adult Inpatient Plan of Care  Goal: Optimal Comfort and Wellbeing  Outcome: Ongoing, Progressing  Intervention: Monitor Pain and Promote Comfort  Flowsheets (Taken 10/2/2023 9232)  Pain Management Interventions:   awakened for pain meds per patient request   care clustered   medication offered     Problem: Renal Function Impairment (Acute Kidney Injury/Impairment)  Goal: Effective Renal Function  Outcome: Ongoing, Progressing  Intervention: Monitor and Support Renal Function  Flowsheets (Taken 10/2/2023 5317)  Stabilization Measures:   fluid resuscitation initiated   legs elevated   provider notified  Medication Review/Management:   medications reviewed   infusion initiated

## 2023-10-02 NOTE — HPI
Janie Zamorano is a 67 y.o. female with a PMHx of HTN, asthma, COPD, GERD, depression, schizophrenia, who presents to the ED via EMS for evaluation of possible hematemesis. History provided by independent historian, EMS, who reports initial call from [facility] for 4 emesis episodes. Per staff, patient had 4 emesis episodes that appeared with dark red blood. EMS further endorses patient had stated she had drank red radha aid prior to her episodes, but did report a weird smell shortly after. Patient currently denies any nausea. She reports right sided pain, including her upper and lower extremities, back, and abdomen. She endorses this is due to her history of multiple myeloma. No medications taken PTA. No alleviating or exacerbating factors noted. Denies headache, CP, or other associated symptoms.      External documents reviewed: Patient presents with CEC/PEC in place. Per PEC, patient was displaying bizarre behavior. She was also non-compliant with her medication. Additionally, her exam at the time showed tangential speech, delusions, and disorientation.      Currently denies CP or SOB  EKG NSR - no acute STT changes  Troponin 0.168 repeat normal  Cr 2.1    Echo 10/2/23    Left Ventricle: The left ventricle is normal in size. There is mild concentric hypertrophy. There is hyperdynamic systolic function with a visually estimated ejection fraction of 75 - 80%.    Right Ventricle: Normal right ventricular cavity size. Systolic function is normal.    Pulmonary Artery: The estimated pulmonary artery systolic pressure is 32 mmHg.    IVC/SVC: Normal venous pressure at 3 mmHg.

## 2023-10-02 NOTE — ASSESSMENT & PLAN NOTE
Troponin 0.168 repeat normal. Suspect demand ischemia from emesis and volume depletion. EF hyperdynamic. Stress test today - ok for d/c if negative for ischemia

## 2023-10-02 NOTE — NURSING
Patient returned to floor from procedure. NAD noted. Safety precautions maintained. Will continue to monitor.

## 2023-10-02 NOTE — HPI
Mili Zamorano is a 67 with significant history for hypertension, paranoid schizophrenia with multiple inpatient psychiatric admissions, multiple myeloma dx 2018 followed by [facility], 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  Patient states patient drank colored Partha-Aid for lunch and dinner and vomited while she was sleeping.  No abdominal pain nausea or vomiting.  In ED guaicic neg.  Patient was also noted to have elevated troponin.  No chest pain or shortness a breath.  In ED also found to have GLENIS serum creatinine 2.2 baseline 1.2. Placed in observation for Cardiology consult    X ray pelvis   Impression:     1. No convincing acute displaced fracture or dislocation of the right hip noting overall limited evaluation given patient positioning.  2. Please note, patient has osseous destructive lesion with large soft tissue component involving the right iliac wing, better evaluated on CT 05/02/2022.  There has been ongoing sclerotic change of the right iliac wing, no recent exams are available for comparison.  Correlation is needed.  3. Sclerotic focus involving the left inferior pubic ramus, similar in appearance to the previous CT.  Healing fracture would have a similar appearance.     9/12/23  MRI brain w wo brain    1. 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   2.   No acute intracranial abnormality or evidence of parenchymal metastases

## 2023-10-02 NOTE — ASSESSMENT & PLAN NOTE
Recent small-bowel obstruction in July status post resection  Not acute abdomen on exam  Tolerated lunch  Continue to monitor

## 2023-10-02 NOTE — ASSESSMENT & PLAN NOTE
Baseline creatinine is 1.3.  Patient admission with creatinine of 2.1 to 2.0 after IV fluid.  Possible volume depletion due to vomiting  Uric acid   IVF  If no improvement will ask Nephrology to see

## 2023-10-02 NOTE — CONSULTS
[facility] - Telemetry  Cardiology  Consult Note    Patient Name: Janie Zamorano  MRN: [id number]  Admission Date: 10/2/2023  Hospital Length of Stay: 0 days  Code Status: Full Code   Attending Provider: Dany Payton MD   Consulting Provider: Malcolm Rai MD  Primary Care Physician: He Hoyt Jr., MD  Principal Problem:<principal problem not specified>    Patient information was obtained from patient and ER records.     Inpatient consult to Cardiology  Consult performed by: Malcolm Rai MD  Consult ordered by: Xavier Guerra MD        Subjective:     Chief Complaint:  Elevated troponin     HPI:   Janie Zamorano is a 67 y.o. female with a PMHx of HTN, asthma, COPD, GERD, depression, schizophrenia, who presents to the ED via EMS for evaluation of possible hematemesis. History provided by independent historian, EMS, who reports initial call from [facility] for 4 emesis episodes. Per staff, patient had 4 emesis episodes that appeared with dark red blood. EMS further endorses patient had stated she had drank red radha aid prior to her episodes, but did report a weird smell shortly after. Patient currently denies any nausea. She reports right sided pain, including her upper and lower extremities, back, and abdomen. She endorses this is due to her history of multiple myeloma. No medications taken PTA. No alleviating or exacerbating factors noted. Denies headache, CP, or other associated symptoms.      External documents reviewed: Patient presents with CEC/PEC in place. Per PEC, patient was displaying bizarre behavior. She was also non-compliant with her medication. Additionally, her exam at the time showed tangential speech, delusions, and disorientation.      Currently denies CP or SOB  EKG NSR - no acute STT changes  Troponin 0.168 repeat normal  Cr 2.1    Echo 10/2/23    Left Ventricle: The left ventricle is normal in size. There is mild concentric hypertrophy. There is  hyperdynamic systolic function with a visually estimated ejection fraction of 75 - 80%.    Right Ventricle: Normal right ventricular cavity size. Systolic function is normal.    Pulmonary Artery: The estimated pulmonary artery systolic pressure is 32 mmHg.    IVC/SVC: Normal venous pressure at 3 mmHg.         Past Medical History:   Diagnosis Date    Anxiety     Asthma     Bronchitis     COPD (chronic obstructive pulmonary disease)     Depression     GERD (gastroesophageal reflux disease)     Hallucination     History of psychiatric hospitalization     Hypertension     Neck pain     Polyneuropathy in diseases classified elsewhere 2021    Schizophrenia     Sleep difficulties        Past Surgical History:   Procedure Laterality Date     SECTION      X 1    COLONOSCOPY N/A 2018    Surgeon: Albert Russell MD    ESOPHAGOGASTRODUODENOSCOPY N/A 2018    Surgeon: Albert Russell MD    HYSTERECTOMY  2016    [facility]---DLH/BSO    LIPOMA RESECTION      back  x 2    SALPINGOOPHORECTOMY Bilateral 2016    [facility]---DLH/BSO    THORACIC LAMINECTOMY WITH FUSION N/A 2018    Surgeon: He Andres MD       Review of patient's allergies indicates:   Allergen Reactions    Iodine Hives    Shellfish containing products Itching    Ondansetron hcl Nausea Only       No current facility-administered medications on file prior to encounter.     Current Outpatient Medications on File Prior to Encounter   Medication Sig    acetaminophen (TYLENOL) 500 MG tablet Take 500 mg by mouth daily as needed for Pain.    amLODIPine (NORVASC) 10 MG tablet TAKE 1 TABLET(10 MG) BY MOUTH EVERY DAY    ascorbic acid, vitamin C, (VITAMIN C) 1000 MG tablet Take 1,000 mg by mouth once daily.    aspirin (ECOTRIN) 81 MG EC tablet Take 81 mg by mouth once daily.    BIOTIN ORAL Take 1 tablet by mouth once daily.    calcium carbonate (TUMS ORAL) Take 1 tablet by mouth daily as needed (Heartburn).    capsaicin  (ARTHRITIS PAIN RELIEF,CAPSAIC, TOP) Apply topically daily as needed (Pain).    cyanocobalamin, vitamin B-12, (VITAMIN B-12 ORAL) Take 1 tablet by mouth daily as needed.    flaxseed oiL 1,000 mg Cap Take by mouth.    grape seed oil, bulk, 100 % Oil by Misc.(Non-Drug; Combo Route) route.    LORazepam (ATIVAN) 0.5 MG tablet Take 0.5 mg by mouth 2 (two) times daily as needed for Anxiety.    mirtazapine (REMERON) 30 MG tablet Take 30 mg by mouth every evening.    multivitamin capsule Take 1 capsule by mouth once daily.    OLANZapine (ZYPREXA) 2.5 MG tablet Take 1 tablet (2.5 mg total) by mouth every evening.    senna-docusate 8.6-50 mg (PERICOLACE) 8.6-50 mg per tablet Take 1 tablet by mouth 2 (two) times daily as needed for Constipation.    tamsulosin (FLOMAX) 0.4 mg Cap Take 1 capsule (0.4 mg total) by mouth once daily.    vitamin D (VITAMIN D3) 1000 units Tab Take 1,000 Units by mouth once daily.     Family History       Problem Relation (Age of Onset)    Breast cancer Mother    COPD Father    Diabetes Brother    Glaucoma Mother, Sister    Hypertension Mother, Father    Liver cancer Paternal Uncle (75)    Macular degeneration Mother    Stroke Mother          Tobacco Use    Smoking status: Never    Smokeless tobacco: Never   Substance and Sexual Activity    Alcohol use: No     Alcohol/week: 0.0 standard drinks of alcohol    Drug use: No    Sexual activity: Not Currently     Partners: Male     Birth control/protection: Post-menopausal     Review of Systems   Constitutional: Negative for decreased appetite.   HENT:  Negative for ear discharge.    Eyes:  Negative for blurred vision.   Respiratory:  Negative for hemoptysis.    Endocrine: Negative for polyphagia.   Hematologic/Lymphatic: Negative for adenopathy.   Skin:  Negative for color change.   Musculoskeletal:  Negative for joint swelling.   Genitourinary:  Negative for bladder incontinence.   Neurological:  Negative for brief paralysis.    Psychiatric/Behavioral:  Negative for hallucinations.    Allergic/Immunologic: Negative for hives.     Objective:     Vital Signs (Most Recent):  Temp: 98.1 °F (36.7 °C) (10/02/23 0829)  Pulse: 88 (10/02/23 0829)  Resp: 18 (10/02/23 0829)  BP: 135/80 (10/02/23 0829)  SpO2: 100 % (10/02/23 0829) Vital Signs (24h Range):  Temp:  [98.1 °F (36.7 °C)-98.4 °F (36.9 °C)] 98.1 °F (36.7 °C)  Pulse:  [74-88] 88  Resp:  [17-21] 18  SpO2:  [100 %] 100 %  BP: (103-135)/(57-82) 135/80     Weight: 54.4 kg (120 lb)  Body mass index is 21.26 kg/m².    SpO2: 100 %       No intake or output data in the 24 hours ending 10/02/23 1155    Lines/Drains/Airways       Peripheral Intravenous Line  Duration                  Peripheral IV - Single Lumen 10/02/23 0415 18 G Anterior;Left Upper Arm <1 day                     Physical Exam  Constitutional:       Appearance: She is well-developed.   HENT:      Head: Normocephalic and atraumatic.   Eyes:      Conjunctiva/sclera: Conjunctivae normal.      Pupils: Pupils are equal, round, and reactive to light.   Cardiovascular:      Rate and Rhythm: Normal rate.      Pulses: Intact distal pulses.      Heart sounds: Normal heart sounds.   Pulmonary:      Effort: Pulmonary effort is normal.      Breath sounds: Normal breath sounds.   Abdominal:      General: Bowel sounds are normal.      Palpations: Abdomen is soft.   Musculoskeletal:         General: Normal range of motion.      Cervical back: Normal range of motion and neck supple.   Skin:     General: Skin is warm and dry.   Neurological:      Mental Status: She is alert and oriented to person, place, and time.          Significant Labs: All pertinent lab results from the last 24 hours have been reviewed.    Significant Imaging: Echocardiogram: 2D echo with color flow doppler: No results found for this or any previous visit.    Assessment and Plan:     Elevated troponin  Troponin 0.168 repeat normal. Suspect demand ischemia from emesis and volume  depletion. EF hyperdynamic. Stress test today - ok for d/c if negative for ischemia    Paranoid schizophrenia  Per primary        VTE Risk Mitigation (From admission, onward)    None          Thank you for your consult. I will follow-up with patient. Please contact us if you have any additional questions.    Malcolm Rai MD  Cardiology   [facility] - Telemetry

## 2023-10-02 NOTE — ED NOTES
"Pt placed on tele box 5001. Tele tech called and notified. Security called to transport pt to room 325.    Pt informed of her room number and transport. Pt opened eyes and stated " ok thank you" and went back to sleep. Pt in no distress. VSS.   "

## 2023-10-02 NOTE — ED PROVIDER NOTES
Encounter Date: 10/2/2023    SCRIBE #1 NOTE: I, Abner Villatoro, am scribing for, and in the presence of,  aXvier Guerra MD.       History     Chief Complaint   Patient presents with    Hematemesis     Pt arrived via ems, pt chief complaint is Hematemesis. Pt sent from [facility] for vomiting blood, per staff pt had 4 bouts of dark red blood.      Janie Zamorano is a 67 y.o. female with a PMHx of HTN, asthma, COPD, GERD, depression, schizophrenia, who presents to the ED via EMS for evaluation of possible hematemesis. History provided by independent historian, EMS, who reports initial call from [facility] for 4 emesis episodes. Per staff, patient had 4 emesis episodes that appeared with dark red blood. EMS further endorses patient had stated she had drank red radha aid prior to her episodes, but did report a weird smell shortly after. Patient currently denies any nausea. She reports right sided pain, including her upper and lower extremities, back, and abdomen. She endorses this is due to her history of multiple myeloma. No medications taken PTA. No alleviating or exacerbating factors noted. Denies headache, CP, or other associated symptoms.     External documents reviewed: Patient presents with CEC/PEC in place. Per PEC, patient was displaying bizarre behavior. She was also non-compliant with her medication. Additionally, her exam at the time showed tangential speech, delusions, and disorientation.     The history is provided by the patient and the EMS personnel. No  was used.     Review of patient's allergies indicates:   Allergen Reactions    Iodine Hives    Shellfish containing products Itching    Ondansetron hcl Nausea Only     Past Medical History:   Diagnosis Date    Anxiety     Asthma     Bronchitis     COPD (chronic obstructive pulmonary disease)     Depression     GERD (gastroesophageal reflux disease)     Hallucination     History of psychiatric hospitalization      Hypertension     Neck pain     Polyneuropathy in diseases classified elsewhere 2021    Schizophrenia     Sleep difficulties      Past Surgical History:   Procedure Laterality Date     SECTION      X 1    COLONOSCOPY N/A 2018    Surgeon: Albert Russell MD    ESOPHAGOGASTRODUODENOSCOPY N/A 2018    Surgeon: Albert Russell MD    HYSTERECTOMY  2016    [facility]---DLH/BSO    LIPOMA RESECTION      back  x 2    SALPINGOOPHORECTOMY Bilateral 2016    [facility]---DLH/BSO    THORACIC LAMINECTOMY WITH FUSION N/A 2018    Surgeon: He Andres MD     Family History   Problem Relation Age of Onset    Hypertension Mother     Glaucoma Mother     Macular degeneration Mother     Breast cancer Mother     Stroke Mother     COPD Father     Hypertension Father     Diabetes Brother     Glaucoma Sister     Liver cancer Paternal Uncle 75        dad brother ETOH    Ovarian cancer Neg Hx     Colon cancer Neg Hx     Colon polyps Neg Hx     Cirrhosis Neg Hx     Celiac disease Neg Hx     Ulcerative colitis Neg Hx     Hemochromatosis Neg Hx     Esophageal cancer Neg Hx     Anxiety disorder Neg Hx     Dementia Neg Hx     Bipolar disorder Neg Hx     Depression Neg Hx     Drug abuse Neg Hx     Schizophrenia Neg Hx     Suicide Neg Hx      Social History     Tobacco Use    Smoking status: Never    Smokeless tobacco: Never   Substance Use Topics    Alcohol use: No     Alcohol/week: 0.0 standard drinks of alcohol    Drug use: No     Review of Systems   Constitutional:  Negative for chills and fever.   HENT:  Negative for sore throat.    Respiratory:  Positive for shortness of breath. Negative for cough.    Cardiovascular:  Negative for chest pain.   Gastrointestinal:  Positive for vomiting. Negative for nausea.   Genitourinary:  Negative for dysuria.   Musculoskeletal:  Positive for myalgias. Negative for back pain.   Skin:  Negative for rash.   Neurological:  Negative for weakness and headaches.   Psychiatric/Behavioral:  Negative  for confusion.        Physical Exam     Initial Vitals [10/02/23 0331]   BP Pulse Resp Temp SpO2   133/82 88 18 98.4 °F (36.9 °C) 100 %      MAP       --         Physical Exam    Nursing note and vitals reviewed.  Constitutional: She appears well-developed and well-nourished. She does not appear ill. No distress.   HENT:   Head: Normocephalic and atraumatic.   Mouth/Throat: Oropharynx is clear and moist.   Eyes: Conjunctivae and EOM are normal.   Neck:   Normal range of motion.  Cardiovascular:  Regular rhythm and normal heart sounds.   Tachycardia present.         No murmur heard.  Pulses:       Dorsalis pedis pulses are 2+ on the right side.   Pulmonary/Chest: Breath sounds normal. No respiratory distress. She has no wheezes.   Abdominal: Abdomen is soft. She exhibits no mass. There is no abdominal tenderness.   Genitourinary:    Genitourinary Comments: Chaperone nurse, Edilma.    Hemoccult negative. Brown stool.      Musculoskeletal:         General: No edema.      Cervical back: Normal range of motion.      Right lower leg: No edema.      Left lower leg: No edema.      Comments: Moving BLE.      Neurological: She is alert. GCS score is 15.   Skin: Skin is warm.   Psychiatric: She has a normal mood and affect.         ED Course   Procedures  Labs Reviewed   CBC W/ AUTO DIFFERENTIAL - Abnormal; Notable for the following components:       Result Value    WBC 3.63 (*)     RBC 3.20 (*)     Hemoglobin 8.9 (*)     Hematocrit 28.7 (*)     MCHC 31.0 (*)     RDW 17.6 (*)     Immature Granulocytes 0.6 (*)     Lymph # 0.7 (*)     All other components within normal limits   COMPREHENSIVE METABOLIC PANEL - Abnormal; Notable for the following components:    BUN 33 (*)     Creatinine 2.1 (*)     Albumin 3.1 (*)     eGFR 26 (*)     All other components within normal limits   TROPONIN I - Abnormal; Notable for the following components:    Troponin I 0.168 (*)     All other components within normal limits   PROTIME-INR   APTT   CK  "  MAGNESIUM   TROPONIN I   B-TYPE NATRIURETIC PEPTIDE   CK   MAGNESIUM   TYPE & SCREEN          Imaging Results              X-Ray Chest 1 View (Final result)  Result time 10/02/23 07:20:31      Final result by Tolu Tran MD (10/02/23 07:20:31)                   Impression:      Left apical pleural thickening and pleural based mass as seen on prior CTA chest dated 06/02/2023, worrisome for malignancy or metastatic disease.  Osseous metastatic disease also better evaluated on prior CTs.    No new large focal consolidation.      Electronically signed by: Tolu Tran MD  Date:    10/02/2023  Time:    07:20               Narrative:    EXAMINATION:  XR CHEST 1 VIEW    CLINICAL HISTORY:  Provided history is "  Other specified abnormal findings of blood chemistry".    TECHNIQUE:  One view of the chest.    COMPARISON:  CTA chest, 06/02/2023.  Chest radiograph, 02/21/2023.    FINDINGS:  Cardiac wires overlie the chest.  Cardiomediastinal silhouette is borderline enlarged and similar to the prior exam.  Left apical pleural thickening and probable pleural based mass in the medial aspect of the left lung apex.  This finding is new when compared with prior chest radiograph though similar when compared with prior CT dated 06/02/2023.  No confluent area of consolidation.  No large pleural effusion.  No pneumothorax.  Postoperative changes in the spine again noted.  Probable osseous metastatic disease better evaluated on prior CT.                                       Medications   lactated ringers infusion (has no administration in time range)   sodium chloride 0.9% flush 10 mL (has no administration in time range)   aspirin tablet 325 mg (325 mg Oral Given 10/2/23 0527)   pantoprazole injection 80 mg (80 mg Intravenous Given 10/2/23 0618)     Medical Decision Making    67-year-old female presenting following vomiting episode.  Patient currently at psychiatric facility for schizophrenia.  Per the facility staff " patient had what appeared to be bloody vomiting episode x3.  Patient is not on anticoagulation.  Patient denies any symptoms during the ER stay.  No abdominal distention or tenderness.  Troponin obtained for possible atypical ACS.  Troponin was 0.168.  Unclear if type 2 troponin leak secondary to anemia or other pathology versus type 1 NSTEMI.  Hemoccult negative.  Brown stool on rectal exam.  Hemoglobin appears to be stable.  Case discussed with observation JANELLE for repeat hemoglobin and repeat troponin checks.  Low suspicion for small-bowel obstruction as patient is not distended and has no abdominal tenderness.    Differential includes ACS, GI bleed, symptomatic anemia    Amount and/or Complexity of Data Reviewed  Independent Historian: EMS  External Data Reviewed: notes.     Details: See HPI.  Labs: ordered. Decision-making details documented in ED Course.  Radiology: ordered.  ECG/medicine tests:  Decision-making details documented in ED Course.    Risk  OTC drugs.  Prescription drug management.            Scribe Attestation:   Scribe #1: I performed the above scribed service and the documentation accurately describes the services I performed. I attest to the accuracy of the note.        ED Course as of 10/02/23 0728   Mon Oct 02, 2023   0512 Troponin I(!): 0.168 [JM]   0540 [phone] [facility]    Nurse stated she vomited three times. Possible blood.  [JM]   0605 EKG 12-lead  Time 5:15 a.m.     Rate 82, sinus, regular rhythm, normal axis.   QRS 78 QTC of 441.  No ST elevation or depression.  T-wave inversio lead 3.  no hyperacute T-waves no Q-waves present RSR lead 3.      Normal sinus rhythm. [JM]   0606 At this time it is unclear if patient is having elevated troponin secondary to true NSTEMI or demand ischemia from anemia.  Patient will need close repeat troponins and repeat hemoglobin is to determine if having ACS.  ECG shows no ST changes at this time.  There are T-wave inversions noted. [JM]    0611 Case discussed with LISA Hernandez, [facility] medicine.     Discussed elevated troponin 0.168.  Discussed possibility of hematemesis however hemoccult negative brown stool.  Patient is asymptomatic at this time.  Discussed ECG.  Patient is receiving 3 hour troponin and a cardiology consult. [LAURY]   0713 EKG 12-lead  Time 5:15 a.m.     Rate 82, sinus, regular rhythm,  QRS 78 .  No ST elevation or depression. T-wave inversion lead III. No hyperacute t-wave. No q-wave.     Normal sinus rhythm with inferior t-wave inversion.  [JM]      ED Course User Index  [JM] Xavier Guerra MD                      I, Xavier Guerra, personally performed the services described in this documentation. All medical record entries made by the scribe were at my direction and in my presence. I have reviewed the chart and agree that the record reflects my personal performance and is accurate and complete.    Clinical Impression:   Final diagnoses:  [R79.89] Elevated troponin  [R11.10] Vomiting, unspecified vomiting type, unspecified whether nausea present (Primary)        ED Disposition Condition    Observation Stable                Xavier Guerra MD  10/02/23 0486

## 2023-10-02 NOTE — SUBJECTIVE & OBJECTIVE
Past Medical History:   Diagnosis Date    Anxiety     Asthma     Bronchitis     COPD (chronic obstructive pulmonary disease)     Depression     GERD (gastroesophageal reflux disease)     Hallucination     History of psychiatric hospitalization     Hypertension     Neck pain     Polyneuropathy in diseases classified elsewhere 2021    Schizophrenia     Sleep difficulties        Past Surgical History:   Procedure Laterality Date     SECTION      X 1    COLONOSCOPY N/A 2018    Surgeon: Albert Russell MD    ESOPHAGOGASTRODUODENOSCOPY N/A 2018    Surgeon: Albert Russell MD    HYSTERECTOMY  2016    KJB---DLH/BSO    LIPOMA RESECTION      back  x 2    SALPINGOOPHORECTOMY Bilateral 2016    KJB---DLH/BSO    THORACIC LAMINECTOMY WITH FUSION N/A 2018    Surgeon: He Andres MD       Review of patient's allergies indicates:   Allergen Reactions    Iodine Hives    Shellfish containing products Itching    Ondansetron hcl Nausea Only       No current facility-administered medications on file prior to encounter.     Current Outpatient Medications on File Prior to Encounter   Medication Sig    acetaminophen (TYLENOL) 500 MG tablet Take 500 mg by mouth daily as needed for Pain.    amLODIPine (NORVASC) 10 MG tablet TAKE 1 TABLET(10 MG) BY MOUTH EVERY DAY    ascorbic acid, vitamin C, (VITAMIN C) 1000 MG tablet Take 1,000 mg by mouth once daily.    aspirin (ECOTRIN) 81 MG EC tablet Take 81 mg by mouth once daily.    BIOTIN ORAL Take 1 tablet by mouth once daily.    calcium carbonate (TUMS ORAL) Take 1 tablet by mouth daily as needed (Heartburn).    cyanocobalamin, vitamin B-12, (VITAMIN B-12 ORAL) Take 1 tablet by mouth daily as needed.    mirtazapine (REMERON) 30 MG tablet Take 30 mg by mouth every evening.    senna-docusate 8.6-50 mg (PERICOLACE) 8.6-50 mg per tablet Take 1 tablet by mouth 2 (two) times daily as needed for Constipation.    vitamin D (VITAMIN D3) 1000 units Tab Take 1,000 Units by  mouth once daily.    [DISCONTINUED] capsaicin (ARTHRITIS PAIN RELIEF,CAPSAIC, TOP) Apply topically daily as needed (Pain).    [DISCONTINUED] flaxseed oiL 1,000 mg Cap Take by mouth.    [DISCONTINUED] grape seed oil, bulk, 100 % Oil by Misc.(Non-Drug; Combo Route) route.    [DISCONTINUED] LORazepam (ATIVAN) 0.5 MG tablet Take 0.5 mg by mouth 2 (two) times daily as needed for Anxiety.    [DISCONTINUED] multivitamin capsule Take 1 capsule by mouth once daily.    [DISCONTINUED] OLANZapine (ZYPREXA) 2.5 MG tablet Take 1 tablet (2.5 mg total) by mouth every evening.    [DISCONTINUED] tamsulosin (FLOMAX) 0.4 mg Cap Take 1 capsule (0.4 mg total) by mouth once daily.     Family History       Problem Relation (Age of Onset)    Breast cancer Mother    COPD Father    Diabetes Brother    Glaucoma Mother, Sister    Hypertension Mother, Father    Liver cancer Paternal Uncle (75)    Macular degeneration Mother    Stroke Mother          Tobacco Use    Smoking status: Never    Smokeless tobacco: Never   Substance and Sexual Activity    Alcohol use: No     Alcohol/week: 0.0 standard drinks of alcohol    Drug use: No    Sexual activity: Not Currently     Partners: Male     Birth control/protection: Post-menopausal     Review of Systems   Constitutional:  Positive for activity change and fatigue. Negative for appetite change, chills, diaphoresis and fever.   HENT: Negative.     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   Respiratory: Negative.     Cardiovascular: Negative.    Gastrointestinal:  Positive for abdominal pain and vomiting. Negative for blood in stool, constipation, diarrhea and nausea.   Endocrine: Negative.    Genitourinary: Negative.    Musculoskeletal:  Positive for arthralgias, gait problem and myalgias.   Skin: Negative.    Hematological: Negative.    Psychiatric/Behavioral: Negative.       Objective:     Vital Signs (Most Recent):  Temp: 97.7 °F (36.5 °C) (10/02/23 1245)  Pulse: 92 (10/02/23  1245)  Resp: 18 (10/02/23 1245)  BP: 114/64 (10/02/23 1245)  SpO2: 100 % (10/02/23 1245) Vital Signs (24h Range):  Temp:  [97.7 °F (36.5 °C)-98.4 °F (36.9 °C)] 97.7 °F (36.5 °C)  Pulse:  [74-92] 92  Resp:  [17-21] 18  SpO2:  [100 %] 100 %  BP: (103-135)/(57-82) 114/64     Weight: 54.4 kg (120 lb)  Body mass index is 21.26 kg/m².     Physical Exam  Constitutional:       General: She is not in acute distress.     Appearance: She is ill-appearing.      Comments: frail   HENT:      Head: Normocephalic and atraumatic.   Cardiovascular:      Rate and Rhythm: Normal rate and regular rhythm.   Pulmonary:      Effort: Pulmonary effort is normal.   Abdominal:      General: There is no distension.   Musculoskeletal:         General: Tenderness (right hip) present.      Right lower leg: No edema.   Skin:     General: Skin is warm and dry.   Neurological:      General: No focal deficit present.      Mental Status: She is alert and oriented to person, place, and time. Mental status is at baseline.                Significant Labs: All pertinent labs within the past 24 hours have been reviewed.    Significant Imaging: I have reviewed all pertinent imaging results/findings within the past 24 hours.

## 2023-10-02 NOTE — ASSESSMENT & PLAN NOTE
Status post resection and radiation continue follow up with oncologist at [facility] [place]

## 2023-10-02 NOTE — Clinical Note
Diagnosis: Elevated troponin [110997]   Future Attending Provider: KENDRICK PLUNKETT [[id number]]   Admitting Provider:: KENDRICK PLUNKETT [[id number]]

## 2023-10-02 NOTE — ASSESSMENT & PLAN NOTE
"Patient recently admitted to inpatient psychiatric facility [facility].  Patient son stated he brought his mother to the emergency room at [facility] for "full body scan" then found overnight that patient was placed in a psychiatric facility for bizzare bx restless and agitattion.  Continue CEC.       "

## 2023-10-02 NOTE — NURSING
Patient arrived to floor via stretcher from ED. NAD noted. Safety precautions maintained. Will continue to monitor.

## 2023-10-02 NOTE — ED TRIAGE NOTES
Pt to rm 4 via EMS from [facility], pt is a CEC/PEC; Pt states about 4 episodes of vomiting today, she states that they had red radha aid earlier and her first episode the worker told her that she threw up blood and pt told her that they had red radha aid earlier but pt does state that after the last episode that she could smell a weird smell

## 2023-10-03 VITALS
HEART RATE: 104 BPM | DIASTOLIC BLOOD PRESSURE: 59 MMHG | BODY MASS INDEX: 21.26 KG/M2 | SYSTOLIC BLOOD PRESSURE: 111 MMHG | TEMPERATURE: 98 F | RESPIRATION RATE: 17 BRPM | WEIGHT: 120 LBS | HEIGHT: 63 IN | OXYGEN SATURATION: 99 %

## 2023-10-03 LAB
ALBUMIN SERPL BCP-MCNC: 2.7 G/DL (ref 3.5–5.2)
ALP SERPL-CCNC: 77 U/L (ref 55–135)
ALT SERPL W/O P-5'-P-CCNC: 12 U/L (ref 10–44)
ANION GAP SERPL CALC-SCNC: 7 MMOL/L (ref 8–16)
AST SERPL-CCNC: 23 U/L (ref 10–40)
BASOPHILS # BLD AUTO: 0 K/UL (ref 0–0.2)
BASOPHILS NFR BLD: 0 % (ref 0–1.9)
BILIRUB SERPL-MCNC: 0.3 MG/DL (ref 0.1–1)
BUN SERPL-MCNC: 27 MG/DL (ref 8–23)
CALCIUM SERPL-MCNC: 8.5 MG/DL (ref 8.7–10.5)
CHLORIDE SERPL-SCNC: 114 MMOL/L (ref 95–110)
CO2 SERPL-SCNC: 21 MMOL/L (ref 23–29)
CREAT SERPL-MCNC: 2 MG/DL (ref 0.5–1.4)
DIFFERENTIAL METHOD: ABNORMAL
EOSINOPHIL # BLD AUTO: 0.1 K/UL (ref 0–0.5)
EOSINOPHIL NFR BLD: 1.9 % (ref 0–8)
ERYTHROCYTE [DISTWIDTH] IN BLOOD BY AUTOMATED COUNT: 18.6 % (ref 11.5–14.5)
EST. GFR  (NO RACE VARIABLE): 27 ML/MIN/1.73 M^2
FOLATE SERPL-MCNC: 2.9 NG/ML (ref 4–24)
GLUCOSE SERPL-MCNC: 82 MG/DL (ref 70–110)
HCT VFR BLD AUTO: 25 % (ref 37–48.5)
HGB BLD-MCNC: 7.4 G/DL (ref 12–16)
IMM GRANULOCYTES # BLD AUTO: 0.01 K/UL (ref 0–0.04)
IMM GRANULOCYTES NFR BLD AUTO: 0.4 % (ref 0–0.5)
LYMPHOCYTES # BLD AUTO: 0.6 K/UL (ref 1–4.8)
LYMPHOCYTES NFR BLD: 20.7 % (ref 18–48)
MCH RBC QN AUTO: 27.8 PG (ref 27–31)
MCHC RBC AUTO-ENTMCNC: 29.6 G/DL (ref 32–36)
MCV RBC AUTO: 94 FL (ref 82–98)
MONOCYTES # BLD AUTO: 0.4 K/UL (ref 0.3–1)
MONOCYTES NFR BLD: 13 % (ref 4–15)
NEUTROPHILS # BLD AUTO: 1.7 K/UL (ref 1.8–7.7)
NEUTROPHILS NFR BLD: 64 % (ref 38–73)
NRBC BLD-RTO: 0 /100 WBC
PLATELET # BLD AUTO: 185 K/UL (ref 150–450)
PMV BLD AUTO: 9.2 FL (ref 9.2–12.9)
POTASSIUM SERPL-SCNC: 5 MMOL/L (ref 3.5–5.1)
PROT SERPL-MCNC: 5.2 G/DL (ref 6–8.4)
RBC # BLD AUTO: 2.66 M/UL (ref 4–5.4)
SODIUM SERPL-SCNC: 142 MMOL/L (ref 136–145)
VIT B12 SERPL-MCNC: 248 PG/ML (ref 210–950)
WBC # BLD AUTO: 2.7 K/UL (ref 3.9–12.7)

## 2023-10-03 PROCEDURE — 25000003 PHARM REV CODE 250: Performed by: NURSE PRACTITIONER

## 2023-10-03 PROCEDURE — 96376 TX/PRO/DX INJ SAME DRUG ADON: CPT

## 2023-10-03 PROCEDURE — 63600175 PHARM REV CODE 636 W HCPCS: Performed by: NURSE PRACTITIONER

## 2023-10-03 PROCEDURE — 94761 N-INVAS EAR/PLS OXIMETRY MLT: CPT

## 2023-10-03 PROCEDURE — C9113 INJ PANTOPRAZOLE SODIUM, VIA: HCPCS | Performed by: NURSE PRACTITIONER

## 2023-10-03 PROCEDURE — 96361 HYDRATE IV INFUSION ADD-ON: CPT

## 2023-10-03 PROCEDURE — 80053 COMPREHEN METABOLIC PANEL: CPT | Performed by: NURSE PRACTITIONER

## 2023-10-03 PROCEDURE — 25000003 PHARM REV CODE 250: Performed by: REGISTERED NURSE

## 2023-10-03 PROCEDURE — 85025 COMPLETE CBC W/AUTO DIFF WBC: CPT | Performed by: NURSE PRACTITIONER

## 2023-10-03 PROCEDURE — 82746 ASSAY OF FOLIC ACID SERUM: CPT | Performed by: NURSE PRACTITIONER

## 2023-10-03 PROCEDURE — 82607 VITAMIN B-12: CPT | Performed by: NURSE PRACTITIONER

## 2023-10-03 PROCEDURE — 36415 COLL VENOUS BLD VENIPUNCTURE: CPT | Performed by: NURSE PRACTITIONER

## 2023-10-03 PROCEDURE — G0378 HOSPITAL OBSERVATION PER HR: HCPCS

## 2023-10-03 RX ORDER — HYDROXYZINE PAMOATE 25 MG/1
25 CAPSULE ORAL EVERY 8 HOURS PRN
Status: DISCONTINUED | OUTPATIENT
Start: 2023-10-03 | End: 2023-10-03 | Stop reason: HOSPADM

## 2023-10-03 RX ORDER — AMIODARONE HYDROCHLORIDE 200 MG/1
200 TABLET ORAL DAILY
Qty: 30 TABLET | Refills: 11 | Status: ON HOLD
Start: 2023-10-03 | End: 2024-02-05 | Stop reason: HOSPADM

## 2023-10-03 RX ORDER — LIDOCAINE 50 MG/G
1 PATCH TOPICAL DAILY
Refills: 0
Start: 2023-10-03 | End: 2023-10-30

## 2023-10-03 RX ADMIN — SODIUM CHLORIDE, POTASSIUM CHLORIDE, SODIUM LACTATE AND CALCIUM CHLORIDE: 600; 310; 30; 20 INJECTION, SOLUTION INTRAVENOUS at 04:10

## 2023-10-03 RX ADMIN — HYDROXYZINE PAMOATE 25 MG: 25 CAPSULE ORAL at 03:10

## 2023-10-03 RX ADMIN — AMIODARONE HYDROCHLORIDE 200 MG: 200 TABLET ORAL at 08:10

## 2023-10-03 RX ADMIN — ACETAMINOPHEN 1000 MG: 500 TABLET ORAL at 01:10

## 2023-10-03 RX ADMIN — AMLODIPINE BESYLATE 10 MG: 5 TABLET ORAL at 08:10

## 2023-10-03 RX ADMIN — PANTOPRAZOLE SODIUM 40 MG: 40 INJECTION, POWDER, FOR SOLUTION INTRAVENOUS at 08:10

## 2023-10-03 RX ADMIN — ASPIRIN 81 MG: 81 TABLET, COATED ORAL at 08:10

## 2023-10-03 NOTE — PLAN OF CARE
Pt is AAOx4, but acts bizarre at times. Room air. Tele maintained.   No falls or injuries reported during shift, safety precautions maintained.     Problem: Adult Inpatient Plan of Care  Goal: Plan of Care Review  Outcome: Ongoing, Progressing     Problem: Adult Inpatient Plan of Care  Goal: Optimal Comfort and Wellbeing  Outcome: Ongoing, Progressing

## 2023-10-03 NOTE — PLAN OF CARE
10/03/23 0852   Final Note   Assessment Type Final Discharge Note   Anticipated Discharge Disposition Psych   Hospital Resources/Appts/Education Provided Post-Acute resouces added to AVS   Post-Acute Status   Post-Acute Authorization Placement   Post-Acute Placement Status Set-up Complete/Auth obtained   Discharge Delays (!) PFC Arranged Transportation     Pts nurse notified that pt is clear for d/c from CM standpoint

## 2023-10-03 NOTE — ASSESSMENT & PLAN NOTE
"Patient recently admitted to inpatient psychiatric facility [facility].  Patient son stated he brought his mother to the emergency room at [facility] for "full body scan" then found overnight that patient was placed in a psychiatric facility for bizzare bx restless and agitattion.  Continue CEC.       "

## 2023-10-03 NOTE — NURSING
[facility] [place]es [place] -- 4 Eyes      10/2/2023       Skin assessed on: Q Shift      [x] No Pressure Injuries Present    [x]Prevention Measures Documented    [] Yes LDA  for Pressure Injury Previously documented     [] Yes New Pressure Injury Discovered   [] LDA for New Pressure Injury Added      Attending RN:  FRANK LICONA, RN     Second RN:  Tianna LUCIANO

## 2023-10-03 NOTE — PLAN OF CARE
Call placed to [facility] to confirm pt from their facility.  Spoke to Brisa who confirmed pt is from them and that the pt can return.  Brisa requested TN contact [facility] and have nurse call report to [phone] option [phone] and ask for Brisa.     0829- ADT 32 entered for transportation back to facility. TN indicated that nursing was who needed to be contacted with  time when transportation arranged.    0830- call placed to [facility] spoke to Marti to notify her that the pt will be returning and that the paperwork will be sent to her shortly.  Marti reports that the pt is still in the system and when she receives it she will forward it to [facility] location.    0832- message sent to JUAN CARLOS Roth to provide call report information and advise that the patient transfer center will be contacting the unit to provide updates on time for transportation.

## 2023-10-03 NOTE — DISCHARGE SUMMARY
[facility] Medicine  Discharge Summary      Patient Name: Janie Zamorano  MRN: [id number]  TODD: [id number]  Patient Class: OP- Observation  Admission Date: 10/2/2023  Hospital Length of Stay: 0 days  Discharge Date and Time:  10/03/2023 8:27 AM  Attending Physician: Theresa Artis, *   Discharging Provider: Nelly Hernandez NP  Primary Care Provider: He Hoyt Jr., MD    Primary Care Team: Networked reference to record PCT     HPI:   Mili Zamorano is a 67 with significant history for hypertension, paranoid schizophrenia with multiple inpatient psychiatric admissions, multiple myeloma dx 2018 followed by [facility], 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  Patient states patient drank colored Partha-Aid for lunch and dinner and vomited while she was sleeping.  No abdominal pain nausea or vomiting.  In ED guaicic neg.  Patient was also noted to have elevated troponin.  No chest pain or shortness a breath.  In ED also found to have GLENIS serum creatinine 2.2 baseline 1.2. Placed in observation for Cardiology consult    X ray pelvis   Impression:     1. No convincing acute displaced fracture or dislocation of the right hip noting overall limited evaluation given patient positioning.  2. Please note, patient has osseous destructive lesion with large soft tissue component involving the right iliac wing, better evaluated on CT 05/02/2022.  There has been ongoing sclerotic change of the right iliac wing, no recent exams are available for comparison.  Correlation is needed.  3. Sclerotic focus involving the left inferior pubic ramus, similar in appearance to the previous CT.  Healing fracture would have a similar appearance.     9/12/23  MRI  "brain w wo brain    1. 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   2.   No acute intracranial abnormality or evidence of parenchymal metastases      * No surgery found *      Hospital Course:   Presents to the ED for suspected hematemesis.  Suspect color from emesis due to Partha-Aid at lunch and dinner per patient.  In ED found to have elevated troponin.  No chest pain or shortness a breath. Negative stress test.  2 D echo normal EF 75 80%, no mention of wall motion abnormality. Reviewed of previous labs and renal function at baseline.  Hgb stable and IVF contributing to dilution. No overt signs of bleeding during stay. Tolerated diet no issues. Stated had a fall 1 week ago at [facility] and right hip pain. X ray right pelvis no fractures  but does show osseous destruction lesion that was also noted on CT 5/2/2022 per Radiology. Plan to follow up with Primary Oncologist at [facility].   Patient HDS and medically clear for transfer back to [place].        Goals of Care Treatment Preferences:  Code Status: Full Code      Consults:   Consults (From admission, onward)        Status Ordering Provider     Inpatient consult to Cardiology  Once        Provider:  (Not yet assigned)    Completed TINA JOHNSTON          Psychiatric  Paranoid schizophrenia  Patient recently admitted to inpatient psychiatric facility [facility].  Patient son stated he brought his mother to the emergency room at [facility] for "full body scan" then found overnight that patient was placed in a psychiatric facility for bizzare bx restless and agitattion.  Continue CEC.         Final Active Diagnoses:    Diagnosis Date Noted POA    PRINCIPAL PROBLEM:  Elevated troponin [R79.89]  Yes    GLENIS (acute kidney injury) [N17.9] 10/02/2023 Yes    History of small bowel obstruction [Z87.19] 10/02/2023 Not " Applicable    Lung mass [R91.8] 06/02/2023 Yes    Mass of sphenoid sinus [J34.89]  Yes    GERD (gastroesophageal reflux disease) [K21.9] 08/10/2018 Yes    Multiple myeloma not having achieved remission [C90.00] 08/03/2018 Yes    Paranoid schizophrenia [F20.0] 08/06/2016 Yes      Problems Resolved During this Admission:       Discharged Condition: stable    Disposition: Psychiatric Hospital    Follow Up:    Patient Instructions:      Diet Adult Regular     Activity as tolerated       Significant Diagnostic Studies: N/A    Pending Diagnostic Studies:     Procedure Component Value Units Date/Time    Folate [[id number]] Collected: 10/03/23 0535    Order Status: Sent Lab Status: In process Updated: 10/03/23 0737    Specimen: Blood     Vitamin B12 [[id number]] Collected: 10/03/23 0535    Order Status: Sent Lab Status: In process Updated: 10/03/23 0535    Specimen: Blood     Narrative:      Collection has been rescheduled by [facility] at 10/03/2023 04:55 Reason:   Patient Refused         Medications:  Reconciled Home Medications:      Medication List      START taking these medications    amiodarone 200 MG Tab  Commonly known as: PACERONE  Take 1 tablet (200 mg total) by mouth once daily.     LIDOcaine 5 %  Commonly known as: LIDODERM  Place 1 patch onto the skin once daily. Remove & Discard patch within 12 hours or as directed by MD        CONTINUE taking these medications    acetaminophen 500 MG tablet  Commonly known as: TYLENOL  Take 500 mg by mouth daily as needed for Pain.     amLODIPine 10 MG tablet  Commonly known as: NORVASC  TAKE 1 TABLET(10 MG) BY MOUTH EVERY DAY     ascorbic acid (vitamin C) 1000 MG tablet  Commonly known as: VITAMIN C  Take 1,000 mg by mouth once daily.     aspirin 81 MG EC tablet  Commonly known as: ECOTRIN  Take 81 mg by mouth once daily.     BIOTIN ORAL  Take 1 tablet by mouth once daily.     mirtazapine 30 MG tablet  Commonly known as: REMERON  Take 30 mg by mouth every evening.      senna-docusate 8.6-50 mg 8.6-50 mg per tablet  Commonly known as: PERICOLACE  Take 1 tablet by mouth 2 (two) times daily as needed for Constipation.     TUMS ORAL  Take 1 tablet by mouth daily as needed (Heartburn).     VITAMIN B-12 ORAL  Take 1 tablet by mouth daily as needed.     vitamin D 1000 units Tab  Commonly known as: VITAMIN D3  Take 1,000 Units by mouth once daily.            Indwelling Lines/Drains at time of discharge:   Lines/Drains/Airways     None                 Time spent on the discharge of patient: 35 minutes         Nelly Hernandez NP  Department of Hospital Medicine  [facility] [place] - Telemetry

## 2023-10-06 NOTE — ED TRIAGE NOTES
Cough, sore throat, congestion since Sunday.  + fevers Janie Zamorano, a 67 y.o. female presents to the ED via EMS from home w/ complaint of syncopal episode. Pt states she was recently diagnosed with COVID(+) results and has had generalized fatigue since then. States woke up to make some soup and fainted. Per EMS pt (+) orthostatic hypotension. Pt admits to taking  prescribed BP medication.     Triage note:  Chief Complaint   Patient presents with    Loss of Consciousness     Pt was standing, warming up food and had a syncopal episode. On EMS arrival, pt sitting on floor. +orthostatics per ems, on BP meds.      Review of patient's allergies indicates:   Allergen Reactions    Iodine Hives    Shellfish containing products Itching    Ondansetron hcl Nausea Only     Past Medical History:   Diagnosis Date    Anxiety     Asthma     Bronchitis     COPD (chronic obstructive pulmonary disease)     Depression     GERD (gastroesophageal reflux disease)     Hallucination     History of psychiatric hospitalization     Hypertension     Neck pain     Polyneuropathy in diseases classified elsewhere 4/12/2021    Schizophrenia     Sleep difficulties

## 2023-10-09 NOTE — HOSPITAL COURSE
Subjective:     Encounter Date:10/10/2023      Patient ID: Ronniekhitesh Perez is a 29 y.o. female.    Chief Complaint:  History of Present Illness    This is a 29 year old who presents for an evaluation of palpitations and dyspnea.     The patient is currently    pregnant.  She was recently evaluated by her OB Dr. Tucker on 10/5.  At that time she reported symptoms of heart racing when she wakes up in the morning associated with dyspnea but no chest pain.  Prior to that she was noted to have anemia with a hemoglobin of 8.8.  This was suspected to be an iron deficiency anemia.  Arrangements for IV iron infusions were made.  She was also referred here for further evaluation of her symptoms.     She reports that she had symptoms of palpitations with her first pregnancy 2 years ago.  She underwent a Holter monitor at that time that was normal.  She reports that her current symptoms are different from that.  She describes her current symptoms as episodes of heart racing associated with lightheadedness and shortness of breath.  She had a couple episodes while she was in the office today where she appeared uncomfortable and was unable to really express what she was feeling at the time.  Episodes lasted a few minutes and resolved on their own.  Attempted to get an EKG at the time of her episode which only showed sinus rhythm.  Heart rate was around the 110s when measured.  Blood pressures were unremarkable.    She reports the episodes occur multiple times a day on a daily basis.  Again associated with lightheadedness and shortness of breath but no chest discomfort.  She reports the symptoms usually just occur at rest.  It is unclear if she is having any dyspnea exertion.  She denies any lower extremity swelling.  She reports that she has a sister that was born with a cardiac issue but she cannot give me any details.  She is scheduled to undergo iron transfusion later this week.    Review of Systems   Constitutional:  Presents to the ED for suspected hematemesis.  Suspect color from emesis due to Partha-Aid at lunch and dinner per patient.  In ED found to have elevated troponin.  No chest pain or shortness a breath. Negative stress test.  2 D echo normal EF 75 80%, no mention of wall motion abnormality. Reviewed of previous labs and renal function at baseline.  Hgb stable and IVF contributing to dilution. No overt signs of bleeding during stay. Tolerated diet no issues. Stated had a fall 1 week ago at [facility] and right hip pain. X ray right pelvis no fractures  but does show osseous destruction lesion that was also noted on CT 5/2/2022 per Radiology. Plan to follow up with Primary Oncologist at [facility].   Patient HDS and medically clear for transfer back to [place].    Negative for malaise/fatigue.   HENT:  Negative for hearing loss, hoarse voice, nosebleeds and sore throat.    Eyes:  Negative for pain.   Cardiovascular:  Positive for palpitations. Negative for chest pain, claudication, cyanosis, dyspnea on exertion, irregular heartbeat, leg swelling, near-syncope, orthopnea, paroxysmal nocturnal dyspnea and syncope.   Respiratory:  Positive for shortness of breath. Negative for snoring.    Endocrine: Negative for cold intolerance, heat intolerance, polydipsia, polyphagia and polyuria.   Skin:  Negative for itching and rash.   Musculoskeletal:  Negative for arthritis, falls, joint pain, joint swelling, muscle cramps, muscle weakness and myalgias.   Gastrointestinal:  Negative for constipation, diarrhea, dysphagia, heartburn, hematemesis, hematochezia, melena, nausea and vomiting.   Genitourinary:  Negative for frequency, hematuria and hesitancy.   Neurological:  Positive for light-headedness. Negative for excessive daytime sleepiness, dizziness, headaches, numbness and weakness.   Psychiatric/Behavioral:  Negative for depression. The patient is not nervous/anxious.        No current outpatient medications on file.    Past Medical History:   Diagnosis Date    Anemia     during pregnancy       History reviewed. No pertinent surgical history.    History reviewed. No pertinent family history.    Social History     Tobacco Use    Smoking status: Never     Passive exposure: Never    Smokeless tobacco: Never   Vaping Use    Vaping Use: Never used   Substance Use Topics    Alcohol use: Not Currently    Drug use: Never         ECG 12 Lead    Date/Time: 10/10/2023 12:57 PM  Performed by: Sary Burciaga MD    Authorized by: Sary Burciaga MD  Comparison: compared with previous ECG   Similar to previous ECG  Rhythm: sinus rhythm  T inversion: V1, V2 and III             Objective:     Visit Vitals  /70 (BP Location: Right arm, Patient Position: Sitting, Cuff Size: Adult)   Pulse 101  "  Ht 144.8 cm (57\")   Wt 69.4 kg (153 lb)   LMP 02/27/2023   SpO2 99%   BMI 33.11 kg/mý         Constitutional:       Appearance: Normal appearance. Well-developed.   Eyes:      General: Lids are normal.      Conjunctiva/sclera: Conjunctivae normal.      Pupils: Pupils are equal, round, and reactive to light.   HENT:      Head: Normocephalic and atraumatic.   Neck:      Vascular: No carotid bruit or JVD.      Lymphadenopathy: No cervical adenopathy.   Pulmonary:      Effort: Pulmonary effort is normal.      Breath sounds: Normal breath sounds.   Cardiovascular:      Normal rate. Regular rhythm.      No gallop.    Pulses:     Radial: 2+ bilaterally.  Edema:     Peripheral edema absent.   Abdominal:      Palpations: Abdomen is soft.   Musculoskeletal:      Cervical back: Full passive range of motion without pain, normal range of motion and neck supple. Skin:     General: Skin is warm and dry.   Neurological:      Mental Status: Alert and oriented to person, place, and time.             Assessment:          Diagnosis Plan   1. Palpitations  Adult Transthoracic Echo Complete w/ Color, Spectral and Contrast if Necessary Per Protocol    Holter Monitor - 24 Hour             Plan:       1.  Palpitations.  Episodes occur in the office and appear to be associated with regular tachycardia with rates in the 110s.  Blood pressures were unremarkable at that time.  Agree that her symptoms could certainly be due to her anemia although her symptoms seem to occur randomly at rest which makes that less likely.  We will proceed with a 24-hour Holter monitor and an echocardiogram.  We will see if her symptoms improve at all with upcoming scheduled iron transfusions.    I will call and discuss results of the monitor and the echocardiogram determine further recommendations based on those results.         "

## 2023-10-21 ENCOUNTER — TELEPHONE (OUTPATIENT)
Dept: INTERNAL MEDICINE | Facility: CLINIC | Age: 68
End: 2023-10-21
Payer: MEDICARE

## 2023-10-21 NOTE — TELEPHONE ENCOUNTER
----- Message from Giorgio Higgins MA sent at 10/20/2023  3:00 PM CDT -----  Contact: Mobile [phone]    ----- Message -----  From: Violeta Triana  Sent: 10/20/2023   2:58 PM CDT  To: Evelina STOKES Jr Staff    Patient would like to put in a order for a Bedside Commode, Quad Cane with four legs, Wheel chair, and a Motorized Rollator that sits up high.    Patient said that [employer] [employer] said that they have put the order in for the Motorized Wheel Chair but it never was sent in.

## 2023-10-25 ENCOUNTER — TELEPHONE (OUTPATIENT)
Dept: INTERNAL MEDICINE | Facility: CLINIC | Age: 68
End: 2023-10-25
Payer: MEDICARE

## 2023-10-25 NOTE — TELEPHONE ENCOUNTER
----- Message from Shreya Jin sent at 10/25/2023  9:52 AM CDT -----  Contact: [phone]  Pt called to get a copy of her most recent mammogram 02/01/2022. She would like for it to be mailed to her at the following mailing address:     [address] [place] [address]    She would like someone to call her once imaging has been mailed. Please Advise

## 2023-10-25 NOTE — TELEPHONE ENCOUNTER
Tried contacting pt, no answer and voicemail box is full. Mammogram results placed in mail bin to be mailed off.    Giorgio Higgins

## 2023-10-30 ENCOUNTER — OFFICE VISIT (OUTPATIENT)
Dept: INTERNAL MEDICINE | Facility: CLINIC | Age: 68
End: 2023-10-30
Payer: MEDICARE

## 2023-10-30 VITALS
DIASTOLIC BLOOD PRESSURE: 86 MMHG | HEIGHT: 63 IN | OXYGEN SATURATION: 100 % | HEART RATE: 84 BPM | BODY MASS INDEX: 16.99 KG/M2 | SYSTOLIC BLOOD PRESSURE: 114 MMHG | WEIGHT: 95.88 LBS

## 2023-10-30 DIAGNOSIS — E44.0 MODERATE PROTEIN-CALORIE MALNUTRITION: ICD-10-CM

## 2023-10-30 DIAGNOSIS — C79.9 MULTIPLE LESIONS OF METASTATIC MALIGNANCY: ICD-10-CM

## 2023-10-30 DIAGNOSIS — M84.58XS PATHOLOGICAL FRACTURE OF VERTEBRA DUE TO NEOPLASTIC DISEASE, SEQUELA: ICD-10-CM

## 2023-10-30 DIAGNOSIS — C90.00 MULTIPLE MYELOMA NOT HAVING ACHIEVED REMISSION: ICD-10-CM

## 2023-10-30 DIAGNOSIS — C90.00 MULTIPLE MYELOMA, REMISSION STATUS UNSPECIFIED: ICD-10-CM

## 2023-10-30 DIAGNOSIS — R53.81 DEBILITY: Primary | ICD-10-CM

## 2023-10-30 PROCEDURE — 3079F DIAST BP 80-89 MM HG: CPT | Mod: CPTII,S$GLB,, | Performed by: INTERNAL MEDICINE

## 2023-10-30 PROCEDURE — 3074F PR MOST RECENT SYSTOLIC BLOOD PRESSURE < 130 MM HG: ICD-10-PCS | Mod: CPTII,S$GLB,, | Performed by: INTERNAL MEDICINE

## 2023-10-30 PROCEDURE — 99214 PR OFFICE/OUTPT VISIT, EST, LEVL IV, 30-39 MIN: ICD-10-PCS | Mod: S$GLB,,, | Performed by: INTERNAL MEDICINE

## 2023-10-30 PROCEDURE — 3288F FALL RISK ASSESSMENT DOCD: CPT | Mod: CPTII,S$GLB,, | Performed by: INTERNAL MEDICINE

## 2023-10-30 PROCEDURE — 1159F MED LIST DOCD IN RCRD: CPT | Mod: CPTII,S$GLB,, | Performed by: INTERNAL MEDICINE

## 2023-10-30 PROCEDURE — 4010F PR ACE/ARB THEARPY RXD/TAKEN: ICD-10-PCS | Mod: CPTII,S$GLB,, | Performed by: INTERNAL MEDICINE

## 2023-10-30 PROCEDURE — 1159F PR MEDICATION LIST DOCUMENTED IN MEDICAL RECORD: ICD-10-PCS | Mod: CPTII,S$GLB,, | Performed by: INTERNAL MEDICINE

## 2023-10-30 PROCEDURE — 1101F PT FALLS ASSESS-DOCD LE1/YR: CPT | Mod: CPTII,S$GLB,, | Performed by: INTERNAL MEDICINE

## 2023-10-30 PROCEDURE — 3074F SYST BP LT 130 MM HG: CPT | Mod: CPTII,S$GLB,, | Performed by: INTERNAL MEDICINE

## 2023-10-30 PROCEDURE — 99999 PR PBB SHADOW E&M-EST. PATIENT-LVL IV: ICD-10-PCS | Mod: PBBFAC,,, | Performed by: INTERNAL MEDICINE

## 2023-10-30 PROCEDURE — 1126F AMNT PAIN NOTED NONE PRSNT: CPT | Mod: CPTII,S$GLB,, | Performed by: INTERNAL MEDICINE

## 2023-10-30 PROCEDURE — 1101F PR PT FALLS ASSESS DOC 0-1 FALLS W/OUT INJ PAST YR: ICD-10-PCS | Mod: CPTII,S$GLB,, | Performed by: INTERNAL MEDICINE

## 2023-10-30 PROCEDURE — 3079F PR MOST RECENT DIASTOLIC BLOOD PRESSURE 80-89 MM HG: ICD-10-PCS | Mod: CPTII,S$GLB,, | Performed by: INTERNAL MEDICINE

## 2023-10-30 PROCEDURE — 99999 PR PBB SHADOW E&M-EST. PATIENT-LVL IV: CPT | Mod: PBBFAC,,, | Performed by: INTERNAL MEDICINE

## 2023-10-30 PROCEDURE — 4010F ACE/ARB THERAPY RXD/TAKEN: CPT | Mod: CPTII,S$GLB,, | Performed by: INTERNAL MEDICINE

## 2023-10-30 PROCEDURE — 1126F PR PAIN SEVERITY QUANTIFIED, NO PAIN PRESENT: ICD-10-PCS | Mod: CPTII,S$GLB,, | Performed by: INTERNAL MEDICINE

## 2023-10-30 PROCEDURE — 99214 OFFICE O/P EST MOD 30 MIN: CPT | Mod: S$GLB,,, | Performed by: INTERNAL MEDICINE

## 2023-10-30 PROCEDURE — 3288F PR FALLS RISK ASSESSMENT DOCUMENTED: ICD-10-PCS | Mod: CPTII,S$GLB,, | Performed by: INTERNAL MEDICINE

## 2023-10-30 PROCEDURE — 3044F PR MOST RECENT HEMOGLOBIN A1C LEVEL <7.0%: ICD-10-PCS | Mod: CPTII,S$GLB,, | Performed by: INTERNAL MEDICINE

## 2023-10-30 PROCEDURE — 3044F HG A1C LEVEL LT 7.0%: CPT | Mod: CPTII,S$GLB,, | Performed by: INTERNAL MEDICINE

## 2023-10-30 PROCEDURE — 3008F PR BODY MASS INDEX (BMI) DOCUMENTED: ICD-10-PCS | Mod: CPTII,S$GLB,, | Performed by: INTERNAL MEDICINE

## 2023-10-30 PROCEDURE — 3008F BODY MASS INDEX DOCD: CPT | Mod: CPTII,S$GLB,, | Performed by: INTERNAL MEDICINE

## 2023-10-30 RX ORDER — METOPROLOL TARTRATE 25 MG/1
25 TABLET, FILM COATED ORAL
COMMUNITY
Start: 2023-10-05 | End: 2023-10-30

## 2023-10-30 RX ORDER — OLANZAPINE 10 MG/1
10 TABLET ORAL NIGHTLY
Status: ON HOLD | COMMUNITY
Start: 2023-10-05 | End: 2024-02-05 | Stop reason: HOSPADM

## 2023-11-02 ENCOUNTER — TELEPHONE (OUTPATIENT)
Dept: NEUROLOGY | Facility: CLINIC | Age: 68
End: 2023-11-02
Payer: MEDICARE

## 2023-11-02 NOTE — TELEPHONE ENCOUNTER
----- Message from Yahaira Benjamin sent at 11/2/2023 11:50 AM CDT -----  Regarding: appt  Contact: [phone]  Pt scheduled 11/8 and would like to rs due to conflict at another appt for PET test. Please call would like to speak with the nurse .

## 2023-11-07 ENCOUNTER — OFFICE VISIT (OUTPATIENT)
Dept: PHYSICAL MEDICINE AND REHAB | Facility: CLINIC | Age: 68
End: 2023-11-07
Payer: MEDICARE

## 2023-11-07 VITALS
WEIGHT: 99 LBS | SYSTOLIC BLOOD PRESSURE: 133 MMHG | DIASTOLIC BLOOD PRESSURE: 87 MMHG | BODY MASS INDEX: 17.54 KG/M2 | HEART RATE: 99 BPM | HEIGHT: 63 IN

## 2023-11-07 DIAGNOSIS — C90.00 MULTIPLE MYELOMA, REMISSION STATUS UNSPECIFIED: ICD-10-CM

## 2023-11-07 DIAGNOSIS — R53.81 DEBILITY: ICD-10-CM

## 2023-11-07 DIAGNOSIS — C90.00 MULTIPLE MYELOMA NOT HAVING ACHIEVED REMISSION: ICD-10-CM

## 2023-11-07 PROCEDURE — 3044F HG A1C LEVEL LT 7.0%: CPT | Mod: CPTII,S$GLB,, | Performed by: PHYSICAL MEDICINE & REHABILITATION

## 2023-11-07 PROCEDURE — 3075F PR MOST RECENT SYSTOLIC BLOOD PRESS GE 130-139MM HG: ICD-10-PCS | Mod: CPTII,S$GLB,, | Performed by: PHYSICAL MEDICINE & REHABILITATION

## 2023-11-07 PROCEDURE — 3008F BODY MASS INDEX DOCD: CPT | Mod: CPTII,S$GLB,, | Performed by: PHYSICAL MEDICINE & REHABILITATION

## 2023-11-07 PROCEDURE — 3288F FALL RISK ASSESSMENT DOCD: CPT | Mod: CPTII,S$GLB,, | Performed by: PHYSICAL MEDICINE & REHABILITATION

## 2023-11-07 PROCEDURE — 4010F PR ACE/ARB THEARPY RXD/TAKEN: ICD-10-PCS | Mod: CPTII,S$GLB,, | Performed by: PHYSICAL MEDICINE & REHABILITATION

## 2023-11-07 PROCEDURE — 3008F PR BODY MASS INDEX (BMI) DOCUMENTED: ICD-10-PCS | Mod: CPTII,S$GLB,, | Performed by: PHYSICAL MEDICINE & REHABILITATION

## 2023-11-07 PROCEDURE — 99205 PR OFFICE/OUTPT VISIT, NEW, LEVL V, 60-74 MIN: ICD-10-PCS | Mod: S$GLB,,, | Performed by: PHYSICAL MEDICINE & REHABILITATION

## 2023-11-07 PROCEDURE — 3075F SYST BP GE 130 - 139MM HG: CPT | Mod: CPTII,S$GLB,, | Performed by: PHYSICAL MEDICINE & REHABILITATION

## 2023-11-07 PROCEDURE — 1100F PR PT FALLS ASSESS DOC 2+ FALLS/FALL W/INJURY/YR: ICD-10-PCS | Mod: CPTII,S$GLB,, | Performed by: PHYSICAL MEDICINE & REHABILITATION

## 2023-11-07 PROCEDURE — 1100F PTFALLS ASSESS-DOCD GE2>/YR: CPT | Mod: CPTII,S$GLB,, | Performed by: PHYSICAL MEDICINE & REHABILITATION

## 2023-11-07 PROCEDURE — 99205 OFFICE O/P NEW HI 60 MIN: CPT | Mod: S$GLB,,, | Performed by: PHYSICAL MEDICINE & REHABILITATION

## 2023-11-07 PROCEDURE — 3079F DIAST BP 80-89 MM HG: CPT | Mod: CPTII,S$GLB,, | Performed by: PHYSICAL MEDICINE & REHABILITATION

## 2023-11-07 PROCEDURE — 1125F AMNT PAIN NOTED PAIN PRSNT: CPT | Mod: CPTII,S$GLB,, | Performed by: PHYSICAL MEDICINE & REHABILITATION

## 2023-11-07 PROCEDURE — 3079F PR MOST RECENT DIASTOLIC BLOOD PRESSURE 80-89 MM HG: ICD-10-PCS | Mod: CPTII,S$GLB,, | Performed by: PHYSICAL MEDICINE & REHABILITATION

## 2023-11-07 PROCEDURE — 4010F ACE/ARB THERAPY RXD/TAKEN: CPT | Mod: CPTII,S$GLB,, | Performed by: PHYSICAL MEDICINE & REHABILITATION

## 2023-11-07 PROCEDURE — 3044F PR MOST RECENT HEMOGLOBIN A1C LEVEL <7.0%: ICD-10-PCS | Mod: CPTII,S$GLB,, | Performed by: PHYSICAL MEDICINE & REHABILITATION

## 2023-11-07 PROCEDURE — 3288F PR FALLS RISK ASSESSMENT DOCUMENTED: ICD-10-PCS | Mod: CPTII,S$GLB,, | Performed by: PHYSICAL MEDICINE & REHABILITATION

## 2023-11-07 PROCEDURE — 1125F PR PAIN SEVERITY QUANTIFIED, PAIN PRESENT: ICD-10-PCS | Mod: CPTII,S$GLB,, | Performed by: PHYSICAL MEDICINE & REHABILITATION

## 2023-11-07 PROCEDURE — 99999 PR PBB SHADOW E&M-EST. PATIENT-LVL III: ICD-10-PCS | Mod: PBBFAC,,, | Performed by: PHYSICAL MEDICINE & REHABILITATION

## 2023-11-07 PROCEDURE — 99999 PR PBB SHADOW E&M-EST. PATIENT-LVL III: CPT | Mod: PBBFAC,,, | Performed by: PHYSICAL MEDICINE & REHABILITATION

## 2023-11-07 NOTE — PROGRESS NOTES
MOBILITY EVALUATION ENCOUNTER  [facility]    Chief Complaint   Patient presents with    mobility     He Hoyt Jr., MD  ENCOUNTER DATE: 11/7/2023    HPI: Janie Zamorano is a 67 y.o. female who presents today for follow-up for mobility evaluation due to   mobility . The patient was referred by He Hoyt Jr., MD.    The patient is a 67-year-old female with history of multiple myeloma, schizophrenia, and pathological vertebral fracture.  She reports that she is been having diagnosis of multiple myeloma for the past 3-4 years.  Her son is present to provide additional history.      She was recently admitted to [facility] or in the last 2 months.  She was admitted for psychosis and required treatment for that.  She reports that she is had decline and her functional care over the last 2-3 years.  She reports that this has mostly been due to pain and debility related to he more treatment due to her multiple myeloma.    She is had multiple complications over the last year including atrial fibrillation and small bowel obstruction.  She did have recent admission for hematemesis but did not have any significant findings with the hospital stay.  She reports that she is had more advancement of her multiple myeloma and she is awaiting final word for ongoing treatment for that.    She most recently was diagnosed with pain in her back and was found to have pathological vertebral fracture relates that.  She also has been told that she is having worsening difficulty with walking due to her pain.  She states that she is needing assistance to transfer from the bed to the walker.  She does have the ability to walk to her room she also has the ability to walk to her kitchen.  She does require some additional assistance with upper lower body dressing.  She requires assistance with her medication management.  She has been hospitalized due to some paranoia and some bizarre behavior.  She was admitted to  [facility] and placed back on her regimen for her schizophrenia.    She reports that she has some problems with her vision as related to her multiple myeloma and some cerebral involvement of her disease.  Otherwise, no further complaints.       Past Medical History:   Diagnosis Date    Anxiety     Asthma     Bronchitis     COPD (chronic obstructive pulmonary disease)     Depression     GERD (gastroesophageal reflux disease)     Hallucination     History of psychiatric hospitalization     Hypertension     Neck pain     Polyneuropathy in diseases classified elsewhere 2021    Schizophrenia     Sleep difficulties      Past Surgical History:   Procedure Laterality Date     SECTION      X 1    COLONOSCOPY N/A 2018    Surgeon: Albert Russell MD    ESOPHAGOGASTRODUODENOSCOPY N/A 2018    Surgeon: Albert Russell MD    HYSTERECTOMY  2016    [facility]---DLH/BSO    LIPOMA RESECTION      back  x 2    SALPINGOOPHORECTOMY Bilateral 2016    [facility]---DLH/BSO    THORACIC LAMINECTOMY WITH FUSION N/A 2018    Surgeon: He Andres MD     Current Outpatient Medications on File Prior to Visit   Medication Sig Dispense Refill    acetaminophen (TYLENOL) 500 MG tablet Take 500 mg by mouth daily as needed for Pain.      amiodarone (PACERONE) 200 MG Tab Take 1 tablet (200 mg total) by mouth once daily. 30 tablet 11    amLODIPine (NORVASC) 10 MG tablet TAKE 1 TABLET(10 MG) BY MOUTH EVERY DAY 90 tablet 3    ascorbic acid, vitamin C, (VITAMIN C) 1000 MG tablet Take 1,000 mg by mouth once daily.      aspirin (ECOTRIN) 81 MG EC tablet Take 81 mg by mouth once daily.      BIOTIN ORAL Take 1 tablet by mouth once daily.      calcium carbonate (TUMS ORAL) Take 1 tablet by mouth daily as needed (Heartburn).      cyanocobalamin, vitamin B-12, (VITAMIN B-12 ORAL) Take 1 tablet by mouth daily as needed.      mirtazapine (REMERON) 30 MG tablet Take 30 mg by mouth every evening.      OLANZapine (ZYPREXA) 10 MG tablet  Take 10 mg by mouth every evening.      vitamin D (VITAMIN D3) 1000 units Tab Take 1,000 Units by mouth once daily.       No current facility-administered medications on file prior to visit.     Review of patient's allergies indicates:   Allergen Reactions    Iodine Hives    Shellfish containing products Itching    Ondansetron hcl Nausea Only       Family History:   Family History   Problem Relation Age of Onset    Hypertension Mother     Glaucoma Mother     Macular degeneration Mother     Breast cancer Mother     Stroke Mother     COPD Father     Hypertension Father     Diabetes Brother     Glaucoma Sister     Liver cancer Paternal Uncle 75        dad brother ETOH    Ovarian cancer Neg Hx     Colon cancer Neg Hx     Colon polyps Neg Hx     Cirrhosis Neg Hx     Celiac disease Neg Hx     Ulcerative colitis Neg Hx     Hemochromatosis Neg Hx     Esophageal cancer Neg Hx     Anxiety disorder Neg Hx     Dementia Neg Hx     Bipolar disorder Neg Hx     Depression Neg Hx     Drug abuse Neg Hx     Schizophrenia Neg Hx     Suicide Neg Hx         Social History:  She lives with her son and house.  She utilizes a walker for home mobility.  She can ambulate 10-20 feet without assistance at this time, mostly limited to pain  Barthel Index:     Barthel Index:     Feeding: Independent(10)    Bathing Independent (5)  Grooming Independent (5)  Dressing Can do 50% (5)  Bowel  Independent  (10)  Bladder Occasional accident (5)  Toilet Use Independent  (10)  Mobility: Walks with one person assistance, > 150 feet (10)   Transfers Verbal/physical assistance (10)   Stairs  Unable (0)     Tobacco:  denies   ETOH:  denies   Other drug use: denies        Review of Systems   All other systems reviewed and are negative.       Pertinent Prior Work Up (Imaging/EMGs):    MRI BRAIN W/WO CONTRAST (9/12/2023):  1.   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   2.   No acute intracranial abnormality or evidence of parenchymal metastases.         Physical Exam  /87   Pulse 99   Ht 5' 3" (1.6 m)   Wt 44.9 kg (99 lb)   LMP  (LMP Unknown)   BMI 17.54 kg/m²   Physical Exam  Vitals reviewed.   Constitutional:       Appearance: Normal appearance.   HENT:      Head: Normocephalic and atraumatic.      Nose: Nose normal.      Mouth/Throat:      Mouth: Mucous membranes are moist.      Pharynx: Oropharynx is clear.   Eyes:      General: Lids are normal. Visual field deficit present.      Extraocular Movements: Extraocular movements intact.      Conjunctiva/sclera: Conjunctivae normal.      Pupils: Pupils are equal, round, and reactive to light.   Cardiovascular:      Rate and Rhythm: Normal rate and regular rhythm.      Pulses: Normal pulses.      Heart sounds: Normal heart sounds.   Pulmonary:      Effort: Pulmonary effort is normal.      Breath sounds: Normal breath sounds.   Abdominal:      General: Abdomen is flat. Bowel sounds are normal.      Palpations: Abdomen is soft.   Musculoskeletal:      Cervical back: Full passive range of motion without pain.      Right lower leg: No edema.      Left lower leg: No edema.   Neurological:      Mental Status: She is alert and oriented to person, place, and time.      GCS: GCS eye subscore is 4. GCS verbal subscore is 5. GCS motor subscore is 6.      Motor: Weakness present.      Gait: Gait abnormal.   Psychiatric:         Attention and Perception: Attention normal.         Mood and Affect: Mood normal.         Behavior: Behavior is cooperative.         Cognition and Memory: Cognition normal.           Impression: 67 y.o. female who presents with multiple conditions including multiple myeloma which has limited HER daily physical functioning and activities of daily living This encounter is for face-to-face evaluation for powered mobility device/scooter.    Diagnoses and all " orders for this visit:    Debility  -     Ambulatory referral/consult to Physical Medicine Rehab    Multiple myeloma not having achieved remission  -     Ambulatory referral/consult to Physical Medicine Rehab    Multiple myeloma, remission status unspecified  -     Ambulatory referral/consult to Physical Medicine Rehab  -     MOTORIZED SCOOTER FOR HOME USE            Plan:        Length of need: Lifetime   Face-to-face evaluation: 11/7/2023    - The patient was seen today for mobility evaluation for a power mobility device due to significant impairment at home.  - The patient has multifactorial gait impairment.  - The patient is not able to ambulate safely to the kitchen or living room.  - The patient is unable to use a walker functional distances due to chronic back pain and generalized weakness.  - The patient is unable to use an optimally-configured manual wheelchair at home due to back pain and weakness.  - The patient has intact cognition and should be able to use a power mobility device well at home.  -The patient has limited vision which is anticipated to improve after she continues ongoing treatment with chemo.  - The patient was ordered for a prescription for a power wheelchair.  - This will allow the patient to go safely to the kitchen, dining room or living room for feeding & socialization. It should also help with energy conservation and improving safety.  - The patient is to return the Physical Medicine/Mobility clinic prn.      Follow-up prn    50% of the total time of 40 minutes was spent in counseling on diagnosis and treatment options          Wilson Grigsby MD

## 2023-11-08 PROBLEM — Z01.818 PREOP EXAMINATION: Status: RESOLVED | Noted: 2023-06-04 | Resolved: 2023-11-08

## 2023-11-08 PROBLEM — G93.40 ACUTE ENCEPHALOPATHY: Status: RESOLVED | Noted: 2023-06-02 | Resolved: 2023-11-08

## 2023-11-08 PROBLEM — N17.9 AKI (ACUTE KIDNEY INJURY): Status: RESOLVED | Noted: 2023-10-02 | Resolved: 2023-11-08

## 2023-11-08 NOTE — PROGRESS NOTES
This note was generated with [employer] voice recognition software. I apologize for any possible typographical errors.  Naomi seems to have trouble with pronouns so I apologize if I Mis pronoun anyone       He is a 67-year-old female who comes in today with the following request.  She wants a motorized wheelchair, a manual wheelchair, a commode for home use, and a Rollator walker.    She has multiple myeloma, thrombocytopenia, hypertension, paranoid schizophrenia moderate protein calorie malnutrition with a BMI 17.4 and an end anemia.  I last saw her back in March of 2023.    [facility]  I can see that she is also seeing palliative care, radiation Oncology I can not see their notes.  She does see Psychiatry for her schizophrenia.  Looks like she was in the emergency room on the 9th of September with psychosis.  That was at [facility].  She is looks like she is had 3 emergency room visits for the psychosis.  He tells me that she takes her Zyprexa periodically.  She does have a family member here with her today but he was playing on his phone is not really helpful in giving any history.      SHe said that her oncology doctor says she had lesions all over and they are affecting her site in her right high.  He also had small bowel obstruction requiring surgery.  He is had the surgery at he [facility] also.  For her multiple myeloma she says she was on a shot and a pill but he stopped this for couple months.  She sees Oncology at [facility] Wednesday.  She says she has a mass in her left breast and she has a mammogram later today and they are going to find out what that is.  She also tells me she has a mass in the spine.  SHe seeing Dr. Tatiana Goetz at [facility]?    Review of systems.  She has poor eyesight.  She says she is eating well.  He does not have any recent nausea vomiting.  She says she gets short of breath and very fatigued when she walks very far.  " He says he walks around her house and she has a old walker but she has talked her insurance company and they that she should get a new 1.  She has not had any blood in her urine or stool.  Today she denies any hearing voices or seeing hallucinations.  She denies any problems with depression or anxiety.    /86 (BP Location: Left arm, Patient Position: Sitting, BP Method: Medium (Manual))   Pulse 84   Ht 5' 3" (1.6 m)   Wt 43.5 kg (95 lb 14.4 oz)   LMP  (LMP Unknown)   SpO2 100%   BMI 16.99 kg/m²     She is a thin female in no acute distress.  He appears older than her stated age of 67.  She is very frail looking.  She is vague and smells clean.  Close her in good shape.  She does not appear disheveled.  She is alert and oriented x4.  Her neck is supple.  Thin.  Chest is clear.  He is no carotid bruits.  No thyroid masses.  Cardiovascular exam shows S1, S2 without any murmur gallop or rub.  Upper and lower extremities have normal range of motion but both upper lower extremities are thin and cachectic.  He also has some temporal muscle wasting.    SHe is a 67-year-old female with multiple medical problems and speaking with palliative Care about end of life issues.  Today I spoke with her about palliative care, by mouth sure how much she understands.  Her caretaker today he is not active in the conversation.  We are going to send her to Physical Medicine Rehab for wheelchair, scooter evaluation.  I do not do those evaluations.  I am going to get her set up for a commode and a wheelchair that is light weight that they can fold and take it with them when she was add the house.  Strongly suggest that she needs to take her antipsychotics regularly as prescribed and following up with both Psychiatry and her Hematology team.  Debility  -     WHEELCHAIR FOR HOME USE  -     COMMODE FOR HOME USE  -     Ambulatory referral/consult to Physical Medicine Rehab; Future; Expected date: 11/06/2023    Multiple lesions of " metastatic malignancy    Multiple myeloma not having achieved remission  -     Ambulatory referral/consult to Physical Medicine Rehab; Future; Expected date: 11/06/2023    Pathological fracture of vertebra due to neoplastic disease, sequela    Multiple myeloma, remission status unspecified  -     Ambulatory referral/consult to Physical Medicine Rehab; Future; Expected date: 11/06/2023    Moderate protein-calorie malnutrition

## 2023-12-11 ENCOUNTER — TELEPHONE (OUTPATIENT)
Dept: INTERNAL MEDICINE | Facility: CLINIC | Age: 68
End: 2023-12-11
Payer: MEDICARE

## 2023-12-11 NOTE — TELEPHONE ENCOUNTER
Informed [facility] nurse that per Dr. Hoyt, he would allow their physician to attend to pt with update.    Giorgio MOORE

## 2023-12-11 NOTE — TELEPHONE ENCOUNTER
Left voicemail for nurse instructing them to contact the office in regards to pt.    Giorgio WILLINGHAM.

## 2023-12-11 NOTE — TELEPHONE ENCOUNTER
----- Message from Marielle Ralph sent at 12/11/2023  1:05 PM CST -----  Contact: [phone]  Hospice nurse is requesting a call from the staff.     [facility] Nurse.

## 2023-12-13 ENCOUNTER — TELEPHONE (OUTPATIENT)
Dept: INTERNAL MEDICINE | Facility: CLINIC | Age: 68
End: 2023-12-13
Payer: MEDICARE

## 2023-12-13 NOTE — TELEPHONE ENCOUNTER
----- Message from Genevieve Ma sent at 12/13/2023  9:19 AM CST -----  Contact: Son Saud   [email or web address]     Patient is calling for Medical Advice regarding:feet,knee,and hand pain    How long has patient had these symptoms:for a while     Pharmacy name and phone#:    Would like response via [email or web address].[email or web address]:  Patient's son is requesting a call back    Comments:

## 2023-12-13 NOTE — TELEPHONE ENCOUNTER
Patients son is requesting a referral to [facility] for feet and knee pain.     [facility]  [address] [facility] [address]

## 2024-01-28 ENCOUNTER — HOSPITAL ENCOUNTER (INPATIENT)
Facility: HOSPITAL | Age: 69
LOS: 9 days | Discharge: HOSPICE | DRG: 840 | End: 2024-02-06
Attending: EMERGENCY MEDICINE | Admitting: HOSPITALIST
Payer: MEDICARE

## 2024-01-28 DIAGNOSIS — R62.7 FAILURE TO THRIVE IN ADULT: ICD-10-CM

## 2024-01-28 DIAGNOSIS — Z87.19 HISTORY OF SMALL BOWEL OBSTRUCTION: ICD-10-CM

## 2024-01-28 DIAGNOSIS — E16.2 HYPOGLYCEMIA: ICD-10-CM

## 2024-01-28 DIAGNOSIS — C90.00 MULTIPLE MYELOMA, REMISSION STATUS UNSPECIFIED: ICD-10-CM

## 2024-01-28 DIAGNOSIS — R53.83 FATIGUE: ICD-10-CM

## 2024-01-28 DIAGNOSIS — I95.9 HYPOTENSION, UNSPECIFIED HYPOTENSION TYPE: ICD-10-CM

## 2024-01-28 DIAGNOSIS — J34.89 MASS OF SPHENOID SINUS: ICD-10-CM

## 2024-01-28 DIAGNOSIS — M84.58XS PATHOLOGICAL FRACTURE OF VERTEBRA DUE TO NEOPLASTIC DISEASE, SEQUELA: ICD-10-CM

## 2024-01-28 DIAGNOSIS — R91.8 LUNG MASS: ICD-10-CM

## 2024-01-28 DIAGNOSIS — D64.9 ANEMIA, UNSPECIFIED TYPE: Primary | ICD-10-CM

## 2024-01-28 DIAGNOSIS — D63.0 ANEMIA IN NEOPLASTIC DISEASE: ICD-10-CM

## 2024-01-28 DIAGNOSIS — E83.51 HYPOCALCEMIA: ICD-10-CM

## 2024-01-28 DIAGNOSIS — D69.6 THROMBOCYTOPENIA, UNSPECIFIED: ICD-10-CM

## 2024-01-28 DIAGNOSIS — R06.02 SHORTNESS OF BREATH: ICD-10-CM

## 2024-01-28 DIAGNOSIS — N17.9 AKI (ACUTE KIDNEY INJURY): ICD-10-CM

## 2024-01-28 DIAGNOSIS — Z71.89 GOALS OF CARE, COUNSELING/DISCUSSION: ICD-10-CM

## 2024-01-28 DIAGNOSIS — L24.A2 IRRITANT CONTACT DERMATITIS DUE TO FECAL, URINARY OR DUAL INCONTINENCE: ICD-10-CM

## 2024-01-28 DIAGNOSIS — C90.00 MULTIPLE MYELOMA NOT HAVING ACHIEVED REMISSION: ICD-10-CM

## 2024-01-28 PROBLEM — T68.XXXA HYPOTHERMIA: Status: ACTIVE | Noted: 2024-01-28

## 2024-01-28 PROBLEM — E87.20 METABOLIC ACIDOSIS: Status: ACTIVE | Noted: 2024-01-28

## 2024-01-28 LAB
ABO + RH BLD: NORMAL
ALBUMIN SERPL BCP-MCNC: 1.6 G/DL (ref 3.5–5.2)
ALBUMIN SERPL BCP-MCNC: 1.6 G/DL (ref 3.5–5.2)
ALP SERPL-CCNC: 63 U/L (ref 55–135)
ALT SERPL W/O P-5'-P-CCNC: 17 U/L (ref 10–44)
ANION GAP SERPL CALC-SCNC: 12 MMOL/L (ref 8–16)
ANION GAP SERPL CALC-SCNC: 14 MMOL/L (ref 8–16)
ANISOCYTOSIS BLD QL SMEAR: SLIGHT
AST SERPL-CCNC: 22 U/L (ref 10–40)
BASOPHILS # BLD AUTO: 0 K/UL (ref 0–0.2)
BASOPHILS # BLD AUTO: 0.01 K/UL (ref 0–0.2)
BASOPHILS NFR BLD: 0 % (ref 0–1.9)
BASOPHILS NFR BLD: 0.3 % (ref 0–1.9)
BILIRUB SERPL-MCNC: 0.7 MG/DL (ref 0.1–1)
BLD GP AB SCN CELLS X3 SERPL QL: NORMAL
BLD PROD TYP BPU: NORMAL
BLD PROD TYP BPU: NORMAL
BLOOD UNIT EXPIRATION DATE: NORMAL
BLOOD UNIT EXPIRATION DATE: NORMAL
BLOOD UNIT TYPE CODE: 5100
BLOOD UNIT TYPE CODE: 5100
BLOOD UNIT TYPE: NORMAL
BLOOD UNIT TYPE: NORMAL
BNP SERPL-MCNC: 241 PG/ML (ref 0–99)
BUN SERPL-MCNC: 47 MG/DL (ref 6–30)
BUN SERPL-MCNC: 49 MG/DL (ref 8–23)
BUN SERPL-MCNC: 51 MG/DL (ref 8–23)
BURR CELLS BLD QL SMEAR: ABNORMAL
CALCIUM SERPL-MCNC: 6.6 MG/DL (ref 8.7–10.5)
CALCIUM SERPL-MCNC: 7.3 MG/DL (ref 8.7–10.5)
CHLORIDE SERPL-SCNC: 116 MMOL/L (ref 95–110)
CHLORIDE SERPL-SCNC: 118 MMOL/L (ref 95–110)
CHLORIDE SERPL-SCNC: 120 MMOL/L (ref 95–110)
CK SERPL-CCNC: 92 U/L (ref 20–180)
CO2 SERPL-SCNC: 12 MMOL/L (ref 23–29)
CO2 SERPL-SCNC: 15 MMOL/L (ref 23–29)
CODING SYSTEM: NORMAL
CODING SYSTEM: NORMAL
CREAT SERPL-MCNC: 2.9 MG/DL (ref 0.5–1.4)
CREAT SERPL-MCNC: 2.9 MG/DL (ref 0.5–1.4)
CREAT SERPL-MCNC: 3.7 MG/DL (ref 0.5–1.4)
CROSSMATCH INTERPRETATION: NORMAL
CROSSMATCH INTERPRETATION: NORMAL
DIFFERENTIAL METHOD BLD: ABNORMAL
DIFFERENTIAL METHOD BLD: ABNORMAL
DISPENSE STATUS: NORMAL
DISPENSE STATUS: NORMAL
EOSINOPHIL # BLD AUTO: 0 K/UL (ref 0–0.5)
EOSINOPHIL # BLD AUTO: 0 K/UL (ref 0–0.5)
EOSINOPHIL NFR BLD: 0 % (ref 0–8)
EOSINOPHIL NFR BLD: 0 % (ref 0–8)
ERYTHROCYTE [DISTWIDTH] IN BLOOD BY AUTOMATED COUNT: 14.6 % (ref 11.5–14.5)
ERYTHROCYTE [DISTWIDTH] IN BLOOD BY AUTOMATED COUNT: 15.3 % (ref 11.5–14.5)
EST. GFR  (NO RACE VARIABLE): 17.2 ML/MIN/1.73 M^2
EST. GFR  (NO RACE VARIABLE): 17.2 ML/MIN/1.73 M^2
GLUCOSE SERPL-MCNC: 165 MG/DL (ref 70–110)
GLUCOSE SERPL-MCNC: 43 MG/DL (ref 70–110)
GLUCOSE SERPL-MCNC: 45 MG/DL (ref 70–110)
GLUCOSE SERPL-MCNC: 93 MG/DL (ref 70–110)
HCT VFR BLD AUTO: 16.2 % (ref 37–48.5)
HCT VFR BLD AUTO: 23.5 % (ref 37–48.5)
HCT VFR BLD CALC: 16 %PCV (ref 36–54)
HGB BLD-MCNC: 4.6 G/DL (ref 12–16)
HGB BLD-MCNC: 7.7 G/DL (ref 12–16)
IMM GRANULOCYTES # BLD AUTO: 0.04 K/UL (ref 0–0.04)
IMM GRANULOCYTES # BLD AUTO: 0.05 K/UL (ref 0–0.04)
IMM GRANULOCYTES NFR BLD AUTO: 1.5 % (ref 0–0.5)
IMM GRANULOCYTES NFR BLD AUTO: 1.5 % (ref 0–0.5)
LACTATE SERPL-SCNC: 1.6 MMOL/L (ref 0.5–2.2)
LIPASE SERPL-CCNC: 10 U/L (ref 4–60)
LYMPHOCYTES # BLD AUTO: 0.2 K/UL (ref 1–4.8)
LYMPHOCYTES # BLD AUTO: 0.2 K/UL (ref 1–4.8)
LYMPHOCYTES NFR BLD: 5.9 % (ref 18–48)
LYMPHOCYTES NFR BLD: 9.3 % (ref 18–48)
MAGNESIUM SERPL-MCNC: 1.9 MG/DL (ref 1.6–2.6)
MCH RBC QN AUTO: 28.4 PG (ref 27–31)
MCH RBC QN AUTO: 28.7 PG (ref 27–31)
MCHC RBC AUTO-ENTMCNC: 28.4 G/DL (ref 32–36)
MCHC RBC AUTO-ENTMCNC: 32.8 G/DL (ref 32–36)
MCV RBC AUTO: 100 FL (ref 82–98)
MCV RBC AUTO: 88 FL (ref 82–98)
MONOCYTES # BLD AUTO: 0.1 K/UL (ref 0.3–1)
MONOCYTES # BLD AUTO: 0.1 K/UL (ref 0.3–1)
MONOCYTES NFR BLD: 4.3 % (ref 4–15)
MONOCYTES NFR BLD: 4.6 % (ref 4–15)
NEUTROPHILS # BLD AUTO: 2.2 K/UL (ref 1.8–7.7)
NEUTROPHILS # BLD AUTO: 2.8 K/UL (ref 1.8–7.7)
NEUTROPHILS NFR BLD: 84.6 % (ref 38–73)
NEUTROPHILS NFR BLD: 88 % (ref 38–73)
NRBC BLD-RTO: 1 /100 WBC
NRBC BLD-RTO: 1 /100 WBC
NUM UNITS TRANS PACKED RBC: NORMAL
NUM UNITS TRANS PACKED RBC: NORMAL
PHOSPHATE SERPL-MCNC: 5.1 MG/DL (ref 2.7–4.5)
PHOSPHATE SERPL-MCNC: 5.3 MG/DL (ref 2.7–4.5)
PLATELET # BLD AUTO: 125 K/UL (ref 150–450)
PLATELET # BLD AUTO: 140 K/UL (ref 150–450)
PLATELET BLD QL SMEAR: ABNORMAL
PMV BLD AUTO: 10.7 FL (ref 9.2–12.9)
PMV BLD AUTO: 9.8 FL (ref 9.2–12.9)
POC IONIZED CALCIUM: 1.01 MMOL/L (ref 1.06–1.42)
POC TCO2 (MEASURED): 19 MMOL/L (ref 23–27)
POCT GLUCOSE: 105 MG/DL (ref 70–110)
POCT GLUCOSE: 107 MG/DL (ref 70–110)
POCT GLUCOSE: 162 MG/DL (ref 70–110)
POCT GLUCOSE: 171 MG/DL (ref 70–110)
POCT GLUCOSE: 45 MG/DL (ref 70–110)
POCT GLUCOSE: 85 MG/DL (ref 70–110)
POIKILOCYTOSIS BLD QL SMEAR: SLIGHT
POTASSIUM BLD-SCNC: 5.5 MMOL/L (ref 3.5–5.1)
POTASSIUM SERPL-SCNC: 4.6 MMOL/L (ref 3.5–5.1)
POTASSIUM SERPL-SCNC: 4.7 MMOL/L (ref 3.5–5.1)
PROT SERPL-MCNC: 4.4 G/DL (ref 6–8.4)
RBC # BLD AUTO: 1.62 M/UL (ref 4–5.4)
RBC # BLD AUTO: 2.68 M/UL (ref 4–5.4)
SAMPLE: ABNORMAL
SODIUM BLD-SCNC: 144 MMOL/L (ref 136–145)
SODIUM SERPL-SCNC: 143 MMOL/L (ref 136–145)
SODIUM SERPL-SCNC: 146 MMOL/L (ref 136–145)
SPECIMEN OUTDATE: NORMAL
SPHEROCYTES BLD QL SMEAR: ABNORMAL
T4 FREE SERPL-MCNC: 0.46 NG/DL (ref 0.71–1.51)
TROPONIN I SERPL DL<=0.01 NG/ML-MCNC: 0.02 NG/ML (ref 0–0.03)
TSH SERPL DL<=0.005 MIU/L-ACNC: 4.88 UIU/ML (ref 0.4–4)
WBC # BLD AUTO: 2.59 K/UL (ref 3.9–12.7)
WBC # BLD AUTO: 3.23 K/UL (ref 3.9–12.7)

## 2024-01-28 PROCEDURE — 84443 ASSAY THYROID STIM HORMONE: CPT | Performed by: EMERGENCY MEDICINE

## 2024-01-28 PROCEDURE — 84439 ASSAY OF FREE THYROXINE: CPT | Performed by: EMERGENCY MEDICINE

## 2024-01-28 PROCEDURE — 82550 ASSAY OF CK (CPK): CPT | Performed by: EMERGENCY MEDICINE

## 2024-01-28 PROCEDURE — 84484 ASSAY OF TROPONIN QUANT: CPT | Performed by: EMERGENCY MEDICINE

## 2024-01-28 PROCEDURE — 20600001 HC STEP DOWN PRIVATE ROOM

## 2024-01-28 PROCEDURE — 93010 ELECTROCARDIOGRAM REPORT: CPT | Mod: GW,,, | Performed by: INTERNAL MEDICINE

## 2024-01-28 PROCEDURE — 99900035 HC TECH TIME PER 15 MIN (STAT)

## 2024-01-28 PROCEDURE — 36600 WITHDRAWAL OF ARTERIAL BLOOD: CPT

## 2024-01-28 PROCEDURE — 85025 COMPLETE CBC W/AUTO DIFF WBC: CPT | Performed by: EMERGENCY MEDICINE

## 2024-01-28 PROCEDURE — 36430 TRANSFUSION BLD/BLD COMPNT: CPT

## 2024-01-28 PROCEDURE — 86920 COMPATIBILITY TEST SPIN: CPT | Performed by: EMERGENCY MEDICINE

## 2024-01-28 PROCEDURE — 80053 COMPREHEN METABOLIC PANEL: CPT | Performed by: EMERGENCY MEDICINE

## 2024-01-28 PROCEDURE — P9040 RBC LEUKOREDUCED IRRADIATED: HCPCS | Performed by: EMERGENCY MEDICINE

## 2024-01-28 PROCEDURE — 84100 ASSAY OF PHOSPHORUS: CPT | Performed by: EMERGENCY MEDICINE

## 2024-01-28 PROCEDURE — 83605 ASSAY OF LACTIC ACID: CPT | Performed by: EMERGENCY MEDICINE

## 2024-01-28 PROCEDURE — 25000003 PHARM REV CODE 250: Performed by: HOSPITALIST

## 2024-01-28 PROCEDURE — 96374 THER/PROPH/DIAG INJ IV PUSH: CPT

## 2024-01-28 PROCEDURE — 83880 ASSAY OF NATRIURETIC PEPTIDE: CPT | Performed by: EMERGENCY MEDICINE

## 2024-01-28 PROCEDURE — 30233N1 TRANSFUSION OF NONAUTOLOGOUS RED BLOOD CELLS INTO PERIPHERAL VEIN, PERCUTANEOUS APPROACH: ICD-10-PCS | Performed by: EMERGENCY MEDICINE

## 2024-01-28 PROCEDURE — 93005 ELECTROCARDIOGRAM TRACING: CPT

## 2024-01-28 PROCEDURE — 83735 ASSAY OF MAGNESIUM: CPT | Performed by: EMERGENCY MEDICINE

## 2024-01-28 PROCEDURE — 80069 RENAL FUNCTION PANEL: CPT | Performed by: HOSPITALIST

## 2024-01-28 PROCEDURE — 86850 RBC ANTIBODY SCREEN: CPT | Performed by: EMERGENCY MEDICINE

## 2024-01-28 PROCEDURE — 25000003 PHARM REV CODE 250: Performed by: EMERGENCY MEDICINE

## 2024-01-28 PROCEDURE — 63600175 PHARM REV CODE 636 W HCPCS: Performed by: EMERGENCY MEDICINE

## 2024-01-28 PROCEDURE — 83690 ASSAY OF LIPASE: CPT | Performed by: EMERGENCY MEDICINE

## 2024-01-28 PROCEDURE — 99285 EMERGENCY DEPT VISIT HI MDM: CPT | Mod: 25

## 2024-01-28 PROCEDURE — 87040 BLOOD CULTURE FOR BACTERIA: CPT | Mod: 59 | Performed by: HOSPITALIST

## 2024-01-28 PROCEDURE — 85025 COMPLETE CBC W/AUTO DIFF WBC: CPT | Mod: 91 | Performed by: HOSPITALIST

## 2024-01-28 RX ORDER — IBUPROFEN 200 MG
24 TABLET ORAL
Status: DISCONTINUED | OUTPATIENT
Start: 2024-01-28 | End: 2024-02-06 | Stop reason: HOSPADM

## 2024-01-28 RX ORDER — IBUPROFEN 200 MG
16 TABLET ORAL
Status: DISCONTINUED | OUTPATIENT
Start: 2024-01-28 | End: 2024-02-06 | Stop reason: HOSPADM

## 2024-01-28 RX ORDER — IBUPROFEN 200 MG
16 TABLET ORAL
Status: ACTIVE | OUTPATIENT
Start: 2024-01-28 | End: 2024-01-28

## 2024-01-28 RX ORDER — GLUCAGON 1 MG
1 KIT INJECTION
Status: DISCONTINUED | OUTPATIENT
Start: 2024-01-28 | End: 2024-02-06 | Stop reason: HOSPADM

## 2024-01-28 RX ORDER — CALCIUM GLUCONATE 20 MG/ML
1 INJECTION, SOLUTION INTRAVENOUS
Status: COMPLETED | OUTPATIENT
Start: 2024-01-28 | End: 2024-01-28

## 2024-01-28 RX ORDER — HYDROCODONE BITARTRATE AND ACETAMINOPHEN 500; 5 MG/1; MG/1
TABLET ORAL
Status: DISCONTINUED | OUTPATIENT
Start: 2024-01-28 | End: 2024-02-06 | Stop reason: HOSPADM

## 2024-01-28 RX ORDER — DEXTROSE MONOHYDRATE 100 MG/ML
INJECTION, SOLUTION INTRAVENOUS CONTINUOUS
Status: DISCONTINUED | OUTPATIENT
Start: 2024-01-28 | End: 2024-01-28

## 2024-01-28 RX ADMIN — CALCIUM GLUCONATE 1 G: 20 INJECTION, SOLUTION INTRAVENOUS at 12:01

## 2024-01-28 RX ADMIN — SODIUM BICARBONATE: 84 INJECTION, SOLUTION INTRAVENOUS at 06:01

## 2024-01-28 RX ADMIN — DEXTROSE MONOHYDRATE 250 ML: 100 INJECTION, SOLUTION INTRAVENOUS at 11:01

## 2024-01-28 RX ADMIN — PIPERACILLIN SODIUM AND TAZOBACTAM SODIUM 4.5 G: 4; .5 INJECTION, POWDER, FOR SOLUTION INTRAVENOUS at 04:01

## 2024-01-28 NOTE — CLINICAL REVIEW
Sepsis Screen (most recent)       Sepsis Screen (IP) - 01/28/24 1413       Is the patient's history or complaint suggestive of a possible infection? Yes  -    Are there at least two of the following signs and symptoms present? Yes  -    Sepsis signs/symptoms - Hyper or Hypothermia Hyperthermia >100.4 or Hypothermia < 96.8  -    Sepsis signs/symptoms - WBC WBC < 4,000 or WBC > 12,000  -    Are any of the following organ dysfunction criteria present and not considered to be due to a chronic condition? Yes  -    Organ Dysfunction Criteria SBP < 90 or MAP < 65  -    Organ Dysfunction Criteria Creatinine > 2.0  -    Initiate Sepsis Protocol No  -JM    Reason sepsis not considered Pt. receiving appropriate management  -              User Key  (r) = Recorded By, (t) = Taken By, (c) = Cosigned By      Initials Name    Mendy Driver RN

## 2024-01-28 NOTE — ASSESSMENT & PLAN NOTE
likely from above  Recent Labs   Lab 01/28/24  1104   CALCIUM 6.6*         hypocalcemia 6.6 .     Corrected calcium is 8.5.  Ordered 1 g calcium gluconate.

## 2024-01-28 NOTE — CONSULTS
Brief consult acknowledgement note    Palliative medicine consult acknowledged and will be seen on Monday morning unless needed sooner.     Please call palliative MD on call for discussion or immediate assistance.     Thank you for the opportunity to care for this patient and family.     Please call with questions.     Gomez Carrillo MD  Palliative Medicine   [facility]  [phone] (cell)

## 2024-01-28 NOTE — ASSESSMENT & PLAN NOTE
? CX ray 1/28  Abnormal left upper thoracic region pleural base opacity, similar to 10/02/2023 chest radiograph, these are new compared to 02/21/2023 radiographs, further evaluation advised.  Malignancy cannot be excluded.

## 2024-01-28 NOTE — ASSESSMENT & PLAN NOTE
Advance Care Planning     Code Status  In light of the patients advanced and life limiting illness,I engaged the the family in a voluntary conversation about the patient's preferences for care  at the very end of life.  son wants full code for now. wants to discuss with palliative care  in AM    I spent a total of 25 minutes engaging the patient in this advance care planning discussion.

## 2024-01-28 NOTE — ASSESSMENT & PLAN NOTE
Patient with acute kidney injury/acute renal failure likely due to pre-renal azotemia due to IVVD GLENIS is currently worsening. Baseline creatinine  2.1  - Labs reviewed- Renal function/electrolytes with Estimated Creatinine Clearance: 13.3 mL/min (A) (based on SCr of 2.9 mg/dL (H)). according to latest data. Monitor urine output and serial BMP and adjust therapy as needed. Avoid nephrotoxins and renally dose meds for GFR listed above.     Obtain urine lytes , renal sonogram  and bladder scans q 6h  Recent Labs   Lab 01/28/24  1104   BUN 51*   CREATININE 2.9*       I & O   No intake or output data in the 24 hours ending 01/28/24 1339   Gentle IV hydration.

## 2024-01-28 NOTE — ED TRIAGE NOTES
"Hasn't eaten in 4-5 days. Hypotensive. Hx. Of multiple myeloma. From home.  Pt stated "there is nothing wrong with me and I want to go home"  "

## 2024-01-28 NOTE — ASSESSMENT & PLAN NOTE
paranoid schizophrenia with multiple inpatient psychiatric admissions, - admitted for  reported decreased eating.  Patient is a difficult historian with limited insight into their medical issues.   collateral from son - No significant oral intake for the last 4-5 days.     psychiatry evaluation

## 2024-01-28 NOTE — ASSESSMENT & PLAN NOTE
difficult historian with limited insight into their medical issues.   collateral from son - No significant oral intake for the last 4-5 days.  EMS gave 1 L of fluid in route.

## 2024-01-28 NOTE — ED PROVIDER NOTES
Encounter Date: 2024       History     Chief Complaint   Patient presents with    Failure To Thrive     Hasn't eaten in 4-5 days. Hypotensive. Hx. Of multiple myeloma. From home     68-year-old female with a history of multiple myeloma and schizophrenia presents with reported decreased eating.  No significant oral intake for the last 4-5 days.  EMS gave 1 L of fluid in route.  Patient is a difficult historian with limited insight into their medical issues.  Will get collateral from son.  Unable to get review of systems secondary to acuity of condition.  The patients available PMH, PSH, Social History, medications, allergies, and triage vital signs were reviewed just prior to their medical evaluation.            Review of patient's allergies indicates:   Allergen Reactions    Iodine Hives    Shellfish containing products Itching    Ondansetron hcl Nausea Only     Past Medical History:   Diagnosis Date    Anxiety     Asthma     Bronchitis     COPD (chronic obstructive pulmonary disease)     Depression     GERD (gastroesophageal reflux disease)     Hallucination     History of psychiatric hospitalization     Hypertension     Neck pain     Polyneuropathy in diseases classified elsewhere 2021    Schizophrenia     Sleep difficulties      Past Surgical History:   Procedure Laterality Date     SECTION      X 1    COLONOSCOPY N/A 2018    Surgeon: Albert Russell MD    ESOPHAGOGASTRODUODENOSCOPY N/A 2018    Surgeon: Albert Russell MD    HYSTERECTOMY  2016    [facility]---DLH/BSO    LIPOMA RESECTION      back  x 2    SALPINGOOPHORECTOMY Bilateral 2016    [facility]---DLH/BSO    THORACIC LAMINECTOMY WITH FUSION N/A 2018    Surgeon: He Andres MD     Family History   Problem Relation Age of Onset    Hypertension Mother     Glaucoma Mother     Macular degeneration Mother     Breast cancer Mother     Stroke Mother     COPD Father     Hypertension Father     Diabetes Brother     Glaucoma Sister     Liver  cancer Paternal Uncle 75        dad brother ETOH    Ovarian cancer Neg Hx     Colon cancer Neg Hx     Colon polyps Neg Hx     Cirrhosis Neg Hx     Celiac disease Neg Hx     Ulcerative colitis Neg Hx     Hemochromatosis Neg Hx     Esophageal cancer Neg Hx     Anxiety disorder Neg Hx     Dementia Neg Hx     Bipolar disorder Neg Hx     Depression Neg Hx     Drug abuse Neg Hx     Schizophrenia Neg Hx     Suicide Neg Hx      Social History     Tobacco Use    Smoking status: Never    Smokeless tobacco: Never   Substance Use Topics    Alcohol use: No     Alcohol/week: 0.0 standard drinks of alcohol    Drug use: No     Review of Systems   Unable to perform ROS: Acuity of condition       Physical Exam     Initial Vitals [01/28/24 0934]   BP Pulse Resp Temp SpO2   100/84 84 18 98.5 °F (36.9 °C) 97 %      MAP       --         Physical Exam    Nursing note and vitals reviewed.  Constitutional: She appears well-developed. She is not diaphoretic. No distress.   cachexia   HENT:   Head: Normocephalic and atraumatic.   Nose: Nose normal.   Dry MM   Eyes: Conjunctivae are normal. Right eye exhibits no discharge. Left eye exhibits no discharge.   Neck: Neck supple.   Normal range of motion.  Cardiovascular:  Normal rate, regular rhythm and normal heart sounds.     Exam reveals no gallop and no friction rub.       No murmur heard.  Pulmonary/Chest: Breath sounds normal. No respiratory distress. She has no wheezes. She has no rhonchi. She has no rales.   Abdominal: Abdomen is soft. She exhibits no distension. There is no abdominal tenderness. There is no rebound and no guarding.   Musculoskeletal:         General: No tenderness or edema. Normal range of motion.      Cervical back: Normal range of motion and neck supple.     Neurological: She is alert and oriented to person, place, and time. GCS score is 15. GCS eye subscore is 4. GCS verbal subscore is 5. GCS motor subscore is 6.   Skin: Skin is warm and dry. No rash noted. No  erythema.   Psychiatric: She has a normal mood and affect. Her behavior is normal. Judgment and thought content normal.         ED Course   Procedures  Labs Reviewed   CBC W/ AUTO DIFFERENTIAL - Abnormal; Notable for the following components:       Result Value    WBC 2.59 (*)     RBC 1.62 (*)     Hemoglobin 4.6 (*)     Hematocrit 16.2 (*)      (*)     MCHC 28.4 (*)     RDW 15.3 (*)     Platelets 125 (*)     Immature Granulocytes 1.5 (*)     Lymph # 0.2 (*)     Mono # 0.1 (*)     nRBC 1 (*)     Gran % 84.6 (*)     Lymph % 9.3 (*)     All other components within normal limits    Narrative:     HGB HCT CRITICAL SC SENT TO MILLICENT NAJERA RN by [facility] 01/28/2024 11:33   COMPREHENSIVE METABOLIC PANEL - Abnormal; Notable for the following components:    Sodium 146 (*)     Chloride 120 (*)     CO2 12 (*)     Glucose 43 (*)     BUN 51 (*)     Creatinine 2.9 (*)     Calcium 6.6 (*)     Total Protein 4.4 (*)     Albumin 1.6 (*)     eGFR 17.2 (*)     All other components within normal limits   B-TYPE NATRIURETIC PEPTIDE - Abnormal; Notable for the following components:     (*)     All other components within normal limits   PHOSPHORUS - Abnormal; Notable for the following components:    Phosphorus 5.1 (*)     All other components within normal limits    Narrative:     add on ck per Ender Mcclure MD order# [id number] 01/28/2024 @   13:47     add on phos & tsh per Ender Mcclure MD order# [id number]   01/28/2024 @ 14:10    TSH - Abnormal; Notable for the following components:    TSH 4.876 (*)     All other components within normal limits    Narrative:     add on ck per Ender Mcclure MD order# [id number] 01/28/2024 @   13:47     add on phos & tsh per Ender Mcclure MD order# [id number]   01/28/2024 @ 14:10    T4, FREE - Abnormal; Notable for the following components:    Free T4 0.46 (*)     All other components within normal limits    Narrative:     add on ck per Ender Mcclure MD order# [id number]  01/28/2024 @   13:47     add on phos & tsh per Ender Mcclure MD order# [id number]   01/28/2024 @ 14:10    CBC W/ AUTO DIFFERENTIAL - Abnormal; Notable for the following components:    WBC 3.23 (*)     RBC 2.68 (*)     Hemoglobin 7.7 (*)     Hematocrit 23.5 (*)     RDW 14.6 (*)     Platelets 140 (*)     Immature Granulocytes 1.5 (*)     Immature Grans (Abs) 0.05 (*)     Lymph # 0.2 (*)     Mono # 0.1 (*)     nRBC 1 (*)     Gran % 88.0 (*)     Lymph % 5.9 (*)     Platelet Estimate Decreased (*)     All other components within normal limits   RENAL FUNCTION PANEL - Abnormal; Notable for the following components:    Chloride 116 (*)     CO2 15 (*)     BUN 49 (*)     Calcium 7.3 (*)     Creatinine 2.9 (*)     Albumin 1.6 (*)     Phosphorus 5.3 (*)     eGFR 17.2 (*)     All other components within normal limits   POCT GLUCOSE MONITORING CONTINUOUS - Abnormal; Notable for the following components:    POC Glucose 45 (*)     All other components within normal limits   POCT GLUCOSE - Abnormal; Notable for the following components:    POCT Glucose 45 (*)     All other components within normal limits   ISTAT PROCEDURE - Abnormal; Notable for the following components:    POC Glucose 165 (*)     POC BUN 47 (*)     POC Creatinine 3.7 (*)     POC Potassium 5.5 (*)     POC Chloride 118 (*)     POC TCO2 (MEASURED) 19 (*)     POC Ionized Calcium 1.01 (*)     POC Hematocrit 16 (*)     All other components within normal limits   POCT GLUCOSE - Abnormal; Notable for the following components:    POCT Glucose 162 (*)     All other components within normal limits   POCT GLUCOSE - Abnormal; Notable for the following components:    POCT Glucose 171 (*)     All other components within normal limits   LACTIC ACID, PLASMA   LIPASE   MAGNESIUM   TROPONIN I   CK   CK    Narrative:     add on ck per Ender Mcclure MD order# [id number] 01/28/2024 @   13:47    PHOSPHORUS   TSH   TYPE & SCREEN   POCT GLUCOSE   PREPARE RBC SOFT     EKG Readings:  (Independently Interpreted)   Initial Reading: No STEMI. Rhythm: Normal Sinus Rhythm. Heart Rate: 69. Ectopy: No Ectopy. Conduction: Normal.   poor qrs progression in V3-4, low voltage     ECG Results              EKG 12-lead (Final result)  Result time 01/28/24 13:42:39      Final result by Interface, Lab In [facility] (01/28/24 13:42:39)                   Narrative:    Test Reason : R53.83,    Vent. Rate : 069 BPM     Atrial Rate : 081 BPM     P-R Int : 000 ms          QRS Dur : 080 ms      QT Int : 410 ms       P-R-T Axes : 000 -25 -12 degrees     QTc Int : 439 ms    Accelerated Junctional rhythm  Low voltage QRS  Possible  Inferior infarct ,age undetermined  Cannot rule out Anteroseptal infarct (cited on or before 28-JAN-2024)  Abnormal ECG  When compared with ECG of 02-OCT-2023 11:14,  Significant changes have occurred  Confirmed by Martir DERAS MD ([id number]) on 1/28/2024 1:42:28 PM    Referred By: [email or web address]   SELF           Confirmed By:Martir DERAS MD                                  Imaging Results               US Retroperitoneal Complete (Final result)  Result time 01/29/24 05:16:38      Final result by Luis Ng MD (01/29/24 05:16:38)                   Impression:      Bilateral increased renal parenchymal echogenicity, with decreased corticomedullary distinction, possibly related to underlying medical renal disease.  Correlate clinically.    Mild distention of the right renal pelvis, possibly on the basis of extrarenal pelvis.  Mild hydronephrosis not fully excluded.  Correlate clinically.  If there is strong clinical suspicion for ureteral obstruction, consider abdominopelvic CT.    At the patient's request, the scan was terminated prior to completion of the imaging protocol.    This report was flagged in Epic as abnormal.    Electronically signed by resident: Delia Mayer  Date:    01/29/2024  Time:    02:39    Electronically signed by: Luis Ng  Date:    01/29/2024  Time:    05:16                Narrative:    EXAMINATION:  US RETROPERITONEAL COMPLETE    CLINICAL HISTORY:  GLENIS;    TECHNIQUE:  Ultrasound of the kidneys and urinary bladder was performed including color flow and Doppler evaluation of the kidneys.    COMPARISON:  CT thoracic spine 08/08/2018    CT renal stone study 08/03/2018    FINDINGS:  Right kidney: The right kidney measures 6.9 cm in length.  No cortical thinning. Increased parenchymal echogenicity with decreased corticomedullary distinction.  Resistive index measures 0.62.  No solid mass is detected the sonographic.  No echogenic shadowing renal stone. Probable mild hydronephrosis versus extrarenal pelvis.    Several simple cysts largest measures 1.3 x 1.0 x 0.9 cm at the interpolar region.    Left kidney: The left kidney measures 7.5 cm in length.  No cortical thinning.  Increased parenchymal echogenicity with decreased corticomedullary distinction.  Unable to obtain resistive index.  No solid mass is detected sonographic.  No echogenic shadowing renal stone. No hydronephrosis.    The bladder is partially distended at the time of scanning and has an unremarkable appearance.  Unable to evaluate for ureteral jets; during scanning of the urinary bladder, the technologist reports that the scan was terminated at the adamant request of the patient.    An echogenic structure partially visualized posterior to the urinary bladder possibly represents bowel but was not fully characterized because the of the premature termination of the scan.                                       X-Ray Abdomen AP 1 View (Final result)  Result time 01/28/24 18:23:00      Final result by Parveen Page DO (01/28/24 18:23:00)                   Impression:      Nonobstructive bowel gas pattern      Electronically signed by: Parveen Page  Date:    01/28/2024  Time:    18:23               Narrative:    EXAMINATION:  XR ABDOMEN AP 1 VIEW    CLINICAL HISTORY:  poor oral intake;    TECHNIQUE:  AP View(s) of the abdomen  was performed.    COMPARISON:  08/03/2018.    FINDINGS:  There is a normal, nonobstructive bowel gas pattern.  There is no free air on this supine view.  There is no abnormal calcification.  There are postop changes of the thoracolumbar spine.  Osseous structures are otherwise intact.                                       X-Ray Chest AP Portable (Final result)  Result time 01/28/24 12:59:38      Final result by Savana Cabrera MD (01/28/24 12:59:38)                   Impression:      Abnormal left upper thoracic region pleural base opacity, similar to 10/02/2023 chest radiograph, these are new compared to 02/21/2023 radiographs, further evaluation advised.  Malignancy cannot be excluded.      Electronically signed by: Savana Cabrera MD  Date:    01/28/2024  Time:    12:59               Narrative:    EXAMINATION:  XR CHEST AP PORTABLE    CLINICAL HISTORY:  Shortness of breath    TECHNIQUE:  Single frontal view of the chest was performed.    COMPARISON:  10/02/2023    FINDINGS:  The cardiac silhouette is normal in size.  The pulmonary vascularity is normal.    Left upper thoracic paramediastinal oval opacity and pleuroparenchymal scarring apical region left upper lobe correlated with CT 06/02/2023.  No new areas of abnormal opacity seen.  The osseous structures appear normal.                                       Medications   glucose chewable tablet 16 g (4 g Oral Not Given 1/28/24 1123)   sodium bicarbonate 150 mEq in dextrose 5 % (D5W) 1,000 mL infusion ( Intravenous New Bag 1/28/24 1804)   sodium bicarbonate 150 mEq in dextrose 5 % (D5W) 1,000 mL infusion ( Intravenous Restarted 1/30/24 1407)   dextrose 10% bolus 250 mL 250 mL (0 mLs Intravenous Stopped 1/28/24 1151)   calcium gluconate 1 g in NS IVPB (premixed) (0 g Intravenous Stopped 1/28/24 1326)   piperacillin-tazobactam (ZOSYN) 4.5 g in dextrose 5 % in water (D5W) 100 mL IVPB (MB+) (0 g Intravenous Stopped 1/28/24 1632)   calcium gluconate 1 g in  NS IVPB (premixed) (0 g Intravenous Stopped 1/29/24 1104)   acetaminophen 1,000 mg/100 mL (10 mg/mL) injection 1,000 mg (0 mg Intravenous Stopped 1/30/24 2253)   acetaminophen 1,000 mg/100 mL (10 mg/mL) injection 1,000 mg (0 mg Intravenous Stopped 1/31/24 1357)     Medical Decision Making  68-year-old female presents with failure to thrive.  Vitals with soft blood pressure.  Physical exam as above.  EKG without acute ischemia.  Labs with hypoglycemia, anemia, and hypocalcemia.  Treated with glucose.  Consented for blood transfusion.  Corrected calcium is 8.5.  Ordered 1 g calcium gluconate.  Will admit to hospital medicine for the above as well as dehydration and failure to thrive.  Did bedside teaching.  All questions answered.  Patient acknowledges understanding.     Amount and/or Complexity of Data Reviewed  Independent Historian: caregiver  Labs: ordered. Decision-making details documented in ED Course.  Radiology: ordered. Decision-making details documented in ED Course.  ECG/medicine tests: ordered and independent interpretation performed. Decision-making details documented in ED Course.    Risk  OTC drugs.  Prescription drug management.  Decision regarding hospitalization.  Diagnosis or treatment significantly limited by social determinants of health.    Critical Care  Total time providing critical care: 35 minutes                     Critical Care:  Date: 1/28/24  Performed by: Dr. Bryon Welch   Authorized by: Dr. Bryon Welch  Total critical care time (exclusive of procedural time) : 35 minutes  Critical care was necessary to treat or prevent imminent or life-threatening deterioration of the following conditions:  hypoglycemia, anemia                  Clinical Impression:  Final diagnoses:  [R53.83] Fatigue  [R06.02] Shortness of breath  [D64.9] Anemia, unspecified type (Primary)  [E16.2] Hypoglycemia          ED Disposition Condition    Admit Stable                Bryon Welch MD  01/28/24  1218       Bryon Welch MD  02/20/24 7408

## 2024-01-28 NOTE — H&P
[facility] - Emergency Dept  [facility] Medicine  History & Physical    Patient Name: Janie Zamorano  MRN: [id number]  Patient Class: IP- Inpatient  Admission Date: 1/28/2024  Attending Physician: Ender Mcclure MD   Primary Care Provider: He Hoyt Jr., MD         Patient information was obtained from patient and ER records.     Subjective:     Principal Problem:Failure to thrive in adult    Chief Complaint:   Chief Complaint   Patient presents with    Failure To Thrive     Hasn't eaten in 4-5 days. Hypotensive. Hx. Of multiple myeloma. 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        Patient is a difficult historian with limited insight into their medical issues. AAO x 2 but not talkative.    collateral from son at the bedside. Patient was following with oncology at [place]. per son, primary oncologist mentioned she is not a candidate for further chemo about 3 months back and advised hospice. Patient is on home hospice and son not happy with current hospic agency. son wants to discuss with palliative care at [facility] and requesting followups regarding boold work . reports patient had   no significant oral intake for the last 7days -only drinking water mostly.. BP was in 80s this AM. son called. EMS.   EMS gave 1 L of fluid in route.  being admitted for evaluation of failure to thrive  per chart review, ultiple myeloma for the past 3-4 years.  recently admitted to [facility]  last year for psychosis and required treatmen. declined functional status over the last 2-3 years due  to pain and debility from multiple myeloma. 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 needs assistance to transfer from the bed to the walker and medication management.  H/o psychiatric admuisssions secondary to schizophrenia. At baseline, ADL and  ambulates with asssitance. she is bedbound x 1 week.     ER couse -   soft blood pressure 100/84 .  EKG without acute ischemia.  Labs with hypoglycemia 43 , anemia Hb 4.6 , and hypocalcemia 6.6 .     Corrected calcium is 8.5.  Ordered 1 g calcium gluconate. Treated with D10 x 250ml  glucose.  Consented for blood transfusion.  severe nonanion gap metabolic acidosis with bicarb of 12. lactate WNL        last admission - 1/3 -suspected hematemesis.  Suspect color from emesis due to Partha-Aid at lunch and dinner per patient.   found to have elevated troponin.  No chest pain or shortness a breath. Negative stress test.  2 D echo normal EF 75 80%, no mention of wall motion abnormality.  .  Hgb stable and  no overt signs of bleeding during stay. X ray right pelvis no fractures  but does show osseous destruction lesion that was also noted on CT 2022 per Radiology. Plan to follow up with Primary Oncologist at [facility]. discharged to [place].                  Review of patient's allergies indicates:   Allergen Reactions    Iodine Hives    Shellfish containing products Itching    Ondansetron hcl Nausea Only           Past Medical History:   Diagnosis Date    Anxiety      Asthma      Bronchitis      COPD (chronic obstructive pulmonary disease)      Depression      GERD (gastroesophageal reflux disease)      Hallucination      History of psychiatric hospitalization      Hypertension      Neck pain      Polyneuropathy in diseases classified elsewhere 2021    Schizophrenia      Sleep difficulties              Past Surgical History:   Procedure Laterality Date     SECTION         X 1    COLONOSCOPY N/A 2018      Surgeon: Albert Russell MD    ESOPHAGOGASTRODUODENOSCOPY N/A 7/17/2018     Surgeon: Albert Russell MD    HYSTERECTOMY   2016     [facility]---DLH/BSO    LIPOMA RESECTION         back  x 2    SALPINGOOPHORECTOMY Bilateral 2016     [facility]---DLH/BSO    THORACIC LAMINECTOMY WITH FUSION N/A 8/17/2018     Surgeon: He Andres MD            Family History   Problem Relation Age of Onset    Hypertension Mother      Glaucoma Mother      Macular degeneration Mother      Breast cancer Mother      Stroke Mother      COPD Father      Hypertension Father      Diabetes Brother      Glaucoma Sister      Liver cancer Paternal Uncle 75         dad brother ETOH    Ovarian cancer Neg Hx      Colon cancer Neg Hx      Colon polyps Neg Hx      Cirrhosis Neg Hx      Celiac disease Neg Hx      Ulcerative colitis Neg Hx      Hemochromatosis Neg Hx      Esophageal cancer Neg Hx      Anxiety disorder Neg Hx      Dementia Neg Hx      Bipolar disorder Neg Hx      Depression Neg Hx      Drug abuse Neg Hx      Schizophrenia Neg Hx      Suicide Neg Hx        Social History            Tobacco Use    Smoking status: Never    Smokeless tobacco: Never   Substance Use Topics    Alcohol use: No       Alcohol/week: 0.0 standard drinks of alcohol    Drug use: No        Review of Systems:   Pain scale:  unable to obtain due to mental status  Constitutional:  fever,  chills, headache, vision loss, hearing loss, weight loss, Generalized weakness, falls, loss of smell, loss of taste, poor appetite,  sore throat  Respiratory: cough, shortness of breath.   Cardiovascular: chest pain, dizziness, palpitations, orthopnea, swelling of feet, syncope  Gastrointestinal: nausea, vomiting, abdominal pain, diarrhea, black stool,  blood in stool, change in bowel habits, constipation  Genitourinary: hematuria, dysuria, urgency, frequency  Integument/Breast: rash,  pruritis  Hematologic/Lymphatic: easy bruising, lymphadenopathy  Musculoskeletal: arthralgias , myalgias, back  "pain, neck pain, knee pain  Neurological: confusion, seizures, tremors, slurred speech  Behavioral/Psych:  depression, anxiety, auditory or visual hallucinations     OBJECTIVE:     Physical Exam:  Body mass index is 17.54 kg/m².    Constitutional: Appears frail and cachectic   Head: Normocephalic and atraumatic.   Neck: Normal range of motion. Neck supple.   Cardiovascular: Normal heart rate.  Regular heart rhythm.  Pulmonary/Chest: Effort normal.   Abdominal: No distension.  No tenderness  Musculoskeletal: Normal range of motion. No edema.   Neurological: Alert and oriented to person, place . follows simple commands. verbalizes occasionally   Skin: Skin is warm and dry.   Psychiatric: Normal mood and affect. Behavior is normal.                  Vital Signs  Temp: 96.4 °F (35.8 °C) (01/28/24 1556)  Pulse: 75 (01/28/24 1556)  Resp: 18 (01/28/24 1556)  BP: (Abnormal) 100/57 (01/28/24 1556)  SpO2: 100 % (01/28/24 1556)     24 Hour VS Range    Temp:  [94 °F (34.4 °C)-98.5 °F (36.9 °C)]   Pulse:  [65-85]   Resp:  [10-18]   BP: ()/(56-84)   SpO2:  [95 %-100 %]     Intake/Output Summary (Last 24 hours) at 1/28/2024 1611  Last data filed at 1/28/2024 1340  Gross per 24 hour   Intake 350 ml   Output no documentation   Net 350 ml         I/O This Shift:  I/O this shift:  In: 350 [Blood:350]  Out: -     Wt Readings from Last 3 Encounters:   01/28/24 44.9 kg (99 lb)   11/07/23 44.9 kg (99 lb)   10/30/23 43.5 kg (95 lb 14.4 oz)       I have personally reviewed the vitals and recorded Intake/Output     Laboratory/Diagnostic Data:    CBC/Anemia Labs: Coags:    Recent Labs   Lab 01/28/24  1104 01/28/24  1230   WBC 2.59*  --    HGB 4.6*  --    HCT 16.2* 16*   *  --    *  --    RDW 15.3*  --     No results for input(s): "PT", "INR", "APTT" in the last 168 hours.     Chemistries: ABG:   Recent Labs   Lab 01/28/24  1104   *   K 4.7   *   CO2 12*   BUN 51*   CREATININE 2.9*   CALCIUM 6.6*   PROT 4.4* " "  BILITOT 0.7   ALKPHOS 63   ALT 17   AST 22   MG 1.9   PHOS 5.1*    No results for input(s): "PH", "PCO2", "PO2", "HCO3", "POCSATURATED", "BE" in the last 168 hours.     POCT Glucose: HbA1c:    Recent Labs   Lab 01/28/24  1109 01/28/24  1135 01/28/24  1420   POCTGLUCOSE 45* 162* 171*    Hemoglobin A1C   Date Value Ref Range Status   06/02/2023 4.9 4.0 - 5.6 % Final     Comment:     ADA Screening Guidelines:  5.7-6.4%  Consistent with prediabetes  >or=6.5%  Consistent with diabetes    High levels of fetal hemoglobin interfere with the HbA1C  assay. Heterozygous hemoglobin variants (HbS, HgC, etc)do  not significantly interfere with this assay.   However, presence of multiple variants may affect accuracy.     08/03/2018 5.4 4.0 - 5.6 % Final     Comment:     ADA Screening Guidelines:  5.7-6.4%  Consistent with prediabetes  >or=6.5%  Consistent with diabetes  High levels of fetal hemoglobin interfere with the HbA1C  assay. Heterozygous hemoglobin variants (HbS, HgC, etc)do  not significantly interfere with this assay.   However, presence of multiple variants may affect accuracy.     10/27/2017 5.5 4.0 - 5.6 % Final     Comment:     According to ADA guidelines, hemoglobin A1c <7.0% represents  optimal control in non-pregnant diabetic patients. Different  metrics may apply to specific patient populations.   Standards of Medical Care in Diabetes-2016.  For the purpose of screening for the presence of diabetes:  <5.7%     Consistent with the absence of diabetes  5.7-6.4%  Consistent with increasing risk for diabetes   (prediabetes)  >or=6.5%  Consistent with diabetes  Currently, no consensus exists for use of hemoglobin A1c  for diagnosis of diabetes for children.  This Hemoglobin A1c assay has significant interference with fetal   hemoglobin   (HbF). The results are invalid for patients with abnormal amounts of   HbF,   including those with known Hereditary Persistence   of Fetal Hemoglobin. Heterozygous hemoglobin " "variants (HbAS, HbAC,   HbAD, HbAE, HbA2) do not significantly interfere with this assay;   however, presence of multiple variants in a sample may impact the %   interference.          Cardiac Enzymes: Ejection Fractions:    Recent Labs     01/28/24  1104   CPK 92   TROPONINI 0.016    EF   Date Value Ref Range Status   02/22/2023 70 % Final     Nuc Stress EF   Date Value Ref Range Status   10/02/2023 89 % Final          No results for input(s): "COLORU", "APPEARANCEUA", "PHUR", "SPECGRAV", "PROTEINUA", "GLUCUA", "KETONESU", "BILIRUBINUA", "OCCULTUA", "NITRITE", "UROBILINOGEN", "LEUKOCYTESUR", "RBCUA", "WBCUA", "BACTERIA", "SQUAMEPITHEL", "HYALINECASTS" in the last 48 hours.    Invalid input(s): "WRIGHTSUR"    Procalcitonin (ng/mL)   Date Value   02/22/2023 0.68 (H)     Lactate (Lactic Acid) (mmol/L)   Date Value   01/28/2024 1.6   02/22/2023 0.6   02/22/2023 0.7     BNP (pg/mL)   Date Value   01/28/2024 241 (H)   10/02/2023 93     No results found for: "CRP", "SEDRATE"  D-Dimer (mg/L FEU)   Date Value   06/02/2023 1.70 (H)   02/21/2023 4.31 (H)     Ferritin (ng/mL)   Date Value   10/02/2023 230   06/02/2023 51   05/25/2018 57     LD (U/L)   Date Value   09/07/2018 186     Troponin I (ng/mL)   Date Value   01/28/2024 0.016   10/02/2023 <0.006   10/02/2023 0.168 (H)   06/02/2023 0.016   06/02/2023 0.018   02/21/2023 0.013     CPK (U/L)   Date Value   01/28/2024 92   10/02/2023 41   10/02/2023 39     CK (U/L)   Date Value   09/20/2023 43   09/16/2023 40   02/12/2023 36     Results for orders placed or performed in visit on 10/02/18   Vitamin D   Result Value Ref Range    Vit D, 25-Hydroxy 18 (L) 30 - 96 ng/mL   Results for orders placed or performed during the hospital encounter of 08/03/18   Vitamin D   Result Value Ref Range    Vit D, 25-Hydroxy 27 (L) 30 - 96 ng/mL   Results for orders placed or performed in visit on 05/25/18   Vitamin D   Result Value Ref Range    Vit D, 25-Hydroxy 17 (L) 30 - 96 ng/mL     No " "results found for: "[id number]"    Microbiology labs for the last week  Microbiology Results (last 7 days)       Procedure Component Value Units Date/Time    Blood culture [[id number]]     Order Status: Sent Specimen: Blood     Blood culture [[id number]]     Order Status: Sent Specimen: Blood             Reviewed and noted in plan where applicable- Please see chart for full lab data.    Lines/Drains:       Peripheral IV - Single Lumen 01/28/24 1103 18 G Anterior;Left;Proximal Forearm (Active)   Number of days: 0       Imaging  ECG Results              EKG 12-lead (Final result)  Result time 01/28/24 13:42:39      Final result by Interface, Lab In [facility] (01/28/24 13:42:39)               Narrative:    Test Reason : R53.83,    Vent. Rate : 069 BPM     Atrial Rate : 081 BPM     P-R Int : 000 ms          QRS Dur : 080 ms      QT Int : 410 ms       P-R-T Axes : 000 -25 -12 degrees     QTc Int : 439 ms    Accelerated Junctional rhythm  Low voltage QRS  Possible  Inferior infarct ,age undetermined  Cannot rule out Anteroseptal infarct (cited on or before 28-JAN-2024)  Abnormal ECG  When compared with ECG of 02-OCT-2023 11:14,  Significant changes have occurred  Confirmed by Martir DERAS MD ([id number]) on 1/28/2024 1:42:28 PM    Referred By: [email or web address]   SELF           Confirmed By:Martir DERAS MD                                  Results for orders placed during the hospital encounter of 10/02/23    Echo    Interpretation Summary    Left Ventricle: The left ventricle is normal in size. There is mild concentric hypertrophy. There is hyperdynamic systolic function with a visually estimated ejection fraction of 75 - 80%.    Right Ventricle: Normal right ventricular cavity size. Systolic function is normal.    Pulmonary Artery: The estimated pulmonary artery systolic pressure is 32 mmHg.    IVC/SVC: Normal venous pressure at 3 mmHg.      X-Ray Chest AP Portable  Narrative: EXAMINATION:  XR CHEST AP PORTABLE    CLINICAL " HISTORY:  Shortness of breath    TECHNIQUE:  Single frontal view of the chest was performed.    COMPARISON:  10/02/2023    FINDINGS:  The cardiac silhouette is normal in size.  The pulmonary vascularity is normal.    Left upper thoracic paramediastinal oval opacity and pleuroparenchymal scarring apical region left upper lobe correlated with CT 06/02/2023.  No new areas of abnormal opacity seen.  The osseous structures appear normal.  Impression: Abnormal left upper thoracic region pleural base opacity, similar to 10/02/2023 chest radiograph, these are new compared to 02/21/2023 radiographs, further evaluation advised.  Malignancy cannot be excluded.    Electronically signed by: Savana Cabrera MD  Date:    01/28/2024  Time:    12:59    EKG- Accelerated Junctional rhythm 69bpm Low voltage QRS Possible Inferior infarct ,age undetermined  Labs, Imaging, EKG and Diagnostic results from 1/28/2024 were reviewed.    Medications:  Medication list was reviewed and changes noted under Assessment/Plan.  No current facility-administered medications on file prior to encounter.     Current Outpatient Medications on File Prior to Encounter   Medication Sig Dispense Refill    acetaminophen (TYLENOL) 500 MG tablet Take 500 mg by mouth daily as needed for Pain.      amiodarone (PACERONE) 200 MG Tab Take 1 tablet (200 mg total) by mouth once daily. 30 tablet 11    amLODIPine (NORVASC) 10 MG tablet TAKE 1 TABLET(10 MG) BY MOUTH EVERY DAY 90 tablet 3    ascorbic acid, vitamin C, (VITAMIN C) 1000 MG tablet Take 1,000 mg by mouth once daily.      aspirin (ECOTRIN) 81 MG EC tablet Take 81 mg by mouth once daily.      BIOTIN ORAL Take 1 tablet by mouth once daily.      calcium carbonate (TUMS ORAL) Take 1 tablet by mouth daily as needed (Heartburn).      cyanocobalamin, vitamin B-12, (VITAMIN B-12 ORAL) Take 1 tablet by mouth daily as needed.      mirtazapine (REMERON) 30 MG tablet Take 30 mg by mouth every evening.      OLANZapine  (ZYPREXA) 10 MG tablet Take 10 mg by mouth every evening.      vitamin D (VITAMIN D3) 1000 units Tab Take 1,000 Units by mouth once daily.       Scheduled Medications:  glucose, 16 g, Oral, ED 1 Time  piperacillin-tazobactam (Zosyn) IV (PEDS and ADULTS) (extended infusion is not appropriate), 4.5 g, Intravenous, ED 1 Time      PRN: 0.9%  NaCl infusion (for blood administration), dextrose 10%, dextrose 10%, glucagon (human recombinant), glucose, glucose  Infusions:    sodium bicarbonate 150 mEq in dextrose 5 % (D5W) 1,000 mL infusion       Estimated Creatinine Clearance: 13.3 mL/min (A) (based on SCr of 2.9 mg/dL (H)).             Assessment/Plan:     * Failure to thrive in adult   difficult historian with limited insight into their medical issues.   collateral from son - No significant oral intake for the last 4-5 days.  EMS gave 1 L of fluid in route.     Hypothermia  Irving hugger ordered. TSH WNL       Goals of care, counseling/discussion  Advance Care Planning    Code Status  In light of the patients advanced and life limiting illness,I engaged the the family in a voluntary conversation about the patient's preferences for care  at the very end of life.  son wants full code for now. wants to discuss with palliative care  in AM    I spent a total of 25 minutes engaging the patient in this advance care planning discussion.            Hypoglycemia  secondary to poor oral intake  Last A1c reviewed-   Lab Results   Component Value Date    HGBA1C 4.9 06/02/2023    HGBA1C 5.4 08/03/2018    HGBA1C 5.5 10/27/2017     Will hold PO hypoglycemics and will start correctional scale insulin  Most recent fingerstick glucose reviewed-   Recent Labs   Lab 01/28/24  1109   POCTGLUCOSE 45*   . Treated with D10 x 250ml  glucose.  POCT glucose q1h. D10 infusion     Hypocalcemia  likely from above  Recent Labs   Lab 01/28/24  1104   CALCIUM 6.6*         hypocalcemia 6.6 .     Corrected calcium is 8.5.  Ordered 1 g calcium gluconate.      Metabolic acidosis     likely from CKD/GLENIS . patient with bicarbonate   Recent Labs   Lab 01/28/24  1104   CO2 12*   sodium bicarbonate  drip . monitor        History of small bowel obstruction   ,recent small bowel obstruction sp small bowel resection 8/5/2023     Hypotension  secondary to poor oral intake. SBP 89/57 . continue IV Fluids       Lung mass  ? CX ray 1/28  Abnormal left upper thoracic region pleural base opacity, similar to 10/02/2023 chest radiograph, these are new compared to 02/21/2023 radiographs, further evaluation advised.  Malignancy cannot be excluded.            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 secondary to above      Thrombocytopenia, unspecified    Patient with thrombocytopenia   Recent Labs   Lab 01/28/24  1104   *   . Platelet counts stable.monitor         Multiple myeloma   multiple myeloma dx 2018 followed by [facility]. 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 needs assistance to transfer from the bed to the walker     Heme/onc evalution    GLENIS (acute kidney injury)  Patient with acute kidney injury/acute renal failure likely due to pre-renal azotemia due to IVVD GLENIS is currently worsening. Baseline creatinine  2.1  - Labs reviewed- Renal function/electrolytes with Estimated Creatinine Clearance: 13.3 mL/min (A) (based on SCr of 2.9 mg/dL (H)). according to latest data. Monitor urine output and serial BMP and adjust therapy as needed. Avoid nephrotoxins and renally dose meds for GFR listed above.     Obtain urine lytes , renal sonogram  and bladder scans q 6h  Recent Labs   Lab 01/28/24  1104   BUN 51*   CREATININE 2.9*       I & O   No intake or output data in the 24 hours ending 01/28/24 1339   Gentle IV hydration.     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 needs assistance to transfer from the bed to the walker      Anemia in neoplastic disease    Patient's with macrocytic anemia.. Hemoglobin downtrending. Etiology likely due to  multiple myelonol not on treatment .  Current CBC reviewed-    Recent Labs   Lab 01/28/24  1104   HGB 4.6*         Component Value Date/Time     (H) 01/28/2024 1104    RDW 15.3 (H) 01/28/2024 1104    IRON 72 10/02/2023 1430    FERRITIN 230 10/02/2023 1430    FOLATE 2.9 (L) 10/03/2023 0535    [id number] 248 10/03/2023 0535     Monitor CBC and transfuse if H/H drops below 7/21.      PRBC transfusion x2 .CBC q 6h. monitor for overt bleed. consider GI eval      Multiple myeloma not having achieved remission  as above      Paranoid schizophrenia   paranoid schizophrenia with multiple inpatient psychiatric admissions, - admitted for  reported decreased eating.  Patient is a difficult historian with limited insight into their medical issues.   collateral from son - No significant oral intake for the last 4-5 days.     psychiatry evaluation       VTE Risk Mitigation (From admission, onward)      None                            Ender Mcclure MD  Department of Hospital Medicine  [facility] - Emergency Dept

## 2024-01-28 NOTE — ASSESSMENT & PLAN NOTE
multiple myeloma dx 2018 followed by [facility]. 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 needs assistance to transfer from the bed to the walker     Heme/onc evalution

## 2024-01-28 NOTE — ASSESSMENT & PLAN NOTE
Patient with thrombocytopenia   Recent Labs   Lab 01/28/24  1104   *   . Platelet counts stable.monitor

## 2024-01-28 NOTE — ASSESSMENT & PLAN NOTE
declined functional status over the last 2-3 years due to pain and debility from multiple myeloma. 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 needs assistance to transfer from the bed to the walker

## 2024-01-28 NOTE — ASSESSMENT & PLAN NOTE
Patient's with macrocytic anemia.. Hemoglobin downtrending. Etiology likely due to  multiple myelonol not on treatment .  Current CBC reviewed-    Recent Labs   Lab 01/28/24  1104   HGB 4.6*         Component Value Date/Time     (H) 01/28/2024 1104    RDW 15.3 (H) 01/28/2024 1104    IRON 72 10/02/2023 1430    FERRITIN 230 10/02/2023 1430    FOLATE 2.9 (L) 10/03/2023 0535    [id number] 248 10/03/2023 0535     Monitor CBC and transfuse if H/H drops below 7/21.      PRBC transfusion x2 .CBC q 6h. monitor for overt bleed. consider GI eval

## 2024-01-28 NOTE — HPI
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        Patient is a difficult historian with limited insight into their medical issues. AAO x 2 but not talkative.    collateral from son at the bedside. Patient was following with oncology at [place]. per son, primary oncologist mentioned she is not a candidate for further chemo about 3 months back and advised hospice. Patient is on home hospice and son not happy with current hospic agency. son wants to discuss with palliative care at [facility] [place] and requesting followups regarding [employer] work . reports patient had   no significant oral intake for the last 7days -only drinking water mostly.. BP was in 80s this AM. son called. EMS.   EMS gave 1 L of fluid in route.  being admitted for evaluation of failure to thrive  per chart review, ultiple myeloma for the past 3-4 years.  recently admitted to [facility]  last year for psychosis and required treatmen. declined functional status over the last 2-3 years due to pain and debility from multiple myeloma. 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 needs assistance to transfer from the bed to the walker and medication management.  H/o psychiatric admuisssions secondary to schizophrenia. At baseline, ADL and  ambulates with asssitance. she is bedbound x 1 week.     ER couse -   soft blood pressure 100/84 .  EKG without acute ischemia.  Labs with hypoglycemia 43 , anemia Hb 4.6 , and hypocalcemia 6.6 .     Corrected calcium is 8.5.  Ordered 1 g calcium gluconate. Treated with  D10 x 250ml  glucose.  Consented for blood transfusion.  severe nonanion gap metabolic acidosis with bicarb of 12. lactate WNL        last admission 1/2- 1/3 -suspected hematemesis.  Suspect color from emesis due to Partha-Aid at lunch and dinner per patient.   found to have elevated troponin.  No chest pain or shortness a breath. Negative stress test.  2 D echo normal EF 75 80%, no mention of wall motion abnormality.  .  Hgb stable and  no overt signs of bleeding during stay. X ray right pelvis no fractures  but does show osseous destruction lesion that was also noted on CT 5/2/2022 per Radiology. [place] [facility] [place]

## 2024-01-28 NOTE — ASSESSMENT & PLAN NOTE
secondary to poor oral intake  Last A1c reviewed-   Lab Results   Component Value Date    HGBA1C 4.9 06/02/2023    HGBA1C 5.4 08/03/2018    HGBA1C 5.5 10/27/2017     Will hold PO hypoglycemics and will start correctional scale insulin  Most recent fingerstick glucose reviewed-   Recent Labs   Lab 01/28/24  1109   POCTGLUCOSE 45*   . Treated with D10 x 250ml  glucose.  POCT glucose q1h. D10 infusion

## 2024-01-28 NOTE — ASSESSMENT & PLAN NOTE
likely from CKD/GLENIS . patient with bicarbonate   Recent Labs   Lab 01/28/24  1104   CO2 12*   sodium bicarbonate  drip . monitor

## 2024-01-29 PROBLEM — E43 SEVERE MALNUTRITION: Status: ACTIVE | Noted: 2024-01-29

## 2024-01-29 LAB
ALBUMIN SERPL BCP-MCNC: 1.6 G/DL (ref 3.5–5.2)
ALP SERPL-CCNC: 64 U/L (ref 55–135)
ALT SERPL W/O P-5'-P-CCNC: 19 U/L (ref 10–44)
ANION GAP SERPL CALC-SCNC: 10 MMOL/L (ref 8–16)
ANISOCYTOSIS BLD QL SMEAR: SLIGHT
ANISOCYTOSIS BLD QL SMEAR: SLIGHT
AST SERPL-CCNC: 22 U/L (ref 10–40)
BASOPHILS # BLD AUTO: 0.02 K/UL (ref 0–0.2)
BASOPHILS # BLD AUTO: 0.03 K/UL (ref 0–0.2)
BASOPHILS # BLD AUTO: 0.06 K/UL (ref 0–0.2)
BASOPHILS # BLD AUTO: 0.07 K/UL (ref 0–0.2)
BASOPHILS NFR BLD: 0.5 % (ref 0–1.9)
BASOPHILS NFR BLD: 0.6 % (ref 0–1.9)
BASOPHILS NFR BLD: 1.5 % (ref 0–1.9)
BASOPHILS NFR BLD: 1.7 % (ref 0–1.9)
BILIRUB SERPL-MCNC: 1 MG/DL (ref 0.1–1)
BUN SERPL-MCNC: 51 MG/DL (ref 8–23)
CALCIUM SERPL-MCNC: 6.9 MG/DL (ref 8.7–10.5)
CHLORIDE SERPL-SCNC: 113 MMOL/L (ref 95–110)
CO2 SERPL-SCNC: 20 MMOL/L (ref 23–29)
CREAT SERPL-MCNC: 2.9 MG/DL (ref 0.5–1.4)
CREAT UR-MCNC: 57 MG/DL (ref 15–325)
DIFFERENTIAL METHOD BLD: ABNORMAL
EOSINOPHIL # BLD AUTO: 0 K/UL (ref 0–0.5)
EOSINOPHIL NFR BLD: 0.2 % (ref 0–8)
EOSINOPHIL NFR BLD: 0.4 % (ref 0–8)
EOSINOPHIL NFR BLD: 0.5 % (ref 0–8)
EOSINOPHIL NFR BLD: 0.5 % (ref 0–8)
EOSINOPHIL URNS QL WRIGHT STN: NORMAL
ERYTHROCYTE [DISTWIDTH] IN BLOOD BY AUTOMATED COUNT: 15.2 % (ref 11.5–14.5)
ERYTHROCYTE [DISTWIDTH] IN BLOOD BY AUTOMATED COUNT: 15.6 % (ref 11.5–14.5)
ERYTHROCYTE [DISTWIDTH] IN BLOOD BY AUTOMATED COUNT: 15.6 % (ref 11.5–14.5)
ERYTHROCYTE [DISTWIDTH] IN BLOOD BY AUTOMATED COUNT: 15.9 % (ref 11.5–14.5)
EST. GFR  (NO RACE VARIABLE): 17.2 ML/MIN/1.73 M^2
GLUCOSE SERPL-MCNC: 117 MG/DL (ref 70–110)
HCT VFR BLD AUTO: 26.9 % (ref 37–48.5)
HCT VFR BLD AUTO: 26.9 % (ref 37–48.5)
HCT VFR BLD AUTO: 27 % (ref 37–48.5)
HCT VFR BLD AUTO: 27 % (ref 37–48.5)
HGB BLD-MCNC: 9.2 G/DL (ref 12–16)
HGB BLD-MCNC: 9.2 G/DL (ref 12–16)
HGB BLD-MCNC: 9.5 G/DL (ref 12–16)
HGB BLD-MCNC: 9.6 G/DL (ref 12–16)
IMM GRANULOCYTES # BLD AUTO: 0.04 K/UL (ref 0–0.04)
IMM GRANULOCYTES # BLD AUTO: 0.05 K/UL (ref 0–0.04)
IMM GRANULOCYTES # BLD AUTO: 0.05 K/UL (ref 0–0.04)
IMM GRANULOCYTES # BLD AUTO: 0.08 K/UL (ref 0–0.04)
IMM GRANULOCYTES NFR BLD AUTO: 1 % (ref 0–0.5)
IMM GRANULOCYTES NFR BLD AUTO: 1.1 % (ref 0–0.5)
IMM GRANULOCYTES NFR BLD AUTO: 1.2 % (ref 0–0.5)
IMM GRANULOCYTES NFR BLD AUTO: 1.5 % (ref 0–0.5)
LYMPHOCYTES # BLD AUTO: 0.2 K/UL (ref 1–4.8)
LYMPHOCYTES # BLD AUTO: 0.2 K/UL (ref 1–4.8)
LYMPHOCYTES # BLD AUTO: 0.4 K/UL (ref 1–4.8)
LYMPHOCYTES # BLD AUTO: 0.4 K/UL (ref 1–4.8)
LYMPHOCYTES NFR BLD: 5.4 % (ref 18–48)
LYMPHOCYTES NFR BLD: 5.6 % (ref 18–48)
LYMPHOCYTES NFR BLD: 7.7 % (ref 18–48)
LYMPHOCYTES NFR BLD: 8.1 % (ref 18–48)
MAGNESIUM SERPL-MCNC: 1.8 MG/DL (ref 1.6–2.6)
MCH RBC QN AUTO: 28.3 PG (ref 27–31)
MCH RBC QN AUTO: 28.4 PG (ref 27–31)
MCH RBC QN AUTO: 29.1 PG (ref 27–31)
MCH RBC QN AUTO: 29.4 PG (ref 27–31)
MCHC RBC AUTO-ENTMCNC: 34.1 G/DL (ref 32–36)
MCHC RBC AUTO-ENTMCNC: 34.2 G/DL (ref 32–36)
MCHC RBC AUTO-ENTMCNC: 35.2 G/DL (ref 32–36)
MCHC RBC AUTO-ENTMCNC: 35.7 G/DL (ref 32–36)
MCV RBC AUTO: 82 FL (ref 82–98)
MCV RBC AUTO: 83 FL (ref 82–98)
MCV RBC AUTO: 83 FL (ref 82–98)
MCV RBC AUTO: 84 FL (ref 82–98)
MONOCYTES # BLD AUTO: 0.2 K/UL (ref 0.3–1)
MONOCYTES # BLD AUTO: 0.3 K/UL (ref 0.3–1)
MONOCYTES NFR BLD: 5.3 % (ref 4–15)
MONOCYTES NFR BLD: 5.4 % (ref 4–15)
MONOCYTES NFR BLD: 5.4 % (ref 4–15)
MONOCYTES NFR BLD: 6.6 % (ref 4–15)
NEUTROPHILS # BLD AUTO: 3.4 K/UL (ref 1.8–7.7)
NEUTROPHILS # BLD AUTO: 3.5 K/UL (ref 1.8–7.7)
NEUTROPHILS # BLD AUTO: 3.7 K/UL (ref 1.8–7.7)
NEUTROPHILS # BLD AUTO: 4.3 K/UL (ref 1.8–7.7)
NEUTROPHILS NFR BLD: 83.2 % (ref 38–73)
NEUTROPHILS NFR BLD: 84.4 % (ref 38–73)
NEUTROPHILS NFR BLD: 86.1 % (ref 38–73)
NEUTROPHILS NFR BLD: 86.1 % (ref 38–73)
NRBC BLD-RTO: 0 /100 WBC
NRBC BLD-RTO: 1 /100 WBC
OVALOCYTES BLD QL SMEAR: ABNORMAL
PLATELET # BLD AUTO: 113 K/UL (ref 150–450)
PLATELET # BLD AUTO: 128 K/UL (ref 150–450)
PLATELET # BLD AUTO: 133 K/UL (ref 150–450)
PLATELET # BLD AUTO: 135 K/UL (ref 150–450)
PLATELET BLD QL SMEAR: ABNORMAL
PLATELET BLD QL SMEAR: ABNORMAL
PMV BLD AUTO: 11.1 FL (ref 9.2–12.9)
PMV BLD AUTO: 11.2 FL (ref 9.2–12.9)
PMV BLD AUTO: 9.5 FL (ref 9.2–12.9)
PMV BLD AUTO: 9.7 FL (ref 9.2–12.9)
POCT GLUCOSE: 118 MG/DL (ref 70–110)
POCT GLUCOSE: 119 MG/DL (ref 70–110)
POCT GLUCOSE: 120 MG/DL (ref 70–110)
POCT GLUCOSE: 120 MG/DL (ref 70–110)
POCT GLUCOSE: 123 MG/DL (ref 70–110)
POCT GLUCOSE: 127 MG/DL (ref 70–110)
POCT GLUCOSE: 137 MG/DL (ref 70–110)
POCT GLUCOSE: 139 MG/DL (ref 70–110)
POCT GLUCOSE: 175 MG/DL (ref 70–110)
POIKILOCYTOSIS BLD QL SMEAR: SLIGHT
POLYCHROMASIA BLD QL SMEAR: ABNORMAL
POTASSIUM SERPL-SCNC: 3.9 MMOL/L (ref 3.5–5.1)
PROT 24H UR-MRATE: ABNORMAL MG/SPEC (ref 0–100)
PROT SERPL-MCNC: 4.4 G/DL (ref 6–8.4)
PROT UR-MCNC: 305 MG/DL (ref 0–15)
PTH-INTACT SERPL-MCNC: 641.3 PG/ML (ref 9–77)
RBC # BLD AUTO: 3.23 M/UL (ref 4–5.4)
RBC # BLD AUTO: 3.24 M/UL (ref 4–5.4)
RBC # BLD AUTO: 3.25 M/UL (ref 4–5.4)
RBC # BLD AUTO: 3.3 M/UL (ref 4–5.4)
SODIUM SERPL-SCNC: 143 MMOL/L (ref 136–145)
SODIUM UR-SCNC: 59 MMOL/L (ref 20–250)
URINE COLLECTION DURATION: 24 HR
URINE VOLUME: ABNORMAL ML
UUN UR-MCNC: 326 MG/DL (ref 140–1050)
WBC # BLD AUTO: 3.96 K/UL (ref 3.9–12.7)
WBC # BLD AUTO: 4.04 K/UL (ref 3.9–12.7)
WBC # BLD AUTO: 4.42 K/UL (ref 3.9–12.7)
WBC # BLD AUTO: 5.17 K/UL (ref 3.9–12.7)

## 2024-01-29 PROCEDURE — 85025 COMPLETE CBC W/AUTO DIFF WBC: CPT | Mod: 91 | Performed by: HOSPITALIST

## 2024-01-29 PROCEDURE — 83970 ASSAY OF PARATHORMONE: CPT | Performed by: HOSPITALIST

## 2024-01-29 PROCEDURE — 20600001 HC STEP DOWN PRIVATE ROOM

## 2024-01-29 PROCEDURE — 82570 ASSAY OF URINE CREATININE: CPT | Performed by: HOSPITALIST

## 2024-01-29 PROCEDURE — 84540 ASSAY OF URINE/UREA-N: CPT | Performed by: HOSPITALIST

## 2024-01-29 PROCEDURE — 25000003 PHARM REV CODE 250: Mod: JZ,JG | Performed by: HOSPITALIST

## 2024-01-29 PROCEDURE — 87205 SMEAR GRAM STAIN: CPT | Performed by: HOSPITALIST

## 2024-01-29 PROCEDURE — 36415 COLL VENOUS BLD VENIPUNCTURE: CPT | Mod: XB | Performed by: HOSPITALIST

## 2024-01-29 PROCEDURE — 63600175 PHARM REV CODE 636 W HCPCS: Performed by: HOSPITALIST

## 2024-01-29 PROCEDURE — 84166 PROTEIN E-PHORESIS/URINE/CSF: CPT | Performed by: STUDENT IN AN ORGANIZED HEALTH CARE EDUCATION/TRAINING PROGRAM

## 2024-01-29 PROCEDURE — 84300 ASSAY OF URINE SODIUM: CPT | Performed by: HOSPITALIST

## 2024-01-29 PROCEDURE — 83735 ASSAY OF MAGNESIUM: CPT | Performed by: HOSPITALIST

## 2024-01-29 PROCEDURE — 84156 ASSAY OF PROTEIN URINE: CPT | Performed by: STUDENT IN AN ORGANIZED HEALTH CARE EDUCATION/TRAINING PROGRAM

## 2024-01-29 PROCEDURE — 80053 COMPREHEN METABOLIC PANEL: CPT | Performed by: HOSPITALIST

## 2024-01-29 RX ORDER — DEXTROSE, SODIUM CHLORIDE, SODIUM LACTATE, POTASSIUM CHLORIDE, AND CALCIUM CHLORIDE 5; .6; .31; .03; .02 G/100ML; G/100ML; G/100ML; G/100ML; G/100ML
INJECTION, SOLUTION INTRAVENOUS CONTINUOUS
Status: DISCONTINUED | OUTPATIENT
Start: 2024-01-29 | End: 2024-01-30

## 2024-01-29 RX ORDER — CALCIUM GLUCONATE 20 MG/ML
1 INJECTION, SOLUTION INTRAVENOUS ONCE
Status: COMPLETED | OUTPATIENT
Start: 2024-01-29 | End: 2024-01-29

## 2024-01-29 RX ORDER — MIRTAZAPINE 15 MG/1
15 TABLET, ORALLY DISINTEGRATING ORAL NIGHTLY
Status: DISCONTINUED | OUTPATIENT
Start: 2024-01-29 | End: 2024-01-31

## 2024-01-29 RX ADMIN — CALCIUM GLUCONATE 1 G: 20 INJECTION, SOLUTION INTRAVENOUS at 10:01

## 2024-01-29 RX ADMIN — SODIUM CHLORIDE, SODIUM LACTATE, POTASSIUM CHLORIDE, CALCIUM CHLORIDE AND DEXTROSE MONOHYDRATE: 5; 600; 310; 30; 20 INJECTION, SOLUTION INTRAVENOUS at 08:01

## 2024-01-29 NOTE — PROGRESS NOTES
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        Patient is a difficult historian with limited insight into their medical issues. AAO x 2 but not talkative.    collateral from son at the bedside. Patient was following with oncology at [place]. per son, primary oncologist mentioned she is not a candidate for further chemo about 3 months back and advised hospice. Patient is on home hospice and son not happy with current hospic agency. son wants to discuss with palliative care at [facility] and requesting followups regarding [employer] work . reports patient had   no significant oral intake for the last 7days -only drinking water mostly.. BP was in 80s this AM. son called. EMS.   EMS gave 1 L of fluid in route.  being admitted for evaluation of failure to thrive  per chart review, ultiple myeloma for the past 3-4 years.  recently admitted to [facility]  last year for psychosis and required treatmen. declined functional status over the last 2-3 years due to pain and debility from multiple myeloma. 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 needs assistance to transfer from the bed to the  walker and medication management.  H/o psychiatric admuisssions secondary to schizophrenia. At baseline, ADL and  ambulates with asssitance. she is bedbound x 1 week.     ER couse -   soft blood pressure 100/84 .  EKG without acute ischemia.  Labs with hypoglycemia 43 , anemia Hb 4.6 , and hypocalcemia 6.6 .     Corrected calcium is 8.5.  Ordered 1 g calcium gluconate. Treated with D10 x 250ml  glucose.  Consented for blood transfusion.  severe nonanion gap metabolic acidosis with bicarb of 12. lactate WNL        last admission 1/2- 1/3 -suspected hematemesis.  Suspect color from emesis due to Partha-Aid at lunch and dinner per patient.   found to have elevated troponin.  No chest pain or shortness a breath. Negative stress test.  2 D echo normal EF 75 80%, no mention of wall motion abnormality.  .  Hgb stable and  no overt signs of bleeding during stay. X ray right pelvis no fractures  but does show osseous destruction lesion that was also noted on CT 5/2/2022 per Radiology. Plan to [place] up with [place] [facility].           Overview/Hospital Course:  1/29 Glucose stable. POCG glucose q 4H. SLP, palliative care  and psychiatry eval. per son , h/o catatonia. did not eat or drink  overnight . continue D5LR.  X ray abdomen -Nonobstructive bowel gas pattern. renal sonogram - Bilateral increased renal parenchymal echogenicity, with decreased corticomedullary distinction, possibly related to underlying medical renal disease.  Mild distention of the right renal pelvis, possibly on the basis of extrarenal pelvis.  Mild hydronephrosis not fully excluded.  CT RSS ordered . patient refused CT scan.corrected calcium is 8.4 . Hb stable            Review of Systems:   Pain scale:    unable to obtain due to mental status  Constitutional:  fever,  chills, headache, vision loss, hearing loss, weight loss, Generalized weakness, falls, loss of smell, loss of taste, poor appetite,  sore  throat  Respiratory: cough, shortness of breath.   Cardiovascular: chest pain, dizziness, palpitations, orthopnea, swelling of feet, syncope  Gastrointestinal: nausea, vomiting, abdominal pain, diarrhea, black stool,  blood in stool, change in bowel habits, constipation  Genitourinary: hematuria, dysuria, urgency, frequency  Integument/Breast: rash,  pruritis  Hematologic/Lymphatic: easy bruising, lymphadenopathy  Musculoskeletal: arthralgias , myalgias, back pain, neck pain, knee pain  Neurological: confusion, seizures, tremors, slurred speech  Behavioral/Psych:  depression, anxiety, auditory or visual hallucinations     OBJECTIVE:     Physical Exam:  Body mass index is 17.54 kg/m².    Constitutional: Appears frail and cachectic   Head: Normocephalic and atraumatic.   Neck: Normal range of motion. Neck supple.   Cardiovascular: Normal heart rate.  Regular heart rhythm.  Pulmonary/Chest: Effort normal.   Abdominal: No distension.  No tenderness  Musculoskeletal: Normal range of motion. No edema.   Neurological: Alert and oriented to person, place . follows simple commands. verbalizes occasionally   Skin: Skin is warm and dry.   Psychiatric: Normal mood and affect. Behavior is normal.                  Vital Signs  Temp: 98.2 °F (36.8 °C) (01/29/24 1614)  Pulse: 73 (01/29/24 1614)  Resp: 20 (01/29/24 1614)  BP: 137/74 (01/29/24 1614)  SpO2: 99 % (01/29/24 1614)     24 Hour VS Range    Temp:  [97.6 °F (36.4 °C)-98.5 °F (36.9 °C)]   Pulse:  [70-87]   Resp:  [12-20]   BP: (107-137)/(60-81)   SpO2:  [99 %-100 %]     Intake/Output Summary (Last 24 hours) at 1/29/2024 1912  Last data filed at 1/29/2024 1715  Gross per 24 hour   Intake 661.25 ml   Output 250 ml   Net 411.25 ml         I/O This Shift:  No intake/output data recorded.    Wt Readings from Last 3 Encounters:   01/28/24 44.9 kg (99 lb)   11/07/23 44.9 kg (99 lb)   10/30/23 43.5 kg (95 lb 14.4 oz)       I have personally reviewed the vitals and recorded  "Intake/Output     Laboratory/Diagnostic Data:    CBC/Anemia Labs: Coags:    Recent Labs   Lab 01/29/24  0654 01/29/24  0757 01/29/24  1308 01/29/24  1757   WBC 4.04 3.96 4.42 5.17   HGB 9.2* 9.2* 9.5* 9.6*   HCT 27.0* 26.9* 27.0* 26.9*   * 135* 133*  --    MCV 83 83 84 82   RDW 15.6* 15.6* 15.9* 15.2*    No results for input(s): "PT", "INR", "APTT" in the last 168 hours.     Chemistries: ABG:   Recent Labs   Lab 01/28/24  1104 01/28/24  1709 01/29/24  0757   * 143 143   K 4.7 4.6 3.9   * 116* 113*   CO2 12* 15* 20*   BUN 51* 49* 51*   CREATININE 2.9* 2.9* 2.9*   CALCIUM 6.6* 7.3* 6.9*   PROT 4.4*  --  4.4*   BILITOT 0.7  --  1.0   ALKPHOS 63  --  64   ALT 17  --  19   AST 22  --  22   MG 1.9  --  1.8   PHOS 5.1* 5.3*  --     No results for input(s): "PH", "PCO2", "PO2", "HCO3", "POCSATURATED", "BE" in the last 168 hours.     POCT Glucose: HbA1c:    Recent Labs   Lab 01/29/24  0235 01/29/24  0339 01/29/24  0436 01/29/24  0529 01/29/24  0807 01/29/24  1611   POCTGLUCOSE 120* 120* 139* 137* 123* 127*    Hemoglobin A1C   Date Value Ref Range Status   06/02/2023 4.9 4.0 - 5.6 % Final     Comment:     ADA Screening Guidelines:  5.7-6.4%  Consistent with prediabetes  >or=6.5%  Consistent with diabetes    High levels of fetal hemoglobin interfere with the HbA1C  assay. Heterozygous hemoglobin variants (HbS, HgC, etc)do  not significantly interfere with this assay.   However, presence of multiple variants may affect accuracy.     08/03/2018 5.4 4.0 - 5.6 % Final     Comment:     ADA Screening Guidelines:  5.7-6.4%  Consistent with prediabetes  >or=6.5%  Consistent with diabetes  High levels of fetal hemoglobin interfere with the HbA1C  assay. Heterozygous hemoglobin variants (HbS, HgC, etc)do  not significantly interfere with this assay.   However, presence of multiple variants may affect accuracy.     10/27/2017 5.5 4.0 - 5.6 % Final     Comment:     According to ADA guidelines, hemoglobin A1c <7.0% " "represents  optimal control in non-pregnant diabetic patients. Different  metrics may apply to specific patient populations.   Standards of Medical Care in Diabetes-2016.  For the purpose of screening for the presence of diabetes:  <5.7%     Consistent with the absence of diabetes  5.7-6.4%  Consistent with increasing risk for diabetes   (prediabetes)  >or=6.5%  Consistent with diabetes  Currently, no consensus exists for use of hemoglobin A1c  for diagnosis of diabetes for children.  This Hemoglobin A1c assay has significant interference with fetal   hemoglobin   (HbF). The results are invalid for patients with abnormal amounts of   HbF,   including those with known Hereditary Persistence   of Fetal Hemoglobin. Heterozygous hemoglobin variants (HbAS, HbAC,   HbAD, HbAE, HbA2) do not significantly interfere with this assay;   however, presence of multiple variants in a sample may impact the %   interference.          Cardiac Enzymes: Ejection Fractions:    Recent Labs     01/28/24  1104   CPK 92   TROPONINI 0.016    EF   Date Value Ref Range Status   02/22/2023 70 % Final     Nuc Stress EF   Date Value Ref Range Status   10/02/2023 89 % Final          No results for input(s): "COLORU", "APPEARANCEUA", "PHUR", "SPECGRAV", "PROTEINUA", "GLUCUA", "KETONESU", "BILIRUBINUA", "OCCULTUA", "NITRITE", "UROBILINOGEN", "LEUKOCYTESUR", "RBCUA", "WBCUA", "BACTERIA", "SQUAMEPITHEL", "HYALINECASTS" in the last 48 hours.    Invalid input(s): "WRIGHTSUR"    Procalcitonin (ng/mL)   Date Value   02/22/2023 0.68 (H)     Lactate (Lactic Acid) (mmol/L)   Date Value   01/28/2024 1.6   02/22/2023 0.6   02/22/2023 0.7     BNP (pg/mL)   Date Value   01/28/2024 241 (H)   10/02/2023 93     No results found for: "CRP", "SEDRATE"  D-Dimer (mg/L FEU)   Date Value   06/02/2023 1.70 (H)   02/21/2023 4.31 (H)     Ferritin (ng/mL)   Date Value   10/02/2023 230   06/02/2023 51   05/25/2018 57     LD (U/L)   Date Value   09/07/2018 186     Troponin I " "(ng/mL)   Date Value   01/28/2024 0.016   10/02/2023 <0.006   10/02/2023 0.168 (H)   06/02/2023 0.016   06/02/2023 0.018   02/21/2023 0.013     CPK (U/L)   Date Value   01/28/2024 92   10/02/2023 41   10/02/2023 39     CK (U/L)   Date Value   09/20/2023 43   09/16/2023 40   02/12/2023 36     Results for orders placed or performed in visit on 10/02/18   Vitamin D   Result Value Ref Range    Vit D, 25-Hydroxy 18 (L) 30 - 96 ng/mL   Results for orders placed or performed during the hospital encounter of 08/03/18   Vitamin D   Result Value Ref Range    Vit D, 25-Hydroxy 27 (L) 30 - 96 ng/mL   Results for orders placed or performed in visit on 05/25/18   Vitamin D   Result Value Ref Range    Vit D, 25-Hydroxy 17 (L) 30 - 96 ng/mL     No results found for: "[id number]"    Microbiology labs for the last week  Microbiology Results (last 7 days)       Procedure Component Value Units Date/Time    Blood culture [[id number]] Collected: 01/28/24 1455    Order Status: Completed Specimen: Blood from Peripheral, Antecubital, Left Updated: 01/29/24 0115     Blood Culture, Routine No Growth to date    Blood culture [[id number]] Collected: 01/28/24 1550    Order Status: Completed Specimen: Blood from Peripheral, Antecubital, Right Updated: 01/29/24 0115     Blood Culture, Routine No Growth to date            Reviewed and noted in plan where applicable- Please see chart for full lab data.    Lines/Drains:       Peripheral IV - Single Lumen 01/28/24 1103 18 G Anterior;Left;Proximal Forearm (Active)   Site Assessment Clean;Dry;Intact;No redness;No swelling 01/29/24 0331   Extremity Assessment Distal to IV No abnormal discoloration 01/28/24 2327   Line Status Infusing 01/29/24 0331   Dressing Status Intact 01/29/24 0331   Dressing Intervention Integrity maintained 01/29/24 0331   Number of days: 0            Peripheral IV - Single Lumen 01/28/24 1803 20 G;1 3/4 in Right Antecubital (Active)   Site Assessment Clean;Dry;Intact;No " redness;No swelling 01/29/24 0331   Extremity Assessment Distal to IV No swelling;No redness;No abnormal discoloration 01/28/24 1803   Line Status Infusing 01/29/24 0000   Dressing Status Clean;Dry;Intact 01/29/24 0331   Dressing Intervention Integrity maintained 01/29/24 0331   Number of days: 0       Imaging  ECG Results              EKG 12-lead (Final result)  Result time 01/28/24 13:42:39      Final result by Interface, Lab In [facility] (01/28/24 13:42:39)               Narrative:    Test Reason : R53.83,    Vent. Rate : 069 BPM     Atrial Rate : 081 BPM     P-R Int : 000 ms          QRS Dur : 080 ms      QT Int : 410 ms       P-R-T Axes : 000 -25 -12 degrees     QTc Int : 439 ms    Accelerated Junctional rhythm  Low voltage QRS  Possible  Inferior infarct ,age undetermined  Cannot rule out Anteroseptal infarct (cited on or before 28-JAN-2024)  Abnormal ECG  When compared with ECG of 02-OCT-2023 11:14,  Significant changes have occurred  Confirmed by Martir DERAS MD ([id number]) on 1/28/2024 1:42:28 PM    Referred By: [email or web address]   SELF           Confirmed By:Martir DERAS MD                                  Results for orders placed during the hospital encounter of 10/02/23    Echo    Interpretation Summary    Left Ventricle: The left ventricle is normal in size. There is mild concentric hypertrophy. There is hyperdynamic systolic function with a visually estimated ejection fraction of 75 - 80%.    Right Ventricle: Normal right ventricular cavity size. Systolic function is normal.    Pulmonary Artery: The estimated pulmonary artery systolic pressure is 32 mmHg.    IVC/SVC: Normal venous pressure at 3 mmHg.      US Retroperitoneal Complete  Narrative: EXAMINATION:  US RETROPERITONEAL COMPLETE    CLINICAL HISTORY:  GLENIS;    TECHNIQUE:  Ultrasound of the kidneys and urinary bladder was performed including color flow and Doppler evaluation of the kidneys.    COMPARISON:  CT thoracic spine 08/08/2018    CT renal stone study  08/03/2018    FINDINGS:  Right kidney: The right kidney measures 6.9 cm in length.  No cortical thinning. Increased parenchymal echogenicity with decreased corticomedullary distinction.  Resistive index measures 0.62.  No solid mass is detected the sonographic.  No echogenic shadowing renal stone. Probable mild hydronephrosis versus extrarenal pelvis.    Several simple cysts largest measures 1.3 x 1.0 x 0.9 cm at the interpolar region.    Left kidney: The left kidney measures 7.5 cm in length.  No cortical thinning.  Increased parenchymal echogenicity with decreased corticomedullary distinction.  Unable to obtain resistive index.  No solid mass is detected sonographic.  No echogenic shadowing renal stone. No hydronephrosis.    The bladder is partially distended at the time of scanning and has an unremarkable appearance.  Unable to evaluate for ureteral jets; during scanning of the urinary bladder, the technologist reports that the scan was terminated at the adamant request of the patient.    An echogenic structure partially visualized posterior to the urinary bladder possibly represents bowel but was not fully characterized because the of the premature termination of the scan.  Impression: Bilateral increased renal parenchymal echogenicity, with decreased corticomedullary distinction, possibly related to underlying medical renal disease.  Correlate clinically.    Mild distention of the right renal pelvis, possibly on the basis of extrarenal pelvis.  Mild hydronephrosis not fully excluded.  Correlate clinically.  If there is strong clinical suspicion for ureteral obstruction, consider abdominopelvic CT.    At the patient's request, the scan was terminated prior to completion of the imaging protocol.    This report was flagged in Epic as abnormal.    Electronically signed by resident: Delia Mayer  Date:    01/29/2024  Time:    02:39    Electronically signed by: Luis  Hernandez  Date:    01/29/2024  Time:    05:16      Labs, Imaging, EKG and Diagnostic results from 1/29/2024 were reviewed.    Medications:  Medication list was reviewed and changes noted under Assessment/Plan.  No current facility-administered medications on file prior to encounter.     Current Outpatient Medications on File Prior to Encounter   Medication Sig Dispense Refill    acetaminophen (TYLENOL) 500 MG tablet Take 500 mg by mouth daily as needed for Pain.      amiodarone (PACERONE) 200 MG Tab Take 1 tablet (200 mg total) by mouth once daily. 30 tablet 11    amLODIPine (NORVASC) 10 MG tablet TAKE 1 TABLET(10 MG) BY MOUTH EVERY DAY 90 tablet 3    ascorbic acid, vitamin C, (VITAMIN C) 1000 MG tablet Take 1,000 mg by mouth once daily.      aspirin (ECOTRIN) 81 MG EC tablet Take 81 mg by mouth once daily.      BIOTIN ORAL Take 1 tablet by mouth once daily.      calcium carbonate (TUMS ORAL) Take 1 tablet by mouth daily as needed (Heartburn).      cyanocobalamin, vitamin B-12, (VITAMIN B-12 ORAL) Take 1 tablet by mouth daily as needed.      mirtazapine (REMERON) 30 MG tablet Take 30 mg by mouth every evening.      OLANZapine (ZYPREXA) 10 MG tablet Take 10 mg by mouth every evening.      vitamin D (VITAMIN D3) 1000 units Tab Take 1,000 Units by mouth once daily.       Scheduled Medications:  mirtazapine, 15 mg, Oral, Nightly      PRN: 0.9%  NaCl infusion (for blood administration), dextrose 10%, dextrose 10%, glucagon (human recombinant), glucose, glucose  Infusions:    dextrose 5% lactated ringers 50 mL/hr at 01/29/24 1649     Estimated Creatinine Clearance: 13.3 mL/min (A) (based on SCr of 2.9 mg/dL (H)).             Assessment/Plan:      * Failure to thrive in adult   difficult historian with limited insight into their medical issues.   collateral from son - No significant oral intake for the last 4-5 days.  EMS gave 1 L of fluid in route.   1/29 Glucose stable. POCG glucose q 4H. SLP, palliative care  and  psychiatry eval. per son , h/o catatonia. did not eat or drink  overnight     Hypothermia  Irving oro ordered. TSH WNL       Goals of care, counseling/discussion  Advance Care Planning    Code Status  In light of the patients advanced and life limiting illness,I engaged the the family in a voluntary conversation about the patient's preferences for care  at the very end of life.  son wants full code for now. wants to discuss with palliative care  in AM    I spent a total of 25 minutes engaging the patient in this advance care planning discussion.            Hypoglycemia  secondary to poor oral intake  Last A1c reviewed-   Lab Results   Component Value Date    HGBA1C 4.9 06/02/2023    HGBA1C 5.4 08/03/2018    HGBA1C 5.5 10/27/2017     Will hold PO hypoglycemics and will start correctional scale insulin  Most recent fingerstick glucose reviewed-   Recent Labs   Lab 01/28/24  1109   POCTGLUCOSE 45*   . Treated with D10 x 250ml  glucose.  POCT glucose q1h. D10 infusion     Hypocalcemia  likely from above  Recent Labs   Lab 01/29/24  0757   CALCIUM 6.9*          Corrected calcium is 8.4.  Ordered 1 g calcium gluconate.       Metabolic acidosis     likely from CKD/GLENIS . patient with bicarbonate   Recent Labs   Lab 01/28/24  1104   CO2 12*   sodium bicarbonate  drip . monitor        History of small bowel obstruction   ,recent small bowel obstruction sp small bowel resection 8/5/2023     Hypotension  secondary to poor oral intake. SBP 89/57 . continue IV Fluids       Lung mass  ? CX ray 1/28  Abnormal left upper thoracic region pleural base opacity, similar to 10/02/2023 chest radiograph, these are new compared to 02/21/2023 radiographs, further evaluation advised.  Malignancy cannot be excluded.            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 secondary to above      Thrombocytopenia, unspecified    Patient with thrombocytopenia   Recent Labs    Lab 01/28/24  1104   *   . Platelet counts stable.monitor         Multiple myeloma   multiple myeloma dx 2018 followed by [facility]. 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 needs assistance to transfer from the bed to the walker     Heme/onc evalution    GLENIS (acute kidney injury)  Patient with acute kidney injury/acute renal failure likely due to pre-renal azotemia due to IVVD GLENIS is currently worsening. Baseline creatinine  2.1  - Labs reviewed- Renal function/electrolytes with Estimated Creatinine Clearance: 13.3 mL/min (A) (based on SCr of 2.9 mg/dL (H)). according to latest data. Monitor urine output and serial BMP and adjust therapy as needed. Avoid nephrotoxins and renally dose meds for GFR listed above.     Obtain urine lytes , renal sonogram  and bladder scans q 6h  Recent Labs   Lab 01/28/24  1104 01/28/24  1709 01/29/24  0757   BUN 51* 49* 51*   CREATININE 2.9* 2.9* 2.9*       I & O     Intake/Output Summary (Last 24 hours) at 1/29/2024 0844  Last data filed at 1/28/2024 1340  Gross per 24 hour   Intake 350 ml   Output no documentation   Net 350 ml      Gentle IV hydration.   1/29    . X ray abdomen -Nonobstructive bowel gas pattern. renal sonogram - Bilateral increased renal parenchymal echogenicity, with decreased corticomedullary distinction, possibly related to underlying medical renal disease.  Mild distention of the right renal pelvis, possibly on the basis of extrarenal pelvis.  Mild hydronephrosis not fully excluded.  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 needs assistance to transfer from the bed to the walker      Anemia in neoplastic disease    Patient's with macrocytic anemia.. Hemoglobin  downtrending. Etiology likely due to  multiple myelonol not on treatment .  Current CBC reviewed-    Recent Labs   Lab 01/28/24  1709 01/29/24  0654 01/29/24  0757   HGB 7.7* 9.2* 9.2*         Component Value Date/Time    MCV 83 01/29/2024 0757    RDW 15.6 (H) 01/29/2024 0757    IRON 72 10/02/2023 1430    FERRITIN 230 10/02/2023 1430    FOLATE 2.9 (L) 10/03/2023 0535    [id number] 248 10/03/2023 0535     Monitor CBC and transfuse if H/H drops below 7/21.      PRBC transfusion x2 .CBC q 6h. monitor for overt bleed. consider GI eval      Multiple myeloma not having achieved remission  as above      Paranoid schizophrenia   paranoid schizophrenia with multiple inpatient psychiatric admissions, - admitted for  reported decreased eating.  Patient is a difficult historian with limited insight into their medical issues.   collateral from son - No significant oral intake for the last 4-5 days.     psychiatry evaluation       VTE Risk Mitigation (From admission, onward)      None            Discharge Planning   EMELY: 1/31/2024     Code Status: Full Code   Is the patient medically ready for discharge?: No    Reason for patient still in hospital (select all that apply): Treatment  Discharge Plan A: Hospice/home                  Ender Mcclure MD  Department of Hospital Medicine   [facility] - Stepdown Flex ([place]-14)

## 2024-01-29 NOTE — ASSESSMENT & PLAN NOTE
Patient with acute kidney injury/acute renal failure likely due to pre-renal azotemia due to IVVD GLENIS is currently worsening. Baseline creatinine  2.1  - Labs reviewed- Renal function/electrolytes with Estimated Creatinine Clearance: 13.3 mL/min (A) (based on SCr of 2.9 mg/dL (H)). according to latest data. Monitor urine output and serial BMP and adjust therapy as needed. Avoid nephrotoxins and renally dose meds for GFR listed above.     Obtain urine lytes , renal sonogram  and bladder scans q 6h  Recent Labs   Lab 01/28/24  1104 01/28/24  1709 01/29/24  0757   BUN 51* 49* 51*   CREATININE 2.9* 2.9* 2.9*       I & O     Intake/Output Summary (Last 24 hours) at 1/29/2024 0844  Last data filed at 1/28/2024 1340  Gross per 24 hour   Intake 350 ml   Output no documentation   Net 350 ml      Gentle IV hydration.   1/29    . X ray abdomen -Nonobstructive bowel gas pattern. renal sonogram - Bilateral increased renal parenchymal echogenicity, with decreased corticomedullary distinction, possibly related to underlying medical renal disease.  Mild distention of the right renal pelvis, possibly on the basis of extrarenal pelvis.  Mild hydronephrosis not fully excluded.  CT RSS ordered

## 2024-01-29 NOTE — ASSESSMENT & PLAN NOTE
Patient's with macrocytic anemia.. Hemoglobin downtrending. Etiology likely due to  multiple myelonol not on treatment .  Current CBC reviewed-    Recent Labs   Lab 01/28/24  1709 01/29/24  0654 01/29/24  0757   HGB 7.7* 9.2* 9.2*         Component Value Date/Time    MCV 83 01/29/2024 0757    RDW 15.6 (H) 01/29/2024 0757    IRON 72 10/02/2023 1430    FERRITIN 230 10/02/2023 1430    FOLATE 2.9 (L) 10/03/2023 0535    [id number] 248 10/03/2023 0535     Monitor CBC and transfuse if H/H drops below 7/21.      PRBC transfusion x2 .CBC q 6h. monitor for overt bleed. consider GI eval

## 2024-01-29 NOTE — PLAN OF CARE
Pt dx FTT. Awake, alert oriented x3. Reoriented to situation. Agitated, angry. Allowed pt to verbalize frustration. VS stable. Pt refused CT scan. Dr Handley notified.  Explained plan of care, reinforcement needed. Safety maintained.

## 2024-01-29 NOTE — ED NOTES
Janie Zamorano, a 68 y.o. female presents to the ED w/ complaint of failure to thrive. Pt states she doesn't feel like eating or drinking right now.     Adult Physical Assessment  LOC: Janie Zamorano, 68 y.o. female verified via two identifiers.  The patient is awake, alert, oriented and speaking appropriately at this time.  APPEARANCE: Patient resting comfortably and appears to be in no acute distress at this time. Patient is clean and well groomed, patient's clothing is properly fastened.   SKIN:The skin is warm and dry, color consistent with ethnicity, patient has normal skin turgor and moist mucus membranes, skin intact, dry, and flaky  MUSCULOSKELETAL: Patient moving all extremities well, no obvious swelling or deformities noted. Generalized weakness noted to upper and lower extremities.  RESPIRATORY: Airway is open and patent, respirations are spontaneous, patient has a normal effort and rate, no accessory muscle use noted. Pt denies feeling SOB.   CARDIAC: Patient has a normal rate and rhythm, no periphreal edema noted in any extremity, capillary refill < 3 seconds in all extremities  ABDOMEN: Soft and non tender to palpation, no abdominal distention noted.   NEUROLOGIC: Eyes open spontaneously, behavior appropriate to situation, follows commands, facial expression symmetrical, bilateral hand grasp equal and even, purposeful motor response noted, normal sensation in all extremities when touched with a finger.      Triage note:  Chief Complaint   Patient presents with    Failure To Thrive     Hasn't eaten in 4-5 days. Hypotensive. Hx. Of multiple myeloma. From home     Review of patient's allergies indicates:   Allergen Reactions    Iodine Hives    Shellfish containing products Itching    Ondansetron hcl Nausea Only     Past Medical History:   Diagnosis Date    Anxiety     Asthma     Bronchitis     COPD (chronic obstructive pulmonary disease)     Depression     GERD (gastroesophageal reflux  disease)     Hallucination     History of psychiatric hospitalization     Hypertension     Neck pain     Polyneuropathy in diseases classified elsewhere 4/12/2021    Schizophrenia     Sleep difficulties

## 2024-01-29 NOTE — ASSESSMENT & PLAN NOTE
likely from above  Recent Labs   Lab 01/29/24  0757   CALCIUM 6.9*          Corrected calcium is 8.4.  Ordered 1 g calcium gluconate.

## 2024-01-29 NOTE — HOSPITAL COURSE
1/29 Glucose stable. POCG glucose q 4H. SLP, palliative care  and psychiatry eval. per son , h/o catatonia. did not eat or drink  overnight . continue D5LR.  X ray abdomen -Nonobstructive bowel gas pattern. renal sonogram - Bilateral increased renal parenchymal echogenicity, with decreased corticomedullary distinction, possibly related to underlying medical renal disease.  Mild distention of the right renal pelvis, possibly on the basis of extrarenal pelvis.  Mild hydronephrosis not fully excluded.  CT RSS ordered . patient refused CT scan.corrected calcium is 8.4 . Hb stable    1/30 s/p psychiatry eval -started remeron 15mg PO nightly. did not take last night. SLP eval.  Glucose in 100s. continue D5LR.  Hematology consulted for further recommendations regarding multiple myeloma. SPEP, ADAM, immunoglobulins, FLC, 24 hour urine protein to further evaluate myeloma. c/o back pain - started IV tylenol.  frequent reposition q2h. wound care consulted. continue bicarb drip. improved mentation and more interactive. answering questions.  SLP eval-  Soft & Bite Sized Diet - IDDSI Level 6, Thin liquids - IDDSI Level 0 . CT abdomen -  Large retroperitoneal mass involving multiple retroperitoneal structures including the aorta and IVC.  In addition there is a large destructive mass involving the right iliac is well as the gluteal musculature.  Findings may relate to patient's known multiple myeloma with lymphoma and sarcoma in the differential.Few left nephrolithiasis largest in the left renal pelvis measures 0.3 cm with minimal pelvic fullness without significant hydronephrosis. Right inferior pole density of nephrolithiasis versus cortical calcification.  No significant right-sided hydronephrosis.Innumerable lytic osseous lesions which is consistent with history of multiple myeloma.Remote T12 compression fracture deformity, likely pathological. Asymmetric thickening of the right gluteal musculature may relate to muscle invasion  from the aforementioned mass versus gluteal hematoma.  1/31  folate deficiency -started on folic acid. tolerating oral meds .s/p wound care eval  IV fluids discontinued due to LE edema. continue POCT glucose q 4h .son wants the patient to go to [facility]. case management updated . Hypotension this PM to 80s and hypoglycemia. rapid response initiated. critical care consulted. NS bolus ordered. son agreeable for DNR. transitioned to comfort care .son agrees with no IV fluids.  continue glucose checks for now  and D10 PRN. transition to hospice

## 2024-01-29 NOTE — CONSULTS
Hematology/BMT consult plan of care note    In brief, patient is a 68 year old man with schizophrenia, A.fib, multiple myeloma who presented with failure to thrive. HPI is obtained per patient's son as patient is a poor historian. Per son, patient was receiving myeloma treatment at [facility] and last received before Thanksgiving last year. He stated that they enrolled in hospice shortly afterwards. He was not happy with the hospice care she was receiving and brought her to the hospital for decreased PO intake over the past few days. Hematology consulted for further recommendations regarding multiple myeloma.    Recommendations:  - Will obtain SPEP, ADAM, immunoglobulins, FLC, 24 hour urine protein to further evaluate myeloma  - Agree with Palliative care consult given poor performance status, mental status and malnutrition.    Full consult note to follow when the above studies return. Discussed with attending, Dr. Jameson. Please contact Hematology/BMT with any questions.    Gladis Luna MD   Hematology and Oncology Fellow, PGY V

## 2024-01-29 NOTE — ASSESSMENT & PLAN NOTE
difficult historian with limited insight into their medical issues.   collateral from son - No significant oral intake for the last 4-5 days.  EMS gave 1 L of fluid in route.   1/29 Glucose stable. POCG glucose q 4H. SLP, palliative care  and psychiatry eval. per son , h/o catatonia. did not eat or drink  overnight

## 2024-01-29 NOTE — PLAN OF CARE
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 / Plan: [employer] CHOICES / Product Type: Medicare Advantage /     Extended Emergency Contact Information  Primary Emergency Contact: MondragonTiff   [facility]  Home Phone: [phone]  Mobile Phone: [phone]  Relation: Sister  Secondary Emergency Contact: JaunSaud   [facility]  Home Phone: [phone]  Mobile Phone: [phone]  Relation: Son    Discharge Plan A: Hospice/home  Discharge Plan B: Home      [id number] - [facility] [place] - [address] [facility]  [address]  [place] [address]  Phone: [phone] Fax: [phone]    [id number] - [place] - [address]  AT [facility] [address]  [address] [place] [address]  Phone: [phone] Fax: [phone]    [id number] - [place] - [address] AT [facility] [address]  [place] [address]  Phone: [phone] Fax: [phone] initial  Assessment (most recent)       Adult Discharge Assessment - 01/29/24 1419          Discharge Assessment    Assessment Type Discharge Planning Assessment     Confirmed/corrected address, phone number and insurance Yes     Confirmed Demographics Correct on Facesheet     Source of Information family     Does patient/caregiver understand observation status Yes     Communicated EMELY with patient/caregiver Date not available/Unable to determine     Reason For Admission Hypocalcemia     People in Home alone     Current cognitive status: Alert/Oriented     Walking or Climbing Stairs Difficulty no     Dressing/Bathing Difficulty no     Home Accessibility wheelchair accessible     Equipment Currently Used at Home cane, straight;walker, rolling     Readmission within 30 days? No     Patient currently being followed by outpatient case management? No     Do you take prescription medications? Yes     Do you have prescription coverage? Yes     [facility]     Do you have any problems affording any of your prescribed medications? No     Is the patient taking medications as prescribed? yes     How do you get to doctors appointments? public transportation     Are you on dialysis? No     Do you take coumadin? No     Discharge Plan A Hospice/home     Discharge Plan B Home     Discharge Plan discussed with: Adult children     Transition of Care Barriers None                      SW met with son to discuss discharge planning.  Pt lives home with hospice and needs assistance with ambulation and  ADLs.  Pt will have transportation and assistance from public transportation at discharge.  Discharge Plan A and Plan B have been determined by review of patient's clinical status, future medical and therapeutic needs, and coverage/benefits for post-acute care in coordination with multidisciplinary team members.      TORIE spoke with son at bedside in regards to pt DC planning.  Son stated that he doesn't know what route to go with pt at  this moment. He's waiting on more information from the Dr.  Son also stated that pt was previously with at home hospice, but he didn't like the care from the at home hospice team. Palliative Care team spoke with family at bedside also.  Will follow up.    Evelia Yao MSW, CSW

## 2024-01-29 NOTE — CARE UPDATE
"RAPID RESPONSE NURSE CHART REVIEW        Chart Reviewed: 01/29/2024, 7:27 AM    MRN: [id number]  Bed: 09015/87488 A    Dx: Failure to thrive in adult    Janie Zamorano has a past medical history of Anxiety, Asthma, Bronchitis, COPD (chronic obstructive pulmonary disease), Depression, GERD (gastroesophageal reflux disease), Hallucination, History of psychiatric hospitalization, Hypertension, Neck pain, Polyneuropathy in diseases classified elsewhere, Schizophrenia, and Sleep difficulties.    Last VS: /60   Pulse 77   Temp 97.6 °F (36.4 °C)   Resp 15   Ht 5' 3" (1.6 m)   Wt 44.9 kg (99 lb)   LMP  (LMP Unknown)   SpO2 100%   BMI 17.54 kg/m²     24H Vital Sign Range:  Temp:  [94 °F (34.4 °C)-98.7 °F (37.1 °C)]   Pulse:  [65-88]   Resp:  [10-20]   BP: ()/(56-84)   SpO2:  [95 %-100 %]     Level of Consciousness (AVPU): alert    Recent Labs     01/28/24  1104 01/28/24  1230 01/28/24  1709   WBC 2.59*  --  3.23*   HGB 4.6*  --  7.7*   HCT 16.2* 16* 23.5*   *  --  140*       Recent Labs     01/28/24  1104 01/28/24  1709   * 143   K 4.7 4.6   * 116*   CO2 12* 15*   BUN 51* 49*   CREATININE 2.9* 2.9*   GLU 43* 93   PHOS 5.1* 5.3*   MG 1.9  --       MEWS score: 1    Charge Ildefonso RANGEL  contacted for abnormal labs. Reports no concerns at this time. Instructed to call [phone] for further concerns or assistance.    Vivi Whitt RN        "

## 2024-01-29 NOTE — PLAN OF CARE
Pt resting. Son at bedside, reinforced current careplan. Stable. Purwic changed and in place. Safety maintained.

## 2024-01-29 NOTE — CONSULTS
CONSULTATION LIAISON PSYCHIATRY INITIAL EVALUATION    Patient Name: Janie Zamorano  MRN: [id number]  Patient Class: IP- Inpatient  Admission Date: 1/28/2024  Attending Physician: Ender Mcclure MD      HPI:   Janie Zamorano is a 68 y.o. 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  Patient reported to have not eaten in the past few days.  Currently on home hospice.  On arrival, patient with non-anion gap metabolic acidosis, hypoglycemia, hypocalcemia, and anemia.    Psychiatry consulted for schizophrenia/failure to thrive/poor oral intake    On psych exam, patient resting in bed.  Patient minimally engages with interviews at this time and voices displeasure with hourly labs overnight.  Endorses decreased sleep due to this.  Able to express needs.    Patient's son at bedside reviews past history and current presentation.  Notes good knowledge of mother's health concerns and mental health history.  He points out that over time there have been many possible diagnoses used for his mother including MDD with psychotic features, schizophrenia, or bipolar.  He shares how patient has in the past had episodes of psychosis and also episodes of catatonia.  Denies current concern for these behaviors.  Patient not RIS.  No current SI/HI.  Current problem primarily with decreased PO intake.  Spoke about past medications and shares that Remeron is usually the most efficacious.    Collateral with patient's permission:   Patient's son present at bedside, collateral incorporated above.    Medical Review of Systems:  Pertinent items are noted in HPI.    Psychiatric Review of Systems (is patient experiencing or having  changes in):  sleep: yes  appetite: yes  weight: yes  energy/anergy: yes  interest/pleasure/anhedonia: unable to assess  anxiety/panic: unable to assess  guilty/hopelessness: unable to assess  concentration: unable to assess  Mandie:unable to assess  Psychosis: no  Trauma: unable to assess  S.I.B.s/risky behavior: unable to assess    Past Psychiatric History:  Previous Medication Trials: yes  Previous Psychiatric Hospitalizations:yes   Previous Suicide Attempts: no  History of Violence: no  Outpatient Psychiatrist: Not currently, with Dr. Palafox in the past  Family Psychiatric History: yes    Substance Abuse History (with emphasis over the last 12 months):  Recreational Drugs:  no  Use of Alcohol: denied  Tobacco Use:no  Rehab History:no    Social History:  Marital Status: not   Children: yes  Employment Status/Info: retired  :no  Education:  college  Special Ed: no  Housing Status: with son  Access to gun: no  Psychosocial Stressors: health  Functioning Relationships: good support system    Legal History:  Past Charges/Incarcerations: no  Pending charges:no    Mental Status Exam:  General Appearance: lying in bed  Behavior: reluctant to participate  Involuntary Movements and Motor Activity: no abnormal involuntary movements noted  Gait and Station: unable to assess - patient lying down or seated  Speech and Language: decreased spontaneity  Mood: irritable  Affect: reactive  Thought Process and Associations: goal-directed  Thought Content and Perceptions::  no SI/HI/AVH  Sensorium and Orientation: alert, poor effort given during testing  Recent and Remote Memory: Unwilling to Participate in Formal Testing  Attention and Concentration: Unwilling to Participate in Formal Testing  Fund of Knowledge: Unwilling to Participate in Formal Testing  Insight: demonstrates awareness of illness  Judgment: behavior is adequate/appropriate to circumstances    CAM ICU positive? no      ASSESSMENT & RECOMMENDATIONS    MDD with psychotic features  R/o Schizophrenia    PSYCH MEDICATIONS  Recommend starting Remeron 15mg PO nightly    RISK ASSESSMENT  NO NEED FOR PEC patient NOT in any imminent danger of hurting self or others and not gravely disabled.     FOLLOW UP  Will follow up while in house    DISPOSITION - once medically cleared:   Defer to medical team    Please contact ON CALL psychiatry service () for any acute issues that may arise.    Dr. David Bolanos   Psychiatry  [facility]  2024 2:19 PM        --------------------------------------------------------------------------------------------------------------------------------------------------------------------------------------------------------------------------------------    CONTINUED HISTORY & OBJECTIVE clinical data & findings reviewed and noted for above decision making    Past Medical/Surgical History:   Past Medical History:   Diagnosis Date    Anxiety     Asthma     Bronchitis     COPD (chronic obstructive pulmonary disease)     Depression     GERD (gastroesophageal reflux disease)     Hallucination     History of psychiatric hospitalization     Hypertension     Neck pain     Polyneuropathy in diseases classified elsewhere 2021    Schizophrenia     Sleep difficulties      Past Surgical History:   Procedure Laterality Date     SECTION      X 1    COLONOSCOPY N/A 2018    Surgeon: Albert Russell MD    ESOPHAGOGASTRODUODENOSCOPY N/A 2018    Surgeon: Albert Russell MD    HYSTERECTOMY  2016    [facility]---DLH/BSO    LIPOMA RESECTION      back  x 2    SALPINGOOPHORECTOMY Bilateral 2016    [facility]---DLH/BSO    THORACIC LAMINECTOMY WITH FUSION N/A 2018    Surgeon: He Andres MD       Current Medications:   Scheduled Meds:   PRN Meds: 0.9%  NaCl infusion (for blood administration), dextrose 10%, dextrose 10%, glucagon (human recombinant), glucose, glucose    Allergies:   Review of patient's allergies indicates:    Allergen Reactions    Iodine Hives    Shellfish containing products Itching    Ondansetron hcl Nausea Only       Vitals  Vitals:    01/29/24 1137   BP: 127/81   Pulse: 71   Resp: 14   Temp: 98 °F (36.7 °C)       Labs/Imaging/Studies:  Recent Results (from the past 24 hour(s))   POCT glucose    Collection Time: 01/28/24  2:20 PM   Result Value Ref Range    POCT Glucose 171 (H) 70 - 110 mg/dL   Blood culture    Collection Time: 01/28/24  2:55 PM    Specimen: Peripheral, Antecubital, Left; Blood   Result Value Ref Range    Blood Culture, Routine No Growth to date    Blood culture    Collection Time: 01/28/24  3:50 PM    Specimen: Peripheral, Antecubital, Right; Blood   Result Value Ref Range    Blood Culture, Routine No Growth to date    CBC auto differential    Collection Time: 01/28/24  5:09 PM   Result Value Ref Range    WBC 3.23 (L) 3.90 - 12.70 K/uL    RBC 2.68 (L) 4.00 - 5.40 M/uL    Hemoglobin 7.7 (L) 12.0 - 16.0 g/dL    Hematocrit 23.5 (L) 37.0 - 48.5 %    MCV 88 82 - 98 fL    MCH 28.7 27.0 - 31.0 pg    MCHC 32.8 32.0 - 36.0 g/dL    RDW 14.6 (H) 11.5 - 14.5 %    Platelets 140 (L) 150 - 450 K/uL    MPV 9.8 9.2 - 12.9 fL    Immature Granulocytes 1.5 (H) 0.0 - 0.5 %    Gran # (ANC) 2.8 1.8 - 7.7 K/uL    Immature Grans (Abs) 0.05 (H) 0.00 - 0.04 K/uL    Lymph # 0.2 (L) 1.0 - 4.8 K/uL    Mono # 0.1 (L) 0.3 - 1.0 K/uL    Eos # 0.0 0.0 - 0.5 K/uL    Baso # 0.01 0.00 - 0.20 K/uL    nRBC 1 (A) 0 /100 WBC    Gran % 88.0 (H) 38.0 - 73.0 %    Lymph % 5.9 (L) 18.0 - 48.0 %    Mono % 4.3 4.0 - 15.0 %    Eosinophil % 0.0 0.0 - 8.0 %    Basophil % 0.3 0.0 - 1.9 %    Platelet Estimate Decreased (A)     Aniso Slight     Poik Slight     Alison Cells Occasional     Spherocytes Occasional     Differential Method Automated    Renal Function Panel    Collection Time: 01/28/24  5:09 PM   Result Value Ref Range    Glucose 93 70 - 110 mg/dL    Sodium 143 136 - 145 mmol/L    Potassium 4.6 3.5 - 5.1 mmol/L    Chloride 116 (H) 95 - 110  mmol/L    CO2 15 (L) 23 - 29 mmol/L    BUN 49 (H) 8 - 23 mg/dL    Calcium 7.3 (L) 8.7 - 10.5 mg/dL    Creatinine 2.9 (H) 0.5 - 1.4 mg/dL    Albumin 1.6 (L) 3.5 - 5.2 g/dL    Phosphorus 5.3 (H) 2.7 - 4.5 mg/dL    eGFR 17.2 (A) >60 mL/min/1.73 m^2    Anion Gap 12 8 - 16 mmol/L   POCT glucose    Collection Time: 01/28/24  7:55 PM   Result Value Ref Range    POCT Glucose 85 70 - 110 mg/dL   POCT glucose    Collection Time: 01/28/24 10:39 PM   Result Value Ref Range    POCT Glucose 107 70 - 110 mg/dL   POCT glucose    Collection Time: 01/28/24 11:29 PM   Result Value Ref Range    POCT Glucose 105 70 - 110 mg/dL   POCT glucose    Collection Time: 01/29/24  1:31 AM   Result Value Ref Range    POCT Glucose 119 (H) 70 - 110 mg/dL   POCT glucose    Collection Time: 01/29/24  2:35 AM   Result Value Ref Range    POCT Glucose 120 (H) 70 - 110 mg/dL   Sodium, urine, random    Collection Time: 01/29/24  2:50 AM   Result Value Ref Range    Sodium, Urine 59 20 - 250 mmol/L   Creatinine, urine, random    Collection Time: 01/29/24  2:50 AM   Result Value Ref Range    Creatinine, Urine 57.0 15.0 - 325.0 mg/dL   Urea nitrogen, urine    Collection Time: 01/29/24  2:50 AM   Result Value Ref Range    Urine Urea Nitrogen 326 140 - 1050 mg/dL   Hogan's Stain, Urine Random    Collection Time: 01/29/24  2:50 AM   Result Value Ref Range    Hogan's Stain, Ur No eosinophils seen No eosinophils seen   POCT glucose    Collection Time: 01/29/24  3:39 AM   Result Value Ref Range    POCT Glucose 120 (H) 70 - 110 mg/dL   POCT glucose    Collection Time: 01/29/24  4:36 AM   Result Value Ref Range    POCT Glucose 139 (H) 70 - 110 mg/dL   POCT glucose    Collection Time: 01/29/24  5:29 AM   Result Value Ref Range    POCT Glucose 137 (H) 70 - 110 mg/dL   CBC auto differential    Collection Time: 01/29/24  6:54 AM   Result Value Ref Range    WBC 4.04 3.90 - 12.70 K/uL    RBC 3.24 (L) 4.00 - 5.40 M/uL    Hemoglobin 9.2 (L) 12.0 - 16.0 g/dL    Hematocrit  27.0 (L) 37.0 - 48.5 %    MCV 83 82 - 98 fL    MCH 28.4 27.0 - 31.0 pg    MCHC 34.1 32.0 - 36.0 g/dL    RDW 15.6 (H) 11.5 - 14.5 %    Platelets 128 (L) 150 - 450 K/uL    MPV 9.7 9.2 - 12.9 fL    Immature Granulocytes 1.2 (H) 0.0 - 0.5 %    Gran # (ANC) 3.5 1.8 - 7.7 K/uL    Immature Grans (Abs) 0.05 (H) 0.00 - 0.04 K/uL    Lymph # 0.2 (L) 1.0 - 4.8 K/uL    Mono # 0.2 (L) 0.3 - 1.0 K/uL    Eos # 0.0 0.0 - 0.5 K/uL    Baso # 0.07 0.00 - 0.20 K/uL    nRBC 1 (A) 0 /100 WBC    Gran % 86.1 (H) 38.0 - 73.0 %    Lymph % 5.4 (L) 18.0 - 48.0 %    Mono % 5.4 4.0 - 15.0 %    Eosinophil % 0.2 0.0 - 8.0 %    Basophil % 1.7 0.0 - 1.9 %    Aniso Slight     Poik Slight     Poly Occasional     Ovalocytes Occasional     Differential Method Automated    Magnesium    Collection Time: 01/29/24  7:57 AM   Result Value Ref Range    Magnesium 1.8 1.6 - 2.6 mg/dL   Comprehensive Metabolic Panel    Collection Time: 01/29/24  7:57 AM   Result Value Ref Range    Sodium 143 136 - 145 mmol/L    Potassium 3.9 3.5 - 5.1 mmol/L    Chloride 113 (H) 95 - 110 mmol/L    CO2 20 (L) 23 - 29 mmol/L    Glucose 117 (H) 70 - 110 mg/dL    BUN 51 (H) 8 - 23 mg/dL    Creatinine 2.9 (H) 0.5 - 1.4 mg/dL    Calcium 6.9 (LL) 8.7 - 10.5 mg/dL    Total Protein 4.4 (L) 6.0 - 8.4 g/dL    Albumin 1.6 (L) 3.5 - 5.2 g/dL    Total Bilirubin 1.0 0.1 - 1.0 mg/dL    Alkaline Phosphatase 64 55 - 135 U/L    AST 22 10 - 40 U/L    ALT 19 10 - 44 U/L    eGFR 17.2 (A) >60 mL/min/1.73 m^2    Anion Gap 10 8 - 16 mmol/L   CBC Auto Differential    Collection Time: 01/29/24  7:57 AM   Result Value Ref Range    WBC 3.96 3.90 - 12.70 K/uL    RBC 3.25 (L) 4.00 - 5.40 M/uL    Hemoglobin 9.2 (L) 12.0 - 16.0 g/dL    Hematocrit 26.9 (L) 37.0 - 48.5 %    MCV 83 82 - 98 fL    MCH 28.3 27.0 - 31.0 pg    MCHC 34.2 32.0 - 36.0 g/dL    RDW 15.6 (H) 11.5 - 14.5 %    Platelets 135 (L) 150 - 450 K/uL    MPV 9.5 9.2 - 12.9 fL    Immature Granulocytes 1.0 (H) 0.0 - 0.5 %    Gran # (ANC) 3.4 1.8 - 7.7 K/uL     Immature Grans (Abs) 0.04 0.00 - 0.04 K/uL    Lymph # 0.2 (L) 1.0 - 4.8 K/uL    Mono # 0.2 (L) 0.3 - 1.0 K/uL    Eos # 0.0 0.0 - 0.5 K/uL    Baso # 0.06 0.00 - 0.20 K/uL    nRBC 1 (A) 0 /100 WBC    Gran % 86.1 (H) 38.0 - 73.0 %    Lymph % 5.6 (L) 18.0 - 48.0 %    Mono % 5.3 4.0 - 15.0 %    Eosinophil % 0.5 0.0 - 8.0 %    Basophil % 1.5 0.0 - 1.9 %    Platelet Estimate Decreased (A)     Aniso Slight     Differential Method Automated    POCT glucose    Collection Time: 01/29/24  8:07 AM   Result Value Ref Range    POCT Glucose 123 (H) 70 - 110 mg/dL   CBC auto differential    Collection Time: 01/29/24  1:08 PM   Result Value Ref Range    WBC 4.42 3.90 - 12.70 K/uL    RBC 3.23 (L) 4.00 - 5.40 M/uL    Hemoglobin 9.5 (L) 12.0 - 16.0 g/dL    Hematocrit 27.0 (L) 37.0 - 48.5 %    MCV 84 82 - 98 fL    MCH 29.4 27.0 - 31.0 pg    MCHC 35.2 32.0 - 36.0 g/dL    RDW 15.9 (H) 11.5 - 14.5 %     Imaging Results               US Retroperitoneal Complete (Final result)  Result time 01/29/24 05:16:38      Final result by Luis Ng MD (01/29/24 05:16:38)                   Impression:      Bilateral increased renal parenchymal echogenicity, with decreased corticomedullary distinction, possibly related to underlying medical renal disease.  Correlate clinically.    Mild distention of the right renal pelvis, possibly on the basis of extrarenal pelvis.  Mild hydronephrosis not fully excluded.  Correlate clinically.  If there is strong clinical suspicion for ureteral obstruction, consider abdominopelvic CT.    At the patient's request, the scan was terminated prior to completion of the imaging protocol.    This report was flagged in Epic as abnormal.    Electronically signed by resident: Delia Mayer  Date:    01/29/2024  Time:    02:39    Electronically signed by: Luis Ng  Date:    01/29/2024  Time:    05:16               Narrative:    EXAMINATION:  US RETROPERITONEAL COMPLETE    CLINICAL  HISTORY:  GLENIS;    TECHNIQUE:  Ultrasound of the kidneys and urinary bladder was performed including color flow and Doppler evaluation of the kidneys.    COMPARISON:  CT thoracic spine 08/08/2018    CT renal stone study 08/03/2018    FINDINGS:  Right kidney: The right kidney measures 6.9 cm in length.  No cortical thinning. Increased parenchymal echogenicity with decreased corticomedullary distinction.  Resistive index measures 0.62.  No solid mass is detected the sonographic.  No echogenic shadowing renal stone. Probable mild hydronephrosis versus extrarenal pelvis.    Several simple cysts largest measures 1.3 x 1.0 x 0.9 cm at the interpolar region.    Left kidney: The left kidney measures 7.5 cm in length.  No cortical thinning.  Increased parenchymal echogenicity with decreased corticomedullary distinction.  Unable to obtain resistive index.  No solid mass is detected sonographic.  No echogenic shadowing renal stone. No hydronephrosis.    The bladder is partially distended at the time of scanning and has an unremarkable appearance.  Unable to evaluate for ureteral jets; during scanning of the urinary bladder, the technologist reports that the scan was terminated at the adamant request of the patient.    An echogenic structure partially visualized posterior to the urinary bladder possibly represents bowel but was not fully characterized because the of the premature termination of the scan.                                       X-Ray Abdomen AP 1 View (Final result)  Result time 01/28/24 18:23:00      Final result by Parveen Page DO (01/28/24 18:23:00)                   Impression:      Nonobstructive bowel gas pattern      Electronically signed by: Parveen Page  Date:    01/28/2024  Time:    18:23               Narrative:    EXAMINATION:  XR ABDOMEN AP 1 VIEW    CLINICAL HISTORY:  poor oral intake;    TECHNIQUE:  AP View(s) of the abdomen was performed.    COMPARISON:  08/03/2018.    FINDINGS:  There is a  normal, nonobstructive bowel gas pattern.  There is no free air on this supine view.  There is no abnormal calcification.  There are postop changes of the thoracolumbar spine.  Osseous structures are otherwise intact.                                       X-Ray Chest AP Portable (Final result)  Result time 01/28/24 12:59:38      Final result by Savana Cabrera MD (01/28/24 12:59:38)                   Impression:      Abnormal left upper thoracic region pleural base opacity, similar to 10/02/2023 chest radiograph, these are new compared to 02/21/2023 radiographs, further evaluation advised.  Malignancy cannot be excluded.      Electronically signed by: Savana Cabrera MD  Date:    01/28/2024  Time:    12:59               Narrative:    EXAMINATION:  XR CHEST AP PORTABLE    CLINICAL HISTORY:  Shortness of breath    TECHNIQUE:  Single frontal view of the chest was performed.    COMPARISON:  10/02/2023    FINDINGS:  The cardiac silhouette is normal in size.  The pulmonary vascularity is normal.    Left upper thoracic paramediastinal oval opacity and pleuroparenchymal scarring apical region left upper lobe correlated with CT 06/02/2023.  No new areas of abnormal opacity seen.  The osseous structures appear normal.

## 2024-01-29 NOTE — NURSING
Pt with no overnight events. Pt did not eat or drink throughout the nightshift. BG remained stable with d5 NaBicarb gtt. Urine sample sent. Asked overnight provider about decreasing BG testing from q1h and he would prefer that daytime providers address this. Pt is frustrated with frequent checks.

## 2024-01-29 NOTE — NURSING
Nurses Note -- 4 Eyes      1/29/2024   5:22 AM      Skin assessed during: Admit      [x] No Altered Skin Integrity Present    [x]Prevention Measures Documented      [] Yes- Altered Skin Integrity Present or Discovered   [] LDA Added if Not in Epic (Describe Wound)   [] New Altered Skin Integrity was Present on Admit and Documented in LDA   [] Wound Image Taken    Wound Care Consulted? No    Attending Nurse:  Antonia Rahman RN/Staff Member:  Tatiana

## 2024-01-30 PROBLEM — R13.10 DYSPHAGIA: Status: ACTIVE | Noted: 2024-01-30

## 2024-01-30 PROBLEM — R64 CACHEXIA: Status: ACTIVE | Noted: 2024-01-30

## 2024-01-30 PROBLEM — E87.0 HYPERNATREMIA: Status: ACTIVE | Noted: 2024-01-30

## 2024-01-30 PROBLEM — Z51.5 PALLIATIVE CARE ENCOUNTER: Status: ACTIVE | Noted: 2024-01-30

## 2024-01-30 LAB
ALBUMIN SERPL BCP-MCNC: 1.5 G/DL (ref 3.5–5.2)
ALBUMIN SERPL BCP-MCNC: 1.6 G/DL (ref 3.5–5.2)
ALP SERPL-CCNC: 69 U/L (ref 55–135)
ALT SERPL W/O P-5'-P-CCNC: 20 U/L (ref 10–44)
ANION GAP SERPL CALC-SCNC: 16 MMOL/L (ref 8–16)
ANION GAP SERPL CALC-SCNC: 16 MMOL/L (ref 8–16)
AST SERPL-CCNC: 23 U/L (ref 10–40)
BASOPHILS # BLD AUTO: 0.02 K/UL (ref 0–0.2)
BASOPHILS NFR BLD: 0.5 % (ref 0–1.9)
BILIRUB SERPL-MCNC: 0.8 MG/DL (ref 0.1–1)
BUN SERPL-MCNC: 47 MG/DL (ref 8–23)
BUN SERPL-MCNC: 47 MG/DL (ref 8–23)
CALCIUM SERPL-MCNC: 7.2 MG/DL (ref 8.7–10.5)
CALCIUM SERPL-MCNC: 7.2 MG/DL (ref 8.7–10.5)
CHLORIDE SERPL-SCNC: 114 MMOL/L (ref 95–110)
CHLORIDE SERPL-SCNC: 115 MMOL/L (ref 95–110)
CO2 SERPL-SCNC: 16 MMOL/L (ref 23–29)
CO2 SERPL-SCNC: 16 MMOL/L (ref 23–29)
CREAT SERPL-MCNC: 2.7 MG/DL (ref 0.5–1.4)
CREAT SERPL-MCNC: 2.9 MG/DL (ref 0.5–1.4)
DIFFERENTIAL METHOD BLD: ABNORMAL
EOSINOPHIL # BLD AUTO: 0 K/UL (ref 0–0.5)
EOSINOPHIL NFR BLD: 0.3 % (ref 0–8)
ERYTHROCYTE [DISTWIDTH] IN BLOOD BY AUTOMATED COUNT: 15.8 % (ref 11.5–14.5)
EST. GFR  (NO RACE VARIABLE): 17.2 ML/MIN/1.73 M^2
EST. GFR  (NO RACE VARIABLE): 18.7 ML/MIN/1.73 M^2
GLUCOSE SERPL-MCNC: 92 MG/DL (ref 70–110)
GLUCOSE SERPL-MCNC: 94 MG/DL (ref 70–110)
HCT VFR BLD AUTO: 29.5 % (ref 37–48.5)
HGB BLD-MCNC: 9.7 G/DL (ref 12–16)
IGA SERPL-MCNC: 15 MG/DL (ref 40–350)
IGG SERPL-MCNC: 565 MG/DL (ref 650–1600)
IGM SERPL-MCNC: 11 MG/DL (ref 50–300)
IMM GRANULOCYTES # BLD AUTO: 0.04 K/UL (ref 0–0.04)
IMM GRANULOCYTES NFR BLD AUTO: 1.1 % (ref 0–0.5)
LYMPHOCYTES # BLD AUTO: 0.4 K/UL (ref 1–4.8)
LYMPHOCYTES NFR BLD: 9.9 % (ref 18–48)
MAGNESIUM SERPL-MCNC: 1.7 MG/DL (ref 1.6–2.6)
MCH RBC QN AUTO: 28.5 PG (ref 27–31)
MCHC RBC AUTO-ENTMCNC: 32.9 G/DL (ref 32–36)
MCV RBC AUTO: 87 FL (ref 82–98)
MONOCYTES # BLD AUTO: 0.2 K/UL (ref 0.3–1)
MONOCYTES NFR BLD: 4.5 % (ref 4–15)
NEUTROPHILS # BLD AUTO: 3.1 K/UL (ref 1.8–7.7)
NEUTROPHILS NFR BLD: 83.7 % (ref 38–73)
NRBC BLD-RTO: 0 /100 WBC
OVALOCYTES BLD QL SMEAR: ABNORMAL
PHOSPHATE SERPL-MCNC: 3 MG/DL (ref 2.7–4.5)
PLATELET # BLD AUTO: 139 K/UL (ref 150–450)
PLATELET BLD QL SMEAR: ABNORMAL
PMV BLD AUTO: 10.2 FL (ref 9.2–12.9)
POCT GLUCOSE: 121 MG/DL (ref 70–110)
POCT GLUCOSE: 94 MG/DL (ref 70–110)
POCT GLUCOSE: 95 MG/DL (ref 70–110)
POCT GLUCOSE: 99 MG/DL (ref 70–110)
POCT GLUCOSE: 99 MG/DL (ref 70–110)
POTASSIUM SERPL-SCNC: 3.7 MMOL/L (ref 3.5–5.1)
POTASSIUM SERPL-SCNC: 4.1 MMOL/L (ref 3.5–5.1)
PROT SERPL-MCNC: 4.6 G/DL (ref 6–8.4)
RBC # BLD AUTO: 3.4 M/UL (ref 4–5.4)
SODIUM SERPL-SCNC: 146 MMOL/L (ref 136–145)
SODIUM SERPL-SCNC: 147 MMOL/L (ref 136–145)
TARGETS BLD QL SMEAR: ABNORMAL
WBC # BLD AUTO: 3.74 K/UL (ref 3.9–12.7)

## 2024-01-30 PROCEDURE — 20600001 HC STEP DOWN PRIVATE ROOM

## 2024-01-30 PROCEDURE — 92610 EVALUATE SWALLOWING FUNCTION: CPT

## 2024-01-30 PROCEDURE — 83521 IG LIGHT CHAINS FREE EACH: CPT | Mod: 59 | Performed by: STUDENT IN AN ORGANIZED HEALTH CARE EDUCATION/TRAINING PROGRAM

## 2024-01-30 PROCEDURE — 82784 ASSAY IGA/IGD/IGG/IGM EACH: CPT | Mod: 59 | Performed by: STUDENT IN AN ORGANIZED HEALTH CARE EDUCATION/TRAINING PROGRAM

## 2024-01-30 PROCEDURE — 36415 COLL VENOUS BLD VENIPUNCTURE: CPT | Mod: XB | Performed by: HOSPITALIST

## 2024-01-30 PROCEDURE — 25000003 PHARM REV CODE 250: Performed by: HOSPITALIST

## 2024-01-30 PROCEDURE — 86334 IMMUNOFIX E-PHORESIS SERUM: CPT | Performed by: STUDENT IN AN ORGANIZED HEALTH CARE EDUCATION/TRAINING PROGRAM

## 2024-01-30 PROCEDURE — C1751 CATH, INF, PER/CENT/MIDLINE: HCPCS

## 2024-01-30 PROCEDURE — 84165 PROTEIN E-PHORESIS SERUM: CPT | Performed by: STUDENT IN AN ORGANIZED HEALTH CARE EDUCATION/TRAINING PROGRAM

## 2024-01-30 PROCEDURE — 76937 US GUIDE VASCULAR ACCESS: CPT

## 2024-01-30 PROCEDURE — 36415 COLL VENOUS BLD VENIPUNCTURE: CPT | Performed by: STUDENT IN AN ORGANIZED HEALTH CARE EDUCATION/TRAINING PROGRAM

## 2024-01-30 PROCEDURE — 80053 COMPREHEN METABOLIC PANEL: CPT | Performed by: HOSPITALIST

## 2024-01-30 PROCEDURE — A4216 STERILE WATER/SALINE, 10 ML: HCPCS | Performed by: HOSPITALIST

## 2024-01-30 PROCEDURE — 36410 VNPNXR 3YR/> PHY/QHP DX/THER: CPT

## 2024-01-30 PROCEDURE — 80069 RENAL FUNCTION PANEL: CPT | Performed by: HOSPITALIST

## 2024-01-30 PROCEDURE — 83735 ASSAY OF MAGNESIUM: CPT | Performed by: HOSPITALIST

## 2024-01-30 PROCEDURE — 63600175 PHARM REV CODE 636 W HCPCS: Performed by: HOSPITALIST

## 2024-01-30 PROCEDURE — 85025 COMPLETE CBC W/AUTO DIFF WBC: CPT | Performed by: HOSPITALIST

## 2024-01-30 RX ORDER — SODIUM CHLORIDE 0.9 % (FLUSH) 0.9 %
10 SYRINGE (ML) INJECTION
Status: DISCONTINUED | OUTPATIENT
Start: 2024-01-30 | End: 2024-02-06 | Stop reason: HOSPADM

## 2024-01-30 RX ORDER — OLANZAPINE 5 MG/1
5 TABLET ORAL EVERY 6 HOURS PRN
Status: DISCONTINUED | OUTPATIENT
Start: 2024-01-30 | End: 2024-01-31

## 2024-01-30 RX ORDER — DEXTROSE, SODIUM CHLORIDE, SODIUM LACTATE, POTASSIUM CHLORIDE, AND CALCIUM CHLORIDE 5; .6; .31; .03; .02 G/100ML; G/100ML; G/100ML; G/100ML; G/100ML
INJECTION, SOLUTION INTRAVENOUS CONTINUOUS
Status: DISCONTINUED | OUTPATIENT
Start: 2024-01-30 | End: 2024-01-31

## 2024-01-30 RX ORDER — SODIUM CHLORIDE 0.9 % (FLUSH) 0.9 %
10 SYRINGE (ML) INJECTION EVERY 6 HOURS
Status: DISCONTINUED | OUTPATIENT
Start: 2024-01-30 | End: 2024-02-06 | Stop reason: HOSPADM

## 2024-01-30 RX ORDER — ACETAMINOPHEN 10 MG/ML
1000 INJECTION, SOLUTION INTRAVENOUS EVERY 8 HOURS
Status: COMPLETED | OUTPATIENT
Start: 2024-01-30 | End: 2024-01-30

## 2024-01-30 RX ADMIN — ACETAMINOPHEN 1000 MG: 10 INJECTION, SOLUTION INTRAVENOUS at 01:01

## 2024-01-30 RX ADMIN — SODIUM CHLORIDE, SODIUM LACTATE, POTASSIUM CHLORIDE, CALCIUM CHLORIDE AND DEXTROSE MONOHYDRATE: 5; 600; 310; 30; 20 INJECTION, SOLUTION INTRAVENOUS at 07:01

## 2024-01-30 RX ADMIN — Medication 10 ML: at 10:01

## 2024-01-30 RX ADMIN — MIRTAZAPINE 15 MG: 15 TABLET, ORALLY DISINTEGRATING ORAL at 07:01

## 2024-01-30 RX ADMIN — SODIUM BICARBONATE: 84 INJECTION, SOLUTION INTRAVENOUS at 10:01

## 2024-01-30 RX ADMIN — ACETAMINOPHEN 1000 MG: 10 INJECTION, SOLUTION INTRAVENOUS at 10:01

## 2024-01-30 RX ADMIN — ACETAMINOPHEN 1000 MG: 10 INJECTION, SOLUTION INTRAVENOUS at 09:01

## 2024-01-30 RX ADMIN — Medication 10 ML: at 05:01

## 2024-01-30 RX ADMIN — SODIUM CHLORIDE, SODIUM LACTATE, POTASSIUM CHLORIDE, CALCIUM CHLORIDE AND DEXTROSE MONOHYDRATE 40 ML/HR: 5; 600; 310; 30; 20 INJECTION, SOLUTION INTRAVENOUS at 09:01

## 2024-01-30 NOTE — ASSESSMENT & PLAN NOTE
multiple myeloma dx 2018 followed by [facility]. 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 needs assistance to transfer from the bed to the walker     Heme/onc evalution   1/30  Hematology consulted for further recommendations regarding multiple myeloma. SPEP, ADAM, immunoglobulins, FLC, 24 hour urine protein to further evaluate myeloma    Not a candidate for further treatment per hematology

## 2024-01-30 NOTE — DISCHARGE INSTRUCTIONS
Depends changed.  Patient boosted in bed.  2L NC reapplied. Oxygen has been on intermittent.    [facility]    [facility]  (aka [facility], aka [facility])  Serves [place], [facility], and [facility] residents.  Serves uninsured patients & those with Medicaid.  Main location: [address], [place] [id number] [phone]  Walk-in's available during regular business hours.  24/7 Crisis Line: [phone]    [facility]  (aka [facility], [facility])  Serves [facility].  Serves uninsured patients, those with Medicaid and some private plans.  Walk-in's available during regular business hours.  Primary care services available as well.  [facility]: [address] [place] [address];   [phone]  [place]: [address] [place] [address];   [phone]  24/7 Crisis Line: [phone]    [facility]  [address] [facility] [address], [place] [address] [phone]  General outpt psych, general psych IOP, addiction outpt psych, addiction IOP    [facility]  Serves uninsured patients & those with Medicaid, call for more info.  Primary care, pediatrics, HIV treatment, and dentistry services available as well.   Three locations.  [phone]    [facility]  Serves patients with Medicaid, Medicare, and private insurance  [address] [place] [address] [phone]    [facility]  Serves uninsured on a sliding scale, as well as Medicaid, Medicare, and private plans.  Eight locations around the [facility] area.  [phone]    [facility]  Serves uninsured patients & those with Medicaid, private insurances.  Primary care available as well.   [phone]  [address] [place] [address]    [facility]  Serves veterans who were honorably discharged.  [address], [place] [id number] [phone]    24/7 Veterans Crisis Line: [phone] (Press 1)    If you have private insurance and need to find a specialist, please contact your insurance network to request a list of providers covered by your benefits.

## 2024-01-30 NOTE — ASSESSMENT & PLAN NOTE
CX ray 1/28  Abnormal left upper thoracic region pleural base opacity, similar to 10/02/2023 chest radiograph, these are new compared to 02/21/2023 radiographs, further evaluation advised.  Malignancy cannot be excluded.

## 2024-01-30 NOTE — HPI
Ms. Janie Zamorano is a 68 year old man with schizophrenia, A.fib, multiple myeloma who presented with failure to thrive. HPI is obtained per patient's son as patient is a poor historian. Per son, patient was receiving myeloma treatment at [facility] and last received before Thanksgiving last year. He stated that they enrolled in hospice shortly afterwards. He was not happy with the hospice care she was receiving and brought her to the hospital for decreased PO intake over the past few days. He states that patient struggles with PO intake due to her schizophrenia and depression. Hematology consulted for further recommendations regarding multiple myeloma.

## 2024-01-30 NOTE — ASSESSMENT & PLAN NOTE
sphenoid/pituitary sella mass status post sphenoid sinustomy 7/23/23 . son reports vision loss in right eye

## 2024-01-30 NOTE — ASSESSMENT & PLAN NOTE
Patient with thrombocytopenia   Recent Labs   Lab 01/29/24  1757 01/30/24  0721 01/31/24  0607   * 139* 136*   . Platelet counts stable.monitor

## 2024-01-30 NOTE — ASSESSMENT & PLAN NOTE
likely from CKD/GLENIS . patient with bicarbonate   Recent Labs   Lab 01/30/24  0721 01/30/24  1916 01/31/24  0607   CO2 16* 16* 19*   s/p sodium bicarbonate  drip . monitor

## 2024-01-30 NOTE — CONSULTS
[facility] - Stepdown Flex ([facility]-14)  Adult Nutrition  Consult Note    SUMMARY     Recommendations    1.) If and when medically able, recommend to ADAT, with goal of Regular, texture per SLP- encourage PO intake if medically warranted.     2.) If medically able, recommend Boost (or Boost equivalent) BID as tolerated.     3.) If medically warranted/appropriate, consider appetite stimulant.     4.) Re-consult if alternative nutrition is medically appropriate. 5.) RD to monitor.      Goals: top meet % of EEN/EPN by next RD f/u  Nutrition Goal Status: new  Communication of RD Recs:  (POC)    Assessment and Plan    Endocrine  Severe malnutrition  Malnutrition Type:  Context: chronic illness  Level: severe    Related to (etiology):   Multi myeloma    Signs and Symptoms (as evidenced by):   Visual NFPE: observed moderate to severe muscle and fat wasting and -18% x 3mo wt loss     Malnutrition Characteristic Summary:  Weight Loss (Malnutrition): greater than 7.5% in 3 months (-18% x 3mo)  Subcutaneous Fat (Malnutrition):  (observed moderate to severe)  Muscle Mass (Malnutrition):  (observed severe)      Interventions/Recommendations (treatment strategy):  1.) If and when medically able, recommend to ADAT, with goal of Regular, texture per SLP- encourage PO intake if medically warranted. 2.) If medically able, recommend Boost (or Boost equivalent) BID as tolerated. 3.) If medically warranted/appropriate, consider appetite stimulant. 4.) Re-consult if alternative nutrition is medically appropriate. 5.) RD to monitor.    Nutrition Diagnosis Status:   New       Malnutrition Assessment  Malnutrition Context: chronic illness  Malnutrition Level: severe  Hair/Scalp (Micronutrient): sparse       Weight Loss (Malnutrition): greater than 7.5% in 3 months (-18% x 3mo)  Subcutaneous Fat (Malnutrition):  (observed moderate to severe)  Muscle Mass (Malnutrition):  (observed severe)   [place] (Subcutaneous Fat Loss):  "moderate depletion   [place] (Muscle Loss): severe depletion     Reason for Assessment    Reason For Assessment: consult  Diagnosis:  (FTT)  Relevant Medical History: hypertension, paranoid schizophrenia with multiple inpatient psychiatric admissions; multi myeloma  Interdisciplinary Rounds: did not attend    General Information Comments: RD consulted for adult FTT and poor PO intake. Pt would not answer RD's questions and kindly asked RD to leave room. NFPE could not be performed at this time. Per MD's notes, pt is receiving Hospice Home care: poor PO intake and wt loss can occur with pt's receiving Hospice Care. Per chart review, pt has had sig wt loss at the 3 mo marker: -18% x 3mo. Per visual NFPE, RD feels that meets the criteria for severe malnutrition. Please see PES for details. RD to continue to monitor.    Nutrition Discharge Planning: adequate intake    Nutrition Risk Screen    Nutrition Risk Screen: reduced oral intake over the last month    Nutrition/Diet History    Food Allergies: shellfish    Anthropometrics    Temp: 98.2 °F (36.8 °C)  Height: 5' 3" (160 cm)  Height (inches): 63 in  Weight Method: Bed Scale  Weight: 44.9 kg (99 lb)  Weight (lb): 99 lb  Ideal Body Weight (IBW), Female: 115 lb  % Ideal Body Weight, Female (lb): 86.09 %  BMI (Calculated): 17.5    Lab/Procedures/Meds    Pertinent Labs Reviewed: reviewed  Pertinent Labs Comments: Cr: 2.9, GFR: 17.2, gluc: 117, Ca: 6.9, PRO: 4.4, alb: 1.6    Pertinent Medications Reviewed: reviewed  Pertinent Medications Comments: D5 w/ lactated ringers    Estimated/Assessed Needs    Weight Used For Calorie Calculations: 52 kg (114 lb 10.2 oz) (IBW d/t BMI <18.0)  Energy Calorie Requirements (kcal): 1560 kcal  Energy Need Method: Kcal/kg (30 kcal/kg of IBW)    Protein Requirements: 52- 63g (1.0-1.2g/kg of IBW)  Weight Used For Protein Calculations: 52 kg (114 lb 10.2 oz) (IBW d/t BMI <18.0)    Fluid Requirements (mL): 1ml/1kcal or per MD  Estimated " Fluid Requirement Method: RDA Method  RDA Method (mL): 1560    Nutrition Prescription Ordered    Current Diet Order: NPO    Evaluation of Received Nutrient/Fluid Intake    I/O: +350ml since admit  Energy Calories Required: not meeting needs  Protein Required: not meeting needs  Fluid Required:  (as per MD)  Comments: LBM 1/28  % Intake of Estimated Energy Needs: 0 - 25 %  % Meal Intake: NPO    Nutrition Risk    Level of Risk/Frequency of Follow-up:  (RD to f/u x 1/week)     Monitor and Evaluation    Food and Nutrient Intake: energy intake, food and beverage intake  Food and Nutrient Adminstration: diet order  Physical Activity and Function: nutrition-related ADLs and IADLs  Anthropometric Measurements: weight, weight change, body mass index  Biochemical Data, Medical Tests and Procedures: electrolyte and renal panel, lipid profile, gastrointestinal profile, glucose/endocrine profile, inflammatory profile  Nutrition-Focused Physical Findings: overall appearance, skin     Nutrition Follow-Up    RD Follow-up?: Yes

## 2024-01-30 NOTE — ASSESSMENT & PLAN NOTE
Patient's with macrocytic anemia.. Hemoglobin downtrending. Etiology likely due to  multiple myelonol not on treatment .  Current CBC reviewed-    Recent Labs   Lab 01/29/24  1757 01/30/24  0721 01/31/24  0607   HGB 9.6* 9.7* 10.6*         Component Value Date/Time    MCV 89 01/31/2024 0607    RDW 15.9 (H) 01/31/2024 0607    IRON 72 10/02/2023 1430    FERRITIN 230 10/02/2023 1430    FOLATE 2.6 (L) 01/31/2024 0607    DDASGRDU36 >2000 (H) 01/31/2024 0607     Monitor CBC and transfuse if H/H drops below 7/21.      PRBC transfusion x2 .

## 2024-01-30 NOTE — PROGRESS NOTES
"CONSULTATION LIAISON PSYCHIATRY PROGRESS NOTE    Patient Name: Janie Zamorano  MRN: [id number]  Patient Class: IP- Inpatient  Admission Date: 1/28/2024  Attending Physician: Ender Mcclure MD      SUBJECTIVE:   Janie Zamorano is a 68 y.o. 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  Patient reported to have not eaten in the past few days.  Currently on home hospice.  On arrival, patient with non-anion gap metabolic acidosis, hypoglycemia, hypocalcemia, and anemia.     Psychiatry consulted for schizophrenia/failure to thrive/poor oral intake    Patient did not receive Remeron last night.  Palliative saw patient and began goals of care discussion with patient and son.  No behavioral PRN medications.  Spoke with nurse who notes patient has been oriented to self only.  Patient irritable and minimally engages with interview today.  Reports not sleeping because people keep interrupting her and endorses feeling hungry.  Patient focuses on wanting to be turned in the bed.       OBJECTIVE:    Mental Status Exam:  General Appearance: lying in bed  Behavior: reluctant to participate  Involuntary Movements and Motor Activity: no abnormal involuntary movements noted  Gait and Station: unable to assess - patient lying down or seated  Speech and Language: decreased spontaneity  Mood: "I'm tired"  Affect: reactive  Thought Process and Associations: goal-directed  Thought Content and Perceptions::  no endorsed SI/HI/AVH  Sensorium and Orientation: alert, poor effort given during testing  Recent and Remote Memory: Unwilling to Participate in Formal Testing  Attention and Concentration: Unwilling to Participate in Formal " Testing  Fund of Knowledge: Unwilling to Participate in Formal Testing  Insight: limited/partial awareness of illness  Judgment: behavior is adequate/appropriate to circumstances    CAM ICU positive? no      ASSESSMENT & RECOMMENDATIONS   MDD with psychotic features  R/o Schizophrenia  R/o delirium    PSYCH MEDICATIONS  Continue Remeron 15mg PO nightly  PRN -  Zyprexa 5mg PO or IM Q6h PRN non-redirectable agitation  Monitor EKG if receiving multiple PRNs  QTc 439 on 1/28    DELIRIUM BEHAVIOR MANAGEMENT  PLEASE utilize CHEMICAL restraints with PRN meds first for agitation. Minimize use of PHYSICAL restraints OR have periods of being out of physical restraints if possible.  Keep window shades open and room lit during day and room dim at night in order to promote normal sleep-wake cycles  Encourage family at bedside. [place] patient often to situation, location, date.  Continue to Limit or Discontinue use of Narcotics, Benzos and Anti-cholinergic medications as they may worsen delirium.  Continue medical workup for causative etiology of Delirium.     RISK ASSESSMENT  NO NEED FOR PEC    FOLLOW UP  Will sign off. Patient can follow up with outpatient provider.  Resources placed in chart.    DISPOSITION - once medically cleared:  Defer to medical team    Please contact ON CALL psychiatry service (24/7) for any acute issues that may arise.    Dr. David HUFF Psychiatry  [facility]  1/30/2024 11:37 AM        --------------------------------------------------------------------------------------------------------------------------------------------------------------------------------------------------------------------------------------    CONTINUED OBJECTIVE clinical data & findings reviewed and noted for above decision making    Current Medications:   Scheduled Meds:    acetaminophen  1,000 mg Intravenous Q8H    mirtazapine  15 mg Oral Nightly     PRN Meds: 0.9%  NaCl infusion (for blood  administration), dextrose 10%, dextrose 10%, glucagon (human recombinant), glucose, glucose    Allergies:   Review of patient's allergies indicates:   Allergen Reactions    Iodine Hives    Shellfish containing products Itching    Ondansetron hcl Nausea Only       Vitals  Vitals:    01/30/24 0926   BP:    Pulse: 101   Resp: 15   Temp:        Labs/Imaging/Studies:  Recent Results (from the past 24 hour(s))   PTH, intact    Collection Time: 01/29/24  1:08 PM   Result Value Ref Range    PTH, Intact 641.3 (H) 9.0 - 77.0 pg/mL   CBC auto differential    Collection Time: 01/29/24  1:08 PM   Result Value Ref Range    WBC 4.42 3.90 - 12.70 K/uL    RBC 3.23 (L) 4.00 - 5.40 M/uL    Hemoglobin 9.5 (L) 12.0 - 16.0 g/dL    Hematocrit 27.0 (L) 37.0 - 48.5 %    MCV 84 82 - 98 fL    MCH 29.4 27.0 - 31.0 pg    MCHC 35.2 32.0 - 36.0 g/dL    RDW 15.9 (H) 11.5 - 14.5 %    Platelets 133 (L) 150 - 450 K/uL    MPV 11.2 9.2 - 12.9 fL    Immature Granulocytes 1.1 (H) 0.0 - 0.5 %    Gran # (ANC) 3.7 1.8 - 7.7 K/uL    Immature Grans (Abs) 0.05 (H) 0.00 - 0.04 K/uL    Lymph # 0.4 (L) 1.0 - 4.8 K/uL    Mono # 0.2 (L) 0.3 - 1.0 K/uL    Eos # 0.0 0.0 - 0.5 K/uL    Baso # 0.02 0.00 - 0.20 K/uL    nRBC 1 (A) 0 /100 WBC    Gran % 84.4 (H) 38.0 - 73.0 %    Lymph % 8.1 (L) 18.0 - 48.0 %    Mono % 5.4 4.0 - 15.0 %    Eosinophil % 0.5 0.0 - 8.0 %    Basophil % 0.5 0.0 - 1.9 %    Differential Method Automated    POCT glucose    Collection Time: 01/29/24  4:11 PM   Result Value Ref Range    POCT Glucose 127 (H) 70 - 110 mg/dL   CBC auto differential    Collection Time: 01/29/24  5:57 PM   Result Value Ref Range    WBC 5.17 3.90 - 12.70 K/uL    RBC 3.30 (L) 4.00 - 5.40 M/uL    Hemoglobin 9.6 (L) 12.0 - 16.0 g/dL    Hematocrit 26.9 (L) 37.0 - 48.5 %    MCV 82 82 - 98 fL    MCH 29.1 27.0 - 31.0 pg    MCHC 35.7 32.0 - 36.0 g/dL    RDW 15.2 (H) 11.5 - 14.5 %    Platelets 113 (L) 150 - 450 K/uL    MPV 11.1 9.2 - 12.9 fL    Immature Granulocytes 1.5 (H) 0.0 - 0.5  %    Gran # (ANC) 4.3 1.8 - 7.7 K/uL    Immature Grans (Abs) 0.08 (H) 0.00 - 0.04 K/uL    Lymph # 0.4 (L) 1.0 - 4.8 K/uL    Mono # 0.3 0.3 - 1.0 K/uL    Eos # 0.0 0.0 - 0.5 K/uL    Baso # 0.03 0.00 - 0.20 K/uL    nRBC 0 0 /100 WBC    Gran % 83.2 (H) 38.0 - 73.0 %    Lymph % 7.7 (L) 18.0 - 48.0 %    Mono % 6.6 4.0 - 15.0 %    Eosinophil % 0.4 0.0 - 8.0 %    Basophil % 0.6 0.0 - 1.9 %    Platelet Estimate Decreased (A)     Differential Method Automated    POCT glucose    Collection Time: 01/29/24  8:14 PM   Result Value Ref Range    POCT Glucose 175 (H) 70 - 110 mg/dL   POCT glucose    Collection Time: 01/29/24 11:54 PM   Result Value Ref Range    POCT Glucose 118 (H) 70 - 110 mg/dL   POCT glucose    Collection Time: 01/30/24  5:03 AM   Result Value Ref Range    POCT Glucose 99 70 - 110 mg/dL   Magnesium    Collection Time: 01/30/24  7:21 AM   Result Value Ref Range    Magnesium 1.7 1.6 - 2.6 mg/dL   Comprehensive Metabolic Panel    Collection Time: 01/30/24  7:21 AM   Result Value Ref Range    Sodium 146 (H) 136 - 145 mmol/L    Potassium 3.7 3.5 - 5.1 mmol/L    Chloride 114 (H) 95 - 110 mmol/L    CO2 16 (L) 23 - 29 mmol/L    Glucose 94 70 - 110 mg/dL    BUN 47 (H) 8 - 23 mg/dL    Creatinine 2.9 (H) 0.5 - 1.4 mg/dL    Calcium 7.2 (L) 8.7 - 10.5 mg/dL    Total Protein 4.6 (L) 6.0 - 8.4 g/dL    Albumin 1.6 (L) 3.5 - 5.2 g/dL    Total Bilirubin 0.8 0.1 - 1.0 mg/dL    Alkaline Phosphatase 69 55 - 135 U/L    AST 23 10 - 40 U/L    ALT 20 10 - 44 U/L    eGFR 17.2 (A) >60 mL/min/1.73 m^2    Anion Gap 16 8 - 16 mmol/L   CBC Auto Differential    Collection Time: 01/30/24  7:21 AM   Result Value Ref Range    WBC 3.74 (L) 3.90 - 12.70 K/uL    RBC 3.40 (L) 4.00 - 5.40 M/uL    Hemoglobin 9.7 (L) 12.0 - 16.0 g/dL    Hematocrit 29.5 (L) 37.0 - 48.5 %    MCV 87 82 - 98 fL    MCH 28.5 27.0 - 31.0 pg    MCHC 32.9 32.0 - 36.0 g/dL    RDW 15.8 (H) 11.5 - 14.5 %    Platelets 139 (L) 150 - 450 K/uL    MPV 10.2 9.2 - 12.9 fL    Immature  Granulocytes 1.1 (H) 0.0 - 0.5 %    Gran # (ANC) 3.1 1.8 - 7.7 K/uL    Immature Grans (Abs) 0.04 0.00 - 0.04 K/uL    Lymph # 0.4 (L) 1.0 - 4.8 K/uL    Mono # 0.2 (L) 0.3 - 1.0 K/uL    Eos # 0.0 0.0 - 0.5 K/uL    Baso # 0.02 0.00 - 0.20 K/uL    nRBC 0 0 /100 WBC    Gran % 83.7 (H) 38.0 - 73.0 %    Lymph % 9.9 (L) 18.0 - 48.0 %    Mono % 4.5 4.0 - 15.0 %    Eosinophil % 0.3 0.0 - 8.0 %    Basophil % 0.5 0.0 - 1.9 %    Platelet Estimate Decreased (A)     Ovalocytes Occasional     Target Cells Occasional     Differential Method Automated    Immunoglobulins (IgG, IgA, IgM) Quantitative    Collection Time: 01/30/24  7:21 AM   Result Value Ref Range    IgG 565 (L) 650 - 1600 mg/dL    IgA 15 (L) 40 - 350 mg/dL    IgM 11 (L) 50 - 300 mg/dL   Protein electrophoresis, serum    Collection Time: 01/30/24  7:21 AM   Result Value Ref Range    Protein, Serum 4.4 (L) 6.0 - 8.4 g/dL   POCT glucose    Collection Time: 01/30/24  8:00 AM   Result Value Ref Range    POCT Glucose 121 (H) 70 - 110 mg/dL     Imaging Results               US Retroperitoneal Complete (Final result)  Result time 01/29/24 05:16:38      Final result by Luis Ng MD (01/29/24 05:16:38)                   Impression:      Bilateral increased renal parenchymal echogenicity, with decreased corticomedullary distinction, possibly related to underlying medical renal disease.  Correlate clinically.    Mild distention of the right renal pelvis, possibly on the basis of extrarenal pelvis.  Mild hydronephrosis not fully excluded.  Correlate clinically.  If there is strong clinical suspicion for ureteral obstruction, consider abdominopelvic CT.    At the patient's request, the scan was terminated prior to completion of the imaging protocol.    This report was flagged in Epic as abnormal.    Electronically signed by resident: Delia Mayer  Date:    01/29/2024  Time:    02:39    Electronically signed by: Luis Ng  Date:    01/29/2024  Time:    05:16                Narrative:    EXAMINATION:  US RETROPERITONEAL COMPLETE    CLINICAL HISTORY:  GLENIS;    TECHNIQUE:  Ultrasound of the kidneys and urinary bladder was performed including color flow and Doppler evaluation of the kidneys.    COMPARISON:  CT thoracic spine 08/08/2018    CT renal stone study 08/03/2018    FINDINGS:  Right kidney: The right kidney measures 6.9 cm in length.  No cortical thinning. Increased parenchymal echogenicity with decreased corticomedullary distinction.  Resistive index measures 0.62.  No solid mass is detected the sonographic.  No echogenic shadowing renal stone. Probable mild hydronephrosis versus extrarenal pelvis.    Several simple cysts largest measures 1.3 x 1.0 x 0.9 cm at the interpolar region.    Left kidney: The left kidney measures 7.5 cm in length.  No cortical thinning.  Increased parenchymal echogenicity with decreased corticomedullary distinction.  Unable to obtain resistive index.  No solid mass is detected sonographic.  No echogenic shadowing renal stone. No hydronephrosis.    The bladder is partially distended at the time of scanning and has an unremarkable appearance.  Unable to evaluate for ureteral jets; during scanning of the urinary bladder, the technologist reports that the scan was terminated at the adamant request of the patient.    An echogenic structure partially visualized posterior to the urinary bladder possibly represents bowel but was not fully characterized because the of the premature termination of the scan.                                       X-Ray Abdomen AP 1 View (Final result)  Result time 01/28/24 18:23:00      Final result by Parveen Page DO (01/28/24 18:23:00)                   Impression:      Nonobstructive bowel gas pattern      Electronically signed by: Parveen Page  Date:    01/28/2024  Time:    18:23               Narrative:    EXAMINATION:  XR ABDOMEN AP 1 VIEW    CLINICAL HISTORY:  poor oral intake;    TECHNIQUE:  AP View(s) of the  abdomen was performed.    COMPARISON:  08/03/2018.    FINDINGS:  There is a normal, nonobstructive bowel gas pattern.  There is no free air on this supine view.  There is no abnormal calcification.  There are postop changes of the thoracolumbar spine.  Osseous structures are otherwise intact.                                       X-Ray Chest AP Portable (Final result)  Result time 01/28/24 12:59:38      Final result by Savana Cabrera MD (01/28/24 12:59:38)                   Impression:      Abnormal left upper thoracic region pleural base opacity, similar to 10/02/2023 chest radiograph, these are new compared to 02/21/2023 radiographs, further evaluation advised.  Malignancy cannot be excluded.      Electronically signed by: Savana Cabrera MD  Date:    01/28/2024  Time:    12:59               Narrative:    EXAMINATION:  XR CHEST AP PORTABLE    CLINICAL HISTORY:  Shortness of breath    TECHNIQUE:  Single frontal view of the chest was performed.    COMPARISON:  10/02/2023    FINDINGS:  The cardiac silhouette is normal in size.  The pulmonary vascularity is normal.    Left upper thoracic paramediastinal oval opacity and pleuroparenchymal scarring apical region left upper lobe correlated with CT 06/02/2023.  No new areas of abnormal opacity seen.  The osseous structures appear normal.

## 2024-01-30 NOTE — PLAN OF CARE
Advance Care Planning   [facility] ([place] [id number])  Palliative Care   Psychosocial Assessment    Patient Name: Janie Zamorano  MRN: [id number]  Admission Date: 1/28/2024  Hospital Length of Stay: 2 days  Code Status: Full Code   Attending Provider: Ender Mcclure MD  Palliative Care Provider: Gomez Carrillo MD     Primary Care Physician: He Hoyt Jr., MD  Principal Problem:Failure to thrive in adult    Reason for Referral: assistance with clarification of goals of care    Present during Interview: patient and son Saud .      Primary Language:English   Needed: no      Past Medical Situation:   PMH:   Past Medical History:   Diagnosis Date    Anxiety     Asthma     Bronchitis     COPD (chronic obstructive pulmonary disease)     Depression     GERD (gastroesophageal reflux disease)     Hallucination     History of psychiatric hospitalization     Hypertension     Neck pain     Polyneuropathy in diseases classified elsewhere 4/12/2021    Schizophrenia     Sleep difficulties      Mental Health/Substance Use History: patient has mental health history. Saud reports patient has had different mental health diagnosis in the past and providers have never been able to come up with a consistent diagnosis   Risk of Abuse, neglect or exploitation: none identified   Current or Previous Trauma and/or evidence of PTSD: potential  Non-traditional Health practices: none identified     Understanding of diagnosis and prognosis: Saud will benefit from ongoing consistent messaging regarding diagnosis and prognosis   Experience/Comfort level with health care system:     Patients Mental Status: patient spoke once during visit to ask Saud to get her out of the bed    Socio-Economic Factors/Resources:  Address: [address] [place] [address]  Phone Number: [phone] (home)     Marital Status: Single  Household composition: patient lives with son Saud  Children:  one son, Saud     Patient/Family perceptions about Caregiving Needs; availability and capacity: Saud stated he has been involved in patient health and mental health care since he was in his teens.     Family Structure, Dynamics/Relationships: patient has two living sisters, one has recently been diagnosed with stomach cancer, the other is a      Activities of Daily Living: patient requires assistance with activities of daily living   Support Systems-Family & Community (Home Health, HME etc): hospice recently, not satisfied with service. 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  Self-Care Activities/Hobbies: Saud stated patient was always on the go      History: no    Financial Resources:Medicare      Advanced Care Planning & Legal Concerns:   Advanced Directives/Living Will: no  [place]ST: no   Planning:  no    Emergency Contacts:    Spirituality, Culture & Coping Mechanisms:  F- [place] and Belief: Latter-day     I - Importance:     C - Community/Culture Values:     A - Address in Care:       Goals/Hopes/Expectations: Saud hopes patient will start eating again  Fears/Anxiety/Concerns: none stated         Preferences about EOL Environment: (own bed, family nearby, pets, music, etc)      Complicated Bereavement Risk Assessment Tool (CBRAT)  Reference:  [facility] Clinical Practice Group (May 2016). Bereavement Risk Screening and Management Guidelines.  Retrieved from: http://www.grpcc.com.au/wp-content/uploads//XNLEK-Gnmsnmrehco-Mygohtrsz-and-Management-Guideline-2016.pdf      Bereaved Client Characteristics   Under 18      no  Was a Twin   no  Young Spouse   no  Elderly Spouse    no  Isolated    no  Lacks Meaningful Social Support   no  Dissatisfied with help available during illness   no  New to Financial [place]  no  New to Decision-Making   no    Illness  Inherited Disorder   no  Stigmatized Disease in the family/community   no  Lengthy/Burdensome   no     Bereaved Client's History of Loss   Cumulative Multiple Losses   yes  Previous Mental Health Illnesses   no  Current Mental Health Illness   no  Other Significant Health Issues   no   Migrant/Refugee   no Will the Death be:  Sudden or Unexpected   no  Traumatic Circumstances Associated with Death   no  Significant Cultural/Social Burdens as a result of Death   no   Relationship with   Profound Lifelong Partner   yes  Highly Dependent    no  Antagonistic   no  Ambivalent   no  Deeply Connected   yes  Culturally Defined   yes   Risk Factors Scores  0-2  Low  3-5  Moderate  5+  High  All persons scoring moderate to high presume to be at risk**    (** It is acknowledged that protective factors and resilience may outweigh apparent risk factors.      Total Risk Factors Score:   low to moderate       Advance Care Planning     Date: 2024    [facility]  I engaged patient's son Saud in a voluntary conversation about advance care planning and we specifically addressed what the goals of care would be moving forward. We did not specifically address the patient's likely prognosis, which is poor.  We explored the patient's values and preferences for future care.  Saud  endorses that what is most important right now is to focus on  improving patient's nutrition status.   Saud verbalized that this is what he wants but would also need to be what the patient wants, therefore it is a day by day situation.  will follow for support.     Daniel Hogan, [facility]  Palliative Medicine

## 2024-01-30 NOTE — CONSULTS
ADELA placed 20 g x 8 cm midline in Right brachial vein for pva using realtime ultrasound guidance.  Lot#[id number].  Max dwell date 2/28/24.  Imagery recorded and saved.

## 2024-01-30 NOTE — ASSESSMENT & PLAN NOTE
Patient is a 68 year old woman with MM, schizophrenia who presented with poor PO intake, weakness. Hematology was consulted regarding further recommendations for MM.    - Follow SPEP, ADAM, immunoglobulins, FLC, 24 hour urine protein to further evaluate myeloma  - Had discussion with patient's son regarding patient's condition and next steps. Discussed that patient is not a candidate for treatment due to her poor PO intake and nutritional status. Expressed concern that patient's myeloma is aggressive as seen with plasmacytomas on imaging and she may not reach the point where she can receive treatment. Patient's son states that if she continues to not eat and decline, he will consider inpatient hospice. Agree with Palliative care consult, appreciate assistance in goals of care conversation.

## 2024-01-30 NOTE — PROGRESS NOTES
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        Patient is a difficult historian with limited insight into their medical issues. AAO x 2 but not talkative.    collateral from son at the bedside. Patient was following with oncology at [place]. per son, primary oncologist mentioned she is not a candidate for further chemo about 3 months back and advised hospice. Patient is on home hospice and son not happy with current hospic agency. son wants to discuss with palliative care at [facility] and requesting followups regarding [employer] work . reports patient had   no significant oral intake for the last 7days -only drinking water mostly.. BP was in 80s this AM. son called. EMS.   EMS gave 1 L of fluid in route.  being admitted for evaluation of failure to thrive  per chart review, ultiple myeloma for the past 3-4 years.  recently admitted to [facility]  last year for psychosis and required treatmen. declined functional status over the last 2-3 years due to pain and debility from multiple myeloma. 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 needs assistance to transfer from the bed to the  walker and medication management.  H/o psychiatric admuisssions secondary to schizophrenia. At baseline, ADL and  ambulates with asssitance. she is bedbound x 1 week.     ER couse -   soft blood pressure 100/84 .  EKG without acute ischemia.  Labs with hypoglycemia 43 , anemia Hb 4.6 , and hypocalcemia 6.6 .     Corrected calcium is 8.5.  Ordered 1 g calcium gluconate. Treated with D10 x 250ml  glucose.  Consented for blood transfusion.  severe nonanion gap metabolic acidosis with bicarb of 12. lactate WNL        last admission 1/2- 1/3 -suspected hematemesis.  Suspect color from emesis due to Partha-Aid at lunch and dinner per patient.   found to have elevated troponin.  No chest pain or shortness a breath. Negative stress test.  2 D echo normal EF 75 80%, no mention of wall motion abnormality.  .  Hgb stable and  no overt signs of bleeding during stay. X ray right pelvis no fractures  but does show osseous destruction lesion that was also noted on CT 5/2/2022 per Radiology. Plan to follow up with Primary Oncologist at [place]. discharged to [place].           Overview/Hospital Course:  1/29 Glucose stable. POCG glucose q 4H. SLP, palliative care  and psychiatry eval. per son , h/o catatonia. did not eat or drink  overnight . continue D5LR.  X ray abdomen -Nonobstructive bowel gas pattern. renal sonogram - Bilateral increased renal parenchymal echogenicity, with decreased corticomedullary distinction, possibly related to underlying medical renal disease.  Mild distention of the right renal pelvis, possibly on the basis of extrarenal pelvis.  Mild hydronephrosis not fully excluded.  CT RSS ordered . patient refused CT scan.corrected calcium is 8.4 . Hb stable    1/30 s/p psychiatry eval -started remeron 15mg PO nightly. did not take last night. SLP eval.  Glucose in 100s. continue D5LR.  Hematology consulted for further recommendations regarding multiple myeloma. SPEP, ADAM, immunoglobulins, FLC, 24 hour urine protein to  further evaluate myeloma. c/o back pain - started IV tylenol.  frequent reposition q2h. wound care consulted. continue bicarb drip. improved mentation and more interactive. answering questions.  SLP eval-  Soft & Bite Sized Diet - IDDSI Level 6, Thin liquids - IDDSI Level 0 . CT abdomen -  Large retroperitoneal mass involving multiple retroperitoneal structures including the aorta and IVC.  In addition there is a large destructive mass involving the right iliac is well as the gluteal musculature.  Findings may relate to patient's known multiple myeloma with lymphoma and sarcoma in the differential.Few left nephrolithiasis largest in the left renal pelvis measures 0.3 cm with minimal pelvic fullness without significant hydronephrosis. Right inferior pole density of nephrolithiasis versus cortical calcification.  No significant right-sided hydronephrosis.Innumerable lytic osseous lesions which is consistent with history of multiple myeloma.Remote T12 compression fracture deformity, likely pathological. Asymmetric thickening of the right gluteal musculature may relate to muscle invasion from the aforementioned mass versus gluteal hematoma.        Review of Systems:   Pain scale:    unable to obtain due to mental status  Constitutional:  fever,  chills, headache, vision loss, hearing loss, weight loss, Generalized weakness, falls, loss of smell, loss of taste, poor appetite,  sore throat  Respiratory: cough, shortness of breath.   Cardiovascular: chest pain, dizziness, palpitations, orthopnea, swelling of feet, syncope  Gastrointestinal: nausea, vomiting, abdominal pain, diarrhea, black stool,  blood in stool, change in bowel habits, constipation  Genitourinary: hematuria, dysuria, urgency, frequency  Integument/Breast: rash,  pruritis  Hematologic/Lymphatic: easy bruising, lymphadenopathy  Musculoskeletal: arthralgias , myalgias, back pain, neck pain, knee pain  Neurological: confusion, seizures, tremors, slurred  "speech  Behavioral/Psych:  depression, anxiety, auditory or visual hallucinations     OBJECTIVE:     Physical Exam:  Body mass index is 17.54 kg/m².    Constitutional: Appears frail and cachectic   Head: Normocephalic and atraumatic.   Neck: Normal range of motion. Neck supple.   Cardiovascular: Normal heart rate.  Regular heart rhythm.  Pulmonary/Chest: Effort normal.   Abdominal: No distension.  No tenderness  Musculoskeletal: Normal range of motion. No edema.   Neurological: Alert and oriented to person, place . follows simple commands. verbalizes occasionally   Skin: Skin is warm and dry.   Psychiatric: Normal mood and affect. Behavior is normal.                  Vital Signs  Temp: 98.3 °F (36.8 °C) (01/30/24 1200)  Pulse: 98 (01/30/24 1200)  Resp: 18 (01/30/24 1200)  BP: 115/81 (01/30/24 1200)  SpO2: 100 % (01/30/24 1200)     24 Hour VS Range    Temp:  [98 °F (36.7 °C)-98.4 °F (36.9 °C)]   Pulse:  []   Resp:  [15-20]   BP: (115-137)/(73-84)   SpO2:  [98 %-100 %]     Intake/Output Summary (Last 24 hours) at 1/30/2024 1526  Last data filed at 1/30/2024 0543  Gross per 24 hour   Intake 1162.5 ml   Output 550 ml   Net 612.5 ml         I/O This Shift:  No intake/output data recorded.    Wt Readings from Last 3 Encounters:   01/28/24 44.9 kg (99 lb)   11/07/23 44.9 kg (99 lb)   10/30/23 43.5 kg (95 lb 14.4 oz)       I have personally reviewed the vitals and recorded Intake/Output     Laboratory/Diagnostic Data:    CBC/Anemia Labs: Coags:    Recent Labs   Lab 01/29/24  1308 01/29/24  1757 01/30/24  0721   WBC 4.42 5.17 3.74*   HGB 9.5* 9.6* 9.7*   HCT 27.0* 26.9* 29.5*   * 113* 139*   MCV 84 82 87   RDW 15.9* 15.2* 15.8*    No results for input(s): "PT", "INR", "APTT" in the last 168 hours.     Chemistries: ABG:   Recent Labs   Lab 01/28/24  1104 01/28/24  1709 01/29/24  0757 01/30/24  0721   * 143 143 146*   K 4.7 4.6 3.9 3.7   * 116* 113* 114*   CO2 12* 15* 20* 16*   BUN 51* 49* 51* 47* " "  CREATININE 2.9* 2.9* 2.9* 2.9*   CALCIUM 6.6* 7.3* 6.9* 7.2*   PROT 4.4*  --  4.4* 4.6*   BILITOT 0.7  --  1.0 0.8   ALKPHOS 63  --  64 69   ALT 17  --  19 20   AST 22  --  22 23   MG 1.9  --  1.8 1.7   PHOS 5.1* 5.3*  --   --     No results for input(s): "PH", "PCO2", "PO2", "HCO3", "POCSATURATED", "BE" in the last 168 hours.     POCT Glucose: HbA1c:    Recent Labs   Lab 01/29/24  1611 01/29/24 2014 01/29/24  2354 01/30/24  0503 01/30/24  0800 01/30/24  1342   POCTGLUCOSE 127* 175* 118* 99 121* 94    Hemoglobin A1C   Date Value Ref Range Status   06/02/2023 4.9 4.0 - 5.6 % Final     Comment:     ADA Screening Guidelines:  5.7-6.4%  Consistent with prediabetes  >or=6.5%  Consistent with diabetes    High levels of fetal hemoglobin interfere with the HbA1C  assay. Heterozygous hemoglobin variants (HbS, HgC, etc)do  not significantly interfere with this assay.   However, presence of multiple variants may affect accuracy.     08/03/2018 5.4 4.0 - 5.6 % Final     Comment:     ADA Screening Guidelines:  5.7-6.4%  Consistent with prediabetes  >or=6.5%  Consistent with diabetes  High levels of fetal hemoglobin interfere with the HbA1C  assay. Heterozygous hemoglobin variants (HbS, HgC, etc)do  not significantly interfere with this assay.   However, presence of multiple variants may affect accuracy.     10/27/2017 5.5 4.0 - 5.6 % Final     Comment:     According to ADA guidelines, hemoglobin A1c <7.0% represents  optimal control in non-pregnant diabetic patients. Different  metrics may apply to specific patient populations.   Standards of Medical Care in Diabetes-2016.  For the purpose of screening for the presence of diabetes:  <5.7%     Consistent with the absence of diabetes  5.7-6.4%  Consistent with increasing risk for diabetes   (prediabetes)  >or=6.5%  Consistent with diabetes  Currently, no consensus exists for use of hemoglobin A1c  for diagnosis of diabetes for children.  This Hemoglobin A1c assay has " "significant interference with fetal   hemoglobin   (HbF). The results are invalid for patients with abnormal amounts of   HbF,   including those with known Hereditary Persistence   of Fetal Hemoglobin. Heterozygous hemoglobin variants (HbAS, HbAC,   HbAD, HbAE, HbA2) do not significantly interfere with this assay;   however, presence of multiple variants in a sample may impact the %   interference.          Cardiac Enzymes: Ejection Fractions:    Recent Labs     01/28/24  1104   CPK 92   TROPONINI 0.016    EF   Date Value Ref Range Status   02/22/2023 70 % Final     Nuc Stress EF   Date Value Ref Range Status   10/02/2023 89 % Final          No results for input(s): "COLORU", "APPEARANCEUA", "PHUR", "SPECGRAV", "PROTEINUA", "GLUCUA", "KETONESU", "BILIRUBINUA", "OCCULTUA", "NITRITE", "UROBILINOGEN", "LEUKOCYTESUR", "RBCUA", "WBCUA", "BACTERIA", "SQUAMEPITHEL", "HYALINECASTS" in the last 48 hours.    Invalid input(s): "WRIGHTSUR"    Procalcitonin (ng/mL)   Date Value   02/22/2023 0.68 (H)     Lactate (Lactic Acid) (mmol/L)   Date Value   01/28/2024 1.6   02/22/2023 0.6   02/22/2023 0.7     BNP (pg/mL)   Date Value   01/28/2024 241 (H)   10/02/2023 93     No results found for: "CRP", "SEDRATE"  D-Dimer (mg/L FEU)   Date Value   06/02/2023 1.70 (H)   02/21/2023 4.31 (H)     Ferritin (ng/mL)   Date Value   10/02/2023 230   06/02/2023 51   05/25/2018 57     LD (U/L)   Date Value   09/07/2018 186     Troponin I (ng/mL)   Date Value   01/28/2024 0.016   10/02/2023 <0.006   10/02/2023 0.168 (H)   06/02/2023 0.016   06/02/2023 0.018   02/21/2023 0.013     CPK (U/L)   Date Value   01/28/2024 92   10/02/2023 41   10/02/2023 39     CK (U/L)   Date Value   09/20/2023 43   09/16/2023 40   02/12/2023 36     Results for orders placed or performed in visit on 10/02/18   Vitamin D   Result Value Ref Range    Vit D, 25-Hydroxy 18 (L) 30 - 96 ng/mL   Results for orders placed or performed during the hospital encounter of 08/03/18 " "  Vitamin D   Result Value Ref Range    Vit D, 25-Hydroxy 27 (L) 30 - 96 ng/mL   Results for orders placed or performed in visit on 05/25/18   Vitamin D   Result Value Ref Range    Vit D, 25-Hydroxy 17 (L) 30 - 96 ng/mL     No results found for: "BED19ISITHAR"    Microbiology labs for the last week  Microbiology Results (last 7 days)       Procedure Component Value Units Date/Time    Blood culture [[id number]] Collected: 01/28/24 1455    Order Status: Completed Specimen: Blood from Peripheral, Antecubital, Left Updated: 01/29/24 2012     Blood Culture, Routine No Growth to date      No Growth to date    Blood culture [[id number]] Collected: 01/28/24 1550    Order Status: Completed Specimen: Blood from Peripheral, Antecubital, Right Updated: 01/29/24 2012     Blood Culture, Routine No Growth to date      No Growth to date            Reviewed and noted in plan where applicable- Please see chart for full lab data.    Lines/Drains:       Peripheral IV - Single Lumen 01/28/24 1103 18 G Anterior;Left;Proximal Forearm (Active)   Site Assessment Clean;Dry;Intact;No redness;No swelling 01/29/24 0331   Extremity Assessment Distal to IV No abnormal discoloration 01/28/24 2327   Line Status Infusing 01/29/24 0331   Dressing Status Intact 01/29/24 0331   Dressing Intervention Integrity maintained 01/29/24 0331   Number of days: 0            Peripheral IV - Single Lumen 01/28/24 1803 20 G;1 3/4 in Right Antecubital (Active)   Site Assessment Clean;Dry;Intact;No redness;No swelling 01/29/24 0331   Extremity Assessment Distal to IV No swelling;No redness;No abnormal discoloration 01/28/24 1803   Line Status Infusing 01/29/24 0000   Dressing Status Clean;Dry;Intact 01/29/24 0331   Dressing Intervention Integrity maintained 01/29/24 0331   Number of days: 0       Imaging  ECG Results              EKG 12-lead (Final result)  Result time 01/28/24 13:42:39      Final result by Interface, Lab [facility] (01/28/24 13:42:39)               " Narrative:    Test Reason : R53.83,    Vent. Rate : 069 BPM     Atrial Rate : 081 BPM     P-R Int : 000 ms          QRS Dur : 080 ms      QT Int : 410 ms       P-R-T Axes : 000 -25 -12 degrees     QTc Int : 439 ms    Accelerated Junctional rhythm  Low voltage QRS  Possible  Inferior infarct ,age undetermined  Cannot rule out Anteroseptal infarct (cited on or before 28-JAN-2024)  Abnormal ECG  When compared with ECG of 02-OCT-2023 11:14,  Significant changes have occurred  Confirmed by Martir DERAS MD ([id number]) on 1/28/2024 1:42:28 PM    Referred By: [email or web address]   SELF           Confirmed By:Martir DERAS MD                                  Results for orders placed during the hospital encounter of 10/02/23    Echo    Interpretation Summary    Left Ventricle: The left ventricle is normal in size. There is mild concentric hypertrophy. There is hyperdynamic systolic function with a visually estimated ejection fraction of 75 - 80%.    Right Ventricle: Normal right ventricular cavity size. Systolic function is normal.    Pulmonary Artery: The estimated pulmonary artery systolic pressure is 32 mmHg.    IVC/SVC: Normal venous pressure at 3 mmHg.      CT Renal Stone Study Abd Pelvis WO  Narrative: EXAMINATION:  CT RENAL STONE STUDY ABD PELVIS WO    CLINICAL HISTORY:  right hydronoephrosis?;    TECHNIQUE:  Low dose axial images, sagittal and coronal reformations were obtained from the lung bases to the pubic symphysis without IV or oral contrast.  All CT scans at this facility use dose modulation, iterative reconstruction and/or weight based dosing when appropriate to reduce radiation dose to as low as reasonably achievable.    COMPARISON:  Ultrasound retroperitoneal 01/29/2024, abdomen and chest radiograph 01/28/2024    FINDINGS:  Heart: Normal in size.  Small pericardial effusion.    Lung Bases: Bibasilar atelectasis left greater than right with trace pleural fluid.    Liver: Normal in size.  Multiple hepatic hypodensities largest  in the left hepatic lobe of fluid attenuation consistent with cyst measure 5 cm.  Multiple are too small to characterize.    Gallbladder: No calcified gallstones. Gallbladder is not distended. No gallbladder wall thickening or pericholecystic fluid collection.    Bile Ducts: No intra or extrahepatic biliary duct dilatation.    Further soft tissue evaluation is limited by hardening beam artifact from spine hardware.    Pancreas: Visualized pancreas is unremarkable.    Spleen: Normal in size. No focal lesion.    Adrenals: Right adrenal gland is not well visualized.  Left adrenal is unremarkable.    Kidneys/ Ureters: The right kidney is mildly laterally displaced by large retroperitoneal mass.  Multiple left nephrolithiasis largest in the renal pelvis measures 0.3 cm with minimal fullness without hydronephrosis (axial series 2 image 53).  Right lower pole density of underlining nephrolithiasis versus cortical calcification (axial series 2, image 66).  Few right hypodensities which are too small characterize but appear as cysts on ultrasound.    Bladder: Nondistended    Reproductive organs: Uterus and adnexa are surgically absent.    Vasculature: No significant atherosclerosis or aneurysm.    Gl/Mesentery/peritoneum and retroperitoneum: Limited examination of the retroperitoneal mass without IV contrast and by beam hardening artifact from spine hardware.  Allowing for this, there is heterogeneous mostly right retroperitoneal mass which extend inferiorly along the midline into the left pelvis with invasion of the distal left iliopsoas muscle; this mass is difficult to separate from adjacent bowel and pancreas. The mass measure approximately 18.2 x 10.6 x 4.3 cm (cc by TV by AP, coronal series 602, image 52 and axial series 2, image 64).  There is mass effect on the small and large bowel without obstruction.  Right lower quadrant surgical changes which consistent with history of small-bowel resection.  Few descending  colon diverticuli.  Rectum is distended with fecal ball collection surrounded by free fluid.    Large heterogeneous partially calcified destructive iliac mass measure 7.6 x 4.2 x 6.5 cm (cc by TV by AP, axial series 2, image 98), it invaded the adjacent right ilium.  Similar lesion on the other side of the ileum by the pelvic muscles measure 6.4 x 4.4 x 7.1 cm (axial series 2, image 103) with displacement of the gluteal muscles.    Multiple heterogeneous partially calcified lesion invading the right paraspinal muscles, measuring approximately 11.7 x 9.6 x 7.6 cm (coronal series 602, image 82) with destruction of the right posterior iliac wing and right sacrum.  Extensive soft tissue stranding and edema of the gluteus muscles.    Bones: Innumerable lytic lesions consistent with history of multiple myeloma. Remote T12 compression fracture deformity. Post T9 to L3 posterior hardware fusion. No acute fracture.  Remote left pubic ramus fracture.  Remote appearing nondisplaced left acetabular fracture.    Abdominal wall: Extensive anasarca.  Small volume of free fluid in the pelvis.    Diffuse thickening of the gluteal musculature on the right may relate to muscle invasion from aforementioned mass with gluteal hematoma also possible.  Impression: Limited evaluation without contrast.  Large retroperitoneal mass involving multiple retroperitoneal structures including the aorta and IVC.  In addition there is a large destructive mass involving the right iliac is well as the gluteal musculature.  Findings may relate to patient's known multiple myeloma with lymphoma and sarcoma in the differential.    Few left nephrolithiasis largest in the left renal pelvis measures 0.3 cm with minimal pelvic fullness without significant hydronephrosis. Right inferior pole density of nephrolithiasis versus cortical calcification.  No significant right-sided hydronephrosis.    Innumerable lytic osseous lesions which is consistent with history  of multiple myeloma.    Remote T12 compression fracture deformity, likely pathological.    Postoperative changes of T9-L3 posterior fusion.    Diffuse anasarca.    Asymmetric thickening of the right gluteal musculature may relate to muscle invasion from the aforementioned mass versus gluteal hematoma.    This report was flagged in Epic as abnormal.    Electronically signed by resident: Sukhdev Dhillon  Date:    01/30/2024  Time:    13:19    Electronically signed by: Jose Ybarra MD  Date:    01/30/2024  Time:    14:46      Labs, Imaging, EKG and Diagnostic results from 1/30/2024 were reviewed.    Medications:  Medication list was reviewed and changes noted under Assessment/Plan.  No current facility-administered medications on file prior to encounter.     Current Outpatient Medications on File Prior to Encounter   Medication Sig Dispense Refill    acetaminophen (TYLENOL) 500 MG tablet Take 500 mg by mouth daily as needed for Pain.      amiodarone (PACERONE) 200 MG Tab Take 1 tablet (200 mg total) by mouth once daily. 30 tablet 11    amLODIPine (NORVASC) 10 MG tablet TAKE 1 TABLET(10 MG) BY MOUTH EVERY DAY 90 tablet 3    ascorbic acid, vitamin C, (VITAMIN C) 1000 MG tablet Take 1,000 mg by mouth once daily.      aspirin (ECOTRIN) 81 MG EC tablet Take 81 mg by mouth once daily.      BIOTIN ORAL Take 1 tablet by mouth once daily.      calcium carbonate (TUMS ORAL) Take 1 tablet by mouth daily as needed (Heartburn).      cyanocobalamin, vitamin B-12, (VITAMIN B-12 ORAL) Take 1 tablet by mouth daily as needed.      mirtazapine (REMERON) 30 MG tablet Take 30 mg by mouth every evening.      OLANZapine (ZYPREXA) 10 MG tablet Take 10 mg by mouth every evening.      vitamin D (VITAMIN D3) 1000 units Tab Take 1,000 Units by mouth once daily.       Scheduled Medications:  acetaminophen, 1,000 mg, Intravenous, Q8H  mirtazapine, 15 mg, Oral, Nightly  sodium chloride 0.9%, 10 mL, Intravenous, Q6H      PRN: 0.9%  NaCl infusion (for  blood administration), dextrose 10%, dextrose 10%, glucagon (human recombinant), glucose, glucose, OLANZapine, Flushing PICC/Midline Protocol **AND** sodium chloride 0.9% **AND** sodium chloride 0.9%  Infusions:    sodium bicarbonate 150 mEq in dextrose 5 % (D5W) 1,000 mL infusion 75 mL/hr at 01/30/24 1407     Estimated Creatinine Clearance: 13.3 mL/min (A) (based on SCr of 2.9 mg/dL (H)).             Assessment/Plan:      * Failure to thrive in adult   difficult historian with limited insight into their medical issues.   collateral from son - No significant oral intake for the last 4-5 days.  EMS gave 1 L of fluid in route.   1/29 Glucose stable. POCG glucose q 4H. SLP, palliative care  and psychiatry eval. per son , h/o catatonia. did not eat or drink  overnight    1/30 s/p psychiatry eval -started remeron 15mg PO nightly. did not take last night. SLP eval.  Glucose in 100s. continue D5LR.    Dysphagia  1/30 SLP eval-  Soft & Bite Sized Diet - IDDSI Level 6, Thin liquids - IDDSI Level 0 .       Palliative care encounter  Palliative care following      Severe malnutrition  Nutrition consulted. Most recent weight and BMI monitored-     Measurements:  Wt Readings from Last 1 Encounters:   01/28/24 44.9 kg (99 lb)   Body mass index is 17.54 kg/m².    Patient has been screened and assessed by RD.    Malnutrition Type:  Context: chronic illness  Level: severe    Malnutrition Characteristic Summary:  Weight Loss (Malnutrition): greater than 7.5% in 3 months (-18% x 3mo)  Subcutaneous Fat (Malnutrition):  (observed moderate to severe)  Muscle Mass (Malnutrition):  (observed severe)    Interventions/Recommendations (treatment strategy):  1.) If and when medically able, recommend to ADAT, with goal of Regular, texture per SLP- encourage PO intake if medically warranted. 2.) If medically able, recommend Boost (or Boost equivalent) BID as tolerated. 3.) If medically warranted/appropriate, consider appetite stimulant. 4.)  Re-consult if alternative nutrition is medically appropriate. 5.) RD to monitor.      Hypothermia  Irving oro ordered. TSH WNL       Goals of care, counseling/discussion  Advance Care Planning    Code Status  In light of the patients advanced and life limiting illness,I engaged the the family in a voluntary conversation about the patient's preferences for care  at the very end of life.  son wants full code for now. wants to discuss with palliative care  in AM    I spent a total of 25 minutes engaging the patient in this advance care planning discussion.           Hypoglycemia  secondary to poor oral intake  Last A1c reviewed-   Lab Results   Component Value Date    HGBA1C 4.9 06/02/2023    HGBA1C 5.4 08/03/2018    HGBA1C 5.5 10/27/2017     Will hold PO hypoglycemics and will start correctional scale insulin  Most recent fingerstick glucose reviewed-   Recent Labs   Lab 01/29/24  1611 01/29/24 2014 01/29/24  2354 01/30/24  0503   POCTGLUCOSE 127* 175* 118* 99     . Treated with D10 x 250ml  glucose.  POCT glucose q1h. D10 infusion    1/30 s/p psychiatry eval -started remeron 15mg PO nightly. did not take last night. SLP eval.  Glucose in 100s. continue D5LR.      Hypocalcemia  likely from above  Recent Labs   Lab 01/30/24  0721   CALCIUM 7.2*          Corrected calcium is 8.4.  Ordered 1 g calcium gluconate.       Metabolic acidosis     likely from CKD/GLENIS . patient with bicarbonate   Recent Labs   Lab 01/28/24  1709 01/29/24  0757 01/30/24  0721   CO2 15* 20* 16*   s/p sodium bicarbonate  drip . monitor        History of small bowel obstruction   recent small bowel obstruction sp small bowel resection 8/5/2023     Hypotension  secondary to poor oral intake. resolved.continue IV Fluids       Lung mass   CX ray 1/28  Abnormal left upper thoracic region pleural base opacity, similar to 10/02/2023 chest radiograph, these are new compared to 02/21/2023 radiographs, further evaluation advised.  Malignancy cannot be  excluded.            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 son reports vision loss in right eye    Leukopenia  2.59. secondary to above      Thrombocytopenia, unspecified    Patient with thrombocytopenia   Recent Labs   Lab 01/29/24  1308 01/29/24  1757 01/30/24  0721   * 113* 139*   . Platelet counts stable.monitor         Multiple myeloma   multiple myeloma dx 2018 followed by [facility]. 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 needs assistance to transfer from the bed to the walker     Heme/onc evalution   1/30  Hematology consulted for further recommendations regarding multiple myeloma. SPEP, ADAM, immunoglobulins, FLC, 24 hour urine protein to further evaluate myeloma    GLENIS (acute kidney injury)  Patient with acute kidney injury/acute renal failure likely due to pre-renal azotemia due to IVVD GLENIS is currently worsening. Baseline creatinine  2.1  - Labs reviewed- Renal function/electrolytes with Estimated Creatinine Clearance: 13.3 mL/min (A) (based on SCr of 2.9 mg/dL (H)). according to latest data. Monitor urine output and serial BMP and adjust therapy as needed. Avoid nephrotoxins and renally dose meds for GFR listed above.     Obtain urine lytes , renal sonogram  and bladder scans q 6h  Recent Labs   Lab 01/28/24  1709 01/29/24  0757 01/30/24  0721   BUN 49* 51* 47*   CREATININE 2.9* 2.9* 2.9*       I & O     Intake/Output Summary (Last 24 hours) at 1/30/2024 0910  Last data filed at 1/30/2024 0543  Gross per 24 hour   Intake 1212.5 ml   Output 550 ml   Net 662.5 ml      Gentle IV hydration.   1/29    . X ray abdomen -Nonobstructive bowel gas pattern. renal sonogram - Bilateral increased renal parenchymal echogenicity, with decreased corticomedullary distinction, possibly related to underlying medical renal disease.  Mild distention of the right renal pelvis, possibly on the basis of  extrarenal pelvis.  Mild hydronephrosis not fully excluded.  CT RSS ordered . 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 needs assistance to transfer from the bed to the walker .    Anemia in neoplastic disease    Patient's with macrocytic anemia.. Hemoglobin downtrending. Etiology likely due to  multiple myelonol not on treatment .  Current CBC reviewed-    Recent Labs   Lab 01/29/24  1308 01/29/24  1757 01/30/24  0721   HGB 9.5* 9.6* 9.7*         Component Value Date/Time    MCV 87 01/30/2024 0721    RDW 15.8 (H) 01/30/2024 0721    IRON 72 10/02/2023 1430    FERRITIN 230 10/02/2023 1430    FOLATE 2.9 (L) 10/03/2023 0535    OPDLJMEH11 248 10/03/2023 0535     Monitor CBC and transfuse if H/H drops below 7/21.      PRBC transfusion x2 .CBC q 6h. monitor for overt bleed. consider GI eval      Multiple myeloma not having achieved remission  as above   1/30   Hematology consulted for further recommendations regarding multiple myeloma. SPEP, ADAM, immunoglobulins, FLC, 24 hour urine protein to further evaluate myeloma.  CT abdomen -  Large retroperitoneal mass involving multiple retroperitoneal structures including the aorta and IVC.  In addition there is a large destructive mass involving the right iliac is well as the gluteal musculature.  Findings may relate to patient's known multiple myeloma with lymphoma and sarcoma in the differential.Few left nephrolithiasis largest in the left renal pelvis measures 0.3 cm with minimal pelvic fullness without significant hydronephrosis. Right inferior pole density of nephrolithiasis versus cortical calcification.  No significant right-sided hydronephrosis.Innumerable lytic osseous lesions which is consistent with history of multiple  myeloma.Remote T12 compression fracture deformity, likely pathological. Asymmetric thickening of the right gluteal musculature may relate to muscle invasion from the aforementioned mass versus gluteal hematoma.      Paranoid schizophrenia   paranoid schizophrenia with multiple inpatient psychiatric admissions, - admitted for  reported decreased eating.  Patient is a difficult historian with limited insight into their medical issues.   collateral from son - No significant oral intake for the last 4-5 days.     psychiatry evaluation       VTE Risk Mitigation (From admission, onward)      None            Discharge Planning   EMELY: 2/2/2024     Code Status: Full Code   Is the patient medically ready for discharge?: No    Reason for patient still in hospital (select all that apply): Treatment  Discharge Plan A: Hospice/home                  Ender Mcclure MD  Department of Hospital Medicine   [facility] - Stepdown Flex ([place]-[facility])

## 2024-01-30 NOTE — ASSESSMENT & PLAN NOTE
Nutrition consulted. Most recent weight and BMI monitored-     Measurements:  Wt Readings from Last 1 Encounters:   01/28/24 44.9 kg (99 lb)   Body mass index is 17.54 kg/m².    Patient has been screened and assessed by RD.    Malnutrition Type:  Context: chronic illness  Level: severe    Malnutrition Characteristic Summary:  Weight Loss (Malnutrition): greater than 7.5% in 3 months (-18% x 3mo)  Subcutaneous Fat (Malnutrition):  (observed moderate to severe)  Muscle Mass (Malnutrition):  (observed severe)    Interventions/Recommendations (treatment strategy):  1.) If and when medically able, recommend to ADAT, with goal of Regular, texture per SLP- encourage PO intake if medically warranted. 2.) If medically able, recommend Boost (or Boost equivalent) BID as tolerated. 3.) If medically warranted/appropriate, consider appetite stimulant. 4.) Re-consult if alternative nutrition is medically appropriate. 5.) RD to monitor.

## 2024-01-30 NOTE — ASSESSMENT & PLAN NOTE
Patient with acute kidney injury/acute renal failure likely due to pre-renal azotemia due to IVVD GLNEIS is currently worsening. Baseline creatinine  2.1  - Labs reviewed- Renal function/electrolytes with Estimated Creatinine Clearance: 13.8 mL/min (A) (based on SCr of 2.8 mg/dL (H)). according to latest data. Monitor urine output and serial BMP and adjust therapy as needed. Avoid nephrotoxins and renally dose meds for GFR listed above.     Obtain urine lytes , renal sonogram  and bladder scans q 6h  Recent Labs   Lab 01/30/24  0721 01/30/24  1916 01/31/24  0607   BUN 47* 47* 45*   CREATININE 2.9* 2.7* 2.8*       I & O   No intake or output data in the 24 hours ending 01/31/24 0751     Gentle IV hydration.   1/29    . X ray abdomen -Nonobstructive bowel gas pattern. renal sonogram - Bilateral increased renal parenchymal echogenicity, with decreased corticomedullary distinction, possibly related to underlying medical renal disease.  Mild distention of the right renal pelvis, possibly on the basis of extrarenal pelvis.  Mild hydronephrosis not fully excluded.  CT RSS ordered . patient refused CT scan

## 2024-01-30 NOTE — ASSESSMENT & PLAN NOTE
Advance Care Planning     Code Status  In light of the patients advanced and life limiting illness,I engaged the the family in a voluntary conversation about the patient's preferences for care  at the very end of life.  son wants full code for now. wants to discuss with palliative care  in AM    I spent a total of 25 minutes engaging the patient in this advance care planning discussion.    1/31 . Hypotension this PM to 80s and hypoglycemia. rapid response initiated. critical care consulted. NS bolus ordered. son agreeable for DNR. transitioned to comfort care .son agrees with no IV fluids.  continue glucose checks for now  and D10 PRN. likely transition to hospice in AM

## 2024-01-30 NOTE — ASSESSMENT & PLAN NOTE
declined functional status over the last 2-3 years due to pain and debility from multiple myeloma. 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 needs assistance to transfer from the bed to the walker .

## 2024-01-30 NOTE — PROGRESS NOTES
[facility] - Stepdown Flex ([place] [id number])  Hematology  Bone Marrow Transplant  Progress Note    Patient Name: Janie Zamorano  Admission Date: 1/28/2024  Hospital Length of Stay: 2 days  Code Status: Full Code    HPI: Ms. Janie Zamorano is a 68 year old man with schizophrenia, A.fib, multiple myeloma who presented with failure to thrive. HPI is obtained per patient's son as patient is a poor historian. Per son, patient was receiving myeloma treatment at [facility] and [facility] He stated that they enrolled in hospice shortly afterwards. He was not happy with the hospice care she was receiving and brought her to the hospital for decreased PO intake over the past few days. He states that patient struggles with PO intake due to her schizophrenia and depression. Upon this admission, imaging was notable for multiple masses in retroperitoneal area, left lung. Patient's son states that they have been biopsied prior and consistent with plasmacytoma. Hematology consulted for further recommendations regarding multiple myeloma.     Objective:     Vital Signs (Most Recent):  Temp: 98.5 °F (36.9 °C) (01/30/24 1645)  Pulse: 101 (01/30/24 1645)  Resp: 18 (01/30/24 1200)  BP: 101/70 (01/30/24 1645)  SpO2: 98 % (01/30/24 1645) Vital Signs (24h Range):  Temp:  [98 °F (36.7 °C)-98.5 °F (36.9 °C)] 98.5 °F (36.9 °C)  Pulse:  [] 101  Resp:  [15-20] 18  SpO2:  [98 %-100 %] 98 %  BP: (101-124)/(70-84) 101/70     Weight: 44.9 kg (99 lb)  Body mass index is 17.54 kg/m².  Body surface area is 1.41 meters squared.    ECOG SCORE           [unfilled]    Intake/Output - Last 3 Shifts         01/28 0700  01/29 0659 01/29 0700 01/30 0659 01/30 0700 01/31 0659    P.O.  90     I.V. (mL/kg)  1166.3 (26)     Blood 350      IV Piggyback  50     Total Intake(mL/kg) 350 (7.8) 1306.3 (29.1)     Urine (mL/kg/hr)  550 (0.5)     Stool  0     Total Output  550     Net +350 +756.3             Stool Occurrence  2 x              Physical Exam  Constitutional:       Appearance: She is well-developed. She is ill-appearing.   HENT:      Head: Normocephalic and atraumatic.   Eyes:      General: No scleral icterus.        Left eye: No discharge.   Cardiovascular:      Rate and Rhythm: Normal rate.   Pulmonary:      Effort: Pulmonary effort is normal. No respiratory distress.   Abdominal:      General: There is no distension.      Palpations: Abdomen is soft.   Musculoskeletal:         General: No swelling. Normal range of motion.      Cervical back: Neck supple.   Skin:     General: Skin is warm and dry.   Neurological:      Mental Status: She is alert. She is disoriented.      Motor: Weakness present.            Significant Labs:   CBC:   Recent Labs   Lab 01/29/24  1308 01/29/24  1757 01/30/24  0721   WBC 4.42 5.17 3.74*   HGB 9.5* 9.6* 9.7*   HCT 27.0* 26.9* 29.5*   * 113* 139*    and CMP:   Recent Labs   Lab 01/29/24  0757 01/30/24  0721    146*   K 3.9 3.7   * 114*   CO2 20* 16*   * 94   BUN 51* 47*   CREATININE 2.9* 2.9*   CALCIUM 6.9* 7.2*   PROT 4.4* 4.6*   ALBUMIN 1.6* 1.6*   BILITOT 1.0 0.8   ALKPHOS 64 69   AST 22 23   ALT 19 20   ANIONGAP 10 16       Diagnostic Results:  I have reviewed all pertinent imaging results/findings within the past 24 hours.  Assessment/Plan:     Multiple myeloma not having achieved remission  Patient is a 68 year old woman with MM, schizophrenia who presented with poor PO intake, weakness. Hematology was consulted regarding further recommendations for MM.    - Follow SPEP, ADAM, immunoglobulins, FLC, 24 hour urine protein to further evaluate myeloma  - Had discussion with patient's son regarding patient's condition and next steps. Discussed that patient is not a candidate for treatment due to her poor PO intake and nutritional status. Expressed concern that patient's myeloma is aggressive as seen with plasmacytomas on imaging and she may not reach  the point where she can receive treatment. Patient's son states that if she continues to not eat and decline, he will consider inpatient hospice. Agree with Palliative care consult, appreciate assistance in goals of care conversation.      Patient seen and discussed with attending, Dr. Jameson.    Gladis Luna MD  Bone Marrow Transplant  [facility] - [facility] ([place]-[facility])

## 2024-01-30 NOTE — ASSESSMENT & PLAN NOTE
as above   1/30   Hematology consulted for further recommendations regarding multiple myeloma. SPEP, ADAM, immunoglobulins, FLC, 24 hour urine protein to further evaluate myeloma.  CT abdomen -  Large retroperitoneal mass involving multiple retroperitoneal structures including the aorta and IVC.  In addition there is a large destructive mass involving the right iliac is well as the gluteal musculature.  Findings may relate to patient's known multiple myeloma with lymphoma and sarcoma in the differential.Few left nephrolithiasis largest in the left renal pelvis measures 0.3 cm with minimal pelvic fullness without significant hydronephrosis. Right inferior pole density of nephrolithiasis versus cortical calcification.  No significant right-sided hydronephrosis.Innumerable lytic osseous lesions which is consistent with history of multiple myeloma.Remote T12 compression fracture deformity, likely pathological. Asymmetric thickening of the right gluteal musculature may relate to muscle invasion from the aforementioned mass versus gluteal hematoma.

## 2024-01-30 NOTE — CONSULTS
[facility] - Stepdown Flex ([facility]-)  Palliative Medicine  Consult Note    Patient Name: Janie Zamorano  MRN: [id number]  Admission Date: 2024  Hospital Length of Stay: 2 days  Code Status: Full Code   Attending Provider: Ender Mcclure MD  Consulting Provider: Gomez Carrillo MD  Primary Care Physician: He Hoyt Jr., MD  Principal Problem:Failure to thrive in adult    Patient information was obtained from relative(s), past medical records, and primary team.      Consults  Assessment/Plan:     Palliative Care  Palliative care encounter  69 yo femlae with h/o schizophrenia, MDD, MM last treated in November and stopped due to severe side effects, lung mass, h/o SBO, admitted with severe anemia likely related to MM, hypocalcemia, hypoglycemic, and failure to thrive    Medicolegal  Decision-making:   -Pt does not have decision-making capacity  -Pt has not appointed a HCPOA.  -Marital status:   -Children: one son Cameron    Advance care planning/Advance directives:  -The patient has not previously engaged in advance care planning  -Living will, HCPOA, DNR, [place]POST not reviewed  -Pt has no scanned advance directives in [facility]    Psychosocial  Support system:  -Spiritual: did not explore;   -Family: lives with son; has sisters    Health status prior to this hospitalization  -Functional status: poor.  Pt was not able to manage ADLs and is a change from her normal baseline.  Last ambulated with walker 2 weeks ago  -Cognitive status: typically good; delirious over the past week  -QOL: good up until 2 weeks ago; the pt actually went to a friends  to sing in late December    Prognosis:  -Time and potential for recovery: concerning at this point given debility, frailty, poor oral intake; treatments may have greater burden than benefit; discussed with son2    [facility] Discussion with son Saud:  The family endorses that what is most important right now is to focus on improvement in condition but  "with limits to invasive therapies.  He hopes to have "reversible" causes of the change of her condition corrected and wants to explore what treatments are possible for her disease to help meaningfully extend life.     He admits he was not hospice minded when it came to acceptance that his mother could be at the end of her life.      A total of 22 min was spent on advance care planning, goals of care discussion, emotional support, formulating and communicating prognosis and goals of care, exploring burden/benefit of various approaches of treatment. This discussion occurred on a fully voluntary basis with the verbal consent of the patient and/or family.       Active symptoms  -Pain: no non verbal signs of pain; only took APAP at home  -Constipation: no   -Dyspnea no  -Anxiety: no  -Depression: no  -Delirium per psych    Summary/Recommendation:  -Most important goals at this time: improvement in condition but with limits to invasive therapies; he is open to continued therapies that will help meaningfully extend his mother's life    -Code status: FC for now; recommended DNR based on goals and condition    -no current symptoms to manage outside of delirium per psych    -Most appropriate disposition: home with son    Thank you for the opportunity to care for this patient and family.     Please call with questions.     Gomez Carrillo MD  Palliative Medicine   [facility]  [phone] (cell)                   Thank you for your consult. I will follow-up with patient. Please contact us if you have any additional questions.    Subjective:     HPI:   Mili Zamorano is a 67 with significant history for hypertension, paranoid schizophrenia with multiple inpatient psychiatric admissions, multiple myeloma dx 2018 followed by 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     She had followed with heme/onc in [place] and received several forms of therapy for her multiple myeloma, however had difficulty tolerating with severe diarrhea, N/V, and severe weight loss.  Ultimately, the decision was made to stop therapy due to QOL issues and was referred to hospice about 2 months ago.  The pt had a pathologic spinal fracture months ago, but did not report daily pain.  Only took APAP     ER couse -   soft blood pressure 100/84 .  EKG without acute ischemia.  Labs with hypoglycemia 43 , anemia Hb 4.6 , and hypocalcemia 6.6 .     Corrected calcium is 8.5.  Ordered 1 g calcium gluconate. Treated with D10 x 250ml  glucose.  Consented for blood transfusion.  severe nonanion gap metabolic acidosis with bicarb of 12. lactate WNL       Son not certain of the last time she had a blood transfusion but has not been transfusion dependent.  No labs done while on hospice.      In this setting, palliative medicine was consulted to help with symptom management, medical decision making, and aiding in the formation of goals of care.       Hospital Course:  No notes on file    Interval History: pt seen at the bedside with son present    Past Medical History:   Diagnosis Date    Anxiety     Asthma     Bronchitis     COPD (chronic obstructive pulmonary disease)     Depression     GERD (gastroesophageal reflux disease)     Hallucination     History of psychiatric hospitalization     Hypertension     Neck pain     Polyneuropathy in diseases classified elsewhere 2021    Schizophrenia     Sleep difficulties        Past Surgical History:   Procedure Laterality Date     SECTION      X 1    COLONOSCOPY N/A 2018    Surgeon: Albert Russell MD    ESOPHAGOGASTRODUODENOSCOPY N/A 2018    Surgeon: Albert Russell MD    HYSTERECTOMY  2016    [facility]---DLH/BSO    LIPOMA RESECTION      back   x 2    SALPINGOOPHORECTOMY Bilateral 2016    [facility]---DLH/BSO    THORACIC LAMINECTOMY WITH FUSION N/A 8/17/2018    Surgeon: He Andres MD       Review of patient's allergies indicates:   Allergen Reactions    Iodine Hives    Shellfish containing products Itching    Ondansetron hcl Nausea Only       Medications:  Continuous Infusions:   dextrose 5% lactated ringers 50 mL/hr at 01/29/24 1649     Scheduled Meds:   mirtazapine  15 mg Oral Nightly     PRN Meds:0.9%  NaCl infusion (for blood administration), dextrose 10%, dextrose 10%, glucagon (human recombinant), glucose, glucose    Family History       Problem Relation (Age of Onset)    Breast cancer Mother    COPD Father    Diabetes Brother    Glaucoma Mother, Sister    Hypertension Mother, Father    Liver cancer Paternal Uncle (75)    Macular degeneration Mother    Stroke Mother          Tobacco Use    Smoking status: Never    Smokeless tobacco: Never   Substance and Sexual Activity    Alcohol use: No     Alcohol/week: 0.0 standard drinks of alcohol    Drug use: No    Sexual activity: Not Currently     Partners: Male     Birth control/protection: Post-menopausal       Review of Systems   Unable to perform ROS: Mental status change     Objective:     Vital Signs (Most Recent):  Temp: 98.2 °F (36.8 °C) (01/29/24 1614)  Pulse: 73 (01/29/24 1614)  Resp: 20 (01/29/24 1614)  BP: 137/74 (01/29/24 1614)  SpO2: 99 % (01/29/24 1614) Vital Signs (24h Range):  Temp:  [97.6 °F (36.4 °C)-98.5 °F (36.9 °C)] 98.2 °F (36.8 °C)  Pulse:  [70-77] 73  Resp:  [12-20] 20  SpO2:  [99 %-100 %] 99 %  BP: (107-137)/(60-81) 137/74     Weight: 44.9 kg (99 lb)  Body mass index is 17.54 kg/m².       Physical Exam  Vitals and nursing note reviewed.   Constitutional:       General: She is not in acute distress.     Appearance: She is ill-appearing.   HENT:      Mouth/Throat:      Mouth: Mucous membranes are dry.   Eyes:      Comments: Conjunctival pallor   Pulmonary:      Effort: Pulmonary effort  "is normal.   Abdominal:      Palpations: Abdomen is soft.      Comments: Prior scars noted   Skin:     General: Skin is warm.   Neurological:      Mental Status: She is disoriented.   Psychiatric:      Comments: Agitated    Answers questions inappropriately            Review of Symptoms      Symptom Assessment (ESAS 0-10 Scale)  Unable to complete assessment due to Psychiatric disorder     CAM / Delirium:  Negative  Constipation:  Negative  Diarrhea:  Negative      Bowel Management Plan (BMP):  Yes      Pain Assessment:  OME in 24 hours:  0  Location(s):      Pain Assessment in Advanced Demential Scale (PAINAD)   Breathing - Independent of vocalization:  0  Negative vocalization:  0  Facial expression:  0  Body language:  0  Consolability:  0  Total:  0    Performance Status:  60 (prior to admit)    Living Arrangements:  Lives with family    Psychosocial/Cultural:   See Palliative Psychosocial Note: No  Cared for at home by her son who still works full time in [employer]/  **Primary  to Follow**  Palliative Care  Consult: No    Spiritual:  F - [place] and Belief:  Did not discuss today        Advance Care Planning  Advance Directives:   Living Will: No    LaPOST: No    Do Not Resuscitate Status: No    Medical Power of : No    Agent's Name:  Cameron Hoffmann (son)   Agent's Contact Number:  [phone]    Decision Making:  Family answered questions and Patient unable to communicate due to disease severity/cognitive impairment  Goals of Care: The family endorses that what is most important right now is to focus on improvement in condition but with limits to invasive therapies.  He hopes to have "reversible" causes of the change of her condition corrected and wants to explore what treatments are possible for her disease to help meaningfully extend life.    He admits he was not hospice minded when it came to acceptance that his mother could be at the end of her life.     "     Significant Labs: All pertinent labs within the past 24 hours have been reviewed.  CBC:   Recent Labs   Lab 01/29/24  1757   WBC 5.17   HGB 9.6*   HCT 26.9*   MCV 82   *     BMP:  Recent Labs   Lab 01/29/24  0757   *      K 3.9   *   CO2 20*   BUN 51*   CREATININE 2.9*   CALCIUM 6.9*   MG 1.8     LFT:  Lab Results   Component Value Date    AST 22 01/29/2024    ALKPHOS 64 01/29/2024    BILITOT 1.0 01/29/2024     Albumin:   Albumin   Date Value Ref Range Status   01/29/2024 1.6 (L) 3.5 - 5.2 g/dL Final     Protein:   Total Protein   Date Value Ref Range Status   01/29/2024 4.4 (L) 6.0 - 8.4 g/dL Final     Lactic acid:   Lab Results   Component Value Date    LACTATE 1.6 01/28/2024    LACTATE 0.6 02/22/2023       Significant Imaging: I have reviewed all pertinent imaging results/findings within the past 24 hours.  Imaging Results               US Retroperitoneal Complete (Final result)  Result time 01/29/24 05:16:38      Final result by Luis Ng MD (01/29/24 05:16:38)                   Impression:      Bilateral increased renal parenchymal echogenicity, with decreased corticomedullary distinction, possibly related to underlying medical renal disease.  Correlate clinically.    Mild distention of the right renal pelvis, possibly on the basis of extrarenal pelvis.  Mild hydronephrosis not fully excluded.  Correlate clinically.  If there is strong clinical suspicion for ureteral obstruction, consider abdominopelvic CT.    At the patient's request, the scan was terminated prior to completion of the imaging protocol.    This report was flagged in Epic as abnormal.    Electronically signed by resident: Delia Mayer  Date:    01/29/2024  Time:    02:39    Electronically signed by: Luis Ng  Date:    01/29/2024  Time:    05:16               Narrative:    EXAMINATION:  US RETROPERITONEAL COMPLETE    CLINICAL HISTORY:  GLENIS;    TECHNIQUE:  Ultrasound of the kidneys and urinary bladder  was performed including color flow and Doppler evaluation of the kidneys.    COMPARISON:  CT thoracic spine 08/08/2018    CT renal stone study 08/03/2018    FINDINGS:  Right kidney: The right kidney measures 6.9 cm in length.  No cortical thinning. Increased parenchymal echogenicity with decreased corticomedullary distinction.  Resistive index measures 0.62.  No solid mass is detected the sonographic.  No echogenic shadowing renal stone. Probable mild hydronephrosis versus extrarenal pelvis.    Several simple cysts largest measures 1.3 x 1.0 x 0.9 cm at the interpolar region.    Left kidney: The left kidney measures 7.5 cm in length.  No cortical thinning.  Increased parenchymal echogenicity with decreased corticomedullary distinction.  Unable to obtain resistive index.  No solid mass is detected sonographic.  No echogenic shadowing renal stone. No hydronephrosis.    The bladder is partially distended at the time of scanning and has an unremarkable appearance.  Unable to evaluate for ureteral jets; during scanning of the urinary bladder, the technologist reports that the scan was terminated at the adamant request of the patient.    An echogenic structure partially visualized posterior to the urinary bladder possibly represents bowel but was not fully characterized because the of the premature termination of the scan.                                       X-Ray Abdomen AP 1 View (Final result)  Result time 01/28/24 18:23:00      Final result by Parveen Page DO (01/28/24 18:23:00)                   Impression:      Nonobstructive bowel gas pattern      Electronically signed by: Parveen Page  Date:    01/28/2024  Time:    18:23               Narrative:    EXAMINATION:  XR ABDOMEN AP 1 VIEW    CLINICAL HISTORY:  poor oral intake;    TECHNIQUE:  AP View(s) of the abdomen was performed.    COMPARISON:  08/03/2018.    FINDINGS:  There is a normal, nonobstructive bowel gas pattern.  There is no free air on this  supine view.  There is no abnormal calcification.  There are postop changes of the thoracolumbar spine.  Osseous structures are otherwise intact.                                       X-Ray Chest AP Portable (Final result)  Result time 01/28/24 12:59:38      Final result by Savana Cabrera MD (01/28/24 12:59:38)                   Impression:      Abnormal left upper thoracic region pleural base opacity, similar to 10/02/2023 chest radiograph, these are new compared to 02/21/2023 radiographs, further evaluation advised.  Malignancy cannot be excluded.      Electronically signed by: Savana Cabrera MD  Date:    01/28/2024  Time:    12:59               Narrative:    EXAMINATION:  XR CHEST AP PORTABLE    CLINICAL HISTORY:  Shortness of breath    TECHNIQUE:  Single frontal view of the chest was performed.    COMPARISON:  10/02/2023    FINDINGS:  The cardiac silhouette is normal in size.  The pulmonary vascularity is normal.    Left upper thoracic paramediastinal oval opacity and pleuroparenchymal scarring apical region left upper lobe correlated with CT 06/02/2023.  No new areas of abnormal opacity seen.  The osseous structures appear normal.                                          I spent a total of 91 minutes on the day of the visit. This includes face to face time in discussion of goals of care, symptom assessment, coordination of care and emotional support.  This also includes non-face to face time preparing to see the patient (eg, review of tests/imaging), obtaining and/or reviewing separately obtained history, documenting clinical information in the electronic or other health record, independently interpreting results and communicating results to the patient/family/caregiver, or care coordinator.    Gomez Carrillo MD  Palliative Medicine  [facility] ([place])

## 2024-01-30 NOTE — SUBJECTIVE & OBJECTIVE
Objective:     Vital Signs (Most Recent):  Temp: 98.5 °F (36.9 °C) (01/30/24 1645)  Pulse: 101 (01/30/24 1645)  Resp: 18 (01/30/24 1200)  BP: 101/70 (01/30/24 1645)  SpO2: 98 % (01/30/24 1645) Vital Signs (24h Range):  Temp:  [98 °F (36.7 °C)-98.5 °F (36.9 °C)] 98.5 °F (36.9 °C)  Pulse:  [] 101  Resp:  [15-20] 18  SpO2:  [98 %-100 %] 98 %  BP: (101-124)/(70-84) 101/70     Weight: 44.9 kg (99 lb)  Body mass index is 17.54 kg/m².  Body surface area is 1.41 meters squared.    ECOG SCORE           [unfilled]    Intake/Output - Last 3 Shifts         01/28 0700  01/29 0659 01/29 0700  01/30 0659 01/30 0700  01/31 0659    P.O.  90     I.V. (mL/kg)  1166.3 (26)     Blood 350      IV Piggyback  50     Total Intake(mL/kg) 350 (7.8) 1306.3 (29.1)     Urine (mL/kg/hr)  550 (0.5)     Stool  0     Total Output  550     Net +350 +756.3            Stool Occurrence  2 x              Physical Exam  Constitutional:       Appearance: She is well-developed. She is ill-appearing.   HENT:      Head: Normocephalic and atraumatic.   Eyes:      General: No scleral icterus.        Left eye: No discharge.   Cardiovascular:      Rate and Rhythm: Normal rate.   Pulmonary:      Effort: Pulmonary effort is normal. No respiratory distress.   Abdominal:      General: There is no distension.      Palpations: Abdomen is soft.   Musculoskeletal:         General: No swelling. Normal range of motion.      Cervical back: Neck supple.   Skin:     General: Skin is warm and dry.   Neurological:      Mental Status: She is alert. She is disoriented.      Motor: Weakness present.            Significant Labs:   CBC:   Recent Labs   Lab 01/29/24  1308 01/29/24  1757 01/30/24  0721   WBC 4.42 5.17 3.74*   HGB 9.5* 9.6* 9.7*   HCT 27.0* 26.9* 29.5*   * 113* 139*    and CMP:   Recent Labs   Lab 01/29/24  0757 01/30/24  0721    146*   K 3.9 3.7   * 114*   CO2 20* 16*   * 94   BUN 51* 47*   CREATININE 2.9* 2.9*   CALCIUM 6.9* 7.2*    PROT 4.4* 4.6*   ALBUMIN 1.6* 1.6*   BILITOT 1.0 0.8   ALKPHOS 64 69   AST 22 23   ALT 19 20   ANIONGAP 10 16       Diagnostic Results:  I have reviewed all pertinent imaging results/findings within the past 24 hours.

## 2024-01-30 NOTE — HPI
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     She had followed with heme/onc in [place] and received several forms of therapy for her multiple myeloma, however had difficulty tolerating with severe diarrhea, N/V, and severe weight loss.  Ultimately, the decision was made to stop therapy due to QOL issues and was referred to hospice about 2 months ago.  The pt had a pathologic spinal fracture months ago, but did not report daily pain.  Only took APAP     ER couse -   soft blood pressure 100/84 .  EKG without acute ischemia.  Labs with hypoglycemia 43 , anemia Hb 4.6 , and hypocalcemia 6.6 .     Corrected calcium is 8.5.  Ordered 1 g calcium gluconate. Treated with D10 x 250ml  glucose.  Consented for blood transfusion.  severe nonanion gap metabolic acidosis with bicarb of 12. lactate WNL       Son not certain of the last time she had a blood transfusion but has not been transfusion dependent.  No labs done while on hospice.      In this setting, palliative medicine was consulted to help with symptom management, medical decision making, and aiding in the formation of goals of care.

## 2024-01-30 NOTE — SUBJECTIVE & OBJECTIVE
Interval History: pt seen at the bedside with son present    Past Medical History:   Diagnosis Date    Anxiety     Asthma     Bronchitis     COPD (chronic obstructive pulmonary disease)     Depression     GERD (gastroesophageal reflux disease)     Hallucination     History of psychiatric hospitalization     Hypertension     Neck pain     Polyneuropathy in diseases classified elsewhere 2021    Schizophrenia     Sleep difficulties        Past Surgical History:   Procedure Laterality Date     SECTION      X 1    COLONOSCOPY N/A 2018    Surgeon: Albert Russell MD    ESOPHAGOGASTRODUODENOSCOPY N/A 2018    Surgeon: Albert Russell MD    HYSTERECTOMY  2016    [facility]---DLH/BSO    LIPOMA RESECTION      back  x 2    SALPINGOOPHORECTOMY Bilateral 2016    [facility]---DLH/BSO    THORACIC LAMINECTOMY WITH FUSION N/A 2018    Surgeon: He Andres MD       Review of patient's allergies indicates:   Allergen Reactions    Iodine Hives    Shellfish containing products Itching    Ondansetron hcl Nausea Only       Medications:  Continuous Infusions:   dextrose 5% lactated ringers 50 mL/hr at 24 1649     Scheduled Meds:   mirtazapine  15 mg Oral Nightly     PRN Meds:0.9%  NaCl infusion (for blood administration), dextrose 10%, dextrose 10%, glucagon (human recombinant), glucose, glucose    Family History       Problem Relation (Age of Onset)    Breast cancer Mother    COPD Father    Diabetes Brother    Glaucoma Mother, Sister    Hypertension Mother, Father    Liver cancer Paternal Uncle (75)    Macular degeneration Mother    Stroke Mother          Tobacco Use    Smoking status: Never    Smokeless tobacco: Never   Substance and Sexual Activity    Alcohol use: No     Alcohol/week: 0.0 standard drinks of alcohol    Drug use: No    Sexual activity: Not Currently     Partners: Male     Birth control/protection: Post-menopausal       Review of Systems   Unable to perform ROS: Mental status change     Objective:      Vital Signs (Most Recent):  Temp: 98.2 °F (36.8 °C) (01/29/24 1614)  Pulse: 73 (01/29/24 1614)  Resp: 20 (01/29/24 1614)  BP: 137/74 (01/29/24 1614)  SpO2: 99 % (01/29/24 1614) Vital Signs (24h Range):  Temp:  [97.6 °F (36.4 °C)-98.5 °F (36.9 °C)] 98.2 °F (36.8 °C)  Pulse:  [70-77] 73  Resp:  [12-20] 20  SpO2:  [99 %-100 %] 99 %  BP: (107-137)/(60-81) 137/74     Weight: 44.9 kg (99 lb)  Body mass index is 17.54 kg/m².       Physical Exam  Vitals and nursing note reviewed.   Constitutional:       General: She is not in acute distress.     Appearance: She is ill-appearing.   HENT:      Mouth/Throat:      Mouth: Mucous membranes are dry.   Eyes:      Comments: Conjunctival pallor   Pulmonary:      Effort: Pulmonary effort is normal.   Abdominal:      Palpations: Abdomen is soft.      Comments: Prior scars noted   Skin:     General: Skin is warm.   Neurological:      Mental Status: She is disoriented.   Psychiatric:      Comments: Agitated    Answers questions inappropriately            Review of Symptoms      Symptom Assessment (ESAS 0-10 Scale)  Unable to complete assessment due to Psychiatric disorder     CAM / Delirium:  Negative  Constipation:  Negative  Diarrhea:  Negative      Bowel Management Plan (BMP):  Yes      Pain Assessment:  OME in 24 hours:  0  Location(s):      Pain Assessment in Advanced Demential Scale (PAINAD)   Breathing - Independent of vocalization:  0  Negative vocalization:  0  Facial expression:  0  Body language:  0  Consolability:  0  Total:  0    Performance Status:  60 (prior to admit)    Living Arrangements:  Lives with family    Psychosocial/Cultural:   See Palliative Psychosocial Note: No  Cared for at home by her son who still works full time in IT/  **Primary  to Follow**  Palliative Care  Consult: No    Spiritual:  F - [place] and Belief:  Did not discuss today        Advance Care Planning   Advance Directives:   Living Will: No    [place]:  "No    Do Not Resuscitate Status: No    Medical Power of : No    Agent's Name:  Cameron Hoffmann (son)   Agent's Contact Number:  [phone]    Decision Making:  Family answered questions and Patient unable to communicate due to disease severity/cognitive impairment  Goals of Care: The family endorses that what is most important right now is to focus on improvement in condition but with limits to invasive therapies.  He hopes to have "reversible" causes of the change of her condition corrected and wants to explore what treatments are possible for her disease to help meaningfully extend life.    He admits he was not hospice minded when it came to acceptance that his mother could be at the end of her life.         Significant Labs: All pertinent labs within the past 24 hours have been reviewed.  CBC:   Recent Labs   Lab 01/29/24  1757   WBC 5.17   HGB 9.6*   HCT 26.9*   MCV 82   *     BMP:  Recent Labs   Lab 01/29/24  0757   *      K 3.9   *   CO2 20*   BUN 51*   CREATININE 2.9*   CALCIUM 6.9*   MG 1.8     LFT:  Lab Results   Component Value Date    AST 22 01/29/2024    ALKPHOS 64 01/29/2024    BILITOT 1.0 01/29/2024     Albumin:   Albumin   Date Value Ref Range Status   01/29/2024 1.6 (L) 3.5 - 5.2 g/dL Final     Protein:   Total Protein   Date Value Ref Range Status   01/29/2024 4.4 (L) 6.0 - 8.4 g/dL Final     Lactic acid:   Lab Results   Component Value Date    LACTATE 1.6 01/28/2024    LACTATE 0.6 02/22/2023       Significant Imaging: I have reviewed all pertinent imaging results/findings within the past 24 hours.  Imaging Results               US Retroperitoneal Complete (Final result)  Result time 01/29/24 05:16:38      Final result by Luis Ng MD (01/29/24 05:16:38)                   Impression:      Bilateral increased renal parenchymal echogenicity, with decreased corticomedullary distinction, possibly related to underlying medical renal disease.  Correlate " clinically.    Mild distention of the right renal pelvis, possibly on the basis of extrarenal pelvis.  Mild hydronephrosis not fully excluded.  Correlate clinically.  If there is strong clinical suspicion for ureteral obstruction, consider abdominopelvic CT.    At the patient's request, the scan was terminated prior to completion of the imaging protocol.    This report was flagged in Epic as abnormal.    Electronically signed by resident: Delia Mayer  Date:    01/29/2024  Time:    02:39    Electronically signed by: Luis Ng  Date:    01/29/2024  Time:    05:16               Narrative:    EXAMINATION:  US RETROPERITONEAL COMPLETE    CLINICAL HISTORY:  GLENIS;    TECHNIQUE:  Ultrasound of the kidneys and urinary bladder was performed including color flow and Doppler evaluation of the kidneys.    COMPARISON:  CT thoracic spine 08/08/2018    CT renal stone study 08/03/2018    FINDINGS:  Right kidney: The right kidney measures 6.9 cm in length.  No cortical thinning. Increased parenchymal echogenicity with decreased corticomedullary distinction.  Resistive index measures 0.62.  No solid mass is detected the sonographic.  No echogenic shadowing renal stone. Probable mild hydronephrosis versus extrarenal pelvis.    Several simple cysts largest measures 1.3 x 1.0 x 0.9 cm at the interpolar region.    Left kidney: The left kidney measures 7.5 cm in length.  No cortical thinning.  Increased parenchymal echogenicity with decreased corticomedullary distinction.  Unable to obtain resistive index.  No solid mass is detected sonographic.  No echogenic shadowing renal stone. No hydronephrosis.    The bladder is partially distended at the time of scanning and has an unremarkable appearance.  Unable to evaluate for ureteral jets; during scanning of the urinary bladder, the technologist reports that the scan was terminated at the adamant request of the patient.    An echogenic structure partially visualized posterior to the  urinary bladder possibly represents bowel but was not fully characterized because the of the premature termination of the scan.                                       X-Ray Abdomen AP 1 View (Final result)  Result time 01/28/24 18:23:00      Final result by Parveen Page DO (01/28/24 18:23:00)                   Impression:      Nonobstructive bowel gas pattern      Electronically signed by: Parveen Page  Date:    01/28/2024  Time:    18:23               Narrative:    EXAMINATION:  XR ABDOMEN AP 1 VIEW    CLINICAL HISTORY:  poor oral intake;    TECHNIQUE:  AP View(s) of the abdomen was performed.    COMPARISON:  08/03/2018.    FINDINGS:  There is a normal, nonobstructive bowel gas pattern.  There is no free air on this supine view.  There is no abnormal calcification.  There are postop changes of the thoracolumbar spine.  Osseous structures are otherwise intact.                                       X-Ray Chest AP Portable (Final result)  Result time 01/28/24 12:59:38      Final result by Savana Cabrera MD (01/28/24 12:59:38)                   Impression:      Abnormal left upper thoracic region pleural base opacity, similar to 10/02/2023 chest radiograph, these are new compared to 02/21/2023 radiographs, further evaluation advised.  Malignancy cannot be excluded.      Electronically signed by: Savana Cabrera MD  Date:    01/28/2024  Time:    12:59               Narrative:    EXAMINATION:  XR CHEST AP PORTABLE    CLINICAL HISTORY:  Shortness of breath    TECHNIQUE:  Single frontal view of the chest was performed.    COMPARISON:  10/02/2023    FINDINGS:  The cardiac silhouette is normal in size.  The pulmonary vascularity is normal.    Left upper thoracic paramediastinal oval opacity and pleuroparenchymal scarring apical region left upper lobe correlated with CT 06/02/2023.  No new areas of abnormal opacity seen.  The osseous structures appear normal.

## 2024-01-30 NOTE — PLAN OF CARE
Recommendations     1.) If and when medically able, recommend to ADAT, with goal of Regular, texture per SLP- encourage PO intake if medically warranted.      2.) If medically able, recommend Boost (or Boost equivalent) BID as tolerated.      3.) If medically warranted/appropriate, consider appetite stimulant.      4.) Re-consult if alternative nutrition is medically appropriate. 5.) RD to monitor.        Goals: top meet % of EEN/EPN by next RD f/u  Nutrition Goal Status: new  Communication of RD Recs:  (POC)

## 2024-01-30 NOTE — PT/OT/SLP EVAL
Speech Language Pathology Evaluation  Bedside Swallow/ Discharge summary     Patient Name:  Janie Zamorano   MRN:  [id number]  Admitting Diagnosis: Failure to thrive in adult    Recommendations:     Pt welcome from speech perspective to continue to consume preferred foods as interested (both solids and regular this liquids). No indication of any oral and pharyngeal feeding and swallowing difficulties impacting PO intake. PO intake appearing mostly limited by generalized discomfort and disinterest. Pt may benefit from nutrition consult and recommendations to maximize/optiomize caloric intake (ensure, boost, etc. ) .                 General Recommendations:  Follow-up not indicated  Diet recommendations:  Soft & Bite Sized Diet - IDDSI Level 6, Thin liquids - IDDSI Level 0   Aspiration Precautions: Standard aspiration precautions   General Precautions: Standard,    Communication strategies:  none    Assessment:     Janie Zamorano is a 68 y.o. female with an SLP diagnosis of  intact oral feeding and swallowing skills .     History:     Past Medical History:   Diagnosis Date    Anxiety     Asthma     Bronchitis     COPD (chronic obstructive pulmonary disease)     Depression     GERD (gastroesophageal reflux disease)     Hallucination     History of psychiatric hospitalization     Hypertension     Neck pain     Polyneuropathy in diseases classified elsewhere 2021    Schizophrenia     Sleep difficulties        Past Surgical History:   Procedure Laterality Date     SECTION      X 1    COLONOSCOPY N/A 2018    Surgeon: Albert Russell MD    ESOPHAGOGASTRODUODENOSCOPY N/A 2018    Surgeon: Albert Russell MD    HYSTERECTOMY  2016    [facility]---DLH/BSO    LIPOMA RESECTION      back  x 2    SALPINGOOPHORECTOMY Bilateral 2016    [facility]---DLH/BSO    THORACIC LAMINECTOMY WITH FUSION N/A 2018    Surgeon: He Andres MD     Per chart review   HPI:   Mili Zamorano is a 67 with significant  "history for hypertension, paranoid schizophrenia with multiple inpatient psychiatric admissions, multiple myeloma dx 2018 followed by [facility], 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     She had followed with heme/onc in [place] and received several forms of therapy for her multiple myeloma, however had difficulty tolerating with severe diarrhea, N/V, and severe weight loss.  Ultimately, the decision was made to stop therapy due to QOL issues and was referred to hospice about 2 months ago.  The pt had a pathologic spinal fracture months ago, but did not report daily hu    Prior Intubation HX:  none this admission     Modified Barium Swallow: none documented previously     Chest X-Rays: 1/28    EXAMINATION:  XR CHEST AP PORTABLE     CLINICAL HISTORY:  Shortness of breath     TECHNIQUE:  Single frontal view of the chest was performed.     COMPARISON:  10/02/2023     FINDINGS:  The cardiac silhouette is normal in size.  The pulmonary vascularity is normal.     Left upper thoracic paramediastinal oval opacity and pleuroparenchymal scarring apical region left upper lobe correlated with CT 06/02/2023.  No new areas of abnormal opacity seen.  The osseous structures appear normal.     Impression:     Abnormal left upper thoracic region pleural base opacity, similar to 10/02/2023 chest radiograph, these are new compared to 02/21/2023 radiographs, further evaluation advised.  Malignancy cannot be excluded.     Prior diet: regular and thin at baseline; pt participated in bedside swallow assessment in 2018 recommending ongoing diet of regular and thin liquids; Pt PO intake has decreased over past few days       Subjective     "Gimme some jell-o and just leave me alone" pt " response to PO trials and swallow assessment this date     Pt awake and alert   RN in agreement with SLP seeing at this time     Pain/Comfort:  Pain Rating 1: 6/10  Location - Orientation 1: generalized  Pain Rating Post-Intervention 1: 6/10    Respiratory Status: Room air    Objective:     Oral Musculature Evaluation  Oral Musculature: general weakness, WFL  Secretion Management: adequate  Oral Labial Strength and Mobility: WFL  Lingual Strength and Mobility: WFL  Volitional Cough: weak yet productive  Volitional Swallow: prompt  Voice Prior to PO Intake: strong and clear    Bedside Swallow Eval:   Consistencies Assessed:  Thin liquids single sips from straw x3 while in preferred in reclined position, Pt resistant to be repositioned despite SLP max  encouragement and offering assistance to help achieve a more comfortable   Solids x1 refused all additional PO trials       Oral Phase:   WFL    Pharyngeal Phase:   no overt clinical signs/symptoms of aspiration  no overt clinical signs/symptoms of pharyngeal dysphagia    Compensatory Strategies  None    Treatment:  Education provided to Pt re: SLP role in acute care setting, overall impressions and therapeutic goals. Whiteboard updated.     Pt welcome from speech perspective to continue to consume preferred foods as interested (both solids and regular this liquids). No indication of any oral and pharyngeal feeding and swallowing difficulties impacting PO intake. PO intake appearing mostly limited by generalized discomfort and disinterest. Pt may benefit from nutrition consult and recommendations to maximize/optimize caloric intake (ensure, boost, etc. ) .     Plan:     Plan of Care reviewed with:  patient   SLP Follow-Up:  No       Discharge recommendations:      Barriers to Discharge:  None    Time Tracking:     SLP Treatment Date:   01/30/24  Speech Start Time:  0824  Speech Stop Time:  0837     Speech Total Time (min):  13 min    Billable Minutes: Eval Swallow and  Oral Function 13    01/30/2024

## 2024-01-30 NOTE — ASSESSMENT & PLAN NOTE
Malnutrition Type:  Context: chronic illness  Level: severe    Related to (etiology):   Multi myeloma    Signs and Symptoms (as evidenced by):   Visual NFPE: observed moderate to severe muscle and fat wasting and -18% x 3mo wt loss     Malnutrition Characteristic Summary:  Weight Loss (Malnutrition): greater than 7.5% in 3 months (-18% x 3mo)  Subcutaneous Fat (Malnutrition):  (observed moderate to severe)  Muscle Mass (Malnutrition):  (observed severe)      Interventions/Recommendations (treatment strategy):  1.) If and when medically able, recommend to ADAT, with goal of Regular, texture per SLP- encourage PO intake if medically warranted. 2.) If medically able, recommend Boost (or Boost equivalent) BID as tolerated. 3.) If medically warranted/appropriate, consider appetite stimulant. 4.) Re-consult if alternative nutrition is medically appropriate. 5.) RD to monitor.    Nutrition Diagnosis Status:   New

## 2024-01-30 NOTE — ASSESSMENT & PLAN NOTE
secondary to poor oral intake  Last A1c reviewed-   Lab Results   Component Value Date    HGBA1C 4.9 06/02/2023    HGBA1C 5.4 08/03/2018    HGBA1C 5.5 10/27/2017     Will hold PO hypoglycemics and will start correctional scale insulin  Most recent fingerstick glucose reviewed-   Recent Labs   Lab 01/29/24  1611 01/29/24 2014 01/29/24  2354 01/30/24  0503   POCTGLUCOSE 127* 175* 118* 99     . Treated with D10 x 250ml  glucose.  POCT glucose q1h. D10 infusion    1/30 s/p psychiatry eval -started remeron 15mg PO nightly. did not take last night. SLP eval.  Glucose in 100s. continue D5LR.

## 2024-01-30 NOTE — ASSESSMENT & PLAN NOTE
"69 yo femlae with h/o schizophrenia, MDD, MM last treated in November and stopped due to severe side effects, lung mass, h/o SBO, admitted with severe anemia likely related to MM, hypocalcemia, hypoglycemic, and failure to thrive    Medicolegal  Decision-making:   -Pt does not have decision-making capacity  -Pt has not appointed a HCPOA.  -Marital status:   -Children: one son Cameron    Advance care planning/Advance directives:  -The patient has not previously engaged in advance care planning  -Living will, HCPOA, DNR, [place]POST not reviewed  -Pt has no scanned advance directives in [place]    Psychosocial  Support system:  -Spiritual: did not explore;   -Family: lives with son; has sisters    Health status prior to this hospitalization  -Functional status: poor.  Pt was not able to manage ADLs and is a change from her normal baseline.  Last ambulated with walker 2 weeks ago  -Cognitive status: typically good; delirious over the past week  -QOL: good up until 2 weeks ago; the pt actually went to a friends  to sing in late December    Prognosis:  -Time and potential for recovery: concerning at this point given debility, frailty, poor oral intake; treatments may have greater burden than benefit; discussed with son2    [facility] Discussion with son Saud:  The family endorses that what is most important right now is to focus on improvement in condition but with limits to invasive therapies.  He hopes to have "reversible" causes of the change of her condition corrected and wants to explore what treatments are possible for her disease to help meaningfully extend life.     He admits he was not hospice minded when it came to acceptance that his mother could be at the end of her life.      A total of 22 min was spent on advance care planning, goals of care discussion, emotional support, formulating and communicating prognosis and goals of care, exploring burden/benefit of various approaches of treatment. This discussion " occurred on a fully voluntary basis with the verbal consent of the patient and/or family.       Active symptoms  -Pain: no non verbal signs of pain; only took APAP at home  -Constipation: no   -Dyspnea no  -Anxiety: no  -Depression: no  -Delirium per psych    Summary/Recommendation:  -Most important goals at this time: improvement in condition but with limits to invasive therapies; he is open to continued therapies that will help meaningfully extend his mother's life    -Code status: FC for now; recommended DNR based on goals and condition    -no current symptoms to manage outside of delirium per psych    -Most appropriate disposition: home with son    Thank you for the opportunity to care for this patient and family.     Please call with questions.     Gomez Carrillo MD  Palliative Medicine   [facility]  [phone] (cell)

## 2024-01-30 NOTE — PLAN OF CARE
Problem: Adult Inpatient Plan of Care  Goal: Plan of Care Review  Outcome: Ongoing, Not Progressing  Goal: Patient-Specific Goal (Individualized)  Outcome: Ongoing, Not Progressing  Goal: Absence of Hospital-Acquired Illness or Injury  Outcome: Ongoing, Not Progressing  Goal: Optimal Comfort and Wellbeing  Outcome: Ongoing, Not Progressing  Goal: Readiness for Transition of Care  Outcome: Ongoing, Not Progressing     Problem: Fluid and Electrolyte Imbalance (Acute Kidney Injury/Impairment)  Goal: Fluid and Electrolyte Balance  Outcome: Ongoing, Not Progressing

## 2024-01-30 NOTE — ASSESSMENT & PLAN NOTE
difficult historian with limited insight into their medical issues.   collateral from son - No significant oral intake for the last 4-5 days.

## 2024-01-30 NOTE — ASSESSMENT & PLAN NOTE
likely from above  Recent Labs   Lab 01/30/24  0721   CALCIUM 7.2*          Corrected calcium is 8.4.  Ordered 1 g calcium gluconate.

## 2024-01-31 PROBLEM — E53.8 FOLIC ACID DEFICIENCY: Status: ACTIVE | Noted: 2024-01-31

## 2024-01-31 LAB
ALBUMIN SERPL BCP-MCNC: 1.6 G/DL (ref 3.5–5.2)
ALBUMIN SERPL ELPH-MCNC: 2.27 G/DL (ref 3.35–5.55)
ALP SERPL-CCNC: 78 U/L (ref 55–135)
ALPHA1 GLOB SERPL ELPH-MCNC: 0.34 G/DL (ref 0.17–0.41)
ALPHA2 GLOB SERPL ELPH-MCNC: 0.57 G/DL (ref 0.43–0.99)
ALT SERPL W/O P-5'-P-CCNC: 23 U/L (ref 10–44)
ANION GAP SERPL CALC-SCNC: 16 MMOL/L (ref 8–16)
ANISOCYTOSIS BLD QL SMEAR: SLIGHT
AST SERPL-CCNC: 26 U/L (ref 10–40)
B-GLOBULIN SERPL ELPH-MCNC: 0.78 G/DL (ref 0.5–1.1)
BASOPHILS # BLD AUTO: 0.02 K/UL (ref 0–0.2)
BASOPHILS NFR BLD: 0.8 % (ref 0–1.9)
BILIRUB SERPL-MCNC: 0.9 MG/DL (ref 0.1–1)
BUN SERPL-MCNC: 45 MG/DL (ref 8–23)
BURR CELLS BLD QL SMEAR: ABNORMAL
CALCIUM SERPL-MCNC: 7.2 MG/DL (ref 8.7–10.5)
CHLORIDE SERPL-SCNC: 109 MMOL/L (ref 95–110)
CO2 SERPL-SCNC: 19 MMOL/L (ref 23–29)
CREAT SERPL-MCNC: 2.8 MG/DL (ref 0.5–1.4)
DIFFERENTIAL METHOD BLD: ABNORMAL
EOSINOPHIL # BLD AUTO: 0 K/UL (ref 0–0.5)
EOSINOPHIL NFR BLD: 0.8 % (ref 0–8)
ERYTHROCYTE [DISTWIDTH] IN BLOOD BY AUTOMATED COUNT: 15.9 % (ref 11.5–14.5)
EST. GFR  (NO RACE VARIABLE): 17.9 ML/MIN/1.73 M^2
FOLATE SERPL-MCNC: 2.6 NG/ML (ref 4–24)
GAMMA GLOB SERPL ELPH-MCNC: 0.44 G/DL (ref 0.67–1.58)
GLUCOSE SERPL-MCNC: 65 MG/DL (ref 70–110)
HCT VFR BLD AUTO: 33.1 % (ref 37–48.5)
HGB BLD-MCNC: 10.6 G/DL (ref 12–16)
IMM GRANULOCYTES # BLD AUTO: 0.03 K/UL (ref 0–0.04)
IMM GRANULOCYTES NFR BLD AUTO: 1.1 % (ref 0–0.5)
INTERPRETATION SERPL IFE-IMP: NORMAL
KAPPA LC SER QL IA: NORMAL MG/DL (ref 0.33–1.94)
KAPPA LC/LAMBDA SER IA: NORMAL (ref 0.26–1.65)
LAMBDA LC SER QL IA: 0.8 MG/DL (ref 0.57–2.63)
LYMPHOCYTES # BLD AUTO: 0.3 K/UL (ref 1–4.8)
LYMPHOCYTES NFR BLD: 11.7 % (ref 18–48)
MAGNESIUM SERPL-MCNC: 1.6 MG/DL (ref 1.6–2.6)
MCH RBC QN AUTO: 28.4 PG (ref 27–31)
MCHC RBC AUTO-ENTMCNC: 32 G/DL (ref 32–36)
MCV RBC AUTO: 89 FL (ref 82–98)
MONOCYTES # BLD AUTO: 0.2 K/UL (ref 0.3–1)
MONOCYTES NFR BLD: 8.6 % (ref 4–15)
NEUTROPHILS # BLD AUTO: 2.1 K/UL (ref 1.8–7.7)
NEUTROPHILS NFR BLD: 77 % (ref 38–73)
NRBC BLD-RTO: 1 /100 WBC
OVALOCYTES BLD QL SMEAR: ABNORMAL
PATHOLOGIST INTERPRETATION IFE: NORMAL
PATHOLOGIST INTERPRETATION SPE: NORMAL
PLATELET # BLD AUTO: 136 K/UL (ref 150–450)
PMV BLD AUTO: 10.3 FL (ref 9.2–12.9)
POCT GLUCOSE: 133 MG/DL (ref 70–110)
POCT GLUCOSE: 51 MG/DL (ref 70–110)
POCT GLUCOSE: 59 MG/DL (ref 70–110)
POCT GLUCOSE: 62 MG/DL (ref 70–110)
POCT GLUCOSE: 66 MG/DL (ref 70–110)
POCT GLUCOSE: 68 MG/DL (ref 70–110)
POCT GLUCOSE: 87 MG/DL (ref 70–110)
POCT GLUCOSE: 93 MG/DL (ref 70–110)
POIKILOCYTOSIS BLD QL SMEAR: SLIGHT
POLYCHROMASIA BLD QL SMEAR: ABNORMAL
POTASSIUM SERPL-SCNC: 3.6 MMOL/L (ref 3.5–5.1)
PROT SERPL-MCNC: 4.4 G/DL (ref 6–8.4)
PROT SERPL-MCNC: 4.6 G/DL (ref 6–8.4)
RBC # BLD AUTO: 3.73 M/UL (ref 4–5.4)
SODIUM SERPL-SCNC: 144 MMOL/L (ref 136–145)
VIT B12 SERPL-MCNC: >2000 PG/ML (ref 210–950)
WBC # BLD AUTO: 2.66 K/UL (ref 3.9–12.7)

## 2024-01-31 PROCEDURE — A4216 STERILE WATER/SALINE, 10 ML: HCPCS | Performed by: HOSPITALIST

## 2024-01-31 PROCEDURE — 20600001 HC STEP DOWN PRIVATE ROOM

## 2024-01-31 PROCEDURE — 63600175 PHARM REV CODE 636 W HCPCS: Performed by: HOSPITALIST

## 2024-01-31 PROCEDURE — 36415 COLL VENOUS BLD VENIPUNCTURE: CPT | Performed by: HOSPITALIST

## 2024-01-31 PROCEDURE — 25000003 PHARM REV CODE 250: Performed by: HOSPITALIST

## 2024-01-31 PROCEDURE — 82746 ASSAY OF FOLIC ACID SERUM: CPT | Performed by: HOSPITALIST

## 2024-01-31 PROCEDURE — 85025 COMPLETE CBC W/AUTO DIFF WBC: CPT | Performed by: HOSPITALIST

## 2024-01-31 PROCEDURE — 83735 ASSAY OF MAGNESIUM: CPT | Performed by: HOSPITALIST

## 2024-01-31 PROCEDURE — 82607 VITAMIN B-12: CPT | Performed by: HOSPITALIST

## 2024-01-31 PROCEDURE — 80053 COMPREHEN METABOLIC PANEL: CPT | Performed by: HOSPITALIST

## 2024-01-31 PROCEDURE — 94761 N-INVAS EAR/PLS OXIMETRY MLT: CPT

## 2024-01-31 RX ORDER — PROCHLORPERAZINE EDISYLATE 5 MG/ML
5 INJECTION INTRAMUSCULAR; INTRAVENOUS EVERY 6 HOURS PRN
Status: DISCONTINUED | OUTPATIENT
Start: 2024-01-31 | End: 2024-02-06 | Stop reason: HOSPADM

## 2024-01-31 RX ORDER — SODIUM BICARBONATE 650 MG/1
1300 TABLET ORAL 2 TIMES DAILY
Status: DISCONTINUED | OUTPATIENT
Start: 2024-01-31 | End: 2024-01-31

## 2024-01-31 RX ORDER — ACETAMINOPHEN 10 MG/ML
1000 INJECTION, SOLUTION INTRAVENOUS ONCE
Status: COMPLETED | OUTPATIENT
Start: 2024-01-31 | End: 2024-01-31

## 2024-01-31 RX ORDER — FOLIC ACID 1 MG/1
1 TABLET ORAL DAILY
Status: DISCONTINUED | OUTPATIENT
Start: 2024-01-31 | End: 2024-01-31

## 2024-01-31 RX ADMIN — Medication 10 ML: at 06:01

## 2024-01-31 RX ADMIN — DEXTROSE MONOHYDRATE 250 ML: 100 INJECTION, SOLUTION INTRAVENOUS at 06:01

## 2024-01-31 RX ADMIN — DEXTROSE MONOHYDRATE 125 ML: 10 INJECTION, SOLUTION INTRAVENOUS at 08:01

## 2024-01-31 RX ADMIN — Medication 10 ML: at 11:01

## 2024-01-31 RX ADMIN — SODIUM BICARBONATE 650 MG TABLET 1300 MG: at 08:01

## 2024-01-31 RX ADMIN — FOLIC ACID 1 MG: 1 TABLET ORAL at 08:01

## 2024-01-31 RX ADMIN — ACETAMINOPHEN 1000 MG: 10 INJECTION, SOLUTION INTRAVENOUS at 01:01

## 2024-01-31 NOTE — AI DETERIORATION ALERT
"RAPID RESPONSE NURSE AI ALERT       AI alert received.    Chart Reviewed: 01/31/2024, 11:52 AM    MRN: [id number]  Bed: 47464/15036 A    Dx: Failure to thrive in adult    Janie Zamorano has a past medical history of Anxiety, Asthma, Bronchitis, COPD (chronic obstructive pulmonary disease), Depression, GERD (gastroesophageal reflux disease), Hallucination, History of psychiatric hospitalization, Hypertension, Neck pain, Polyneuropathy in diseases classified elsewhere, Schizophrenia, and Sleep difficulties.    Last VS: /79 (BP Location: Right arm, Patient Position: Lying)   Pulse 99   Temp 98.5 °F (36.9 °C) (Oral)   Resp 17   Ht 5' 3" (1.6 m)   Wt 44.9 kg (99 lb)   LMP  (LMP Unknown)   SpO2 100%   BMI 17.54 kg/m²     24H Vital Sign Range:  Temp:  [98.3 °F (36.8 °C)-98.5 °F (36.9 °C)]   Pulse:  []   Resp:  [17-20]   BP: (101-119)/(70-83)   SpO2:  [98 %-100 %]     Level of Consciousness (AVPU): responds to voice    Recent Labs     01/29/24  1757 01/30/24  0721 01/31/24  0607   WBC 5.17 3.74* 2.66*   HGB 9.6* 9.7* 10.6*   HCT 26.9* 29.5* 33.1*   * 139* 136*       Recent Labs     01/28/24  1709 01/29/24  0757 01/30/24  0721 01/30/24  1916 01/31/24  0607    143 146* 147* 144   K 4.6 3.9 3.7 4.1 3.6   * 113* 114* 115* 109   CO2 15* 20* 16* 16* 19*   BUN 49* 51* 47* 47* 45*   CREATININE 2.9* 2.9* 2.9* 2.7* 2.8*   GLU 93 117* 94 92 65*   PHOS 5.3*  --   --  3.0  --    MG  --  1.8 1.7  --  1.6        No results for input(s): "PH", "PCO2", "PO2", "HCO3", "POCSATURATED", "BE" in the last 72 hours.     OXYGEN:             MEWS score: 1    Bedside RN, James  contacted. No concerns verbalized at this time. Instructed to call [phone] for further concerns or assistance.    Stephy Xiong RN        "

## 2024-01-31 NOTE — PHYSICIAN QUERY
PT Name: Janie Zamorano  MR #: [id number]     DOCUMENTATION CLARIFICATION     CDS/: Yara Lucero RN              Contact information: [email or web address]  This form is a permanent document in the medical record.    Query Date: January 31, 2024    By submitting this query, we are merely seeking further clarification of documentation.  Please utilize your independent clinical judgment when addressing the question(s) below.    The Medical Record contains the following:   Indicator Supporting Clinical Findings Location in Medical Record    Kidney (Renal) Insufficiency     x Kidney (Renal) Failure/Injury acute renal failure likely due to pre-renal azotemia due to IVVD   GLENIS is currently worsening.   Baseline creatinine  2.1     Metabolic acidosis likely from CKD/ GLENIS   1/30 Hosp Med MD PN    Nephrotoxic Agents     x BUN/Creatinine           GFR Baseline creatinine  2.1      01/28/24 11:04 01/28/24 17:09 01/29/24 07:57 01/30/24 07:21 01/30/24 19:16 01/31/24 06:07   BUN 51 (H) 49 (H) 51 (H) 47 (H) 47 (H) 45 (H)   Creatinine 2.9 (H) 2.9 (H) 2.9 (H) 2.9 (H) 2.7 (H) 2.8 (H)   eGFR 17.2 ! 17.2 ! 17.2 ! 17.2 ! 18.7 ! 17.9 !      1/30 Hosp Med MD PN      1/28-31 Lab    Urine: Casts         Eosinophils      Urine Output      Dehydration      Nausea/Vomiting      Dialysis/CRRT     x Treatment NS 250mL bolus 1/28  Sodium bicarbonate IVPB 1/28    BMP  Input and output   1/28 MAR      1/28 Orders    Other         [facility] approved diagnostic criteria for acute kidney injury is based on KDIGO criteria:    An increase in serum creatinine > 0.3mg/dl within 48 hours  OR  Increase in serum creatinine to > 1.5x baseline, which is known or presumed to have occurred within the prior 7 days  OR  Urine volume <0.5 ml/kg/hr for 6 hours       The clinical guidelines noted above are only a system guideline. It does not replace the providers clinical judgment.     Provider, Due to clinical picture, please validate  the diagnosis of GLENIS:       [    ] GLENIS is ruled out       [  x  ] GLENIS is ruled out, other diagnosis ruled in (specify):   (  x ) CKD (specify stage): ______4_______                                                                                    (   ) Acute renal insufficiency                                                                                     (    )  other: ________     [    ] Acute Kidney Injury is confirmed.    Specify clinical criteria to support the diagnosis of GLENIS (specify): _____________     [    ] Other (please specify): _______________________________     [  ] Clinically Undetermined           Please document in your progress notes daily for the duration of treatment until resolved and include in your discharge summary.    References:   [facility] Clinical Practice Guideline for Acute Kidney Injury. (2012, March). Retrieved October 21, 2020, from https://kdigo.org/wp-content/uploads/[email or web address]/10/QGAMH-1171-XJR-Guideline-English.pdf    TERESA Juan MD, JACKIE Dang MD, & ART Sanchez MD. (1960). Renal medullary necrosis [Abstract]. The American Journal of Medicine, 29(1), 132-156. Doi:https://www.sciencedirect.com/science/article/abs/pii/2358965555983720    ART Ramirez MD, & NED Castelan MD, MS. (2020, June 18). Definition and staging of chronic kidney disease in adults ([id number] JACKIE Gonzalez MD, ScD & [id number] KANNAN Patton MD, MSc, Eds.). Retrieved October 21, 2020, from https://www.[employer].Infectious/contents/definition-and-staging-of-chronic-kidney-disease-in-adults?search=ckd%20staging&source=search_result&selectedTitle=1~150&usage_type=default&display_rank=1     RODRIGUEZ Escalante MD, FACP. (2015, Aliza 15). Acute kidney injury revisited. Retrieved October 21, 2020, from https://acphospitalist.org/archives/2015/06/coding-acute-kidney-injury.htm    EDYTA Lopez MD. (2019, July). Renal Cortical Necrosis. Retrieved October 21, 2020, from  https://www.[employer]/professional/genitourinary-disorders/renovascular-disorders/renal-cortical-necrosis    Form No. [id number]

## 2024-01-31 NOTE — PROGRESS NOTES
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        Patient is a difficult historian with limited insight into their medical issues. AAO x 2 but not talkative.    collateral from son at the bedside. Patient was following with oncology at [facility]. per son, primary oncologist mentioned she is not a candidate for further chemo about 3 months back and advised hospice. Patient is on home hospice and son not happy with current hospic agency. son wants to discuss with palliative care at [facility] and requesting followups regarding [employer] work . reports patient had   no significant oral intake for the last 7days -only drinking water mostly.. BP was in 80s this AM. son called. EMS.   EMS gave 1 L of fluid in route.  being admitted for evaluation of failure to thrive  per chart review, ultiple myeloma for the past 3-4 years.  recently admitted to [facility]  last year for psychosis and required treatmen. declined functional status over the last 2-3 years due to pain and debility from multiple myeloma. 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 needs assistance to transfer from the bed to the  walker and medication management.  H/o psychiatric admuisssions secondary to schizophrenia. At baseline, ADL and  ambulates with asssitance. she is bedbound x 1 week.     ER couse -   soft blood pressure 100/84 .  EKG without acute ischemia.  Labs with hypoglycemia 43 , anemia Hb 4.6 , and hypocalcemia 6.6 .     Corrected calcium is 8.5.  Ordered 1 g calcium gluconate. Treated with D10 x 250ml  glucose.  Consented for blood transfusion.  severe nonanion gap metabolic acidosis with bicarb of 12. lactate WNL        last admission 1/2- 1/3 -suspected hematemesis.  Suspect color from emesis due to Partha-Aid at lunch and dinner per patient.   found to have elevated troponin.  No chest pain or shortness a breath. Negative stress test.  2 D echo normal EF 75 80%, no mention of wall motion abnormality.  .  Hgb stable and  no overt signs of bleeding during stay. X ray right pelvis no fractures  but does show osseous destruction lesion that was also noted on CT 5/2/2022 per Radiology. Plan to follow up with Primary Oncologist at [facility]. discharged to [place].           Overview/Hospital Course:  1/29 Glucose stable. POCG glucose q 4H. SLP, palliative care  and psychiatry eval. per son , h/o catatonia. did not eat or drink  overnight . continue D5LR.  X ray abdomen -Nonobstructive bowel gas pattern. renal sonogram - Bilateral increased renal parenchymal echogenicity, with decreased corticomedullary distinction, possibly related to underlying medical renal disease.  Mild distention of the right renal pelvis, possibly on the basis of extrarenal pelvis.  Mild hydronephrosis not fully excluded.  CT RSS ordered . patient refused CT scan.corrected calcium is 8.4 . Hb stable    1/30 s/p psychiatry eval -started remeron 15mg PO nightly. did not take last night. SLP eval.  Glucose in 100s. continue D5LR.  Hematology consulted for further recommendations regarding multiple myeloma. SPEP, ADAM, immunoglobulins, FLC, 24 hour urine protein to  further evaluate myeloma. c/o back pain - started IV tylenol.  frequent reposition q2h. wound care consulted. continue bicarb drip. improved mentation and more interactive. answering questions.  SLP eval-  Soft & Bite Sized Diet - IDDSI Level 6, Thin liquids - IDDSI Level 0 . CT abdomen -  Large retroperitoneal mass involving multiple retroperitoneal structures including the aorta and IVC.  In addition there is a large destructive mass involving the right iliac is well as the gluteal musculature.  Findings may relate to patient's known multiple myeloma with lymphoma and sarcoma in the differential.Few left nephrolithiasis largest in the left renal pelvis measures 0.3 cm with minimal pelvic fullness without significant hydronephrosis. Right inferior pole density of nephrolithiasis versus cortical calcification.  No significant right-sided hydronephrosis.Innumerable lytic osseous lesions which is consistent with history of multiple myeloma.Remote T12 compression fracture deformity, likely pathological. Asymmetric thickening of the right gluteal musculature may relate to muscle invasion from the aforementioned mass versus gluteal hematoma.  1/31  folate deficiency -started on folic acid. tolerating oral meds .s/p wound care eval  IV fluids discontinued due to LE edema. continue POCT glucose q 4h .son wants the patient to go to [facility]. case management updated . Hypotension this PM to 80s and hypoglycemia. rapid response initiated. critical care consulted. NS bolus ordered. son agreeable for DNR. transitioned to comfort care .son agrees with no IV fluids.  continue glucose checks for now  and D10 PRN. likely transition to hospice in AM        Review of Systems:   Pain scale:    unable to obtain due to mental status  Constitutional:  fever,  chills, headache, vision loss, hearing loss, weight loss, Generalized weakness, falls, loss of smell, loss of taste, poor appetite,  sore throat  Respiratory: cough, shortness  of breath.   Cardiovascular: chest pain, dizziness, palpitations, orthopnea, swelling of feet, syncope  Gastrointestinal: nausea, vomiting, abdominal pain, diarrhea, black stool,  blood in stool, change in bowel habits, constipation  Genitourinary: hematuria, dysuria, urgency, frequency  Integument/Breast: rash,  pruritis  Hematologic/Lymphatic: easy bruising, lymphadenopathy  Musculoskeletal: arthralgias , myalgias, back pain, neck pain, knee pain  Neurological: confusion, seizures, tremors, slurred speech  Behavioral/Psych:  depression, anxiety, auditory or visual hallucinations     OBJECTIVE:     Physical Exam:  Body mass index is 17.54 kg/m².    Constitutional: Appears frail and cachectic   Head: Normocephalic and atraumatic.   Neck: Normal range of motion. Neck supple.   Cardiovascular: Normal heart rate.  Regular heart rhythm.  Pulmonary/Chest: Effort normal.   Abdominal: No distension.  No tenderness  Musculoskeletal: Normal range of motion.+  edema.   Neurological: Alert and oriented to person, place . follows simple commands. verbalizes occasionally   Skin: Skin is warm and dry.   Psychiatric: Normal mood and affect. Behavior is normal.                  Vital Signs  Temp: 98.5 °F (36.9 °C) (01/31/24 1300)  Pulse: 104 (01/31/24 1655)  Resp: 17 (01/31/24 1655)  BP: (Abnormal) 82/43 (01/31/24 1655)  SpO2: 99 % (01/31/24 1655)     24 Hour VS Range    Temp:  [98.3 °F (36.8 °C)-98.5 °F (36.9 °C)]   Pulse:  []   Resp:  [16-20]   BP: ()/(43-83)   SpO2:  [99 %-100 %]   No intake or output data in the 24 hours ending 01/31/24 1737        I/O This Shift:  No intake/output data recorded.    Wt Readings from Last 3 Encounters:   01/28/24 44.9 kg (99 lb)   11/07/23 44.9 kg (99 lb)   10/30/23 43.5 kg (95 lb 14.4 oz)       I have personally reviewed the vitals and recorded Intake/Output     Laboratory/Diagnostic Data:    CBC/Anemia Labs: Coags:    Recent Labs   Lab 01/29/24  1757 01/30/24  0721 01/31/24  0607  "  WBC 5.17 3.74* 2.66*   HGB 9.6* 9.7* 10.6*   HCT 26.9* 29.5* 33.1*   * 139* 136*   MCV 82 87 89   RDW 15.2* 15.8* 15.9*   FOLATE  --   --  2.6*   LOEEMOGD64  --   --  >2000*    No results for input(s): "PT", "INR", "APTT" in the last 168 hours.     Chemistries: ABG:   Recent Labs   Lab 01/28/24  1104 01/28/24  1709 01/29/24  0757 01/30/24  0721 01/30/24  1916 01/31/24  0607   * 143 143 146* 147* 144   K 4.7 4.6 3.9 3.7 4.1 3.6   * 116* 113* 114* 115* 109   CO2 12* 15* 20* 16* 16* 19*   BUN 51* 49* 51* 47* 47* 45*   CREATININE 2.9* 2.9* 2.9* 2.9* 2.7* 2.8*   CALCIUM 6.6* 7.3* 6.9* 7.2* 7.2* 7.2*   PROT 4.4*  --  4.4* 4.6*  --  4.6*   BILITOT 0.7  --  1.0 0.8  --  0.9   ALKPHOS 63  --  64 69  --  78   ALT 17  --  19 20  --  23   AST 22  --  22 23  --  26   MG 1.9  --  1.8 1.7  --  1.6   PHOS 5.1* 5.3*  --   --  3.0  --     No results for input(s): "PH", "PCO2", "PO2", "HCO3", "POCSATURATED", "BE" in the last 168 hours.     POCT Glucose: HbA1c:    Recent Labs   Lab 01/30/24  1942 01/31/24  0418 01/31/24  0815 01/31/24  1254 01/31/24  1649 01/31/24  1707   POCTGLUCOSE 95 66* 133* 93 62* 87    Hemoglobin A1C   Date Value Ref Range Status   06/02/2023 4.9 4.0 - 5.6 % Final     Comment:     ADA Screening Guidelines:  5.7-6.4%  Consistent with prediabetes  >or=6.5%  Consistent with diabetes    High levels of fetal hemoglobin interfere with the HbA1C  assay. Heterozygous hemoglobin variants (HbS, HgC, etc)do  not significantly interfere with this assay.   However, presence of multiple variants may affect accuracy.     08/03/2018 5.4 4.0 - 5.6 % Final     Comment:     ADA Screening Guidelines:  5.7-6.4%  Consistent with prediabetes  >or=6.5%  Consistent with diabetes  High levels of fetal hemoglobin interfere with the HbA1C  assay. Heterozygous hemoglobin variants (HbS, HgC, etc)do  not significantly interfere with this assay.   However, presence of multiple variants may affect accuracy.     10/27/2017 " "5.5 4.0 - 5.6 % Final     Comment:     According to ADA guidelines, hemoglobin A1c <7.0% represents  optimal control in non-pregnant diabetic patients. Different  metrics may apply to specific patient populations.   Standards of Medical Care in Diabetes-2016.  For the purpose of screening for the presence of diabetes:  <5.7%     Consistent with the absence of diabetes  5.7-6.4%  Consistent with increasing risk for diabetes   (prediabetes)  >or=6.5%  Consistent with diabetes  Currently, no consensus exists for use of hemoglobin A1c  for diagnosis of diabetes for children.  This Hemoglobin A1c assay has significant interference with fetal   hemoglobin   (HbF). The results are invalid for patients with abnormal amounts of   HbF,   including those with known Hereditary Persistence   of Fetal Hemoglobin. Heterozygous hemoglobin variants (HbAS, HbAC,   HbAD, HbAE, HbA2) do not significantly interfere with this assay;   however, presence of multiple variants in a sample may impact the %   interference.          Cardiac Enzymes: Ejection Fractions:    No results for input(s): "CPK", "CPKMB", "MB", "TROPONINI" in the last 72 hours.   EF   Date Value Ref Range Status   02/22/2023 70 % Final     Nuc Stress EF   Date Value Ref Range Status   10/02/2023 89 % Final          No results for input(s): "COLORU", "APPEARANCEUA", "PHUR", "SPECGRAV", "PROTEINUA", "GLUCUA", "KETONESU", "BILIRUBINUA", "OCCULTUA", "NITRITE", "UROBILINOGEN", "LEUKOCYTESUR", "RBCUA", "WBCUA", "BACTERIA", "SQUAMEPITHEL", "HYALINECASTS" in the last 48 hours.    Invalid input(s): "WRIGHTSUR"    Procalcitonin (ng/mL)   Date Value   02/22/2023 0.68 (H)     Lactate (Lactic Acid) (mmol/L)   Date Value   01/28/2024 1.6   02/22/2023 0.6   02/22/2023 0.7     BNP (pg/mL)   Date Value   01/28/2024 241 (H)   10/02/2023 93     No results found for: "CRP", "SEDRATE"  D-Dimer (mg/L FEU)   Date Value   06/02/2023 1.70 (H)   02/21/2023 4.31 (H)     Ferritin (ng/mL)   Date " "Value   10/02/2023 230   06/02/2023 51   05/25/2018 57     LD (U/L)   Date Value   09/07/2018 186     Troponin I (ng/mL)   Date Value   01/28/2024 0.016   10/02/2023 <0.006   10/02/2023 0.168 (H)   06/02/2023 0.016   06/02/2023 0.018   02/21/2023 0.013     CPK (U/L)   Date Value   01/28/2024 92   10/02/2023 41   10/02/2023 39     CK (U/L)   Date Value   09/20/2023 43   09/16/2023 40   02/12/2023 36     Results for orders placed or performed in visit on 10/02/18   Vitamin D   Result Value Ref Range    Vit D, 25-Hydroxy 18 (L) 30 - 96 ng/mL   Results for orders placed or performed during the hospital encounter of 08/03/18   Vitamin D   Result Value Ref Range    Vit D, 25-Hydroxy 27 (L) 30 - 96 ng/mL   Results for orders placed or performed in visit on 05/25/18   Vitamin D   Result Value Ref Range    Vit D, 25-Hydroxy 17 (L) 30 - 96 ng/mL     No results found for: "[id number]"    Microbiology labs for the last week  Microbiology Results (last 7 days)       Procedure Component Value Units Date/Time    Blood culture [[id number]] Collected: 01/28/24 1455    Order Status: Completed Specimen: Blood from Peripheral, Antecubital, Left Updated: 01/30/24 2012     Blood Culture, Routine No Growth to date      No Growth to date      No Growth to date    Blood culture [[id number]] Collected: 01/28/24 1550    Order Status: Completed Specimen: Blood from Peripheral, Antecubital, Right Updated: 01/30/24 2012     Blood Culture, Routine No Growth to date      No Growth to date      No Growth to date            Reviewed and noted in plan where applicable- Please see chart for full lab data.    Lines/Drains:       Peripheral IV - Single Lumen 01/28/24 1103 18 G Anterior;Left;Proximal Forearm (Active)   Site Assessment Clean;Dry;Intact;No redness;No swelling 01/29/24 0331   Extremity Assessment Distal to IV No abnormal discoloration 01/28/24 2327   Line Status Infusing 01/29/24 0331   Dressing Status Intact 01/29/24 0331   Dressing " Intervention Integrity maintained 01/29/24 0331   Number of days: 0            Peripheral IV - Single Lumen 01/28/24 1803 20 G;1 3/4 in Right Antecubital (Active)   Site Assessment Clean;Dry;Intact;No redness;No swelling 01/29/24 0331   Extremity Assessment Distal to IV No swelling;No redness;No abnormal discoloration 01/28/24 1803   Line Status Infusing 01/29/24 0000   Dressing Status Clean;Dry;Intact 01/29/24 0331   Dressing Intervention Integrity maintained 01/29/24 0331   Number of days: 0       Imaging  ECG Results              EKG 12-lead (Final result)  Result time 01/28/24 13:42:39      Final result by Interface, Lab In [facility] (01/28/24 13:42:39)               Narrative:    Test Reason : R53.83,    Vent. Rate : 069 BPM     Atrial Rate : 081 BPM     P-R Int : 000 ms          QRS Dur : 080 ms      QT Int : 410 ms       P-R-T Axes : 000 -25 -12 degrees     QTc Int : 439 ms    Accelerated Junctional rhythm  Low voltage QRS  Possible  Inferior infarct ,age undetermined  Cannot rule out Anteroseptal infarct (cited on or before 28-JAN-2024)  Abnormal ECG  When compared with ECG of 02-OCT-2023 11:14,  Significant changes have occurred  Confirmed by Martir DERAS MD ([id number]) on 1/28/2024 1:42:28 PM    Referred By: [email or web address]   SELF           Confirmed By:Martir DERAS MD                                  Results for orders placed during the hospital encounter of 10/02/23    Echo    Interpretation Summary    Left Ventricle: The left ventricle is normal in size. There is mild concentric hypertrophy. There is hyperdynamic systolic function with a visually estimated ejection fraction of 75 - 80%.    Right Ventricle: Normal right ventricular cavity size. Systolic function is normal.    Pulmonary Artery: The estimated pulmonary artery systolic pressure is 32 mmHg.    IVC/SVC: Normal venous pressure at 3 mmHg.      CT Renal Stone Study Abd Pelvis WO  Narrative: EXAMINATION:  CT RENAL STONE STUDY ABD PELVIS WO    CLINICAL  HISTORY:  right hydronoephrosis?;    TECHNIQUE:  Low dose axial images, sagittal and coronal reformations were obtained from the lung bases to the pubic symphysis without IV or oral contrast.  All CT scans at this facility use dose modulation, iterative reconstruction and/or weight based dosing when appropriate to reduce radiation dose to as low as reasonably achievable.    COMPARISON:  Ultrasound retroperitoneal 01/29/2024, abdomen and chest radiograph 01/28/2024    FINDINGS:  Heart: Normal in size.  Small pericardial effusion.    Lung Bases: Bibasilar atelectasis left greater than right with trace pleural fluid.    Liver: Normal in size.  Multiple hepatic hypodensities largest in the left hepatic lobe of fluid attenuation consistent with cyst measure 5 cm.  Multiple are too small to characterize.    Gallbladder: No calcified gallstones. Gallbladder is not distended. No gallbladder wall thickening or pericholecystic fluid collection.    Bile Ducts: No intra or extrahepatic biliary duct dilatation.    Further soft tissue evaluation is limited by hardening beam artifact from spine hardware.    Pancreas: Visualized pancreas is unremarkable.    Spleen: Normal in size. No focal lesion.    Adrenals: Right adrenal gland is not well visualized.  Left adrenal is unremarkable.    Kidneys/ Ureters: The right kidney is mildly laterally displaced by large retroperitoneal mass.  Multiple left nephrolithiasis largest in the renal pelvis measures 0.3 cm with minimal fullness without hydronephrosis (axial series 2 image 53).  Right lower pole density of underlining nephrolithiasis versus cortical calcification (axial series 2, image 66).  Few right hypodensities which are too small characterize but appear as cysts on ultrasound.    Bladder: Nondistended    Reproductive organs: Uterus and adnexa are surgically absent.    Vasculature: No significant atherosclerosis or aneurysm.    Gl/Mesentery/peritoneum and retroperitoneum: Limited  examination of the retroperitoneal mass without IV contrast and by beam hardening artifact from spine hardware.  Allowing for this, there is heterogeneous mostly right retroperitoneal mass which extend inferiorly along the midline into the left pelvis with invasion of the distal left iliopsoas muscle; this mass is difficult to separate from adjacent bowel and pancreas. The mass measure approximately 18.2 x 10.6 x 4.3 cm (cc by TV by AP, coronal series 602, image 52 and axial series 2, image 64).  There is mass effect on the small and large bowel without obstruction.  Right lower quadrant surgical changes which consistent with history of small-bowel resection.  Few descending colon diverticuli.  Rectum is distended with fecal ball collection surrounded by free fluid.    Large heterogeneous partially calcified destructive iliac mass measure 7.6 x 4.2 x 6.5 cm (cc by TV by AP, axial series 2, image 98), it invaded the adjacent right ilium.  Similar lesion on the other side of the ileum by the pelvic muscles measure 6.4 x 4.4 x 7.1 cm (axial series 2, image 103) with displacement of the gluteal muscles.    Multiple heterogeneous partially calcified lesion invading the right paraspinal muscles, measuring approximately 11.7 x 9.6 x 7.6 cm (coronal series 602, image 82) with destruction of the right posterior iliac wing and right sacrum.  Extensive soft tissue stranding and edema of the gluteus muscles.    Bones: Innumerable lytic lesions consistent with history of multiple myeloma. Remote T12 compression fracture deformity. Post T9 to L3 posterior hardware fusion. No acute fracture.  Remote left pubic ramus fracture.  Remote appearing nondisplaced left acetabular fracture.    Abdominal wall: Extensive anasarca.  Small volume of free fluid in the pelvis.    Diffuse thickening of the gluteal musculature on the right may relate to muscle invasion from aforementioned mass with gluteal hematoma also possible.  Impression:  Limited evaluation without contrast.  Large retroperitoneal mass involving multiple retroperitoneal structures including the aorta and IVC.  In addition there is a large destructive mass involving the right iliac is well as the gluteal musculature.  Findings may relate to patient's known multiple myeloma with lymphoma and sarcoma in the differential.    Few left nephrolithiasis largest in the left renal pelvis measures 0.3 cm with minimal pelvic fullness without significant hydronephrosis. Right inferior pole density of nephrolithiasis versus cortical calcification.  No significant right-sided hydronephrosis.    Innumerable lytic osseous lesions which is consistent with history of multiple myeloma.    Remote T12 compression fracture deformity, likely pathological.    Postoperative changes of T9-L3 posterior fusion.    Diffuse anasarca.    Asymmetric thickening of the right gluteal musculature may relate to muscle invasion from the aforementioned mass versus gluteal hematoma.    This report was flagged in Epic as abnormal.    Electronically signed by resident: Sukhdev Dhillon  Date:    01/30/2024  Time:    13:19    Electronically signed by: Jose Ybarra MD  Date:    01/30/2024  Time:    14:46      Labs, Imaging, EKG and Diagnostic results from 1/31/2024 were reviewed.    Medications:  Medication list was reviewed and changes noted under Assessment/Plan.  No current facility-administered medications on file prior to encounter.     Current Outpatient Medications on File Prior to Encounter   Medication Sig Dispense Refill    acetaminophen (TYLENOL) 500 MG tablet Take 500 mg by mouth daily as needed for Pain.      amiodarone (PACERONE) 200 MG Tab Take 1 tablet (200 mg total) by mouth once daily. 30 tablet 11    amLODIPine (NORVASC) 10 MG tablet TAKE 1 TABLET(10 MG) BY MOUTH EVERY DAY 90 tablet 3    ascorbic acid, vitamin C, (VITAMIN C) 1000 MG tablet Take 1,000 mg by mouth once daily.      aspirin (ECOTRIN) 81 MG EC  tablet Take 81 mg by mouth once daily.      BIOTIN ORAL Take 1 tablet by mouth once daily.      calcium carbonate (TUMS ORAL) Take 1 tablet by mouth daily as needed (Heartburn).      cyanocobalamin, vitamin B-12, (VITAMIN B-12 ORAL) Take 1 tablet by mouth daily as needed.      mirtazapine (REMERON) 30 MG tablet Take 30 mg by mouth every evening.      OLANZapine (ZYPREXA) 10 MG tablet Take 10 mg by mouth every evening.      vitamin D (VITAMIN D3) 1000 units Tab Take 1,000 Units by mouth once daily.       Scheduled Medications:  folic acid, 1 mg, Oral, Daily  mirtazapine, 15 mg, Oral, Nightly  sodium bicarbonate, 1,300 mg, Oral, BID  sodium chloride 0.9%, 10 mL, Intravenous, Q6H      PRN: 0.9%  NaCl infusion (for blood administration), dextrose 10%, dextrose 10%, glucagon (human recombinant), glucose, glucose, prochlorperazine, Flushing PICC/Midline Protocol **AND** sodium chloride 0.9% **AND** sodium chloride 0.9%  Infusions:       Estimated Creatinine Clearance: 13.8 mL/min (A) (based on SCr of 2.8 mg/dL (H)).             Assessment/Plan:      * Failure to thrive in adult   difficult historian with limited insight into their medical issues.   collateral from son - No significant oral intake for the last 4-5 days.  EMS gave 1 L of fluid in route.   1/29 Glucose stable. POCG glucose q 4H. SLP, palliative care  and psychiatry eval. per son , h/o catatonia. did not eat or drink  overnight    1/30 s/p psychiatry eval -started remeron 15mg PO nightly. did not take last night. SLP eval.  Glucose in 100s. continue D5LR.    Folic acid deficiency  1/31 folate deficiency -started on folic acid. tolerating oral meds       Dysphagia  1/30 SLP eval-  Soft & Bite Sized Diet - IDDSI Level 6, Thin liquids - IDDSI Level 0 .       Palliative care encounter  Palliative care following      Severe malnutrition  Nutrition consulted. Most recent weight and BMI monitored-     Measurements:  Wt Readings from Last 1 Encounters:   01/28/24  44.9 kg (99 lb)   Body mass index is 17.54 kg/m².    Patient has been screened and assessed by RD.    Malnutrition Type:  Context: chronic illness  Level: severe    Malnutrition Characteristic Summary:  Weight Loss (Malnutrition): greater than 7.5% in 3 months (-18% x 3mo)  Subcutaneous Fat (Malnutrition):  (observed moderate to severe)  Muscle Mass (Malnutrition):  (observed severe)    Interventions/Recommendations (treatment strategy):  1.) If and when medically able, recommend to [facility], with goal of Regular, texture per SLP- encourage PO intake if medically warranted. 2.) If medically able, recommend Boost (or Boost equivalent) BID as tolerated. 3.) If medically warranted/appropriate, consider appetite stimulant. 4.) Re-consult if alternative nutrition is medically appropriate. 5.) RD to monitor.      Hypothermia  Irving hugger ordered. TSH WNL       Goals of care, counseling/discussion  Advance Care Planning    Code Status  In light of the patients advanced and life limiting illness,I engaged the the family in a voluntary conversation about the patient's preferences for care  at the very end of life.  son wants full code for now. wants to discuss with palliative care  in AM    I spent a total of 25 minutes engaging the patient in this advance care planning discussion.    1/31 . Hypotension this PM to 80s and hypoglycemia. rapid response initiated. critical care consulted. NS bolus ordered. son agreeable for DNR. transitioned to comfort care .son agrees with no IV fluids.  continue glucose checks for now  and D10 PRN. likely transition to hospice in AM            Hypoglycemia  secondary to poor oral intake  Last A1c reviewed-   Lab Results   Component Value Date    HGBA1C 4.9 06/02/2023    HGBA1C 5.4 08/03/2018    HGBA1C 5.5 10/27/2017     Will hold PO hypoglycemics and will start correctional scale insulin  Most recent fingerstick glucose reviewed-   Recent Labs   Lab 01/29/24  1611 01/29/24 2014 01/29/24  0987  01/30/24  0503   POCTGLUCOSE 127* 175* 118* 99     . Treated with D10 x 250ml  glucose.  POCT glucose q1h. D10 infusion    1/30 s/p psychiatry eval -started remeron 15mg PO nightly. did not take last night. SLP eval.  Glucose in 100s. continue D5LR.      Hypocalcemia  likely from above  Recent Labs   Lab 01/30/24  0721   CALCIUM 7.2*          Corrected calcium is 8.4.  Ordered 1 g calcium gluconate.       Metabolic acidosis     likely from CKD/GLENIS . patient with bicarbonate   Recent Labs   Lab 01/30/24  0721 01/30/24  1916 01/31/24  0607   CO2 16* 16* 19*   s/p sodium bicarbonate  drip . monitor          History of small bowel obstruction   recent small bowel obstruction sp small bowel resection 8/5/2023     Hypotension  secondary to poor oral intake. resolved.continue IV Fluids       Lung mass   CX ray 1/28  Abnormal left upper thoracic region pleural base opacity, similar to 10/02/2023 chest radiograph, these are new compared to 02/21/2023 radiographs, further evaluation advised.  Malignancy cannot be excluded.            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 son reports vision loss in right eye    Leukopenia  2.59. secondary to above      Thrombocytopenia, unspecified    Patient with thrombocytopenia   Recent Labs   Lab 01/29/24  1757 01/30/24  0721 01/31/24  0607   * 139* 136*   . Platelet counts stable.monitor         Multiple myeloma   multiple myeloma dx 2018 followed by [facility]. 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 needs assistance to transfer from the bed to the walker     Heme/onc evalution   1/30  Hematology consulted for further recommendations regarding multiple myeloma. SPEP, ADAM, immunoglobulins, FLC, 24 hour urine protein to further evaluate myeloma    GLENIS (acute kidney injury)  Patient with acute kidney injury/acute renal failure likely due to pre-renal azotemia due to IVVD  GLENIS is currently worsening. Baseline creatinine  2.1  - Labs reviewed- Renal function/electrolytes with Estimated Creatinine Clearance: 13.8 mL/min (A) (based on SCr of 2.8 mg/dL (H)). according to latest data. Monitor urine output and serial BMP and adjust therapy as needed. Avoid nephrotoxins and renally dose meds for GFR listed above.     Obtain urine lytes , renal sonogram  and bladder scans q 6h  Recent Labs   Lab 01/30/24  0721 01/30/24  1916 01/31/24  0607   BUN 47* 47* 45*   CREATININE 2.9* 2.7* 2.8*       I & O   No intake or output data in the 24 hours ending 01/31/24 0751     Gentle IV hydration.   1/29    . X ray abdomen -Nonobstructive bowel gas pattern. renal sonogram - Bilateral increased renal parenchymal echogenicity, with decreased corticomedullary distinction, possibly related to underlying medical renal disease.  Mild distention of the right renal pelvis, possibly on the basis of extrarenal pelvis.  Mild hydronephrosis not fully excluded.  CT RSS ordered . 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 needs assistance to transfer from the bed to the walker .    Anemia in neoplastic disease    Patient's with macrocytic anemia.. Hemoglobin downtrending. Etiology likely due to  multiple myelonol not on treatment .  Current CBC reviewed-    Recent Labs   Lab 01/29/24  1757 01/30/24  0721 01/31/24  0607   HGB 9.6* 9.7* 10.6*         Component Value Date/Time    MCV 89 01/31/2024 0607    RDW 15.9 (H) 01/31/2024 0607    IRON 72 10/02/2023 1430    FERRITIN 230 10/02/2023 1430    FOLATE 2.6 (L) 01/31/2024 0607    CGPVTQCT89 >2000 (H) 01/31/2024 0607     Monitor CBC and transfuse if H/H drops below 7/21.      PRBC transfusion x2 .CBC q 6h. monitor for overt bleed.  consider GI eval      Multiple myeloma not having achieved remission  as above   1/30   Hematology consulted for further recommendations regarding multiple myeloma. SPEP, ADAM, immunoglobulins, FLC, 24 hour urine protein to further evaluate myeloma.  CT abdomen -  Large retroperitoneal mass involving multiple retroperitoneal structures including the aorta and IVC.  In addition there is a large destructive mass involving the right iliac is well as the gluteal musculature.  Findings may relate to patient's known multiple myeloma with lymphoma and sarcoma in the differential.Few left nephrolithiasis largest in the left renal pelvis measures 0.3 cm with minimal pelvic fullness without significant hydronephrosis. Right inferior pole density of nephrolithiasis versus cortical calcification.  No significant right-sided hydronephrosis.Innumerable lytic osseous lesions which is consistent with history of multiple myeloma.Remote T12 compression fracture deformity, likely pathological. Asymmetric thickening of the right gluteal musculature may relate to muscle invasion from the aforementioned mass versus gluteal hematoma.      Paranoid schizophrenia   paranoid schizophrenia with multiple inpatient psychiatric admissions, - admitted for  reported decreased eating.  Patient is a difficult historian with limited insight into their medical issues.   collateral from son - No significant oral intake for the last 4-5 days.     psychiatry evaluation       VTE Risk Mitigation (From admission, onward)      None            Discharge Planning   EMELY: 2/2/2024     Code Status: DNR   Is the patient medically ready for discharge?: No    Reason for patient still in hospital (select all that apply): Treatment  Discharge Plan A: Hospice/home              Critical care time spent on the evaluation and treatment of severe organ dysfunction, review of pertinent labs and imaging studies, discussions with consulting providers and discussions with  patient/family 50 minutes     Ender Mcclure MD  Department of Hospital Medicine   [facility] ([place])

## 2024-01-31 NOTE — CONSULTS
[facility] ([place] [id number])  Wound Care    Patient Name:  Janie Zamorano   MRN:  [id number]  Date: 1/31/2024  Diagnosis: Failure to thrive in adult    History:     Past Medical History:   Diagnosis Date    Anxiety     Asthma     Bronchitis     COPD (chronic obstructive pulmonary disease)     Depression     GERD (gastroesophageal reflux disease)     Hallucination     History of psychiatric hospitalization     Hypertension     Neck pain     Polyneuropathy in diseases classified elsewhere 4/12/2021    Schizophrenia     Sleep difficulties        Social History     Socioeconomic History    Marital status:     Number of children: 1    Years of education: 16    Highest education level: Bachelor's degree (e.g., BA, AB, BS)   Occupational History    Occupation: Disabled   Tobacco Use    Smoking status: Never    Smokeless tobacco: Never   Substance and Sexual Activity    Alcohol use: No     Alcohol/week: 0.0 standard drinks of alcohol    Drug use: No    Sexual activity: Not Currently     Partners: Male     Birth control/protection: Post-menopausal   Social History Narrative    Pt was the 4th child of 4 girls and 2 boys from an intact family.  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  She  once at age 29 and  21 years later.  They had 1 son.  Pt lives with her son since back surgery 1 month ago.  She plans to return to live with 2 sisters after she completes rehabilitation.  Hobbies include movies, spa care, and cooking.  She has no pets.  Pt attended [employer] services regularly until her back surgery.  9/19/2018     Social Determinants of Health     Financial Resource Strain: Medium Risk (6/3/2023)    Overall Financial Resource Strain (CARDIA)     Difficulty of Paying Living Expenses: Somewhat hard   Food Insecurity: Food Insecurity Present (6/3/2023)    Hunger Vital Sign     Worried About  Running Out of Food in the Last Year: Sometimes true     Ran Out of Food in the Last Year: Never true   Transportation Needs: No Transportation Needs (6/3/2023)    PRAPARE - Transportation     Lack of Transportation (Medical): No     Lack of Transportation (Non-Medical): No   Physical Activity: Inactive (6/3/2023)    Exercise Vital Sign     Days of Exercise per Week: 0 days     Minutes of Exercise per Session: 0 min   Stress: Stress Concern Present (6/3/2023)    [place] citizen [employer] - Occupational Stress Questionnaire     Feeling of Stress : To some extent   Social Connections: Unknown (6/3/2023)    Social Connection and Isolation Panel [NHANES]     Frequency of Communication with Friends and Family: Once a week     Frequency of Social Gatherings with Friends and Family: Once a week     Attends Restoration Services: Patient declined     Active Member of Clubs or Organizations: Patient declined     Attends Club or Organization Meetings: Patient declined     Marital Status:    Housing Stability: Low Risk  (6/3/2023)    Housing Stability Vital Sign     Unable to Pay for Housing in the Last Year: No     Number of Places Lived in the Last Year: 1     Unstable Housing in the Last Year: No       Precautions:     Allergies as of 01/28/2024 - Reviewed 01/28/2024   Allergen Reaction Noted    Iodine Hives 09/27/2019    Shellfish containing products Itching 01/27/2016    Ondansetron hcl Nausea Only 11/23/2022       [facility] Assessment Details/Treatment   Patient seen for wound care consultation.   Reviewed chart for this encounter.   See Flow Sheet for findings.    Heriberto: 9  Albumin: 1.6  Nutrition score: -1- very poor  Moisture score: -3- occasionally moist  Wound is POA    Pt laying in bed, son at bedside, pt agreed to assessment.   Pt on immerse, setting incorrect, WC corrected and educated.   Pt was able to turn with minimal assistance pt had loose BM, while pt being cleansed she screamed in pain, All  "stool removed revealing moist excoriated erythema to sacral / rectal area very sensitive to touch. Applied Triad cream.     Consult to Skin Integrity JANELLE Prema De Leon    RECOMMENDATIONS:  Nutrition consult / [place]  Ensure pt is clean and dry.     Ensure bed settings are correct:   "Immerse"  Each green bar = 50lbs, this pt weight 99 lbs, therefore 2 green bars is appropriate for patient immersion.    Continue implementing Q2H turn protocol     Apply Triad correctly: BID and PRN  Always cleanse wound before applying Triad.  To remove Triad:   Use pH-balanced wound cleanser to soften Triad  Gently wipe without scrubbing  For complete removal, repeat as needed.   Gently spread Triad evenly over the area of application to the thickness of a dime.     Discussed POC with patient and primary nurse.   See EMR for orders & patient education.    Discussed nutrition and the role of protein in wound healing with the patient. Instructed patient to optimize protein for wound healing.    Bedside nursing to continue care & monitoring.  Bedside nursing to maintain pressure injury prevention interventions.       01/31/24 1115   WOCN Assessment   WOCN Total Time (mins) 30   Visit Date 01/31/24   Visit Time 1115   Consult Type New   WOCN Speciality Wound   Wound moisture   Intervention assessed;changed;applied;chart review;coordination of care;team conference;orders   Teaching complication        Altered Skin Integrity 01/30/24 1211 Rectum Moisture associated dermatitis   Date First Assessed/Time First Assessed: 01/30/24 1211   Altered Skin Integrity Present on Admission - Did Patient arrive to the hospital with altered skin?: yes  Location: Rectum  Primary Wound Type: Moisture associated dermatitis   Wound Image    Dressing Appearance Open to air   Drainage Amount None   Drainage Characteristics/Odor No odor   Appearance Red;Pink;Moist   Tissue loss description Partial thickness   Red (%), Wound Tissue Color 100 %   Periwound Area " Excoriated;Moist   Care Cleansed with:;Soap and water   Dressing Applied;Other (comment)  (Triad)   Off Loading Other (see comments)  (Pt on immerse)   Dressing Change Due 01/31/24

## 2024-01-31 NOTE — NURSING
Pt is only oriented to self. Pt had c/o pain this shift, meds given with mild relief. Pt on immersion bed. Pt receiving sodium bicarb at 75ml/hr. Pt family at bedside, bed low and locked.

## 2024-01-31 NOTE — PROGRESS NOTES
Nurse called to room by PCT reporting low CBG, nurse  to room to assess pt, vs taken see below. Charge nurse and staff to bedside, cameron and MD notified and came to bedside. Pt received IV bolus of D10 CBG rechecked and was higher. Med team spoke with family and MD notified nurse pt is now DNR / DNI.    01/31/24 1655   Vital Signs   Pulse 104   Heart Rate Source Monitor   Resp 17   SpO2 99 %   Pulse Oximetry Type Continuous   Device (Oxygen Therapy) room air   BP (!) 82/43   MAP (mmHg) 59   BP Location Right arm   BP Method Automatic   Patient Position Lying

## 2024-01-31 NOTE — PLAN OF CARE
Problem: Adult Inpatient Plan of Care  Goal: Plan of Care Review  Outcome: Ongoing, Not Progressing  Goal: Patient-Specific Goal (Individualized)  Outcome: Ongoing, Not Progressing  Goal: Absence of Hospital-Acquired Illness or Injury  Outcome: Ongoing, Not Progressing  Goal: Optimal Comfort and Wellbeing  Outcome: Ongoing, Not Progressing  Goal: Readiness for Transition of Care  Outcome: Ongoing, Not Progressing     Problem: Coping Ineffective  Goal: Effective Coping  Outcome: Ongoing, Not Progressing

## 2024-01-31 NOTE — SIGNIFICANT EVENT
"RAPID RESPONSE RESPIRATORY THERAPY NOTE             Code Status: DNR   : 1955  Bed: 19934/03986 A:   MRN: [id number]  Time page Received:    Time Rapid Response RT at Bedside:   Time Rapid Response RT left Bedside: 171    SITUATION    Evaluated patient for: Mental status change/hypotension.    BACKGROUND    Why is the patient in the hospital?: Failure to thrive in adult    Patient has a past medical history of Anxiety, Asthma, Bronchitis, COPD (chronic obstructive pulmonary disease), Depression, GERD (gastroesophageal reflux disease), Hallucination, History of psychiatric hospitalization, Hypertension, Neck pain, Polyneuropathy in diseases classified elsewhere, Schizophrenia, and Sleep difficulties.    24 Hours Vitals Range:  Temp:  [98.3 °F (36.8 °C)-98.5 °F (36.9 °C)]   Pulse:  []   Resp:  [11-20]   BP: ()/(43-83)   SpO2:  [95 %-100 %]     Labs:    Recent Labs     24  0757 24  0721 24  1916 24  0607    146* 147* 144   K 3.9 3.7 4.1 3.6   * 114* 115* 109   CO2 20* 16* 16* 19*   BUN 51* 47* 47* 45*   CREATININE 2.9* 2.9* 2.7* 2.8*   * 94 92 65*   PHOS  --   --  3.0  --    MG 1.8 1.7  --  1.6        No results for input(s): "PH", "PCO2", "PO2", "HCO3", "POCSATURATED", "BE" in the last 72 hours.    ASSESSMENT/INTERVENTIONS  Rapid paged to room 09726. Pt had a mental staus change and became hypotensive. IV bolus given. BP increased to 89/60. Pt on RA, sat 98%, no respiratory issues. NP discussed pt with family outside room. Left bedside at 1711.    Last Vitals: Temp: 98.5 °F (36.9 °C) ( 1300)  Pulse: 98 ( 1730)  Resp: 11 ( 173)  BP: 82/56 ( 173)  SpO2: 100 % (1730)  Level of Consciousness: Level of Consciousness (AVPU): responds to voice  Respiratory Effort: Respiratory Effort: Unlabored  Expansion/Accessory Muscle Usage: Expansion/Accessory Muscles/Retractions: expansion symmetric  All Lung Field Breath Sounds: All Lung " Fields Breath Sounds: Anterior:, Lateral:, diminished, equal bilaterally  TERI Breath Sounds: clear  LLL Breath Sounds: diminished  RUL Breath Sounds: clear  RLL Breath Sounds: diminished  O2 Device/Concentration: RA, 21%.  NIPPV: No Surgical airway: No Vent: No  ETCO2 monitored:    Ambu at bedside:      Active Orders   There are no active orders of the following types: Respiratory Care.       RECOMMENDATIONS  ?  We recommend: RRT Recs: Continue POC per primary team.    ESCALATION        FOLLOW-UP    Disposition: Remain in room 28878.    Please call back the Rapid Response RT, Jacob Jung RRT at x [phone] for any questions or concerns.

## 2024-01-31 NOTE — ACP (ADVANCE CARE PLANNING)
Inpatient consult to Critical Care Medicine  Consult performed by: Tara Tellez DNP  Consult ordered by: Ender Mcclure MD  Reason for consult: Hypotension, hypoglycemia       Advance Care Planning     Date: 01/31/2024    [facility]  I engaged the family in a voluntary conversation about advance care planning and we specifically addressed what the goals of care would be moving forward, in light of the patient's change in clinical status, specifically hypoglycemia, hypotension, untreatable multiple myeloma, failure to thrive.  We did specifically address the patient's likely prognosis, which is poor.  I discussed with the patient's son that I did not think in light of his mother's current state that we should pursue aggressive treatments including vasopressors central lines, etc. He agreed. I also expressed that placing the patient on mechanical ventilation if her breathing were to stop or doing chest compressions in the event of cardiac arrest would only cause his mom increased pain and suffering. He agreed. I informed him that a DNR would be placed in the chart. We explored the patient's values and preferences for future care.  The family endorses that what is most important right now is to focus on comfort and QOL . The family wishes to periodically check her blood sugar and if it is low administer a 150 ml D10 bolus.     Accordingly, we have decided that the best plan to meet the patient's goals includes pivot to comfort-focused care.     Dr. Mcclure present at bedside and confirmed comfort care measures and DNR status with patient's son.     I spent a total of 15 minutes engaging the patient in this advance care planning discussion.    TOSHIA Orr-  Critical Care Medicine  01/31/2024 5:29 PM

## 2024-02-01 LAB
PATHOLOGIST INTERPRETATION UPE: NORMAL
POCT GLUCOSE: 105 MG/DL (ref 70–110)
POCT GLUCOSE: 119 MG/DL (ref 70–110)
POCT GLUCOSE: 124 MG/DL (ref 70–110)
POCT GLUCOSE: 51 MG/DL (ref 70–110)
POCT GLUCOSE: 51 MG/DL (ref 70–110)
POCT GLUCOSE: 55 MG/DL (ref 70–110)
POCT GLUCOSE: 56 MG/DL (ref 70–110)
POCT GLUCOSE: 58 MG/DL (ref 70–110)
POCT GLUCOSE: 67 MG/DL (ref 70–110)
POCT GLUCOSE: 75 MG/DL (ref 70–110)
PROT PATTERN UR ELPH-IMP: NORMAL

## 2024-02-01 PROCEDURE — A4216 STERILE WATER/SALINE, 10 ML: HCPCS | Performed by: HOSPITALIST

## 2024-02-01 PROCEDURE — 20600001 HC STEP DOWN PRIVATE ROOM

## 2024-02-01 PROCEDURE — 25000003 PHARM REV CODE 250: Performed by: HOSPITALIST

## 2024-02-01 RX ADMIN — DEXTROSE MONOHYDRATE 125 ML: 10 INJECTION, SOLUTION INTRAVENOUS at 04:02

## 2024-02-01 RX ADMIN — DEXTROSE MONOHYDRATE 125 ML: 10 INJECTION, SOLUTION INTRAVENOUS at 12:02

## 2024-02-01 RX ADMIN — Medication 10 ML: at 12:02

## 2024-02-01 RX ADMIN — Medication 10 ML: at 05:02

## 2024-02-01 RX ADMIN — DEXTROSE MONOHYDRATE 125 ML: 10 INJECTION, SOLUTION INTRAVENOUS at 08:02

## 2024-02-01 RX ADMIN — DEXTROSE MONOHYDRATE 125 ML: 10 INJECTION, SOLUTION INTRAVENOUS at 05:02

## 2024-02-01 NOTE — PLAN OF CARE
Problem: Adult Inpatient Plan of Care  Goal: Plan of Care Review  Outcome: Ongoing, Progressing  Goal: Patient-Specific Goal (Individualized)  Outcome: Ongoing, Progressing  Goal: Absence of Hospital-Acquired Illness or Injury  Outcome: Ongoing, Progressing  Goal: Optimal Comfort and Wellbeing  Outcome: Ongoing, Progressing  Goal: Readiness for Transition of Care  Outcome: Ongoing, Progressing     Problem: Coping Ineffective  Goal: Effective Coping  Outcome: Ongoing, Progressing     Problem: Fluid and Electrolyte Imbalance (Acute Kidney Injury/Impairment)  Goal: Fluid and Electrolyte Balance  Outcome: Ongoing, Progressing     Problem: Oral Intake Inadequate (Acute Kidney Injury/Impairment)  Goal: Optimal Nutrition Intake  Outcome: Ongoing, Progressing     Problem: Renal Function Impairment (Acute Kidney Injury/Impairment)  Goal: Effective Renal Function  Outcome: Ongoing, Progressing     Problem: Skin Injury Risk Increased  Goal: Skin Health and Integrity  Outcome: Ongoing, Progressing     Problem: Fall Injury Risk  Goal: Absence of Fall and Fall-Related Injury  Outcome: Ongoing, Progressing     Problem: Impaired Wound Healing  Goal: Optimal Wound Healing  Outcome: Ongoing, Progressing     Problem: Infection  Goal: Absence of Infection Signs and Symptoms  Outcome: Ongoing, Progressing

## 2024-02-01 NOTE — ASSESSMENT & PLAN NOTE
69 yo femlae with h/o schizophrenia, MDD, MM last treated in November and stopped due to severe side effects, lung mass, h/o SBO, admitted with severe anemia likely related to MM, hypocalcemia, hypoglycemic, and failure to thrive    Medicolegal  Decision-making:   -Pt does not have decision-making capacity  -Pt has not appointed a HCPOA.  -Marital status:   -Children: one son Cameron    Advance care planning/Advance directives:  -The patient has not previously engaged in advance care planning  -Living will, HCPOA, DNR, [place]POST not reviewed  -Pt has no scanned advance directives in [employer]    Psychosocial  Support system:  -Spiritual: did not explore;   -Family: lives with son; has sisters    Health status prior to this hospitalization  -Functional status: poor.  Pt was not able to manage ADLs and is a change from her normal baseline.  Last ambulated with walker 2 weeks ago  -Cognitive status: typically good; delirious over the past week  -QOL: good up until 2 weeks ago; the pt actually went to a friends  to sing in late December    Prognosis:  -Time and potential for recovery: concerning at this point given debility, frailty, poor oral intake; treatments may have greater burden than benefit; discussed with son     [facility] Discussion with son Saud:      Discussed with HM, onc, and son.  Pt continues to remain delirious and bedbound.  Pt not a candidate for additional disease directed therapy re: MM and I discussed with the son that I would worry that other therapies would also qualify as burdensome as well, including aggressive resuscitation should she decompensate.     He feels that current therapies, I.e. blood transfusions, abx, PACHECO helps to achieve goals that he feels his mother would want. He wants to continue to bring her to the hospital if needed and also wants to explore SNF options closer to his home in [place].     Discussed the task at hand for him to consider is what the thresholds look like for  "him as to what is meaningfully prolonging life vs delaying the time of her death.     He is not open to re-enrolling in hospice at this time but open to pal med follow up after discharge.     Discussed with Dr. Mcclure.     A total of 18 min was spent on advance care planning, goals of care discussion, emotional support, formulating and communicating prognosis and goals of care, exploring burden/benefit of various approaches of treatment. This discussion occurred on a fully voluntary basis with the verbal consent of the patient and/or family.     1/29  The family endorses that what is most important right now is to focus on improvement in condition but with limits to invasive therapies.  He hopes to have "reversible" causes of the change of her condition corrected and wants to explore what treatments are possible for her disease to help meaningfully extend life.     He admits he was not hospice minded when it came to acceptance that his mother could be at the end of her life.    Active symptoms  -Pain: no non verbal signs of pain; only took APAP at home  -Constipation: no   -Dyspnea no  -Anxiety: no  -Depression: no  -Delirium per psych    Summary/Recommendation:  -Most important goals at this time: improvement in condition but with limits to invasive therapies; he is open to continued therapies that will help meaningfully extend his mother's life    -Code status: FC for now; recommended DNR based on goals and condition    -no current symptoms to manage outside of delirium per psych    -Most appropriate disposition: home with son vs SNF/LTC    Will arrange for post d/c follow up.    Thank you for the opportunity to care for this patient and family.     Please call with questions.     Gomez Carrillo MD  Palliative Medicine   [facility]  [phone] (cell)             "

## 2024-02-01 NOTE — PLAN OF CARE
TORIE sent referrals through [facility] for inpatient hospice, as requested by the DR.  Will follow up.     Evelia Yao MSW, CSW

## 2024-02-01 NOTE — NURSING
Pt only oriented to self. Nurse attempted to give PO meds. Pt spit meds out x2, pt refuses to take PO meds, pt also refuses to eat and drink at this time. Monitoring.

## 2024-02-01 NOTE — SUBJECTIVE & OBJECTIVE
Interval History: Remains hypoglycemic  Discussed plan of care with son at bedside this am. He wants to pursue [facility] with palliative care    Review of Systems  Objective:     Vital Signs (Most Recent):  Temp: 97.9 °F (36.6 °C) (02/01/24 1300)  Pulse: 97 (02/01/24 0354)  Resp: 18 (02/01/24 0354)  BP: (!) 105/55 (02/01/24 1238)  SpO2: 98 % (02/01/24 0354) Vital Signs (24h Range):  Temp:  [97.8 °F (36.6 °C)-98.5 °F (36.9 °C)] 97.9 °F (36.6 °C)  Pulse:  [] 97  Resp:  [11-20] 18  SpO2:  [95 %-100 %] 98 %  BP: ()/(43-83) 105/55     Weight: 44.9 kg (99 lb)  Body mass index is 17.54 kg/m².    Intake/Output Summary (Last 24 hours) at 2/1/2024 1520  Last data filed at 1/31/2024 2045  Gross per 24 hour   Intake 90.11 ml   Output 1 ml   Net 89.11 ml         Physical Exam  Constitutional:       Appearance: She is well-developed. She is ill-appearing.      Comments: cacechtic   HENT:      Head: Normocephalic and atraumatic.   Eyes:      General: No scleral icterus.        Left eye: No discharge.   Cardiovascular:      Rate and Rhythm: Normal rate.   Pulmonary:      Effort: Pulmonary effort is normal. No respiratory distress.   Abdominal:      General: There is no distension.      Palpations: Abdomen is soft.   Musculoskeletal:         General: No swelling. Normal range of motion.      Cervical back: Neck supple.   Skin:     General: Skin is warm and dry.   Neurological:      Mental Status: She is alert. She is disoriented.      Motor: Weakness present.             Significant Labs: All pertinent labs within the past 24 hours have been reviewed.    Significant Imaging: I have reviewed all pertinent imaging results/findings within the past 24 hours.

## 2024-02-01 NOTE — SUBJECTIVE & OBJECTIVE
Interval History: pt seen at the bedside with son present; no interval events; more alert but remains disoriented;     Discussed with BMT/onc; not a treatment for any disease directed therapy; agreed that pt has advanced and aggressive disease    Past Medical History:   Diagnosis Date    Anxiety     Asthma     Bronchitis     COPD (chronic obstructive pulmonary disease)     Depression     GERD (gastroesophageal reflux disease)     Hallucination     History of psychiatric hospitalization     Hypertension     Neck pain     Polyneuropathy in diseases classified elsewhere 2021    Schizophrenia     Sleep difficulties        Past Surgical History:   Procedure Laterality Date     SECTION      X 1    COLONOSCOPY N/A 2018    Surgeon: Albert Russell MD    ESOPHAGOGASTRODUODENOSCOPY N/A 2018    Surgeon: Albert Russell MD    HYSTERECTOMY  2016    [facility]---DLH/BSO    LIPOMA RESECTION      back  x 2    SALPINGOOPHORECTOMY Bilateral 2016    KJB---DLH/BSO    THORACIC LAMINECTOMY WITH FUSION N/A 2018    Surgeon: He Andres MD       Review of patient's allergies indicates:   Allergen Reactions    Iodine Hives    Shellfish containing products Itching    Ondansetron hcl Nausea Only       Medications:  Continuous Infusions:      Scheduled Meds:   sodium chloride 0.9%  10 mL Intravenous Q6H     PRN Meds:0.9%  NaCl infusion (for blood administration), dextrose 10%, dextrose 10%, glucagon (human recombinant), glucose, glucose, prochlorperazine, Flushing PICC/Midline Protocol **AND** sodium chloride 0.9% **AND** sodium chloride 0.9%    Family History       Problem Relation (Age of Onset)    Breast cancer Mother    COPD Father    Diabetes Brother    Glaucoma Mother, Sister    Hypertension Mother, Father    Liver cancer Paternal Uncle (75)    Macular degeneration Mother    Stroke Mother          Tobacco Use    Smoking status: Never    Smokeless tobacco: Never   Substance and Sexual Activity    Alcohol use: No      Alcohol/week: 0.0 standard drinks of alcohol    Drug use: No    Sexual activity: Not Currently     Partners: Male     Birth control/protection: Post-menopausal       Review of Systems   Unable to perform ROS: Mental status change     Objective:     Vital Signs (Most Recent):  Temp: 98.5 °F (36.9 °C) (01/31/24 1950)  Pulse: 96 (01/31/24 1950)  Resp: 16 (01/31/24 1950)  BP: 103/68 (01/31/24 1950)  SpO2: 98 % (01/31/24 1950) Vital Signs (24h Range):  Temp:  [98.3 °F (36.8 °C)-98.5 °F (36.9 °C)] 98.5 °F (36.9 °C)  Pulse:  [] 96  Resp:  [11-20] 16  SpO2:  [95 %-100 %] 98 %  BP: ()/(43-83) 103/68     Weight: 44.9 kg (99 lb)  Body mass index is 17.54 kg/m².       Physical Exam  Vitals and nursing note reviewed.   Constitutional:       General: She is not in acute distress.     Appearance: She is ill-appearing.   HENT:      Mouth/Throat:      Mouth: Mucous membranes are dry.   Eyes:      Comments: Conjunctival pallor   Pulmonary:      Effort: Pulmonary effort is normal.   Abdominal:      Palpations: Abdomen is soft.      Comments: Prior scars noted   Skin:     General: Skin is warm.   Neurological:      Mental Status: She is disoriented.   Psychiatric:      Comments: Agitated    Answers questions inappropriately            Review of Symptoms      Symptom Assessment (ESAS 0-10 Scale)  Pain:  0  Dyspnea:  0  Anxiety:  0  Nausea:  0  Depression:  0  Anorexia:  0  Fatigue:  0  Insomnia:  0  Restlessness:  0  Agitation:  0 due to Psychiatric disorder     CAM / Delirium:  Negative  Constipation:  Negative  Diarrhea:  Negative      Bowel Management Plan (BMP):  Yes      Pain Assessment:  OME in 24 hours:  0  Location(s):      Pain Assessment in Advanced Demential Scale (PAINAD)   Breathing - Independent of vocalization:  0  Negative vocalization:  0  Facial expression:  0  Body language:  0  Consolability:  0  Total:  0    Performance Status:  60 (prior to admit)    Living Arrangements:  Lives with  family    Psychosocial/Cultural:   See Palliative Psychosocial Note: No  Cared for at home by her son who still works full time in IT/  **Primary  to Follow**  Palliative Care  Consult: No    Spiritual:  F - [place] and Belief:  Did not discuss today        Advance Care Planning   Advance Directives:   Living Will: No    LaPOST: No    Do Not Resuscitate Status: No    Medical Power of : No    Agent's Name:  Cameron Hoffmann (son)   Agent's Contact Number:  [phone]    Decision Making:  Family answered questions and Patient unable to communicate due to disease severity/cognitive impairment  Goals of Care: What is most important right now is to focus on comfort and QOL . Accordingly, we have decided that the best plan to meet the patient's goals includes pivot to comfort-focused care.         Significant Labs: All pertinent labs within the past 24 hours have been reviewed.  CBC:   Recent Labs   Lab 01/31/24  0607   WBC 2.66*   HGB 10.6*   HCT 33.1*   MCV 89   *       BMP:  Recent Labs   Lab 01/31/24  0607   GLU 65*      K 3.6      CO2 19*   BUN 45*   CREATININE 2.8*   CALCIUM 7.2*   MG 1.6       LFT:  Lab Results   Component Value Date    AST 26 01/31/2024    ALKPHOS 78 01/31/2024    BILITOT 0.9 01/31/2024     Albumin:   Albumin   Date Value Ref Range Status   01/31/2024 1.6 (L) 3.5 - 5.2 g/dL Final     Protein:   Total Protein   Date Value Ref Range Status   01/31/2024 4.6 (L) 6.0 - 8.4 g/dL Final     Lactic acid:   Lab Results   Component Value Date    LACTATE 1.6 01/28/2024    LACTATE 0.6 02/22/2023       Significant Imaging: I have reviewed all pertinent imaging results/findings within the past 24 hours.  Imaging Results               US Retroperitoneal Complete (Final result)  Result time 01/29/24 05:16:38      Final result by Luis Ng MD (01/29/24 05:16:38)                   Impression:      Bilateral increased renal parenchymal  echogenicity, with decreased corticomedullary distinction, possibly related to underlying medical renal disease.  Correlate clinically.    Mild distention of the right renal pelvis, possibly on the basis of extrarenal pelvis.  Mild hydronephrosis not fully excluded.  Correlate clinically.  If there is strong clinical suspicion for ureteral obstruction, consider abdominopelvic CT.    At the patient's request, the scan was terminated prior to completion of the imaging protocol.    This report was flagged in Epic as abnormal.    Electronically signed by resident: Delia Mayer  Date:    01/29/2024  Time:    02:39    Electronically signed by: Luis Ng  Date:    01/29/2024  Time:    05:16               Narrative:    EXAMINATION:  US RETROPERITONEAL COMPLETE    CLINICAL HISTORY:  GLENIS;    TECHNIQUE:  Ultrasound of the kidneys and urinary bladder was performed including color flow and Doppler evaluation of the kidneys.    COMPARISON:  CT thoracic spine 08/08/2018    CT renal stone study 08/03/2018    FINDINGS:  Right kidney: The right kidney measures 6.9 cm in length.  No cortical thinning. Increased parenchymal echogenicity with decreased corticomedullary distinction.  Resistive index measures 0.62.  No solid mass is detected the sonographic.  No echogenic shadowing renal stone. Probable mild hydronephrosis versus extrarenal pelvis.    Several simple cysts largest measures 1.3 x 1.0 x 0.9 cm at the interpolar region.    Left kidney: The left kidney measures 7.5 cm in length.  No cortical thinning.  Increased parenchymal echogenicity with decreased corticomedullary distinction.  Unable to obtain resistive index.  No solid mass is detected sonographic.  No echogenic shadowing renal stone. No hydronephrosis.    The bladder is partially distended at the time of scanning and has an unremarkable appearance.  Unable to evaluate for ureteral jets; during scanning of the urinary bladder, the technologist reports that the  scan was terminated at the adamant request of the patient.    An echogenic structure partially visualized posterior to the urinary bladder possibly represents bowel but was not fully characterized because the of the premature termination of the scan.                                       X-Ray Abdomen AP 1 View (Final result)  Result time 01/28/24 18:23:00      Final result by Parveen Page DO (01/28/24 18:23:00)                   Impression:      Nonobstructive bowel gas pattern      Electronically signed by: Parveen Page  Date:    01/28/2024  Time:    18:23               Narrative:    EXAMINATION:  XR ABDOMEN AP 1 VIEW    CLINICAL HISTORY:  poor oral intake;    TECHNIQUE:  AP View(s) of the abdomen was performed.    COMPARISON:  08/03/2018.    FINDINGS:  There is a normal, nonobstructive bowel gas pattern.  There is no free air on this supine view.  There is no abnormal calcification.  There are postop changes of the thoracolumbar spine.  Osseous structures are otherwise intact.                                       X-Ray Chest AP Portable (Final result)  Result time 01/28/24 12:59:38      Final result by Savana Cabrera MD (01/28/24 12:59:38)                   Impression:      Abnormal left upper thoracic region pleural base opacity, similar to 10/02/2023 chest radiograph, these are new compared to 02/21/2023 radiographs, further evaluation advised.  Malignancy cannot be excluded.      Electronically signed by: Savana Cabrera MD  Date:    01/28/2024  Time:    12:59               Narrative:    EXAMINATION:  XR CHEST AP PORTABLE    CLINICAL HISTORY:  Shortness of breath    TECHNIQUE:  Single frontal view of the chest was performed.    COMPARISON:  10/02/2023    FINDINGS:  The cardiac silhouette is normal in size.  The pulmonary vascularity is normal.    Left upper thoracic paramediastinal oval opacity and pleuroparenchymal scarring apical region left upper lobe correlated with CT 06/02/2023.  No  new areas of abnormal opacity seen.  The osseous structures appear normal.

## 2024-02-01 NOTE — PLAN OF CARE
Pt hypoglycemic and hypotensive throughout night. MAPs 67-81. BG in the 50s, resolved after D10 boluses. Son at bedside updated on plan of care.      Problem: Adult Inpatient Plan of Care  Goal: Plan of Care Review  Outcome: Ongoing, Progressing  Goal: Patient-Specific Goal (Individualized)  Outcome: Ongoing, Progressing  Goal: Absence of Hospital-Acquired Illness or Injury  Outcome: Ongoing, Progressing  Goal: Optimal Comfort and Wellbeing  Outcome: Ongoing, Progressing  Goal: Readiness for Transition of Care  Outcome: Ongoing, Progressing     Problem: Coping Ineffective  Goal: Effective Coping  Outcome: Ongoing, Progressing     Problem: Fluid and Electrolyte Imbalance (Acute Kidney Injury/Impairment)  Goal: Fluid and Electrolyte Balance  Outcome: Ongoing, Progressing     Problem: Oral Intake Inadequate (Acute Kidney Injury/Impairment)  Goal: Optimal Nutrition Intake  Outcome: Ongoing, Progressing     Problem: Renal Function Impairment (Acute Kidney Injury/Impairment)  Goal: Effective Renal Function  Outcome: Ongoing, Progressing     Problem: Skin Injury Risk Increased  Goal: Skin Health and Integrity  Outcome: Ongoing, Progressing     Problem: Fall Injury Risk  Goal: Absence of Fall and Fall-Related Injury  Outcome: Ongoing, Progressing     Problem: Impaired Wound Healing  Goal: Optimal Wound Healing  Outcome: Ongoing, Progressing     Problem: Infection  Goal: Absence of Infection Signs and Symptoms  Outcome: Ongoing, Progressing

## 2024-02-01 NOTE — NURSING
0400- BG 55. D10 125ml bolus given. BG now 87. Arnav Akhtar MD notified over low BG throughout night.

## 2024-02-01 NOTE — NURSING
2000- BG 68. 125ml D10 bolus administered.  on re-check after bolus. Not coming through into Epic.

## 2024-02-01 NOTE — NURSING
Pt has had no other events at time. Pt in bed resting. Bed low and locked, call light in use for family as needed.

## 2024-02-01 NOTE — PROGRESS NOTES
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        Patient is a difficult historian with limited insight into their medical issues. AAO x 2 but not talkative.    collateral from son at the bedside. Patient was following with oncology at [place]. per son, primary oncologist mentioned she is not a candidate for further chemo about 3 months back and advised hospice. Patient is on home hospice and son not happy with current hospic agency. son wants to discuss with palliative care at [facility] and requesting followups regarding [employer] work . reports patient had   no significant oral intake for the last 7days -only drinking water mostly.. BP was in 80s this AM. son called. EMS.   EMS gave 1 L of fluid in route.  being admitted for evaluation of failure to thrive  per chart review, ultiple myeloma for the past 3-4 years.  recently admitted to [facility]  last year for psychosis and required treatmen. declined functional status over the last 2-3 years due to pain and debility from multiple myeloma. 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 needs assistance to transfer from the bed to  the walker and medication management.  H/o psychiatric admuisssions secondary to schizophrenia. At baseline, ADL and  ambulates with asssitance. she is bedbound x 1 week.     ER couse -   soft blood pressure 100/84 .  EKG without acute ischemia.  Labs with hypoglycemia 43 , anemia Hb 4.6 , and hypocalcemia 6.6 .     Corrected calcium is 8.5.  Ordered 1 g calcium gluconate. Treated with D10 x 250ml  glucose.  Consented for blood transfusion.  severe nonanion gap metabolic acidosis with bicarb of 12. lactate WNL        last admission 1/2- 1/3 -suspected hematemesis.  Suspect color from emesis due to Partha-Aid at lunch and dinner per patient.   found to have elevated troponin.  No chest pain or shortness a breath. Negative stress test.  2 D echo normal EF 75 80%, no mention of wall motion abnormality.  .  Hgb stable and  no overt signs of bleeding during stay. X ray right pelvis no fractures  but does show osseous destruction lesion that was also noted on CT 5/2/2022 per Radiology. Plan to follow up with [place] [facility] to [place].           Overview/Hospital Course:  1/29 Glucose stable. POCG glucose q 4H. SLP, palliative care  and psychiatry eval. per son , h/o catatonia. did not eat or drink  overnight . continue D5LR.  X ray abdomen -Nonobstructive bowel gas pattern. renal sonogram - Bilateral increased renal parenchymal echogenicity, with decreased corticomedullary distinction, possibly related to underlying medical renal disease.  Mild distention of the right renal pelvis, possibly on the basis of extrarenal pelvis.  Mild hydronephrosis not fully excluded.  CT RSS ordered . patient refused CT scan.corrected calcium is 8.4 . Hb stable    1/30 s/p psychiatry eval -started remeron 15mg PO nightly. did not take last night. SLP eval.  Glucose in 100s. continue D5LR.  Hematology consulted for further recommendations regarding multiple myeloma. SPEP, ADAM, immunoglobulins, FLC, 24 hour urine protein  to further evaluate myeloma. c/o back pain - started IV tylenol.  frequent reposition q2h. wound care consulted. continue bicarb drip. improved mentation and more interactive. answering questions.  SLP eval-  Soft & Bite Sized Diet - IDDSI Level 6, Thin liquids - IDDSI Level 0 . CT abdomen -  Large retroperitoneal mass involving multiple retroperitoneal structures including the aorta and IVC.  In addition there is a large destructive mass involving the right iliac is well as the gluteal musculature.  Findings may relate to patient's known multiple myeloma with lymphoma and sarcoma in the differential.Few left nephrolithiasis largest in the left renal pelvis measures 0.3 cm with minimal pelvic fullness without significant hydronephrosis. Right inferior pole density of nephrolithiasis versus cortical calcification.  No significant right-sided hydronephrosis.Innumerable lytic osseous lesions which is consistent with history of multiple myeloma.Remote T12 compression fracture deformity, likely pathological. Asymmetric thickening of the right gluteal musculature may relate to muscle invasion from the aforementioned mass versus gluteal hematoma.  1/31  folate deficiency -started on folic acid. tolerating oral meds .s/p wound care eval  IV fluids discontinued due to LE edema. continue POCT glucose q 4h .son wants the patient to go to [facility]. case management updated . Hypotension this PM to 80s and hypoglycemia. rapid response initiated. critical care consulted. NS bolus ordered. son agreeable for DNR. transitioned to comfort care .son agrees with no IV fluids.  continue glucose checks for now  and D10 PRN. likely transition to hospice in AM    Interval History: Remains hypoglycemic  Discussed plan of care with son at bedside this am. He wants to pursue [facility] with palliative care    Review of Systems  Objective:     Vital Signs (Most Recent):  Temp: 97.9 °F (36.6 °C) (02/01/24 1300)  Pulse: 97 (02/01/24  0354)  Resp: 18 (02/01/24 0354)  BP: (!) 105/55 (02/01/24 1238)  SpO2: 98 % (02/01/24 0354) Vital Signs (24h Range):  Temp:  [97.8 °F (36.6 °C)-98.5 °F (36.9 °C)] 97.9 °F (36.6 °C)  Pulse:  [] 97  Resp:  [11-20] 18  SpO2:  [95 %-100 %] 98 %  BP: ()/(43-83) 105/55     Weight: 44.9 kg (99 lb)  Body mass index is 17.54 kg/m².    Intake/Output Summary (Last 24 hours) at 2/1/2024 1520  Last data filed at 1/31/2024 2045  Gross per 24 hour   Intake 90.11 ml   Output 1 ml   Net 89.11 ml         Physical Exam  Constitutional:       Appearance: She is well-developed. She is ill-appearing.      Comments: cacechtic   HENT:      Head: Normocephalic and atraumatic.   Eyes:      General: No scleral icterus.        Left eye: No discharge.   Cardiovascular:      Rate and Rhythm: Normal rate.   Pulmonary:      Effort: Pulmonary effort is normal. No respiratory distress.   Abdominal:      General: There is no distension.      Palpations: Abdomen is soft.   Musculoskeletal:         General: No swelling. Normal range of motion.      Cervical back: Neck supple.   Skin:     General: Skin is warm and dry.   Neurological:      Mental Status: She is alert. She is disoriented.      Motor: Weakness present.             Significant Labs: All pertinent labs within the past 24 hours have been reviewed.    Significant Imaging: I have reviewed all pertinent imaging results/findings within the past 24 hours.    Assessment/Plan:      * Goals of care, counseling/discussion  Advance Care Planning    Code Status  In light of the patients advanced and life limiting illness,I engaged the the family in a voluntary conversation about the patient's preferences for care  at the very end of life.  son wants full code for now. wants to discuss with palliative care  in AM    I spent a total of 25 minutes engaging the patient in this advance care planning discussion.    1/31 . Hypotension this PM to 80s and hypoglycemia. rapid response initiated.  critical care consulted. NS bolus ordered. son agreeable for DNR. transitioned to comfort care .son agrees with no IV fluids.  continue glucose checks for now  and D10 PRN. likely transition to hospice in AM            Folic acid deficiency  1/31 folate deficiency -started on folic acid. tolerating oral meds       Dysphagia  1/30 SLP eval-  Soft & Bite Sized Diet - IDDSI Level 6, Thin liquids - IDDSI Level 0 .       Palliative care encounter  Palliative care following      Severe malnutrition  Nutrition consulted. Most recent weight and BMI monitored-     Measurements:  Wt Readings from Last 1 Encounters:   01/28/24 44.9 kg (99 lb)   Body mass index is 17.54 kg/m².    Patient has been screened and assessed by RD.    Malnutrition Type:  Context: chronic illness  Level: severe    Malnutrition Characteristic Summary:  Weight Loss (Malnutrition): greater than 7.5% in 3 months (-18% x 3mo)  Subcutaneous Fat (Malnutrition):  (observed moderate to severe)  Muscle Mass (Malnutrition):  (observed severe)    Interventions/Recommendations (treatment strategy):  1.) If and when medically able, recommend to ADAT, with goal of Regular, texture per SLP- encourage PO intake if medically warranted. 2.) If medically able, recommend Boost (or Boost equivalent) BID as tolerated. 3.) If medically warranted/appropriate, consider appetite stimulant. 4.) Re-consult if alternative nutrition is medically appropriate. 5.) RD to monitor.      Hypothermia  Irving oro ordered. TSH WNL       Hypoglycemia  secondary to poor oral intake  Last A1c reviewed-   Lab Results   Component Value Date    HGBA1C 4.9 06/02/2023    HGBA1C 5.4 08/03/2018    HGBA1C 5.5 10/27/2017     Will hold PO hypoglycemics and will start correctional scale insulin  Most recent fingerstick glucose reviewed-   Recent Labs   Lab 01/29/24  1611 01/29/24  2014 01/29/24  2354 01/30/24  0503   POCTGLUCOSE 127* 175* 118* 99     . Treated with D10 x 250ml  glucose.  POCT glucose  q1h. D10 infusion    1/30 s/p psychiatry eval -started remeron 15mg PO nightly. did not take last night. SLP eval.  Glucose in 100s. continue D5LR.      Hypocalcemia  likely from above  Recent Labs   Lab 01/30/24  0721   CALCIUM 7.2*          Corrected calcium is 8.4.  Ordered 1 g calcium gluconate.       Failure to thrive in adult   difficult historian with limited insight into their medical issues.   collateral from son - No significant oral intake for the last 4-5 days.     Metabolic acidosis     likely from CKD/GLENIS . patient with bicarbonate   Recent Labs   Lab 01/30/24  0721 01/30/24  1916 01/31/24  0607   CO2 16* 16* 19*   s/p sodium bicarbonate  drip . monitor          History of small bowel obstruction   recent small bowel obstruction sp small bowel resection 8/5/2023     Hypotension  secondary to poor oral intake. resolved.continue IV Fluids       Lung mass   CX ray 1/28  Abnormal left upper thoracic region pleural base opacity, similar to 10/02/2023 chest radiograph, these are new compared to 02/21/2023 radiographs, further evaluation advised.  Malignancy cannot be excluded.            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 son reports vision loss in right eye    Leukopenia  2.59. secondary to above      Thrombocytopenia, unspecified    Patient with thrombocytopenia   Recent Labs   Lab 01/29/24  1757 01/30/24  0721 01/31/24  0607   * 139* 136*   . Platelet counts stable.monitor         Multiple myeloma   multiple myeloma dx 2018 followed by [facility]. 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 needs assistance to transfer from the bed to the walker     Heme/onc evalution   1/30  Hematology consulted for further recommendations regarding multiple myeloma. SPEP, ADAM, immunoglobulins, FLC, 24 hour urine protein to further evaluate myeloma    Not a candidate for further treatment per  hematology    GLENIS (acute kidney injury)  Patient with acute kidney injury/acute renal failure likely due to pre-renal azotemia due to IVVD GLENIS is currently worsening. Baseline creatinine  2.1  - Labs reviewed- Renal function/electrolytes with Estimated Creatinine Clearance: 13.8 mL/min (A) (based on SCr of 2.8 mg/dL (H)). according to latest data. Monitor urine output and serial BMP and adjust therapy as needed. Avoid nephrotoxins and renally dose meds for GFR listed above.     Obtain urine lytes , renal sonogram  and bladder scans q 6h  Recent Labs   Lab 01/30/24  0721 01/30/24  1916 01/31/24  0607   BUN 47* 47* 45*   CREATININE 2.9* 2.7* 2.8*       I & O   No intake or output data in the 24 hours ending 01/31/24 0751     Gentle IV hydration.   1/29    . X ray abdomen -Nonobstructive bowel gas pattern. renal sonogram - Bilateral increased renal parenchymal echogenicity, with decreased corticomedullary distinction, possibly related to underlying medical renal disease.  Mild distention of the right renal pelvis, possibly on the basis of extrarenal pelvis.  Mild hydronephrosis not fully excluded.  CT RSS ordered . 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 needs assistance to transfer from the bed to the walker .    Anemia in neoplastic disease    Patient's with macrocytic anemia.. Hemoglobin downtrending. Etiology likely due to  multiple myelonol not on treatment .  Current CBC reviewed-    Recent Labs   Lab 01/29/24  1757 01/30/24  0721 01/31/24  0607   HGB 9.6* 9.7* 10.6*         Component Value Date/Time    MCV 89 01/31/2024 0607    RDW 15.9 (H) 01/31/2024 0607    IRON 72 10/02/2023 1430    FERRITIN 230 10/02/2023 1430    FOLATE 2.6 (L) 01/31/2024 0607    [id number]  >2000 (H) 01/31/2024 0607     Monitor CBC and transfuse if H/H drops below 7/21.      PRBC transfusion x2 .    Multiple myeloma not having achieved remission  as above   1/30   Hematology consulted for further recommendations regarding multiple myeloma. SPEP, ADAM, immunoglobulins, FLC, 24 hour urine protein to further evaluate myeloma.  CT abdomen -  Large retroperitoneal mass involving multiple retroperitoneal structures including the aorta and IVC.  In addition there is a large destructive mass involving the right iliac is well as the gluteal musculature.  Findings may relate to patient's known multiple myeloma with lymphoma and sarcoma in the differential.Few left nephrolithiasis largest in the left renal pelvis measures 0.3 cm with minimal pelvic fullness without significant hydronephrosis. Right inferior pole density of nephrolithiasis versus cortical calcification.  No significant right-sided hydronephrosis.Innumerable lytic osseous lesions which is consistent with history of multiple myeloma.Remote T12 compression fracture deformity, likely pathological. Asymmetric thickening of the right gluteal musculature may relate to muscle invasion from the aforementioned mass versus gluteal hematoma.      Paranoid schizophrenia   paranoid schizophrenia with multiple inpatient psychiatric admissions, - admitted for  reported decreased eating.  Patient is a difficult historian with limited insight into their medical issues.   collateral from son - No significant oral intake for the last 4-5 days.     psychiatry evaluation       VTE Risk Mitigation (From admission, onward)      None            Discharge Planning   EMELY: 2/2/2024     Code Status: DNR   Is the patient medically ready for discharge?: No    Reason for patient still in hospital (select all that apply): Patient trending condition  Discharge Plan A: Hospice/home          Omega Garcia MD  Department of Hospital Medicine   [facility] - Stepdown Flex ([place]  [place])

## 2024-02-01 NOTE — PROGRESS NOTES
[facility] ([place] [address])  Palliative Medicine  Progress Note    Patient Name: Janie Zamorano  MRN: [id number]  Admission Date: 2024  Hospital Length of Stay: 3 days  Code Status: DNR   Attending Provider: Ender Mcclure MD  Consulting Provider: Gomez Carrillo MD  Primary Care Physician: He Hoyt Jr., MD  Principal Problem:Failure to thrive in adult    Patient information was obtained from relative(s), past medical records, and primary team.      Assessment/Plan:     Palliative Care  Palliative care encounter  69 yo femlae with h/o schizophrenia, MDD, MM last treated in November and stopped due to severe side effects, lung mass, h/o SBO, admitted with severe anemia likely related to MM, hypocalcemia, hypoglycemic, and failure to thrive    Medicolegal  Decision-making:   -Pt does not have decision-making capacity  -Pt has not appointed a HCPOA.  -Marital status:   -Children: one son Cameron    Advance care planning/Advance directives:  -The patient has not previously engaged in advance care planning  -Living will, HCPOA, DNR, LaPOST not reviewed  -Pt has no scanned advance directives in [employer]    Psychosocial  Support system:  -Spiritual: did not explore;   -Family: lives with son; has sisters    Health status prior to this hospitalization  -Functional status: poor.  Pt was not able to manage ADLs and is a change from her normal baseline.  Last ambulated with walker 2 weeks ago  -Cognitive status: typically good; delirious over the past week  -QOL: good up until 2 weeks ago; the pt actually went to a friends  to sing in late December    Prognosis:  -Time and potential for recovery: concerning at this point given debility, frailty, poor oral intake; treatments may have greater burden than benefit; discussed with son     GOC Discussion with son Saud:      Discussed with HM, onc, and son.  Pt continues to remain delirious and bedbound.  Pt not a candidate for  "additional disease directed therapy re: MM and I discussed with the son that I would worry that other therapies would also qualify as burdensome as well, including aggressive resuscitation should she decompensate.     He feels that current therapies, I.e. blood transfusions, abx, PACHECO helps to achieve goals that he feels his mother would want. He wants to continue to bring her to the hospital if needed and also wants to explore SNF options closer to his home in [place].     Discussed the task at hand for him to consider is what the thresholds look like for him as to what is meaningfully prolonging life vs delaying the time of her death.     He is not open to re-enrolling in hospice at this time but open to pal med follow up after discharge.     Discussed with Dr. Mcclure.     A total of 18 min was spent on advance care planning, goals of care discussion, emotional support, formulating and communicating prognosis and goals of care, exploring burden/benefit of various approaches of treatment. This discussion occurred on a fully voluntary basis with the verbal consent of the patient and/or family.     1/29  The family endorses that what is most important right now is to focus on improvement in condition but with limits to invasive therapies.  He hopes to have "reversible" causes of the change of her condition corrected and wants to explore what treatments are possible for her disease to help meaningfully extend life.     He admits he was not hospice minded when it came to acceptance that his mother could be at the end of her life.    Active symptoms  -Pain: no non verbal signs of pain; only took APAP at home  -Constipation: no   -Dyspnea no  -Anxiety: no  -Depression: no  -Delirium per psych    Summary/Recommendation:  -Most important goals at this time: improvement in condition but with limits to invasive therapies; he is open to continued therapies that will help meaningfully extend his mother's life    -Code status: " FC for now; recommended DNR based on goals and condition    -no current symptoms to manage outside of delirium per psych    -Most appropriate disposition: home with son vs SNF/LTC    Will arrange for post d/c follow up.    Thank you for the opportunity to care for this patient and family.     Please call with questions.     Gomez Carrillo MD  Palliative Medicine   [facility]  [phone] (cell)                   I will sign off. Please contact us if you have any additional questions.    Subjective:     Chief Complaint:   Chief Complaint   Patient presents with    Failure To Thrive     Hasn't eaten in 4-5 days. Hypotensive. Hx. Of multiple myeloma. 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     She had followed with heme/onc in [place] and received several forms of therapy for her multiple myeloma, however had difficulty tolerating with severe diarrhea, N/V, and severe weight loss.  Ultimately, the decision was made to stop therapy due to QOL issues and was referred to hospice about 2 months ago.  The pt had a pathologic spinal fracture months ago, but did not report daily pain.  Only took APAP     ER couse -   soft blood pressure 100/84 .  EKG without acute ischemia.  Labs with hypoglycemia 43 , anemia Hb 4.6 , and hypocalcemia 6.6 .     Corrected calcium is 8.5.  Ordered 1 g calcium gluconate. Treated with D10 x 250ml  glucose.  Consented for blood transfusion.  severe nonanion gap metabolic acidosis with bicarb of 12. lactate WNL       Son not  certain of the last time she had a blood transfusion but has not been transfusion dependent.  No labs done while on hospice.      In this setting, palliative medicine was consulted to help with symptom management, medical decision making, and aiding in the formation of goals of care.       Hospital Course:  No notes on file    Interval History: pt seen at the bedside with son present; no interval events; more alert but remains disoriented;     Discussed with BMT/onc; not a treatment for any disease directed therapy; agreed that pt has advanced and aggressive disease    Past Medical History:   Diagnosis Date    Anxiety     Asthma     Bronchitis     COPD (chronic obstructive pulmonary disease)     Depression     GERD (gastroesophageal reflux disease)     Hallucination     History of psychiatric hospitalization     Hypertension     Neck pain     Polyneuropathy in diseases classified elsewhere 2021    Schizophrenia     Sleep difficulties        Past Surgical History:   Procedure Laterality Date     SECTION      X 1    COLONOSCOPY N/A 2018    Surgeon: Albert Russell MD    ESOPHAGOGASTRODUODENOSCOPY N/A 2018    Surgeon: Albert Russell MD    HYSTERECTOMY  2016    [facility]---DLH/BSO    LIPOMA RESECTION      back  x 2    SALPINGOOPHORECTOMY Bilateral 2016    [facility]---DLH/BSO    THORACIC LAMINECTOMY WITH FUSION N/A 2018    Surgeon: He Andres MD       Review of patient's allergies indicates:   Allergen Reactions    Iodine Hives    Shellfish containing products Itching    Ondansetron hcl Nausea Only       Medications:  Continuous Infusions:      Scheduled Meds:   sodium chloride 0.9%  10 mL Intravenous Q6H     PRN Meds:0.9%  NaCl infusion (for blood administration), dextrose 10%, dextrose 10%, glucagon (human recombinant), glucose, glucose, prochlorperazine, Flushing PICC/Midline Protocol **AND** sodium chloride 0.9% **AND** sodium chloride 0.9%    Family History       Problem Relation (Age of  Onset)    Breast cancer Mother    COPD Father    Diabetes Brother    Glaucoma Mother, Sister    Hypertension Mother, Father    Liver cancer Paternal Uncle (75)    Macular degeneration Mother    Stroke Mother          Tobacco Use    Smoking status: Never    Smokeless tobacco: Never   Substance and Sexual Activity    Alcohol use: No     Alcohol/week: 0.0 standard drinks of alcohol    Drug use: No    Sexual activity: Not Currently     Partners: Male     Birth control/protection: Post-menopausal       Review of Systems   Unable to perform ROS: Mental status change     Objective:     Vital Signs (Most Recent):  Temp: 98.5 °F (36.9 °C) (01/31/24 1950)  Pulse: 96 (01/31/24 1950)  Resp: 16 (01/31/24 1950)  BP: 103/68 (01/31/24 1950)  SpO2: 98 % (01/31/24 1950) Vital Signs (24h Range):  Temp:  [98.3 °F (36.8 °C)-98.5 °F (36.9 °C)] 98.5 °F (36.9 °C)  Pulse:  [] 96  Resp:  [11-20] 16  SpO2:  [95 %-100 %] 98 %  BP: ()/(43-83) 103/68     Weight: 44.9 kg (99 lb)  Body mass index is 17.54 kg/m².       Physical Exam  Vitals and nursing note reviewed.   Constitutional:       General: She is not in acute distress.     Appearance: She is ill-appearing.   HENT:      Mouth/Throat:      Mouth: Mucous membranes are dry.   Eyes:      Comments: Conjunctival pallor   Pulmonary:      Effort: Pulmonary effort is normal.   Abdominal:      Palpations: Abdomen is soft.      Comments: Prior scars noted   Skin:     General: Skin is warm.   Neurological:      Mental Status: She is disoriented.   Psychiatric:      Comments: Agitated    Answers questions inappropriately            Review of Symptoms      Symptom Assessment (ESAS 0-10 Scale)  Pain:  0  Dyspnea:  0  Anxiety:  0  Nausea:  0  Depression:  0  Anorexia:  0  Fatigue:  0  Insomnia:  0  Restlessness:  0  Agitation:  0 due to Psychiatric disorder     CAM / Delirium:  Negative  Constipation:  Negative  Diarrhea:  Negative      Bowel Management Plan (BMP):  Yes      Pain  Assessment:  OME in 24 hours:  0  Location(s):      Pain Assessment in Advanced Demential Scale (PAINAD)   Breathing - Independent of vocalization:  0  Negative vocalization:  0  Facial expression:  0  Body language:  0  Consolability:  0  Total:  0    Performance Status:  60 (prior to admit)    Living Arrangements:  Lives with family    Psychosocial/Cultural:   See Palliative Psychosocial Note: No  Cared for at home by her son who still works full time in IT/  **Primary  to Follow**  Palliative Care  Consult: No    Spiritual:  F - [place] and Belief:  Did not discuss today        Advance Care Planning  Advance Directives:   Living Will: No    LaPOST: No    Do Not Resuscitate Status: No    Medical Power of : No    Agent's Name:  Cameron Hoffmann (son)   Agent's Contact Number:  [phone]    Decision Making:  Family answered questions and Patient unable to communicate due to disease severity/cognitive impairment  Goals of Care: What is most important right now is to focus on comfort and QOL . Accordingly, we have decided that the best plan to meet the patient's goals includes pivot to comfort-focused care.         Significant Labs: All pertinent labs within the past 24 hours have been reviewed.  CBC:   Recent Labs   Lab 01/31/24  0607   WBC 2.66*   HGB 10.6*   HCT 33.1*   MCV 89   *       BMP:  Recent Labs   Lab 01/31/24  0607   GLU 65*      K 3.6      CO2 19*   BUN 45*   CREATININE 2.8*   CALCIUM 7.2*   MG 1.6       LFT:  Lab Results   Component Value Date    AST 26 01/31/2024    ALKPHOS 78 01/31/2024    BILITOT 0.9 01/31/2024     Albumin:   Albumin   Date Value Ref Range Status   01/31/2024 1.6 (L) 3.5 - 5.2 g/dL Final     Protein:   Total Protein   Date Value Ref Range Status   01/31/2024 4.6 (L) 6.0 - 8.4 g/dL Final     Lactic acid:   Lab Results   Component Value Date    LACTATE 1.6 01/28/2024    LACTATE 0.6 02/22/2023       Significant Imaging: I  have reviewed all pertinent imaging results/findings within the past 24 hours.  Imaging Results               US Retroperitoneal Complete (Final result)  Result time 01/29/24 05:16:38      Final result by Luis Ng MD (01/29/24 05:16:38)                   Impression:      Bilateral increased renal parenchymal echogenicity, with decreased corticomedullary distinction, possibly related to underlying medical renal disease.  Correlate clinically.    Mild distention of the right renal pelvis, possibly on the basis of extrarenal pelvis.  Mild hydronephrosis not fully excluded.  Correlate clinically.  If there is strong clinical suspicion for ureteral obstruction, consider abdominopelvic CT.    At the patient's request, the scan was terminated prior to completion of the imaging protocol.    This report was flagged in Epic as abnormal.    Electronically signed by resident: Delia Mayer  Date:    01/29/2024  Time:    02:39    Electronically signed by: Luis Ng  Date:    01/29/2024  Time:    05:16               Narrative:    EXAMINATION:  US RETROPERITONEAL COMPLETE    CLINICAL HISTORY:  GLENIS;    TECHNIQUE:  Ultrasound of the kidneys and urinary bladder was performed including color flow and Doppler evaluation of the kidneys.    COMPARISON:  CT thoracic spine 08/08/2018    CT renal stone study 08/03/2018    FINDINGS:  Right kidney: The right kidney measures 6.9 cm in length.  No cortical thinning. Increased parenchymal echogenicity with decreased corticomedullary distinction.  Resistive index measures 0.62.  No solid mass is detected the sonographic.  No echogenic shadowing renal stone. Probable mild hydronephrosis versus extrarenal pelvis.    Several simple cysts largest measures 1.3 x 1.0 x 0.9 cm at the interpolar region.    Left kidney: The left kidney measures 7.5 cm in length.  No cortical thinning.  Increased parenchymal echogenicity with decreased corticomedullary distinction.  Unable to obtain resistive  index.  No solid mass is detected sonographic.  No echogenic shadowing renal stone. No hydronephrosis.    The bladder is partially distended at the time of scanning and has an unremarkable appearance.  Unable to evaluate for ureteral jets; during scanning of the urinary bladder, the technologist reports that the scan was terminated at the adamant request of the patient.    An echogenic structure partially visualized posterior to the urinary bladder possibly represents bowel but was not fully characterized because the of the premature termination of the scan.                                       X-Ray Abdomen AP 1 View (Final result)  Result time 01/28/24 18:23:00      Final result by Parveen Page DO (01/28/24 18:23:00)                   Impression:      Nonobstructive bowel gas pattern      Electronically signed by: Parveen Page  Date:    01/28/2024  Time:    18:23               Narrative:    EXAMINATION:  XR ABDOMEN AP 1 VIEW    CLINICAL HISTORY:  poor oral intake;    TECHNIQUE:  AP View(s) of the abdomen was performed.    COMPARISON:  08/03/2018.    FINDINGS:  There is a normal, nonobstructive bowel gas pattern.  There is no free air on this supine view.  There is no abnormal calcification.  There are postop changes of the thoracolumbar spine.  Osseous structures are otherwise intact.                                       X-Ray Chest AP Portable (Final result)  Result time 01/28/24 12:59:38      Final result by Savana Cabrera MD (01/28/24 12:59:38)                   Impression:      Abnormal left upper thoracic region pleural base opacity, similar to 10/02/2023 chest radiograph, these are new compared to 02/21/2023 radiographs, further evaluation advised.  Malignancy cannot be excluded.      Electronically signed by: Savana Cabrera MD  Date:    01/28/2024  Time:    12:59               Narrative:    EXAMINATION:  XR CHEST AP PORTABLE    CLINICAL HISTORY:  Shortness of  breath    TECHNIQUE:  Single frontal view of the chest was performed.    COMPARISON:  10/02/2023    FINDINGS:  The cardiac silhouette is normal in size.  The pulmonary vascularity is normal.    Left upper thoracic paramediastinal oval opacity and pleuroparenchymal scarring apical region left upper lobe correlated with CT 06/02/2023.  No new areas of abnormal opacity seen.  The osseous structures appear normal.                                          Gomez Carrillo MD  Palliative Medicine  [facility] - Stepdown Flex ([place]-)

## 2024-02-01 NOTE — PHYSICIAN QUERY
PT Name: Janie Zamorano  MR #: [id number]    DOCUMENTATION CLARIFICATION      CDS/: Yara Lucero RN             Contact information:  [email or web address]    2/9 Withdrew query- did not impact not variable JT    This form is a permanent document in the medical record.      Query Date: February 1, 2024    By submitting this query, we are merely seeking further clarification of documentation. Please utilize your independent clinical judgment when addressing the question(s) below.       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  Patient's with macrocytic anemia  Thrombocytopenia, unspecifed  leukopenia   1/30 Hosp Med MD PN   x H&H  1/28 01/28 01/29 01/29   Hemoglobin 4.6 7.7 (L) 9.2 (L) 9.2 (L)   Hematocrit 16.2 23.5 (L) 27.0 (L) 26.9 (L)      1/28-29 Lab   x WBC  01/28/24 11:04 01/28/24 17:09 01/29/24 06:54 01/29/24 07:57   WBC 2.59 (L) 3.23 (L) 4.04 3.96      1/28-29 Lab    Neutrophils/ Granulocytes/ ANC     x Platelets  01/28/24 11:04 01/28/24 17:09 01/29/24 06:54 01/29/24 07:57   Platelet Count 125 (L) 140 (L) 128 (L) 135 (L)    1/28-29 Lab           x Transfusion(s) Transfused PRBC x 2 doses  1/28 Blood bank     x Treatments: Daily CBC  Type and cross  Transfuse PRBC x 2   1/28 orders   x Acute/ Chronic illness  history for hypertension, paranoid schizophrenia with multiple inpatient psychiatric admissions, multiple myeloma dx 2018 followed by [facility], 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   FTT  Dysphagia  Severe malnutrition  Hypothermia  MA  Hypocalcemia  Lung mass  Mass of sphenoid sinus  Leukopenia  Laurie  Multiple myeloma     1/30 Hosp Med MD PN    x Other: Per son, patient was receiving myeloma treatment at [facility] and last received before Thanksgiving last year.     Patient was following with oncology at [place]. per son, primary oncologist mentioned she is not a candidate for further chemo about 3 months back and advised hospice  1/30 Bone Marrow MD PN    1/28 HP   Pancytopenia is defined by:  Hb < 12g/dL (non-pregnant women) or <13g/dL (men) + ANC < 1800/microL + Platelets < 150,000/microL    The clinical guidelines noted are only a system guideline. It does not replace the providers clinical judgment.      Provider, please specify diagnosis or diagnoses associated with above clinical findings.    [   ] Pancytopenia, unspecified   [   ] Other Hematological Diagnosis (please specify): ________   [  ] Clinically Undetermined           Please document in your progress notes daily for the duration of treatment, until resolved, and include in your discharge summary.    Reference:    ADALBERTO Luke (n.d.). Approach to the adult with pancytopenia. [employer]. Retrieved September 7, 2022, from [email or web address]/contents/approach-to-the-adult-with-pancytopenia?search=pancytopenia&source=search_result&selectedTitle=1~150&usage_type=default&display_rank=1    Form No. [id number]

## 2024-02-01 NOTE — NURSING
BG on midnight check 59. Double checked BG 51 on second check. Son at bedside giving patient juice. Pt unable to take in adequate oral intake, BG still 56. 125ml D10 bolus given and  after bolus complete.

## 2024-02-02 LAB
BACTERIA BLD CULT: NORMAL
BACTERIA BLD CULT: NORMAL
POCT GLUCOSE: 117 MG/DL (ref 70–110)
POCT GLUCOSE: 123 MG/DL (ref 70–110)
POCT GLUCOSE: 51 MG/DL (ref 70–110)
POCT GLUCOSE: 53 MG/DL (ref 70–110)
POCT GLUCOSE: 54 MG/DL (ref 70–110)
POCT GLUCOSE: 64 MG/DL (ref 70–110)
POCT GLUCOSE: 84 MG/DL (ref 70–110)
POCT GLUCOSE: 86 MG/DL (ref 70–110)
POCT GLUCOSE: 87 MG/DL (ref 70–110)

## 2024-02-02 PROCEDURE — 20600001 HC STEP DOWN PRIVATE ROOM

## 2024-02-02 PROCEDURE — A4216 STERILE WATER/SALINE, 10 ML: HCPCS | Performed by: HOSPITALIST

## 2024-02-02 PROCEDURE — 25000003 PHARM REV CODE 250: Performed by: HOSPITALIST

## 2024-02-02 RX ORDER — POLYETHYLENE GLYCOL 3350 17 G/17G
17 POWDER, FOR SOLUTION ORAL DAILY
Status: DISCONTINUED | OUTPATIENT
Start: 2024-02-02 | End: 2024-02-06 | Stop reason: HOSPADM

## 2024-02-02 RX ADMIN — DEXTROSE MONOHYDRATE 125 ML: 10 INJECTION, SOLUTION INTRAVENOUS at 07:02

## 2024-02-02 RX ADMIN — Medication 10 ML: at 05:02

## 2024-02-02 RX ADMIN — DEXTROSE MONOHYDRATE 125 ML: 10 INJECTION, SOLUTION INTRAVENOUS at 12:02

## 2024-02-02 RX ADMIN — DEXTROSE MONOHYDRATE 125 ML: 10 INJECTION, SOLUTION INTRAVENOUS at 05:02

## 2024-02-02 RX ADMIN — Medication 10 ML: at 12:02

## 2024-02-02 RX ADMIN — DEXTROSE MONOHYDRATE 125 ML: 10 INJECTION, SOLUTION INTRAVENOUS at 04:02

## 2024-02-02 NOTE — SUBJECTIVE & OBJECTIVE
Interval History: Discussed plan of care with son at bedside. He wishes for patient to go to [facility] with palliative care services    Review of Systems  Objective:     Vital Signs (Most Recent):  Temp: 98 °F (36.7 °C) (02/02/24 0800)  Pulse: 87 (02/02/24 0800)  Resp: 16 (02/02/24 0352)  BP: 103/70 (02/02/24 0352)  SpO2: 100 % (02/02/24 0800) Vital Signs (24h Range):  Temp:  [97.8 °F (36.6 °C)-98.3 °F (36.8 °C)] 98 °F (36.7 °C)  Pulse:  [] 87  Resp:  [16-18] 16  SpO2:  [93 %-100 %] 100 %  BP: ()/(69-75) 103/70     Weight: 44.9 kg (99 lb)  Body mass index is 17.54 kg/m².    Intake/Output Summary (Last 24 hours) at 2/2/2024 1445  Last data filed at 2/2/2024 0500  Gross per 24 hour   Intake --   Output 1 ml   Net -1 ml         Physical Exam  Constitutional:       Appearance: She is well-developed. She is ill-appearing.      Comments: cacechtic   HENT:      Head: Normocephalic and atraumatic.   Eyes:      General: No scleral icterus.        Left eye: No discharge.   Cardiovascular:      Rate and Rhythm: Normal rate.   Pulmonary:      Effort: Pulmonary effort is normal. No respiratory distress.   Abdominal:      General: There is no distension.      Palpations: Abdomen is soft.   Musculoskeletal:         General: No swelling. Normal range of motion.      Cervical back: Neck supple.   Skin:     General: Skin is warm and dry.   Neurological:      Mental Status: She is alert. She is disoriented.      Motor: Weakness present.             Significant Labs: All pertinent labs within the past 24 hours have been reviewed.    Significant Imaging: I have reviewed all pertinent imaging results/findings within the past 24 hours.

## 2024-02-02 NOTE — PROGRESS NOTES
[facility] - Stepdown Flex ([place] [id number])  [facility] Medicine  Progress Note    Patient Name: Janie Zamorano  MRN: [id number]  Patient Class: IP- Inpatient   Admission Date: 1/28/2024  Length of Stay: 5 days  Attending Physician: Omega Garcia*  Primary Care Provider: He Hoyt Jr., MD        Subjective:     Principal Problem:Goals of care, counseling/discussion        HPI:  Mili Zamorano is a 67 with significant history for hypertension, paranoid schizophrenia with multiple inpatient psychiatric admissions, multiple myeloma dx 2018 followed by [facility], 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        Patient is a difficult historian with limited insight into their medical issues. AAO x 2 but not talkative.    collateral from son at the bedside. Patient was following with oncology at [place]. per son, primary oncologist mentioned she is not a candidate for further chemo about 3 months back and advised hospice. Patient is on home hospice and son not happy with current hospic agency. son wants to discuss with palliative care at [facility] and requesting followups regarding [employer] work . reports patient had   no significant oral intake for the last 7days -only drinking water mostly.. BP was in 80s this AM. son called. EMS.   EMS gave 1 L of fluid in route.  being admitted for evaluation of failure to thrive  per chart review, ultiple myeloma for the past 3-4 years.  recently admitted to [facility]  last year for psychosis and required treatmen. declined functional status over the last 2-3 years due to pain and debility from multiple myeloma. 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 needs assistance to transfer from  the bed to the walker and medication management.  H/o psychiatric admuisssions secondary to schizophrenia. At baseline, ADL and  ambulates with asssitance. she is bedbound x 1 week.     ER couse -   soft blood pressure 100/84 .  EKG without acute ischemia.  Labs with hypoglycemia 43 , anemia Hb 4.6 , and hypocalcemia 6.6 .     Corrected calcium is 8.5.  Ordered 1 g calcium gluconate. Treated with D10 x 250ml  glucose.  Consented for blood transfusion.  severe nonanion gap metabolic acidosis with bicarb of 12. lactate WNL        last admission 1/2- 1/3 -suspected hematemesis.  Suspect color from emesis due to Partha-Aid at lunch and dinner per patient.   found to have elevated troponin.  No chest pain or shortness a breath. Negative stress test.  2 D echo normal EF 75 80%, no mention of wall motion abnormality.  .  Hgb stable and  no overt signs of bleeding during stay. X ray right pelvis no fractures  but does show osseous destruction lesion that was also noted on CT 5/2/2022 per Radiology. Plan to follow up with Primary Oncologist at [facility]. discharged to [place].           Overview/Hospital Course:  1/29 Glucose stable. POCG glucose q 4H. SLP, palliative care  and psychiatry eval. per son , h/o catatonia. did not eat or drink  overnight . continue D5LR.  X ray abdomen -Nonobstructive bowel gas pattern. renal sonogram - Bilateral increased renal parenchymal echogenicity, with decreased corticomedullary distinction, possibly related to underlying medical renal disease.  Mild distention of the right renal pelvis, possibly on the basis of extrarenal pelvis.  Mild hydronephrosis not fully excluded.  CT RSS ordered . patient refused CT scan.corrected calcium is 8.4 . Hb stable    1/30 s/p psychiatry eval -started remeron 15mg PO nightly. did not take last night. SLP eval.  Glucose in 100s. continue D5LR.  Hematology consulted for further recommendations regarding multiple myeloma. SPEP, ADAM, immunoglobulins, FLC, 24 hour  urine protein to further evaluate myeloma. c/o back pain - started IV tylenol.  frequent reposition q2h. wound care consulted. continue bicarb drip. improved mentation and more interactive. answering questions.  SLP eval-  Soft & Bite Sized Diet - IDDSI Level 6, Thin liquids - IDDSI Level 0 . CT abdomen -  Large retroperitoneal mass involving multiple retroperitoneal structures including the aorta and IVC.  In addition there is a large destructive mass involving the right iliac is well as the gluteal musculature.  Findings may relate to patient's known multiple myeloma with lymphoma and sarcoma in the differential.Few left nephrolithiasis largest in the left renal pelvis measures 0.3 cm with minimal pelvic fullness without significant hydronephrosis. Right inferior pole density of nephrolithiasis versus cortical calcification.  No significant right-sided hydronephrosis.Innumerable lytic osseous lesions which is consistent with history of multiple myeloma.Remote T12 compression fracture deformity, likely pathological. Asymmetric thickening of the right gluteal musculature may relate to muscle invasion from the aforementioned mass versus gluteal hematoma.  1/31  folate deficiency -started on folic acid. tolerating oral meds .s/p wound care eval  IV fluids discontinued due to LE edema. continue POCT glucose q 4h .son wants the patient to go to [facility]. case management updated . Hypotension this PM to 80s and hypoglycemia. rapid response initiated. critical care consulted. NS bolus ordered. son agreeable for DNR. transitioned to comfort care .son agrees with no IV fluids.  continue glucose checks for now  and D10 PRN. transition to hospice    Interval History: Discussed plan of care with son at bedside. He wishes for patient to go to [facility] with palliative care services    Review of Systems  Objective:     Vital Signs (Most Recent):  Temp: 98 °F (36.7 °C) (02/02/24 0800)  Pulse: 87 (02/02/24  0800)  Resp: 16 (02/02/24 0352)  BP: 103/70 (02/02/24 0352)  SpO2: 100 % (02/02/24 0800) Vital Signs (24h Range):  Temp:  [97.8 °F (36.6 °C)-98.3 °F (36.8 °C)] 98 °F (36.7 °C)  Pulse:  [] 87  Resp:  [16-18] 16  SpO2:  [93 %-100 %] 100 %  BP: ()/(69-75) 103/70     Weight: 44.9 kg (99 lb)  Body mass index is 17.54 kg/m².    Intake/Output Summary (Last 24 hours) at 2/2/2024 1445  Last data filed at 2/2/2024 0500  Gross per 24 hour   Intake --   Output 1 ml   Net -1 ml         Physical Exam  Constitutional:       Appearance: She is well-developed. She is ill-appearing.      Comments: cacechtic   HENT:      Head: Normocephalic and atraumatic.   Eyes:      General: No scleral icterus.        Left eye: No discharge.   Cardiovascular:      Rate and Rhythm: Normal rate.   Pulmonary:      Effort: Pulmonary effort is normal. No respiratory distress.   Abdominal:      General: There is no distension.      Palpations: Abdomen is soft.   Musculoskeletal:         General: No swelling. Normal range of motion.      Cervical back: Neck supple.   Skin:     General: Skin is warm and dry.   Neurological:      Mental Status: She is alert. She is disoriented.      Motor: Weakness present.             Significant Labs: All pertinent labs within the past 24 hours have been reviewed.    Significant Imaging: I have reviewed all pertinent imaging results/findings within the past 24 hours.    Assessment/Plan:      * Goals of care, counseling/discussion  Advance Care Planning    Code Status  In light of the patients advanced and life limiting illness,I engaged the the family in a voluntary conversation about the patient's preferences for care  at the very end of life.  son wants full code for now. wants to discuss with palliative care  in AM    I spent a total of 25 minutes engaging the patient in this advance care planning discussion.    1/31 . Hypotension this PM to 80s and hypoglycemia. rapid response initiated. critical care  consulted. NS bolus ordered. son agreeable for DNR. transitioned to comfort care .son agrees with no IV fluids.  continue glucose checks for now  and D10 PRN. likely transition to hospice in AM            Folic acid deficiency  1/31 folate deficiency -started on folic acid. tolerating oral meds       Dysphagia  1/30 SLP eval-  Soft & Bite Sized Diet - IDDSI Level 6, Thin liquids - IDDSI Level 0 .       Palliative care encounter  Palliative care following      Severe malnutrition  Nutrition consulted. Most recent weight and BMI monitored-     Measurements:  Wt Readings from Last 1 Encounters:   01/28/24 44.9 kg (99 lb)   Body mass index is 17.54 kg/m².    Patient has been screened and assessed by RD.    Malnutrition Type:  Context: chronic illness  Level: severe    Malnutrition Characteristic Summary:  Weight Loss (Malnutrition): greater than 7.5% in 3 months (-18% x 3mo)  Subcutaneous Fat (Malnutrition):  (observed moderate to severe)  Muscle Mass (Malnutrition):  (observed severe)    Interventions/Recommendations (treatment strategy):  1.) If and when medically able, recommend to ADAT, with goal of Regular, texture per SLP- encourage PO intake if medically warranted. 2.) If medically able, recommend Boost (or Boost equivalent) BID as tolerated. 3.) If medically warranted/appropriate, consider appetite stimulant. 4.) Re-consult if alternative nutrition is medically appropriate. 5.) RD to monitor.      Hypothermia  Irving preethi ordered. TSH WNL       Hypoglycemia  secondary to poor oral intake  Last A1c reviewed-   Lab Results   Component Value Date    HGBA1C 4.9 06/02/2023    HGBA1C 5.4 08/03/2018    HGBA1C 5.5 10/27/2017     Will hold PO hypoglycemics and will start correctional scale insulin  Most recent fingerstick glucose reviewed-   Recent Labs   Lab 01/29/24  1611 01/29/24  2014 01/29/24  2354 01/30/24  0503   POCTGLUCOSE 127* 175* 118* 99     . Treated with D10 x 250ml  glucose.  POCT glucose q1h. D10 infusion     1/30 s/p psychiatry eval -started remeron 15mg PO nightly. did not take last night. SLP eval.  Glucose in 100s. continue D5LR.      Hypocalcemia  likely from above  Recent Labs   Lab 01/30/24  0721   CALCIUM 7.2*          Corrected calcium is 8.4.  Ordered 1 g calcium gluconate.       Failure to thrive in adult   difficult historian with limited insight into their medical issues.   collateral from son - No significant oral intake for the last 4-5 days.     Metabolic acidosis     likely from CKD/GLENIS . patient with bicarbonate   Recent Labs   Lab 01/30/24  0721 01/30/24  1916 01/31/24  0607   CO2 16* 16* 19*   s/p sodium bicarbonate  drip . monitor          History of small bowel obstruction   recent small bowel obstruction sp small bowel resection 8/5/2023     Hypotension  secondary to poor oral intake. resolved.continue IV Fluids       Lung mass   CX ray 1/28  Abnormal left upper thoracic region pleural base opacity, similar to 10/02/2023 chest radiograph, these are new compared to 02/21/2023 radiographs, further evaluation advised.  Malignancy cannot be excluded.            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 son reports vision loss in right eye    Leukopenia  2.59. secondary to above      Thrombocytopenia, unspecified    Patient with thrombocytopenia   Recent Labs   Lab 01/29/24  1757 01/30/24  0721 01/31/24  0607   * 139* 136*   . Platelet counts stable.monitor         Multiple myeloma   multiple myeloma dx 2018 followed by [facility]. 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 needs assistance to transfer from the bed to the walker     Heme/onc evalution   1/30  Hematology consulted for further recommendations regarding multiple myeloma. SPEP, ADAM, immunoglobulins, FLC, 24 hour urine protein to further evaluate myeloma    Not a candidate for further treatment per hematology    GLENIS (acute  kidney injury)  Patient with acute kidney injury/acute renal failure likely due to pre-renal azotemia due to IVVD GLENIS is currently worsening. Baseline creatinine  2.1  - Labs reviewed- Renal function/electrolytes with Estimated Creatinine Clearance: 13.8 mL/min (A) (based on SCr of 2.8 mg/dL (H)). according to latest data. Monitor urine output and serial BMP and adjust therapy as needed. Avoid nephrotoxins and renally dose meds for GFR listed above.     Obtain urine lytes , renal sonogram  and bladder scans q 6h  Recent Labs   Lab 01/30/24  0721 01/30/24  1916 01/31/24  0607   BUN 47* 47* 45*   CREATININE 2.9* 2.7* 2.8*       I & O   No intake or output data in the 24 hours ending 01/31/24 0751     Gentle IV hydration.   1/29    . X ray abdomen -Nonobstructive bowel gas pattern. renal sonogram - Bilateral increased renal parenchymal echogenicity, with decreased corticomedullary distinction, possibly related to underlying medical renal disease.  Mild distention of the right renal pelvis, possibly on the basis of extrarenal pelvis.  Mild hydronephrosis not fully excluded.  CT RSS ordered . 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 needs assistance to transfer from the bed to the walker .    Anemia in neoplastic disease    Patient's with macrocytic anemia.. Hemoglobin downtrending. Etiology likely due to  multiple myelonol not on treatment .  Current CBC reviewed-    Recent Labs   Lab 01/29/24  1757 01/30/24  0721 01/31/24  0607   HGB 9.6* 9.7* 10.6*         Component Value Date/Time    MCV 89 01/31/2024 0607    RDW 15.9 (H) 01/31/2024 0607    IRON 72 10/02/2023 1430    FERRITIN 230 10/02/2023 1430    FOLATE 2.6 (L) 01/31/2024 0607    LDSJYZDA29 >2000 (H) 01/31/2024 0607      Monitor CBC and transfuse if H/H drops below 7/21.      PRBC transfusion x2 .    Multiple myeloma not having achieved remission  as above   1/30   Hematology consulted for further recommendations regarding multiple myeloma. SPEP, ADAM, immunoglobulins, FLC, 24 hour urine protein to further evaluate myeloma.  CT abdomen -  Large retroperitoneal mass involving multiple retroperitoneal structures including the aorta and IVC.  In addition there is a large destructive mass involving the right iliac is well as the gluteal musculature.  Findings may relate to patient's known multiple myeloma with lymphoma and sarcoma in the differential.Few left nephrolithiasis largest in the left renal pelvis measures 0.3 cm with minimal pelvic fullness without significant hydronephrosis. Right inferior pole density of nephrolithiasis versus cortical calcification.  No significant right-sided hydronephrosis.Innumerable lytic osseous lesions which is consistent with history of multiple myeloma.Remote T12 compression fracture deformity, likely pathological. Asymmetric thickening of the right gluteal musculature may relate to muscle invasion from the aforementioned mass versus gluteal hematoma.      Paranoid schizophrenia   paranoid schizophrenia with multiple inpatient psychiatric admissions, - admitted for  reported decreased eating.  Patient is a difficult historian with limited insight into their medical issues.   collateral from son - No significant oral intake for the last 4-5 days.     psychiatry evaluation       VTE Risk Mitigation (From admission, onward)      None            Discharge Planning   EMELY: 2/2/2024     Code Status: DNR   Is the patient medically ready for discharge?: Yes    Reason for patient still in hospital (select all that apply): Patient trending condition  Discharge Plan A: Skilled Nursing Facility        Omega Garcia MD  Department of Hospital Medicine   [facility] - Stepdown Flex ([place])

## 2024-02-02 NOTE — PLAN OF CARE
02/02/24 1441   Discharge Planning   Assessment Type Discharge Planning Brief Assessment   Resource/Environmental Concerns none   Equipment Currently Used at Home cane, straight;walker, rolling   Patient/Family Anticipated Services at Transition none   DME Needed Upon Discharge  bedside commode   Discharge Plan A Skilled Nursing Facility   Discharge Plan B Skilled Nursing Facility     SW spoke with son in regards to DC planning.  Son is aware of pt going to [place] and he understands the auth process.  SW submitted for auth.  Will follow up.    Evelia Yao MSW, CSW

## 2024-02-02 NOTE — PLAN OF CARE
Pt was accepted in [place].  SW is waiting for auth.    Will follow up.    Evelia Yao MSW, CSW

## 2024-02-02 NOTE — NURSING
Pt AAO x2, no c/o pain. BG checked Q4, dropped <70 each time, Pt could not take PO. D10 given per order per hypoglycemic order. BG rechecked. MD notified for frequent hypoglycemic events. Skin care completed. Side rails up x2, family remains at the bedside. Call light within reach.

## 2024-02-03 LAB
GLUCOSE SERPL-MCNC: 119 MG/DL (ref 70–110)
POCT GLUCOSE: 101 MG/DL (ref 70–110)
POCT GLUCOSE: 104 MG/DL (ref 70–110)
POCT GLUCOSE: 53 MG/DL (ref 70–110)
POCT GLUCOSE: 57 MG/DL (ref 70–110)
POCT GLUCOSE: 59 MG/DL (ref 70–110)
POCT GLUCOSE: 62 MG/DL (ref 70–110)
POCT GLUCOSE: 63 MG/DL (ref 70–110)
POCT GLUCOSE: 72 MG/DL (ref 70–110)
POCT GLUCOSE: 80 MG/DL (ref 70–110)
POCT GLUCOSE: 90 MG/DL (ref 70–110)

## 2024-02-03 PROCEDURE — 25000003 PHARM REV CODE 250: Performed by: HOSPITALIST

## 2024-02-03 PROCEDURE — A4216 STERILE WATER/SALINE, 10 ML: HCPCS | Performed by: HOSPITALIST

## 2024-02-03 PROCEDURE — 20600001 HC STEP DOWN PRIVATE ROOM

## 2024-02-03 RX ADMIN — Medication 10 ML: at 05:02

## 2024-02-03 RX ADMIN — DEXTROSE MONOHYDRATE 125 ML: 10 INJECTION, SOLUTION INTRAVENOUS at 12:02

## 2024-02-03 RX ADMIN — Medication 10 ML: at 11:02

## 2024-02-03 RX ADMIN — DEXTROSE MONOHYDRATE 125 ML: 10 INJECTION, SOLUTION INTRAVENOUS at 10:02

## 2024-02-03 RX ADMIN — DEXTROSE MONOHYDRATE 125 ML: 10 INJECTION, SOLUTION INTRAVENOUS at 08:02

## 2024-02-03 RX ADMIN — DEXTROSE MONOHYDRATE 125 ML: 10 INJECTION, SOLUTION INTRAVENOUS at 05:02

## 2024-02-03 NOTE — SUBJECTIVE & OBJECTIVE
Interval History:  Hypoglycemia today that was corrected.  No new complaints at this time.  Disposition is for [facility] with palliative    Review of Systems   Unable to perform ROS: Other     Objective:     Vital Signs (Most Recent):  Temp: 96.4 °F (35.8 °C) (02/03/24 1230)  Pulse: 98 (02/03/24 1230)  Resp: 18 (02/03/24 1230)  BP: 130/76 (02/03/24 1230)  SpO2: 98 % (02/03/24 1230) Vital Signs (24h Range):  Temp:  [96.4 °F (35.8 °C)-98.6 °F (37 °C)] 96.4 °F (35.8 °C)  Pulse:  [84-98] 98  Resp:  [16-19] 18  SpO2:  [96 %-100 %] 98 %  BP: (100-130)/(70-77) 130/76     Weight: 44.9 kg (99 lb)  Body mass index is 17.54 kg/m².    Intake/Output Summary (Last 24 hours) at 2/3/2024 1305  Last data filed at 2/2/2024 1813  Gross per 24 hour   Intake --   Output 300 ml   Net -300 ml         Physical Exam  Constitutional:       Appearance: She is ill-appearing.      Comments: Sleepy but arousable   HENT:      Head: Normocephalic.      Right Ear: External ear normal.      Left Ear: External ear normal.      Nose: Nose normal.   Cardiovascular:      Rate and Rhythm: Normal rate.   Pulmonary:      Effort: Pulmonary effort is normal.   Skin:     General: Skin is warm.   Neurological:      Mental Status: Mental status is at baseline.

## 2024-02-03 NOTE — PROGRESS NOTES
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        Patient is a difficult historian with limited insight into their medical issues. AAO x 2 but not talkative.    collateral from son at the bedside. Patient was following with oncology at [place]. per son, primary oncologist mentioned she is not a candidate for further chemo about 3 months back and advised hospice. Patient is on home hospice and son not happy with current hospic agency. [place] [facility] [place]ups regarding [facility] work . reports patient had   no significant oral intake for the last 7days -only drinking water mostly.. BP was in 80s this AM. son called. EMS.   EMS gave 1 L of fluid in route.  being admitted for evaluation of failure to thrive  per chart review, ultiple myeloma for the past 3-4 years.  recently admitted to [facility]  last year for psychosis and required treatmen. declined functional status over the last 2-3 years due to pain and debility from multiple myeloma. 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 needs assistance to transfer from the bed to  the walker and medication management.  H/o psychiatric admuisssions secondary to schizophrenia. At baseline, ADL and  ambulates with asssitance. she is bedbound x 1 week.     ER couse -   soft blood pressure 100/84 .  EKG without acute ischemia.  Labs with hypoglycemia 43 , anemia Hb 4.6 , and hypocalcemia 6.6 .     Corrected calcium is 8.5.  Ordered 1 g calcium gluconate. Treated with D10 x 250ml  glucose.  Consented for blood transfusion.  severe nonanion gap metabolic acidosis with bicarb of 12. lactate WNL        last admission 1/2- 1/3 -suspected hematemesis.  Suspect color from emesis due to Partha-Aid at lunch and dinner per patient.   found to have elevated troponin.  No chest pain or shortness a breath. Negative stress test.  2 D echo normal EF 75 80%, no mention of wall motion abnormality.  .  Hgb stable and  no overt signs of bleeding during stay. X ray right pelvis no fractures  but does show osseous destruction lesion that was also noted on CT 5/2/2022 per Radiology. Plan to follow up with Primary Oncologist at [facility] [place]. [place] to [place].           Overview/Hospital Course:  1/29 Glucose stable. POCG glucose q 4H. SLP, palliative care  and psychiatry eval. per son , h/o catatonia. did not eat or drink  overnight . continue D5LR.  X ray abdomen -Nonobstructive bowel gas pattern. renal sonogram - Bilateral increased renal parenchymal echogenicity, with decreased corticomedullary distinction, possibly related to underlying medical renal disease.  Mild distention of the right renal pelvis, possibly on the basis of extrarenal pelvis.  Mild hydronephrosis not fully excluded.  CT RSS ordered . patient refused CT scan.corrected calcium is 8.4 . Hb stable    1/30 s/p psychiatry eval -started remeron 15mg PO nightly. did not take last night. SLP eval.  Glucose in 100s. continue D5LR.  Hematology consulted for further recommendations regarding multiple myeloma. SPEP, ADAM, immunoglobulins, FLC, 24 hour urine protein  to further evaluate myeloma. c/o back pain - started IV tylenol.  frequent reposition q2h. wound care consulted. continue bicarb drip. improved mentation and more interactive. answering questions.  SLP eval-  Soft & Bite Sized Diet - IDDSI Level 6, Thin liquids - IDDSI Level 0 . CT abdomen -  Large retroperitoneal mass involving multiple retroperitoneal structures including the aorta and IVC.  In addition there is a large destructive mass involving the right iliac is well as the gluteal musculature.  Findings may relate to patient's known multiple myeloma with lymphoma and sarcoma in the differential.Few left nephrolithiasis largest in the left renal pelvis measures 0.3 cm with minimal pelvic fullness without significant hydronephrosis. Right inferior pole density of nephrolithiasis versus cortical calcification.  No significant right-sided hydronephrosis.Innumerable lytic osseous lesions which is consistent with history of multiple myeloma.Remote T12 compression fracture deformity, likely pathological. Asymmetric thickening of the right gluteal musculature may relate to muscle invasion from the aforementioned mass versus gluteal hematoma.  1/31  folate deficiency -started on folic acid. tolerating oral meds .s/p wound care eval  IV fluids discontinued due to LE edema. continue POCT glucose q 4h .son wants the patient to go to [facility]. case management updated . Hypotension this PM to 80s and hypoglycemia. rapid response initiated. critical care consulted. NS bolus ordered. son agreeable for DNR. transitioned to comfort care .son agrees with no IV fluids.  continue glucose checks for now  and D10 PRN. transition to hospice    Interval History:  Hypoglycemia today that was corrected.  No new complaints at this time.  Disposition is for [facility] nursing home with palliative    Review of Systems   Unable to perform ROS: Other     Objective:     Vital Signs (Most Recent):  Temp: 96.4 °F (35.8 °C) (02/03/24  1230)  Pulse: 98 (02/03/24 1230)  Resp: 18 (02/03/24 1230)  BP: 130/76 (02/03/24 1230)  SpO2: 98 % (02/03/24 1230) Vital Signs (24h Range):  Temp:  [96.4 °F (35.8 °C)-98.6 °F (37 °C)] 96.4 °F (35.8 °C)  Pulse:  [84-98] 98  Resp:  [16-19] 18  SpO2:  [96 %-100 %] 98 %  BP: (100-130)/(70-77) 130/76     Weight: 44.9 kg (99 lb)  Body mass index is 17.54 kg/m².    Intake/Output Summary (Last 24 hours) at 2/3/2024 1305  Last data filed at 2/2/2024 1813  Gross per 24 hour   Intake --   Output 300 ml   Net -300 ml         Physical Exam  Constitutional:       Appearance: She is ill-appearing.      Comments: Sleepy but arousable   HENT:      Head: Normocephalic.      Right Ear: External ear normal.      Left Ear: External ear normal.      Nose: Nose normal.   Cardiovascular:      Rate and Rhythm: Normal rate.   Pulmonary:      Effort: Pulmonary effort is normal.   Skin:     General: Skin is warm.   Neurological:      Mental Status: Mental status is at baseline.                 Assessment/Plan:      * Goals of care, counseling/discussion  Advance Care Planning    Code Status  In light of the patients advanced and life limiting illness,I engaged the the family in a voluntary conversation about the patient's preferences for care  at the very end of life.  son wants full code for now. wants to discuss with palliative care  in AM    I spent a total of 25 minutes engaging the patient in this advance care planning discussion.    1/31 . Hypotension this PM to 80s and hypoglycemia. rapid response initiated. critical care consulted. NS bolus ordered. son agreeable for DNR. transitioned to comfort care .son agrees with no IV fluids.  continue glucose checks for now  and D10 PRN. likely transition to hospice in AM    2/3/24  Disposition plan at this time is nursing home at [facility] with palliative            Folic acid deficiency  1/31 folate deficiency -started on folic acid. tolerating oral meds       Dysphagia  1/30 SLP eval-   Soft & Bite Sized Diet - IDDSI Level 6, Thin liquids - IDDSI Level 0 .       Palliative care encounter  Palliative care following      Severe malnutrition  Nutrition consulted. Most recent weight and BMI monitored-     Measurements:  Wt Readings from Last 1 Encounters:   01/28/24 44.9 kg (99 lb)   Body mass index is 17.54 kg/m².    Patient has been screened and assessed by RD.    Malnutrition Type:  Context: chronic illness  Level: severe    Malnutrition Characteristic Summary:  Weight Loss (Malnutrition): greater than 7.5% in 3 months (-18% x 3mo)  Subcutaneous Fat (Malnutrition):  (observed moderate to severe)  Muscle Mass (Malnutrition):  (observed severe)    Interventions/Recommendations (treatment strategy):  1.) If and when medically able, recommend to ADAT, with goal of Regular, texture per SLP- encourage PO intake if medically warranted. 2.) If medically able, recommend Boost (or Boost equivalent) BID as tolerated. 3.) If medically warranted/appropriate, consider appetite stimulant. 4.) Re-consult if alternative nutrition is medically appropriate. 5.) RD to monitor.      Hypothermia  Irving oro ordered. TSH WNL       Hypoglycemia  secondary to poor oral intake  Last A1c reviewed-   Lab Results   Component Value Date    HGBA1C 4.9 06/02/2023    HGBA1C 5.4 08/03/2018    HGBA1C 5.5 10/27/2017     Will hold PO hypoglycemics and will start correctional scale insulin  Most recent fingerstick glucose reviewed-   Recent Labs   Lab 01/29/24  1611 01/29/24 2014 01/29/24  2354 01/30/24  0503   POCTGLUCOSE 127* 175* 118* 99     . Treated with D10 x 250ml  glucose.  POCT glucose q1h. D10 infusion    1/30 s/p psychiatry eval -started remeron 15mg PO nightly. did not take last night. SLP eval.  Glucose in 100s. continue D5LR.      Hypocalcemia  likely from above  Recent Labs   Lab 01/30/24  0721   CALCIUM 7.2*          Corrected calcium is 8.4.  Ordered 1 g calcium gluconate.       Failure to thrive in adult   difficult  historian with limited insight into their medical issues.   collateral from son - No significant oral intake for the last 4-5 days.     Metabolic acidosis     likely from CKD/GLENIS . patient with bicarbonate   Recent Labs   Lab 01/30/24  0721 01/30/24  1916 01/31/24  0607   CO2 16* 16* 19*   s/p sodium bicarbonate  drip . monitor          History of small bowel obstruction   recent small bowel obstruction sp small bowel resection 8/5/2023     Hypotension  secondary to poor oral intake. resolved.continue IV Fluids       Lung mass   CX ray 1/28  Abnormal left upper thoracic region pleural base opacity, similar to 10/02/2023 chest radiograph, these are new compared to 02/21/2023 radiographs, further evaluation advised.  Malignancy cannot be excluded.            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 son reports vision loss in right eye    Leukopenia  2.59. secondary to above      Thrombocytopenia, unspecified    Patient with thrombocytopenia   Recent Labs   Lab 01/29/24  1757 01/30/24  0721 01/31/24  0607   * 139* 136*   . Platelet counts stable.monitor         Multiple myeloma   multiple myeloma dx 2018 followed by [facility]. 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 needs assistance to transfer from the bed to the walker     Heme/onc evalution   1/30  Hematology consulted for further recommendations regarding multiple myeloma. SPEP, ADAM, immunoglobulins, FLC, 24 hour urine protein to further evaluate myeloma    Not a candidate for further treatment per hematology    GLENIS (acute kidney injury)  Patient with acute kidney injury/acute renal failure likely due to pre-renal azotemia due to IVVD GLENIS is currently worsening. Baseline creatinine  2.1  - Labs reviewed- Renal function/electrolytes with Estimated Creatinine Clearance: 13.8 mL/min (A) (based on SCr of 2.8 mg/dL (H)). according to latest data.  Monitor urine output and serial BMP and adjust therapy as needed. Avoid nephrotoxins and renally dose meds for GFR listed above.     Obtain urine lytes , renal sonogram  and bladder scans q 6h  Recent Labs   Lab 01/30/24  0721 01/30/24  1916 01/31/24  0607   BUN 47* 47* 45*   CREATININE 2.9* 2.7* 2.8*       I & O   No intake or output data in the 24 hours ending 01/31/24 0751     Gentle IV hydration.   1/29    . X ray abdomen -Nonobstructive bowel gas pattern. renal sonogram - Bilateral increased renal parenchymal echogenicity, with decreased corticomedullary distinction, possibly related to underlying medical renal disease.  Mild distention of the right renal pelvis, possibly on the basis of extrarenal pelvis.  Mild hydronephrosis not fully excluded.  CT RSS ordered . 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 needs assistance to transfer from the bed to the walker .    Anemia in neoplastic disease    Patient's with macrocytic anemia.. Hemoglobin downtrending. Etiology likely due to  multiple myelonol not on treatment .  Current CBC reviewed-    Recent Labs   Lab 01/29/24  1757 01/30/24  0721 01/31/24  0607   HGB 9.6* 9.7* 10.6*         Component Value Date/Time    MCV 89 01/31/2024 0607    RDW 15.9 (H) 01/31/2024 0607    IRON 72 10/02/2023 1430    FERRITIN 230 10/02/2023 1430    FOLATE 2.6 (L) 01/31/2024 0607    RJIJOWCY04 >2000 (H) 01/31/2024 0607     Monitor CBC and transfuse if H/H drops below 7/21.      PRBC transfusion x2 .    Multiple myeloma not having achieved remission  as above   1/30   Hematology consulted for further recommendations regarding multiple myeloma. SPEP, ADAM, immunoglobulins, FLC, 24 hour urine protein to further evaluate myeloma.  CT abdomen -   Large retroperitoneal mass involving multiple retroperitoneal structures including the aorta and IVC.  In addition there is a large destructive mass involving the right iliac is well as the gluteal musculature.  Findings may relate to patient's known multiple myeloma with lymphoma and sarcoma in the differential.Few left nephrolithiasis largest in the left renal pelvis measures 0.3 cm with minimal pelvic fullness without significant hydronephrosis. Right inferior pole density of nephrolithiasis versus cortical calcification.  No significant right-sided hydronephrosis.Innumerable lytic osseous lesions which is consistent with history of multiple myeloma.Remote T12 compression fracture deformity, likely pathological. Asymmetric thickening of the right gluteal musculature may relate to muscle invasion from the aforementioned mass versus gluteal hematoma.      Paranoid schizophrenia   paranoid schizophrenia with multiple inpatient psychiatric admissions, - admitted for  reported decreased eating.  Patient is a difficult historian with limited insight into their medical issues.   collateral from son - No significant oral intake for the last 4-5 days.     psychiatry evaluation       VTE Risk Mitigation (From admission, onward)      None            Discharge Planning   EMELY: 2/2/2024     Code Status: DNR   Is the patient medically ready for discharge?: Yes    Reason for patient still in hospital (select all that apply): Patient trending condition  Discharge Plan A: (P) New Nursing Home placement - detention care facility (with hospice)   Discharge Delays: (P) None known at this time              Daniel Covarrubias MD  Department of Hospital Medicine   [facility] - Stepdown Flex ([place])

## 2024-02-03 NOTE — ASSESSMENT & PLAN NOTE
Advance Care Planning     Code Status  In light of the patients advanced and life limiting illness,I engaged the the family in a voluntary conversation about the patient's preferences for care  at the very end of life.  son wants full code for now. wants to discuss with palliative care  in AM    I spent a total of 25 minutes engaging the patient in this advance care planning discussion.    1/31 . Hypotension this PM to 80s and hypoglycemia. rapid response initiated. critical care consulted. NS bolus ordered. son agreeable for DNR. transitioned to comfort care .son agrees with no IV fluids.  continue glucose checks for now  and D10 PRN. likely transition to hospice in AM    2/3/24  Disposition plan at this time is nursing home at [facility] with palliative

## 2024-02-03 NOTE — PLAN OF CARE
02/03/24 1306   Post-Acute Status   Post-Acute Authorization Placement;Hospice   Hospice Status Referrals Sent   [facility] CHOICES -   Discharge Delays None known at this time   Discharge Plan   Discharge Plan A New Nursing Home placement - longterm care facility  (with hospice)     [facility] Referral  [facility] Phone: [phone]      Hospice Referral   [facility] Phone: [phone]        Deedee Luna RN  Case Management  [facility]  [phone]

## 2024-02-03 NOTE — NURSING
Pt aao x2, no c/o pain. Pt tolerating diet poorly, refused eating & drinking. Had multiple hypoglycemic, D10% bolus given per order. Wound care done, Turned frequently. Side rails x2, son remains at the bedside, call light within reach.

## 2024-02-03 NOTE — NURSING
Pt's 8pm BG was 82, pt recheck after admin D10 x125ml after BG of 52 was 119. Writers glucometer isn't transmitting but tech's is, 0515 POCT 72.

## 2024-02-04 LAB
GLUCOSE SERPL-MCNC: 108 MG/DL (ref 70–110)
GLUCOSE SERPL-MCNC: 109 MG/DL (ref 70–110)
POCT GLUCOSE: 100 MG/DL (ref 70–110)
POCT GLUCOSE: 105 MG/DL (ref 70–110)
POCT GLUCOSE: 108 MG/DL (ref 70–110)
POCT GLUCOSE: 109 MG/DL (ref 70–110)
POCT GLUCOSE: 119 MG/DL (ref 70–110)
POCT GLUCOSE: 63 MG/DL (ref 70–110)
POCT GLUCOSE: 68 MG/DL (ref 70–110)
POCT GLUCOSE: 69 MG/DL (ref 70–110)
POCT GLUCOSE: 70 MG/DL (ref 70–110)
POCT GLUCOSE: 76 MG/DL (ref 70–110)
POCT GLUCOSE: 80 MG/DL (ref 70–110)

## 2024-02-04 PROCEDURE — 25000003 PHARM REV CODE 250: Performed by: HOSPITALIST

## 2024-02-04 PROCEDURE — A4216 STERILE WATER/SALINE, 10 ML: HCPCS | Performed by: HOSPITALIST

## 2024-02-04 PROCEDURE — 25000003 PHARM REV CODE 250: Performed by: INTERNAL MEDICINE

## 2024-02-04 PROCEDURE — 20600001 HC STEP DOWN PRIVATE ROOM

## 2024-02-04 RX ORDER — DEXTROSE MONOHYDRATE 100 MG/ML
INJECTION, SOLUTION INTRAVENOUS CONTINUOUS
Status: DISCONTINUED | OUTPATIENT
Start: 2024-02-04 | End: 2024-02-05

## 2024-02-04 RX ADMIN — DEXTROSE MONOHYDRATE 125 ML: 10 INJECTION, SOLUTION INTRAVENOUS at 08:02

## 2024-02-04 RX ADMIN — DEXTROSE MONOHYDRATE 125 ML: 10 INJECTION, SOLUTION INTRAVENOUS at 03:02

## 2024-02-04 RX ADMIN — DEXTROSE MONOHYDRATE: 100 INJECTION, SOLUTION INTRAVENOUS at 03:02

## 2024-02-04 RX ADMIN — Medication 10 ML: at 03:02

## 2024-02-04 RX ADMIN — DEXTROSE MONOHYDRATE 250 ML: 100 INJECTION, SOLUTION INTRAVENOUS at 05:02

## 2024-02-04 RX ADMIN — DEXTROSE MONOHYDRATE 125 ML: 10 INJECTION, SOLUTION INTRAVENOUS at 11:02

## 2024-02-04 NOTE — PROGRESS NOTES
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        Patient is a difficult historian with limited insight into their medical issues. AAO x 2 but not talkative.    collateral from son at the bedside. Patient was following with oncology at [place]. per son, primary oncologist mentioned she is not a candidate for further chemo about 3 months back and advised hospice. Patient is on home hospice and son not happy with current hospic agency. son wants to discuss with palliative care at [facility] and requesting followups regarding [employer] work . reports patient had   no significant oral intake for the last 7days -only drinking water mostly.. BP was in 80s this AM. son called. EMS.   EMS gave 1 L of fluid in route.  being admitted for evaluation of failure to thrive  per chart review, ultiple myeloma for the past 3-4 years.  recently admitted to [facility]  last year for psychosis and required treatmen. declined functional status over the last 2-3 years due to pain and debility from multiple myeloma. 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 needs assistance to transfer from the bed to  the walker and medication management.  H/o psychiatric admuisssions secondary to schizophrenia. At baseline, ADL and  ambulates with asssitance. she is bedbound x 1 week.     ER couse -   soft blood pressure 100/84 .  EKG without acute ischemia.  Labs with hypoglycemia 43 , anemia Hb 4.6 , and hypocalcemia 6.6 .     Corrected calcium is 8.5.  Ordered 1 g calcium gluconate. Treated with D10 x 250ml  glucose.  Consented for blood transfusion.  severe nonanion gap metabolic acidosis with bicarb of 12. lactate WNL        last admission 1/2- 1/3 -suspected hematemesis.  Suspect color from emesis due to Partha-Aid at lunch and dinner per patient.   found to have elevated troponin.  No chest pain or shortness a breath. Negative stress test.  2 D echo normal EF 75 80%, no mention of wall motion abnormality.  .  Hgb stable and  no overt signs of bleeding during stay. X ray right pelvis no fractures  but does show osseous destruction lesion that was also noted on CT 5/2/2022 per Radiology. Plan to follow up with Primary Oncologist at [facility]. discharged to [place].           Overview/Hospital Course:  1/29 Glucose stable. POCG glucose q 4H. SLP, palliative care  and psychiatry eval. per son , h/o catatonia. did not eat or drink  overnight . continue D5LR.  X ray abdomen -Nonobstructive bowel gas pattern. renal sonogram - Bilateral increased renal parenchymal echogenicity, with decreased corticomedullary distinction, possibly related to underlying medical renal disease.  Mild distention of the right renal pelvis, possibly on the basis of extrarenal pelvis.  Mild hydronephrosis not fully excluded.  CT RSS ordered . patient refused CT scan.corrected calcium is 8.4 . Hb stable    1/30 s/p psychiatry eval -started remeron 15mg PO nightly. did not take last night. SLP eval.  Glucose in 100s. continue D5LR.  Hematology consulted for further recommendations regarding multiple myeloma. SPEP, ADAM, immunoglobulins, FLC, 24 hour urine protein  to further evaluate myeloma. c/o back pain - started IV tylenol.  frequent reposition q2h. wound care consulted. continue bicarb drip. improved mentation and more interactive. answering questions.  SLP eval-  Soft & Bite Sized Diet - IDDSI Level 6, Thin liquids - IDDSI Level 0 . CT abdomen -  Large retroperitoneal mass involving multiple retroperitoneal structures including the aorta and IVC.  In addition there is a large destructive mass involving the right iliac is well as the gluteal musculature.  Findings may relate to patient's known multiple myeloma with lymphoma and sarcoma in the differential.Few left nephrolithiasis largest in the left renal pelvis measures 0.3 cm with minimal pelvic fullness without significant hydronephrosis. Right inferior pole density of nephrolithiasis versus cortical calcification.  No significant right-sided hydronephrosis.Innumerable lytic osseous lesions which is consistent with history of multiple myeloma.Remote T12 compression fracture deformity, likely pathological. Asymmetric thickening of the right gluteal musculature may relate to muscle invasion from the aforementioned mass versus gluteal hematoma.  1/31  folate deficiency -started on folic acid. tolerating oral meds .s/p wound care eval  IV fluids discontinued due to LE edema. continue POCT glucose q 4h .son wants the patient to go to [facility]. case management updated . Hypotension this PM to 80s and hypoglycemia. rapid response initiated. critical care consulted. NS bolus ordered. son agreeable for DNR. transitioned to comfort care .son agrees with no IV fluids.  continue glucose checks for now  and D10 PRN. transition to hospice    Interval History:  Mild hypoglycemia noted this morning which was corrected.    Review of Systems   Unable to perform ROS: Other     Objective:     Vital Signs (Most Recent):  Temp: 96.5 °F (35.8 °C) (02/04/24 1130)  Pulse: 101 (02/04/24 1130)  Resp: 18 (02/04/24 1130)  BP: 116/75  (02/04/24 1130)  SpO2: 100 % (02/04/24 1130) Vital Signs (24h Range):  Temp:  [96.3 °F (35.7 °C)-98.8 °F (37.1 °C)] 96.5 °F (35.8 °C)  Pulse:  [] 101  Resp:  [16-18] 18  SpO2:  [95 %-100 %] 100 %  BP: (112-126)/(72-82) 116/75     Weight: 44.9 kg (99 lb)  Body mass index is 17.54 kg/m².    Intake/Output Summary (Last 24 hours) at 2/4/2024 1458  Last data filed at 2/4/2024 1149  Gross per 24 hour   Intake 250 ml   Output 425 ml   Net -175 ml         Physical Exam  Constitutional:       Appearance: She is ill-appearing.      Comments: Sleepy but arousable   HENT:      Head: Normocephalic.      Right Ear: External ear normal.      Left Ear: External ear normal.      Nose: Nose normal.   Cardiovascular:      Rate and Rhythm: Normal rate.   Pulmonary:      Effort: Pulmonary effort is normal.   Skin:     General: Skin is warm.   Neurological:      Mental Status: Mental status is at baseline.        Assessment/Plan:      * Goals of care, counseling/discussion  Advance Care Planning    Code Status  In light of the patients advanced and life limiting illness,I engaged the the family in a voluntary conversation about the patient's preferences for care  at the very end of life.  son wants full code for now. wants to discuss with palliative care  in AM    I spent a total of 25 minutes engaging the patient in this advance care planning discussion.    1/31 . Hypotension this PM to 80s and hypoglycemia. rapid response initiated. critical care consulted. NS bolus ordered. son agreeable for DNR. transitioned to comfort care .son agrees with no IV fluids.  continue glucose checks for now  and D10 PRN. likely transition to hospice in AM    2/3/24  Disposition plan at this time is nursing home at [facility] with palliative            Folic acid deficiency  1/31 folate deficiency -started on folic acid. tolerating oral meds       Dysphagia  1/30 SLP eval-  Soft & Bite Sized Diet - IDDSI Level 6, Thin liquids - IDDSI Level 0 .        Palliative care encounter  Palliative care following      Severe malnutrition  Nutrition consulted. Most recent weight and BMI monitored-     Measurements:  Wt Readings from Last 1 Encounters:   01/28/24 44.9 kg (99 lb)   Body mass index is 17.54 kg/m².    Patient has been screened and assessed by RD.    Malnutrition Type:  Context: chronic illness  Level: severe    Malnutrition Characteristic Summary:  Weight Loss (Malnutrition): greater than 7.5% in 3 months (-18% x 3mo)  Subcutaneous Fat (Malnutrition):  (observed moderate to severe)  Muscle Mass (Malnutrition):  (observed severe)    Interventions/Recommendations (treatment strategy):  1.) If and when medically able, recommend to ADAT, with goal of Regular, texture per SLP- encourage PO intake if medically warranted. 2.) If medically able, recommend Boost (or Boost equivalent) BID as tolerated. 3.) If medically warranted/appropriate, consider appetite stimulant. 4.) Re-consult if alternative nutrition is medically appropriate. 5.) RD to monitor.      Hypothermia  Irving hugger ordered. TSH WNL       Hypoglycemia  secondary to poor oral intake  Last A1c reviewed-   Lab Results   Component Value Date    HGBA1C 4.9 06/02/2023    HGBA1C 5.4 08/03/2018    HGBA1C 5.5 10/27/2017     Will hold PO hypoglycemics and will start correctional scale insulin  Most recent fingerstick glucose reviewed-   Recent Labs   Lab 01/29/24  1611 01/29/24 2014 01/29/24  2354 01/30/24  0503   POCTGLUCOSE 127* 175* 118* 99     . Treated with D10 x 250ml  glucose.  POCT glucose q1h. D10 infusion    1/30 s/p psychiatry eval -started remeron 15mg PO nightly. did not take last night. SLP eval.  Glucose in 100s. continue D5LR.      Hypocalcemia  likely from above  Recent Labs   Lab 01/30/24  0721   CALCIUM 7.2*          Corrected calcium is 8.4.  Ordered 1 g calcium gluconate.       Failure to thrive in adult   difficult historian with limited insight into their medical issues.   collateral  from son - No significant oral intake for the last 4-5 days.     Metabolic acidosis     likely from CKD/GLENIS . patient with bicarbonate   Recent Labs   Lab 01/30/24  0721 01/30/24  1916 01/31/24  0607   CO2 16* 16* 19*   s/p sodium bicarbonate  drip . monitor          History of small bowel obstruction   recent small bowel obstruction sp small bowel resection 8/5/2023     Hypotension  secondary to poor oral intake. resolved.continue IV Fluids       Lung mass   CX ray 1/28  Abnormal left upper thoracic region pleural base opacity, similar to 10/02/2023 chest radiograph, these are new compared to 02/21/2023 radiographs, further evaluation advised.  Malignancy cannot be excluded.            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 son reports vision loss in right eye    Leukopenia  2.59. secondary to above      Thrombocytopenia, unspecified    Patient with thrombocytopenia   Recent Labs   Lab 01/29/24  1757 01/30/24  0721 01/31/24  0607   * 139* 136*   . Platelet counts stable.monitor         Multiple myeloma   multiple myeloma dx 2018 followed by [facility]. 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 needs assistance to transfer from the bed to the walker     Heme/onc evalution   1/30  Hematology consulted for further recommendations regarding multiple myeloma. SPEP, ADAM, immunoglobulins, FLC, 24 hour urine protein to further evaluate myeloma    Not a candidate for further treatment per hematology    GLENIS (acute kidney injury)  Patient with acute kidney injury/acute renal failure likely due to pre-renal azotemia due to IVVD GLENIS is currently worsening. Baseline creatinine  2.1  - Labs reviewed- Renal function/electrolytes with Estimated Creatinine Clearance: 13.8 mL/min (A) (based on SCr of 2.8 mg/dL (H)). according to latest data. Monitor urine output and serial BMP and adjust therapy as needed. Avoid  nephrotoxins and renally dose meds for GFR listed above.     Obtain urine lytes , renal sonogram  and bladder scans q 6h  Recent Labs   Lab 01/30/24  0721 01/30/24  1916 01/31/24  0607   BUN 47* 47* 45*   CREATININE 2.9* 2.7* 2.8*       I & O   No intake or output data in the 24 hours ending 01/31/24 0751     Gentle IV hydration.   1/29    . X ray abdomen -Nonobstructive bowel gas pattern. renal sonogram - Bilateral increased renal parenchymal echogenicity, with decreased corticomedullary distinction, possibly related to underlying medical renal disease.  Mild distention of the right renal pelvis, possibly on the basis of extrarenal pelvis.  Mild hydronephrosis not fully excluded.  CT RSS ordered . 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 needs assistance to transfer from the bed to the walker .    Anemia in neoplastic disease    Patient's with macrocytic anemia.. Hemoglobin downtrending. Etiology likely due to  multiple myelonol not on treatment .  Current CBC reviewed-    Recent Labs   Lab 01/29/24  1757 01/30/24  0721 01/31/24  0607   HGB 9.6* 9.7* 10.6*         Component Value Date/Time    MCV 89 01/31/2024 0607    RDW 15.9 (H) 01/31/2024 0607    IRON 72 10/02/2023 1430    FERRITIN 230 10/02/2023 1430    FOLATE 2.6 (L) 01/31/2024 0607    FLUHEHJD96 >2000 (H) 01/31/2024 0607     Monitor CBC and transfuse if H/H drops below 7/21.      PRBC transfusion x2 .    Multiple myeloma not having achieved remission  as above   1/30   Hematology consulted for further recommendations regarding multiple myeloma. SPEP, ADAM, immunoglobulins, FLC, 24 hour urine protein to further evaluate myeloma.  CT abdomen -  Large retroperitoneal mass involving multiple retroperitoneal structures  including the aorta and IVC.  In addition there is a large destructive mass involving the right iliac is well as the gluteal musculature.  Findings may relate to patient's known multiple myeloma with lymphoma and sarcoma in the differential.Few left nephrolithiasis largest in the left renal pelvis measures 0.3 cm with minimal pelvic fullness without significant hydronephrosis. Right inferior pole density of nephrolithiasis versus cortical calcification.  No significant right-sided hydronephrosis.Innumerable lytic osseous lesions which is consistent with history of multiple myeloma.Remote T12 compression fracture deformity, likely pathological. Asymmetric thickening of the right gluteal musculature may relate to muscle invasion from the aforementioned mass versus gluteal hematoma.      Paranoid schizophrenia   paranoid schizophrenia with multiple inpatient psychiatric admissions, - admitted for  reported decreased eating.  Patient is a difficult historian with limited insight into their medical issues.   collateral from son - No significant oral intake for the last 4-5 days.     psychiatry evaluation       VTE Risk Mitigation (From admission, onward)      None            Discharge Planning   EMELY: 2/2/2024     Code Status: DNR   Is the patient medically ready for discharge?: Yes    Reason for patient still in hospital (select all that apply): Patient trending condition  Discharge Plan A: New Nursing Home placement - retirement care facility (with hospice)   Discharge Delays: None known at this time              Daniel Covarrubias MD  Department of Hospital Medicine   [facility] - Stepdown Flex ([place])

## 2024-02-04 NOTE — PLAN OF CARE
Problem: Coping Ineffective  Goal: Effective Coping  Outcome: Ongoing, Progressing     Problem: Oral Intake Inadequate (Acute Kidney Injury/Impairment)  Goal: Optimal Nutrition Intake  Outcome: Ongoing, Not Progressing     Problem: Skin Injury Risk Increased  Goal: Skin Health and Integrity  Outcome: Ongoing, Not Progressing     Problem: Fall Injury Risk  Goal: Absence of Fall and Fall-Related Injury  Outcome: Ongoing, Progressing     Problem: Impaired Wound Healing  Goal: Optimal Wound Healing  Outcome: Ongoing, Not Progressing     Plan of care reviewed with pt. Pt aaox2, disorientated to time and situation. Pt has a very poor appetite. Pt recieived D10 bolus 3 times during shift for glucose less than 70. Pt started on a D10 drip at 50 ml/hr. All other VSS. Pt remained free of falls, trauma, and injury. No other concerns at this time.

## 2024-02-04 NOTE — NURSING
Pt aao x2, no c/o pain. Pt tolerating diet poorly, refused eating & drinking. Had multiple hypoglycemic, D10% bolus given per order. Wound care done, Turned frequently. Side rails x2, family remains at the bedside, call light within reach.

## 2024-02-04 NOTE — SUBJECTIVE & OBJECTIVE
Interval History:  Mild hypoglycemia noted this morning which was corrected.    Review of Systems   Unable to perform ROS: Other     Objective:     Vital Signs (Most Recent):  Temp: 96.5 °F (35.8 °C) (02/04/24 1130)  Pulse: 101 (02/04/24 1130)  Resp: 18 (02/04/24 1130)  BP: 116/75 (02/04/24 1130)  SpO2: 100 % (02/04/24 1130) Vital Signs (24h Range):  Temp:  [96.3 °F (35.7 °C)-98.8 °F (37.1 °C)] 96.5 °F (35.8 °C)  Pulse:  [] 101  Resp:  [16-18] 18  SpO2:  [95 %-100 %] 100 %  BP: (112-126)/(72-82) 116/75     Weight: 44.9 kg (99 lb)  Body mass index is 17.54 kg/m².    Intake/Output Summary (Last 24 hours) at 2/4/2024 1458  Last data filed at 2/4/2024 1149  Gross per 24 hour   Intake 250 ml   Output 425 ml   Net -175 ml         Physical Exam  Constitutional:       Appearance: She is ill-appearing.      Comments: Sleepy but arousable   HENT:      Head: Normocephalic.      Right Ear: External ear normal.      Left Ear: External ear normal.      Nose: Nose normal.   Cardiovascular:      Rate and Rhythm: Normal rate.   Pulmonary:      Effort: Pulmonary effort is normal.   Skin:     General: Skin is warm.   Neurological:      Mental Status: Mental status is at baseline.

## 2024-02-05 PROBLEM — L24.A2 IRRITANT CONTACT DERMATITIS DUE TO FECAL, URINARY OR DUAL INCONTINENCE: Status: ACTIVE | Noted: 2024-02-05

## 2024-02-05 PROBLEM — T68.XXXA HYPOTHERMIA: Status: RESOLVED | Noted: 2024-01-28 | Resolved: 2024-02-05

## 2024-02-05 PROBLEM — N17.9 AKI (ACUTE KIDNEY INJURY): Status: RESOLVED | Noted: 2018-08-18 | Resolved: 2024-02-05

## 2024-02-05 LAB
POCT GLUCOSE: 103 MG/DL (ref 70–110)
POCT GLUCOSE: 107 MG/DL (ref 70–110)
POCT GLUCOSE: 107 MG/DL (ref 70–110)
POCT GLUCOSE: 109 MG/DL (ref 70–110)
POCT GLUCOSE: 112 MG/DL (ref 70–110)
POCT GLUCOSE: 119 MG/DL (ref 70–110)
POCT GLUCOSE: 121 MG/DL (ref 70–110)
POCT GLUCOSE: 147 MG/DL (ref 70–110)
POCT GLUCOSE: 67 MG/DL (ref 70–110)
POCT GLUCOSE: 82 MG/DL (ref 70–110)
POCT GLUCOSE: 91 MG/DL (ref 70–110)
SARS-COV-2 RNA RESP QL NAA+PROBE: NOT DETECTED

## 2024-02-05 PROCEDURE — 25000003 PHARM REV CODE 250: Performed by: HOSPITALIST

## 2024-02-05 PROCEDURE — 63600175 PHARM REV CODE 636 W HCPCS: Performed by: HOSPITALIST

## 2024-02-05 PROCEDURE — 25000003 PHARM REV CODE 250: Performed by: INTERNAL MEDICINE

## 2024-02-05 PROCEDURE — 25000003 PHARM REV CODE 250: Performed by: STUDENT IN AN ORGANIZED HEALTH CARE EDUCATION/TRAINING PROGRAM

## 2024-02-05 PROCEDURE — 99222 1ST HOSP IP/OBS MODERATE 55: CPT | Mod: ,,, | Performed by: NURSE PRACTITIONER

## 2024-02-05 PROCEDURE — 20600001 HC STEP DOWN PRIVATE ROOM

## 2024-02-05 PROCEDURE — A4216 STERILE WATER/SALINE, 10 ML: HCPCS | Performed by: HOSPITALIST

## 2024-02-05 PROCEDURE — 87635 SARS-COV-2 COVID-19 AMP PRB: CPT | Performed by: INTERNAL MEDICINE

## 2024-02-05 RX ADMIN — POLYETHYLENE GLYCOL 3350 17 G: 17 POWDER, FOR SOLUTION ORAL at 08:02

## 2024-02-05 RX ADMIN — DEXTROSE MONOHYDRATE: 100 INJECTION, SOLUTION INTRAVENOUS at 01:02

## 2024-02-05 RX ADMIN — DEXTROSE MONOHYDRATE 125 ML: 10 INJECTION, SOLUTION INTRAVENOUS at 07:02

## 2024-02-05 RX ADMIN — Medication 10 ML: at 12:02

## 2024-02-05 RX ADMIN — Medication 10 ML: at 06:02

## 2024-02-05 RX ADMIN — PROCHLORPERAZINE EDISYLATE 5 MG: 5 INJECTION INTRAMUSCULAR; INTRAVENOUS at 01:02

## 2024-02-05 NOTE — PROGRESS NOTES
[facility] - Stepdown Flex ([facility])  Adult Nutrition  Progress Note    SUMMARY       Recommendations    Continue Soft & Bite Sized (level 6) diet as tolerated; ONS ordered.  RD following.    Goals: Meet % EEN, EPN by RD f/u date  Nutrition Goal Status: goal not met  Communication of RD Recs:  (POC)    Assessment and Plan    Severe malnutrition    Malnutrition Type:  Context: chronic illness  Level: severe    Related to (etiology):   Multi myeloma    Signs and Symptoms (as evidenced by):   Visual NFPE: observed moderate to severe muscle and fat wasting and -18% x 3mo wt loss     Malnutrition Characteristic Summary:  Weight Loss (Malnutrition): greater than 7.5% in 3 months (-18% x 3mo)  Subcutaneous Fat (Malnutrition):  (observed moderate to severe)  Muscle Mass (Malnutrition):  (observed severe)    Interventions/Recommendations (treatment strategy):  Collaboration of nutrition care w/ other providers  ONS    Nutrition Diagnosis Status:   Continues    Malnutrition Assessment  Malnutrition Context: chronic illness  Malnutrition Level: severe    Weight Loss (Malnutrition): greater than 7.5% in 3 months (-18% x 3mo)  Subcutaneous Fat (Malnutrition):  (observed moderate to severe)  Muscle Mass (Malnutrition):  (observed severe)   Orbital Region (Subcutaneous Fat Loss): moderate depletion   Lane Region (Muscle Loss): severe depletion     Reason for Assessment    Reason For Assessment: RD follow-up  Diagnosis:  (FTT)  Relevant Medical History: hypertension, paranoid schizophrenia with multiple inpatient psychiatric admissions; multi myeloma  Interdisciplinary Rounds: did not attend    General Information Comments: Pt noted w/ poor PO intake - ONS just ordered. Pt transitioned to comfort care 1/31, awaiting hospice placement. Severe malnutrition diagnosis continues; please see PES statement for details.  Nutrition Discharge Planning: Adequate nutrition    Nutrition/Diet History    Food Allergies:  "shellfish  Factors Affecting Nutritional Intake: decreased appetite    Anthropometrics    Temp: 98.2 °F (36.8 °C)  Height: 5' 3" (160 cm)  Height (inches): 63 in  Weight Method: Bed Scale  Weight: 44.9 kg (98 lb 15.8 oz)  Weight (lb): 98.99 lb  Ideal Body Weight (IBW), Female: 115 lb  % Ideal Body Weight, Female (lb): 86.08 %  BMI (Calculated): 17.5  BMI Grade: 17 - 18.4 protein-energy malnutrition grade I    Lab/Procedures/Meds    Pertinent Labs Reviewed: reviewed  Pertinent Labs Comments: Creat 2.8, GFR 17.9  Pertinent Medications Reviewed: reviewed  Pertinent Medications Comments: D10    Estimated/Assessed Needs    Weight Used For Calorie Calculations: 45 kg (99 lb 3.3 oz)    Energy Calorie Requirements (kcal): 1575 kcal/d  Energy Need Method: Kcal/kg (35 kcal/kg)    Protein Requirements: 59 g/d (1.3 g/kg)  Weight Used For Protein Calculations: 45 kg (99 lb 3.3 oz)    Estimated Fluid Requirement Method: other (see comments) (Per MD)  RDA Method (mL): 1575    Nutrition Prescription Ordered    Current Diet Order: Soft & Bite Sized (level 6)    Evaluation of Received Nutrient/Fluid Intake    I/O: +1L since admit    Comments: LBM: 2/1    Tolerance: tolerating    Nutrition Risk    Level of Risk/Frequency of Follow-up:  (1x/week)     Monitor and Evaluation    Food and Nutrient Intake: energy intake, food and beverage intake  Food and Nutrient Adminstration: diet order  Physical Activity and Function: nutrition-related ADLs and IADLs  Anthropometric Measurements: weight, weight change, body mass index  Biochemical Data, Medical Tests and Procedures: electrolyte and renal panel, lipid profile, gastrointestinal profile, glucose/endocrine profile, inflammatory profile  Nutrition-Focused Physical Findings: overall appearance, skin     Nutrition Follow-Up    RD Follow-up?: Yes      "

## 2024-02-05 NOTE — PLAN OF CARE
[facility]  Department of Hospital Medicine  [address] [place] [address]  [phone] after hours  [phone] fax    NURSING HOSPICE  ORDERS    02/05/2024    Admit to Nursing Home Hospice:      Diagnoses:   Active Hospital Problems    Diagnosis  POA    *Goals of care, counseling/discussion [Z71.89]  Not Applicable    Folic acid deficiency [E53.8]  Yes    Palliative care encounter [Z51.5]  Not Applicable    Hypernatremia [E87.0]  No    Cachexia [R64]  Yes    Dysphagia [R13.10]  Yes    Severe malnutrition [E43]  Yes    Metabolic acidosis [E87.20]  Yes    Failure to thrive in adult [R62.7]  Yes    Hypocalcemia [E83.51]  Yes    Hypoglycemia [E16.2]  Yes    History of small bowel obstruction [Z87.19]  Not Applicable    Hypotension [I95.9]  Yes    Lung mass [R91.8]  Yes    Mass of sphenoid sinus [J34.89]  Yes    Leukopenia [D72.819]  Yes    Thrombocytopenia, unspecified [D69.6]  Yes    Multiple myeloma [C90.00]  Yes    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]  Yes    Anemia in neoplastic disease [D63.0]  Yes    Multiple myeloma not having achieved remission [C90.00]  Yes      Resolved Hospital Problems    Diagnosis Date Resolved POA    Hypothermia [T68.XXXA] 02/05/2024 Yes    GLENIS (acute kidney injury) [N17.9] 02/05/2024 Yes       Hospice Qualifying Diagnoses:        Patient has a life expectancy < 6 months due to:  Primary Hospice Diagnosis. Advanced multiple myeloma      Vital Signs: Routine per Hospice Protocol.    Code Status: DNR    Allergies:   Review of patient's allergies indicates:   Allergen Reactions    Iodine Hives    Shellfish containing products Itching    Ondansetron hcl Nausea Only       Diet: as tolerated    Activities: As tolerated    Goals of Care Treatment Preferences:  Code Status: DNR    Health care agent: Cameron Hoffmann (son)  Health care agent number: [phone]          What is most important right now is to focus on  comfort and QOL .  Accordingly, we have decided that the best plan to meet the patient's goals includes pivot to comfort-focused care.      Nursing: Per Hospice Routine.      Medication List        STOP taking these medications      acetaminophen 500 MG tablet  Commonly known as: TYLENOL     amiodarone 200 MG Tab  Commonly known as: PACERONE     amLODIPine 10 MG tablet  Commonly known as: NORVASC     ascorbic acid (vitamin C) 1000 MG tablet  Commonly known as: VITAMIN C     aspirin 81 MG EC tablet  Commonly known as: ECOTRIN     BIOTIN ORAL     mirtazapine 30 MG tablet  Commonly known as: REMERON     OLANZapine 10 MG tablet  Commonly known as: ZyPREXA     TUMS ORAL     VITAMIN B-12 ORAL     vitamin D 1000 units Tab  Commonly known as: VITAMIN D3                Future Orders:  Hospice Medical Director may dictate new orders for comfortable care measures & sign death certificate.        _________________________________  Daniel Covarrubias MD  02/05/2024

## 2024-02-05 NOTE — DISCHARGE SUMMARY
[facility] ([place] [address])  [facility] Medicine  Discharge Summary      Patient Name: Janie Zamorano  MRN: [id number]  Admission Date: 1/28/2024  Hospital Length of Stay: 8 days  Discharge Date and Time:  02/05/2024 11:30 AM  Attending Physician: Daniel Covarrubias MD   Discharging Provider: Daniel Covarrubias MD  Discharge Provider Team: 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 Hematology oncology was consulted who recommended palliative care.  Palliative Care was consulted. Given patient's decline, code status and goals of care was discussed with the son and decision was made transitioned to DNR and discharge to [facility] with hospice.      Addendum 02/06/2024.  Son opted to go home with home hospice instead of nursing home      Physical Exam  Constitutional:       Appearance: She is ill-appearing.      Comments: Sleepy but arousable   HENT:      Head: Normocephalic.      Right Ear: External ear normal.      Left Ear: External ear normal.      Nose: Nose normal.   Cardiovascular:      Rate and Rhythm: Normal rate.   Pulmonary:      Effort: Pulmonary effort is normal.   Skin:     General: Skin is warm.   Neurological:      Mental Status: Mental status is at baseline.        Consults:   Consults (From admission, onward)          Status Ordering Provider     Inpatient consult to Critical Care Medicine  Once        Provider:  (Not yet assigned)    Completed SURY RYAN     Inpatient consult to Skin Integrity   Practitioner  Once        Provider:  Prema De Leon NP    Acknowledged SURY RYAN     Inpatient consult to Midline team  Once        Provider:  (Not yet assigned)    Completed SURY RYAN     Inpatient consult to Hematology/Oncology  Once        Provider:  (Not yet assigned)    Completed SURY RYAN     Inpatient consult to Palliative Care  Once        Provider:  (Not yet assigned)    Completed SURY RYAN     Inpatient consult to Registered Dietitian/Nutritionist  Once        Provider:  (Not yet assigned)    Completed SURY RYAN     Inpatient consult to Psychiatry  Once        Provider:  (Not yet assigned)    Completed SURY RYAN            Final Active Diagnoses:    Diagnosis Date Noted POA    PRINCIPAL PROBLEM:  Goals of care, counseling/discussion [Z71.89] 01/28/2024 Not Applicable    Folic acid deficiency [E53.8] 01/31/2024 Yes    Palliative care encounter [Z51.5] 01/30/2024 Not Applicable    Hypernatremia [E87.0] 01/30/2024 No    Cachexia [R64] 01/30/2024 Yes    Dysphagia [R13.10] 01/30/2024 Yes    Severe malnutrition [E43] 01/29/2024 Yes    Metabolic acidosis [E87.20] 01/28/2024 Yes    Failure to thrive in adult [R62.7] 01/28/2024 Yes    Hypocalcemia [E83.51] 01/28/2024 Yes    Hypoglycemia [E16.2] 01/28/2024 Yes    History of small bowel obstruction [Z87.19] 10/02/2023 Not Applicable    Hypotension [I95.9] 06/03/2023 Yes    Lung mass [R91.8] 06/02/2023 Yes    Mass of sphenoid sinus [J34.89]  Yes    Leukopenia [D72.819]  Yes    Thrombocytopenia, unspecified [D69.6] 03/13/2023 Yes    Multiple myeloma [C90.00] 09/23/2019 Yes    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]  Yes    Anemia in neoplastic disease [D63.0] 08/03/2018 Yes    Multiple myeloma not having achieved remission [C90.00] 08/03/2018 Yes      Problems Resolved During this Admission:    Diagnosis Date Noted Date Resolved POA    Hypothermia [T68.XXXA] 01/28/2024 02/05/2024 Yes     GLENIS (acute kidney injury) [N17.9] 08/18/2018 02/05/2024 Yes      Discharged Condition:  Guarded    Disposition: Hospice/Medical Facility    Follow Up:    Patient Instructions:   No discharge procedures on file.  Medications:  Reconciled Home Medications:      Medication List        STOP taking these medications      acetaminophen 500 MG tablet  Commonly known as: TYLENOL     amiodarone 200 MG Tab  Commonly known as: PACERONE     amLODIPine 10 MG tablet  Commonly known as: NORVASC     ascorbic acid (vitamin C) 1000 MG tablet  Commonly known as: VITAMIN C     aspirin 81 MG EC tablet  Commonly known as: ECOTRIN     BIOTIN ORAL     mirtazapine 30 MG tablet  Commonly known as: REMERON     OLANZapine 10 MG tablet  Commonly known as: ZyPREXA     TUMS ORAL     VITAMIN B-12 ORAL     vitamin D 1000 units Tab  Commonly known as: VITAMIN D3                  Pending Diagnostic Studies:       Procedure Component Value Units Date/Time    COVID-19 Routine Screening Extended Care Placement [[id number]] Collected: 02/05/24 1023    Order Status: Sent Lab Status: In process Updated: 02/05/24 1023    Specimen: Nasopharyngeal           Indwelling Lines/Drains at time of discharge:   Lines/Drains/Airways       None                   Time spent on the discharge of patient: 40 minutes         Daniel Covarrubias MD  Department of Hospital Medicine  [facility] ([place])

## 2024-02-05 NOTE — NURSING
Pt slept well this shift when compared to prev nights.. A&Ox 2. VSS. On RA. PT received 1 dose of compazine at 138 for vomiting, heel protecting boots applied. Pt had 1 bm this morning, Safety precautions in place. Call light and personal items in reach. pt has not eaten or drank [id number] this shift. She gas maintained her blood glucose between  tonight with D10 going at 50 ml/hr via her ML No further concerns noted at this time

## 2024-02-06 VITALS
WEIGHT: 99 LBS | RESPIRATION RATE: 18 BRPM | DIASTOLIC BLOOD PRESSURE: 62 MMHG | HEART RATE: 60 BPM | TEMPERATURE: 98 F | BODY MASS INDEX: 17.54 KG/M2 | SYSTOLIC BLOOD PRESSURE: 101 MMHG | HEIGHT: 63 IN | OXYGEN SATURATION: 97 %

## 2024-02-06 LAB
POCT GLUCOSE: 102 MG/DL (ref 70–110)
POCT GLUCOSE: 120 MG/DL (ref 70–110)
POCT GLUCOSE: 126 MG/DL (ref 70–110)
POCT GLUCOSE: 62 MG/DL (ref 70–110)
POCT GLUCOSE: 66 MG/DL (ref 70–110)
POCT GLUCOSE: 69 MG/DL (ref 70–110)
POCT GLUCOSE: 70 MG/DL (ref 70–110)
POCT GLUCOSE: 77 MG/DL (ref 70–110)
POCT GLUCOSE: 98 MG/DL (ref 70–110)

## 2024-02-06 PROCEDURE — 25000003 PHARM REV CODE 250: Performed by: HOSPITALIST

## 2024-02-06 PROCEDURE — 25000003 PHARM REV CODE 250: Performed by: STUDENT IN AN ORGANIZED HEALTH CARE EDUCATION/TRAINING PROGRAM

## 2024-02-06 PROCEDURE — A4216 STERILE WATER/SALINE, 10 ML: HCPCS | Performed by: HOSPITALIST

## 2024-02-06 RX ADMIN — Medication 10 ML: at 05:02

## 2024-02-06 RX ADMIN — POLYETHYLENE GLYCOL 3350 17 G: 17 POWDER, FOR SOLUTION ORAL at 08:02

## 2024-02-06 RX ADMIN — DEXTROSE MONOHYDRATE 125 ML: 10 INJECTION, SOLUTION INTRAVENOUS at 04:02

## 2024-02-06 RX ADMIN — DEXTROSE MONOHYDRATE 125 ML: 10 INJECTION, SOLUTION INTRAVENOUS at 12:02

## 2024-02-06 RX ADMIN — DEXTROSE MONOHYDRATE 125 ML: 10 INJECTION, SOLUTION INTRAVENOUS at 07:02

## 2024-02-06 RX ADMIN — Medication 10 ML: at 11:02

## 2024-02-06 NOTE — NURSING
Pt had no changes this shift. Pt to d/c home with hospice. Pt family at bedside. Pt in bed, pt only oriented to self, pt has no complaints. Bed low and locked, bed alarm active.

## 2024-02-06 NOTE — PLAN OF CARE
Shelia spoke with son on 2/5/24 @4pm.  Son stated that he would like patient to go home with home hospice.  Son was interested in [facility] hospice agency.  SHELIA sent over pt information to agency.  Will follow up.    Evelia Yao MSW, CSW

## 2024-02-06 NOTE — HPI
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      Patient is a difficult historian with limited insight into their medical issues. AAO x 2 but not talkative. Collateral from son at the bedside. Patient was following with oncology at [place]. Per son, primary oncologist mentioned she is not a candidate for further chemo about 3 months back and advised hospice. Patient is on home hospice and son not happy with current hospice agency. son wants to discuss with palliative care at [facility] and requesting followups regarding blood work. Reports patient had no significant oral intake for the last 7days -only drinking water mostly. BP was in 80s this AM. Son called EMS. EMS gave 1 L of fluid in route. Being admitted for evaluation of failure to thrive per chart review, multiple myeloma for the past 3-4 years. Recently admitted to [facility] last year for psychosis and required treatment. Declined functional status over the last 2-3 years due to pain and debility from multiple myeloma. Chronic back pain from pathological vertebral fracture and difficulty with walking due to her pain. Needs assistance to transfer from the bed to the walker and medication management. H/o psychiatric admissions secondary to schizophrenia. At baseline, ADL and ambulates with asssitance. She is bedbound x 1 week.      ER couse -   soft blood pressure 100/84 .  EKG without acute ischemia.  Labs with hypoglycemia 43 , anemia Hb 4.6 , and hypocalcemia 6.6 .     Corrected calcium is 8.5.  Ordered 1 g calcium gluconate. Treated with D10  x 250ml  glucose.  Consented for blood transfusion.  severe nonanion gap metabolic acidosis with bicarb of 12. lactate WNL     Patient admitted to hospital medicine service for further management. Skin integrity JANELLE consulted for evaluation of skin injury.

## 2024-02-06 NOTE — CONSULTS
[facility] ([place])  Skin Integrity JANELLE  Consult Note    Patient Name: Janie Zamorano  MRN: [id number]  Admission Date: 1/28/2024  Hospital Length of Stay: 8 days  Attending Physician: Daniel Covarrubias MD  Primary Care Provider: He Hoyt Jr., MD     Inpatient consult to Skin Integrity  Practitioner  Consult performed by: Prema De Leon, NP  Consult ordered by: Ender Mcclure MD        Subjective:     History of Present Illness:  Mili Zamorano is a 67 year old female with significant history for hypertension, paranoid schizophrenia with multiple inpatient psychiatric admissions, multiple myeloma dx 2018 followed by [facility], 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      Patient is a difficult historian with limited insight into their medical issues. AAO x 2 but not talkative. Collateral from son at the bedside. Patient was following with oncology at [place]. Per son, primary oncologist mentioned she is not a candidate for further chemo about 3 months back and advised hospice. Patient is on home hospice and son not happy with current hospice agency. son wants to discuss with palliative care [place] [facility] and requesting followups regarding blood work. Reports patient had no significant oral intake for the last 7days -only drinking water mostly. BP was in 80s this AM. Son called EMS. EMS gave 1 L of fluid in route. Being admitted for evaluation of failure to thrive per chart review, multiple myeloma for the past 3-4 years. Recently admitted to [facility] last year for psychosis and required treatment. Declined functional status over the last 2-3 years due to pain and debility from multiple myeloma. Chronic back pain from pathological vertebral fracture and  difficulty with walking due to her pain. Needs assistance to transfer from the bed to the walker and medication management. H/o psychiatric admissions secondary to schizophrenia. At baseline, ADL and ambulates with asssitance. She is bedbound x 1 week.      ER couse -   soft blood pressure 100/84 .  EKG without acute ischemia.  Labs with hypoglycemia 43 , anemia Hb 4.6 , and hypocalcemia 6.6 .     Corrected calcium is 8.5.  Ordered 1 g calcium gluconate. Treated with D10 x 250ml  glucose.  Consented for blood transfusion.  severe nonanion gap metabolic acidosis with bicarb of 12. lactate WNL     Patient admitted to hospital medicine service for further management. Skin integrity JANELLE consulted for evaluation of skin injury.    Scheduled Meds:   polyethylene glycol  17 g Oral Daily    sodium chloride 0.9%  10 mL Intravenous Q6H     Continuous Infusions:  PRN Meds:0.9%  NaCl infusion (for blood administration), dextrose 10%, dextrose 10%, glucagon (human recombinant), glucose, glucose, prochlorperazine, Flushing PICC/Midline Protocol **AND** sodium chloride 0.9% **AND** sodium chloride 0.9%    Review of patient's allergies indicates:   Allergen Reactions    Iodine Hives    Shellfish containing products Itching    Ondansetron hcl Nausea Only        Past Medical History:   Diagnosis Date    Anxiety     Asthma     Bronchitis     COPD (chronic obstructive pulmonary disease)     Depression     GERD (gastroesophageal reflux disease)     Hallucination     History of psychiatric hospitalization     Hypertension     Neck pain     Polyneuropathy in diseases classified elsewhere 2021    Schizophrenia     Sleep difficulties      Past Surgical History:   Procedure Laterality Date     SECTION      X 1    COLONOSCOPY N/A 2018    Surgeon: Albert Russell MD    ESOPHAGOGASTRODUODENOSCOPY N/A 2018    Surgeon: Albert Russell MD    HYSTERECTOMY  2016    [facility]---DLH/BSO    LIPOMA RESECTION      back  x 2     SALPINGOOPHORECTOMY Bilateral 2016    [facility]---DLH/BSO    THORACIC LAMINECTOMY WITH FUSION N/A 8/17/2018    Surgeon: He Andres MD       Family History       Problem Relation (Age of Onset)    Breast cancer Mother    COPD Father    Diabetes Brother    Glaucoma Mother, Sister    Hypertension Mother, Father    Liver cancer Paternal Uncle (75)    Macular degeneration Mother    Stroke Mother          Tobacco Use    Smoking status: Never    Smokeless tobacco: Never   Substance and Sexual Activity    Alcohol use: No     Alcohol/week: 0.0 standard drinks of alcohol    Drug use: No    Sexual activity: Not Currently     Partners: Male     Birth control/protection: Post-menopausal     Review of Systems   Skin:  Positive for wound.       Objective:     Vital Signs (Most Recent):  Temp: 97.4 °F (36.3 °C) (02/05/24 1947)  Pulse: (!) 114 (02/05/24 1947)  Resp: 18 (02/05/24 1947)  BP: (!) 141/95 (02/05/24 1947)  SpO2: 100 % (02/05/24 1947) Vital Signs (24h Range):  Temp:  [97.4 °F (36.3 °C)-98.3 °F (36.8 °C)] 97.4 °F (36.3 °C)  Pulse:  [] 114  Resp:  [16-18] 18  SpO2:  [99 %-100 %] 100 %  BP: (111-141)/(63-95) 141/95     Weight: 44.9 kg (98 lb 15.8 oz)  Body mass index is 17.53 kg/m².     Physical Exam  Constitutional:       Appearance: Normal appearance.   Skin:     General: Skin is warm and dry.      Findings: Lesion present.   Neurological:      Mental Status: She is alert.          Laboratory:  All pertinent labs reviewed within the last 24 hours.    Diagnostic Results:  None      Assessment/Plan:         JANELLE Skin Integrity Evaluation      Skin Integrity JANELLE evaluation of patient as part of the comprehensive skin care team.     She has been admitted for 8 days. Skin injury was noted on 1/30/24. POA yes.    Perirectal      Derm  Irritant contact dermatitis due to fecal, urinary or dual incontinence  - consult received for evaluation of skin injury.  - pt admitted for reported decreased eating.   - partial thickness  tissue loss to perirectal area with red, moist wound base due to moisture from incontinence.  - continue Triad bid/prn.  - Immerse surface.  - wedge//pillows and heel boots for offloading.  - turn q2h.  - osman score documented at 10 with nutrition sub scale score of 1.   - RD following. Optimize nutrition for wound healing.  - nursing to maintain pressure injury prevention measures and continue wound care per orders.         Thank you for your consult. I will follow-up with patient. Please contact us if you have any additional questions.      Prema De Leon, NP  Skin Integrity JANELLE  [facility] ([place])

## 2024-02-06 NOTE — PLAN OF CARE
BG checks q4H, PRN D10 infusions given per order. Possible D/C back home on hospice.      Problem: Adult Inpatient Plan of Care  Goal: Plan of Care Review  Outcome: Ongoing, Progressing  Goal: Patient-Specific Goal (Individualized)  Outcome: Ongoing, Progressing  Goal: Absence of Hospital-Acquired Illness or Injury  Outcome: Ongoing, Progressing  Goal: Optimal Comfort and Wellbeing  Outcome: Ongoing, Progressing  Goal: Readiness for Transition of Care  Outcome: Ongoing, Progressing     Problem: Coping Ineffective  Goal: Effective Coping  Outcome: Ongoing, Progressing     Problem: Fluid and Electrolyte Imbalance (Acute Kidney Injury/Impairment)  Goal: Fluid and Electrolyte Balance  Outcome: Ongoing, Progressing     Problem: Oral Intake Inadequate (Acute Kidney Injury/Impairment)  Goal: Optimal Nutrition Intake  Outcome: Ongoing, Progressing     Problem: Renal Function Impairment (Acute Kidney Injury/Impairment)  Goal: Effective Renal Function  Outcome: Ongoing, Progressing     Problem: Skin Injury Risk Increased  Goal: Skin Health and Integrity  Outcome: Ongoing, Progressing     Problem: Fall Injury Risk  Goal: Absence of Fall and Fall-Related Injury  Outcome: Ongoing, Progressing     Problem: Impaired Wound Healing  Goal: Optimal Wound Healing  Outcome: Ongoing, Progressing     Problem: Infection  Goal: Absence of Infection Signs and Symptoms  Outcome: Ongoing, Progressing

## 2024-02-06 NOTE — PROGRESS NOTES
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        Patient is a difficult historian with limited insight into their medical issues. AAO x 2 but not talkative.    collateral from son at the bedside. Patient was following with oncology at [place]. per son, primary oncologist mentioned she is not a candidate for further chemo about 3 months back and advised hospice. Patient is on home hospice and son not happy with current hospic agency. son wants to discuss with palliative care at [facility] and requesting followups regarding [employer] work . reports patient had   no significant oral intake for the last 7days -only drinking water mostly.. BP was in 80s this AM. son called. EMS.   EMS gave 1 L of fluid in route.  being admitted for evaluation of failure to thrive  per chart review, ultiple myeloma for the past 3-4 years.  recently admitted to [facility]  last year for psychosis and required treatmen. declined functional status over the last 2-3 years due to pain and debility from multiple myeloma. 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 needs assistance to transfer from the bed to  the walker and medication management.  H/o psychiatric admuisssions secondary to schizophrenia. At baseline, ADL and  ambulates with asssitance. she is bedbound x 1 week.     ER couse -   soft blood pressure 100/84 .  EKG without acute ischemia.  Labs with hypoglycemia 43 , anemia Hb 4.6 , and hypocalcemia 6.6 .     Corrected calcium is 8.5.  Ordered 1 g calcium gluconate. Treated with D10 x 250ml  glucose.  Consented for blood transfusion.  severe nonanion gap metabolic acidosis with bicarb of 12. lactate WNL        last admission 1/2- 1/3 -suspected hematemesis.  Suspect color from emesis due to Partha-Aid at lunch and dinner per patient.   found to have elevated troponin.  No chest pain or shortness a breath. Negative stress test.  2 D echo normal EF 75 80%, no mention of wall motion abnormality.  .  Hgb stable and  no overt signs of bleeding during stay. X ray right pelvis no fractures  but does show osseous destruction lesion that was also noted on CT 5/2/2022 per Radiology. Plan to follow up with Primary Oncologist at [facility]. discharged to [facility].           Overview/Hospital Course:  1/29 Glucose stable. POCG glucose q 4H. SLP, palliative care  and psychiatry eval. per son , h/o catatonia. did not eat or drink  overnight . continue D5LR.  X ray abdomen -Nonobstructive bowel gas pattern. renal sonogram - Bilateral increased renal parenchymal echogenicity, with decreased corticomedullary distinction, possibly related to underlying medical renal disease.  Mild distention of the right renal pelvis, possibly on the basis of extrarenal pelvis.  Mild hydronephrosis not fully excluded.  CT RSS ordered . patient refused CT scan.corrected calcium is 8.4 . Hb stable    1/30 s/p psychiatry eval -started remeron 15mg PO nightly. did not take last night. SLP eval.  Glucose in 100s. continue D5LR.  Hematology consulted for further recommendations regarding multiple myeloma. SPEP, ADAM, immunoglobulins, FLC, 24 hour urine protein  to further evaluate myeloma. c/o back pain - started IV tylenol.  frequent reposition q2h. wound care consulted. continue bicarb drip. improved mentation and more interactive. answering questions.  SLP eval-  Soft & Bite Sized Diet - IDDSI Level 6, Thin liquids - IDDSI Level 0 . CT abdomen -  Large retroperitoneal mass involving multiple retroperitoneal structures including the aorta and IVC.  In addition there is a large destructive mass involving the right iliac is well as the gluteal musculature.  Findings may relate to patient's known multiple myeloma with lymphoma and sarcoma in the differential.Few left nephrolithiasis largest in the left renal pelvis measures 0.3 cm with minimal pelvic fullness without significant hydronephrosis. Right inferior pole density of nephrolithiasis versus cortical calcification.  No significant right-sided hydronephrosis.Innumerable lytic osseous lesions which is consistent with history of multiple myeloma.Remote T12 compression fracture deformity, likely pathological. Asymmetric thickening of the right gluteal musculature may relate to muscle invasion from the aforementioned mass versus gluteal hematoma.  1/31  folate deficiency -started on folic acid. tolerating oral meds .s/p wound care eval  IV fluids discontinued due to LE edema. continue POCT glucose q 4h .son wants the patient to go to [facility]. case management updated . Hypotension this PM to 80s and hypoglycemia. rapid response initiated. critical care consulted. NS bolus ordered. son agreeable for DNR. transitioned to comfort care .son agrees with no IV fluids.  continue glucose checks for now  and D10 PRN. transition to hospice    Interval History:  Patient was discharged to nursing home with hospice yesterday, however son decided to go with home hospice instead.    Review of Systems   Unable to perform ROS: Other     Objective:     Vital Signs (Most Recent):  Temp: 98.3 °F (36.8 °C) (02/06/24 0720)  Pulse: 86  (02/06/24 0720)  Resp: 17 (02/06/24 0720)  BP: 114/72 (02/06/24 0720)  SpO2: 100 % (02/06/24 0720) Vital Signs (24h Range):  Temp:  [96.4 °F (35.8 °C)-98.3 °F (36.8 °C)] 98.3 °F (36.8 °C)  Pulse:  [] 86  Resp:  [14-18] 17  SpO2:  [98 %-100 %] 100 %  BP: (113-141)/(63-95) 114/72     Weight: 44.9 kg (98 lb 15.8 oz)  Body mass index is 17.53 kg/m².    Intake/Output Summary (Last 24 hours) at 2/6/2024 1027  Last data filed at 2/5/2024 1629  Gross per 24 hour   Intake 100 ml   Output 100 ml   Net 0 ml         Physical Exam  Constitutional:       Appearance: She is ill-appearing.      Comments: Sleepy but arousable   HENT:      Head: Normocephalic.      Right Ear: External ear normal.      Left Ear: External ear normal.      Nose: Nose normal.   Cardiovascular:      Rate and Rhythm: Normal rate.   Pulmonary:      Effort: Pulmonary effort is normal.   Skin:     General: Skin is warm.   Neurological:      Mental Status: Mental status is at baseline.              Assessment/Plan:      * Goals of care, counseling/discussion  Advance Care Planning    Code Status  In light of the patients advanced and life limiting illness,I engaged the the family in a voluntary conversation about the patient's preferences for care  at the very end of life.  son wants full code for now. wants to discuss with palliative care  in AM    I spent a total of 25 minutes engaging the patient in this advance care planning discussion.    1/31 . Hypotension this PM to 80s and hypoglycemia. rapid response initiated. critical care consulted. NS bolus ordered. son agreeable for DNR. transitioned to comfort care .son agrees with no IV fluids.  continue glucose checks for now  and D10 PRN. likely transition to hospice in AM    2/3/24  Disposition plan at this time is nursing home at [facility] with palliative    2/6/24  Son opted for home hospice instead            Folic acid deficiency  1/31 folate deficiency -started on folic acid. tolerating oral  meds       Dysphagia  1/30 SLP eval-  Soft & Bite Sized Diet - IDDSI Level 6, Thin liquids - IDDSI Level 0 .       Palliative care encounter  Palliative care following      Severe malnutrition  Nutrition consulted. Most recent weight and BMI monitored-     Measurements:  Wt Readings from Last 1 Encounters:   01/28/24 44.9 kg (99 lb)   Body mass index is 17.54 kg/m².    Patient has been screened and assessed by RD.    Malnutrition Type:  Context: chronic illness  Level: severe    Malnutrition Characteristic Summary:  Weight Loss (Malnutrition): greater than 7.5% in 3 months (-18% x 3mo)  Subcutaneous Fat (Malnutrition):  (observed moderate to severe)  Muscle Mass (Malnutrition):  (observed severe)    Interventions/Recommendations (treatment strategy):  1.) If and when medically able, recommend to ADAT, with goal of Regular, texture per SLP- encourage PO intake if medically warranted. 2.) If medically able, recommend Boost (or Boost equivalent) BID as tolerated. 3.) If medically warranted/appropriate, consider appetite stimulant. 4.) Re-consult if alternative nutrition is medically appropriate. 5.) RD to monitor.      Hypoglycemia  secondary to poor oral intake  Last A1c reviewed-   Lab Results   Component Value Date    HGBA1C 4.9 06/02/2023    HGBA1C 5.4 08/03/2018    HGBA1C 5.5 10/27/2017     Will hold PO hypoglycemics and will start correctional scale insulin  Most recent fingerstick glucose reviewed-   Recent Labs   Lab 01/29/24  1611 01/29/24 2014 01/29/24  2354 01/30/24  0503   POCTGLUCOSE 127* 175* 118* 99     . Treated with D10 x 250ml  glucose.  POCT glucose q1h. D10 infusion    1/30 s/p psychiatry eval -started remeron 15mg PO nightly. did not take last night. SLP eval.  Glucose in 100s. continue D5LR.      Hypocalcemia  likely from above  Recent Labs   Lab 01/30/24  0721   CALCIUM 7.2*          Corrected calcium is 8.4.  Ordered 1 g calcium gluconate.       Failure to thrive in adult   difficult historian  with limited insight into their medical issues.   collateral from son - No significant oral intake for the last 4-5 days.     Metabolic acidosis     likely from CKD/GLENIS . patient with bicarbonate   Recent Labs   Lab 01/30/24  0721 01/30/24  1916 01/31/24  0607   CO2 16* 16* 19*   s/p sodium bicarbonate  drip . monitor          History of small bowel obstruction   recent small bowel obstruction sp small bowel resection 8/5/2023     Hypotension  secondary to poor oral intake. resolved.continue IV Fluids       Lung mass   CX ray 1/28  Abnormal left upper thoracic region pleural base opacity, similar to 10/02/2023 chest radiograph, these are new compared to 02/21/2023 radiographs, further evaluation advised.  Malignancy cannot be excluded.            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 son reports vision loss in right eye    Leukopenia  2.59. secondary to above      Thrombocytopenia, unspecified    Patient with thrombocytopenia   Recent Labs   Lab 01/29/24  1757 01/30/24  0721 01/31/24  0607   * 139* 136*   . Platelet counts stable.monitor         Multiple myeloma   multiple myeloma dx 2018 followed by [facility]. 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 needs assistance to transfer from the bed to the walker     Heme/onc evalution   1/30  Hematology consulted for further recommendations regarding multiple myeloma. 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 needs assistance to transfer from the bed to the walker .    Anemia in neoplastic  disease    Patient's with macrocytic anemia.. Hemoglobin downtrending. Etiology likely due to  multiple myelonol not on treatment .  Current CBC reviewed-    Recent Labs   Lab 01/29/24  1757 01/30/24  0721 01/31/24  0607   HGB 9.6* 9.7* 10.6*         Component Value Date/Time    MCV 89 01/31/2024 0607    RDW 15.9 (H) 01/31/2024 0607    IRON 72 10/02/2023 1430    FERRITIN 230 10/02/2023 1430    FOLATE 2.6 (L) 01/31/2024 0607    [id number] >2000 (H) 01/31/2024 0607     Monitor CBC and transfuse if H/H drops below 7/21.      PRBC transfusion x2 .    Multiple myeloma not having achieved remission  as above   1/30   Hematology consulted for further recommendations regarding multiple myeloma. SPEP, ADAM, immunoglobulins, FLC, 24 hour urine protein to further evaluate myeloma.  CT abdomen -  Large retroperitoneal mass involving multiple retroperitoneal structures including the aorta and IVC.  In addition there is a large destructive mass involving the right iliac is well as the gluteal musculature.  Findings may relate to patient's known multiple myeloma with lymphoma and sarcoma in the differential.Few left nephrolithiasis largest in the left renal pelvis measures 0.3 cm with minimal pelvic fullness without significant hydronephrosis. Right inferior pole density of nephrolithiasis versus cortical calcification.  No significant right-sided hydronephrosis.Innumerable lytic osseous lesions which is consistent with history of multiple myeloma.Remote T12 compression fracture deformity, likely pathological. Asymmetric thickening of the right gluteal musculature may relate to muscle invasion from the aforementioned mass versus gluteal hematoma.      Paranoid schizophrenia   paranoid schizophrenia with multiple inpatient psychiatric admissions, - admitted for  reported decreased eating.  Patient is a difficult historian with limited insight into their medical issues.   collateral from son - No significant oral intake for the last  4-5 days.     psychiatry evaluation       VTE Risk Mitigation (From admission, onward)      None            Discharge Planning   EMELY: 2/6/2024     Code Status: DNR   Is the patient medically ready for discharge?: Yes    Reason for patient still in hospital (select all that apply): Patient trending condition  Discharge Plan A: New Nursing Home placement - care home care facility (with hospice)   Discharge Delays: None known at this time              Daniel Covarrubias MD  Department of Hospital Medicine   [facility] - Stepdown Flex ([place]-[facility])

## 2024-02-06 NOTE — SUBJECTIVE & OBJECTIVE
Scheduled Meds:   polyethylene glycol  17 g Oral Daily    sodium chloride 0.9%  10 mL Intravenous Q6H     Continuous Infusions:  PRN Meds:0.9%  NaCl infusion (for blood administration), dextrose 10%, dextrose 10%, glucagon (human recombinant), glucose, glucose, prochlorperazine, Flushing PICC/Midline Protocol **AND** sodium chloride 0.9% **AND** sodium chloride 0.9%    Review of patient's allergies indicates:   Allergen Reactions    Iodine Hives    Shellfish containing products Itching    Ondansetron hcl Nausea Only        Past Medical History:   Diagnosis Date    Anxiety     Asthma     Bronchitis     COPD (chronic obstructive pulmonary disease)     Depression     GERD (gastroesophageal reflux disease)     Hallucination     History of psychiatric hospitalization     Hypertension     Neck pain     Polyneuropathy in diseases classified elsewhere 2021    Schizophrenia     Sleep difficulties      Past Surgical History:   Procedure Laterality Date     SECTION      X 1    COLONOSCOPY N/A 2018    Surgeon: Albert Russell MD    ESOPHAGOGASTRODUODENOSCOPY N/A 2018    Surgeon: Albert Russell MD    HYSTERECTOMY  2016    KJB---DLH/BSO    LIPOMA RESECTION      back  x 2    SALPINGOOPHORECTOMY Bilateral 2016    [facility]---DLH/BSO    THORACIC LAMINECTOMY WITH FUSION N/A 2018    Surgeon: He Andres MD       Family History       Problem Relation (Age of Onset)    Breast cancer Mother    COPD Father    Diabetes Brother    Glaucoma Mother, Sister    Hypertension Mother, Father    Liver cancer Paternal Uncle (75)    Macular degeneration Mother    Stroke Mother          Tobacco Use    Smoking status: Never    Smokeless tobacco: Never   Substance and Sexual Activity    Alcohol use: No     Alcohol/week: 0.0 standard drinks of alcohol    Drug use: No    Sexual activity: Not Currently     Partners: Male     Birth control/protection: Post-menopausal     Review of Systems   Skin:  Positive for wound.        Objective:     Vital Signs (Most Recent):  Temp: 97.4 °F (36.3 °C) (02/05/24 1947)  Pulse: (!) 114 (02/05/24 1947)  Resp: 18 (02/05/24 1947)  BP: (!) 141/95 (02/05/24 1947)  SpO2: 100 % (02/05/24 1947) Vital Signs (24h Range):  Temp:  [97.4 °F (36.3 °C)-98.3 °F (36.8 °C)] 97.4 °F (36.3 °C)  Pulse:  [] 114  Resp:  [16-18] 18  SpO2:  [99 %-100 %] 100 %  BP: (111-141)/(63-95) 141/95     Weight: 44.9 kg (98 lb 15.8 oz)  Body mass index is 17.53 kg/m².     Physical Exam  Constitutional:       Appearance: Normal appearance.   Skin:     General: Skin is warm and dry.      Findings: Lesion present.   Neurological:      Mental Status: She is alert.          Laboratory:  All pertinent labs reviewed within the last 24 hours.    Diagnostic Results:  None

## 2024-02-06 NOTE — PLAN OF CARE
[facility]  Department of Hospital Medicine  [address] [place] [address]  [phone] after hours  [phone] fax    HOME HOSPICE  ORDERS    02/06/2024    Admit to Hospice:  Home Service       Diagnoses:   Active Hospital Problems    Diagnosis  POA    *Goals of care, counseling/discussion [Z71.89]  Not Applicable    Irritant contact dermatitis due to fecal, urinary or dual incontinence [L24.A2]  No    Folic acid deficiency [E53.8]  Yes    Palliative care encounter [Z51.5]  Not Applicable    Hypernatremia [E87.0]  No    Cachexia [R64]  Yes    Dysphagia [R13.10]  Yes    Severe malnutrition [E43]  Yes    Metabolic acidosis [E87.20]  Yes    Failure to thrive in adult [R62.7]  Yes    Hypocalcemia [E83.51]  Yes    Hypoglycemia [E16.2]  Yes    History of small bowel obstruction [Z87.19]  Not Applicable    Hypotension [I95.9]  Yes    Lung mass [R91.8]  Yes    Mass of sphenoid sinus [J34.89]  Yes    Leukopenia [D72.819]  Yes    Thrombocytopenia, unspecified [D69.6]  Yes    Multiple myeloma [C90.00]  Yes    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    Code Status:  DNR    Allergies:   Review of patient's allergies indicates:   Allergen Reactions    Iodine Hives    Shellfish containing products Itching    Ondansetron hcl Nausea Only       Diet:  As tolerated    Activities: As tolerated    Goals of Care Treatment Preferences:  Code Status: DNR    Health care agent: Cameron Hoffmann (son)  [facility]  care agent number: [phone]          What is most important right now is to focus on comfort and QOL .  Accordingly, we have decided that the best plan to meet the patient's goals includes pivot to comfort-focused care.      Nursing: Per Hospice Routine.         Medication List        STOP taking these medications      acetaminophen 500 MG tablet  Commonly known as: TYLENOL     amiodarone 200 MG Tab  Commonly known as: PACERONE     amLODIPine 10 MG tablet  Commonly known as: NORVASC     ascorbic acid (vitamin C) 1000 MG tablet  Commonly known as: VITAMIN C     aspirin 81 MG EC tablet  Commonly known as: ECOTRIN     BIOTIN ORAL     mirtazapine 30 MG tablet  Commonly known as: REMERON     OLANZapine 10 MG tablet  Commonly known as: ZyPREXA     TUMS ORAL     VITAMIN B-12 ORAL     vitamin D 1000 units Tab  Commonly known as: VITAMIN D3                  Future Orders:  Hospice Medical Director may dictate new orders for comfortable care measures & sign death certificate.        _________________________________  Daniel Covarrubias MD  02/06/2024

## 2024-02-06 NOTE — SUBJECTIVE & OBJECTIVE
Interval History:  Patient was discharged to nursing home with hospice yesterday, however son decided to go with home hospice instead.    Review of Systems   Unable to perform ROS: Other     Objective:     Vital Signs (Most Recent):  Temp: 98.3 °F (36.8 °C) (02/06/24 0720)  Pulse: 86 (02/06/24 0720)  Resp: 17 (02/06/24 0720)  BP: 114/72 (02/06/24 0720)  SpO2: 100 % (02/06/24 0720) Vital Signs (24h Range):  Temp:  [96.4 °F (35.8 °C)-98.3 °F (36.8 °C)] 98.3 °F (36.8 °C)  Pulse:  [] 86  Resp:  [14-18] 17  SpO2:  [98 %-100 %] 100 %  BP: (113-141)/(63-95) 114/72     Weight: 44.9 kg (98 lb 15.8 oz)  Body mass index is 17.53 kg/m².    Intake/Output Summary (Last 24 hours) at 2/6/2024 1027  Last data filed at 2/5/2024 1629  Gross per 24 hour   Intake 100 ml   Output 100 ml   Net 0 ml         Physical Exam  Constitutional:       Appearance: She is ill-appearing.      Comments: Sleepy but arousable   HENT:      Head: Normocephalic.      Right Ear: External ear normal.      Left Ear: External ear normal.      Nose: Nose normal.   Cardiovascular:      Rate and Rhythm: Normal rate.   Pulmonary:      Effort: Pulmonary effort is normal.   Skin:     General: Skin is warm.   Neurological:      Mental Status: Mental status is at baseline.

## 2024-02-06 NOTE — NURSING
Pt disorientedx4, pt has no complaints at this time. Pt lunch CBG low, D10 given, CBG recheck came up, MD aware, no new orders at this time. Monitoring.

## 2024-02-06 NOTE — NURSING
Pt D/C home with son. Pt escorted to personal car via WC by transport. D/C instructions reviewed with son, son voiced understanding.

## 2024-02-06 NOTE — ASSESSMENT & PLAN NOTE
Advance Care Planning     Code Status  In light of the patients advanced and life limiting illness,I engaged the the family in a voluntary conversation about the patient's preferences for care  at the very end of life.  son wants full code for now. wants to discuss with palliative care  in AM    I spent a total of 25 minutes engaging the patient in this advance care planning discussion.    1/31 . Hypotension this PM to 80s and hypoglycemia. rapid response initiated. critical care consulted. NS bolus ordered. son agreeable for DNR. transitioned to comfort care .son agrees with no IV fluids.  continue glucose checks for now  and D10 PRN. likely transition to hospice in AM    2/3/24  Disposition plan at this time is nursing home at [facility] with palliative    2/6/24  Son opted for home hospice instead

## 2024-02-06 NOTE — NURSING
Pt disorientedx4, pt has no complaints at this time. Pt AM CBG low, D10 given, CBG recheck came up, MD aware, no new orders at this time. Monitoring.

## 2024-02-06 NOTE — ASSESSMENT & PLAN NOTE
- consult received for evaluation of skin injury.  - pt admitted for reported decreased eating.   - partial thickness tissue loss to perirectal area with red, moist wound base due to moisture from incontinence.  - continue [facility] bid/prn.  - Immerse surface.  - wedge//pillows and heel boots for offloading.  - turn q2h.  - osman score documented at 10 with nutrition sub scale score of 1.   - RD following. Optimize nutrition for wound healing.  - nursing to maintain pressure injury prevention measures and continue wound care per orders.

## 2024-02-07 NOTE — PLAN OF CARE
02/07/24 0916   Final Note   Assessment Type Final Discharge Note   Anticipated Discharge Disposition HospiceHome   What phone number can be called within the next 1-3 days to see how you are doing after discharge? [phone]   Hospital Resources/Appts/Education Provided Provided patient/caregiver with written discharge plan information   Post-Acute Status   Post-Acute Authorization Placement;Hospice;HME   HME Status Set-up Complete/Auth obtained   Hospice Status Set-up Complete/Auth obtained   [facility]   Patient choice form signed by patient/caregiver List with quality metrics by geographic area provided   Discharge Delays None known at this time     Pt was DC and went home with hospice.  Pt was set up with [facility] facility.     [facility]:  [address] [place] [address]  Phone: [phone]    No additional assistance needed.    Evelia Yao MSW, CSW

## 2024-02-09 NOTE — PHYSICIAN QUERY
PT Name: Janie Zamorano  MR #: [id number]     DOCUMENTATION CLARIFICATION     CDS/: Yara Lucero RN              Contact information: [email or web address]  This form is a permanent document in the medical record.     Query Date: February 9, 2024    By submitting this query, we are merely seeking further clarification of documentation.  Please utilize your independent clinical judgment when addressing the question(s) below.      The Medical Record contains the following:    Clinical Information Location in Medical Records   1/28 Admit to inpatient:  anemia    She has been admitted for 8 days. Skin injury was noted on 1/30/24. POA yes.    Derm  Irritant contact dermatitis due to fecal, urinary or dual incontinence  - consult received for evaluation of skin injury.  - pt admitted for reported decreased eating.  - partial thickness tissue loss to perirectal area with red, moist wound base due to moisture from incontinence.  - continue [facility] bid/prn.  - Immerse surface.  - wedge//pillows and heel boots for offloading.  - turn q2h.  - osman score documented at 10 with nutrition sub scale score of 1.  - RD following. Optimize nutrition for wound healing.  - nursing to maintain pressure injury prevention measures and continue wound care per orders      Irritant contact dermatitis due to fecal, urinary or dual incontinence  POA:  No                 1/28 Admit orders    2/5 Wound care consult- Prema De Leon NP PN                                    2/6 Home Hospice orders- Hospital Medicine MD PN      1/31 Scan- Photograph     According to coding guidelines, Present on Admission is defined as present at the time the order for inpatient admission occurs. Conditions that develop during an outpatient encounter, including emergency department, observation, or outpatient surgery, are considered as present on admission.         Provider, due to conflicting documentation, please clarify the  (POA) status of  the diagnosis: ____Irritant contact dermatitis due to fecal, urinary or dual incontinence ____    [ x ] Yes (Y)   [  ] No (N)   [  ] Documentation insufficient to determine if condition is POA (U)   [  ] Clinically Undetermined (W)     Reference:  ICD-10-CM Official Guidelines for Coding and Reporting FY 2021. (2020). Retrieved October 21, 2020, from https://www.cdc.gov/nchs/data/icd/10cmguidelines-FY2021.pdf?[id number]    Form No. 14569

## 2024-08-23 NOTE — ASSESSMENT & PLAN NOTE
Plan to discuss therapy once outpatient- likely VRD.    - consider 40mg dex x4 days; discussing with neurosurgery, likely will hold while healing.     -hypercalcemia, anemia, bone fx and lytic lesions. Likely 2/2 to multiple myeloma.  -zometa 4 mg IV x1 8/7/18     T2DM (type 2 diabetes mellitus) T2DM (type 2 diabetes mellitus) T2DM (type 2 diabetes mellitus)

## 2024-09-07 NOTE — SUBJECTIVE & OBJECTIVE
Interval History: see above    Review of Systems   Constitutional:  Positive for activity change and appetite change.   HENT:  Negative for trouble swallowing.    Respiratory:  Negative for shortness of breath.    Cardiovascular:  Negative for chest pain and leg swelling.   Gastrointestinal:  Negative for constipation, diarrhea, nausea and vomiting.   Genitourinary:  Negative for difficulty urinating.   Neurological:  Negative for numbness.   Psychiatric/Behavioral:  Negative for behavioral problems.    Objective:     Vital Signs (Most Recent):  Temp: 97.5 °F (36.4 °C) (06/04/23 0734)  Pulse: 83 (06/04/23 0734)  Resp: 17 (06/04/23 0734)  BP: (!) 155/83 (06/04/23 0734)  SpO2: 100 % (06/04/23 0734) Vital Signs (24h Range):  Temp:  [97.5 °F (36.4 °C)-98.9 °F (37.2 °C)] 97.5 °F (36.4 °C)  Pulse:  [] 83  Resp:  [16-17] 17  SpO2:  [97 %-100 %] 100 %  BP: (119-158)/(66-92) 155/83     Weight: 50.8 kg (112 lb)  Body mass index is 19.84 kg/m².    Intake/Output Summary (Last 24 hours) at 6/4/2023 1028  Last data filed at 6/3/2023 2220  Gross per 24 hour   Intake 1944.28 ml   Output 400 ml   Net 1544.28 ml         Physical Exam  Constitutional:       General: She is not in acute distress.     Appearance: Normal appearance.   HENT:      Head: Normocephalic and atraumatic.      Nose: Nose normal.      Mouth/Throat:      Mouth: Mucous membranes are moist.   Eyes:      General: No scleral icterus.     Extraocular Movements: Extraocular movements intact.      Pupils: Pupils are equal, round, and reactive to light.   Cardiovascular:      Rate and Rhythm: Normal rate and regular rhythm.      Pulses: Normal pulses.      Heart sounds: Normal heart sounds.   Pulmonary:      Effort: Pulmonary effort is normal.      Breath sounds: Normal breath sounds. No wheezing or rhonchi.   Chest:      Chest wall: No tenderness.   Abdominal:      General: Abdomen is flat. Bowel sounds are normal. There is no distension.      Palpations: Abdomen  is soft.      Tenderness: There is no abdominal tenderness. There is no right CVA tenderness, left CVA tenderness, guarding or rebound.   Musculoskeletal:         General: No swelling, tenderness, deformity or signs of injury. Normal range of motion.      Cervical back: Normal range of motion and neck supple. No rigidity or tenderness.   Skin:     General: Skin is warm and dry.      Coloration: Skin is not jaundiced or pale.      Findings: No erythema or rash.   Neurological:      General: No focal deficit present.      Mental Status: She is alert and oriented to person, place, and time. Mental status is at baseline.      Cranial Nerves: No cranial nerve deficit.      Motor: No weakness.   Psychiatric:         Mood and Affect: Mood normal.           Significant Labs: All pertinent labs within the past 24 hours have been reviewed.  CBC:   Recent Labs   Lab 06/03/23 0256 06/04/23  0514   WBC 2.46* 2.48*   HGB 10.4* 8.7*   HCT 33.6* 28.7*    163       CMP:   Recent Labs   Lab 06/03/23 0256 06/04/23  0514    141   K 4.6 4.1    109   CO2 22* 24    88   BUN 22 26*   CREATININE 1.3 1.3   CALCIUM 9.1 8.8   PROT 5.5* 4.8*   ALBUMIN 3.4* 2.9*   BILITOT 0.3 0.3   ALKPHOS 66 56   AST 15 15   ALT 6* 7*   ANIONGAP 10 8         Significant Imaging: I have reviewed all pertinent imaging results/findings within the past 24 hours.   no concerns

## 2025-08-01 NOTE — SUBJECTIVE & OBJECTIVE
Problem: Chronic Conditions and Co-morbidities  Goal: Patient's chronic conditions and co-morbidity symptoms are monitored and maintained or improved  Outcome: Progressing     Problem: Discharge Planning  Goal: Discharge to home or other facility with appropriate resources  Outcome: Progressing     Problem: Pain  Goal: Verbalizes/displays adequate comfort level or baseline comfort level  Outcome: Progressing     Problem: Skin/Tissue Integrity  Goal: Skin integrity remains intact  Description: 1.  Monitor for areas of redness and/or skin breakdown  2.  Assess vascular access sites hourly  3.  Every 4-6 hours minimum:  Change oxygen saturation probe site  4.  Every 4-6 hours:  If on nasal continuous positive airway pressure, respiratory therapy assess nares and determine need for appliance change or resting period  Outcome: Progressing     Problem: Safety - Adult  Goal: Free from fall injury  Outcome: Progressing      Interval History: NAEON. TLSO in place.     Medications:  Continuous Infusions:  Scheduled Meds:   benztropine  1 mg Oral BID    diclofenac sodium  2 g Topical (Top) QID    enoxaparin  40 mg Subcutaneous Daily    famotidine  20 mg Oral BID    fluticasone  1 puff Inhalation BID    labetalol  300 mg Oral TID    moxifloxacin  400 mg Oral Daily    polyethylene glycol  17 g Oral Daily    potassium, sodium phosphates  1 packet Oral QID (WM & HS)    QUEtiapine  50 mg Oral QHS    senna-docusate 8.6-50 mg  1 tablet Oral BID     PRN Meds:albuterol-ipratropium, dextrose 50%, dextrose 50%, glucagon (human recombinant), glucose, glucose, hydrALAZINE, methyl salicylate-menthol 15-10%, ondansetron, ramelteon, sodium chloride 0.9%, traMADol     Review of Systems  Objective:     Weight: 70.4 kg (155 lb 3.3 oz)  Body mass index is 26.64 kg/m².  Vital Signs (Most Recent):  Temp: 98.5 °F (36.9 °C) (08/12/18 1121)  Pulse: 95 (08/12/18 1121)  Resp: 18 (08/12/18 1121)  BP: (!) 143/89 (08/12/18 1121)  SpO2: 96 % (08/12/18 1121) Vital Signs (24h Range):  Temp:  [98.2 °F (36.8 °C)-99.4 °F (37.4 °C)] 98.5 °F (36.9 °C)  Pulse:  [80-95] 95  Resp:  [18-20] 18  SpO2:  [93 %-96 %] 96 %  BP: (143-165)/(82-99) 143/89     Date 08/12/18 0700 - 08/13/18 0659   Shift 4677-9138 3495-1349 6788-5884 24 Hour Total   INTAKE   P.O. 60   60   Shift Total(mL/kg) 60(0.9)   60(0.9)   OUTPUT   Urine(mL/kg/hr) 250   250   Shift Total(mL/kg) 250(3.6)   250(3.6)   Weight (kg) 70.4 70.4 70.4 70.4                   Female External Urinary Catheter 08/09/18 0000 (Active)   Skin no redness;no breakdown 8/12/2018  7:24 AM   Tolerance no signs/symptoms of discomfort 8/12/2018  7:24 AM   Suction Continuous suction at 70 mmHg 8/12/2018  7:24 AM   Date of last wick change 08/12/18 8/12/2018  7:24 AM   Time of last wick change 2318 8/11/2018 10:48 PM   Output (mL) 500 mL 8/12/2018  6:02 AM       Neurosurgery Physical Exam  Awake, alert, no acute distress  GCS:  Motor: 6/Verbal: 5/Eyes: 4 GCS Total: 15  Mental Status: Awake, Alert, Oriented x 4  Follows commands   Head: normocephalic, atraumatic  PERRL, EOMI,   Facial expression symmetric, tongue midline, shoulder shrug symmetric  Moves all extremities with good strength 5/5  SLR positive on R  SILT  No clonus or babinski   TLSO brace on         Significant Labs:  Recent Labs   Lab  08/11/18   0449  08/12/18   0351   GLU  84  97   NA  140  140   K  3.3*  3.8   CL  108  109   CO2  23  20*   BUN  7*  7*   CREATININE  0.8  0.9   CALCIUM  8.4*  8.3*     Recent Labs   Lab  08/11/18   0449  08/12/18   0351   WBC  4.13  4.78   HGB  8.7*  9.0*   HCT  26.8*  27.5*   PLT  224  240     No results for input(s): LABPT, INR, APTT in the last 48 hours.  Microbiology Results (last 7 days)     Procedure Component Value Units Date/Time    Blood culture [[id number]] Collected:  08/08/18 1801    Order Status:  Completed Specimen:  Blood Updated:  08/11/18 2213     Blood Culture, Routine No Growth to date     Blood Culture, Routine No Growth to date     Blood Culture, Routine No Growth to date     Blood Culture, Routine No Growth to date    Narrative:       Collection has been rescheduled by FB at 8/8/2018 14:42 Reason: nurse   Leonora jain all labs at 1600  Collection has been rescheduled by 2 at 8/8/2018 16:24 Reason:   Patient unavailable  Collection has been rescheduled by 2 at 8/8/2018 16:25 Reason:   Patient unavailable  Collection has been rescheduled by JM2 at 8/8/2018 16:37 Reason:   Patient unavailable  Collection has been rescheduled by FB at 8/8/2018 14:42 Reason: nurse   Leonora clines all labs at 1600  Collection has been rescheduled by 2 at 8/8/2018 16:24 Reason:   Patient unavailable  Collection has been rescheduled by 2 at 8/8/2018 16:25 Reason:   Patient unavailable  Collection has been rescheduled by 2 at 8/8/2018 16:37 Reason:   Patient unavailable    Blood culture [[id number]] Collected:  08/08/18 1800    Order  Status:  Completed Specimen:  Blood Updated:  08/11/18 2213     Blood Culture, Routine No Growth to date     Blood Culture, Routine No Growth to date     Blood Culture, Routine No Growth to date     Blood Culture, Routine No Growth to date    Narrative:       Collection has been rescheduled by FB at 8/8/2018 14:42 Reason: nurse   Leonora clines all labs at 1600  Collection has been rescheduled by JM2 at 8/8/2018 16:24 Reason:   Patient unavailable  Collection has been rescheduled by JM2 at 8/8/2018 16:25 Reason:   Patient unavailable  Collection has been rescheduled by JM2 at 8/8/2018 16:37 Reason:   Patient unavailable  Collection has been rescheduled by FB at 8/8/2018 14:42 Reason: nurse   Leonora wants all labs at 1600  Collection has been rescheduled by JM2 at 8/8/2018 16:24 Reason:   Patient unavailable  Collection has been rescheduled by JM2 at 8/8/2018 16:25 Reason:   Patient unavailable  Collection has been rescheduled by JM2 at 8/8/2018 16:37 Reason:   Patient unavailable    Gram stain [[id number]] Collected:  08/08/18 1558    Order Status:  Completed Specimen:  Urine Updated:  08/09/18 0229     Gram Stain Result Rare WBC's      Few Gram positive rods       Rare budding yeast        Recent Lab Results       08/12/18  1206 08/12/18  0351      Immature Granulocytes  2.3     Immature Grans (Abs)  0.11  Comment:  Mild elevation in immature granulocytes is non specific and   can be seen in a variety of conditions including stress response,   acute inflammation, trauma and pregnancy. Correlation with other   laboratory and clinical findings is essential.       Albumin  2.8     Anion Gap  11     Appearance, UA Clear      Bacteria, UA Few      Baso #  0.01     Basophil%  0.2     Bilirubin (UA) Negative      BUN, Bld  7     Calcium  8.3     Chloride  109     CO2  20     Color, UA Yellow      Creatinine  0.9     Differential Method  Automated     eGFR if African American  >60.0     eGFR if non African American   >60.0  Comment:  Calculation used to obtain the estimated glomerular filtration  rate (eGFR) is the CKD-EPI equation.        Eos #  0.1     Eosinophil%  2.3     Glucose  97     Glucose, UA Negative      Gran # (ANC)  3.1     Gran%  64.8     Hematocrit  27.5     Hemoglobin  9.0     Hyaline Casts, UA 0      Ketones, UA Trace      Leukocytes, UA Negative      Lymph #  0.8     Lymph%  17.2     MCH  28.5     MCHC  32.7     MCV  87     Microscopic Comment SEE COMMENT  Comment:  Other formed elements not mentioned in the report are not   present in the microscopic examination.         Mono #  0.6     Mono%  13.2     MPV  9.3     Nitrite, UA Negative      nRBC  1     Occult Blood UA Negative      pH, UA 7.0      Phosphorus  1.6     Platelets  240     Potassium  3.8     Protein, UA 2+  Comment:  Recommend a 24 hour urine protein or a urine   protein/creatinine ratio if globulin induced proteinuria is  clinically suspected.        RBC  3.16     RBC, UA 0      RDW  14.0     Sodium  140     Specific Gravity, UA 1.010      Specimen UA Urine, Clean Catch      Squam Epithel, UA 6      Urobilinogen, UA Negative      WBC, UA 1      WBC  4.78           Significant Diagnostics:  I have reviewed all pertinent imaging results/findings within the past 24 hours.

## (undated) DEVICE — ROUTER TAPERED 3.0MM

## (undated) DEVICE — KIT SPINAL PATIENT CARE JACK

## (undated) DEVICE — WARMER DRAPE STERILE LF

## (undated) DEVICE — KIT EVACUATOR 3-SPRING 1/8 DRN

## (undated) DEVICE — DRESSING AQUACEL FOAM 5 X 5

## (undated) DEVICE — STAPLER SKIN PROXIMATE WIDE

## (undated) DEVICE — SEE MEDLINE ITEM 146347

## (undated) DEVICE — SEE MEDLINE ITEM 157150

## (undated) DEVICE — DRESSING AQUACEL FOAM 3 X 3

## (undated) DEVICE — DRAPE STERI INSTRUMENT 1018

## (undated) DEVICE — ELECTRODE BLD 1 INCH TEFLON

## (undated) DEVICE — CARTRIDGE OIL

## (undated) DEVICE — GAUZE SPONGE 4X4 12PLY

## (undated) DEVICE — DRAPE C ARM 42 X 120 10/BX

## (undated) DEVICE — DIFFUSER

## (undated) DEVICE — SPONGE GAUZE 16PLY 4X4

## (undated) DEVICE — SUT VICRYL PLUS 2-0

## (undated) DEVICE — DRESSING AQUACEL SACRAL 9 X 9

## (undated) DEVICE — DRAPE STERI-DRAPE 1000 17X11IN

## (undated) DEVICE — DRAPE INCISE IOBAN 2 23X17IN

## (undated) DEVICE — SEE MEDLINE ITEM 157131

## (undated) DEVICE — SUT 2-0 SILK 30IN BLK BRAID

## (undated) DEVICE — DRAPE C-ARMOR EQUIPMENT COVER

## (undated) DEVICE — SUT VICRYL PLUS 2-0 CT1 18

## (undated) DEVICE — FRAZIER 18FR

## (undated) DEVICE — DRESSING TRANS 4X4 TEGADERM

## (undated) DEVICE — BURR ROUND PRECISION 7.5MM

## (undated) DEVICE — SUT MONOCRYL 3-0 PS-2 UND

## (undated) DEVICE — KIT DRESSING PREVENA PLUS

## (undated) DEVICE — MARKER SKIN STND TIP BLUE BARR

## (undated) DEVICE — SPONGE LAP 4X18 PREWASHED

## (undated) DEVICE — TRAY FOLEY 16FR INFECTION CONT

## (undated) DEVICE — SEE MEDLINE ITEM 146417

## (undated) DEVICE — CANISTER INFOV.A.C WOUND 500ML

## (undated) DEVICE — DURAPREP SURG SCRUB 26ML

## (undated) DEVICE — SYR 50CC LL

## (undated) DEVICE — SUT VICRYL+ 1 CT1 18IN

## (undated) DEVICE — SEE MEDLINE ITEM 156905

## (undated) DEVICE — KIT SURGIFLO EVITHROM

## (undated) DEVICE — DRESSING MEPILEX BORDER 4 X 4

## (undated) DEVICE — BUR BONE CUT MICRO TPS 3X3.8MM

## (undated) DEVICE — CORD BIPOLAR 12 FOOT

## (undated) DEVICE — ELECTRODE REM PLYHSV RETURN 9

## (undated) DEVICE — NDL SPINAL 18GX3.5 SPINOCAN

## (undated) DEVICE — TAP 5MM SPINAL POWER

## (undated) DEVICE — DRESSING AQUACEL FOAM 4 X 12

## (undated) DEVICE — TAP POWER 6MM DOUBLE LEAD

## (undated) DEVICE — KIT IRR SUCTION HND PIECE

## (undated) DEVICE — COVER BACK TABLE 72X21

## (undated) DEVICE — TUBE FRAZIER 5MM 2FT SOFT TIP

## (undated) DEVICE — CLOSURE SKIN 1X5 STERI-STRIP

## (undated) DEVICE — SUT SILK 3-0 BLK BR SH 30IN

## (undated) DEVICE — DRAPE ABDOMINAL TIBURON 14X11